# Patient Record
Sex: MALE | Race: WHITE | NOT HISPANIC OR LATINO | Employment: OTHER | URBAN - METROPOLITAN AREA
[De-identification: names, ages, dates, MRNs, and addresses within clinical notes are randomized per-mention and may not be internally consistent; named-entity substitution may affect disease eponyms.]

---

## 2017-02-24 ENCOUNTER — OFFICE VISIT - HEALTHEAST (OUTPATIENT)
Dept: CARDIOLOGY | Facility: CLINIC | Age: 72
End: 2017-02-24

## 2017-02-24 ENCOUNTER — AMBULATORY - HEALTHEAST (OUTPATIENT)
Dept: CARDIOLOGY | Facility: CLINIC | Age: 72
End: 2017-02-24

## 2017-02-24 DIAGNOSIS — I48.20 CHRONIC ATRIAL FIBRILLATION (H): ICD-10-CM

## 2017-02-24 DIAGNOSIS — R07.9 CHEST PAIN, UNSPECIFIED: ICD-10-CM

## 2017-02-24 DIAGNOSIS — E78.00 PURE HYPERCHOLESTEROLEMIA: ICD-10-CM

## 2017-02-24 DIAGNOSIS — G47.33 OBSTRUCTIVE SLEEP APNEA SYNDROME: ICD-10-CM

## 2017-02-24 DIAGNOSIS — I48.91 ATRIAL FIBRILLATION (H): ICD-10-CM

## 2017-02-24 DIAGNOSIS — I10 ESSENTIAL HYPERTENSION: ICD-10-CM

## 2017-02-24 DIAGNOSIS — E11.9 TYPE 2 DIABETES MELLITUS WITHOUT COMPLICATION (H): ICD-10-CM

## 2017-02-24 ASSESSMENT — MIFFLIN-ST. JEOR: SCORE: 1937.82

## 2017-02-25 ENCOUNTER — COMMUNICATION - HEALTHEAST (OUTPATIENT)
Dept: CARDIOLOGY | Facility: CLINIC | Age: 72
End: 2017-02-25

## 2017-02-25 DIAGNOSIS — I48.20 CHRONIC ATRIAL FIBRILLATION (H): ICD-10-CM

## 2017-02-27 ENCOUNTER — HOSPITAL ENCOUNTER (OUTPATIENT)
Dept: CARDIOLOGY | Facility: HOSPITAL | Age: 72
Discharge: HOME | End: 2017-02-27
Attending: INTERNAL MEDICINE

## 2017-02-27 ENCOUNTER — HOSPITAL ENCOUNTER (OUTPATIENT)
Dept: NUCLEAR MEDICINE | Facility: HOSPITAL | Age: 72
Discharge: HOME | End: 2017-02-27
Attending: INTERNAL MEDICINE

## 2017-02-27 DIAGNOSIS — E11.9 TYPE 2 DIABETES MELLITUS WITHOUT COMPLICATION (H): ICD-10-CM

## 2017-02-27 DIAGNOSIS — R07.9 CHEST PAIN, UNSPECIFIED: ICD-10-CM

## 2017-02-27 LAB
CV STRESS MAX HR HE: 83
NUC STRESS EJECTION FRACTION: 68 %
STRESS ECHO BASELINE BP: NORMAL M/S
STRESS ECHO BASELINE HR: 62 BPM
STRESS ECHO CALCULATED PERCENT HR: 56 %
STRESS ECHO LAST STRESS BP: NORMAL M/S

## 2017-03-22 ENCOUNTER — AMBULATORY - HEALTHEAST (OUTPATIENT)
Dept: FAMILY MEDICINE | Facility: CLINIC | Age: 72
End: 2017-03-22

## 2017-03-22 ENCOUNTER — OFFICE VISIT - HEALTHEAST (OUTPATIENT)
Dept: FAMILY MEDICINE | Facility: CLINIC | Age: 72
End: 2017-03-22

## 2017-03-22 DIAGNOSIS — I48.91 ATRIAL FIBRILLATION, UNSPECIFIED TYPE (H): ICD-10-CM

## 2017-03-22 DIAGNOSIS — M75.52 SHOULDER BURSITIS, LEFT: ICD-10-CM

## 2017-03-22 ASSESSMENT — MIFFLIN-ST. JEOR: SCORE: 1931.01

## 2017-03-23 ENCOUNTER — AMBULATORY - HEALTHEAST (OUTPATIENT)
Dept: CARDIOLOGY | Facility: CLINIC | Age: 72
End: 2017-03-23

## 2017-03-23 DIAGNOSIS — I48.91 ATRIAL FIBRILLATION (H): ICD-10-CM

## 2017-03-30 ENCOUNTER — AMBULATORY - HEALTHEAST (OUTPATIENT)
Dept: CARDIOLOGY | Facility: CLINIC | Age: 72
End: 2017-03-30

## 2017-03-30 DIAGNOSIS — I48.91 ATRIAL FIBRILLATION (H): ICD-10-CM

## 2017-04-20 ENCOUNTER — RECORDS - HEALTHEAST (OUTPATIENT)
Dept: ADMINISTRATIVE | Facility: OTHER | Age: 72
End: 2017-04-20

## 2017-05-11 ENCOUNTER — AMBULATORY - HEALTHEAST (OUTPATIENT)
Dept: CARDIOLOGY | Facility: CLINIC | Age: 72
End: 2017-05-11

## 2017-05-11 DIAGNOSIS — I48.91 ATRIAL FIBRILLATION (H): ICD-10-CM

## 2017-05-16 ENCOUNTER — COMMUNICATION - HEALTHEAST (OUTPATIENT)
Dept: CARDIOLOGY | Facility: CLINIC | Age: 72
End: 2017-05-16

## 2017-05-16 DIAGNOSIS — I48.20 CHRONIC ATRIAL FIBRILLATION (H): ICD-10-CM

## 2017-06-13 ENCOUNTER — COMMUNICATION - HEALTHEAST (OUTPATIENT)
Dept: FAMILY MEDICINE | Facility: CLINIC | Age: 72
End: 2017-06-13

## 2017-06-13 DIAGNOSIS — K21.00 GASTROESOPHAGEAL REFLUX DISEASE WITH ESOPHAGITIS: ICD-10-CM

## 2017-06-16 ENCOUNTER — AMBULATORY - HEALTHEAST (OUTPATIENT)
Dept: CARDIOLOGY | Facility: CLINIC | Age: 72
End: 2017-06-16

## 2017-06-16 ENCOUNTER — OFFICE VISIT - HEALTHEAST (OUTPATIENT)
Dept: FAMILY MEDICINE | Facility: CLINIC | Age: 72
End: 2017-06-16

## 2017-06-16 DIAGNOSIS — I48.91 ATRIAL FIBRILLATION (H): ICD-10-CM

## 2017-06-16 DIAGNOSIS — R30.0 DYSURIA: ICD-10-CM

## 2017-06-16 DIAGNOSIS — E11.9 TYPE 2 DIABETES MELLITUS WITHOUT COMPLICATION, WITHOUT LONG-TERM CURRENT USE OF INSULIN (H): ICD-10-CM

## 2017-06-16 DIAGNOSIS — I48.91 ATRIAL FIBRILLATION, UNSPECIFIED TYPE (H): ICD-10-CM

## 2017-06-16 LAB — HBA1C MFR BLD: 7.7 % (ref 3.5–6)

## 2017-06-16 ASSESSMENT — MIFFLIN-ST. JEOR: SCORE: 1911.74

## 2017-07-04 ENCOUNTER — COMMUNICATION - HEALTHEAST (OUTPATIENT)
Dept: FAMILY MEDICINE | Facility: CLINIC | Age: 72
End: 2017-07-04

## 2017-07-04 DIAGNOSIS — I48.91 ATRIAL FIBRILLATION (H): ICD-10-CM

## 2017-08-01 ENCOUNTER — COMMUNICATION - HEALTHEAST (OUTPATIENT)
Dept: SCHEDULING | Facility: CLINIC | Age: 72
End: 2017-08-01

## 2017-08-01 DIAGNOSIS — L50.9 URTICARIA, UNSPECIFIED: ICD-10-CM

## 2017-08-15 ENCOUNTER — COMMUNICATION - HEALTHEAST (OUTPATIENT)
Dept: FAMILY MEDICINE | Facility: CLINIC | Age: 72
End: 2017-08-15

## 2017-08-15 DIAGNOSIS — Z13.1 DM (DIABETES MELLITUS SCREEN): ICD-10-CM

## 2017-08-15 DIAGNOSIS — I48.91 ATRIAL FIBRILLATION (H): ICD-10-CM

## 2017-08-15 DIAGNOSIS — I10 UNSPECIFIED ESSENTIAL HYPERTENSION: ICD-10-CM

## 2017-08-28 ENCOUNTER — COMMUNICATION - HEALTHEAST (OUTPATIENT)
Dept: FAMILY MEDICINE | Facility: CLINIC | Age: 72
End: 2017-08-28

## 2017-08-28 ENCOUNTER — COMMUNICATION - HEALTHEAST (OUTPATIENT)
Dept: CARDIOLOGY | Facility: CLINIC | Age: 72
End: 2017-08-28

## 2017-08-28 DIAGNOSIS — I48.20 CHRONIC ATRIAL FIBRILLATION (H): ICD-10-CM

## 2017-09-05 ENCOUNTER — RECORDS - HEALTHEAST (OUTPATIENT)
Dept: ADMINISTRATIVE | Facility: OTHER | Age: 72
End: 2017-09-05

## 2017-09-05 ENCOUNTER — AMBULATORY - HEALTHEAST (OUTPATIENT)
Dept: CARDIOLOGY | Facility: CLINIC | Age: 72
End: 2017-09-05

## 2017-09-05 DIAGNOSIS — I48.91 ATRIAL FIBRILLATION (H): ICD-10-CM

## 2017-09-21 ENCOUNTER — COMMUNICATION - HEALTHEAST (OUTPATIENT)
Dept: CARDIOLOGY | Facility: CLINIC | Age: 72
End: 2017-09-21

## 2017-09-21 DIAGNOSIS — I48.20 CHRONIC ATRIAL FIBRILLATION (H): ICD-10-CM

## 2017-10-02 ENCOUNTER — OFFICE VISIT - HEALTHEAST (OUTPATIENT)
Dept: FAMILY MEDICINE | Facility: CLINIC | Age: 72
End: 2017-10-02

## 2017-10-02 DIAGNOSIS — Z01.818 PREOP EXAMINATION: ICD-10-CM

## 2017-10-02 DIAGNOSIS — Z23 NEED FOR VACCINATION: ICD-10-CM

## 2017-10-02 DIAGNOSIS — H26.9 CATARACT: ICD-10-CM

## 2017-10-02 ASSESSMENT — MIFFLIN-ST. JEOR: SCORE: 1942.35

## 2017-10-03 ENCOUNTER — AMBULATORY - HEALTHEAST (OUTPATIENT)
Dept: CARDIOLOGY | Facility: CLINIC | Age: 72
End: 2017-10-03

## 2017-10-03 DIAGNOSIS — I48.91 ATRIAL FIBRILLATION (H): ICD-10-CM

## 2017-10-24 ENCOUNTER — RECORDS - HEALTHEAST (OUTPATIENT)
Dept: ADMINISTRATIVE | Facility: OTHER | Age: 72
End: 2017-10-24

## 2017-10-31 ENCOUNTER — AMBULATORY - HEALTHEAST (OUTPATIENT)
Dept: CARDIOLOGY | Facility: CLINIC | Age: 72
End: 2017-10-31

## 2017-10-31 DIAGNOSIS — I48.91 ATRIAL FIBRILLATION (H): ICD-10-CM

## 2017-11-14 ENCOUNTER — COMMUNICATION - HEALTHEAST (OUTPATIENT)
Dept: FAMILY MEDICINE | Facility: CLINIC | Age: 72
End: 2017-11-14

## 2017-11-14 ENCOUNTER — RECORDS - HEALTHEAST (OUTPATIENT)
Dept: ADMINISTRATIVE | Facility: OTHER | Age: 72
End: 2017-11-14

## 2017-11-14 DIAGNOSIS — E11.9 TYPE 2 DIABETES MELLITUS WITHOUT COMPLICATION, WITHOUT LONG-TERM CURRENT USE OF INSULIN (H): ICD-10-CM

## 2017-11-25 ENCOUNTER — COMMUNICATION - HEALTHEAST (OUTPATIENT)
Dept: FAMILY MEDICINE | Facility: CLINIC | Age: 72
End: 2017-11-25

## 2017-11-25 DIAGNOSIS — G60.9 HEREDITARY AND IDIOPATHIC PERIPHERAL NEUROPATHY: ICD-10-CM

## 2017-12-02 ENCOUNTER — COMMUNICATION - HEALTHEAST (OUTPATIENT)
Dept: SCHEDULING | Facility: CLINIC | Age: 72
End: 2017-12-02

## 2017-12-03 ENCOUNTER — COMMUNICATION - HEALTHEAST (OUTPATIENT)
Dept: FAMILY MEDICINE | Facility: CLINIC | Age: 72
End: 2017-12-03

## 2017-12-03 DIAGNOSIS — E78.00 PURE HYPERCHOLESTEROLEMIA: ICD-10-CM

## 2017-12-06 ENCOUNTER — COMMUNICATION - HEALTHEAST (OUTPATIENT)
Dept: CARDIOLOGY | Facility: CLINIC | Age: 72
End: 2017-12-06

## 2017-12-06 DIAGNOSIS — I48.20 CHRONIC ATRIAL FIBRILLATION (H): ICD-10-CM

## 2017-12-14 ENCOUNTER — AMBULATORY - HEALTHEAST (OUTPATIENT)
Dept: CARDIOLOGY | Facility: CLINIC | Age: 72
End: 2017-12-14

## 2017-12-14 DIAGNOSIS — I48.91 ATRIAL FIBRILLATION (H): ICD-10-CM

## 2017-12-14 DIAGNOSIS — I48.91 ATRIAL FIBRILLATION, UNSPECIFIED TYPE (H): ICD-10-CM

## 2017-12-29 ENCOUNTER — COMMUNICATION - HEALTHEAST (OUTPATIENT)
Dept: CARDIOLOGY | Facility: CLINIC | Age: 72
End: 2017-12-29

## 2017-12-29 DIAGNOSIS — I48.20 CHRONIC ATRIAL FIBRILLATION (H): ICD-10-CM

## 2018-01-05 ENCOUNTER — COMMUNICATION - HEALTHEAST (OUTPATIENT)
Dept: SCHEDULING | Facility: CLINIC | Age: 73
End: 2018-01-05

## 2018-01-18 ENCOUNTER — AMBULATORY - HEALTHEAST (OUTPATIENT)
Dept: CARDIOLOGY | Facility: CLINIC | Age: 73
End: 2018-01-18

## 2018-01-18 DIAGNOSIS — I48.91 ATRIAL FIBRILLATION (H): ICD-10-CM

## 2018-01-18 LAB — INTERNATIONAL NORMALIZATION RATIO, POC - HISTORICAL: 1.7

## 2018-02-13 ENCOUNTER — COMMUNICATION - HEALTHEAST (OUTPATIENT)
Dept: CARDIOLOGY | Facility: CLINIC | Age: 73
End: 2018-02-13

## 2018-02-13 DIAGNOSIS — I48.20 CHRONIC ATRIAL FIBRILLATION (H): ICD-10-CM

## 2018-02-15 ENCOUNTER — COMMUNICATION - HEALTHEAST (OUTPATIENT)
Dept: CARDIOLOGY | Facility: CLINIC | Age: 73
End: 2018-02-15

## 2018-02-15 DIAGNOSIS — I48.20 CHRONIC ATRIAL FIBRILLATION (H): ICD-10-CM

## 2018-02-19 ENCOUNTER — AMBULATORY - HEALTHEAST (OUTPATIENT)
Dept: CARDIOLOGY | Facility: CLINIC | Age: 73
End: 2018-02-19

## 2018-02-19 DIAGNOSIS — I48.91 ATRIAL FIBRILLATION (H): ICD-10-CM

## 2018-02-19 LAB — INTERNATIONAL NORMALIZATION RATIO, POC - HISTORICAL: 2.1

## 2018-03-19 ENCOUNTER — AMBULATORY - HEALTHEAST (OUTPATIENT)
Dept: CARDIOLOGY | Facility: CLINIC | Age: 73
End: 2018-03-19

## 2018-03-19 DIAGNOSIS — Z79.01 LONG-TERM (CURRENT) USE OF ANTICOAGULANTS: ICD-10-CM

## 2018-03-19 DIAGNOSIS — I48.91 ATRIAL FIBRILLATION (H): ICD-10-CM

## 2018-03-19 LAB — POC INR - HE - HISTORICAL: 2.6 (ref 0.9–1.1)

## 2018-03-25 ENCOUNTER — COMMUNICATION - HEALTHEAST (OUTPATIENT)
Dept: CARDIOLOGY | Facility: CLINIC | Age: 73
End: 2018-03-25

## 2018-03-25 DIAGNOSIS — I48.20 CHRONIC ATRIAL FIBRILLATION (H): ICD-10-CM

## 2018-04-04 ENCOUNTER — AMBULATORY - HEALTHEAST (OUTPATIENT)
Dept: CARDIOLOGY | Facility: CLINIC | Age: 73
End: 2018-04-04

## 2018-04-04 ENCOUNTER — OFFICE VISIT - HEALTHEAST (OUTPATIENT)
Dept: CARDIOLOGY | Facility: CLINIC | Age: 73
End: 2018-04-04

## 2018-04-04 DIAGNOSIS — E78.00 PURE HYPERCHOLESTEROLEMIA: ICD-10-CM

## 2018-04-04 DIAGNOSIS — I10 ESSENTIAL HYPERTENSION: ICD-10-CM

## 2018-04-04 DIAGNOSIS — I48.91 ATRIAL FIBRILLATION (H): ICD-10-CM

## 2018-04-04 DIAGNOSIS — I48.20 CHRONIC ATRIAL FIBRILLATION (H): ICD-10-CM

## 2018-04-04 LAB — INTERNATIONAL NORMALIZATION RATIO, POC - HISTORICAL: 2.7

## 2018-04-04 ASSESSMENT — MIFFLIN-ST. JEOR: SCORE: 1910.6

## 2018-04-13 ENCOUNTER — COMMUNICATION - HEALTHEAST (OUTPATIENT)
Dept: FAMILY MEDICINE | Facility: CLINIC | Age: 73
End: 2018-04-13

## 2018-04-13 DIAGNOSIS — I48.91 ATRIAL FIBRILLATION (H): ICD-10-CM

## 2018-04-24 ENCOUNTER — RECORDS - HEALTHEAST (OUTPATIENT)
Dept: ADMINISTRATIVE | Facility: OTHER | Age: 73
End: 2018-04-24

## 2018-05-05 ENCOUNTER — COMMUNICATION - HEALTHEAST (OUTPATIENT)
Dept: FAMILY MEDICINE | Facility: CLINIC | Age: 73
End: 2018-05-05

## 2018-05-05 ENCOUNTER — COMMUNICATION - HEALTHEAST (OUTPATIENT)
Dept: CARDIOLOGY | Facility: CLINIC | Age: 73
End: 2018-05-05

## 2018-05-05 DIAGNOSIS — Z13.1 ENCOUNTER FOR SCREENING FOR DIABETES MELLITUS: ICD-10-CM

## 2018-05-05 DIAGNOSIS — I10 ESSENTIAL HYPERTENSION: ICD-10-CM

## 2018-05-05 DIAGNOSIS — I48.91 ATRIAL FIBRILLATION (H): ICD-10-CM

## 2018-05-05 DIAGNOSIS — I48.20 CHRONIC ATRIAL FIBRILLATION (H): ICD-10-CM

## 2018-05-15 ENCOUNTER — RECORDS - HEALTHEAST (OUTPATIENT)
Dept: ADMINISTRATIVE | Facility: OTHER | Age: 73
End: 2018-05-15

## 2018-05-23 ENCOUNTER — AMBULATORY - HEALTHEAST (OUTPATIENT)
Dept: CARDIOLOGY | Facility: CLINIC | Age: 73
End: 2018-05-23

## 2018-05-23 DIAGNOSIS — I48.91 ATRIAL FIBRILLATION (H): ICD-10-CM

## 2018-05-23 LAB — INTERNATIONAL NORMALIZATION RATIO, POC - HISTORICAL: 2.1

## 2018-05-25 ENCOUNTER — COMMUNICATION - HEALTHEAST (OUTPATIENT)
Dept: FAMILY MEDICINE | Facility: CLINIC | Age: 73
End: 2018-05-25

## 2018-05-25 DIAGNOSIS — G60.9 HEREDITARY AND IDIOPATHIC PERIPHERAL NEUROPATHY: ICD-10-CM

## 2018-06-14 ENCOUNTER — COMMUNICATION - HEALTHEAST (OUTPATIENT)
Dept: CARDIOLOGY | Facility: CLINIC | Age: 73
End: 2018-06-14

## 2018-06-14 DIAGNOSIS — I48.20 CHRONIC ATRIAL FIBRILLATION (H): ICD-10-CM

## 2018-07-25 ENCOUNTER — AMBULATORY - HEALTHEAST (OUTPATIENT)
Dept: CARDIOLOGY | Facility: CLINIC | Age: 73
End: 2018-07-25

## 2018-07-25 DIAGNOSIS — I48.91 ATRIAL FIBRILLATION (H): ICD-10-CM

## 2018-07-25 LAB — INTERNATIONAL NORMALIZATION RATIO, POC - HISTORICAL: 2

## 2018-09-16 ENCOUNTER — COMMUNICATION - HEALTHEAST (OUTPATIENT)
Dept: CARDIOLOGY | Facility: CLINIC | Age: 73
End: 2018-09-16

## 2018-09-16 DIAGNOSIS — I48.20 CHRONIC ATRIAL FIBRILLATION (H): ICD-10-CM

## 2018-09-19 ENCOUNTER — AMBULATORY - HEALTHEAST (OUTPATIENT)
Dept: CARDIOLOGY | Facility: CLINIC | Age: 73
End: 2018-09-19

## 2018-09-19 DIAGNOSIS — I48.91 ATRIAL FIBRILLATION (H): ICD-10-CM

## 2018-09-19 LAB — INTERNATIONAL NORMALIZATION RATIO, POC - HISTORICAL: 2.3

## 2018-09-20 ENCOUNTER — OFFICE VISIT - HEALTHEAST (OUTPATIENT)
Dept: FAMILY MEDICINE | Facility: CLINIC | Age: 73
End: 2018-09-20

## 2018-09-20 ENCOUNTER — COMMUNICATION - HEALTHEAST (OUTPATIENT)
Dept: SCHEDULING | Facility: CLINIC | Age: 73
End: 2018-09-20

## 2018-09-20 DIAGNOSIS — I48.91 ATRIAL FIBRILLATION (H): ICD-10-CM

## 2018-09-20 DIAGNOSIS — M79.89 CALF SWELLING: ICD-10-CM

## 2018-09-20 DIAGNOSIS — E11.9 TYPE 2 DIABETES MELLITUS WITHOUT COMPLICATION, WITHOUT LONG-TERM CURRENT USE OF INSULIN (H): ICD-10-CM

## 2018-09-20 DIAGNOSIS — I10 ESSENTIAL HYPERTENSION: ICD-10-CM

## 2018-09-20 LAB
ANION GAP SERPL CALCULATED.3IONS-SCNC: 12 MMOL/L (ref 5–18)
BUN SERPL-MCNC: 19 MG/DL (ref 8–28)
CALCIUM SERPL-MCNC: 10.4 MG/DL (ref 8.5–10.5)
CHLORIDE BLD-SCNC: 105 MMOL/L (ref 98–107)
CO2 SERPL-SCNC: 22 MMOL/L (ref 22–31)
CREAT SERPL-MCNC: 0.77 MG/DL (ref 0.7–1.3)
D DIMER PPP FEU-MCNC: 0.28 FEU UG/ML
GFR SERPL CREATININE-BSD FRML MDRD: >60 ML/MIN/1.73M2
GLUCOSE BLD-MCNC: 131 MG/DL (ref 70–125)
HBA1C MFR BLD: 7.7 % (ref 3.5–6)
POTASSIUM BLD-SCNC: 4.8 MMOL/L (ref 3.5–5)
SODIUM SERPL-SCNC: 139 MMOL/L (ref 136–145)

## 2018-09-20 ASSESSMENT — MIFFLIN-ST. JEOR: SCORE: 1925.57

## 2018-09-21 ENCOUNTER — RECORDS - HEALTHEAST (OUTPATIENT)
Dept: VASCULAR ULTRASOUND | Facility: CLINIC | Age: 73
End: 2018-09-21

## 2018-09-21 ENCOUNTER — COMMUNICATION - HEALTHEAST (OUTPATIENT)
Dept: FAMILY MEDICINE | Facility: CLINIC | Age: 73
End: 2018-09-21

## 2018-09-21 DIAGNOSIS — M79.89 OTHER SPECIFIED SOFT TISSUE DISORDERS: ICD-10-CM

## 2018-10-01 ENCOUNTER — OFFICE VISIT - HEALTHEAST (OUTPATIENT)
Dept: FAMILY MEDICINE | Facility: CLINIC | Age: 73
End: 2018-10-01

## 2018-10-01 DIAGNOSIS — M79.662 PAIN OF LEFT LOWER LEG: ICD-10-CM

## 2018-10-01 DIAGNOSIS — D12.6 ADENOMATOUS POLYP OF COLON, UNSPECIFIED PART OF COLON: ICD-10-CM

## 2018-10-01 ASSESSMENT — MIFFLIN-ST. JEOR: SCORE: 1918.54

## 2018-10-03 ENCOUNTER — COMMUNICATION - HEALTHEAST (OUTPATIENT)
Dept: CARDIOLOGY | Facility: CLINIC | Age: 73
End: 2018-10-03

## 2018-10-12 ENCOUNTER — COMMUNICATION - HEALTHEAST (OUTPATIENT)
Dept: FAMILY MEDICINE | Facility: CLINIC | Age: 73
End: 2018-10-12

## 2018-10-12 DIAGNOSIS — I10 ESSENTIAL HYPERTENSION: ICD-10-CM

## 2018-10-12 DIAGNOSIS — I48.91 ATRIAL FIBRILLATION (H): ICD-10-CM

## 2018-10-30 ENCOUNTER — COMMUNICATION - HEALTHEAST (OUTPATIENT)
Dept: CARDIOLOGY | Facility: CLINIC | Age: 73
End: 2018-10-30

## 2018-10-30 ENCOUNTER — AMBULATORY - HEALTHEAST (OUTPATIENT)
Dept: CARDIOLOGY | Facility: CLINIC | Age: 73
End: 2018-10-30

## 2018-10-30 DIAGNOSIS — I48.91 ATRIAL FIBRILLATION (H): ICD-10-CM

## 2018-10-30 LAB — INTERNATIONAL NORMALIZATION RATIO, POC - HISTORICAL: 1.8

## 2018-11-03 ENCOUNTER — COMMUNICATION - HEALTHEAST (OUTPATIENT)
Dept: FAMILY MEDICINE | Facility: CLINIC | Age: 73
End: 2018-11-03

## 2018-11-03 DIAGNOSIS — Z13.1 ENCOUNTER FOR SCREENING FOR DIABETES MELLITUS: ICD-10-CM

## 2018-11-08 ENCOUNTER — RECORDS - HEALTHEAST (OUTPATIENT)
Dept: ADMINISTRATIVE | Facility: OTHER | Age: 73
End: 2018-11-08

## 2018-11-16 ENCOUNTER — COMMUNICATION - HEALTHEAST (OUTPATIENT)
Dept: FAMILY MEDICINE | Facility: CLINIC | Age: 73
End: 2018-11-16

## 2018-11-16 DIAGNOSIS — G60.9 HEREDITARY AND IDIOPATHIC PERIPHERAL NEUROPATHY: ICD-10-CM

## 2018-11-20 ENCOUNTER — OFFICE VISIT - HEALTHEAST (OUTPATIENT)
Dept: FAMILY MEDICINE | Facility: CLINIC | Age: 73
End: 2018-11-20

## 2018-11-20 ENCOUNTER — AMBULATORY - HEALTHEAST (OUTPATIENT)
Dept: CARDIOLOGY | Facility: CLINIC | Age: 73
End: 2018-11-20

## 2018-11-20 DIAGNOSIS — I48.91 ATRIAL FIBRILLATION (H): ICD-10-CM

## 2018-11-20 DIAGNOSIS — M75.52 BURSITIS OF LEFT SHOULDER: ICD-10-CM

## 2018-11-20 LAB — INR PPP: 2.1 (ref 0.9–1.1)

## 2018-11-20 ASSESSMENT — MIFFLIN-ST. JEOR: SCORE: 1937.82

## 2018-11-24 ENCOUNTER — COMMUNICATION - HEALTHEAST (OUTPATIENT)
Dept: FAMILY MEDICINE | Facility: CLINIC | Age: 73
End: 2018-11-24

## 2018-11-24 DIAGNOSIS — E11.9 TYPE 2 DIABETES MELLITUS WITHOUT COMPLICATION, WITHOUT LONG-TERM CURRENT USE OF INSULIN (H): ICD-10-CM

## 2018-11-26 ENCOUNTER — OFFICE VISIT - HEALTHEAST (OUTPATIENT)
Dept: FAMILY MEDICINE | Facility: CLINIC | Age: 73
End: 2018-11-26

## 2018-11-26 DIAGNOSIS — M75.52 BURSITIS OF LEFT SHOULDER: ICD-10-CM

## 2018-11-26 DIAGNOSIS — I48.20 CHRONIC ATRIAL FIBRILLATION (H): ICD-10-CM

## 2018-11-26 DIAGNOSIS — E66.01 MORBID OBESITY (H): ICD-10-CM

## 2018-11-26 ASSESSMENT — MIFFLIN-ST. JEOR: SCORE: 1942.35

## 2018-11-30 ENCOUNTER — COMMUNICATION - HEALTHEAST (OUTPATIENT)
Dept: CARDIOLOGY | Facility: CLINIC | Age: 73
End: 2018-11-30

## 2018-12-07 ENCOUNTER — COMMUNICATION - HEALTHEAST (OUTPATIENT)
Dept: FAMILY MEDICINE | Facility: CLINIC | Age: 73
End: 2018-12-07

## 2018-12-07 DIAGNOSIS — E11.9 TYPE 2 DIABETES MELLITUS WITHOUT COMPLICATION, WITHOUT LONG-TERM CURRENT USE OF INSULIN (H): ICD-10-CM

## 2018-12-11 ENCOUNTER — AMBULATORY - HEALTHEAST (OUTPATIENT)
Dept: CARDIOLOGY | Facility: CLINIC | Age: 73
End: 2018-12-11

## 2018-12-11 ENCOUNTER — COMMUNICATION - HEALTHEAST (OUTPATIENT)
Dept: FAMILY MEDICINE | Facility: CLINIC | Age: 73
End: 2018-12-11

## 2018-12-11 DIAGNOSIS — I48.91 ATRIAL FIBRILLATION (H): ICD-10-CM

## 2018-12-11 DIAGNOSIS — E78.00 PURE HYPERCHOLESTEROLEMIA: ICD-10-CM

## 2018-12-11 LAB — INTERNATIONAL NORMALIZATION RATIO, POC - HISTORICAL: 2.1

## 2018-12-19 ENCOUNTER — COMMUNICATION - HEALTHEAST (OUTPATIENT)
Dept: FAMILY MEDICINE | Facility: CLINIC | Age: 73
End: 2018-12-19

## 2018-12-19 DIAGNOSIS — K21.00 GASTROESOPHAGEAL REFLUX DISEASE WITH ESOPHAGITIS: ICD-10-CM

## 2018-12-20 ENCOUNTER — OFFICE VISIT - HEALTHEAST (OUTPATIENT)
Dept: FAMILY MEDICINE | Facility: CLINIC | Age: 73
End: 2018-12-20

## 2018-12-20 DIAGNOSIS — K58.9 SPASM OF BOWEL: ICD-10-CM

## 2018-12-20 DIAGNOSIS — R13.10 DYSPHAGIA, UNSPECIFIED TYPE: ICD-10-CM

## 2018-12-20 DIAGNOSIS — I48.91 ATRIAL FIBRILLATION, UNSPECIFIED TYPE (H): ICD-10-CM

## 2018-12-20 DIAGNOSIS — E11.9 TYPE 2 DIABETES MELLITUS WITHOUT COMPLICATION, WITHOUT LONG-TERM CURRENT USE OF INSULIN (H): ICD-10-CM

## 2018-12-20 DIAGNOSIS — K21.00 GASTROESOPHAGEAL REFLUX DISEASE WITH ESOPHAGITIS: ICD-10-CM

## 2018-12-20 ASSESSMENT — MIFFLIN-ST. JEOR: SCORE: 1913.32

## 2018-12-26 ENCOUNTER — HOSPITAL ENCOUNTER (OUTPATIENT)
Dept: RADIOLOGY | Facility: HOSPITAL | Age: 73
Discharge: HOME | End: 2018-12-26
Attending: FAMILY MEDICINE

## 2018-12-26 DIAGNOSIS — R13.10 DYSPHAGIA, UNSPECIFIED TYPE: ICD-10-CM

## 2019-01-03 ENCOUNTER — COMMUNICATION - HEALTHEAST (OUTPATIENT)
Dept: FAMILY MEDICINE | Facility: CLINIC | Age: 74
End: 2019-01-03

## 2019-01-04 ENCOUNTER — AMBULATORY - HEALTHEAST (OUTPATIENT)
Dept: FAMILY MEDICINE | Facility: CLINIC | Age: 74
End: 2019-01-04

## 2019-01-09 ENCOUNTER — AMBULATORY - HEALTHEAST (OUTPATIENT)
Dept: FAMILY MEDICINE | Facility: CLINIC | Age: 74
End: 2019-01-09

## 2019-01-09 ENCOUNTER — COMMUNICATION - HEALTHEAST (OUTPATIENT)
Dept: FAMILY MEDICINE | Facility: CLINIC | Age: 74
End: 2019-01-09

## 2019-01-09 DIAGNOSIS — R13.10 DYSPHAGIA, UNSPECIFIED TYPE: ICD-10-CM

## 2019-01-10 ENCOUNTER — RECORDS - HEALTHEAST (OUTPATIENT)
Dept: ADMINISTRATIVE | Facility: OTHER | Age: 74
End: 2019-01-10

## 2019-01-11 ENCOUNTER — COMMUNICATION - HEALTHEAST (OUTPATIENT)
Dept: CARDIOLOGY | Facility: CLINIC | Age: 74
End: 2019-01-11

## 2019-01-11 DIAGNOSIS — I48.20 CHRONIC ATRIAL FIBRILLATION (H): ICD-10-CM

## 2019-01-30 ENCOUNTER — COMMUNICATION - HEALTHEAST (OUTPATIENT)
Dept: CARDIOLOGY | Facility: CLINIC | Age: 74
End: 2019-01-30

## 2019-01-30 ENCOUNTER — AMBULATORY - HEALTHEAST (OUTPATIENT)
Dept: CARDIOLOGY | Facility: CLINIC | Age: 74
End: 2019-01-30

## 2019-01-30 DIAGNOSIS — I48.91 ATRIAL FIBRILLATION (H): ICD-10-CM

## 2019-02-05 ENCOUNTER — OFFICE VISIT - HEALTHEAST (OUTPATIENT)
Dept: FAMILY MEDICINE | Facility: CLINIC | Age: 74
End: 2019-02-05

## 2019-02-05 DIAGNOSIS — I48.91 ATRIAL FIBRILLATION, UNSPECIFIED TYPE (H): ICD-10-CM

## 2019-02-05 DIAGNOSIS — L60.2 ONYCHOGRYPOSIS OF TOENAIL: ICD-10-CM

## 2019-02-05 DIAGNOSIS — E78.00 PURE HYPERCHOLESTEROLEMIA: ICD-10-CM

## 2019-02-05 DIAGNOSIS — E11.9 TYPE 2 DIABETES MELLITUS WITHOUT COMPLICATION, WITHOUT LONG-TERM CURRENT USE OF INSULIN (H): ICD-10-CM

## 2019-02-05 LAB
HBA1C MFR BLD: 8.2 % (ref 3.5–6)
INR PPP: 3.2 (ref 0.9–1.1)
LDLC SERPL CALC-MCNC: 93 MG/DL

## 2019-02-05 ASSESSMENT — MIFFLIN-ST. JEOR: SCORE: 1920.81

## 2019-02-11 ENCOUNTER — COMMUNICATION - HEALTHEAST (OUTPATIENT)
Dept: ANTICOAGULATION | Facility: CLINIC | Age: 74
End: 2019-02-11

## 2019-02-11 DIAGNOSIS — I48.91 ATRIAL FIBRILLATION, UNSPECIFIED TYPE (H): ICD-10-CM

## 2019-02-14 ENCOUNTER — AMBULATORY - HEALTHEAST (OUTPATIENT)
Dept: PODIATRY | Facility: CLINIC | Age: 74
End: 2019-02-14

## 2019-02-18 ENCOUNTER — AMBULATORY - HEALTHEAST (OUTPATIENT)
Dept: LAB | Facility: CLINIC | Age: 74
End: 2019-02-18

## 2019-02-18 ENCOUNTER — COMMUNICATION - HEALTHEAST (OUTPATIENT)
Dept: ANTICOAGULATION | Facility: CLINIC | Age: 74
End: 2019-02-18

## 2019-02-18 ENCOUNTER — OFFICE VISIT - HEALTHEAST (OUTPATIENT)
Dept: PODIATRY | Facility: CLINIC | Age: 74
End: 2019-02-18

## 2019-02-18 DIAGNOSIS — B35.1 NAIL FUNGUS: ICD-10-CM

## 2019-02-18 DIAGNOSIS — L03.031 CELLULITIS OF TOE OF RIGHT FOOT: ICD-10-CM

## 2019-02-18 DIAGNOSIS — G58.9 MONONEURITIS: ICD-10-CM

## 2019-02-18 DIAGNOSIS — E11.9 TYPE 2 DIABETES MELLITUS WITHOUT COMPLICATION, WITHOUT LONG-TERM CURRENT USE OF INSULIN (H): ICD-10-CM

## 2019-02-18 DIAGNOSIS — I48.91 ATRIAL FIBRILLATION, UNSPECIFIED TYPE (H): ICD-10-CM

## 2019-02-18 DIAGNOSIS — L60.2 ONYCHAUXIS: ICD-10-CM

## 2019-02-18 DIAGNOSIS — G58.9 COMPRESSION NEUROPATHY: ICD-10-CM

## 2019-02-18 DIAGNOSIS — G57.50 TARSAL TUNNEL SYNDROME, UNSPECIFIED LATERALITY: ICD-10-CM

## 2019-02-18 LAB — INR PPP: 2.7 (ref 0.9–1.1)

## 2019-02-18 ASSESSMENT — MIFFLIN-ST. JEOR: SCORE: 1919.67

## 2019-02-21 ENCOUNTER — COMMUNICATION - HEALTHEAST (OUTPATIENT)
Dept: FAMILY MEDICINE | Facility: CLINIC | Age: 74
End: 2019-02-21

## 2019-02-25 ENCOUNTER — COMMUNICATION - HEALTHEAST (OUTPATIENT)
Dept: ANTICOAGULATION | Facility: CLINIC | Age: 74
End: 2019-02-25

## 2019-03-04 ENCOUNTER — COMMUNICATION - HEALTHEAST (OUTPATIENT)
Dept: ANTICOAGULATION | Facility: CLINIC | Age: 74
End: 2019-03-04

## 2019-03-04 ENCOUNTER — OFFICE VISIT - HEALTHEAST (OUTPATIENT)
Dept: PODIATRY | Facility: CLINIC | Age: 74
End: 2019-03-04

## 2019-03-04 ENCOUNTER — AMBULATORY - HEALTHEAST (OUTPATIENT)
Dept: LAB | Facility: CLINIC | Age: 74
End: 2019-03-04

## 2019-03-04 DIAGNOSIS — L97.511: ICD-10-CM

## 2019-03-04 DIAGNOSIS — I48.91 ATRIAL FIBRILLATION, UNSPECIFIED TYPE (H): ICD-10-CM

## 2019-03-04 DIAGNOSIS — E13.621: ICD-10-CM

## 2019-03-04 DIAGNOSIS — L03.031 CELLULITIS OF TOE OF RIGHT FOOT: ICD-10-CM

## 2019-03-04 LAB — INR PPP: 2.3 (ref 0.9–1.1)

## 2019-03-04 ASSESSMENT — MIFFLIN-ST. JEOR: SCORE: 1919.67

## 2019-03-18 ENCOUNTER — OFFICE VISIT - HEALTHEAST (OUTPATIENT)
Dept: PODIATRY | Facility: CLINIC | Age: 74
End: 2019-03-18

## 2019-03-18 DIAGNOSIS — E13.621: ICD-10-CM

## 2019-03-18 DIAGNOSIS — L97.511: ICD-10-CM

## 2019-03-20 ENCOUNTER — COMMUNICATION - HEALTHEAST (OUTPATIENT)
Dept: FAMILY MEDICINE | Facility: CLINIC | Age: 74
End: 2019-03-20

## 2019-03-20 ENCOUNTER — RECORDS - HEALTHEAST (OUTPATIENT)
Dept: ADMINISTRATIVE | Facility: OTHER | Age: 74
End: 2019-03-20

## 2019-03-20 DIAGNOSIS — E11.9 TYPE 2 DIABETES MELLITUS WITHOUT COMPLICATION, WITHOUT LONG-TERM CURRENT USE OF INSULIN (H): ICD-10-CM

## 2019-03-28 ENCOUNTER — COMMUNICATION - HEALTHEAST (OUTPATIENT)
Dept: CARDIOLOGY | Facility: CLINIC | Age: 74
End: 2019-03-28

## 2019-03-29 ENCOUNTER — COMMUNICATION - HEALTHEAST (OUTPATIENT)
Dept: FAMILY MEDICINE | Facility: CLINIC | Age: 74
End: 2019-03-29

## 2019-03-29 ENCOUNTER — COMMUNICATION - HEALTHEAST (OUTPATIENT)
Dept: CARDIOLOGY | Facility: CLINIC | Age: 74
End: 2019-03-29

## 2019-04-03 ENCOUNTER — COMMUNICATION - HEALTHEAST (OUTPATIENT)
Dept: CARDIOLOGY | Facility: CLINIC | Age: 74
End: 2019-04-03

## 2019-04-03 DIAGNOSIS — I48.20 CHRONIC ATRIAL FIBRILLATION (H): ICD-10-CM

## 2019-04-08 ENCOUNTER — COMMUNICATION - HEALTHEAST (OUTPATIENT)
Dept: ANTICOAGULATION | Facility: CLINIC | Age: 74
End: 2019-04-08

## 2019-04-08 DIAGNOSIS — I48.91 ATRIAL FIBRILLATION, UNSPECIFIED TYPE (H): ICD-10-CM

## 2019-04-19 ENCOUNTER — COMMUNICATION - HEALTHEAST (OUTPATIENT)
Dept: FAMILY MEDICINE | Facility: CLINIC | Age: 74
End: 2019-04-19

## 2019-04-23 ENCOUNTER — COMMUNICATION - HEALTHEAST (OUTPATIENT)
Dept: FAMILY MEDICINE | Facility: CLINIC | Age: 74
End: 2019-04-23

## 2019-04-23 ENCOUNTER — AMBULATORY - HEALTHEAST (OUTPATIENT)
Dept: LAB | Facility: CLINIC | Age: 74
End: 2019-04-23

## 2019-04-23 DIAGNOSIS — I48.91 ATRIAL FIBRILLATION, UNSPECIFIED TYPE (H): ICD-10-CM

## 2019-04-23 DIAGNOSIS — I48.20 CHRONIC ATRIAL FIBRILLATION (H): ICD-10-CM

## 2019-04-23 LAB — INR PPP: 2.2 (ref 0.9–1.1)

## 2019-05-01 ENCOUNTER — COMMUNICATION - HEALTHEAST (OUTPATIENT)
Dept: FAMILY MEDICINE | Facility: CLINIC | Age: 74
End: 2019-05-01

## 2019-05-01 DIAGNOSIS — I10 ESSENTIAL HYPERTENSION: ICD-10-CM

## 2019-05-04 ENCOUNTER — COMMUNICATION - HEALTHEAST (OUTPATIENT)
Dept: SCHEDULING | Facility: CLINIC | Age: 74
End: 2019-05-04

## 2019-05-05 ENCOUNTER — SURGERY - HEALTHEAST (OUTPATIENT)
Dept: PODIATRY | Facility: CLINIC | Age: 74
End: 2019-05-05

## 2019-05-05 ENCOUNTER — OFFICE VISIT - HEALTHEAST (OUTPATIENT)
Dept: FAMILY MEDICINE | Facility: CLINIC | Age: 74
End: 2019-05-05

## 2019-05-05 DIAGNOSIS — L02.611 CELLULITIS AND ABSCESS OF TOE OF RIGHT FOOT: ICD-10-CM

## 2019-05-05 DIAGNOSIS — L03.031 CELLULITIS AND ABSCESS OF TOE OF RIGHT FOOT: ICD-10-CM

## 2019-05-05 ASSESSMENT — MIFFLIN-ST. JEOR: SCORE: 1937.82

## 2019-05-06 ENCOUNTER — ANESTHESIA - HEALTHEAST (OUTPATIENT)
Dept: SURGERY | Facility: HOSPITAL | Age: 74
End: 2019-05-06

## 2019-05-06 ENCOUNTER — SURGERY - HEALTHEAST (OUTPATIENT)
Dept: SURGERY | Facility: HOSPITAL | Age: 74
End: 2019-05-06

## 2019-05-06 ASSESSMENT — MIFFLIN-ST. JEOR: SCORE: 1937.82

## 2019-05-08 ENCOUNTER — HOME CARE/HOSPICE - HEALTHEAST (OUTPATIENT)
Dept: HOME HEALTH SERVICES | Facility: HOME HEALTH | Age: 74
End: 2019-05-08

## 2019-05-08 LAB
BACTERIA SPEC CULT: ABNORMAL

## 2019-05-09 ENCOUNTER — COMMUNICATION - HEALTHEAST (OUTPATIENT)
Dept: ANTICOAGULATION | Facility: CLINIC | Age: 74
End: 2019-05-09

## 2019-05-09 DIAGNOSIS — I48.91 ATRIAL FIBRILLATION, UNSPECIFIED TYPE (H): ICD-10-CM

## 2019-05-10 ENCOUNTER — COMMUNICATION - HEALTHEAST (OUTPATIENT)
Dept: SCHEDULING | Facility: CLINIC | Age: 74
End: 2019-05-10

## 2019-05-10 ENCOUNTER — COMMUNICATION - HEALTHEAST (OUTPATIENT)
Dept: CARE COORDINATION | Facility: CLINIC | Age: 74
End: 2019-05-10

## 2019-05-13 ENCOUNTER — AMBULATORY - HEALTHEAST (OUTPATIENT)
Dept: PODIATRY | Facility: CLINIC | Age: 74
End: 2019-05-13

## 2019-05-13 DIAGNOSIS — M86.9 OSTEOMYELITIS OF TOE OF RIGHT FOOT (H): ICD-10-CM

## 2019-05-13 DIAGNOSIS — M20.41 HAMMER TOE OF RIGHT FOOT: ICD-10-CM

## 2019-05-13 DIAGNOSIS — E11.9 TYPE 2 DIABETES MELLITUS WITHOUT COMPLICATION, WITHOUT LONG-TERM CURRENT USE OF INSULIN (H): ICD-10-CM

## 2019-05-13 ASSESSMENT — MIFFLIN-ST. JEOR: SCORE: 1937.82

## 2019-05-14 ENCOUNTER — OFFICE VISIT - HEALTHEAST (OUTPATIENT)
Dept: CARDIOLOGY | Facility: CLINIC | Age: 74
End: 2019-05-14

## 2019-05-14 DIAGNOSIS — I48.20 CHRONIC ATRIAL FIBRILLATION (H): ICD-10-CM

## 2019-05-14 ASSESSMENT — MIFFLIN-ST. JEOR: SCORE: 1881.8

## 2019-05-15 ENCOUNTER — OFFICE VISIT - HEALTHEAST (OUTPATIENT)
Dept: FAMILY MEDICINE | Facility: CLINIC | Age: 74
End: 2019-05-15

## 2019-05-15 DIAGNOSIS — L03.119 CELLULITIS OF FOOT: ICD-10-CM

## 2019-05-15 DIAGNOSIS — S98.131A AMPUTATED TOE OF RIGHT FOOT (H): ICD-10-CM

## 2019-05-15 ASSESSMENT — MIFFLIN-ST. JEOR: SCORE: 1895.78

## 2019-05-16 ENCOUNTER — COMMUNICATION - HEALTHEAST (OUTPATIENT)
Dept: FAMILY MEDICINE | Facility: CLINIC | Age: 74
End: 2019-05-16

## 2019-05-16 DIAGNOSIS — I48.91 ATRIAL FIBRILLATION (H): ICD-10-CM

## 2019-05-18 ENCOUNTER — COMMUNICATION - HEALTHEAST (OUTPATIENT)
Dept: FAMILY MEDICINE | Facility: CLINIC | Age: 74
End: 2019-05-18

## 2019-05-18 DIAGNOSIS — G60.9 HEREDITARY AND IDIOPATHIC PERIPHERAL NEUROPATHY: ICD-10-CM

## 2019-05-20 ENCOUNTER — OFFICE VISIT - HEALTHEAST (OUTPATIENT)
Dept: PODIATRY | Facility: CLINIC | Age: 74
End: 2019-05-20

## 2019-05-20 DIAGNOSIS — M86.9 OSTEOMYELITIS OF TOE OF RIGHT FOOT (H): ICD-10-CM

## 2019-05-20 ASSESSMENT — MIFFLIN-ST. JEOR: SCORE: 1894.66

## 2019-05-22 ENCOUNTER — COMMUNICATION - HEALTHEAST (OUTPATIENT)
Dept: ANTICOAGULATION | Facility: CLINIC | Age: 74
End: 2019-05-22

## 2019-05-23 ENCOUNTER — COMMUNICATION - HEALTHEAST (OUTPATIENT)
Dept: FAMILY MEDICINE | Facility: CLINIC | Age: 74
End: 2019-05-23

## 2019-05-28 ENCOUNTER — OFFICE VISIT - HEALTHEAST (OUTPATIENT)
Dept: PODIATRY | Facility: CLINIC | Age: 74
End: 2019-05-28

## 2019-05-28 DIAGNOSIS — M86.9 OSTEOMYELITIS OF TOE OF RIGHT FOOT (H): ICD-10-CM

## 2019-05-28 ASSESSMENT — MIFFLIN-ST. JEOR: SCORE: 1894.5

## 2019-05-29 ENCOUNTER — AMBULATORY - HEALTHEAST (OUTPATIENT)
Dept: OTHER | Facility: CLINIC | Age: 74
End: 2019-05-29

## 2019-05-29 ENCOUNTER — RECORDS - HEALTHEAST (OUTPATIENT)
Dept: ADMINISTRATIVE | Facility: OTHER | Age: 74
End: 2019-05-29

## 2019-05-29 DIAGNOSIS — E11.9 TYPE 2 DIABETES MELLITUS WITHOUT COMPLICATION, WITHOUT LONG-TERM CURRENT USE OF INSULIN (H): ICD-10-CM

## 2019-05-29 DIAGNOSIS — M20.41 HAMMER TOE OF RIGHT FOOT: ICD-10-CM

## 2019-05-31 ENCOUNTER — COMMUNICATION - HEALTHEAST (OUTPATIENT)
Dept: FAMILY MEDICINE | Facility: CLINIC | Age: 74
End: 2019-05-31

## 2019-05-31 ENCOUNTER — RECORDS - HEALTHEAST (OUTPATIENT)
Dept: ADMINISTRATIVE | Facility: OTHER | Age: 74
End: 2019-05-31

## 2019-06-01 ENCOUNTER — OFFICE VISIT - HEALTHEAST (OUTPATIENT)
Dept: FAMILY MEDICINE | Facility: CLINIC | Age: 74
End: 2019-06-01

## 2019-06-01 DIAGNOSIS — Z51.89 VISIT FOR WOUND CHECK: ICD-10-CM

## 2019-06-01 DIAGNOSIS — S98.131A AMPUTATED TOE OF RIGHT FOOT (H): ICD-10-CM

## 2019-06-03 ENCOUNTER — COMMUNICATION - HEALTHEAST (OUTPATIENT)
Dept: CARDIOLOGY | Facility: CLINIC | Age: 74
End: 2019-06-03

## 2019-06-12 ENCOUNTER — COMMUNICATION - HEALTHEAST (OUTPATIENT)
Dept: CARDIOLOGY | Facility: CLINIC | Age: 74
End: 2019-06-12

## 2019-06-18 ENCOUNTER — COMMUNICATION - HEALTHEAST (OUTPATIENT)
Dept: FAMILY MEDICINE | Facility: CLINIC | Age: 74
End: 2019-06-18

## 2019-06-19 ENCOUNTER — AMBULATORY - HEALTHEAST (OUTPATIENT)
Dept: FAMILY MEDICINE | Facility: CLINIC | Age: 74
End: 2019-06-19

## 2019-06-19 DIAGNOSIS — K21.9 GASTROESOPHAGEAL REFLUX DISEASE WITHOUT ESOPHAGITIS: ICD-10-CM

## 2019-06-22 ENCOUNTER — COMMUNICATION - HEALTHEAST (OUTPATIENT)
Dept: CARDIOLOGY | Facility: CLINIC | Age: 74
End: 2019-06-22

## 2019-06-22 DIAGNOSIS — I48.20 CHRONIC ATRIAL FIBRILLATION (H): ICD-10-CM

## 2019-06-24 ENCOUNTER — AMBULATORY - HEALTHEAST (OUTPATIENT)
Dept: FAMILY MEDICINE | Facility: CLINIC | Age: 74
End: 2019-06-24

## 2019-06-24 DIAGNOSIS — I48.20 CHRONIC ATRIAL FIBRILLATION (H): ICD-10-CM

## 2019-07-15 ENCOUNTER — COMMUNICATION - HEALTHEAST (OUTPATIENT)
Dept: ANTICOAGULATION | Facility: CLINIC | Age: 74
End: 2019-07-15

## 2019-07-15 ENCOUNTER — AMBULATORY - HEALTHEAST (OUTPATIENT)
Dept: FAMILY MEDICINE | Facility: CLINIC | Age: 74
End: 2019-07-15

## 2019-07-15 ENCOUNTER — AMBULATORY - HEALTHEAST (OUTPATIENT)
Dept: LAB | Facility: CLINIC | Age: 74
End: 2019-07-15

## 2019-07-15 DIAGNOSIS — E11.9 TYPE 2 DIABETES MELLITUS WITHOUT COMPLICATION, WITHOUT LONG-TERM CURRENT USE OF INSULIN (H): ICD-10-CM

## 2019-07-15 DIAGNOSIS — I48.91 ATRIAL FIBRILLATION, UNSPECIFIED TYPE (H): ICD-10-CM

## 2019-07-15 LAB — INR PPP: 3.2 (ref 0.9–1.1)

## 2019-07-23 ENCOUNTER — COMMUNICATION - HEALTHEAST (OUTPATIENT)
Dept: FAMILY MEDICINE | Facility: CLINIC | Age: 74
End: 2019-07-23

## 2019-07-30 ENCOUNTER — RECORDS - HEALTHEAST (OUTPATIENT)
Dept: ADMINISTRATIVE | Facility: OTHER | Age: 74
End: 2019-07-30

## 2019-08-01 ENCOUNTER — COMMUNICATION - HEALTHEAST (OUTPATIENT)
Dept: FAMILY MEDICINE | Facility: CLINIC | Age: 74
End: 2019-08-01

## 2019-08-01 DIAGNOSIS — E11.9 TYPE 2 DIABETES MELLITUS WITHOUT COMPLICATION, WITHOUT LONG-TERM CURRENT USE OF INSULIN (H): ICD-10-CM

## 2019-08-01 DIAGNOSIS — I10 ESSENTIAL HYPERTENSION: ICD-10-CM

## 2019-08-05 ENCOUNTER — COMMUNICATION - HEALTHEAST (OUTPATIENT)
Dept: ANTICOAGULATION | Facility: CLINIC | Age: 74
End: 2019-08-05

## 2019-08-15 ENCOUNTER — OFFICE VISIT - HEALTHEAST (OUTPATIENT)
Dept: FAMILY MEDICINE | Facility: CLINIC | Age: 74
End: 2019-08-15

## 2019-08-15 DIAGNOSIS — R60.0 EDEMA OF LEFT LOWER EXTREMITY: ICD-10-CM

## 2019-08-15 DIAGNOSIS — M25.562 ACUTE PAIN OF LEFT KNEE: ICD-10-CM

## 2019-08-19 ENCOUNTER — OFFICE VISIT - HEALTHEAST (OUTPATIENT)
Dept: FAMILY MEDICINE | Facility: CLINIC | Age: 74
End: 2019-08-19

## 2019-08-19 ENCOUNTER — COMMUNICATION - HEALTHEAST (OUTPATIENT)
Dept: ANTICOAGULATION | Facility: CLINIC | Age: 74
End: 2019-08-19

## 2019-08-19 DIAGNOSIS — I48.91 ATRIAL FIBRILLATION, UNSPECIFIED TYPE (H): ICD-10-CM

## 2019-08-19 DIAGNOSIS — E11.9 TYPE 2 DIABETES MELLITUS WITHOUT COMPLICATION, WITHOUT LONG-TERM CURRENT USE OF INSULIN (H): ICD-10-CM

## 2019-08-19 DIAGNOSIS — L03.116 CELLULITIS OF LEFT LOWER EXTREMITY: ICD-10-CM

## 2019-08-19 LAB
HBA1C MFR BLD: 7.6 % (ref 3.5–6)
INR PPP: 2.7 (ref 0.9–1.1)

## 2019-08-19 ASSESSMENT — MIFFLIN-ST. JEOR: SCORE: 1904.94

## 2019-08-23 ENCOUNTER — COMMUNICATION - HEALTHEAST (OUTPATIENT)
Dept: ANTICOAGULATION | Facility: CLINIC | Age: 74
End: 2019-08-23

## 2019-08-23 DIAGNOSIS — I48.91 ATRIAL FIBRILLATION, UNSPECIFIED TYPE (H): ICD-10-CM

## 2019-09-06 ENCOUNTER — COMMUNICATION - HEALTHEAST (OUTPATIENT)
Dept: ANTICOAGULATION | Facility: CLINIC | Age: 74
End: 2019-09-06

## 2019-09-06 ENCOUNTER — OFFICE VISIT - HEALTHEAST (OUTPATIENT)
Dept: FAMILY MEDICINE | Facility: CLINIC | Age: 74
End: 2019-09-06

## 2019-09-06 DIAGNOSIS — I48.91 ATRIAL FIBRILLATION, UNSPECIFIED TYPE (H): ICD-10-CM

## 2019-09-06 DIAGNOSIS — Z23 NEED FOR VACCINATION: ICD-10-CM

## 2019-09-06 DIAGNOSIS — Z00.00 ROUTINE GENERAL MEDICAL EXAMINATION AT A HEALTH CARE FACILITY: ICD-10-CM

## 2019-09-06 LAB — INR PPP: 2.7 (ref 0.9–1.1)

## 2019-09-06 ASSESSMENT — MIFFLIN-ST. JEOR: SCORE: 1906.07

## 2019-09-10 ENCOUNTER — COMMUNICATION - HEALTHEAST (OUTPATIENT)
Dept: FAMILY MEDICINE | Facility: CLINIC | Age: 74
End: 2019-09-10

## 2019-09-10 DIAGNOSIS — E78.00 PURE HYPERCHOLESTEROLEMIA: ICD-10-CM

## 2019-09-19 ENCOUNTER — COMMUNICATION - HEALTHEAST (OUTPATIENT)
Dept: CARDIOLOGY | Facility: CLINIC | Age: 74
End: 2019-09-19

## 2019-09-19 DIAGNOSIS — I48.20 CHRONIC ATRIAL FIBRILLATION (H): ICD-10-CM

## 2019-10-10 ENCOUNTER — COMMUNICATION - HEALTHEAST (OUTPATIENT)
Dept: FAMILY MEDICINE | Facility: CLINIC | Age: 74
End: 2019-10-10

## 2019-10-10 DIAGNOSIS — I48.91 ATRIAL FIBRILLATION (H): ICD-10-CM

## 2019-10-11 ENCOUNTER — COMMUNICATION - HEALTHEAST (OUTPATIENT)
Dept: ANTICOAGULATION | Facility: CLINIC | Age: 74
End: 2019-10-11

## 2019-10-16 ENCOUNTER — AMBULATORY - HEALTHEAST (OUTPATIENT)
Dept: LAB | Facility: CLINIC | Age: 74
End: 2019-10-16

## 2019-10-16 ENCOUNTER — COMMUNICATION - HEALTHEAST (OUTPATIENT)
Dept: ANTICOAGULATION | Facility: CLINIC | Age: 74
End: 2019-10-16

## 2019-10-16 DIAGNOSIS — I48.91 ATRIAL FIBRILLATION, UNSPECIFIED TYPE (H): ICD-10-CM

## 2019-10-16 LAB — INR PPP: 2.4 (ref 0.9–1.1)

## 2019-10-22 ENCOUNTER — COMMUNICATION - HEALTHEAST (OUTPATIENT)
Dept: SCHEDULING | Facility: CLINIC | Age: 74
End: 2019-10-22

## 2019-10-23 ENCOUNTER — OFFICE VISIT - HEALTHEAST (OUTPATIENT)
Dept: FAMILY MEDICINE | Facility: CLINIC | Age: 74
End: 2019-10-23

## 2019-10-23 ENCOUNTER — COMMUNICATION - HEALTHEAST (OUTPATIENT)
Dept: ANTICOAGULATION | Facility: CLINIC | Age: 74
End: 2019-10-23

## 2019-10-23 DIAGNOSIS — L03.116 CELLULITIS OF LEFT LOWER EXTREMITY: ICD-10-CM

## 2019-10-23 DIAGNOSIS — I48.91 ATRIAL FIBRILLATION, UNSPECIFIED TYPE (H): ICD-10-CM

## 2019-10-23 DIAGNOSIS — G60.9 HEREDITARY AND IDIOPATHIC PERIPHERAL NEUROPATHY: ICD-10-CM

## 2019-10-23 ASSESSMENT — MIFFLIN-ST. JEOR: SCORE: 1909.47

## 2019-10-29 ENCOUNTER — COMMUNICATION - HEALTHEAST (OUTPATIENT)
Dept: ANTICOAGULATION | Facility: CLINIC | Age: 74
End: 2019-10-29

## 2019-11-01 ENCOUNTER — AMBULATORY - HEALTHEAST (OUTPATIENT)
Dept: LAB | Facility: CLINIC | Age: 74
End: 2019-11-01

## 2019-11-01 ENCOUNTER — COMMUNICATION - HEALTHEAST (OUTPATIENT)
Dept: ANTICOAGULATION | Facility: CLINIC | Age: 74
End: 2019-11-01

## 2019-11-01 DIAGNOSIS — I48.91 ATRIAL FIBRILLATION, UNSPECIFIED TYPE (H): ICD-10-CM

## 2019-11-01 LAB — INR PPP: 3 (ref 0.9–1.1)

## 2019-11-05 ENCOUNTER — OFFICE VISIT - HEALTHEAST (OUTPATIENT)
Dept: PODIATRY | Facility: CLINIC | Age: 74
End: 2019-11-05

## 2019-11-05 DIAGNOSIS — M67.472 GANGLION CYST OF LEFT FOOT: ICD-10-CM

## 2019-11-05 ASSESSMENT — MIFFLIN-ST. JEOR: SCORE: 1908.34

## 2019-12-02 ENCOUNTER — COMMUNICATION - HEALTHEAST (OUTPATIENT)
Dept: ANTICOAGULATION | Facility: CLINIC | Age: 74
End: 2019-12-02

## 2019-12-09 ENCOUNTER — AMBULATORY - HEALTHEAST (OUTPATIENT)
Dept: LAB | Facility: CLINIC | Age: 74
End: 2019-12-09

## 2019-12-09 ENCOUNTER — COMMUNICATION - HEALTHEAST (OUTPATIENT)
Dept: FAMILY MEDICINE | Facility: CLINIC | Age: 74
End: 2019-12-09

## 2019-12-09 ENCOUNTER — COMMUNICATION - HEALTHEAST (OUTPATIENT)
Dept: ANTICOAGULATION | Facility: CLINIC | Age: 74
End: 2019-12-09

## 2019-12-09 DIAGNOSIS — K21.00 GASTROESOPHAGEAL REFLUX DISEASE WITH ESOPHAGITIS: ICD-10-CM

## 2019-12-09 DIAGNOSIS — I48.91 ATRIAL FIBRILLATION, UNSPECIFIED TYPE (H): ICD-10-CM

## 2019-12-09 LAB — INR PPP: 2 (ref 0.9–1.1)

## 2020-01-13 ENCOUNTER — COMMUNICATION - HEALTHEAST (OUTPATIENT)
Dept: ANTICOAGULATION | Facility: CLINIC | Age: 75
End: 2020-01-13

## 2020-01-14 ENCOUNTER — COMMUNICATION - HEALTHEAST (OUTPATIENT)
Dept: ANTICOAGULATION | Facility: CLINIC | Age: 75
End: 2020-01-14

## 2020-01-14 ENCOUNTER — AMBULATORY - HEALTHEAST (OUTPATIENT)
Dept: LAB | Facility: CLINIC | Age: 75
End: 2020-01-14

## 2020-01-14 DIAGNOSIS — I48.91 ATRIAL FIBRILLATION, UNSPECIFIED TYPE (H): ICD-10-CM

## 2020-01-14 LAB — INR PPP: 2.5 (ref 0.9–1.1)

## 2020-01-21 ENCOUNTER — RECORDS - HEALTHEAST (OUTPATIENT)
Dept: ADMINISTRATIVE | Facility: OTHER | Age: 75
End: 2020-01-21

## 2020-01-21 LAB — RETINOPATHY: NEGATIVE

## 2020-01-24 ENCOUNTER — RECORDS - HEALTHEAST (OUTPATIENT)
Dept: HEALTH INFORMATION MANAGEMENT | Facility: CLINIC | Age: 75
End: 2020-01-24

## 2020-01-31 ENCOUNTER — RECORDS - HEALTHEAST (OUTPATIENT)
Dept: ADMINISTRATIVE | Facility: OTHER | Age: 75
End: 2020-01-31

## 2020-02-01 ENCOUNTER — OFFICE VISIT - HEALTHEAST (OUTPATIENT)
Dept: FAMILY MEDICINE | Facility: CLINIC | Age: 75
End: 2020-02-01

## 2020-02-01 DIAGNOSIS — L03.116 CELLULITIS OF LEFT FOOT: ICD-10-CM

## 2020-02-01 DIAGNOSIS — S91.109A OPEN WOUND OF TOE, INITIAL ENCOUNTER: ICD-10-CM

## 2020-02-01 ASSESSMENT — MIFFLIN-ST. JEOR: SCORE: 1938.73

## 2020-02-05 ENCOUNTER — COMMUNICATION - HEALTHEAST (OUTPATIENT)
Dept: FAMILY MEDICINE | Facility: CLINIC | Age: 75
End: 2020-02-05

## 2020-02-05 ENCOUNTER — RECORDS - HEALTHEAST (OUTPATIENT)
Dept: ADMINISTRATIVE | Facility: OTHER | Age: 75
End: 2020-02-05

## 2020-02-06 ENCOUNTER — COMMUNICATION - HEALTHEAST (OUTPATIENT)
Dept: FAMILY MEDICINE | Facility: CLINIC | Age: 75
End: 2020-02-06

## 2020-02-06 DIAGNOSIS — E78.2 MIXED HYPERLIPIDEMIA: ICD-10-CM

## 2020-02-08 ENCOUNTER — COMMUNICATION - HEALTHEAST (OUTPATIENT)
Dept: FAMILY MEDICINE | Facility: CLINIC | Age: 75
End: 2020-02-08

## 2020-02-08 DIAGNOSIS — E11.9 TYPE 2 DIABETES MELLITUS WITHOUT COMPLICATION, WITHOUT LONG-TERM CURRENT USE OF INSULIN (H): ICD-10-CM

## 2020-02-11 ENCOUNTER — OFFICE VISIT - HEALTHEAST (OUTPATIENT)
Dept: PODIATRY | Facility: CLINIC | Age: 75
End: 2020-02-11

## 2020-02-11 DIAGNOSIS — E11.621: ICD-10-CM

## 2020-02-11 DIAGNOSIS — L89.891: ICD-10-CM

## 2020-02-11 ASSESSMENT — MIFFLIN-ST. JEOR: SCORE: 1935.55

## 2020-02-18 ENCOUNTER — OFFICE VISIT - HEALTHEAST (OUTPATIENT)
Dept: PODIATRY | Facility: CLINIC | Age: 75
End: 2020-02-18

## 2020-02-18 DIAGNOSIS — L89.891: ICD-10-CM

## 2020-02-18 DIAGNOSIS — E11.621: ICD-10-CM

## 2020-02-18 ASSESSMENT — MIFFLIN-ST. JEOR: SCORE: 1935.55

## 2020-03-02 ENCOUNTER — AMBULATORY - HEALTHEAST (OUTPATIENT)
Dept: CARDIOLOGY | Facility: CLINIC | Age: 75
End: 2020-03-02

## 2020-03-02 ENCOUNTER — OFFICE VISIT - HEALTHEAST (OUTPATIENT)
Dept: CARDIOLOGY | Facility: CLINIC | Age: 75
End: 2020-03-02

## 2020-03-02 DIAGNOSIS — I48.20 CHRONIC ATRIAL FIBRILLATION (H): ICD-10-CM

## 2020-03-02 DIAGNOSIS — R07.9 CHEST PAIN, UNSPECIFIED: ICD-10-CM

## 2020-03-02 DIAGNOSIS — R06.02 SHORTNESS OF BREATH: ICD-10-CM

## 2020-03-02 DIAGNOSIS — I10 ESSENTIAL HYPERTENSION: ICD-10-CM

## 2020-03-02 DIAGNOSIS — E78.00 PURE HYPERCHOLESTEROLEMIA: ICD-10-CM

## 2020-03-02 ASSESSMENT — MIFFLIN-ST. JEOR: SCORE: 1935.55

## 2020-03-03 ENCOUNTER — COMMUNICATION - HEALTHEAST (OUTPATIENT)
Dept: ANTICOAGULATION | Facility: CLINIC | Age: 75
End: 2020-03-03

## 2020-03-10 ENCOUNTER — HOSPITAL ENCOUNTER (OUTPATIENT)
Dept: NUCLEAR MEDICINE | Facility: HOSPITAL | Age: 75
Discharge: HOME | End: 2020-03-10
Attending: INTERNAL MEDICINE

## 2020-03-10 ENCOUNTER — HOSPITAL ENCOUNTER (OUTPATIENT)
Dept: CARDIOLOGY | Facility: HOSPITAL | Age: 75
Discharge: HOME | End: 2020-03-10
Attending: INTERNAL MEDICINE

## 2020-03-10 DIAGNOSIS — R07.9 CHEST PAIN, UNSPECIFIED: ICD-10-CM

## 2020-03-10 DIAGNOSIS — R06.02 SHORTNESS OF BREATH: ICD-10-CM

## 2020-03-10 DIAGNOSIS — I10 ESSENTIAL HYPERTENSION: ICD-10-CM

## 2020-03-10 DIAGNOSIS — E78.00 PURE HYPERCHOLESTEROLEMIA: ICD-10-CM

## 2020-03-10 LAB
CV STRESS CURRENT BP HE: NORMAL
CV STRESS CURRENT HR HE: 62
CV STRESS CURRENT HR HE: 71
CV STRESS CURRENT HR HE: 72
CV STRESS CURRENT HR HE: 72
CV STRESS CURRENT HR HE: 73
CV STRESS CURRENT HR HE: 74
CV STRESS CURRENT HR HE: 74
CV STRESS CURRENT HR HE: 75
CV STRESS CURRENT HR HE: 76
CV STRESS DEVIATION TIME HE: NORMAL
CV STRESS ECHO PERCENT HR HE: NORMAL
CV STRESS EXERCISE STAGE HE: NORMAL
CV STRESS FINAL RESTING BP HE: NORMAL
CV STRESS FINAL RESTING HR HE: 75
CV STRESS MAX HR HE: 78
CV STRESS MAX TREADMILL GRADE HE: 0
CV STRESS MAX TREADMILL SPEED HE: 0
CV STRESS PEAK DIA BP HE: NORMAL
CV STRESS PEAK SYS BP HE: NORMAL
CV STRESS PHASE HE: NORMAL
CV STRESS PROTOCOL HE: NORMAL
CV STRESS RESTING PT POSITION HE: NORMAL
CV STRESS ST DEVIATION AMOUNT HE: NORMAL
CV STRESS ST DEVIATION ELEVATION HE: NORMAL
CV STRESS ST EVELATION AMOUNT HE: NORMAL
CV STRESS TEST TYPE HE: NORMAL
CV STRESS TOTAL STAGE TIME MIN 1 HE: NORMAL
NUC STRESS EJECTION FRACTION: 66 %
RATE PRESSURE PRODUCT: NORMAL
STRESS ECHO BASELINE DIASTOLIC HE: 86
STRESS ECHO BASELINE HR: 61
STRESS ECHO BASELINE SYSTOLIC BP: 149
STRESS ECHO CALCULATED PERCENT HR: 54 %
STRESS ECHO LAST STRESS DIASTOLIC BP: 79
STRESS ECHO LAST STRESS HR: 75
STRESS ECHO LAST STRESS SYSTOLIC BP: 148
STRESS ECHO TARGET HR: 144

## 2020-03-14 ENCOUNTER — COMMUNICATION - HEALTHEAST (OUTPATIENT)
Dept: FAMILY MEDICINE | Facility: CLINIC | Age: 75
End: 2020-03-14

## 2020-03-14 DIAGNOSIS — I10 ESSENTIAL HYPERTENSION: ICD-10-CM

## 2020-03-17 ENCOUNTER — AMBULATORY - HEALTHEAST (OUTPATIENT)
Dept: FAMILY MEDICINE | Facility: CLINIC | Age: 75
End: 2020-03-17

## 2020-03-17 DIAGNOSIS — I48.91 ATRIAL FIBRILLATION, UNSPECIFIED TYPE (H): ICD-10-CM

## 2020-03-18 ENCOUNTER — AMBULATORY - HEALTHEAST (OUTPATIENT)
Dept: LAB | Facility: CLINIC | Age: 75
End: 2020-03-18

## 2020-03-18 ENCOUNTER — COMMUNICATION - HEALTHEAST (OUTPATIENT)
Dept: ANTICOAGULATION | Facility: CLINIC | Age: 75
End: 2020-03-18

## 2020-03-18 DIAGNOSIS — I48.91 ATRIAL FIBRILLATION, UNSPECIFIED TYPE (H): ICD-10-CM

## 2020-03-18 LAB — INR PPP: 2.4 (ref 0.9–1.1)

## 2020-03-22 ENCOUNTER — COMMUNICATION - HEALTHEAST (OUTPATIENT)
Dept: FAMILY MEDICINE | Facility: CLINIC | Age: 75
End: 2020-03-22

## 2020-03-22 DIAGNOSIS — I10 ESSENTIAL HYPERTENSION: ICD-10-CM

## 2020-04-07 ENCOUNTER — COMMUNICATION - HEALTHEAST (OUTPATIENT)
Dept: FAMILY MEDICINE | Facility: CLINIC | Age: 75
End: 2020-04-07

## 2020-04-07 DIAGNOSIS — I48.20 CHRONIC ATRIAL FIBRILLATION (H): ICD-10-CM

## 2020-04-25 ENCOUNTER — COMMUNICATION - HEALTHEAST (OUTPATIENT)
Dept: FAMILY MEDICINE | Facility: CLINIC | Age: 75
End: 2020-04-25

## 2020-04-25 DIAGNOSIS — I48.91 ATRIAL FIBRILLATION (H): ICD-10-CM

## 2020-04-28 ENCOUNTER — COMMUNICATION - HEALTHEAST (OUTPATIENT)
Dept: FAMILY MEDICINE | Facility: CLINIC | Age: 75
End: 2020-04-28

## 2020-04-28 DIAGNOSIS — E11.9 TYPE 2 DIABETES MELLITUS WITHOUT COMPLICATION, WITHOUT LONG-TERM CURRENT USE OF INSULIN (H): ICD-10-CM

## 2020-05-01 ENCOUNTER — COMMUNICATION - HEALTHEAST (OUTPATIENT)
Dept: ANTICOAGULATION | Facility: CLINIC | Age: 75
End: 2020-05-01

## 2020-05-01 DIAGNOSIS — I48.20 CHRONIC ATRIAL FIBRILLATION (H): ICD-10-CM

## 2020-05-11 ENCOUNTER — OFFICE VISIT - HEALTHEAST (OUTPATIENT)
Dept: FAMILY MEDICINE | Facility: CLINIC | Age: 75
End: 2020-05-11

## 2020-05-11 ENCOUNTER — AMBULATORY - HEALTHEAST (OUTPATIENT)
Dept: LAB | Facility: CLINIC | Age: 75
End: 2020-05-11

## 2020-05-11 ENCOUNTER — COMMUNICATION - HEALTHEAST (OUTPATIENT)
Dept: ANTICOAGULATION | Facility: CLINIC | Age: 75
End: 2020-05-11

## 2020-05-11 DIAGNOSIS — I48.91 ATRIAL FIBRILLATION, UNSPECIFIED TYPE (H): ICD-10-CM

## 2020-05-11 DIAGNOSIS — I48.20 CHRONIC ATRIAL FIBRILLATION (H): ICD-10-CM

## 2020-05-11 DIAGNOSIS — M79.662 PAIN OF LEFT LOWER LEG: ICD-10-CM

## 2020-05-11 LAB — INR PPP: 2.6 (ref 0.9–1.1)

## 2020-05-18 ENCOUNTER — COMMUNICATION - HEALTHEAST (OUTPATIENT)
Dept: ANTICOAGULATION | Facility: CLINIC | Age: 75
End: 2020-05-18

## 2020-06-08 ENCOUNTER — COMMUNICATION - HEALTHEAST (OUTPATIENT)
Dept: ANTICOAGULATION | Facility: CLINIC | Age: 75
End: 2020-06-08

## 2020-06-08 ENCOUNTER — AMBULATORY - HEALTHEAST (OUTPATIENT)
Dept: LAB | Facility: CLINIC | Age: 75
End: 2020-06-08

## 2020-06-08 DIAGNOSIS — I48.20 CHRONIC ATRIAL FIBRILLATION (H): ICD-10-CM

## 2020-06-08 DIAGNOSIS — I48.91 ATRIAL FIBRILLATION, UNSPECIFIED TYPE (H): ICD-10-CM

## 2020-06-08 LAB — INR PPP: 2.8 (ref 0.9–1.1)

## 2020-07-28 ENCOUNTER — COMMUNICATION - HEALTHEAST (OUTPATIENT)
Dept: FAMILY MEDICINE | Facility: CLINIC | Age: 75
End: 2020-07-28

## 2020-07-28 DIAGNOSIS — E11.9 TYPE 2 DIABETES MELLITUS WITHOUT COMPLICATION, WITHOUT LONG-TERM CURRENT USE OF INSULIN (H): ICD-10-CM

## 2020-08-04 ENCOUNTER — OFFICE VISIT - HEALTHEAST (OUTPATIENT)
Dept: FAMILY MEDICINE | Facility: CLINIC | Age: 75
End: 2020-08-04

## 2020-08-04 ENCOUNTER — COMMUNICATION - HEALTHEAST (OUTPATIENT)
Dept: SCHEDULING | Facility: CLINIC | Age: 75
End: 2020-08-04

## 2020-08-04 ENCOUNTER — COMMUNICATION - HEALTHEAST (OUTPATIENT)
Dept: ANTICOAGULATION | Facility: CLINIC | Age: 75
End: 2020-08-04

## 2020-08-04 DIAGNOSIS — I48.20 CHRONIC ATRIAL FIBRILLATION (H): ICD-10-CM

## 2020-08-04 DIAGNOSIS — I48.91 ATRIAL FIBRILLATION, UNSPECIFIED TYPE (H): ICD-10-CM

## 2020-08-04 DIAGNOSIS — L03.115 CELLULITIS OF LEG, RIGHT: ICD-10-CM

## 2020-08-04 LAB
BASOPHILS # BLD AUTO: 0.1 THOU/UL (ref 0–0.2)
BASOPHILS NFR BLD AUTO: 0 % (ref 0–2)
C REACTIVE PROTEIN LHE: 12.3 MG/DL (ref 0–0.8)
EOSINOPHIL # BLD AUTO: 0.3 THOU/UL (ref 0–0.4)
EOSINOPHIL NFR BLD AUTO: 2 % (ref 0–6)
ERYTHROCYTE [DISTWIDTH] IN BLOOD BY AUTOMATED COUNT: 13.4 % (ref 11–14.5)
HCT VFR BLD AUTO: 41.8 % (ref 40–54)
HGB BLD-MCNC: 14.1 G/DL (ref 14–18)
INR PPP: 2.32 (ref 0.9–1.1)
LYMPHOCYTES # BLD AUTO: 2 THOU/UL (ref 0.8–4.4)
LYMPHOCYTES NFR BLD AUTO: 16 % (ref 20–40)
MCH RBC QN AUTO: 30.9 PG (ref 27–34)
MCHC RBC AUTO-ENTMCNC: 33.7 G/DL (ref 32–36)
MCV RBC AUTO: 92 FL (ref 80–100)
MONOCYTES # BLD AUTO: 1.3 THOU/UL (ref 0–0.9)
MONOCYTES NFR BLD AUTO: 10 % (ref 2–10)
NEUTROPHILS # BLD AUTO: 8.9 THOU/UL (ref 2–7.7)
NEUTROPHILS NFR BLD AUTO: 71 % (ref 50–70)
PLATELET # BLD AUTO: 240 THOU/UL (ref 140–440)
PMV BLD AUTO: 11.2 FL (ref 8.5–12.5)
RBC # BLD AUTO: 4.57 MILL/UL (ref 4.4–6.2)
WBC: 12.6 THOU/UL (ref 4–11)

## 2020-08-10 ENCOUNTER — COMMUNICATION - HEALTHEAST (OUTPATIENT)
Dept: ANTICOAGULATION | Facility: CLINIC | Age: 75
End: 2020-08-10

## 2020-08-10 DIAGNOSIS — I48.91 ATRIAL FIBRILLATION, UNSPECIFIED TYPE (H): ICD-10-CM

## 2020-08-12 ENCOUNTER — COMMUNICATION - HEALTHEAST (OUTPATIENT)
Dept: SCHEDULING | Facility: CLINIC | Age: 75
End: 2020-08-12

## 2020-08-15 ASSESSMENT — MIFFLIN-ST. JEOR: SCORE: 1925.12

## 2020-08-18 ENCOUNTER — SURGERY - HEALTHEAST (OUTPATIENT)
Dept: SURGERY | Facility: HOSPITAL | Age: 75
End: 2020-08-18

## 2020-08-18 ENCOUNTER — ANESTHESIA - HEALTHEAST (OUTPATIENT)
Dept: SURGERY | Facility: HOSPITAL | Age: 75
End: 2020-08-18

## 2020-08-20 ENCOUNTER — HOME CARE/HOSPICE - HEALTHEAST (OUTPATIENT)
Dept: HOME HEALTH SERVICES | Facility: HOME HEALTH | Age: 75
End: 2020-08-20

## 2020-08-21 ENCOUNTER — COMMUNICATION - HEALTHEAST (OUTPATIENT)
Dept: ANTICOAGULATION | Facility: CLINIC | Age: 75
End: 2020-08-21

## 2020-08-21 DIAGNOSIS — I48.91 ATRIAL FIBRILLATION, UNSPECIFIED TYPE (H): ICD-10-CM

## 2020-08-24 ENCOUNTER — COMMUNICATION - HEALTHEAST (OUTPATIENT)
Dept: FAMILY MEDICINE | Facility: CLINIC | Age: 75
End: 2020-08-24

## 2020-08-24 ENCOUNTER — RECORDS - HEALTHEAST (OUTPATIENT)
Dept: ADMINISTRATIVE | Facility: OTHER | Age: 75
End: 2020-08-24

## 2020-08-24 DIAGNOSIS — I48.91 ATRIAL FIBRILLATION, UNSPECIFIED TYPE (H): ICD-10-CM

## 2020-08-25 ENCOUNTER — COMMUNICATION - HEALTHEAST (OUTPATIENT)
Dept: LAB | Facility: CLINIC | Age: 75
End: 2020-08-25

## 2020-08-25 ENCOUNTER — AMBULATORY - HEALTHEAST (OUTPATIENT)
Dept: LAB | Facility: CLINIC | Age: 75
End: 2020-08-25

## 2020-08-25 ENCOUNTER — AMBULATORY - HEALTHEAST (OUTPATIENT)
Dept: PHARMACY | Facility: CLINIC | Age: 75
End: 2020-08-25

## 2020-08-25 DIAGNOSIS — I10 ESSENTIAL HYPERTENSION, BENIGN: ICD-10-CM

## 2020-08-25 DIAGNOSIS — I48.20 CHRONIC ATRIAL FIBRILLATION (H): ICD-10-CM

## 2020-08-25 DIAGNOSIS — I48.91 ATRIAL FIBRILLATION, UNSPECIFIED TYPE (H): ICD-10-CM

## 2020-08-25 DIAGNOSIS — L03.115 CELLULITIS OF RIGHT LOWER EXTREMITY: ICD-10-CM

## 2020-08-25 DIAGNOSIS — R05.9 COUGH: ICD-10-CM

## 2020-08-25 LAB — INR PPP: 3.92 (ref 0.9–1.1)

## 2020-08-25 PROCEDURE — 99607 MTMS BY PHARM ADDL 15 MIN: CPT | Performed by: PHARMACIST

## 2020-08-25 PROCEDURE — 99605 MTMS BY PHARM NP 15 MIN: CPT | Performed by: PHARMACIST

## 2020-08-27 ENCOUNTER — COMMUNICATION - HEALTHEAST (OUTPATIENT)
Dept: FAMILY MEDICINE | Facility: CLINIC | Age: 75
End: 2020-08-27

## 2020-08-27 DIAGNOSIS — K21.00 GASTROESOPHAGEAL REFLUX DISEASE WITH ESOPHAGITIS: ICD-10-CM

## 2020-08-31 ENCOUNTER — COMMUNICATION - HEALTHEAST (OUTPATIENT)
Dept: FAMILY MEDICINE | Facility: CLINIC | Age: 75
End: 2020-08-31

## 2020-08-31 DIAGNOSIS — E78.00 PURE HYPERCHOLESTEROLEMIA: ICD-10-CM

## 2020-09-01 ENCOUNTER — AMBULATORY - HEALTHEAST (OUTPATIENT)
Dept: LAB | Facility: CLINIC | Age: 75
End: 2020-09-01

## 2020-09-01 ENCOUNTER — COMMUNICATION - HEALTHEAST (OUTPATIENT)
Dept: ANTICOAGULATION | Facility: CLINIC | Age: 75
End: 2020-09-01

## 2020-09-01 ENCOUNTER — RECORDS - HEALTHEAST (OUTPATIENT)
Dept: ADMINISTRATIVE | Facility: OTHER | Age: 75
End: 2020-09-01

## 2020-09-01 DIAGNOSIS — I48.91 ATRIAL FIBRILLATION, UNSPECIFIED TYPE (H): ICD-10-CM

## 2020-09-01 DIAGNOSIS — I48.20 CHRONIC ATRIAL FIBRILLATION (H): ICD-10-CM

## 2020-09-01 LAB — INR PPP: 1.4 (ref 0.9–1.1)

## 2020-09-03 ENCOUNTER — OFFICE VISIT - HEALTHEAST (OUTPATIENT)
Dept: FAMILY MEDICINE | Facility: CLINIC | Age: 75
End: 2020-09-03

## 2020-09-03 DIAGNOSIS — I10 ESSENTIAL HYPERTENSION, BENIGN: ICD-10-CM

## 2020-09-03 DIAGNOSIS — R05.9 COUGH: ICD-10-CM

## 2020-09-03 DIAGNOSIS — I48.91 ATRIAL FIBRILLATION, UNSPECIFIED TYPE (H): ICD-10-CM

## 2020-09-03 DIAGNOSIS — L02.419 CELLULITIS AND ABSCESS OF LEG: ICD-10-CM

## 2020-09-03 DIAGNOSIS — L50.9 URTICARIA, UNSPECIFIED: ICD-10-CM

## 2020-09-03 DIAGNOSIS — E11.51 TYPE II DIABETES MELLITUS WITH PERIPHERAL CIRCULATORY DISORDER (H): ICD-10-CM

## 2020-09-03 DIAGNOSIS — L03.119 CELLULITIS AND ABSCESS OF LEG: ICD-10-CM

## 2020-09-03 DIAGNOSIS — R09.82 POSTNASAL DRIP: ICD-10-CM

## 2020-09-03 ASSESSMENT — MIFFLIN-ST. JEOR: SCORE: 1900.12

## 2020-09-10 ENCOUNTER — COMMUNICATION - HEALTHEAST (OUTPATIENT)
Dept: ANTICOAGULATION | Facility: CLINIC | Age: 75
End: 2020-09-10

## 2020-09-10 ENCOUNTER — COMMUNICATION - HEALTHEAST (OUTPATIENT)
Dept: FAMILY MEDICINE | Facility: CLINIC | Age: 75
End: 2020-09-10

## 2020-09-10 DIAGNOSIS — R05.9 COUGH: ICD-10-CM

## 2020-09-15 ENCOUNTER — RECORDS - HEALTHEAST (OUTPATIENT)
Dept: ADMINISTRATIVE | Facility: OTHER | Age: 75
End: 2020-09-15

## 2020-09-15 DIAGNOSIS — Z11.59 ENCOUNTER FOR SCREENING FOR OTHER VIRAL DISEASES: Primary | ICD-10-CM

## 2020-09-17 ENCOUNTER — OFFICE VISIT - HEALTHEAST (OUTPATIENT)
Dept: FAMILY MEDICINE | Facility: CLINIC | Age: 75
End: 2020-09-17

## 2020-09-17 ENCOUNTER — COMMUNICATION - HEALTHEAST (OUTPATIENT)
Dept: ANTICOAGULATION | Facility: CLINIC | Age: 75
End: 2020-09-17

## 2020-09-17 DIAGNOSIS — R05.9 COUGH: ICD-10-CM

## 2020-09-17 DIAGNOSIS — R09.82 POSTNASAL DRIP: ICD-10-CM

## 2020-09-17 DIAGNOSIS — R63.4 WEIGHT LOSS: ICD-10-CM

## 2020-09-17 DIAGNOSIS — I48.20 CHRONIC ATRIAL FIBRILLATION (H): ICD-10-CM

## 2020-09-17 DIAGNOSIS — I48.91 ATRIAL FIBRILLATION, UNSPECIFIED TYPE (H): ICD-10-CM

## 2020-09-17 LAB
BASOPHILS # BLD AUTO: 0.1 THOU/UL (ref 0–0.2)
BASOPHILS NFR BLD AUTO: 1 % (ref 0–2)
EOSINOPHIL # BLD AUTO: 0.3 THOU/UL (ref 0–0.4)
EOSINOPHIL NFR BLD AUTO: 2 % (ref 0–6)
ERYTHROCYTE [DISTWIDTH] IN BLOOD BY AUTOMATED COUNT: 12.9 % (ref 11–14.5)
HCT VFR BLD AUTO: 33.2 % (ref 40–54)
HGB BLD-MCNC: 11.1 G/DL (ref 14–18)
INR PPP: 4.5 (ref 0.9–1.1)
LYMPHOCYTES # BLD AUTO: 3 THOU/UL (ref 0.8–4.4)
LYMPHOCYTES NFR BLD AUTO: 26 % (ref 20–40)
MCH RBC QN AUTO: 29.4 PG (ref 27–34)
MCHC RBC AUTO-ENTMCNC: 33.6 G/DL (ref 32–36)
MCV RBC AUTO: 88 FL (ref 80–100)
MONOCYTES # BLD AUTO: 1 THOU/UL (ref 0–0.9)
MONOCYTES NFR BLD AUTO: 9 % (ref 2–10)
NEUTROPHILS # BLD AUTO: 7.1 THOU/UL (ref 2–7.7)
NEUTROPHILS NFR BLD AUTO: 62 % (ref 50–70)
PLATELET # BLD AUTO: 451 THOU/UL (ref 140–440)
PMV BLD AUTO: 7.3 FL (ref 7–10)
RBC # BLD AUTO: 3.79 MILL/UL (ref 4.4–6.2)
WBC: 11.4 THOU/UL (ref 4–11)

## 2020-09-17 ASSESSMENT — MIFFLIN-ST. JEOR: SCORE: 1897.28

## 2020-09-23 ENCOUNTER — COMMUNICATION - HEALTHEAST (OUTPATIENT)
Dept: FAMILY MEDICINE | Facility: CLINIC | Age: 75
End: 2020-09-23

## 2020-09-23 DIAGNOSIS — I48.20 CHRONIC ATRIAL FIBRILLATION, UNSPECIFIED (H): ICD-10-CM

## 2020-09-25 ENCOUNTER — AMBULATORY - HEALTHEAST (OUTPATIENT)
Dept: LAB | Facility: CLINIC | Age: 75
End: 2020-09-25

## 2020-09-25 ENCOUNTER — COMMUNICATION - HEALTHEAST (OUTPATIENT)
Dept: ANTICOAGULATION | Facility: CLINIC | Age: 75
End: 2020-09-25

## 2020-09-25 ENCOUNTER — COMMUNICATION - HEALTHEAST (OUTPATIENT)
Dept: FAMILY MEDICINE | Facility: CLINIC | Age: 75
End: 2020-09-25

## 2020-09-25 DIAGNOSIS — I48.91 ATRIAL FIBRILLATION, UNSPECIFIED TYPE (H): ICD-10-CM

## 2020-09-25 DIAGNOSIS — I48.20 CHRONIC ATRIAL FIBRILLATION (H): ICD-10-CM

## 2020-09-25 DIAGNOSIS — R09.82 POSTNASAL DRIP: ICD-10-CM

## 2020-09-25 DIAGNOSIS — R05.9 COUGH: ICD-10-CM

## 2020-09-25 LAB — INR PPP: 2.5 (ref 0.9–1.1)

## 2020-10-07 ENCOUNTER — COMMUNICATION - HEALTHEAST (OUTPATIENT)
Dept: ANTICOAGULATION | Facility: CLINIC | Age: 75
End: 2020-10-07

## 2020-10-07 ENCOUNTER — OFFICE VISIT - HEALTHEAST (OUTPATIENT)
Dept: FAMILY MEDICINE | Facility: CLINIC | Age: 75
End: 2020-10-07

## 2020-10-07 DIAGNOSIS — I48.20 CHRONIC ATRIAL FIBRILLATION (H): ICD-10-CM

## 2020-10-07 DIAGNOSIS — E11.51 TYPE II DIABETES MELLITUS WITH PERIPHERAL CIRCULATORY DISORDER (H): ICD-10-CM

## 2020-10-07 DIAGNOSIS — Z23 NEED FOR VACCINATION: ICD-10-CM

## 2020-10-07 DIAGNOSIS — I48.91 ATRIAL FIBRILLATION, UNSPECIFIED TYPE (H): ICD-10-CM

## 2020-10-07 DIAGNOSIS — M51.369 DDD (DEGENERATIVE DISC DISEASE), LUMBAR: ICD-10-CM

## 2020-10-07 DIAGNOSIS — Z01.818 PREOPERATIVE EXAMINATION: ICD-10-CM

## 2020-10-07 DIAGNOSIS — M79.605 PAIN IN LEFT LEG: ICD-10-CM

## 2020-10-07 DIAGNOSIS — M41.9 SCOLIOSIS OF LUMBOSACRAL SPINE, UNSPECIFIED SCOLIOSIS TYPE: ICD-10-CM

## 2020-10-07 DIAGNOSIS — M43.16 SPONDYLOLISTHESIS OF LUMBAR REGION: ICD-10-CM

## 2020-10-07 LAB
ANION GAP SERPL CALCULATED.3IONS-SCNC: 13 MMOL/L (ref 5–18)
APTT PPP: 47 SECONDS (ref 24–37)
ATRIAL RATE - MUSE: 83 BPM
BUN SERPL-MCNC: 21 MG/DL (ref 8–28)
CALCIUM SERPL-MCNC: 9.6 MG/DL (ref 8.5–10.5)
CHLORIDE BLD-SCNC: 106 MMOL/L (ref 98–107)
CO2 SERPL-SCNC: 23 MMOL/L (ref 22–31)
CREAT SERPL-MCNC: 0.87 MG/DL (ref 0.7–1.3)
DIASTOLIC BLOOD PRESSURE - MUSE: NORMAL
ERYTHROCYTE [DISTWIDTH] IN BLOOD BY AUTOMATED COUNT: 14.1 % (ref 11–14.5)
GFR SERPL CREATININE-BSD FRML MDRD: >60 ML/MIN/1.73M2
GLUCOSE BLD-MCNC: 135 MG/DL (ref 70–125)
HCT VFR BLD AUTO: 37.1 % (ref 40–54)
HGB BLD-MCNC: 12.6 G/DL (ref 14–18)
INR PPP: 2.7 (ref 0.9–1.1)
INTERPRETATION ECG - MUSE: NORMAL
MCH RBC QN AUTO: 30.7 PG (ref 27–34)
MCHC RBC AUTO-ENTMCNC: 33.9 G/DL (ref 32–36)
MCV RBC AUTO: 90 FL (ref 80–100)
P AXIS - MUSE: NORMAL
PLATELET # BLD AUTO: 263 THOU/UL (ref 140–440)
PMV BLD AUTO: 8.2 FL (ref 7–10)
POTASSIUM BLD-SCNC: 4.9 MMOL/L (ref 3.5–5)
PR INTERVAL - MUSE: NORMAL
QRS DURATION - MUSE: 162 MS
QT - MUSE: 406 MS
QTC - MUSE: 468 MS
R AXIS - MUSE: 44 DEGREES
RBC # BLD AUTO: 4.1 MILL/UL (ref 4.4–6.2)
SODIUM SERPL-SCNC: 142 MMOL/L (ref 136–145)
SYSTOLIC BLOOD PRESSURE - MUSE: NORMAL
T AXIS - MUSE: 2 DEGREES
VENTRICULAR RATE- MUSE: 80 BPM
WBC: 8.7 THOU/UL (ref 4–11)

## 2020-10-07 ASSESSMENT — MIFFLIN-ST. JEOR: SCORE: 1890.19

## 2020-10-08 LAB
ABO/RH(D): NORMAL
ABORH REPEAT: NORMAL
ANTIBODY SCREEN: NEGATIVE
BACTERIA SPEC CULT: NORMAL

## 2020-10-09 ENCOUNTER — COMMUNICATION - HEALTHEAST (OUTPATIENT)
Dept: FAMILY MEDICINE | Facility: CLINIC | Age: 75
End: 2020-10-09

## 2020-10-09 DIAGNOSIS — R05.9 COUGH: ICD-10-CM

## 2020-10-14 ENCOUNTER — AMBULATORY - HEALTHEAST (OUTPATIENT)
Dept: FAMILY MEDICINE | Facility: CLINIC | Age: 75
End: 2020-10-14

## 2020-10-14 DIAGNOSIS — R79.1 ELEVATED PARTIAL THROMBOPLASTIN TIME (PTT): ICD-10-CM

## 2020-10-14 RX ORDER — GEMFIBROZIL 600 MG/1
600 TABLET, FILM COATED ORAL
COMMUNITY
End: 2021-11-03

## 2020-10-14 RX ORDER — WARFARIN SODIUM 5 MG/1
5 TABLET ORAL DAILY
COMMUNITY
End: 2020-10-27

## 2020-10-14 RX ORDER — DILTIAZEM HYDROCHLORIDE 120 MG/1
120 CAPSULE, COATED, EXTENDED RELEASE ORAL DAILY
COMMUNITY
End: 2021-11-01

## 2020-10-14 RX ORDER — GABAPENTIN 300 MG/1
600 CAPSULE ORAL AT BEDTIME
COMMUNITY
End: 2021-11-17

## 2020-10-14 RX ORDER — EPINEPHRINE 0.3 MG/.3ML
0.3 INJECTION SUBCUTANEOUS PRN
Status: ON HOLD | COMMUNITY
End: 2023-01-01

## 2020-10-14 RX ORDER — DILTIAZEM HYDROCHLORIDE 120 MG/1
120 TABLET, FILM COATED ORAL DAILY
COMMUNITY
End: 2020-10-14

## 2020-10-14 RX ORDER — LORATADINE 10 MG/1
10 TABLET ORAL DAILY
COMMUNITY
End: 2023-01-01

## 2020-10-14 RX ORDER — MONTELUKAST SODIUM 10 MG/1
10 TABLET ORAL DAILY
COMMUNITY
End: 2023-01-01

## 2020-10-14 RX ORDER — FLUTICASONE PROPIONATE 50 MCG
2 SPRAY, SUSPENSION (ML) NASAL DAILY PRN
Status: ON HOLD | COMMUNITY
End: 2023-01-01

## 2020-10-15 ENCOUNTER — AMBULATORY - HEALTHEAST (OUTPATIENT)
Dept: LAB | Facility: CLINIC | Age: 75
End: 2020-10-15

## 2020-10-15 DIAGNOSIS — R79.1 ELEVATED PARTIAL THROMBOPLASTIN TIME (PTT): ICD-10-CM

## 2020-10-15 LAB — APTT PPP: 43 SECONDS (ref 24–37)

## 2020-10-18 ENCOUNTER — COMMUNICATION - HEALTHEAST (OUTPATIENT)
Dept: FAMILY MEDICINE | Facility: CLINIC | Age: 75
End: 2020-10-18

## 2020-10-18 DIAGNOSIS — I48.91 ATRIAL FIBRILLATION (H): ICD-10-CM

## 2020-10-18 LAB — VWF:RCO ACT/NOR PPP PL AGG: 160 %

## 2020-10-19 LAB
FACT VIII ACT/NOR PPP: 190 % (ref 55–200)
VWF AG ACT/NOR PPP IA: 197 % (ref 50–200)
VWF:AC ACT/NOR PPP IA: 203 % (ref 50–180)

## 2020-10-21 LAB — VON WILLEBRAND EVAL PPP-IMP: NORMAL

## 2020-10-26 ENCOUNTER — COMMUNICATION - HEALTHEAST (OUTPATIENT)
Dept: FAMILY MEDICINE | Facility: CLINIC | Age: 75
End: 2020-10-26

## 2020-10-26 ASSESSMENT — MIFFLIN-ST. JEOR: SCORE: 1890.19

## 2020-10-27 DIAGNOSIS — Z11.59 ENCOUNTER FOR SCREENING FOR OTHER VIRAL DISEASES: ICD-10-CM

## 2020-10-27 LAB
SARS-COV-2 RNA SPEC QL NAA+PROBE: NORMAL
SPECIMEN SOURCE: NORMAL

## 2020-10-27 PROCEDURE — U0003 INFECTIOUS AGENT DETECTION BY NUCLEIC ACID (DNA OR RNA); SEVERE ACUTE RESPIRATORY SYNDROME CORONAVIRUS 2 (SARS-COV-2) (CORONAVIRUS DISEASE [COVID-19]), AMPLIFIED PROBE TECHNIQUE, MAKING USE OF HIGH THROUGHPUT TECHNOLOGIES AS DESCRIBED BY CMS-2020-01-R: HCPCS | Performed by: NEUROLOGICAL SURGERY

## 2020-10-27 RX ORDER — WARFARIN SODIUM 5 MG/1
5 TABLET ORAL
COMMUNITY
End: 2022-05-13

## 2020-10-27 RX ORDER — MULTIVIT-MIN/IRON/FOLIC ACID/K 18-600-40
1 CAPSULE ORAL DAILY
Status: ON HOLD | COMMUNITY
End: 2023-01-01

## 2020-10-27 RX ORDER — WARFARIN SODIUM 7.5 MG/1
7.5 TABLET ORAL WEEKLY
COMMUNITY
End: 2023-01-01

## 2020-10-27 RX ORDER — ATORVASTATIN CALCIUM 20 MG/1
20 TABLET, FILM COATED ORAL DAILY
COMMUNITY
End: 2022-01-03

## 2020-10-27 RX ORDER — ATENOLOL 50 MG/1
25 TABLET ORAL DAILY
COMMUNITY
End: 2021-11-17

## 2020-10-27 RX ORDER — VITS A,C,E/LUTEIN/MINERALS 300MCG-200
1 TABLET ORAL 2 TIMES DAILY
Status: ON HOLD | COMMUNITY
End: 2023-01-01

## 2020-10-27 NOTE — PHARMACY-ADMISSION MEDICATION HISTORY
Admission medication history interview status for this patient is complete. See [facility] admission navigator for allergy information, prior to admission medications and immunization status.     PTA meds completed by pre-admitting nurse, Brenda Meneses, RN, and reviewed by pharmacy.    Prior to Admission medications    Medication Sig Last Dose Taking? Auth Provider   atenolol (TENORMIN) 50 MG tablet Take 25 mg by mouth daily  Yes Unknown, Entered By History   atorvastatin (LIPITOR) 20 MG tablet Take 20 mg by mouth daily  Yes Unknown, Entered By History   diltiazem ER COATED BEADS (CARDIZEM CD/CARTIA XT) 120 MG 24 hr capsule Take 120 mg by mouth daily  Yes Reported, Patient   EPINEPHrine (ANY BX GENERIC EQUIV) 0.3 MG/0.3ML injection 2-pack Inject 0.3 mg into the muscle as needed for anaphylaxis  Yes Reported, Patient   fluticasone (FLONASE) 50 MCG/ACT nasal spray Spray 2 sprays into both nostrils daily  Yes Reported, Patient   gabapentin (NEURONTIN) 300 MG capsule Take 600 mg by mouth At Bedtime   Yes Reported, Patient   gemfibrozil (LOPID) 600 MG tablet Take 600 mg by mouth 2 times daily (before meals)  Yes Reported, Patient   loratadine (CLARITIN) 10 MG tablet Take 10 mg by mouth daily  Yes Reported, Patient   metFORMIN (GLUCOPHAGE) 500 MG tablet Take 1,000 mg by mouth daily (with dinner)  Yes Unknown, Entered By History   metFORMIN (GLUCOPHAGE) 500 MG tablet Take 500 mg by mouth daily (with breakfast)   Yes Reported, Patient   montelukast (SINGULAIR) 10 MG tablet Take 10 mg by mouth daily  Yes Reported, Patient   multivitamin (OCUVITE) TABS tablet Take 1 tablet by mouth 2 times daily  Yes Unknown, Entered By History   omeprazole (PRILOSEC) 20 MG DR capsule Take 20 mg by mouth daily  Yes Reported, Patient   Vitamin D, Cholecalciferol, 25 MCG (1000 UT) TABS Take 1 tablet by mouth daily  Yes Unknown, Entered By History   warfarin ANTICOAGULANT (COUMADIN) 5 MG tablet Take 5 mg by mouth six times a week On Mon, Tues, Wed,  Thurs, Fri and Sat  Yes Unknown, Entered By History   warfarin ANTICOAGULANT (COUMADIN) 7.5 MG tablet Take 7.5 mg by mouth once a week On Sundays  Yes Unknown, Entered By History

## 2020-10-28 ENCOUNTER — COMMUNICATION - HEALTHEAST (OUTPATIENT)
Dept: FAMILY MEDICINE | Facility: CLINIC | Age: 75
End: 2020-10-28

## 2020-10-28 ENCOUNTER — COMMUNICATION - HEALTHEAST (OUTPATIENT)
Dept: ANTICOAGULATION | Facility: CLINIC | Age: 75
End: 2020-10-28

## 2020-10-28 ENCOUNTER — AMBULATORY - HEALTHEAST (OUTPATIENT)
Dept: LAB | Facility: CLINIC | Age: 75
End: 2020-10-28

## 2020-10-28 DIAGNOSIS — I48.20 CHRONIC ATRIAL FIBRILLATION (H): ICD-10-CM

## 2020-10-28 DIAGNOSIS — I48.91 ATRIAL FIBRILLATION (H): ICD-10-CM

## 2020-10-28 DIAGNOSIS — I48.91 ATRIAL FIBRILLATION, UNSPECIFIED TYPE (H): ICD-10-CM

## 2020-10-28 LAB
INR PPP: 1.1 (ref 0.9–1.1)
LABORATORY COMMENT REPORT: NORMAL
SARS-COV-2 RNA SPEC QL NAA+PROBE: NEGATIVE
SPECIMEN SOURCE: NORMAL

## 2020-10-29 ENCOUNTER — ANESTHESIA (OUTPATIENT)
Dept: SURGERY | Facility: CLINIC | Age: 75
DRG: 454 | End: 2020-10-29
Payer: COMMERCIAL

## 2020-10-29 ENCOUNTER — ANESTHESIA EVENT (OUTPATIENT)
Dept: SURGERY | Facility: CLINIC | Age: 75
DRG: 454 | End: 2020-10-29
Payer: COMMERCIAL

## 2020-10-29 ENCOUNTER — APPOINTMENT (OUTPATIENT)
Dept: PHYSICAL THERAPY | Facility: CLINIC | Age: 75
DRG: 454 | End: 2020-10-29
Attending: NEUROLOGICAL SURGERY
Payer: COMMERCIAL

## 2020-10-29 ENCOUNTER — APPOINTMENT (OUTPATIENT)
Dept: GENERAL RADIOLOGY | Facility: CLINIC | Age: 75
DRG: 454 | End: 2020-10-29
Attending: NEUROLOGICAL SURGERY
Payer: COMMERCIAL

## 2020-10-29 ENCOUNTER — HOSPITAL ENCOUNTER (INPATIENT)
Facility: CLINIC | Age: 75
LOS: 1 days | Discharge: HOME | DRG: 454 | End: 2020-10-30
Attending: NEUROLOGICAL SURGERY | Admitting: NEUROLOGICAL SURGERY
Payer: COMMERCIAL

## 2020-10-29 DIAGNOSIS — M43.27 FUSION OF SPINE, LUMBOSACRAL REGION: Primary | ICD-10-CM

## 2020-10-29 LAB
ABO + RH BLD: NORMAL
ABO + RH BLD: NORMAL
ANION GAP SERPL CALCULATED.3IONS-SCNC: 8 MMOL/L (ref 3–14)
BLD GP AB SCN SERPL QL: NORMAL
BLOOD BANK CMNT PATIENT-IMP: NORMAL
BUN SERPL-MCNC: 28 MG/DL (ref 7–30)
CALCIUM SERPL-MCNC: 9.3 MG/DL (ref 8.5–10.1)
CHLORIDE SERPL-SCNC: 108 MMOL/L (ref 94–109)
CO2 SERPL-SCNC: 23 MMOL/L (ref 20–32)
CREAT SERPL-MCNC: 0.94 MG/DL (ref 0.66–1.25)
ERYTHROCYTE [DISTWIDTH] IN BLOOD BY AUTOMATED COUNT: 15.6 % (ref 10–15)
GFR SERPL CREATININE-BSD FRML MDRD: 78 ML/MIN/{1.73_M2}
GLUCOSE BLDC GLUCOMTR-MCNC: 165 MG/DL (ref 70–99)
GLUCOSE BLDC GLUCOMTR-MCNC: 173 MG/DL (ref 70–99)
GLUCOSE BLDC GLUCOMTR-MCNC: 173 MG/DL (ref 70–99)
GLUCOSE BLDC GLUCOMTR-MCNC: 207 MG/DL (ref 70–99)
GLUCOSE SERPL-MCNC: 170 MG/DL (ref 70–99)
HCT VFR BLD AUTO: 41.7 % (ref 40–53)
HGB BLD-MCNC: 13.2 G/DL (ref 13.3–17.7)
INR PPP: 1.05 (ref 0.86–1.14)
MCH RBC QN AUTO: 29.6 PG (ref 26.5–33)
MCHC RBC AUTO-ENTMCNC: 31.7 G/DL (ref 31.5–36.5)
MCV RBC AUTO: 94 FL (ref 78–100)
PLATELET # BLD AUTO: 290 10E9/L (ref 150–450)
POTASSIUM SERPL-SCNC: 4 MMOL/L (ref 3.4–5.3)
RBC # BLD AUTO: 4.46 10E12/L (ref 4.4–5.9)
SODIUM SERPL-SCNC: 139 MMOL/L (ref 133–144)
SPECIMEN EXP DATE BLD: NORMAL
WBC # BLD AUTO: 10.9 10E9/L (ref 4–11)

## 2020-10-29 PROCEDURE — 999N000136 HC STATISTIC PRE PROC ASSESS II: Performed by: NEUROLOGICAL SURGERY

## 2020-10-29 PROCEDURE — 85027 COMPLETE CBC AUTOMATED: CPT | Performed by: NEUROLOGICAL SURGERY

## 2020-10-29 PROCEDURE — 4A11X4G MONITORING OF PERIPHERAL NERVOUS ELECTRICAL ACTIVITY, INTRAOPERATIVE, EXTERNAL APPROACH: ICD-10-PCS | Performed by: NEUROLOGICAL SURGERY

## 2020-10-29 PROCEDURE — 360N000033 HC SURGERY LEVEL 5 EA 15 ADDTL MIN: Performed by: NEUROLOGICAL SURGERY

## 2020-10-29 PROCEDURE — 360N000036 HC SURGERY LEVEL 5 W FLUORO 1ST 30 MIN: Performed by: NEUROLOGICAL SURGERY

## 2020-10-29 PROCEDURE — 258N000003 HC RX IP 258 OP 636: Performed by: ANESTHESIOLOGY

## 2020-10-29 PROCEDURE — 250N000009 HC RX 250: Performed by: NURSE ANESTHETIST, CERTIFIED REGISTERED

## 2020-10-29 PROCEDURE — 999N001017 HC STATISTIC GLUCOSE BY METER IP

## 2020-10-29 PROCEDURE — C1713 ANCHOR/SCREW BN/BN,TIS/BN: HCPCS | Performed by: NEUROLOGICAL SURGERY

## 2020-10-29 PROCEDURE — 85610 PROTHROMBIN TIME: CPT | Performed by: NEUROLOGICAL SURGERY

## 2020-10-29 PROCEDURE — 86900 BLOOD TYPING SEROLOGIC ABO: CPT | Performed by: ANESTHESIOLOGY

## 2020-10-29 PROCEDURE — 250N000011 HC RX IP 250 OP 636: Performed by: NEUROLOGICAL SURGERY

## 2020-10-29 PROCEDURE — 3E0R3NZ INTRODUCTION OF ANALGESICS, HYPNOTICS, SEDATIVES INTO SPINAL CANAL, PERCUTANEOUS APPROACH: ICD-10-PCS | Performed by: NEUROLOGICAL SURGERY

## 2020-10-29 PROCEDURE — 86901 BLOOD TYPING SEROLOGIC RH(D): CPT | Performed by: ANESTHESIOLOGY

## 2020-10-29 PROCEDURE — 0SG1071 FUSION OF 2 OR MORE LUMBAR VERTEBRAL JOINTS WITH AUTOLOGOUS TISSUE SUBSTITUTE, POSTERIOR APPROACH, POSTERIOR COLUMN, OPEN APPROACH: ICD-10-PCS | Performed by: NEUROLOGICAL SURGERY

## 2020-10-29 PROCEDURE — 250N000013 HC RX MED GY IP 250 OP 250 PS 637: Performed by: PHYSICIAN ASSISTANT

## 2020-10-29 PROCEDURE — 07DS3ZZ EXTRACTION OF VERTEBRAL BONE MARROW, PERCUTANEOUS APPROACH: ICD-10-PCS | Performed by: NEUROLOGICAL SURGERY

## 2020-10-29 PROCEDURE — 120N000001 HC R&B MED SURG/OB

## 2020-10-29 PROCEDURE — 278N000051 HC OR IMPLANT GENERAL: Performed by: NEUROLOGICAL SURGERY

## 2020-10-29 PROCEDURE — 250N000009 HC RX 250: Performed by: NEUROLOGICAL SURGERY

## 2020-10-29 PROCEDURE — 761N000002 HC RECOVERY PHASE 1 LEVEL 1 EA ADDTL HR: Performed by: NEUROLOGICAL SURGERY

## 2020-10-29 PROCEDURE — 250N000013 HC RX MED GY IP 250 OP 250 PS 637: Performed by: CLINICAL NURSE SPECIALIST

## 2020-10-29 PROCEDURE — 999N000179 XR SURGERY CARM FLUORO LESS THAN 5 MIN W STILLS: Mod: TC

## 2020-10-29 PROCEDURE — 761N000001 HC RECOVERY PHASE 1 LEVEL 1 FIRST HR: Performed by: NEUROLOGICAL SURGERY

## 2020-10-29 PROCEDURE — 0SG30A0 FUSION OF LUMBOSACRAL JOINT WITH INTERBODY FUSION DEVICE, ANTERIOR APPROACH, ANTERIOR COLUMN, OPEN APPROACH: ICD-10-PCS | Performed by: NEUROLOGICAL SURGERY

## 2020-10-29 PROCEDURE — 36415 COLL VENOUS BLD VENIPUNCTURE: CPT | Performed by: NEUROLOGICAL SURGERY

## 2020-10-29 PROCEDURE — 258N000003 HC RX IP 258 OP 636: Performed by: NURSE ANESTHETIST, CERTIFIED REGISTERED

## 2020-10-29 PROCEDURE — 8E0WXBF COMPUTER ASSISTED PROCEDURE OF TRUNK REGION, WITH FLUOROSCOPY: ICD-10-PCS | Performed by: NEUROLOGICAL SURGERY

## 2020-10-29 PROCEDURE — 99222 1ST HOSP IP/OBS MODERATE 55: CPT | Performed by: CLINICAL NURSE SPECIALIST

## 2020-10-29 PROCEDURE — 95940 IONM IN OPERATNG ROOM 15 MIN: CPT | Performed by: NEUROLOGICAL SURGERY

## 2020-10-29 PROCEDURE — 3E0R33Z INTRODUCTION OF ANTI-INFLAMMATORY INTO SPINAL CANAL, PERCUTANEOUS APPROACH: ICD-10-PCS | Performed by: NEUROLOGICAL SURGERY

## 2020-10-29 PROCEDURE — 80048 BASIC METABOLIC PNL TOTAL CA: CPT | Performed by: NEUROLOGICAL SURGERY

## 2020-10-29 PROCEDURE — 0SG10A0 FUSION OF 2 OR MORE LUMBAR VERTEBRAL JOINTS WITH INTERBODY FUSION DEVICE, ANTERIOR APPROACH, ANTERIOR COLUMN, OPEN APPROACH: ICD-10-PCS | Performed by: NEUROLOGICAL SURGERY

## 2020-10-29 PROCEDURE — 86850 RBC ANTIBODY SCREEN: CPT | Performed by: ANESTHESIOLOGY

## 2020-10-29 PROCEDURE — 97530 THERAPEUTIC ACTIVITIES: CPT | Mod: GP | Performed by: PHYSICAL THERAPIST

## 2020-10-29 PROCEDURE — 250N000011 HC RX IP 250 OP 636: Performed by: PHYSICIAN ASSISTANT

## 2020-10-29 PROCEDURE — 370N000001 HC ANESTHESIA TECHNICAL FEE, 1ST 30 MIN: Performed by: NEUROLOGICAL SURGERY

## 2020-10-29 PROCEDURE — 370N000002 HC ANESTHESIA TECHNICAL FEE, EACH ADDTL 15 MIN: Performed by: NEUROLOGICAL SURGERY

## 2020-10-29 PROCEDURE — 272N000001 HC OR GENERAL SUPPLY STERILE: Performed by: NEUROLOGICAL SURGERY

## 2020-10-29 PROCEDURE — 97161 PT EVAL LOW COMPLEX 20 MIN: CPT | Mod: GP | Performed by: PHYSICAL THERAPIST

## 2020-10-29 PROCEDURE — 250N000011 HC RX IP 250 OP 636: Performed by: ANESTHESIOLOGY

## 2020-10-29 PROCEDURE — 0SG3071 FUSION OF LUMBOSACRAL JOINT WITH AUTOLOGOUS TISSUE SUBSTITUTE, POSTERIOR APPROACH, POSTERIOR COLUMN, OPEN APPROACH: ICD-10-PCS | Performed by: NEUROLOGICAL SURGERY

## 2020-10-29 PROCEDURE — 250N000011 HC RX IP 250 OP 636: Performed by: NURSE ANESTHETIST, CERTIFIED REGISTERED

## 2020-10-29 RX ORDER — HYDROMORPHONE HYDROCHLORIDE 1 MG/ML
.3-.5 INJECTION, SOLUTION INTRAMUSCULAR; INTRAVENOUS; SUBCUTANEOUS
Status: DISCONTINUED | OUTPATIENT
Start: 2020-10-29 | End: 2020-10-29

## 2020-10-29 RX ORDER — POLYETHYLENE GLYCOL 3350 17 G/17G
17 POWDER, FOR SOLUTION ORAL DAILY PRN
Status: DISCONTINUED | OUTPATIENT
Start: 2020-10-29 | End: 2020-10-30 | Stop reason: HOSPADM

## 2020-10-29 RX ORDER — DEXAMETHASONE SODIUM PHOSPHATE 4 MG/ML
INJECTION, SOLUTION INTRA-ARTICULAR; INTRALESIONAL; INTRAMUSCULAR; INTRAVENOUS; SOFT TISSUE PRN
Status: DISCONTINUED | OUTPATIENT
Start: 2020-10-29 | End: 2020-10-29

## 2020-10-29 RX ORDER — GEMFIBROZIL 600 MG/1
600 TABLET, FILM COATED ORAL
Status: DISCONTINUED | OUTPATIENT
Start: 2020-10-29 | End: 2020-10-30 | Stop reason: HOSPADM

## 2020-10-29 RX ORDER — CEFAZOLIN SODIUM 1 G/3ML
1 INJECTION, POWDER, FOR SOLUTION INTRAMUSCULAR; INTRAVENOUS SEE ADMIN INSTRUCTIONS
Status: DISCONTINUED | OUTPATIENT
Start: 2020-10-29 | End: 2020-10-29 | Stop reason: HOSPADM

## 2020-10-29 RX ORDER — AMOXICILLIN 250 MG
1-2 CAPSULE ORAL 2 TIMES DAILY
Status: DISCONTINUED | OUTPATIENT
Start: 2020-10-29 | End: 2020-10-30 | Stop reason: HOSPADM

## 2020-10-29 RX ORDER — PROPOFOL 10 MG/ML
INJECTION, EMULSION INTRAVENOUS PRN
Status: DISCONTINUED | OUTPATIENT
Start: 2020-10-29 | End: 2020-10-29

## 2020-10-29 RX ORDER — LIDOCAINE 40 MG/G
CREAM TOPICAL
Status: DISCONTINUED | OUTPATIENT
Start: 2020-10-29 | End: 2020-10-29 | Stop reason: HOSPADM

## 2020-10-29 RX ORDER — HYDRALAZINE HYDROCHLORIDE 20 MG/ML
2.5-5 INJECTION INTRAMUSCULAR; INTRAVENOUS EVERY 10 MIN PRN
Status: DISCONTINUED | OUTPATIENT
Start: 2020-10-29 | End: 2020-10-29 | Stop reason: HOSPADM

## 2020-10-29 RX ORDER — GLYCOPYRROLATE 0.2 MG/ML
INJECTION, SOLUTION INTRAMUSCULAR; INTRAVENOUS PRN
Status: DISCONTINUED | OUTPATIENT
Start: 2020-10-29 | End: 2020-10-29

## 2020-10-29 RX ORDER — WARFARIN SODIUM 5 MG/1
5 TABLET ORAL
Status: COMPLETED | OUTPATIENT
Start: 2020-10-29 | End: 2020-10-29

## 2020-10-29 RX ORDER — LIDOCAINE 40 MG/G
CREAM TOPICAL
Status: DISCONTINUED | OUTPATIENT
Start: 2020-10-29 | End: 2020-10-29

## 2020-10-29 RX ORDER — HYDROMORPHONE HYDROCHLORIDE 1 MG/ML
0.2 INJECTION, SOLUTION INTRAMUSCULAR; INTRAVENOUS; SUBCUTANEOUS
Status: DISCONTINUED | OUTPATIENT
Start: 2020-10-29 | End: 2020-10-30 | Stop reason: HOSPADM

## 2020-10-29 RX ORDER — CEFAZOLIN SODIUM 1 G/3ML
1 INJECTION, POWDER, FOR SOLUTION INTRAMUSCULAR; INTRAVENOUS EVERY 8 HOURS
Status: COMPLETED | OUTPATIENT
Start: 2020-10-29 | End: 2020-10-30

## 2020-10-29 RX ORDER — FENTANYL CITRATE 50 UG/ML
25-50 INJECTION, SOLUTION INTRAMUSCULAR; INTRAVENOUS
Status: DISCONTINUED | OUTPATIENT
Start: 2020-10-29 | End: 2020-10-29 | Stop reason: HOSPADM

## 2020-10-29 RX ORDER — HYDROXYZINE HYDROCHLORIDE 10 MG/1
10 TABLET, FILM COATED ORAL EVERY 6 HOURS PRN
Status: DISCONTINUED | OUTPATIENT
Start: 2020-10-29 | End: 2020-10-30 | Stop reason: HOSPADM

## 2020-10-29 RX ORDER — ACETAMINOPHEN 325 MG/1
975 TABLET ORAL EVERY 8 HOURS
Status: DISCONTINUED | OUTPATIENT
Start: 2020-10-29 | End: 2020-10-30 | Stop reason: HOSPADM

## 2020-10-29 RX ORDER — NALOXONE HYDROCHLORIDE 0.4 MG/ML
.1-.4 INJECTION, SOLUTION INTRAMUSCULAR; INTRAVENOUS; SUBCUTANEOUS
Status: DISCONTINUED | OUTPATIENT
Start: 2020-10-29 | End: 2020-10-29

## 2020-10-29 RX ORDER — WARFARIN SODIUM 7.5 MG/1
7.5 TABLET ORAL WEEKLY
Status: DISCONTINUED | OUTPATIENT
Start: 2020-10-29 | End: 2020-10-29

## 2020-10-29 RX ORDER — METHOCARBAMOL 500 MG/1
500 TABLET, FILM COATED ORAL 4 TIMES DAILY PRN
Status: DISCONTINUED | OUTPATIENT
Start: 2020-10-29 | End: 2020-10-30 | Stop reason: HOSPADM

## 2020-10-29 RX ORDER — MONTELUKAST SODIUM 10 MG/1
10 TABLET ORAL DAILY
Status: DISCONTINUED | OUTPATIENT
Start: 2020-10-30 | End: 2020-10-30 | Stop reason: HOSPADM

## 2020-10-29 RX ORDER — SODIUM CHLORIDE AND POTASSIUM CHLORIDE 150; 900 MG/100ML; MG/100ML
INJECTION, SOLUTION INTRAVENOUS CONTINUOUS
Status: DISCONTINUED | OUTPATIENT
Start: 2020-10-29 | End: 2020-10-29

## 2020-10-29 RX ORDER — GABAPENTIN 300 MG/1
300 CAPSULE ORAL 3 TIMES DAILY
Status: DISCONTINUED | OUTPATIENT
Start: 2020-10-29 | End: 2020-10-30 | Stop reason: HOSPADM

## 2020-10-29 RX ORDER — ONDANSETRON 2 MG/ML
4 INJECTION INTRAMUSCULAR; INTRAVENOUS EVERY 6 HOURS PRN
Status: DISCONTINUED | OUTPATIENT
Start: 2020-10-29 | End: 2020-10-30 | Stop reason: HOSPADM

## 2020-10-29 RX ORDER — DILTIAZEM HYDROCHLORIDE 120 MG/1
120 CAPSULE, COATED, EXTENDED RELEASE ORAL DAILY
Status: DISCONTINUED | OUTPATIENT
Start: 2020-10-30 | End: 2020-10-30 | Stop reason: HOSPADM

## 2020-10-29 RX ORDER — ONDANSETRON 4 MG/1
4 TABLET, ORALLY DISINTEGRATING ORAL EVERY 6 HOURS PRN
Status: DISCONTINUED | OUTPATIENT
Start: 2020-10-29 | End: 2020-10-30 | Stop reason: HOSPADM

## 2020-10-29 RX ORDER — ACETAMINOPHEN 325 MG/1
650 TABLET ORAL EVERY 4 HOURS PRN
Status: DISCONTINUED | OUTPATIENT
Start: 2020-11-01 | End: 2020-10-30 | Stop reason: HOSPADM

## 2020-10-29 RX ORDER — LORATADINE 10 MG/1
10 TABLET ORAL DAILY
Status: DISCONTINUED | OUTPATIENT
Start: 2020-10-30 | End: 2020-10-30 | Stop reason: HOSPADM

## 2020-10-29 RX ORDER — PROPOFOL 10 MG/ML
INJECTION, EMULSION INTRAVENOUS CONTINUOUS PRN
Status: DISCONTINUED | OUTPATIENT
Start: 2020-10-29 | End: 2020-10-29

## 2020-10-29 RX ORDER — HYDROMORPHONE HYDROCHLORIDE 1 MG/ML
.3-.5 INJECTION, SOLUTION INTRAMUSCULAR; INTRAVENOUS; SUBCUTANEOUS EVERY 5 MIN PRN
Status: DISCONTINUED | OUTPATIENT
Start: 2020-10-29 | End: 2020-10-29 | Stop reason: HOSPADM

## 2020-10-29 RX ORDER — ATORVASTATIN CALCIUM 20 MG/1
20 TABLET, FILM COATED ORAL DAILY
Status: DISCONTINUED | OUTPATIENT
Start: 2020-10-30 | End: 2020-10-30 | Stop reason: HOSPADM

## 2020-10-29 RX ORDER — SODIUM CHLORIDE AND POTASSIUM CHLORIDE 150; 900 MG/100ML; MG/100ML
INJECTION, SOLUTION INTRAVENOUS CONTINUOUS
Status: DISCONTINUED | OUTPATIENT
Start: 2020-10-29 | End: 2020-10-30 | Stop reason: HOSPADM

## 2020-10-29 RX ORDER — ONDANSETRON 2 MG/ML
INJECTION INTRAMUSCULAR; INTRAVENOUS PRN
Status: DISCONTINUED | OUTPATIENT
Start: 2020-10-29 | End: 2020-10-29

## 2020-10-29 RX ORDER — BUPIVACAINE HYDROCHLORIDE 7.5 MG/ML
INJECTION, SOLUTION EPIDURAL; RETROBULBAR PRN
Status: DISCONTINUED | OUTPATIENT
Start: 2020-10-29 | End: 2020-10-29 | Stop reason: HOSPADM

## 2020-10-29 RX ORDER — EPHEDRINE SULFATE 50 MG/ML
INJECTION, SOLUTION INTRAMUSCULAR; INTRAVENOUS; SUBCUTANEOUS PRN
Status: DISCONTINUED | OUTPATIENT
Start: 2020-10-29 | End: 2020-10-29

## 2020-10-29 RX ORDER — ONDANSETRON 2 MG/ML
4 INJECTION INTRAMUSCULAR; INTRAVENOUS EVERY 30 MIN PRN
Status: DISCONTINUED | OUTPATIENT
Start: 2020-10-29 | End: 2020-10-29 | Stop reason: HOSPADM

## 2020-10-29 RX ORDER — LIDOCAINE HYDROCHLORIDE 10 MG/ML
INJECTION, SOLUTION INFILTRATION; PERINEURAL PRN
Status: DISCONTINUED | OUTPATIENT
Start: 2020-10-29 | End: 2020-10-29

## 2020-10-29 RX ORDER — LIDOCAINE 40 MG/G
CREAM TOPICAL
Status: DISCONTINUED | OUTPATIENT
Start: 2020-10-29 | End: 2020-10-30 | Stop reason: HOSPADM

## 2020-10-29 RX ORDER — ATENOLOL 25 MG/1
25 TABLET ORAL DAILY
Status: DISCONTINUED | OUTPATIENT
Start: 2020-10-30 | End: 2020-10-30 | Stop reason: HOSPADM

## 2020-10-29 RX ORDER — SODIUM CHLORIDE, SODIUM LACTATE, POTASSIUM CHLORIDE, CALCIUM CHLORIDE 600; 310; 30; 20 MG/100ML; MG/100ML; MG/100ML; MG/100ML
INJECTION, SOLUTION INTRAVENOUS CONTINUOUS
Status: DISCONTINUED | OUTPATIENT
Start: 2020-10-29 | End: 2020-10-29 | Stop reason: HOSPADM

## 2020-10-29 RX ORDER — NALOXONE HYDROCHLORIDE 0.4 MG/ML
.1-.4 INJECTION, SOLUTION INTRAMUSCULAR; INTRAVENOUS; SUBCUTANEOUS
Status: DISCONTINUED | OUTPATIENT
Start: 2020-10-29 | End: 2020-10-30 | Stop reason: HOSPADM

## 2020-10-29 RX ORDER — FLUTICASONE PROPIONATE 50 MCG
2 SPRAY, SUSPENSION (ML) NASAL DAILY
Status: DISCONTINUED | OUTPATIENT
Start: 2020-10-30 | End: 2020-10-30 | Stop reason: HOSPADM

## 2020-10-29 RX ORDER — LABETALOL 20 MG/4 ML (5 MG/ML) INTRAVENOUS SYRINGE
10
Status: DISCONTINUED | OUTPATIENT
Start: 2020-10-29 | End: 2020-10-29 | Stop reason: HOSPADM

## 2020-10-29 RX ORDER — OXYCODONE HYDROCHLORIDE 5 MG/1
5-10 TABLET ORAL EVERY 4 HOURS PRN
Status: DISCONTINUED | OUTPATIENT
Start: 2020-10-29 | End: 2020-10-29

## 2020-10-29 RX ORDER — CEFAZOLIN SODIUM 2 G/100ML
2 INJECTION, SOLUTION INTRAVENOUS
Status: COMPLETED | OUTPATIENT
Start: 2020-10-29 | End: 2020-10-29

## 2020-10-29 RX ORDER — ONDANSETRON 4 MG/1
4 TABLET, ORALLY DISINTEGRATING ORAL EVERY 30 MIN PRN
Status: DISCONTINUED | OUTPATIENT
Start: 2020-10-29 | End: 2020-10-29 | Stop reason: HOSPADM

## 2020-10-29 RX ORDER — FENTANYL CITRATE 50 UG/ML
INJECTION, SOLUTION INTRAMUSCULAR; INTRAVENOUS PRN
Status: DISCONTINUED | OUTPATIENT
Start: 2020-10-29 | End: 2020-10-29

## 2020-10-29 RX ORDER — KETOROLAC TROMETHAMINE 15 MG/ML
15 INJECTION, SOLUTION INTRAMUSCULAR; INTRAVENOUS EVERY 6 HOURS
Status: DISCONTINUED | OUTPATIENT
Start: 2020-10-29 | End: 2020-10-30 | Stop reason: HOSPADM

## 2020-10-29 RX ADMIN — FENTANYL CITRATE 50 MCG: 50 INJECTION, SOLUTION INTRAMUSCULAR; INTRAVENOUS at 11:36

## 2020-10-29 RX ADMIN — PHENYLEPHRINE HYDROCHLORIDE 200 MCG: 10 INJECTION INTRAVENOUS at 09:48

## 2020-10-29 RX ADMIN — FENTANYL CITRATE 50 MCG: 50 INJECTION, SOLUTION INTRAMUSCULAR; INTRAVENOUS at 10:07

## 2020-10-29 RX ADMIN — PHENYLEPHRINE HYDROCHLORIDE 100 MCG: 10 INJECTION INTRAVENOUS at 10:34

## 2020-10-29 RX ADMIN — ACETAMINOPHEN 975 MG: 325 TABLET, FILM COATED ORAL at 22:44

## 2020-10-29 RX ADMIN — SODIUM CHLORIDE, POTASSIUM CHLORIDE, SODIUM LACTATE AND CALCIUM CHLORIDE: 600; 310; 30; 20 INJECTION, SOLUTION INTRAVENOUS at 08:12

## 2020-10-29 RX ADMIN — CEFAZOLIN 1 G: 1 INJECTION, POWDER, FOR SOLUTION INTRAMUSCULAR; INTRAVENOUS at 17:38

## 2020-10-29 RX ADMIN — PROPOFOL 50 MG: 10 INJECTION, EMULSION INTRAVENOUS at 10:06

## 2020-10-29 RX ADMIN — SODIUM CHLORIDE, POTASSIUM CHLORIDE, SODIUM LACTATE AND CALCIUM CHLORIDE: 600; 310; 30; 20 INJECTION, SOLUTION INTRAVENOUS at 10:36

## 2020-10-29 RX ADMIN — PROPOFOL 125 MCG/KG/MIN: 10 INJECTION, EMULSION INTRAVENOUS at 09:19

## 2020-10-29 RX ADMIN — ONDANSETRON HYDROCHLORIDE 4 MG: 2 INJECTION, SOLUTION INTRAVENOUS at 10:30

## 2020-10-29 RX ADMIN — METFORMIN HYDROCHLORIDE 1000 MG: 500 TABLET, FILM COATED ORAL at 17:40

## 2020-10-29 RX ADMIN — GABAPENTIN 300 MG: 300 CAPSULE ORAL at 14:40

## 2020-10-29 RX ADMIN — LIDOCAINE HYDROCHLORIDE 35 MG: 10 INJECTION, SOLUTION INFILTRATION; PERINEURAL at 09:09

## 2020-10-29 RX ADMIN — SODIUM CHLORIDE, POTASSIUM CHLORIDE, SODIUM LACTATE AND CALCIUM CHLORIDE: 600; 310; 30; 20 INJECTION, SOLUTION INTRAVENOUS at 09:27

## 2020-10-29 RX ADMIN — DEXAMETHASONE SODIUM PHOSPHATE 4 MG: 4 INJECTION, SOLUTION INTRA-ARTICULAR; INTRALESIONAL; INTRAMUSCULAR; INTRAVENOUS; SOFT TISSUE at 09:09

## 2020-10-29 RX ADMIN — KETOROLAC TROMETHAMINE 15 MG: 15 INJECTION, SOLUTION INTRAMUSCULAR; INTRAVENOUS at 12:44

## 2020-10-29 RX ADMIN — FENTANYL CITRATE 100 MCG: 50 INJECTION, SOLUTION INTRAMUSCULAR; INTRAVENOUS at 09:09

## 2020-10-29 RX ADMIN — GLYCOPYRROLATE 0.2 MG: 0.2 INJECTION, SOLUTION INTRAMUSCULAR; INTRAVENOUS at 09:09

## 2020-10-29 RX ADMIN — PROPOFOL 140 MG: 10 INJECTION, EMULSION INTRAVENOUS at 09:09

## 2020-10-29 RX ADMIN — POTASSIUM CHLORIDE AND SODIUM CHLORIDE: 900; 150 INJECTION, SOLUTION INTRAVENOUS at 13:49

## 2020-10-29 RX ADMIN — DOCUSATE SODIUM 50 MG AND SENNOSIDES 8.6 MG 1 TABLET: 8.6; 5 TABLET, FILM COATED ORAL at 19:38

## 2020-10-29 RX ADMIN — Medication 2.5 MG: at 22:44

## 2020-10-29 RX ADMIN — FENTANYL CITRATE 50 MCG: 50 INJECTION, SOLUTION INTRAMUSCULAR; INTRAVENOUS at 11:46

## 2020-10-29 RX ADMIN — PHENYLEPHRINE HYDROCHLORIDE 200 MCG: 10 INJECTION INTRAVENOUS at 09:27

## 2020-10-29 RX ADMIN — Medication 10 MG: at 10:44

## 2020-10-29 RX ADMIN — PHENYLEPHRINE HYDROCHLORIDE 200 MCG: 10 INJECTION INTRAVENOUS at 10:09

## 2020-10-29 RX ADMIN — CEFAZOLIN SODIUM 2 G: 2 INJECTION, SOLUTION INTRAVENOUS at 09:06

## 2020-10-29 RX ADMIN — GEMFIBROZIL 600 MG: 600 TABLET ORAL at 17:33

## 2020-10-29 RX ADMIN — HYDROMORPHONE HYDROCHLORIDE 0.5 MG: 1 INJECTION, SOLUTION INTRAMUSCULAR; INTRAVENOUS; SUBCUTANEOUS at 11:38

## 2020-10-29 RX ADMIN — GABAPENTIN 300 MG: 300 CAPSULE ORAL at 19:38

## 2020-10-29 RX ADMIN — WARFARIN SODIUM 5 MG: 5 TABLET ORAL at 17:40

## 2020-10-29 RX ADMIN — PHENYLEPHRINE HYDROCHLORIDE 200 MCG: 10 INJECTION INTRAVENOUS at 09:37

## 2020-10-29 RX ADMIN — PHENYLEPHRINE HYDROCHLORIDE 100 MCG: 10 INJECTION INTRAVENOUS at 10:27

## 2020-10-29 RX ADMIN — ACETAMINOPHEN 975 MG: 325 TABLET, FILM COATED ORAL at 14:40

## 2020-10-29 RX ADMIN — HYDROMORPHONE HYDROCHLORIDE 0.3 MG: 1 INJECTION, SOLUTION INTRAMUSCULAR; INTRAVENOUS; SUBCUTANEOUS at 14:40

## 2020-10-29 RX ADMIN — KETOROLAC TROMETHAMINE 15 MG: 15 INJECTION, SOLUTION INTRAMUSCULAR; INTRAVENOUS at 19:31

## 2020-10-29 RX ADMIN — ROCURONIUM BROMIDE 30 MG: 10 INJECTION INTRAVENOUS at 09:09

## 2020-10-29 RX ADMIN — FENTANYL CITRATE 50 MCG: 50 INJECTION, SOLUTION INTRAMUSCULAR; INTRAVENOUS at 12:23

## 2020-10-29 ASSESSMENT — ACTIVITIES OF DAILY LIVING (ADL)
ADLS_ACUITY_SCORE: 15
ADLS_ACUITY_SCORE: 15

## 2020-10-29 ASSESSMENT — LIFESTYLE VARIABLES: TOBACCO_USE: 1

## 2020-10-29 ASSESSMENT — MIFFLIN-ST. JEOR: SCORE: 1894.73

## 2020-10-29 ASSESSMENT — ENCOUNTER SYMPTOMS: DYSRHYTHMIAS: 1

## 2020-10-29 NOTE — OP NOTE
REPORT OF OPERATION  Stefan Diallo is a 75 year old old male admitted on 10/29/2020  7:08 AM.    Operative Date:  10/29/2020  PRE-PROCEDURE DIAGNOSIS:  1) L3/4/5S1 degenerative disc disease.  2)Body mass index is 32.46 kg/m .  Obesety  POST-PROCEDURE DIAGNOSIS:  1) Same as above  PROCEDURE PERFORMED:  1) L3/4/5S1 oblique lateral lumbar interbody fusion with discectomy, preparation of the endplate and placement of a bullet cage packed with calcium triphosphate soaked in bone marrow anterior to the transverse process in modified prone position, with intraoperative biplanar fluoroscopic imaging and electrophysiological monitoring.  2) L3/4/5S1 Posterior minimally invasive pedicle screw placement and posterolateral instrumentation and fusion with LNK  system with intraoperative biplanar fluoroscopic imaging and electrophysiological monitoring.  3) Epidural steroid injection.  4) Transpedicular Bone marrow aspiration  5) Injection of 0.75 marcain in paracvertebral tissue for post op pain management  Surgeon: Vernon Bynum MD  ASSISTANT: Kenya QUIROZ    This is Complex surgery ad an assistant is needed for safety and excecution of the surgery, assistant helps with instrumentation setup and retraction, as well as for positioning and closure of the incision.    HISTORY: Please refer to my clinic note for full details, but in short, patient is a 75 -year-old male with severe back pain and radiculopathy not responding to usual conservative therapy. Patient was set up for the surgery as mentioned above and was taken to surgery as mentioned above after all risks and benefits were explained.  PROCEDURE:  The patient was taken to surgery. After general anesthesia was applied, SCDs and Scruggs placed and preoperative antibiotic given, then patient has been positioned on the Domingo table and Art frame in a modified prone position for ease of access from the left side.  AP and lateral fluoroscopic images are positioned.  Patient has been prepped and draped in sterile fashion. The landmarks, including Spinal process, transverse process, disk space, endplates and pedicels are identified and marked.    A Jamshidi needle is placed in upper right pedicle inside of the vertebral body and bone marrow has been aspirated to be mixed with biologics to introduce Stem cells to the biologics.  Following steps are then taken for levels:    L3/4  Cage size 12 mm high and 33 mm long Titanium  L4/5  Cage size 13 mm high and 27 mm long Titanium  L5/S1   Cage size 10 mm high and 27 mm long Titanium    The patient was turned using the rotation of the surgical table so that a near direct anterior-lateral approach to the lumbar spine could be achieved. A smal  incision was then made superior to the mid iliac crest and then using biplanar fluoroscopic visualization, under electrophysiological monitoring and stimulation, we introduced an electrophysiological probe through the retro peritoneal space into the desired discs anterior to the transverse processes and then passed it into the disc space after finding a silent window. The sleeve is retained and the probe is removed, then a K wire was passed sequentially into the disk space. A dilating tube was then passed along this same route. Following this, a working channel was then passed sequentially into the disc spaces. The working channel was manually held in position while a series of disc cleaning tools was passed through the channel to remove the affected discs under clear and direct biplanar fluoroscopic visualization, decompress the nerve roots, and decorticate the vertebral endplates at those segments.    Arthrodesis of the intervertebral spaces via an anterior retroperitoneal exposure and application of an intervertebral biomechanical device was then accomplished by using the working channel that had been placed in the retroperitoneal space anterior to the transverse processes. After adequate  decompression and preparation of the endplates, we then put calcium triphosphate soaked in bone marrow anterior into disc space and then a PEEK interbody was packed tightly with allograft bone for stabilization and arthrodesis of the intervertebral spaces and inserted into the mid portion of the intervertebral discs. This was done under biplanar fluoroscopic visualization. All bone was confined to the borders of the disc space. The working channel was then removed.    Following steps are then taken for levels:  L3 7.5mm Screw size Right 60 Left 60   L4 7.5mm Screw size Right 55 Left 55  L5 7.5mm Screw size Right 55 Left 55   S1 7.5mm Screw size Right 50 Left 50     Then patient is rotated for a true prone position. Then entry point for the pedicles is identified in the AP and lateral view, and then skin incision has been injected with local anesthetic. Then we entered the pedicle with a Jamshidi needle. Over the Jamshidi needle, we introduced the K wire in Vertebral body. Additionally we use a small periostal elevator along the screws to refresh the surface of the bone and facet and put minimal amount of Calcium-triphoisphate soaked in bone marrow for additional posterolateral fusion. Over the K wire then , we dilate the muscle with the dilator and then put a pedicle screws bilaterally. Screws are all silent up to  25 MA of stimulation. After screws are all placed, we put tatiana in place and under fluoroscopic imaging, we locked the tatiana in place and removed the screw tops and then each incision has been closed with 2-0 Vicryl suture and then Steri-Strips applied.  Before the end of the surgery, we injected 40mg Kenalog and 1 cc 0.25% Marcaine for epidural steroid injection in epidural space under fluoroscopic imaging after we introduced the spinal needle and confirmed with injecting 2cc air and aspiration which confirms we are in the epidural space and no CSF is returned.  20 cc of marcaine 0.75 was injected into deep  tissue at the end of surgery for post operative pain management.  Before closing skin we inject 15 cc 0.75% marcaine in paravertebral tissue for post op pain management.    Additional finding: Body mass index is 32.46 kg/m .    Obesity/Scoliosis made the surgery technically more challenging and so  extended the surgery time  Estimated blood: 73cc.    DISPOSITION: To PACU with postoperative antibiotic. All counts are correct at the end of the surgery.  Vernon Bynum MD        CC Dr Bynum/[employer]

## 2020-10-29 NOTE — ANESTHESIA CARE TRANSFER NOTE
Patient: Stefan Diallo    Procedure(s):  LUMBAR three to sacral one interbody and posterior lateral fusion minimally invasive versus open.    Diagnosis: Idiopathic scoliosis of lumbar spine [M41.26]  Spondylolisthesis of lumbar region [M43.16]  Degeneration of lumbar intervertebral disc [M51.36]  Spinal stenosis, lumbar region, without neurogenic claudication [M48.061]  Diagnosis Additional Information: No value filed.    Anesthesia Type:   General     Note:  Airway :Face Mask  Patient transferred to:PACU  Handoff Report: Identifed the Patient, Identified the Reponsible Provider, Reviewed the pertinent medical history, Discussed the surgical course, Reviewed Intra-OP anesthesia mangement and issues during anesthesia, Set expectations for post-procedure period and Allowed opportunity for questions and acknowledgement of understanding      Vitals: (Last set prior to Anesthesia Care Transfer)    CRNA VITALS  10/29/2020 1034 - 10/29/2020 1110      10/29/2020             Pulse:  75    SpO2:  98 %    Resp Rate (observed):  11                Electronically Signed By: LEO Boogie CRNA  October 29, 2020  11:10 AM

## 2020-10-29 NOTE — PHARMACY-ANTICOAGULATION SERVICE
Clinical Pharmacy - Warfarin Dosing Consult     Pharmacy has been consulted to manage this patient s warfarin therapy.  Indication: Atrial Fibrillation  Therapy Goal: INR 2-3  Warfarin Prior to Admission: Yes  Warfarin PTA Regimen: : Warfarin PTA (Per 10/7 Anticoag Note): 7.5mg on Sun, 5mg ROW    INR   Date Value Ref Range Status   10/29/2020 1.05 0.86 - 1.14 Final       Recommend warfarin 5 mg today.  Pharmacy will monitor Stefan Diallo daily and order warfarin doses to achieve specified goal.      Please contact pharmacy as soon as possible if the warfarin needs to be held for a procedure or if the warfarin goals change.

## 2020-10-29 NOTE — PLAN OF CARE
From PACU at 1320, little drowsy but wakes easily.  Pain managed with minimal PRN IV pain meds + cold.  No nausea, tolerating clears.  LS clear bilaterally, O2, encouraged CDB hourly.  BS faint, gas-, hewitt.  CMS intact except baseline neuropathy BLEs.  Drsg CDI.  Oriented to room and call system, denies questions.  Educated on IS use hourly, denies question.

## 2020-10-29 NOTE — ANESTHESIA POSTPROCEDURE EVALUATION
Patient: Stefan Diallo    Procedure(s):  LUMBAR three to sacral one interbody and posterior lateral fusion minimally invasive versus open.    Diagnosis:Idiopathic scoliosis of lumbar spine [M41.26]  Spondylolisthesis of lumbar region [M43.16]  Degeneration of lumbar intervertebral disc [M51.36]  Spinal stenosis, lumbar region, without neurogenic claudication [M48.061]  Diagnosis Additional Information: No value filed.    Anesthesia Type:  General    Note:  Anesthesia Post Evaluation    Patient location during evaluation: PACU  Patient participation: Able to fully participate in evaluation  Level of consciousness: awake  Pain management: adequate  Airway patency: patent  Cardiovascular status: acceptable  Respiratory status: acceptable  Hydration status: acceptable  PONV: none             Last vitals:  Vitals:    10/29/20 1200 10/29/20 1205 10/29/20 1220   BP:  121/77    Pulse: 73 70    Resp: (!) 7 16    Temp:  97.3  F (36.3  C)    SpO2: 96% 96% 92%         Electronically Signed By: Sushil Tesfaye MD  October 29, 2020  12:28 PM

## 2020-10-29 NOTE — PROGRESS NOTES
10/29/20 1610   Quick Adds   Type of Visit Initial PT Evaluation   Living Environment   People in home alone   Current Living Arrangements house   Home Accessibility stairs to enter home   Number of Stairs, Main Entrance   (13)   Stair Railings, Main Entrance railing on right side (ascending)   Transportation Anticipated family or friend will provide   Living Environment Comments Pt lives alone in a home with a tuck-under garage-13 stairs to enter, once in the home all needs are met on same level. Fiance will be staying with pt.    Self-Care   Usual Activity Tolerance good   Current Activity Tolerance fair   Regular Exercise No   Equipment Currently Used at Home none   Activity/Exercise/Self-Care Comment Owns a walker from a recent R TKA   Disability/Function   Fall history within last six months no   Change in Functional Status Since Onset of Current Illness/Injury yes   General Information   Onset of Illness/Injury or Date of Surgery 10/29/20   Referring Physician Kenya Damon PA-C   Patient/Family Therapy Goals Statement (PT) Discharge to home   Pertinent History of Current Problem (include personal factors and/or comorbidities that impact the POC) L3/4/5S1 oblique lateral lumbar interbody fusion with discectomy   Existing Precautions/Restrictions fall;spinal;brace worn when out of bed   Weight-Bearing Status - LLE full weight-bearing   Weight-Bearing Status - RLE full weight-bearing   Cognition   Orientation Status (Cognition) oriented x 4   Affect/Mental Status (Cognition) WNL   Follows Commands (Cognition) WNL   Pain Assessment   Patient Currently in Pain Yes, see Vital Sign flowsheet  (reports 8/10, however appears calm)   Integumentary/Edema   Integumentary/Edema Comments Dressing intact to L spine   Posture    Posture Forward head position;Protracted shoulders   Range of Motion (ROM)   ROM Comment LE ROM WFL, spinal mobility limited by precautions   Strength   Strength Comments Able to SLR B LE   Bed  Mobility   Comment (Bed Mobility) sit<>supine with Linda   Transfers   Transfer Safety Comments sit<>stand with Linda   Gait/Stairs (Locomotion)   Comment (Gait/Stairs) Steps at EOB without walker with min-modA   Balance   Balance Comments Pt would benefit from B UE support for safe dynamic support.    Sensory Examination   Sensory Perception patient reports no sensory changes   Coordination   Coordination no deficits were identified   Muscle Tone   Muscle Tone no deficits were identified   Clinical Impression   Criteria for Skilled Therapeutic Intervention yes, treatment indicated   PT Diagnosis (PT) Impaired functional mobility   Influenced by the following impairments Pain, spinal precautions, LE weakness   Functional limitations due to impairments Difficulty with bed mobility, transfers, ambulation, stairs   Clinical Presentation Stable/Uncomplicated   Clinical Presentation Rationale medically stable, clear POC, good support at home   Clinical Decision Making (Complexity) low complexity   Therapy Frequency (PT) 2x/day   Predicted Duration of Therapy Intervention (days/wks) 3 days   Planned Therapy Interventions (PT) balance training;bed mobility training;gait training;patient/family education;ROM (range of motion);stair training;strengthening;stretching;transfer training   Risk & Benefits of therapy have been explained evaluation/treatment results reviewed;care plan/treatment goals reviewed;risks/benefits reviewed;current/potential barriers reviewed;participants voiced agreement with care plan;participants included;patient;spouse/significant other   PT Discharge Planning    PT Discharge Recommendation (DC Rec) home with assist   PT Rationale for DC Rec Anticipate that with continued IP PT the pt will be able to complete all basic mobility skills required for safe discharge home. Pt may require assist for stair management.    Total Evaluation Time   Total Evaluation Time (Minutes) 5

## 2020-10-29 NOTE — CONSULTS
"    [facility]  Pain Service Consultation   Text Page    Date of Admission:  10/29/2020    Assessment & Plan   Stefan Diallo is a 75 year old male who was admitted on 10/29/2020. I was asked by Kenya Damon PA-C to see the patient for pain management.    Met with Stefan as he was resting in bed. He explained he is having low back pain, but that he's a little sleepy. Stefan acknowledged that when he was taking 5 mg Oxycodone following his surgery earlier this year at [facility] \"They would just knock me out and I'd sleep\". Reasonable to start with lower does Oxycodone of 2.5 mg and if needed use 5 mg. Stefan does not have his home BiPAP machine, but gave permission to call his girlfriend, Kayce to see if she could bring it. I phoned Kayce Elena at [phone] and she is willing to drop off Stefan's \"sleeping machine\".       1)  Acute low back pain s/p L3-S1 fusion per Vernon Bynum MD earlier today     2)  Patient with chronic back pain with radiculopathy. He is not on chronic opioid therapy   Baseline 0 mg Daily Morphine Equivalent as dispensed as reported daily use.  Patient has no expected opioid tolerance.     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    PLAN:   1)  Oxycodone 2.5-5 mg every 3 hours prn    2)  Non-opioid multimodal medication therapy  -Topical:  Not indicated   -N-SAIDS:  Ketorolac 15 mg every 6 hour x 2 days per surgery  -Muscle " Relaxants:Methocarbamol 500 mg QID PRN  -Adjuvants:Acetaminophen 975 mg every 8 hours, Gabapentin 300 mg TID  -Antidepressants/anxiolytics: Not indicated    3)  Non-medication interventions  Positioning, ICE, Relaxation, Distraction, Physical therapy     4)  Opioids: Oxycodone 2.5 mg to 5 mg every 3 hours prn  Opioids Treatment Goal: -Improvement in function  -Participate in PT  -Post-operative management of pain, expected 3-7 days needed    5)  Constipation Prophylaxis  Senna-S 1-2 tablets BID and Autumn lax daily prn     6)  Pain Education  -Opioid safe use, storage and disposal information included in DC AVS    7)  DC Planning   Discussed goal of opioid therapy as above with patient and bedside nurse Leslie Tilley RN  Length of therapy is less than 10 days, opioids may be stopped without taper.  Continued outpatient management of pain per PCP  Disposition: ? Home  Support systems: Significant other  Outpatient Referrals: None needed from pain perspective  The following risk factors have been identified for unintentional overdose: patient is not expected to have previous tolerance given gap in opioid use, patient is > 65 years old, patient is overweight and patient has sleep disordered breathing. Discharge with intra-nasal naloxone if discharged to home with opioids  >40 mg MME/day.  Plan for education prior to discharge.    Time Spent on this Encounter   Total unit/floor time 2:10 PM until 2:55 PM, time consisted of the following, examination of the patient, reviewing the record and completing documentation. >50% of time spent in counseling and coordination of care.  Time spend counseling with patient consisted of the following topics, symptom management.  Time spent in coordination of care with Bedside Nurse Leslie Tilley RN.     Brenda Marie MS, RN, CNS, APRN, ACHPN, FAACVPR  Pain and Palliative Care  Pager [phone]  Office [phone]       Primary Care Physician   Primary Care Physician:Collin Singh,  "MD  Pain Specialist:  None    Chief Complaint   No chief complaint on file.    History is obtained from the patient, electronic health record and patient's significant other    History of Present Illness   Stefan Diallo is a 75 year old male who was admitted 10/29/2020 for planned L3/4/5S1 fusion per Vernon yBnum MD. Patient is a obese gentleman with diabetes mellitus with peripheral neuropathy, GERD      CURRENT PAIN:  His pain is located in the low back  It is described as Throbbing  He rates it as ranging between 2/10 and 9/10  The average is 7/10 on a scale of 0-10  Currently it is rated as 7/10  It improves by \"moving\"  It worsens by \"standing too long\"  He has been compliant with the recommendations while in the hospital.    PAST PAIN TREATMENT:   Medications: Gabapentin for peripheral neuropathy and Oxycodone with recent surgery   Non-pharmacologic modalities: Physical Therapy  Previous interventions/surgeries:Toe amputation    [place]  REVIEW [email or web address]  One prescription for Oxycodone 5 mg (tablets obtained 8/20/2020 following I&D of pre patella bursa for septic bursitis on 8/18/2020 at [place], [place]) previous use of Gabapentin 300 mg (180 tablets obtained 10/23/2019)    Past Medical History   I have reviewed this patient's medical history and updated it with pertinent information if needed.   Past Medical History:   Diagnosis Date     Arthritis      Bell's palsy 2015     BPH (benign prostatic hyperplasia)      Diabetes (H)      Gastroesophageal reflux disease      Hyperlipemia      Hypertension      Idiopathic peripheral neuropathy      Irregular heart beat     chronic at fib     Renal artery aneurysm (H)      Sleep apnea     Bipap       Past Surgical History   I have reviewed this patient's surgical history and updated it with pertinent information if needed.  Past Surgical History:   Procedure Laterality Date     ORTHOPEDIC SURGERY Right     knee " surgery     ORTHOPEDIC SURGERY Right     amputation 3rd toe on right foot         Prior to Admission Medications   Prior to Admission Medications   Prescriptions Last Dose Informant Patient Reported? Taking?   EPINEPHrine (ANY BX GENERIC EQUIV) 0.3 MG/0.3ML injection 2-pack Unknown at Unknown time  Yes No   Sig: Inject 0.3 mg into the muscle as needed for anaphylaxis   Vitamin D, Cholecalciferol, 25 MCG (1000 UT) TABS 10/28/2020 at Unknown time  Yes Yes   Sig: Take 1 tablet by mouth daily   atenolol (TENORMIN) 50 MG tablet 10/29/2020 at Unknown time  Yes Yes   Sig: Take 25 mg by mouth daily   atorvastatin (LIPITOR) 20 MG tablet 10/29/2020 at Unknown time  Yes Yes   Sig: Take 20 mg by mouth daily   diltiazem ER COATED BEADS (CARDIZEM CD/CARTIA XT) 120 MG 24 hr capsule 10/29/2020 at Unknown time  Yes Yes   Sig: Take 120 mg by mouth daily   fluticasone (FLONASE) 50 MCG/ACT nasal spray 10/28/2020 at Unknown time  Yes Yes   Sig: Spray 2 sprays into both nostrils daily   gabapentin (NEURONTIN) 300 MG capsule 10/29/2020 at Unknown time  Yes Yes   Sig: Take 600 mg by mouth At Bedtime    gemfibrozil (LOPID) 600 MG tablet 10/29/2020 at Unknown time  Yes Yes   Sig: Take 600 mg by mouth 2 times daily (before meals)   loratadine (CLARITIN) 10 MG tablet 10/29/2020 at Unknown time  Yes Yes   Sig: Take 10 mg by mouth daily   metFORMIN (GLUCOPHAGE) 500 MG tablet 10/28/2020 at Unknown time  Yes Yes   Sig: Take 500 mg by mouth daily (with breakfast)    metFORMIN (GLUCOPHAGE) 500 MG tablet 10/28/2020 at Unknown time  Yes Yes   Sig: Take 1,000 mg by mouth daily (with dinner)   montelukast (SINGULAIR) 10 MG tablet Unknown at Unknown time  Yes No   Sig: Take 10 mg by mouth daily   multivitamin (OCUVITE) TABS tablet 10/28/2020 at Unknown time  Yes Yes   Sig: Take 1 tablet by mouth 2 times daily   omeprazole (PRILOSEC) 20 MG DR capsule 10/29/2020 at Unknown time  Yes Yes   Sig: Take 20 mg by mouth daily   warfarin ANTICOAGULANT (COUMADIN) 5  "MG tablet 10/21/2020  Yes Yes   Sig: Take 5 mg by mouth six times a week On Mon, Tues, Wed, Thurs, Fri and Sat   warfarin ANTICOAGULANT (COUMADIN) 7.5 MG tablet 10/18/2020  Yes Yes   Sig: Take 7.5 mg by mouth once a week On Sundays      Facility-Administered Medications: None     Allergies   Allergies   Allergen Reactions     Bees        Social History   I have reviewed this patient's social history and updated it with pertinent information if needed. Stefan Diallo  reports that he quit smoking about 30 years ago. He has never used smokeless tobacco. He reports previous alcohol use. He reports that he does not use drugs.    Family History   I have reviewed this patient's family history and updated it with pertinent information if needed.   History reviewed. No pertinent family history.  Family history of addiction - \"Not that I know\"    Review of Systems   The 10 point Review of Systems is negative other than noted in the HPI or here.    Denies Bowel or bladder dysfunction (currently has hewitt catheter)    Physical Exam   Temp:  [97.1  F (36.2  C)-98.1  F (36.7  C)] 98.1  F (36.7  C)  Pulse:  [67-77] 77  Resp:  [7-21] 12  BP: ()/() 129/78  SpO2:  [92 %-99 %] 98 %  246 lbs 0 oz  GEN:  Elderly obese  male. Slightly groggy, but oriented x 3, appears comfortable, no apparent distress.  HEENT:  Normocephalic/atraumatic, no scleral icterus, no nasal discharge, mouth moist.  CV:  RRR, S1, S2; no murmurs or other irregularities noted.  +3 DP/PT pulses bilaterally; no edema bilateral lower extremities.  RESP:  Clear to auscultation bilaterally without rales/rhonchi/wheezing/retractions.  Symmetric chest rise on inhalation noted.  Normal respiratory effort.  ABD:  Rounded, soft, non-tender/non-distended.  +BS  EXT:  Edema & pulses as noted above.  Color, moisture and sensation intact x 4.     M/S:   Low back is tender to palpation.    SKIN:  Warm and dry to touch, no exanthems noted in the visualized " areas.    NEURO: Symmetric strength +5/5.  Sensation to touch intact all extremities.   There is no area of allodynia or hyperesthesia.  PAIN BEHAVIOR: Cooperative  Psych:  Normal affect.  Calm, cooperative, conversant appropriately.       Data   Results for orders placed or performed during the hospital encounter of 10/29/20   XR Surgery GRAEME L/T 5 Min Fluoro w Stills     Status: None    Narrative    This exam was marked as non-reportable because it will not be read by a   radiologist or a [facility] non-radiologist provider.         CBC with platelets     Status: Abnormal   Result Value Ref Range    WBC 10.9 4.0 - 11.0 10e9/L    RBC Count 4.46 4.4 - 5.9 10e12/L    Hemoglobin 13.2 (L) 13.3 - 17.7 g/dL    Hematocrit 41.7 40.0 - 53.0 %    MCV 94 78 - 100 fl    MCH 29.6 26.5 - 33.0 pg    MCHC 31.7 31.5 - 36.5 g/dL    RDW 15.6 (H) 10.0 - 15.0 %    Platelet Count 290 150 - 450 10e9/L   Basic metabolic panel     Status: Abnormal   Result Value Ref Range    Sodium 139 133 - 144 mmol/L    Potassium 4.0 3.4 - 5.3 mmol/L    Chloride 108 94 - 109 mmol/L    Carbon Dioxide 23 20 - 32 mmol/L    Anion Gap 8 3 - 14 mmol/L    Glucose 170 (H) 70 - 99 mg/dL    Urea Nitrogen 28 7 - 30 mg/dL    Creatinine 0.94 0.66 - 1.25 mg/dL    GFR Estimate 78 >60 mL/min/[1.73_m2]    GFR Estimate If Black >90 >60 mL/min/[1.73_m2]    Calcium 9.3 8.5 - 10.1 mg/dL   INR     Status: None   Result Value Ref Range    INR 1.05 0.86 - 1.14   Glucose by meter     Status: Abnormal   Result Value Ref Range    Glucose 165 (H) 70 - 99 mg/dL   Glucose by meter     Status: Abnormal   Result Value Ref Range    Glucose 173 (H) 70 - 99 mg/dL   ABO/Rh type and screen     Status: None   Result Value Ref Range    ABO O     RH(D) Pos     Antibody Screen Neg     Test Valid Only At [facility]        Specimen Expires 11/01/2020

## 2020-10-29 NOTE — ANESTHESIA PREPROCEDURE EVALUATION
Anesthesia Pre-Procedure Evaluation    Patient: Stefan Diallo   MRN: [id number] : 1945          Preoperative Diagnosis: Idiopathic scoliosis of lumbar spine [M41.26]  Spondylolisthesis of lumbar region [M43.16]  Degeneration of lumbar intervertebral disc [M51.36]  Spinal stenosis, lumbar region, without neurogenic claudication [M48.061]    Procedure(s):  LUMBAR three to sacral one, with possible extension to lumbar two to lumbar three interbody and posterior lateral fusion minimally invasive versus open.    Past Medical History:   Diagnosis Date     Arthritis      Bell's palsy      BPH (benign prostatic hyperplasia)      Diabetes (H)      Gastroesophageal reflux disease      Hyperlipemia      Hypertension      Idiopathic peripheral neuropathy      Irregular heart beat     chronic at fib     Renal artery aneurysm (H)      Sleep apnea     Bipap     Past Surgical History:   Procedure Laterality Date     ORTHOPEDIC SURGERY Right     knee surgery     ORTHOPEDIC SURGERY Right     amputation 3rd toe on right foot     Anesthesia Evaluation     . Pt has had prior anesthetic.            ROS/MED HX    ENT/Pulmonary:     (+)sleep apnea, tobacco use, Past use uses CPAP , . Other pulmonary disease SOB.    Neurologic:     (+)neuropathy     Cardiovascular:     (+) Dyslipidemia, hypertension-Peripheral Vascular Disease---. : . . . :. dysrhythmias a-fib, .       METS/Exercise Tolerance:     Hematologic:         Musculoskeletal:   (+) arthritis,  other musculoskeletal- LBP      GI/Hepatic:     (+) GERD Other GI/Hepatic h/o GIB      Renal/Genitourinary:     (+) Other Renal/ Genitourinary, renal art aneurysm      Endo:     (+) type II DM .      Psychiatric:         Infectious Disease:  - neg infectious disease ROS (+) Other Infectious Disease LE, UTI's      Malignancy:         Other:                          Physical Exam      Airway   Mallampati: II  TM distance: >3 FB  Neck ROM: full    Dental   (+) upper dentures and  "lower dentures    Cardiovascular       Pulmonary             No results found for: WBC, HGB, HCT, PLT, CRP, SED, NA, POTASSIUM, CHLORIDE, CO2, BUN, CR, GLC, LUTHER, PHOS, MAG, ALBUMIN, PROTTOTAL, ALT, AST, GGT, ALKPHOS, BILITOTAL, BILIDIRECT, LIPASE, AMYLASE, [place], PTT, INR, FIBR, TSH, T4, T3, HCG, HCGS, CKTOTAL, CKMB, TROPN    Preop Vitals  BP Readings from Last 3 Encounters:   No data found for BP    Pulse Readings from Last 3 Encounters:   No data found for Pulse      Resp Readings from Last 3 Encounters:   No data found for Resp    SpO2 Readings from Last 3 Encounters:   No data found for SpO2      Temp Readings from Last 1 Encounters:   No data found for Temp    Ht Readings from Last 1 Encounters:   10/26/20 1.854 m (6' 1\")      Wt Readings from Last 1 Encounters:   10/26/20 111.1 kg (245 lb)    Estimated body mass index is 32.32 kg/m  as calculated from the following:    Height as of this encounter: 1.854 m (6' 1\").    Weight as of this encounter: 111.1 kg (245 lb).       Anesthesia Plan      History & Physical Review  History and physical reviewed and following examination; no interval change.    ASA Status:  3 .    NPO Status:  > 8 hours    Plan for General with Intravenous and Propofol induction. Maintenance will be Balanced.    PONV prophylaxis:  Ondansetron (or other 5HT-3) and Dexamethasone or Solumedrol  Fully discussed plan and risks including cardiac, pulm issues and visual loss, all questions answered and desires to proceed.        Postoperative Care  Postoperative pain management:  IV analgesics.      Consents  Anesthetic plan, risks, benefits and alternatives discussed with:  Patient.  Use of blood products discussed: Yes.   .                 Sushil Tesfaye MD                    .  "

## 2020-10-30 ENCOUNTER — APPOINTMENT (OUTPATIENT)
Dept: OCCUPATIONAL THERAPY | Facility: CLINIC | Age: 75
DRG: 454 | End: 2020-10-30
Attending: PHYSICIAN ASSISTANT
Payer: COMMERCIAL

## 2020-10-30 ENCOUNTER — APPOINTMENT (OUTPATIENT)
Dept: PHYSICAL THERAPY | Facility: CLINIC | Age: 75
DRG: 454 | End: 2020-10-30
Attending: NEUROLOGICAL SURGERY
Payer: COMMERCIAL

## 2020-10-30 ENCOUNTER — RECORDS - HEALTHEAST (OUTPATIENT)
Dept: ADMINISTRATIVE | Facility: OTHER | Age: 75
End: 2020-10-30

## 2020-10-30 VITALS
TEMPERATURE: 98.1 F | RESPIRATION RATE: 16 BRPM | OXYGEN SATURATION: 93 % | HEIGHT: 73 IN | DIASTOLIC BLOOD PRESSURE: 71 MMHG | HEART RATE: 82 BPM | WEIGHT: 246 LBS | BODY MASS INDEX: 32.6 KG/M2 | SYSTOLIC BLOOD PRESSURE: 121 MMHG

## 2020-10-30 LAB
ANION GAP SERPL CALCULATED.3IONS-SCNC: 5 MMOL/L (ref 3–14)
BUN SERPL-MCNC: 24 MG/DL (ref 7–30)
CALCIUM SERPL-MCNC: 9.1 MG/DL (ref 8.5–10.1)
CHLORIDE SERPL-SCNC: 106 MMOL/L (ref 94–109)
CO2 SERPL-SCNC: 26 MMOL/L (ref 20–32)
CREAT SERPL-MCNC: 0.87 MG/DL (ref 0.66–1.25)
GFR SERPL CREATININE-BSD FRML MDRD: 83 ML/MIN/{1.73_M2}
GLUCOSE BLDC GLUCOMTR-MCNC: 164 MG/DL (ref 70–99)
GLUCOSE BLDC GLUCOMTR-MCNC: 181 MG/DL (ref 70–99)
GLUCOSE BLDC GLUCOMTR-MCNC: 207 MG/DL (ref 70–99)
GLUCOSE SERPL-MCNC: 167 MG/DL (ref 70–99)
HGB BLD-MCNC: 11.1 G/DL (ref 13.3–17.7)
INR PPP: 1.2 (ref 0.86–1.14)
POTASSIUM SERPL-SCNC: 4.5 MMOL/L (ref 3.4–5.3)
SODIUM SERPL-SCNC: 137 MMOL/L (ref 133–144)

## 2020-10-30 PROCEDURE — 85018 HEMOGLOBIN: CPT | Performed by: PHYSICIAN ASSISTANT

## 2020-10-30 PROCEDURE — 80048 BASIC METABOLIC PNL TOTAL CA: CPT | Performed by: PHYSICIAN ASSISTANT

## 2020-10-30 PROCEDURE — 85610 PROTHROMBIN TIME: CPT | Performed by: PHYSICIAN ASSISTANT

## 2020-10-30 PROCEDURE — 97116 GAIT TRAINING THERAPY: CPT | Mod: GP | Performed by: PHYSICAL THERAPIST

## 2020-10-30 PROCEDURE — 250N000013 HC RX MED GY IP 250 OP 250 PS 637: Performed by: PHYSICIAN ASSISTANT

## 2020-10-30 PROCEDURE — 97165 OT EVAL LOW COMPLEX 30 MIN: CPT | Mod: GO

## 2020-10-30 PROCEDURE — 97535 SELF CARE MNGMENT TRAINING: CPT | Mod: GO

## 2020-10-30 PROCEDURE — 999N001017 HC STATISTIC GLUCOSE BY METER IP

## 2020-10-30 PROCEDURE — 36415 COLL VENOUS BLD VENIPUNCTURE: CPT | Performed by: PHYSICIAN ASSISTANT

## 2020-10-30 PROCEDURE — 97530 THERAPEUTIC ACTIVITIES: CPT | Mod: GP | Performed by: PHYSICAL THERAPIST

## 2020-10-30 PROCEDURE — 99231 SBSQ HOSP IP/OBS SF/LOW 25: CPT | Performed by: CLINICAL NURSE SPECIALIST

## 2020-10-30 PROCEDURE — 250N000011 HC RX IP 250 OP 636: Performed by: PHYSICIAN ASSISTANT

## 2020-10-30 PROCEDURE — 250N000013 HC RX MED GY IP 250 OP 250 PS 637: Performed by: CLINICAL NURSE SPECIALIST

## 2020-10-30 RX ORDER — METHOCARBAMOL 500 MG/1
500 TABLET, FILM COATED ORAL 4 TIMES DAILY PRN
Qty: 60 TABLET | Refills: 3 | Status: SHIPPED | OUTPATIENT
Start: 2020-10-30 | End: 2023-01-01

## 2020-10-30 RX ORDER — HYDROXYZINE HYDROCHLORIDE 10 MG/1
10 TABLET, FILM COATED ORAL EVERY 6 HOURS PRN
Qty: 60 TABLET | Refills: 3 | Status: SHIPPED | OUTPATIENT
Start: 2020-10-30 | End: 2023-01-01

## 2020-10-30 RX ORDER — OXYCODONE HYDROCHLORIDE 5 MG/1
2.5 TABLET ORAL
Qty: 60 TABLET | Refills: 0 | Status: SHIPPED | OUTPATIENT
Start: 2020-10-30 | End: 2022-01-01

## 2020-10-30 RX ORDER — WARFARIN SODIUM 7.5 MG/1
7.5 TABLET ORAL
Status: DISCONTINUED | OUTPATIENT
Start: 2020-10-30 | End: 2020-10-30 | Stop reason: HOSPADM

## 2020-10-30 RX ORDER — OXYCODONE HYDROCHLORIDE 5 MG/1
5 TABLET ORAL
Qty: 60 TABLET | Refills: 0 | Status: SHIPPED | OUTPATIENT
Start: 2020-10-30 | End: 2020-10-30

## 2020-10-30 RX ADMIN — ATORVASTATIN CALCIUM 20 MG: 20 TABLET, FILM COATED ORAL at 08:23

## 2020-10-30 RX ADMIN — GEMFIBROZIL 600 MG: 600 TABLET ORAL at 06:40

## 2020-10-30 RX ADMIN — CEFAZOLIN 1 G: 1 INJECTION, POWDER, FOR SOLUTION INTRAMUSCULAR; INTRAVENOUS at 01:38

## 2020-10-30 RX ADMIN — DILTIAZEM HYDROCHLORIDE 120 MG: 120 CAPSULE, COATED, EXTENDED RELEASE ORAL at 08:24

## 2020-10-30 RX ADMIN — OMEPRAZOLE 20 MG: 20 CAPSULE, DELAYED RELEASE ORAL at 08:23

## 2020-10-30 RX ADMIN — ACETAMINOPHEN 975 MG: 325 TABLET, FILM COATED ORAL at 06:19

## 2020-10-30 RX ADMIN — DOCUSATE SODIUM 50 MG AND SENNOSIDES 8.6 MG 2 TABLET: 8.6; 5 TABLET, FILM COATED ORAL at 08:23

## 2020-10-30 RX ADMIN — LORATADINE 10 MG: 10 TABLET ORAL at 08:23

## 2020-10-30 RX ADMIN — GABAPENTIN 300 MG: 300 CAPSULE ORAL at 08:23

## 2020-10-30 RX ADMIN — Medication 2.5 MG: at 13:42

## 2020-10-30 RX ADMIN — KETOROLAC TROMETHAMINE 15 MG: 15 INJECTION, SOLUTION INTRAMUSCULAR; INTRAVENOUS at 06:19

## 2020-10-30 RX ADMIN — METFORMIN HYDROCHLORIDE 500 MG: 500 TABLET, FILM COATED ORAL at 08:24

## 2020-10-30 RX ADMIN — KETOROLAC TROMETHAMINE 15 MG: 15 INJECTION, SOLUTION INTRAMUSCULAR; INTRAVENOUS at 12:20

## 2020-10-30 RX ADMIN — KETOROLAC TROMETHAMINE 15 MG: 15 INJECTION, SOLUTION INTRAMUSCULAR; INTRAVENOUS at 01:37

## 2020-10-30 RX ADMIN — MONTELUKAST 10 MG: 10 TABLET, FILM COATED ORAL at 08:23

## 2020-10-30 RX ADMIN — ATENOLOL 25 MG: 25 TABLET ORAL at 08:23

## 2020-10-30 RX ADMIN — ACETAMINOPHEN 975 MG: 325 TABLET, FILM COATED ORAL at 13:42

## 2020-10-30 RX ADMIN — Medication 2.5 MG: at 08:28

## 2020-10-30 RX ADMIN — GABAPENTIN 300 MG: 300 CAPSULE ORAL at 13:42

## 2020-10-30 ASSESSMENT — ACTIVITIES OF DAILY LIVING (ADL)
ADLS_ACUITY_SCORE: 17

## 2020-10-30 NOTE — DISCHARGE SUMMARY
Discharge Summary    Attending Physician:  Vernon Bynum MD  Admit Date: 10/29/2020    Discharge Date: 10/30/20  Primary Care Physician: Collin Singh    Discharge Diagnoses  [unfilled]    Discharge Exam    AAOx3 HONG f/c all for , no weakness, No new sensory deficit, incision C/D/I        Preliminary Discharge Medications    This list of medications is preliminary and tentative.  Please see the After Visit Summary for the final and accurate medication list.    [unfilled]    Procedures Performed and Findings  Procedure(s):  LUMBAR three to sacral one interbody and posterior lateral fusion minimally invasive versus open.       Consultations Obtained  HOSPITALIST IP CONSULT  OCCUPATIONAL THERAPY ADULT IP CONSULT  PHYSICAL THERAPY ADULT IP CONSULT  PAIN MANAGEMENT ADULT IP CONSULT  CARE MANAGEMENT / SOCIAL WORK IP CONSULT  PHARMACY TO DOSE WARFARIN    Code Status   Full Code    Discharge Disposition        Diet on Discharge   Regular    Activity on Discharge   Your activity upon discharge: Ad hesham within following limitations:  No excessive activities   No Bending, Twisting, climbing, Crawling,   No lifting more than 8 lb for 2 weeks, or 15 lb for 2 months or 25 lb for 4 months or 35 lb for 6 months  Brace for riding cars for 4-6 months      Discharge Instructions  Per [facility] instruction : [email or web address]        Follow-Up Scheduled    Follow up in 2 weeks with me or PCP for wound check ( patient's choice) if patients want to go to PCP for wound check, then f/u in my office  In 3 months      Hospital Course   Hospital Course unremarkable , adequate ambulation in due time, pain controlled , cleared by PT/OT, no events         CC Dr Bynum/[employer]

## 2020-10-30 NOTE — PLAN OF CARE
Pain managed with PRN oxycodone 2.5mg + cold.  No nausea.  LS clear bilaterally, weaned off O2, sats low-mid 90s RA, encouraged CDB/IS hourly.  CMS intact except baseline neuropathy BLEs.  Incisions approximated, steri-stripes intact, small dried drainage, drsgs changed sterilely.  Up with SBA belt walker, brace OOB, ambulating.  Voiding.  Reviewed discharge instructions and medications with patient and fiance, questions answered.  Patient discharged to home with discharge instructions, medications (oxycodone, atarax, robaxin), pill cutter, and belongings.  Left floor via w/c, transported to home by [facility].

## 2020-10-30 NOTE — PHARMACY-ANTICOAGULATION SERVICE
Clinical Pharmacy- Warfarin Discharge Note  This patient is currently on warfarin for the treatment of Atrial fibrillation.  INR Goal= 2-3  Expected length of therapy undetermined.    Warfarin PTA Regimen: : Warfarin PTA (Per 10/7 Anticoag Note): 7.5mg on Sun, 5mg ROW      Anticoagulation Dose History     Recent Dosing and Labs Latest Ref Rng & Units 10/29/2020 10/30/2020    Warfarin 5 mg - 5 mg -    INR 0.86 - 1.14 1.05 1.20(H)          Vitamin K doses administered during the last 7 days:   FFP administered during the last 7 days:     Patient is discharged on same PTA warfarin dose of 7.5 mg Sundays and 5 mg ROW.  To have INR ordered for Monday or Tuesday of next week.

## 2020-10-30 NOTE — CONSULTS
Care Transitions Team: Following for CC, discharge planning, and disposition.        Per chart review MD has consulted [facility] for potential for disposition concerns.     Per PT  Assessment:   Distance in Feet (Required for LE Total Joints)    10/30 0929  180   PT Discharge Recommendation (DC Rec) (Last 1 values)    10/30 0929  home with assist   PT Rationale for DC Rec (Last 1 values)    10/30 0929  Patient is SBA with all bed mobility, transfers, gait, and stairs.            Please re- consult CM if there are changes/concern with above plan.    Angeles Zhong RN, BSN CTS  Care Coordinator  [facility]   [phone]

## 2020-10-30 NOTE — PLAN OF CARE
Physical Therapy Discharge Summary    Reason for therapy discharge:    All goals and outcomes met, no further needs identified.    Progress towards therapy goal(s). See goals on Care Plan in [facility] electronic health record for goal details.  Goals met    Therapy recommendation(s):    No further therapy is recommended.

## 2020-10-30 NOTE — PROGRESS NOTES
10/30/20 1235   Quick Adds   Type of Visit Initial Occupational Therapy Evaluation   Living Environment   People in home alone   Current Living Arrangements house  (multi level home )   Home Accessibility stairs to enter home   Transportation Anticipated family or friend will provide  (pt typically drives )   Living Environment Comments all needs met on the main level. Fiance will be staying with pt.  Has a Tub/shower.    Self-Care   Usual Activity Tolerance good   Current Activity Tolerance moderate   Equipment Currently Used at Home raised toilet seat;shower chair;grab bar, toilet;grab bar, tub/shower  (has long shoe horn. )   Disability/Function   Walking or Climbing Stairs Difficulty no   Dressing/Bathing Difficulty no   Toileting no   Fall history within last six months no   General Information   Onset of Illness/Injury or Date of Surgery 10/29/20   Referring Physician Kenya Damon PA-C   Patient/Family Therapy Goal Statement (OT) Home today    Additional Occupational Profile Info/Pertinent History of Current Problem Acute low back pain s/p L3-S1 fusion per Vernon Bynum MD on 10/29/2020. See chart for details.    Existing Precautions/Restrictions spinal   Cognitive Status Examination   Orientation Status orientation to person, place and time   Transfers   Transfers sit-stand transfer;toilet transfer   Sit-Stand Transfer   Sit-Stand [place] (Transfers) set up;verbal cues  (SBA)   Toilet Transfer   [place] Level (Toilet Transfer) set up;verbal cues  (SBA)   Activities of Daily Living   BADL Assessment/Intervention lower body dressing   Lower Body Dressing Assessment/Training   [place] Level (Lower Body Dressing) set up;verbal cues;minimum assist (75% patient effort)   Assistive Devices (Lower Body Dressing) reacher;long-handled shoe horn   Instrumental Activities of Daily Living (IADL)   Previous Responsibilities housekeeping;laundry;shopping;medication management;finances;driving;yardwork  "  Clinical Impression   Criteria for Skilled Therapeutic Interventions Met (OT) yes   OT Diagnosis Decreased independence in I/ADLs    OT Problem List-Impairments impacting ADL activity tolerance impaired;post-surgical precautions   ADL comments/analysis Decreased independence in I/ADLs    Assessment of Occupational Performance 1-3 Performance Deficits   Identified Performance Deficits Decreased independence in I/ADLs  (dressing, bathing, toileting)   Planned Therapy Interventions (OT) ADL retraining;IADL retraining;transfer training   Clinical Decision Making Complexity (OT) low complexity   Therapy Frequency (OT) Daily   Predicted Duration of Therapy 3 days   Anticipated Equipment Needs Upon Discharge (OT) reacher;shower chair;raised toilet seat   Risks and Benefits of Treatment have been explained. Yes   Patient, Family & other staff in agreement with plan of care Yes   OT Discharge Planning    OT Discharge Recommendation (DC Rec) Home with assist   OT Rationale for DC Rec Assist for I/ADLs (including transportation, dressing, shower transfers, home management tasks).    [facility] AM-PAC TM \"6 Clicks\"   2016, Trustees of [facility], under license to [employer].  All rights reserved.   6 Clicks Short Forms Daily Activity Inpatient Short Form   Total Evaluation Time (Minutes)   Total Evaluation Time (Minutes) 7     "

## 2020-10-30 NOTE — PLAN OF CARE
Pt ambulating in the room with A1, walker GB, hewitt patent, dressing reinforced, pain controlled with oxycodone 2.5mg, ice and scheduled medications. Will keep monitoring.

## 2020-10-30 NOTE — PROGRESS NOTES
[facility]  Pain Management Progress Note  Text Page     Assessment & Plan   Stefan Diallo is a 75 year old male who was admitted on 10/29/2020.     Met with Stefan as he was resting in bed watching television. He offered no complaint.    1)  Acute low back pain s/p L3-S1 fusion per Vernon Bynum MD on 10/29/2020.  Pain well managed over night with use of 2.5 mg Oxycodone prn     2)  Patient with chronic back pain with radiculopathy. He is not on chronic opioid therapy   Baseline 0 mg Daily Morphine Equivalent as dispensed as reported daily use.  Patient has no expected opioid tolerance.      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     PLAN:   1)  Oxycodone 2.5-5 mg every 3 hours prn     2)  Non-opioid multimodal medication therapy  -Topical:  Not indicated   -N-SAIDS:  Ketorolac 15 mg every 6 hour x 2 days per surgery  -Muscle Relaxants:  Methocarbamol 500 mg QID PRN  -Adjuvants:  Acetaminophen 975 mg every 8 hours, Gabapentin 300 mg TID  -Antidepressants/anxiolytics: Not indicated     3)  Non-medication interventions  Positioning, ICE, Relaxation, Distraction  Appreciate input of Physical Therapist Cathleen Ochoa, PT     4)  Opioids: Oxycodone 2.5 mg to 5 mg every 3 hours prn  Opioids Treatment Goal: -Improvement in function  -Participate in PT  -Post-operative management of pain, expected 3-7 days needed     5)  Constipation Prophylaxis  Senna-S 1-2 tablets  BID and Autumn lax daily prn      6)  Pain Education  -Opioid safe use, storage and disposal information included in DC AVS     7)  DC Planning   Discussed goal of opioid therapy as above with patient, Physical Therapist Cathleen Ocoha, ALINE and bedside nurse Carmen Crespo, RN  Length of therapy is less than 10 days, opioids may be stopped without taper.  Continued outpatient management of pain per PCP  Disposition: Home  Support systems: Significant other  Outpatient Referrals: None needed from pain perspective  The following risk factors have been identified for unintentional overdose: patient is not expected to have previous tolerance given gap in opioid use, patient is > 65 years old, patient is overweight and patient has sleep disordered breathing. Discharge with intra-nasal naloxone if discharged to home with opioids  >40 mg MME/day.  Plan for education prior to discharge.    Brenda Marie MS, RN, CNS, APRN, ACHPN, FAACVPR  Pain and Palliative Care  Pager [phone]  Office [phone]       Time Spent on this Encounter   Total unit/floor time 8:45 AM until 9:05 AM minutes, time consisted of the following, examination of the patient, reviewing the record and completing documentation. >50% of time spent in counseling and coordination of care.  Time spend counseling with patient consisted of the following topics, symptom management.  Time spent in coordination of care with Physical Therapist Cathleen Ochoa, ALINE and bedside nurse Carmen Crespo, RN.    Interval History   Chart reviewed - progressing well after surgery    Review of Systems    CONSTITUTIONAL: NEGATIVE for fever, chills, change in weight  ENT/MOUTH: NEGATIVE for ear, mouth and throat problems  RESP: NEGATIVE for significant cough or SOB  CV: NEGATIVE for chest pain, palpitations or peripheral edema    Physical Exam   Temp:  [97.1  F (36.2  C)-98.1  F (36.7  C)] 98.1  F (36.7  C)  Pulse:  [67-84] 82  Resp:  [7-21] 16  BP: ()/(60-84)  121/71  SpO2:  [91 %-99 %] 92 %  246 lbs 0 oz  GEN:  Obese  male. Alert, oriented x 3, appears comfortable, NAD.  HEENT:  Normocephalic/atraumatic, no scleral icterus, no nasal discharge, mouth moist.  CV:  RRR, S1, S2; no murmurs or other irregularities noted.  +3 DP/PT pulses bilaterally; no edema BLE.  RESP:  Clear to auscultation bilaterally without rales/rhonchi/wheezing/retractions.  Symmetric chest rise on inhalation noted.  Normal respiratory effort.  ABD:  Rounded, soft, non-tender/non-distended.  +BS  EXT:  CMS intact x 4.     M/S:   Low back is tender to palpation.    SKIN:  Warm and dry to touch, no exanthems noted in the visualized areas.    NEURO: Symmetric strength +5/5.  Sensation to touch intact all extremities.   There is no area of allodynia or hyperesthesia.  PAIN BEHAVIOR: Cooperative  Psych:  Normal affect.  Calm, cooperative, conversant appropriately.    Medications     0.9% sodium chloride + KCl 20 mEq/L Stopped (10/30/20 0140)     Warfarin Therapy Reminder         acetaminophen  975 mg Oral Q8H     atenolol  25 mg Oral Daily     atorvastatin  20 mg Oral Daily     diltiazem ER COATED BEADS  120 mg Oral Daily     fluticasone  2 spray Both Nostrils Daily     gabapentin  300 mg Oral TID     gemfibrozil  600 mg Oral BID AC     ketorolac  15 mg Intravenous Q6H     loratadine  10 mg Oral Daily     metFORMIN  1,000 mg Oral Daily with supper     metFORMIN  500 mg Oral Daily with breakfast     montelukast  10 mg Oral Daily     omeprazole  20 mg Oral Daily     senna-docusate  1-2 tablet Oral BID     sodium chloride (PF)  3 mL Intracatheter Q8H       Data   Results for orders placed or performed during the hospital encounter of 10/29/20 (from the past 24 hour(s))   XR Surgery GRAEME L/T 5 Min Fluoro w Stills    Narrative    This exam was marked as non-reportable because it will not be read by a   radiologist or a [facility] non-radiologist provider.         Glucose by meter   Result Value Ref Range     Glucose 173 (H) 70 - 99 mg/dL   Glucose by meter   Result Value Ref Range    Glucose 173 (H) 70 - 99 mg/dL   Glucose by meter   Result Value Ref Range    Glucose 207 (H) 70 - 99 mg/dL   Glucose by meter   Result Value Ref Range    Glucose 207 (H) 70 - 99 mg/dL   Basic metabolic panel   Result Value Ref Range    Sodium 137 133 - 144 mmol/L    Potassium 4.5 3.4 - 5.3 mmol/L    Chloride 106 94 - 109 mmol/L    Carbon Dioxide 26 20 - 32 mmol/L    Anion Gap 5 3 - 14 mmol/L    Glucose 167 (H) 70 - 99 mg/dL    Urea Nitrogen 24 7 - 30 mg/dL    Creatinine 0.87 0.66 - 1.25 mg/dL    GFR Estimate 83 >60 mL/min/[1.73_m2]    GFR Estimate If Black >90 >60 mL/min/[1.73_m2]    Calcium 9.1 8.5 - 10.1 mg/dL   Hemoglobin   Result Value Ref Range    Hemoglobin 11.1 (L) 13.3 - 17.7 g/dL   INR   Result Value Ref Range    INR 1.20 (H) 0.86 - 1.14   Glucose by meter   Result Value Ref Range    Glucose 164 (H) 70 - 99 mg/dL

## 2020-10-30 NOTE — PROGRESS NOTES
No charge note:    Reviewed chart.  discharge orders/ instructions are in place  Medicine reconciliation done.  Spoke with RN in charge of patient.  No current issues, stable hemodynamics  Warfarin resumed upon discharge, placed repeat INR this coming Monday  Seen by pain service. Comfortable this morning    Greg

## 2020-10-30 NOTE — PROGRESS NOTES
"Blood pressure 118/70, pulse 80, temperature 97.4  F (36.3  C), temperature source Temporal, resp. rate 16, height 1.854 m (6' 1\"), weight 111.6 kg (246 lb), SpO2 95 %.    Pt is alert and oriented x3. Pt verbalized pain of 2/10 and denied pain medications. Pt reported no nausea. Pt wanted to sleep after his night medications and refused to go on his side. Pt had hewitt removed in the morning and administered medications. Patient turned to his side and had clean, dry, and intact dressing on his back incision site. Patient went back to sleep after.  "

## 2020-10-30 NOTE — DISCHARGE INSTRUCTIONS
Wear your back brace when out of bed at all times!    While on narcotic pain medication, to prevent constipation:  1. Drink plenty of water to keep well hydrated   2. May take over the counter Colace or Senna (follow instructions on label)    Call MD if:  1) Increased redness, inflammation, localized warmth, swelling, or tenderness at incision site.  2) Opening or pulling apart of the incision.  3) Increased pain not controlled with oral pain medications, cold pack, and rest.  4) Fever greater than 101 degrees, body chills or excessive sweating.  5) Generalized feeling of illness.  6) Any questions/concerns.      Thank your for allowing [facility] to participate in your cares!!!

## 2020-10-31 ENCOUNTER — COMMUNICATION - HEALTHEAST (OUTPATIENT)
Dept: SCHEDULING | Facility: CLINIC | Age: 75
End: 2020-10-31

## 2020-10-31 NOTE — PLAN OF CARE
Occupational Therapy Discharge Summary    Reason for therapy discharge:    Discharged to home.    Progress towards therapy goal(s). See goals on Care Plan in [facility] electronic health record for goal details.  Goals not met.  Barriers to achieving goals:   discharge on same date as initial evaluation.    Therapy recommendation(s):    No further therapy is recommended.

## 2020-11-04 ENCOUNTER — COMMUNICATION - HEALTHEAST (OUTPATIENT)
Dept: FAMILY MEDICINE | Facility: CLINIC | Age: 75
End: 2020-11-04

## 2020-11-04 DIAGNOSIS — G60.9 HEREDITARY AND IDIOPATHIC PERIPHERAL NEUROPATHY: ICD-10-CM

## 2020-11-06 ENCOUNTER — OFFICE VISIT - HEALTHEAST (OUTPATIENT)
Dept: FAMILY MEDICINE | Facility: CLINIC | Age: 75
End: 2020-11-06

## 2020-11-06 ENCOUNTER — COMMUNICATION - HEALTHEAST (OUTPATIENT)
Dept: ANTICOAGULATION | Facility: CLINIC | Age: 75
End: 2020-11-06

## 2020-11-06 DIAGNOSIS — R79.89 ELEVATED FERRITIN: ICD-10-CM

## 2020-11-06 DIAGNOSIS — I48.91 ATRIAL FIBRILLATION, UNSPECIFIED TYPE (H): ICD-10-CM

## 2020-11-06 DIAGNOSIS — Z98.890 STATUS POST LUMBAR SURGERY: ICD-10-CM

## 2020-11-06 DIAGNOSIS — E11.51 TYPE II DIABETES MELLITUS WITH PERIPHERAL CIRCULATORY DISORDER (H): ICD-10-CM

## 2020-11-06 DIAGNOSIS — E83.10 DISORDER OF IRON METABOLISM, UNSPECIFIED: ICD-10-CM

## 2020-11-06 DIAGNOSIS — Z89.421 ACQUIRED ABSENCE OF OTHER RIGHT TOE(S) (H): ICD-10-CM

## 2020-11-06 DIAGNOSIS — D64.9 ANEMIA, UNSPECIFIED TYPE: ICD-10-CM

## 2020-11-06 DIAGNOSIS — E66.01 MORBID OBESITY (H): ICD-10-CM

## 2020-11-06 LAB
BASOPHILS # BLD AUTO: 0.1 THOU/UL (ref 0–0.2)
BASOPHILS NFR BLD AUTO: 1 % (ref 0–2)
CHOLEST SERPL-MCNC: 151 MG/DL
EOSINOPHIL # BLD AUTO: 0.6 THOU/UL (ref 0–0.4)
EOSINOPHIL NFR BLD AUTO: 5 % (ref 0–6)
ERYTHROCYTE [DISTWIDTH] IN BLOOD BY AUTOMATED COUNT: 14.4 % (ref 11–14.5)
FASTING STATUS PATIENT QL REPORTED: NORMAL
FERRITIN SERPL-MCNC: 1201 NG/ML (ref 27–300)
HCT VFR BLD AUTO: 37.8 % (ref 40–54)
HDLC SERPL-MCNC: 40 MG/DL
HGB BLD-MCNC: 12.9 G/DL (ref 14–18)
INR PPP: 2.4 (ref 0.9–1.1)
IRON SATN MFR SERPL: 20 % (ref 20–50)
IRON SERPL-MCNC: 56 UG/DL (ref 42–175)
LDLC SERPL CALC-MCNC: 83 MG/DL
LYMPHOCYTES # BLD AUTO: 2.2 THOU/UL (ref 0.8–4.4)
LYMPHOCYTES NFR BLD AUTO: 19 % (ref 20–40)
MCH RBC QN AUTO: 29.9 PG (ref 27–34)
MCHC RBC AUTO-ENTMCNC: 34.1 G/DL (ref 32–36)
MCV RBC AUTO: 88 FL (ref 80–100)
MONOCYTES # BLD AUTO: 1 THOU/UL (ref 0–0.9)
MONOCYTES NFR BLD AUTO: 9 % (ref 2–10)
NEUTROPHILS # BLD AUTO: 7.7 THOU/UL (ref 2–7.7)
NEUTROPHILS NFR BLD AUTO: 66 % (ref 50–70)
PLATELET # BLD AUTO: 460 THOU/UL (ref 140–440)
PMV BLD AUTO: 7.2 FL (ref 7–10)
RBC # BLD AUTO: 4.3 MILL/UL (ref 4.4–6.2)
TIBC SERPL-MCNC: 280 UG/DL (ref 313–563)
TRANSFERRIN SERPL-MCNC: 224 MG/DL (ref 212–360)
TRIGL SERPL-MCNC: 138 MG/DL
VIT B12 SERPL-MCNC: 352 PG/ML (ref 213–816)
WBC: 11.6 THOU/UL (ref 4–11)

## 2020-11-06 ASSESSMENT — MIFFLIN-ST. JEOR: SCORE: 1853.9

## 2020-11-16 ENCOUNTER — COMMUNICATION - HEALTHEAST (OUTPATIENT)
Dept: FAMILY MEDICINE | Facility: CLINIC | Age: 75
End: 2020-11-16

## 2020-11-16 ENCOUNTER — AMBULATORY - HEALTHEAST (OUTPATIENT)
Dept: FAMILY MEDICINE | Facility: CLINIC | Age: 75
End: 2020-11-16

## 2020-11-16 DIAGNOSIS — R79.89 ELEVATED FERRITIN: ICD-10-CM

## 2020-11-16 DIAGNOSIS — E83.10 DISORDER OF IRON METABOLISM, UNSPECIFIED: ICD-10-CM

## 2020-11-16 DIAGNOSIS — D64.9 ANEMIA, UNSPECIFIED TYPE: ICD-10-CM

## 2020-11-23 ENCOUNTER — COMMUNICATION - HEALTHEAST (OUTPATIENT)
Dept: ANTICOAGULATION | Facility: CLINIC | Age: 75
End: 2020-11-23

## 2020-12-07 ENCOUNTER — AMBULATORY - HEALTHEAST (OUTPATIENT)
Dept: LAB | Facility: CLINIC | Age: 75
End: 2020-12-07

## 2020-12-07 ENCOUNTER — COMMUNICATION - HEALTHEAST (OUTPATIENT)
Dept: ANTICOAGULATION | Facility: CLINIC | Age: 75
End: 2020-12-07

## 2020-12-07 DIAGNOSIS — I48.20 CHRONIC ATRIAL FIBRILLATION (H): ICD-10-CM

## 2020-12-07 DIAGNOSIS — I48.91 ATRIAL FIBRILLATION, UNSPECIFIED TYPE (H): ICD-10-CM

## 2020-12-07 LAB — INR PPP: 2.9 (ref 0.9–1.1)

## 2020-12-17 ENCOUNTER — COMMUNICATION - HEALTHEAST (OUTPATIENT)
Dept: FAMILY MEDICINE | Facility: CLINIC | Age: 75
End: 2020-12-17

## 2020-12-17 DIAGNOSIS — I10 ESSENTIAL HYPERTENSION: ICD-10-CM

## 2020-12-18 ENCOUNTER — OFFICE VISIT - HEALTHEAST (OUTPATIENT)
Dept: FAMILY MEDICINE | Facility: CLINIC | Age: 75
End: 2020-12-18

## 2020-12-18 DIAGNOSIS — Z79.01 LONG TERM CURRENT USE OF ANTICOAGULANT THERAPY: ICD-10-CM

## 2020-12-18 DIAGNOSIS — R04.0 EPISTAXIS: ICD-10-CM

## 2020-12-22 ENCOUNTER — COMMUNICATION - HEALTHEAST (OUTPATIENT)
Dept: FAMILY MEDICINE | Facility: CLINIC | Age: 75
End: 2020-12-22

## 2020-12-22 DIAGNOSIS — I10 ESSENTIAL HYPERTENSION: ICD-10-CM

## 2020-12-23 ENCOUNTER — COMMUNICATION - HEALTHEAST (OUTPATIENT)
Dept: ANTICOAGULATION | Facility: CLINIC | Age: 75
End: 2020-12-23

## 2020-12-23 DIAGNOSIS — I48.91 ATRIAL FIBRILLATION, UNSPECIFIED TYPE (H): ICD-10-CM

## 2020-12-28 ENCOUNTER — AMBULATORY - HEALTHEAST (OUTPATIENT)
Dept: FAMILY MEDICINE | Facility: CLINIC | Age: 75
End: 2020-12-28

## 2020-12-28 DIAGNOSIS — I10 ESSENTIAL HYPERTENSION: ICD-10-CM

## 2020-12-29 ENCOUNTER — OFFICE VISIT - HEALTHEAST (OUTPATIENT)
Dept: OTOLARYNGOLOGY | Facility: CLINIC | Age: 75
End: 2020-12-29

## 2020-12-29 DIAGNOSIS — R04.0 EPISTAXIS: ICD-10-CM

## 2020-12-29 DIAGNOSIS — J34.89 NASAL CRUSTING: ICD-10-CM

## 2021-01-07 ENCOUNTER — AMBULATORY - HEALTHEAST (OUTPATIENT)
Dept: LAB | Facility: CLINIC | Age: 76
End: 2021-01-07

## 2021-01-07 ENCOUNTER — COMMUNICATION - HEALTHEAST (OUTPATIENT)
Dept: ANTICOAGULATION | Facility: CLINIC | Age: 76
End: 2021-01-07

## 2021-01-07 DIAGNOSIS — I48.20 CHRONIC ATRIAL FIBRILLATION (H): ICD-10-CM

## 2021-01-07 DIAGNOSIS — I48.91 ATRIAL FIBRILLATION, UNSPECIFIED TYPE (H): ICD-10-CM

## 2021-01-07 LAB — INR PPP: 2.6 (ref 0.9–1.1)

## 2021-01-26 ENCOUNTER — COMMUNICATION - HEALTHEAST (OUTPATIENT)
Dept: FAMILY MEDICINE | Facility: CLINIC | Age: 76
End: 2021-01-26

## 2021-01-26 DIAGNOSIS — E11.9 TYPE 2 DIABETES MELLITUS WITHOUT COMPLICATION, WITHOUT LONG-TERM CURRENT USE OF INSULIN (H): ICD-10-CM

## 2021-02-04 ENCOUNTER — COMMUNICATION - HEALTHEAST (OUTPATIENT)
Dept: FAMILY MEDICINE | Facility: CLINIC | Age: 76
End: 2021-02-04

## 2021-02-07 ENCOUNTER — COMMUNICATION - HEALTHEAST (OUTPATIENT)
Dept: FAMILY MEDICINE | Facility: CLINIC | Age: 76
End: 2021-02-07

## 2021-02-07 DIAGNOSIS — E78.2 MIXED HYPERLIPIDEMIA: ICD-10-CM

## 2021-02-11 ENCOUNTER — COMMUNICATION - HEALTHEAST (OUTPATIENT)
Dept: ANTICOAGULATION | Facility: CLINIC | Age: 76
End: 2021-02-11

## 2021-02-15 ENCOUNTER — COMMUNICATION - HEALTHEAST (OUTPATIENT)
Dept: FAMILY MEDICINE | Facility: CLINIC | Age: 76
End: 2021-02-15

## 2021-02-16 ENCOUNTER — COMMUNICATION - HEALTHEAST (OUTPATIENT)
Dept: ANTICOAGULATION | Facility: CLINIC | Age: 76
End: 2021-02-16

## 2021-02-16 ENCOUNTER — OFFICE VISIT - HEALTHEAST (OUTPATIENT)
Dept: FAMILY MEDICINE | Facility: CLINIC | Age: 76
End: 2021-02-16

## 2021-02-16 DIAGNOSIS — I48.91 ATRIAL FIBRILLATION, UNSPECIFIED TYPE (H): ICD-10-CM

## 2021-02-16 DIAGNOSIS — E11.51 TYPE II DIABETES MELLITUS WITH PERIPHERAL CIRCULATORY DISORDER (H): ICD-10-CM

## 2021-02-16 DIAGNOSIS — I48.20 CHRONIC ATRIAL FIBRILLATION (H): ICD-10-CM

## 2021-02-16 DIAGNOSIS — R79.89 ELEVATED FERRITIN: ICD-10-CM

## 2021-02-16 DIAGNOSIS — D64.9 ANEMIA, UNSPECIFIED TYPE: ICD-10-CM

## 2021-02-16 DIAGNOSIS — E83.10 DISORDER OF IRON METABOLISM, UNSPECIFIED: ICD-10-CM

## 2021-02-16 LAB
CREAT UR-MCNC: 133.3 MG/DL
HBA1C MFR BLD: 6.9 %
INR PPP: 2.7 (ref 0.9–1.1)
MICROALBUMIN UR-MCNC: 8.95 MG/DL (ref 0–1.99)
MICROALBUMIN/CREAT UR: 67.1 MG/G

## 2021-02-16 ASSESSMENT — MIFFLIN-ST. JEOR: SCORE: 1803.54

## 2021-02-19 ENCOUNTER — COMMUNICATION - HEALTHEAST (OUTPATIENT)
Dept: FAMILY MEDICINE | Facility: CLINIC | Age: 76
End: 2021-02-19

## 2021-02-19 DIAGNOSIS — E78.00 PURE HYPERCHOLESTEROLEMIA: ICD-10-CM

## 2021-02-19 LAB — MOLECULAR DX INHERIT DISORDER - HISTORICAL: NORMAL

## 2021-02-22 ENCOUNTER — OFFICE VISIT - HEALTHEAST (OUTPATIENT)
Dept: FAMILY MEDICINE | Facility: CLINIC | Age: 76
End: 2021-02-22

## 2021-02-22 DIAGNOSIS — E11.51 TYPE II DIABETES MELLITUS WITH PERIPHERAL CIRCULATORY DISORDER (H): ICD-10-CM

## 2021-02-22 DIAGNOSIS — S98.131A AMPUTATED TOE OF RIGHT FOOT (H): ICD-10-CM

## 2021-02-22 DIAGNOSIS — S90.424A BLISTER OF TOE OF RIGHT FOOT WITH INFECTION, INITIAL ENCOUNTER: ICD-10-CM

## 2021-02-22 DIAGNOSIS — I75.022: ICD-10-CM

## 2021-02-22 DIAGNOSIS — B35.1 ONYCHOMYCOSIS: ICD-10-CM

## 2021-02-22 DIAGNOSIS — G60.9 HEREDITARY AND IDIOPATHIC PERIPHERAL NEUROPATHY: ICD-10-CM

## 2021-02-22 DIAGNOSIS — L08.9 BLISTER OF TOE OF RIGHT FOOT WITH INFECTION, INITIAL ENCOUNTER: ICD-10-CM

## 2021-02-23 ENCOUNTER — COMMUNICATION - HEALTHEAST (OUTPATIENT)
Dept: ANTICOAGULATION | Facility: CLINIC | Age: 76
End: 2021-02-23

## 2021-02-25 ENCOUNTER — COMMUNICATION - HEALTHEAST (OUTPATIENT)
Dept: FAMILY MEDICINE | Facility: CLINIC | Age: 76
End: 2021-02-25

## 2021-02-26 ENCOUNTER — AMBULATORY - HEALTHEAST (OUTPATIENT)
Dept: NURSING | Facility: CLINIC | Age: 76
End: 2021-02-26

## 2021-03-04 ENCOUNTER — COMMUNICATION - HEALTHEAST (OUTPATIENT)
Dept: FAMILY MEDICINE | Facility: CLINIC | Age: 76
End: 2021-03-04

## 2021-03-05 ENCOUNTER — RECORDS - HEALTHEAST (OUTPATIENT)
Dept: VASCULAR ULTRASOUND | Facility: CLINIC | Age: 76
End: 2021-03-05

## 2021-03-05 ENCOUNTER — OFFICE VISIT - HEALTHEAST (OUTPATIENT)
Dept: PODIATRY | Facility: CLINIC | Age: 76
End: 2021-03-05

## 2021-03-05 DIAGNOSIS — B35.1 ONYCHOMYCOSIS: ICD-10-CM

## 2021-03-05 DIAGNOSIS — L97.511 DIABETIC ULCER OF TOE OF RIGHT FOOT ASSOCIATED WITH TYPE 2 DIABETES MELLITUS, LIMITED TO BREAKDOWN OF SKIN (H): ICD-10-CM

## 2021-03-05 DIAGNOSIS — I73.9 PAD (PERIPHERAL ARTERY DISEASE) (H): ICD-10-CM

## 2021-03-05 DIAGNOSIS — E11.621 DIABETIC ULCER OF TOE OF RIGHT FOOT ASSOCIATED WITH TYPE 2 DIABETES MELLITUS, LIMITED TO BREAKDOWN OF SKIN (H): ICD-10-CM

## 2021-03-05 DIAGNOSIS — I73.9 PERIPHERAL VASCULAR DISEASE, UNSPECIFIED (H): ICD-10-CM

## 2021-03-08 ENCOUNTER — AMBULATORY - HEALTHEAST (OUTPATIENT)
Dept: VASCULAR SURGERY | Facility: CLINIC | Age: 76
End: 2021-03-08

## 2021-03-08 DIAGNOSIS — R09.89 OTHER SPECIFIED SYMPTOMS AND SIGNS INVOLVING THE CIRCULATORY AND RESPIRATORY SYSTEMS: ICD-10-CM

## 2021-03-08 DIAGNOSIS — S98.131A AMPUTATED TOE OF RIGHT FOOT (H): ICD-10-CM

## 2021-03-10 ENCOUNTER — COMMUNICATION - HEALTHEAST (OUTPATIENT)
Dept: VASCULAR SURGERY | Facility: CLINIC | Age: 76
End: 2021-03-10

## 2021-03-11 ENCOUNTER — RECORDS - HEALTHEAST (OUTPATIENT)
Dept: VASCULAR ULTRASOUND | Facility: CLINIC | Age: 76
End: 2021-03-11

## 2021-03-11 DIAGNOSIS — S98.131A: ICD-10-CM

## 2021-03-11 DIAGNOSIS — R09.89 OTHER SPECIFIED SYMPTOMS AND SIGNS INVOLVING THE CIRCULATORY AND RESPIRATORY SYSTEMS: ICD-10-CM

## 2021-03-15 ENCOUNTER — COMMUNICATION - HEALTHEAST (OUTPATIENT)
Dept: FAMILY MEDICINE | Facility: CLINIC | Age: 76
End: 2021-03-15

## 2021-03-15 DIAGNOSIS — I48.20 CHRONIC ATRIAL FIBRILLATION, UNSPECIFIED (H): ICD-10-CM

## 2021-03-16 ENCOUNTER — COMMUNICATION - HEALTHEAST (OUTPATIENT)
Dept: VASCULAR SURGERY | Facility: CLINIC | Age: 76
End: 2021-03-16

## 2021-03-16 DIAGNOSIS — R09.89 OTHER SPECIFIED SYMPTOMS AND SIGNS INVOLVING THE CIRCULATORY AND RESPIRATORY SYSTEMS: ICD-10-CM

## 2021-03-19 ENCOUNTER — AMBULATORY - HEALTHEAST (OUTPATIENT)
Dept: NURSING | Facility: CLINIC | Age: 76
End: 2021-03-19

## 2021-03-25 ENCOUNTER — AMBULATORY - HEALTHEAST (OUTPATIENT)
Dept: ANTICOAGULATION | Facility: CLINIC | Age: 76
End: 2021-03-25

## 2021-03-25 DIAGNOSIS — I48.20 CHRONIC ATRIAL FIBRILLATION (H): ICD-10-CM

## 2021-03-31 ENCOUNTER — OFFICE VISIT - HEALTHEAST (OUTPATIENT)
Dept: VASCULAR SURGERY | Facility: CLINIC | Age: 76
End: 2021-03-31

## 2021-03-31 DIAGNOSIS — L97.511 DIABETIC ULCER OF TOE OF RIGHT FOOT ASSOCIATED WITH TYPE 2 DIABETES MELLITUS, LIMITED TO BREAKDOWN OF SKIN (H): ICD-10-CM

## 2021-03-31 DIAGNOSIS — E11.621 DIABETIC ULCER OF TOE OF RIGHT FOOT ASSOCIATED WITH TYPE 2 DIABETES MELLITUS, LIMITED TO BREAKDOWN OF SKIN (H): ICD-10-CM

## 2021-04-07 ENCOUNTER — COMMUNICATION - HEALTHEAST (OUTPATIENT)
Dept: ANTICOAGULATION | Facility: CLINIC | Age: 76
End: 2021-04-07

## 2021-04-13 ENCOUNTER — COMMUNICATION - HEALTHEAST (OUTPATIENT)
Dept: ANTICOAGULATION | Facility: CLINIC | Age: 76
End: 2021-04-13

## 2021-04-13 ENCOUNTER — AMBULATORY - HEALTHEAST (OUTPATIENT)
Dept: LAB | Facility: CLINIC | Age: 76
End: 2021-04-13

## 2021-04-13 DIAGNOSIS — I48.91 ATRIAL FIBRILLATION, UNSPECIFIED TYPE (H): ICD-10-CM

## 2021-04-13 DIAGNOSIS — I48.20 CHRONIC ATRIAL FIBRILLATION (H): ICD-10-CM

## 2021-04-13 LAB — INR PPP: 2.7 (ref 0.9–1.1)

## 2021-05-06 ENCOUNTER — RECORDS - HEALTHEAST (OUTPATIENT)
Dept: ADMINISTRATIVE | Facility: OTHER | Age: 76
End: 2021-05-06

## 2021-05-26 ENCOUNTER — COMMUNICATION - HEALTHEAST (OUTPATIENT)
Dept: FAMILY MEDICINE | Facility: CLINIC | Age: 76
End: 2021-05-26

## 2021-05-26 ENCOUNTER — RECORDS - HEALTHEAST (OUTPATIENT)
Dept: ADMINISTRATIVE | Facility: CLINIC | Age: 76
End: 2021-05-26

## 2021-05-26 DIAGNOSIS — K21.00 GASTROESOPHAGEAL REFLUX DISEASE WITH ESOPHAGITIS: ICD-10-CM

## 2021-05-27 VITALS
OXYGEN SATURATION: 97 % | TEMPERATURE: 98.1 F | DIASTOLIC BLOOD PRESSURE: 76 MMHG | SYSTOLIC BLOOD PRESSURE: 128 MMHG | HEART RATE: 90 BPM | RESPIRATION RATE: 16 BRPM

## 2021-05-27 VITALS
TEMPERATURE: 97.6 F | SYSTOLIC BLOOD PRESSURE: 140 MMHG | HEART RATE: 80 BPM | DIASTOLIC BLOOD PRESSURE: 82 MMHG | RESPIRATION RATE: 18 BRPM

## 2021-05-27 VITALS
DIASTOLIC BLOOD PRESSURE: 79 MMHG | HEART RATE: 79 BPM | RESPIRATION RATE: 16 BRPM | SYSTOLIC BLOOD PRESSURE: 131 MMHG | TEMPERATURE: 98 F | OXYGEN SATURATION: 96 %

## 2021-05-27 VITALS
HEART RATE: 80 BPM | DIASTOLIC BLOOD PRESSURE: 72 MMHG | TEMPERATURE: 97.2 F | SYSTOLIC BLOOD PRESSURE: 120 MMHG | RESPIRATION RATE: 18 BRPM

## 2021-05-27 VITALS
SYSTOLIC BLOOD PRESSURE: 130 MMHG | TEMPERATURE: 98.2 F | OXYGEN SATURATION: 97 % | DIASTOLIC BLOOD PRESSURE: 67 MMHG | HEART RATE: 85 BPM | RESPIRATION RATE: 12 BRPM

## 2021-05-27 NOTE — TELEPHONE ENCOUNTER
----- Message from Vishnu Garland MD sent at 3/28/2019  5:47 PM CDT -----  Regarding: RE: hold Warfarin for injection  To whom it may concern  Stefan Diallo has been under my care for chronic atrial fibrillation.  His left ventricular function is normal.  There is minimal increase in risk for stroke by holding his warfarin for 5 days, and given the severity of his chronic back pain, this risk appears quite reasonable, to allow effective treatment of his condition.  Vishnu Garland MD [facility]  ----- Message -----  From: Lula Perez, RN  Sent: 3/28/2019   3:17 PM  To: Vishnu Garland MD  Subject: hold Warfarin for injection                      Pt needs spinal injection and Dr. Kumar [facility] office needs an order/statment that it is ok to hold Warfarin 5 days before injection and then restart. Pt is afib. Concerns?

## 2021-05-27 NOTE — TELEPHONE ENCOUNTER
Pt called due to need for steroid injection from back pain. [phone]. Fax [phone].

## 2021-05-27 NOTE — TELEPHONE ENCOUNTER
----- Message from Estela Roth sent at 3/27/2019  3:08 PM CDT -----  Contact: CANDY  General phone call:    Caller: candy  Primary cardiologist: dr baxter  Detailed reason for call: pt wants to get a cortozone shot in his back. [facility] needs permission for him to stop his warfarin for 5 days. Needs us to call kai at [facility]. Her number is [phone]  New or active symptoms? n/a  Best phone number: [phone] (patient  Best time to contact: any  Ok to leave a detailed message? yes  Device? no    Additional Info:

## 2021-05-27 NOTE — TELEPHONE ENCOUNTER
Dr. Garland,  Patient is planning a spinal injection from [facility].   The request is for patient to hold warfarin for 5 days prior to procedure.  May patient hold warfarin for 5 days prior to spinal injection?  Message sent to  to call patient for yearly f/u.  Thank you,  Magalie

## 2021-05-27 NOTE — TELEPHONE ENCOUNTER
Per chart review, pt is holding warfarin (without bridge) for injection on 4/10. Rechecking INR 1 week after resuming warfarin.

## 2021-05-27 NOTE — TELEPHONE ENCOUNTER
Who is calling:  Patient   Reason for Call:  Notification -- See below  Date of last appointment with primary care:   Okay to leave a detailed message: Yes    Patient wants to notify that he will be stopping Warfarin starting on 4/5 for 5 days due to getting a Cortisone Injection on 4/10.

## 2021-05-27 NOTE — TELEPHONE ENCOUNTER
Spoke with pt that hold order was faxed to [facility]. Will hold for 5 days and restart as soon as directed by ortho. Retest INR 1 week after restart. Pt agrees with plan.

## 2021-05-28 NOTE — TELEPHONE ENCOUNTER
CMT left message x 2. Please review message thread below and advise the patient as indicated. Please schedule if necessary or indicated in message thread. Per clinic outreach policy, writer will call three times before postponing 3 months. CMT will then repeat call cycle (3 calls) and send letter if unable to reach the patient.

## 2021-05-28 NOTE — TELEPHONE ENCOUNTER
"Triage Nurse Advisor- [facility]    RN Phone Assessment  Pt calling requesting care advice for toes that are swelling  Pt recently was seen by podiatrist for problems with right foot, 2nd toe, treated with antibiotics,   Now 2nd and 3rd toe are affected.  Today, toes are swollen and bleeding is worse  Swelling now extends to foot and calf  Toes are red, foot is swelled up to \"where it is shiny\", skin looks white.  Gets worse when he is up walking around but improves when he elevates foot or after laying in bed.   Foot is sore, is able to walk on it but is painful   Does not think he has a fever, no chills  Right foot is warmer than then left foot.   No red streaks or redness going up his legs.   Painful to touch.   Patient covers each affected toe with a Band-Aid with neosporin as he states he was instructed by Dr. Abbasi.   He would like to know if he should be seen tonight or if he can wait to be seen until tomorrow.     Reviewed Care Advice per Protocol  Pt will go to [facility] in the morning. Tonight he will keep his foot elevated to reduce swelling. Pt agrees with  the recommended plan of care. Has no further questions at this time. Encouraged to call back as needed for worsening or development of additional symptoms.     Lani Angel RN, [facility] Nurse Triage    Reason for Disposition    Looks like a boil, infected sore, deep ulcer or other infected rash (spreading redness, pus)    Protocols used: LEG SWELLING AND EDEMA-A-AH      "

## 2021-05-28 NOTE — TELEPHONE ENCOUNTER
Refill Request  Did you contact pharmacy: Yes  Medication name: warfarin (COUMADIN/JANTOVEN) 5 MG tablet  Who prescribed the medication: Dr. Singh  Pharmacy Name and Location: [facility]  Is patient out of medication: No.  2-3 days left  Patient notified refills processed in 72 hours:  No. Patient scheduled and INR for today  Okay to leave a detailed message: yes

## 2021-05-28 NOTE — TELEPHONE ENCOUNTER
Pt calling with questions post podiatry surgery about his wound and bandage. Bandage has fallen off twice.     Writer spoke with Dr. Abbasi who advised pt should re wrap foot and try to stay off of it. Writer advised pt of Dr. Abbasi recommendation to re wrap foot and advised pt to follow up with Dr. Abbasi on Monday.     Pt hung up on Writer before Writer could advise pt to stay off his foot however pt agrees to follow up with Dr. Abbasi on Monday and verbalized understanding to wrap wound again. Pt expressed dissatisfaction that Dr. Abbasi was not able to call him despite Writer explaining that triage is contact for pt.     Reason for Disposition    [1] Caller demands to speak with the PCP AND [2] about sick adult (or sick caller)    Protocols used: DIFFICULT CALLER-A-AH

## 2021-05-28 NOTE — TELEPHONE ENCOUNTER
Refill Approved    Rx renewed per Medication Renewal Policy. Medication was last renewed on 10/12/18.    Lula Karimi, [facility] Triage/Med Refill 5/2/2019     Requested Prescriptions   Pending Prescriptions Disp Refills     lisinopril (PRINIVIL,ZESTRIL) 5 MG tablet [Pharmacy Med Name: LISINOPRIL 5 MG TABLET] 90 tablet 1     Sig: TAKE 1 TABLET BY MOUTH EVERY DAY       [employer] Inhibitors Refill Protocol Passed - 5/1/2019 11:40 AM        Passed - PCP or prescribing provider visit in past 12 months       Last office visit with prescriber/PCP: 2/5/2019 Collin Singh MD OR same dept: 2/5/2019 Collin Singh MD OR same specialty: 2/5/2019 Collin Singh MD  Last physical: Visit date not found Last MTM visit: Visit date not found   Next visit within 3 mo: Visit date not found  Next physical within 3 mo: Visit date not found  Prescriber OR PCP: Collin Singh MD  Last diagnosis associated with med order: 1. Essential hypertension  - lisinopril (PRINIVIL,ZESTRIL) 5 MG tablet [Pharmacy Med Name: LISINOPRIL 5 MG TABLET]; TAKE 1 TABLET BY MOUTH EVERY DAY  Dispense: 90 tablet; Refill: 1    If protocol passes may refill for 12 months if within 3 months of last provider visit (or a total of 15 months).             Passed - Serum Potassium in past 12 months     Lab Results   Component Value Date    Potassium 4.8 09/20/2018             Passed - Blood pressure filed in past 12 months     BP Readings from Last 1 Encounters:   03/18/19 (!) 148/96             Passed - Serum Creatinine in past 12 months     Creatinine   Date Value Ref Range Status   09/20/2018 0.77 0.70 - 1.30 mg/dL Final

## 2021-05-28 NOTE — PROGRESS NOTES
"TCM DISCHARGE FOLLOW UP CALL    Discharge Date:  5/9/2019  Reason for hospital stay (discharge diagnosis)::  Cellulitis of foot, Osteomyelitis of toe, Closed nondisplaced fracture of phalanx of toe of right foot, unspecified toe  Are you feeling better, the same or worse since your discharge?:  Patient is feeling the same (Some pain in the foot, but mostly from the sciatic nerve. He having BM's. States bandage on his foot fell off today, he put a new piece of guaze over the incision and he rewrapped the foot/)  Do you feel like you have a plan in the event of a health emergency?: Yes ([facility] ER)    As part of your discharge plan, were  home care services ordered for you?: No    Did you receive any new medications, or was there a change to your medications?: Yes    Are you taking those medications, or do you have any established regiment?:  RN reviewed discharge medications w/pt.  Pt states he took one Vicodin last night, and 2 Aleve earlier today.  He is taking Keflex four times a day as prescribed.  RN advised pt not to take anymore Aleve or any other NSAIDs, as he is on warfarin.  Pt states \"I've been taking Aleve for years w/the warfarin,\" but does agree not to take anymore going forward.  Do you have any follow up visits scheduled with your PCP or Specialist?:  Yes, with PCP and Yes, with Specialist (Dr. Singh 5/15/19)  (RN) Is PCP appt scheduled soon enough (within 14 days of discharge date)?: Yes    Who are you seeing and when is it scheduled?:  Dr. Abbasi (Podiatry) 5/13/19  RN NOTES::  Provided pt w/podiatry phone number, and advised he call over the weekend if any questions/concerns, or dressing comes off again.      Heather Lewis RN Care Manager, Population Health      "

## 2021-05-28 NOTE — ANESTHESIA PREPROCEDURE EVALUATION
Anesthesia Evaluation      Patient summary reviewed     Airway   Mallampati: II  Neck ROM: full   Pulmonary - normal exam    breath sounds clear to auscultation                         Cardiovascular - normal exam  Rhythm: regular  Rate: normal,         Neuro/Psych    Anxiety/panic attacks:       Endo/Other       GI/Hepatic/Renal    (+) GERD well controlled,        Other findings:       NMST 2/27/17: When compared to the images of 10/4/2012, there is now evidence of transient ischemic dilatation.  The left ventricular ejection fraction is 68%.  The pharmacologic nuclear stress test is abnormal. Nuclear stress test was negative for any focal perfusion defects.  Increased stress to rest cavity ratio of 1.5 is consistent with diffuse subendocardial ischemia.  The patient is at an intermediate risk of future cardiac ischemic events.      Dental - normal exam                        Anesthesia Plan  Planned anesthetic: MAC    ASA 3   Induction: intravenous   Anesthetic plan and risks discussed with: patient    Post-op plan: routine recovery

## 2021-05-28 NOTE — PROGRESS NOTES
Subjective findings: The patient return to clinic today for postop visit #2, 2 weeks status post amputation third toe right foot.  The patient is in good spirits.  Had no complaints.  The patient also complained of a painful second toe right foot.     Objective findings: The dressings were removed and wound margins well healed. There is no edema, erythema, cellulitis, drainage or bleeding noted.  Neurovascular status is intact.  Vital signs are stable.  There is a small necrotic lesion at the distal aspect second toe right foot.  No drainage or bleeding noted.  There is a flexion deformity at the proximal interphalangeal joint second toe right foot.     Assessment: Osteomyelitis  Hammertoe second toe right foot     Plan: Moved all sutures.  A Band-Aid was placed across the incision site.  I have asked the patient to keep the wound clean and dry for 1 more week.  He is to return to the clinic for one final postop visit in 1 week.

## 2021-05-28 NOTE — PATIENT INSTRUCTIONS - HE
Please call one of the [facility] locations below to schedule an appointment. If you received a prescription please bring it with you to your appointment. Some locations are limited to what they carry.    Office Locations    [facility]  [address] [place] [address]  Home Medical Equipment, [address]   Phone: [phone]   Orthotics and Prosthetics, [address]   Phone: [phone]    [facility]  Home Medical Equipment  [address], [address], [place] [address]   Phone: [phone]    Orthotics and Prosthetics ([facility])    [address], [address], [place] [address]  Phone: [phone]    [facility]  [address]  [address]   [place] [address]   Phone: [phone]    [facility]  [address]  [place] [address]  Phone: [phone]    [facility] Bld[facility]   [address]  [place] [address]  Phone: [phone]    [facility]  [address]  [place] [address]  Phone: [phone]    [facility]  [address]  [address]  [place] [address]  Phone: [phone]    [place]   [address] [place] [address]   Phone: [phone]

## 2021-05-28 NOTE — PROGRESS NOTES
"[facility] Office Note    Assessment / Plan:    1.  Dyspnea on exertion due to physical deconditioning and morbid obesity.  Congratulated on weight loss and encouraged to continue his efforts  2.  Atrial fibrillation.  Continue warfarin.  We will continue atenolol 25 mg daily.  We discussed that a steady intake of leafy green vegetables will help to stabilize his INR readings, and that whenever he is placed on antibiotics more frequent INR checks are required because of alterations in gut patti.  3.  Hypertension.  Adequate control on his present regimen    Plan follow-up in 1 year .    ______________________________________________________________________    Subjective:    I had the opportunity to see Stefan Diallo at the [facility]. Stefan Diallo is a 73 y.o. male with a history of diabetes mellitus, obstructive sleep apnea, hypertension, and chronic atrial fibrillation who returns for routine follow-up.      He reports recent toe amputation due to a penetrating ulcer.  His blood sugars have not been well controlled though his weight is down 8 pounds from when I saw him last year.  He is not aware of any palpitations and uses his BiPAP regularly.  He does have a number of misperceptions about anticoagulation with warfarin that we discussed at some length.  He reports eating leafy green vegetables \"like candy\"    His walking is limited by toe pains as well as sciatica.  He does occasionally uses stationary bicycle or walk.  He has had no chest pain or palpitations.  He has had no syncope or near syncopal episodes.      ______________________________________________________________________    Problem List:  Patient Active Problem List   Diagnosis     Benign Prostatic Hypertrophy     Renal Artery Aneurysm     Tinea Corporis     Shortness Of Breath     Chest Pain     Lower Back Pain     Vertigo     Idiopathic Peripheral Neuropathy     Myalgia And Myositis     Urinary Tract Infection     " Pain During Urination (Dysuria)     Cough     Eye Pain     Eczematoid Dermatitis     Urticaria     Obesity     Essential Hypertension     Atrial Fibrillation     Adult Sleep Apnea     Pure hypercholesterolemia     Muscle Weakness     Dysphagia     Bell's palsy     Gastroesophageal reflux disease with esophagitis     Type 2 diabetes mellitus without complication, without long-term current use of insulin (H)     Obesity (BMI 35.0-39.9) with comorbidity (H)     Atrial fibrillation, unspecified type (H)     Cellulitis of foot     Osteomyelitis of toe (H)     Closed nondisplaced fracture of phalanx of toe of right foot, unspecified toe, initial encounter     Medical History:  Past Medical History:   Diagnosis Date     Atrial fibrillation (H)      Bacteremia      GERD (gastroesophageal reflux disease)      GI hemorrhage      High cholesterol      Hyperlipidemia      Hypertension      Renal artery aneurysm (H)      Sleep apnea, obstructive     USES BIPAP     Type 2 diabetes mellitus (H)      UTI (lower urinary tract infection)      Surgical History:  Past Surgical History:   Procedure Laterality Date     FOOT AMPUTATION Right 5/6/2019    Procedure: AMPUTATION, 3rd TOE RIGHT FOOT;  Surgeon: Ravi Abbasi DPM;  Location: [facility] [place];  Service: Podiatry     MD REPAIR COMPL ROTATOR CUFF AVULSN,CHR      Description: Chronic Rotator Cuff Avulsion;  Recorded: 04/11/2011;     TENOTOMY ACHILLES TENDON       TRANSURETHRAL RESECTION OF PROSTATE       Social History:  Social History     Socioeconomic History     Marital status:      Spouse name: Not on file     Number of children: 3     Years of education: Not on file     Highest education level: Not on file   Occupational History     Occupation:      Employer: [employer]   Social Needs     Financial resource strain: Not on file     Food insecurity:     Worry: Not on file     Inability: Not on file     Transportation needs:     Medical: Not on file      Non-medical: Not on file   Tobacco Use     Smoking status: Former Smoker     Packs/day: 2.00     Years: 27.00     Pack years: 54.00     Types: Cigarettes     Last attempt to quit: 1990     Years since quittin.3     Smokeless tobacco: Never Used   Substance and Sexual Activity     Alcohol use: No     Frequency: Never     Drug use: No     Sexual activity: Never   Lifestyle     Physical activity:     Days per week: Not on file     Minutes per session: Not on file     Stress: Not on file   Relationships     Social connections:     Talks on phone: Not on file     Gets together: Not on file     Attends Anabaptism service: Not on file     Active member of club or organization: Not on file     Attends meetings of clubs or organizations: Not on file     Relationship status: Not on file     Intimate partner violence:     Fear of current or ex partner: Not on file     Emotionally abused: Not on file     Physically abused: Not on file     Forced sexual activity: Not on file   Other Topics Concern     Not on file   Social History Narrative     Not on file     Sleep History:  Uses BIPAP nightly.  Restorative  Exercise History:  Limited by foot pain, as well as hip pain    Review of Systems:   General: WNL  Eyes: WNL  Ears/Nose/Throat: Nosebleeds  Lungs: WNL  Heart: Irregular Heartbeat, Shortness of Breath with activity  Stomach: WNL  Bladder: WNL  Muscle/Joints: WNL  Skin: WNL  Nervous System: WNL  Mental Health: WNL     Blood: Easy Bleeding, Easy Bruising          Family History:  Family History   Problem Relation Age of Onset     Coronary artery disease Mother      Coronary artery disease Father      Atrial fibrillation Father          Allergies:  No Known Allergies  Medications:  Current Outpatient Medications   Medication Sig Dispense Refill     atenolol (TENORMIN) 50 MG tablet Take 0.5 tablets (25 mg total) by mouth daily.       atorvastatin (LIPITOR) 20 MG tablet TAKE 1 TABLET BY MOUTH ONCE DAILY 90 tablet 2      blood glucose test strips Use 1 each As Directed daily before breakfast. 100 strip 3     blood-glucose meter (CONTOUR NEXT METER) Misc Use once daily. 1 each 0     blood-glucose meter Misc Use 1 Device As Directed daily before breakfast. 1 each 0     cholecalciferol, vitamin D3, 1,000 unit tablet Take 1,000 Units by mouth daily.              CONTOUR NEXT TEST STRIPS strips USE TO TEST ONCE DAILY 100 strip 5     diltiazem (CARDIZEM CD) 120 MG 24 hr capsule TAKE 1 CAPSULE BY MOUTH EVERY DAY 90 capsule 1     EPINEPHrine (EPIPEN 2-BRI) 0.3 mg/0.3 mL atIn Inject 0.3 mL (0.3 mg total) into the shoulder, thigh, or buttocks once as needed (allergic reaction). 2 Pre-filled Pen Syringe 1     FLUZONE HIGH-DOSE 2018-19, PF, 180 mcg/0.5 mL Syrg injection TO BE ADMINISTERED BY PHARMACIST FOR IMMUNIZATION  0     gabapentin (NEURONTIN) 300 MG capsule TAKE ONE CAPSULE BY MOUTH TWICE A  capsule 1     gemfibrozil (LOPID) 600 MG tablet Take 1 tablet (600 mg total) by mouth 2 (two) times a day. 180 tablet 3     generic lancets (ACCU-CHEK SOFTCLIX LANCETS) Use 1 application As Directed daily before breakfast. 100 each 3     HYDROcodone-acetaminophen (NORCO )  mg per tablet Take 1 tablet by mouth every 6 (six) hours as needed. 10 tablet 0     lisinopril (PRINIVIL,ZESTRIL) 5 MG tablet TAKE 1 TABLET BY MOUTH EVERY DAY 90 tablet 0     metFORMIN (GLUCOPHAGE) 500 MG tablet Take 1 tablet each morning and 2 tablets each evening 270 tablet 1     miscellaneous medical supply Misc Bipap w heated humidifier, tubing & chamber all x1, FFM w cushion x 1, headgear x 1, filers: disposable and resuable x 1pk both       vitamin A-vitamin C-vit E-min (OCUVITE) Tab tablet Take 1 tablet by mouth 2 (two) times a day.              warfarin (COUMADIN/JANTOVEN) 5 MG tablet Take 1 to 1.5 tablets (5 to 7.5 mg) by mouth daily. Adjust dose based on INR results as directed. (Patient taking differently: Take 5-7.5 mg by mouth See Admin Instructions.  "Take 5mg on Monday, Tuesday, Wednesday, Thursday, Friday, and Saturday.  Take 7.5mg on Sunday.      ) 90 tablet 1     cephalexin (KEFLEX) 500 MG capsule Take 1 capsule (500 mg total) by mouth 4 (four) times a day. 112 capsule 0     omeprazole (PRILOSEC) 20 MG capsule TAKE 1 CAPSULE (20 MG TOTAL) BY MOUTH DAILY. 90 capsule 3     polyethylene glycol (MIRALAX) 17 gram packet Take 1 packet (17 g total) by mouth daily as needed.  0     REDNESS RELIEVER EYE DROPS 0.05 % ophthalmic solution        REDNESS RELIEVER EYE DROPS 0.05 % ophthalmic solution        No current facility-administered medications for this visit.        Objective:   Wt Readings from Last 3 Encounters:   05/14/19 (!) 252 lb (114.3 kg)   05/13/19 (!) 259 lb (117.5 kg)   05/06/19 (!) 259 lb (117.5 kg)     Vital signs:  /78 (Patient Site: Right Arm, Patient Position: Sitting, Cuff Size: Adult Large)   Pulse 76   Resp 16   Ht 5' 10.47\" (1.79 m)   Wt (!) 252 lb (114.3 kg)   BMI 35.68 kg/m        Physical Exam: Alert, appropriate, joking  Eyes    Conjunctiva Findings: bilateral hyperemia.   Sclerae: Clear, nonicteric.   ENT   Mouth: Oral mucuous membranes are pink and moist   Neck   Carotid pulses: Carotid pulses palpaple with normal contours and symmetric, no bruits.   Jugular Veins: Normal jugular venous pressure.   Thyroid: No visible thyromegaly.   Pulmonary   Examination of lungs: Clear to auscultation, no crackles, or wheezing.   Cardiovascular    The heart rate was normal. The rhythm was irregularly irregular.  No murmur audible  Pulses: Normal   Extremities: No edema    Gastrointestinal    The abdomen was obese. Bowel sounds were normal.   Musculoskeletal   Spine: spine straight upon visual inspection.   Skin   Skin and subcutaneous tissue: No xanthelasma.   Neurologic   Orientation to person, place and time: Normal.   Psychiatric   Mood and affect: Normal.       Lab Results:  LIPIDS:  Lab Results   Component Value Date    CHOL 183 " 12/14/2012    CHOL 176 11/29/2011    CHOL 167 03/28/2011     Lab Results   Component Value Date    HDL 40 12/14/2012    HDL 39 (L) 11/29/2011    HDL 47 03/28/2011     Lab Results   Component Value Date    LDLCALC 103 12/14/2012    LDLCALC 98 11/29/2011    LDLCALC 88 03/28/2011     Lab Results   Component Value Date    TRIG 200 (H) 12/14/2012    TRIG 193 (H) 11/29/2011    TRIG 161 (H) 03/28/2011       Pharmacologic nuclear stress test 2/2017:  Conclusion     When compared to the images of 10/4/2012, there is now evidence of transient ischemic dilatation.    The left ventricular ejection fraction is 68%.    The pharmacologic nuclear stress test is abnormal. Nuclear stress test was negative for any focal perfusion defects.    Increased stress to rest cavity ratio of 1.5 is consistent with diffuse subendocardial ischemia.    The patient is at an intermediate risk of future cardiac ischemic events.                 MARGIE KEANE MD [facility]  [phone]    This note created using Dragon voice recognition software.  Sound alike errors may have escaped editing.

## 2021-05-28 NOTE — ANESTHESIA CARE TRANSFER NOTE
Last vitals:   Vitals:    05/06/19 1030   BP: 96/64   Pulse: 89   Resp: 12   Temp: 37.2  C (98.9  F)   SpO2: 96%     Patient's level of consciousness is drowsy  Spontaneous respirations: yes  Maintains airway independently: yes  Dentition unchanged: yes  Oropharynx: oropharynx clear of all foreign objects    QCDR Measures:  ASA# 20 - Surgical Safety Checklist: WHO surgical safety checklist completed prior to induction    PQRS# 430 - Adult PONV Prevention: 4558F - Pt received => 2 anti-emetic agents (different classes) preop & intraop  ASA# 8 - Peds PONV Prevention: NA - Not pediatric patient, not GA or 2 or more risk factors NOT present  PQRS# 424 - Leydi-op Temp Management: 4559F - At least one body temp DOCUMENTED => 35.5C or 95.9F within required timeframe  PQRS# 426 - PACU Transfer Protocol: - Transfer of care checklist used  ASA# 14 - Acute Post-op Pain: ASA14B - Patient did NOT experience pain >= 7 out of 10

## 2021-05-28 NOTE — TELEPHONE ENCOUNTER
reports indicate that the patient is due for diabetic follow and/or diabetic quality measures are delinquent. Chart review indicates that the patient needs a diabetic follow up exam, microalbumin urine test, Zoster and Pneumo vaccines. Due for AD discussion. Writer intends to contact the patient to assist in scheduling and appointing for this purpose. Per clinic policy, writer will call three times prior to closing encounter.

## 2021-05-28 NOTE — TELEPHONE ENCOUNTER
Refill Approved    Rx renewed per Medication Renewal Policy. Medication was last renewed on 11/19/18.    Lula Karimi, [facility] Triage/Med Refill 5/20/2019     Requested Prescriptions   Pending Prescriptions Disp Refills     gabapentin (NEURONTIN) 300 MG capsule [Pharmacy Med Name: GABAPENTIN 300 MG CAPSULE] 180 capsule 1     Sig: TAKE 1 CAPSULE BY MOUTH TWICE A DAY       Gabapentin/Levetiracetam/Tiagabine Refill Protocol  Passed - 5/18/2019 11:27 AM        Passed - PCP or prescribing provider visit in past 12 months or next 3 months     Last office visit with prescriber/PCP: 5/15/2019 Collin Singh MD OR same dept: 5/15/2019 Collin Singh MD OR same specialty: 5/15/2019 Collin Singh MD  Last physical: Visit date not found Last MTM visit: Visit date not found   Next visit within 3 mo: Visit date not found  Next physical within 3 mo: Visit date not found  Prescriber OR PCP: Collin Singh MD  Last diagnosis associated with med order: 1. Hereditary and idiopathic peripheral neuropathy  - gabapentin (NEURONTIN) 300 MG capsule [Pharmacy Med Name: GABAPENTIN 300 MG CAPSULE]; TAKE 1 CAPSULE BY MOUTH TWICE A DAY  Dispense: 180 capsule; Refill: 1    If protocol passes may refill for 12 months if within 3 months of last provider visit (or a total of 15 months).

## 2021-05-28 NOTE — TELEPHONE ENCOUNTER
Refill Approved    Rx renewed per Medication Renewal Policy. Medication was last renewed on 10/12/18.    Lula Karimi, [facility] Triage/Med Refill 5/16/2019     Requested Prescriptions   Pending Prescriptions Disp Refills     diltiazem (CARDIZEM CD) 120 MG 24 hr capsule [Pharmacy Med Name: DILTIAZEM 24HR  MG CAP] 90 capsule 1     Sig: TAKE 1 CAPSULE BY MOUTH EVERY DAY       Calcium-Channel Blockers Protocol Passed - 5/16/2019 10:33 AM        Passed - PCP or prescribing provider visit in past 12 months or next 3 months     Last office visit with prescriber/PCP: 5/15/2019 Collin Singh MD OR same dept: 5/15/2019 Collin Singh MD OR same specialty: 5/15/2019 Collin Singh MD  Last physical: Visit date not found Last MTM visit: Visit date not found   Next visit within 3 mo: Visit date not found  Next physical within 3 mo: Visit date not found  Prescriber OR PCP: Collin Singh MD  Last diagnosis associated with med order: 1. Atrial fibrillation (H)  - diltiazem (CARDIZEM CD) 120 MG 24 hr capsule [Pharmacy Med Name: DILTIAZEM 24HR  MG CAP]; TAKE 1 CAPSULE BY MOUTH EVERY DAY  Dispense: 90 capsule; Refill: 1    If protocol passes may refill for 12 months if within 3 months of last provider visit (or a total of 15 months).             Passed - Blood pressure filed in past 12 months     BP Readings from Last 1 Encounters:   05/15/19 134/80

## 2021-05-28 NOTE — TELEPHONE ENCOUNTER
ANTICOAGULATION  MANAGEMENT    Assessment     Today's INR result of 2.2 is Therapeutic (goal INR of 2.0-3.0)        Warfarin taken as previously instructed    No new diet changes affecting INR    No new medication/supplements affecting INR    Continues to tolerate warfarin with no reported s/s of bleeding or thromboembolism     Previous INR was Therapeutic    Plan:     Spoke with Stefan regarding INR result and instructed:     Warfarin Dosing Instructions:  Continue current warfarin dose    7.5 mg every Sun; 5 mg all other days         Instructed patient to follow up no later than: 4 weeks.     Education provided: importance of taking warfarin as instructed    Stefan verbalizes understanding and agrees to warfarin dosing plan.    Instructed to call the [facility] for any changes, questions or concerns. (#[phone])   ?   Torin Tobar RN    Subjective/Objective:      Stefan Diallo, a 73 y.o. male is on warfarin.     Stefan reports:     Home warfarin dose: template incorrect; verbally confirmed home dose with pt and updated on anticoagulation calendar     Missed doses: No     Medication changes:  No     S/S of bleeding or thromboembolism:  No     New Injury or illness:  No     Changes in diet or alcohol consumption:  No     Upcoming surgery, procedure or cardioversion:  No    Anticoagulation Episode Summary     Current INR goal:   2.0-3.0   TTR:   100.0 % (2.1 mo)   Next INR check:   5/21/2019   INR from last check:   2.20 (4/23/2019)   Weekly max warfarin dose:      Target end date:      INR check location:      Preferred lab:      Send INR reminders to:   ANTICOAGULATION POOL B (MPW,HUG,STW,RVL,OAK,RLN)    Indications    Atrial fibrillation  unspecified type (H) [I48.91]           Comments:            Anticoagulation Care Providers     Provider Role Specialty Phone number    Collin Singh MD Referring Family Medicine [phone]

## 2021-05-28 NOTE — TELEPHONE ENCOUNTER
ANTICOAGULATION  MANAGEMENT: Discharge Continuity of Care Review    Hospital admission on  5/5-5/9 for osteomyelitis; had amputation of 3rd toe right foot.    Discharge disposition: Home    INR Results:       Recent labs: (last 7 days)     05/05/19  1010 05/06/19  0646 05/07/19  0745 05/08/19  0740 05/09/19  0654   INR 2.11* 2.03* 2.42* 2.57* 2.25*       Warfarin inpatient management: Home regimen continued    Warfarin discharge instructions: home regimen continued     Medication Changes Affecting Anticoagulation: Yes: Keflex x4 weeks.     Additional Factors Affecting Anticoagulation: Yes: osteomyelitis    Plan     Agree with discharge plan for follow up on 5/15 at     Recommended follow up is scheduled    Anticoagulation calendar updated    Torin Tobar RN

## 2021-05-28 NOTE — TELEPHONE ENCOUNTER
CMT left message x 1. Please review message thread below and advise the patient as indicated. Please schedule if necessary or indicated in message thread. Per clinic outreach policy, writer will call three times before postponing 3 months. CMT will then repeat call cycle (3 calls) and send letter if unable to reach the patient.

## 2021-05-28 NOTE — ANESTHESIA POSTPROCEDURE EVALUATION
Patient: Stefan Diallo  AMPUTATION, 3rd TOE RIGHT FOOT  Anesthesia type: MAC    Patient location: PACU  Last vitals:   Vitals Value Taken Time   /78 5/6/2019 11:30 AM   Temp 36.5  C (97.7  F) 5/6/2019 11:30 AM   Pulse 83 5/6/2019 11:30 AM   Resp 17 5/6/2019 11:30 AM   SpO2 95 % 5/6/2019 11:30 AM   Note is a late entry, pt seen in PACU at time indicated.  Post vital signs: stable  Level of consciousness: alert and conversant  Post-anesthesia pain: pain controlled  Post-anesthesia nausea and vomiting: no  Pulmonary: supplemental oxygen  Cardiovascular: stable and blood pressure at baseline  Hydration: adequate  Anesthetic events: no    QCDR Measures:  ASA# 11 - Leydi-op Cardiac Arrest: ASA11B - Patient did NOT experience unanticipated cardiac arrest  ASA# 12 - Leydi-op Mortality Rate: ASA12B - Patient did NOT die  ASA# 13 - PACU Re-Intubation Rate: ASA13B - Patient did NOT require a new airway mgmt  ASA# 10 - Composite Anes Safety: ASA10A - No serious adverse event    Additional Notes:

## 2021-05-28 NOTE — TELEPHONE ENCOUNTER
Pt had surgery yesterday, and was discharged. Nurse indicated he should call back this weekend if he has issues with his bandage again. Pt is calling about having his bandage re-wrapped. On call Podiatrist was called to call pt at home about his bandage.    Roman Thrasher RN [facility] Triage/Medication Refill

## 2021-05-28 NOTE — PATIENT INSTRUCTIONS - HE
1. Try to be physically active for 20-30 minutes most days of the week  2. Maintain a steady intake of leafy green vegetables to help stabilize your INR  3. Whenever you are placed on antibiotics alert your provider that you take warfarin

## 2021-05-28 NOTE — PROGRESS NOTES
Assessment/Plan:   Cellulitis and abscess of toe of right foot  Second and third toes with callous and dystrophic nails at the tips with swelling, oozing, purulent drainage, redness and warmth extending to dorsum of foot, swelling to knee.  Worse the last couple days but the toe tips have had infection for awhile. Missed last podiatry recheck appointment. No oral antibiotics for a month or so . Thick callous pulled away from toe tip and purulent drainage. Sterile swab to obtain wound culture entered into the third toe tip all the way to end of the cotton tip. Diabetic. Recent cortisone injection for flare of sciatica. Afib. On coumadin, levels sub therapeutic as it was held for cortisone injection.    - Culture, Wound    To ER for further management, possible imaging of bones of toes and/or venous ultrasound, IV antibiotics +/- admission  Report called to ER MD    Subjective:      Stefan Diallo is a 73 y.o. male with diabetes, peripheral neuropathy, afib, and low back pain/sciatica who presents for increased redness, swelling, warmth of right foot, lower leg, and the second and third right toes. He has had chronic nail issues and some cellulitis in the second toe for which he he had been seeing podiatry since Feb/March. No apparent trauma. He was treated with cephalexin for about 3 weeks total in March. He was also told to cover the 2nd and 3rd toe tips with antibiotic ointment and bandaids which he does every night. He missed his follow up appointment due to a flare of sciatica and eventually had a cortisone injection to settle that down. He has had some swelling in the right leg off and on, but more so this week, worse when he is up and better over night. Yesterday he walked a lot in his normal shoes so assumes there was pinching on the toes but he doesn't feel it due to numbness and constant neuropathy pain. The third toe now seems swollen and red and oozing as well as the second toe. He wonders if he needs  another antibiotic. No fever. Has been suggested previously by podiatry that he get an MRI of the toes to rule out osteomyelitis before which he has declined. He has recently been off coumadin due to the cortisone injection, but has resumed it. No history of DVT. Is on coumadin for afib. No CP or increased shortness of breath, no fever or chills. Former smoker.  NKDA    No current facility-administered medications on file prior to visit.      Current Outpatient Medications on File Prior to Visit   Medication Sig Dispense Refill     atenolol (TENORMIN) 50 MG tablet Take 1 tablet (50 mg total) by mouth daily. (Patient taking differently: Take 25 mg by mouth daily.       ) 90 tablet 1     atorvastatin (LIPITOR) 20 MG tablet TAKE 1 TABLET BY MOUTH ONCE DAILY 90 tablet 2     blood glucose test strips Use 1 each As Directed daily before breakfast. 100 strip 3     blood-glucose meter (CONTOUR NEXT METER) Misc Use once daily. 1 each 0     blood-glucose meter Misc Use 1 Device As Directed daily before breakfast. 1 each 0     cholecalciferol, vitamin D3, 1,000 unit tablet Take 1,000 Units by mouth daily.              CONTOUR NEXT TEST STRIPS strips USE TO TEST ONCE DAILY 100 strip 5     diltiazem (CARDIZEM CD) 120 MG 24 hr capsule TAKE 1 CAPSULE BY MOUTH EVERY DAY 90 capsule 1     EPINEPHrine (EPIPEN 2-BRI) 0.3 mg/0.3 mL atIn Inject 0.3 mL (0.3 mg total) into the shoulder, thigh, or buttocks once as needed (allergic reaction). 2 Pre-filled Pen Syringe 1     FLUZONE HIGH-DOSE 2018-19, PF, 180 mcg/0.5 mL Syrg injection TO BE ADMINISTERED BY PHARMACIST FOR IMMUNIZATION  0     gabapentin (NEURONTIN) 300 MG capsule TAKE ONE CAPSULE BY MOUTH TWICE A  capsule 1     gemfibrozil (LOPID) 600 MG tablet Take 1 tablet (600 mg total) by mouth 2 (two) times a day. 180 tablet 3     generic lancets (ACCU-CHEK SOFTCLIX LANCETS) Use 1 application As Directed daily before breakfast. 100 each 3     lisinopril (PRINIVIL,ZESTRIL) 5 MG tablet  TAKE 1 TABLET BY MOUTH EVERY DAY 90 tablet 0     metFORMIN (GLUCOPHAGE) 500 MG tablet Take 1 tablet each morning and 2 tablets each evening 270 tablet 1     miscellaneous medical supply Misc Bipap w heated humidifier, tubing & chamber all x1, FFM w cushion x 1, headgear x 1, filers: disposable and resuable x 1pk both       omeprazole (PRILOSEC) 20 MG capsule TAKE 1 CAPSULE (20 MG TOTAL) BY MOUTH DAILY. 90 capsule 3     vitamin A-vitamin C-vit E-min (OCUVITE) Tab tablet Take 1 tablet by mouth 2 (two) times a day.              warfarin (COUMADIN/JANTOVEN) 5 MG tablet Take 1 to 1.5 tablets (5 to 7.5 mg) by mouth daily. Adjust dose based on INR results as directed. (Patient taking differently: Take 5-7.5 mg by mouth See Admin Instructions. Take 5mg on Monday, Tuesday, Wednesday, Thursday, Friday, and Saturday.  Take 7.5mg on Sunday.      ) 90 tablet 1     [DISCONTINUED] CYANOCOBALAMIN, VITAMIN B-12, (VITAMIN B12 ORAL) Take by mouth.       [DISCONTINUED] neomycin-polymyxin-dexamethasone (MAXITROL) 3.5mg/mL-10,000 unit/mL-0.1 % ophthalmic suspension Apply 1 drop to eye.       [DISCONTINUED] neomycin-polymyxin-dexamethasone (MAXITROL) 3.5mg/mL-10,000 unit/mL-0.1 % ophthalmic suspension INSTILL 1 DROP INTO RIGHT EYE 4 TIMES A DAY FOR 7 DAYS  1     [DISCONTINUED] ranitidine (ZANTAC) 150 MG capsule Take 150 mg by mouth 2 (two) times a day.       [DISCONTINUED] simvastatin (ZOCOR) 40 MG tablet Take 40 mg by mouth.       Past Medical History:   Diagnosis Date     Atrial fibrillation (H)      Bacteremia      GERD (gastroesophageal reflux disease)      GI hemorrhage      High cholesterol      Hyperlipidemia      Hypertension      Renal artery aneurysm (H)      Sleep apnea, obstructive     USES BIPAP     Type 2 diabetes mellitus (H)      UTI (lower urinary tract infection)        Objective:     /81   Pulse 98   Temp 97.5  F (36.4  C) (Oral)   Resp 12   Wt (!) 259 lb 12.8 oz (117.8 kg)   SpO2 96%   BMI 37.28 kg/m       Physical  General Appearance: Alert, cooperative, no distress, AVSS  Head: Normocephalic, without obvious abnormality, atraumatic  Eyes: Conjunctivae are normal.   Nose: No significant congestion.  Extremities: there is swelling of the mid and distal foot with warmth and redness over the dorsum of the foot as well as extending to the toes. There are thickened nails. The second and third toenails have thick callous at the tips and these came off partly with removal of the bandaid and then more fully by me to view the toes. There is a shallow ulcer at the second toe tip which is not fluctuant. There is a larger open ulcer at the tip of the third toe with draining purulent fluid. A culture was obtained of this and the swab entered the ulcer up to the end of the cotton tip. The right great toe is bluish - he states this is longstanding.   Psychiatric: Patient has a normal mood and affect.

## 2021-05-29 NOTE — TELEPHONE ENCOUNTER
Spoke with pt and let him know to hold Warfarin for spinal injection. Restart back as soon as possible after inject. He will call spine office to set up inject.

## 2021-05-29 NOTE — TELEPHONE ENCOUNTER
ANTICOAGULATION MANAGEMENT PROGRAM--Non-Compliance Chart Review    Stefan Diallo is overdue for INR follow up.      INR Results:   Lab Results   Component Value Date    INR 2.25 (H) 05/09/2019    INR 2.57 (H) 05/08/2019    INR 2.42 (H) 05/07/2019         Chart reviewed, continue non-compliance protocol       Rossi Spence RN

## 2021-05-29 NOTE — PROGRESS NOTES
"Subjective:      Patient ID: Stefan Diallo is a 73 y.o. male.    Chief Complaint:    HPI  Stefan Diallo is a 73 y.o. male who presents today complaining of opening of his surgical incision site.  Patient is just recently transition from postop shoe to a \"extrawide\" tennis shoe to accommodate his foot.  He noticed that the wound may have been open for the last day or 2 and is concerned and is coming in for evaluation and treatment.  He has been placing a Band-Aid on the wound.  No noted fever chills night sweats fatigue or discharge from the area.  He also states there is no pain warmth or edema.    He has been taking his antibiotic as prescribed.    He was just recently seen by Dr. Abbasi for postop check #3 on 5/29/2019.  It was documented that the wound was not open at that time there is no reactivity.  Please see the note in epic for specifics.    Past Medical History:   Diagnosis Date     Atrial fibrillation (H)      Bacteremia      GERD (gastroesophageal reflux disease)      GI hemorrhage      High cholesterol      Hyperlipidemia      Hypertension      Renal artery aneurysm (H)      Sleep apnea, obstructive     USES BIPAP     Type 2 diabetes mellitus (H)      UTI (lower urinary tract infection)        Past Surgical History:   Procedure Laterality Date     FOOT AMPUTATION Right 5/6/2019    Procedure: AMPUTATION, 3rd TOE RIGHT FOOT;  Surgeon: Ravi Abbasi DPM;  [place] [facility] [place]iat[place]     AL REPAIR COMPL ROTATOR CUFF AVULSN,CHR      Description: Chronic Rotator Cuff Avulsion;  Recorded: 04/11/2011;     TENOTOMY ACHILLES TENDON       TRANSURETHRAL RESECTION OF PROSTATE         Family History   Problem Relation Age of Onset     Coronary artery disease Mother      Coronary artery disease Father      Atrial fibrillation Father        Social History     Tobacco Use     Smoking status: Former Smoker     Packs/day: 2.00     Years: 27.00     Pack years: 54.00     Types: Cigarettes     " Last attempt to quit: 1990     Years since quittin.3     Smokeless tobacco: Never Used   Substance Use Topics     Alcohol use: No     Frequency: Never     Drug use: No       Review of Systems  As above in HPI, otherwise balance of Review of Systems are negative.    Objective:     /74   Pulse 74   Temp 98  F (36.7  C) (Oral)   Resp 18   Wt (!) 255 lb (115.7 kg)   SpO2 96%   BMI 35.87 kg/m      Physical Exam  General: Patient is resting comfortably no acute distress is afebrile  Skin: Without rash non-diaphoretic  Musculoskeletal: dorsum of right foot: The surgical incision site with the proximal portion of the surgical incision on the dorsum of the foot has a 5 mm area of opening.  There is some fibrin formation already on the edges of the wound and on the bottom of the wound itself.  This has been open for a little bit.  Gentle traction on the wound does not show that is opening and gentle pressure pushing it together does not come together easily.  There is no current discharge or malodorous area the rest of the test.  He has no pain to palpation.    Dry dressing consisting of Adaptic, 2 x 2 roll gauze and Coban to hold the dressing in place was applied.  Care was taken not to wrap too tightly.  He was comfortable in the dressing before discharge.          Assessment:     Procedures    The primary encounter diagnosis was Visit for wound check. A diagnosis of Amputated toe of right foot (H) was also pertinent to this visit.    Plan:     1. Visit for wound check  Ambulatory referral to Podiatry   2. Amputated toe of right foot (H)         Had a conversation with the patient stating that I do think that the shear force on the dorsum of the foot has opened up his surgical wound.  However the wound margins are not dynamic and moving and there is no bleeding.  This will heal by secondary intent and it is already been open for a period of time before he presented to clinic.  I do not think that the  wound is able to be closed in the office because of the amount of time that is open as well as the margins are adhered to the underlying tissue.  Patient is currently on an antibiotic and will allow him to continue with the course of antibiotic as written.  He will keep the area clean dry and protected.  I want him to transition back to his postop surgical shoes that there is no shear force either on the dorsum of the foot or from the tibial and fibular side pushing on the skin margins.  Patient acknowledged that he still has the postop shoe at home.  Lastly, referral back to Dr. Abbasi to review the wound next week.  He can return to walk-in care in the interim if any new symptoms or concerns arise.  He should also keep the dressings on clean dry and protected.  He does not need to change the dressing between now and his follow-up with Dr. Abbasi if it is clean and dry.  Questions were answered patient satisfaction before discharge.    Patient Instructions   Keep the area clean dry and covered.  Do not soak the foot or immerse in water.  Do not apply topical antibiotic.  Keep the dressings on for at least 2 to 3 days until you are seen by podiatry.  If the dressings get wet dirty or bleed through you may change them.  You may also transition back from normal shoewear to the postop shoes there is no pressure on the top of the wound.  The shear force from walking and on the shoe wear may be opening the wound.  Return to the walk-in care if you having any complications or new symptoms arise.  In the interim, continue taking the oral antibiotic as previously prescribed.

## 2021-05-29 NOTE — TELEPHONE ENCOUNTER
Please see pt request below and respond.  Pt Zantac was dc'd and now is on Omeprazole but pt wants both.  Thanks.

## 2021-05-29 NOTE — TELEPHONE ENCOUNTER
----- Message from Vishnu Garland MD sent at 6/11/2019  4:51 PM CDT -----  Regarding: RE: spinal inject    No changes. [facility]      ----- Message -----  From: Lula Perez RN  Sent: 6/3/2019   7:55 AM  To: Vishnu Garland MD  Subject: spinal inject                                    Pt is having a spinal inject and needs to hold Warfarin for 5 days before injection.    Has anything changed since 3/28/19 hold note?   You saw him in June. Concerns?

## 2021-05-29 NOTE — TELEPHONE ENCOUNTER
----- Message from Vishnu Garland MD sent at 6/3/2019 12:57 PM CDT -----  Regarding: RE: spinal inject  Low risk for holding anticoagulants for 5 days.  [facility]  ----- Message -----  From: Lula Perez RN  Sent: 6/3/2019   7:55 AM  To: Vishnu Garland MD  Subject: spinal inject                                    Pt is having a spinal inject and needs to hold Warfarin for 5 days before injection.    Has anything changed since 3/28/19 hold note?   You saw him in June. Concerns?

## 2021-05-29 NOTE — TELEPHONE ENCOUNTER
Patient Returning Call  Reason for call:  Patient   Information relayed to patient:  Calling ACN back  Patient has additional questions:  Yes  If YES, what are your questions/concerns:  Stefan stated that he Is not coming in. He is not due and  Going to be coming off off his medications for 5 days anyway's for a cortisone injection so he will not be coming in.  Okay to leave a detailed message?: No call back needed

## 2021-05-29 NOTE — TELEPHONE ENCOUNTER
ANTICOAGULATION  MANAGEMENT PROGRAM    Stefan Diallo is overdue for INR check.  Reminder call made.    Left message for Stefan. If returning call, please schedule INR check as soon as possible.    Torin Tobar RN

## 2021-05-29 NOTE — PATIENT INSTRUCTIONS - HE
Keep the area clean dry and covered.  Do not soak the foot or immerse in water.  Do not apply topical antibiotic.  Keep the dressings on for at least 2 to 3 days until you are seen by podiatry.  If the dressings get wet dirty or bleed through you may change them.  You may also transition back from normal shoewear to the postop shoes there is no pressure on the top of the wound.  The shear force from walking and on the shoe wear may be opening the wound.  Return to the walk-in care if you having any complications or new symptoms arise.  In the interim, continue taking the oral antibiotic as previously prescribed.

## 2021-05-29 NOTE — TELEPHONE ENCOUNTER
Medication Request  Medication name: ranitidine (ZANTAC) 150 MG tablet (Discontinued)  Pharmacy Name and Location: [facility] [place]  Reason for request: Patient states he would like to have this medication on hand for when he has symptoms. Patient states he does currently take Omeprazole, but would like to have Zantac on hand as well.  When did you use medication last?:  Unknown  Patient offered appointment:  No  Okay to leave a detailed message: yes

## 2021-05-29 NOTE — TELEPHONE ENCOUNTER
Orders being requested: hold and bridge   Reason service is needed/diagnosis: not able to schedule his ortho appointment until he gets the okay to hold for 5 days and if he needs to bridge in that time.  When are orders needed by: asap, patient called in very upset he wasn't able to talk with INR nurse right away and states he waited over 3 days for a call back before.   Where to send Orders: patient didnt state.  Okay to leave detailed message?  Yes

## 2021-05-29 NOTE — TELEPHONE ENCOUNTER
OK to hold warfarin for 5 days prior to spine injection.  No need for bridging; he has A Fib.  Thanks - dlh

## 2021-05-29 NOTE — TELEPHONE ENCOUNTER
ACN called and informed pt that he can hold warfarin 5 days prior his spine injection; no need for bridge (per PCP)    ACN also called [facility] [phone] (as requested by patient) and left a message for Dr. Tristin Mckenzie's coordinator, that it is ok for patient to hold warfarin 5 days prior without bridge.

## 2021-05-29 NOTE — PROGRESS NOTES
Assessment: /    Plan:    1. Cellulitis of foot     2. Amputated toe of right foot (H)         Recheck diabetes in 2 months.    I reconciled all of his medications.      Subjective:    HPI:  Stefan Diallo is a 73-year-old male presents for follow-up of hospitalization.  He was at [facility] May 5 to May 9.  He had cellulitis of the right foot.  He had osteomyelitis of the third toe due to fracture, and had amputation of the toe.  He also had cellulitis of the second toe.  He is taking cephalexin.  No drainage from the wound.      Review of Systems: No fever or cough.      Current Outpatient Medications   Medication Sig Dispense Refill     atenolol (TENORMIN) 50 MG tablet Take 0.5 tablets (25 mg total) by mouth daily.       atorvastatin (LIPITOR) 20 MG tablet TAKE 1 TABLET BY MOUTH ONCE DAILY 90 tablet 2     blood glucose test strips Use 1 each As Directed daily before breakfast. 100 strip 3     blood-glucose meter (CONTOUR NEXT METER) Misc Use once daily. 1 each 0     blood-glucose meter Misc Use 1 Device As Directed daily before breakfast. 1 each 0     cephalexin (KEFLEX) 500 MG capsule Take 1 capsule (500 mg total) by mouth 4 (four) times a day. 112 capsule 0     cholecalciferol, vitamin D3, 1,000 unit tablet Take 1,000 Units by mouth daily.              CONTOUR NEXT TEST STRIPS strips USE TO TEST ONCE DAILY 100 strip 5     EPINEPHrine (EPIPEN 2-BRI) 0.3 mg/0.3 mL atIn Inject 0.3 mL (0.3 mg total) into the shoulder, thigh, or buttocks once as needed (allergic reaction). 2 Pre-filled Pen Syringe 1     FLUZONE HIGH-DOSE 2018-19, PF, 180 mcg/0.5 mL Syrg injection TO BE ADMINISTERED BY PHARMACIST FOR IMMUNIZATION  0     gemfibrozil (LOPID) 600 MG tablet Take 1 tablet (600 mg total) by mouth 2 (two) times a day. 180 tablet 3     generic lancets (ACCU-CHEK SOFTCLIX LANCETS) Use 1 application As Directed daily before breakfast. 100 each 3     HYDROcodone-acetaminophen (NORCO )  mg per tablet Take 1 tablet by  mouth every 6 (six) hours as needed. 10 tablet 0     lisinopril (PRINIVIL,ZESTRIL) 5 MG tablet TAKE 1 TABLET BY MOUTH EVERY DAY 90 tablet 0     metFORMIN (GLUCOPHAGE) 500 MG tablet Take 1 tablet each morning and 2 tablets each evening 270 tablet 1     miscellaneous medical supply Misc Bipap w heated humidifier, tubing & chamber all x1, FFM w cushion x 1, headgear x 1, filers: disposable and resuable x 1pk both       omeprazole (PRILOSEC) 20 MG capsule TAKE 1 CAPSULE (20 MG TOTAL) BY MOUTH DAILY. 90 capsule 3     polyethylene glycol (MIRALAX) 17 gram packet Take 1 packet (17 g total) by mouth daily as needed.  0     REDNESS RELIEVER EYE DROPS 0.05 % ophthalmic solution        REDNESS RELIEVER EYE DROPS 0.05 % ophthalmic solution        vitamin A-vitamin C-vit E-min (OCUVITE) Tab tablet Take 1 tablet by mouth 2 (two) times a day.              warfarin (COUMADIN/JANTOVEN) 5 MG tablet Take 1 to 1.5 tablets (5 to 7.5 mg) by mouth daily. Adjust dose based on INR results as directed. (Patient taking differently: Take 5-7.5 mg by mouth See Admin Instructions. Take 5mg on Monday, Tuesday, Wednesday, Thursday, Friday, and Saturday.  Take 7.5mg on Sunday.      ) 90 tablet 1     diltiazem (CARDIZEM CD) 120 MG 24 hr capsule TAKE 1 CAPSULE BY MOUTH EVERY DAY 90 capsule 3     gabapentin (NEURONTIN) 300 MG capsule TAKE 1 CAPSULE BY MOUTH TWICE A  capsule 3     No current facility-administered medications for this visit.          Objective:    Vitals:    05/15/19 1112   BP: 134/80   Pulse: 75   Resp: 20   Temp: 98  F (36.7  C)   SpO2: 96%       Gen:  NAD, VSS  Lungs:  normal  Heart: Atrial fibrillation          ADDITIONAL HISTORY SUMMARIZED (2): Reviewed discharge summary and podiatry notes.  DECISION TO OBTAIN EXTRA INFORMATION (1): None.   RADIOLOGY TESTS (1):  Reviewed foot MRI.  LABS (1): Reviewed BMP.  MEDICINE TESTS (1): Reviewed EKG.  INDEPENDENT REVIEW (2 each): None.     Total Data Points: 5

## 2021-05-29 NOTE — TELEPHONE ENCOUNTER
Spoke to the pt, thought he was due for DM f/u according to the note made in 2/2019 office visit with PCP. Pt states that his 5/2019 office he already spoke to PCP about his DM when he came in to check his foot.

## 2021-05-29 NOTE — TELEPHONE ENCOUNTER
Pt is noncompliant with INR checks. Per protocol ACN is unable to refill warfarin. PCP please address refill.

## 2021-05-29 NOTE — TELEPHONE ENCOUNTER
Who is calling:  Patient   Reason for Call:  Follow up on refill request. Patient states he is out of medication. Please process as soon as possible.  Date of last appointment with primary care: 5//28/19  Okay to leave a detailed message: No

## 2021-05-30 VITALS — BODY MASS INDEX: 38.08 KG/M2 | HEIGHT: 70 IN | WEIGHT: 266 LBS

## 2021-05-30 VITALS — WEIGHT: 264.5 LBS | HEIGHT: 70 IN | BODY MASS INDEX: 37.87 KG/M2

## 2021-05-30 NOTE — TELEPHONE ENCOUNTER
ANTICOAGULATION MANAGEMENT PROGRAM--FINAL REMINDER NOTIFICATION     Dr. Singh,    Your patient, Stefan Diallo, has been under the care of the [facility] for warfarin management. Our records indicate that Stefan is either past due for an INR check or has not followed clinic staff recommendations. The patient s last INR was 2.25 on 5/9/2019.      Stefan received two phone calls and one letter over the last several weeks in attempt to arrange a follow up appointment.  Stefan is being sent a final reminder letter today to attempt to arrange a follow up appointment.      The [facility] is unable to manage anticoagulation therapy for non-compliant patients. If Stefan does not schedule follow up within 3 weeks from the date of his letter it may result in transition of Stefan from our service back to you for warfarin management.    Please don t hesitate to contact the Anticoagulation Management Program at [phone] if you have any questions or concerns.     Sincerely,     [facility]

## 2021-05-30 NOTE — TELEPHONE ENCOUNTER
reports indicate that the patient needs AWV, TD/PNEUMO, AD.  Writer intends to contact the patient to assist in scheduling and appointing for this purpose. Per clinic policy, writer will three times prior to closing encounter.

## 2021-05-30 NOTE — TELEPHONE ENCOUNTER
ANTICOAGULATION  MANAGEMENT    Assessment     Today's INR result of 3.2 is Supratherapeutic (goal INR of 2.0-3.0)        Warfarin taken as previously instructed    No new diet changes affecting INR    No new medication/supplements affecting INR    Continues to tolerate warfarin with no reported s/s of bleeding or thromboembolism     Previous INR was Therapeutic    Plan:     Spoke with Stefan regarding INR result and instructed:     Warfarin Dosing Instructions:  Continue current warfarin dose    7.5 mg every Sun; 5 mg all other days     Pt agreed to slightly increase greens.     Instructed patient to follow up no later than: 2 weeks.    Education provided: importance of consistent vitamin K intake, importance of following up for INR monitoring at instructed interval and importance of taking warfarin as instructed    Stefan verbalizes understanding and agrees to warfarin dosing plan.    Instructed to call the [facility] for any changes, questions or concerns. (#[phone])   ?   Torin Tobar RN    Subjective/Objective:      Stefan Diallo, a 74 y.o. male is on warfarin.     Stefan reports:     Home warfarin dose: template incorrect; verbally confirmed home dose with pt and updated on anticoagulation calendar     Missed doses: No     Medication changes:  No     S/S of bleeding or thromboembolism:  No     New Injury or illness:  No     Changes in diet or alcohol consumption:  No     Upcoming surgery, procedure or cardioversion:  No    Anticoagulation Episode Summary     Current INR goal:   2.0-3.0   TTR:   100.0 % (2.5 mo)   Next INR check:   7/29/2019   INR from last check:   3.20! (7/15/2019)   Weekly max warfarin dose:      Target end date:      INR check location:      Preferred lab:      Send INR reminders to:   HILARIO HECTOR    Indications    Atrial fibrillation  unspecified type (H) [I48.91]           Comments:            Anticoagulation Care Providers     Provider Role Specialty Phone number    Francisco  Collin Fontanez MD [facility] [phone]

## 2021-05-30 NOTE — TELEPHONE ENCOUNTER
"Writer spoke with patient today. Patient states he is awaiting results from 07/15/19 A1c. Patient was scheduled 07/15/19 for INR and A1c. INR collected and run.     EDTA tube drawn while lab staff awaited A1c order, PCP out on 07/15/19 (Mark covered). Covering provider entered A1c as \"future\" order. A1c not run 07/15/19.     Writer will call patient back with update.   "

## 2021-05-30 NOTE — TELEPHONE ENCOUNTER
Staff checked with coding. Patient charged appropriately for INR but was never charged for A1c.     [facility] offered to schedule lab-only visit for patient to have EDTA tube collected for A1c testing.     Patient states that he will call clinic to schedule appointment to meet with PCP at his convenience.

## 2021-05-31 VITALS — WEIGHT: 260.25 LBS | BODY MASS INDEX: 37.26 KG/M2 | HEIGHT: 70 IN

## 2021-05-31 VITALS — BODY MASS INDEX: 38.22 KG/M2 | HEIGHT: 70 IN | WEIGHT: 267 LBS

## 2021-05-31 NOTE — PROGRESS NOTES
Assessment: /    Plan:    1. Cellulitis of left lower extremity  cephalexin (KEFLEX) 500 MG capsule   2. Type 2 diabetes mellitus without complication, without long-term current use of insulin (H)  Glycosylated Hemoglobin A1c   3. Atrial fibrillation, unspecified type (H)  INR       Cellulitis, acute.  Cephalexin is not likely to affect INR significantly.    Diabetes, stable.  Continue metformin.    He has a follow-up appointment September 6.  Recheck sooner if any problem.      Subjective:    HPI:  Stefan Diallo is a 74-year-old male presenting with swelling of the left leg.  1.5 weeks ago, he was working on a deck, replacing a board.  He stepped on the end of the board, and his foot went through the deck and the other end of the board came up and hit his leg.  He was seen at walk-in clinic on August 15, and had negative x-ray and ultrasound.    He has diabetes, and is due for A1c.  He takes metformin.      Social Hx: He quit smoking in 1990.    Review of Systems: No fever, sweats, dyspnea, chest pain.      Current Outpatient Medications   Medication Sig Dispense Refill     atenolol (TENORMIN) 50 MG tablet Take 0.5 tablets (25 mg total) by mouth daily.       atorvastatin (LIPITOR) 20 MG tablet TAKE 1 TABLET BY MOUTH ONCE DAILY 90 tablet 2     blood glucose test strips Use 1 each As Directed daily before breakfast. 100 strip 3     blood-glucose meter (CONTOUR NEXT METER) Misc Use once daily. 1 each 0     blood-glucose meter Misc Use 1 Device As Directed daily before breakfast. 1 each 0     cephalexin (KEFLEX) 500 MG capsule Take 1 capsule (500 mg total) by mouth 3 (three) times a day for 7 days. 21 capsule 0     cholecalciferol, vitamin D3, 1,000 unit tablet Take 1,000 Units by mouth daily.              CONTOUR NEXT TEST STRIPS strips USE TO TEST ONCE DAILY 100 strip 5     diltiazem (CARDIZEM CD) 120 MG 24 hr capsule TAKE 1 CAPSULE BY MOUTH EVERY DAY 90 capsule 3     EPINEPHrine (EPIPEN 2-BRI) 0.3 mg/0.3 mL atIn  Inject 0.3 mL (0.3 mg total) into the shoulder, thigh, or buttocks once as needed (allergic reaction). 2 Pre-filled Pen Syringe 1     FLUZONE HIGH-DOSE 2018-19, PF, 180 mcg/0.5 mL Syrg injection TO BE ADMINISTERED BY PHARMACIST FOR IMMUNIZATION  0     gabapentin (NEURONTIN) 300 MG capsule TAKE 1 CAPSULE BY MOUTH TWICE A  capsule 3     gemfibrozil (LOPID) 600 MG tablet Take 1 tablet (600 mg total) by mouth 2 (two) times a day. 180 tablet 3     generic lancets (ACCU-CHEK SOFTCLIX LANCETS) Use 1 application As Directed daily before breakfast. 100 each 3     HYDROcodone-acetaminophen (NORCO )  mg per tablet Take 1 tablet by mouth every 6 (six) hours as needed. 10 tablet 0     lisinopril (PRINIVIL,ZESTRIL) 5 MG tablet TAKE 1 TABLET BY MOUTH EVERY DAY 90 tablet 2     metFORMIN (GLUCOPHAGE) 500 MG tablet TAKE 1 TABLET BY MOUTH EACH MORNING AND 2 TABLETS EACH EVENING 270 tablet 1     miscellaneous medical supply Misc Bipap w heated humidifier, tubing & chamber all x1, FFM w cushion x 1, headgear x 1, filers: disposable and resuable x 1pk both       omeprazole (PRILOSEC) 20 MG capsule TAKE 1 CAPSULE (20 MG TOTAL) BY MOUTH DAILY. 90 capsule 3     polyethylene glycol (MIRALAX) 17 gram packet Take 1 packet (17 g total) by mouth daily as needed.  0     ranitidine (ZANTAC) 150 MG tablet Take 1 tablet (150 mg total) by mouth 2 (two) times a day. 60 tablet 5     REDNESS RELIEVER EYE DROPS 0.05 % ophthalmic solution               vitamin A-vitamin C-vit E-min (OCUVITE) Tab tablet Take 1 tablet by mouth 2 (two) times a day.              warfarin (COUMADIN/JANTOVEN) 5 MG tablet Take 1 to 1.5 tablets (5 to 7.5 mg) by mouth daily. Adjust dose based on INR results as directed. 90 tablet 1     No current facility-administered medications for this visit.          Objective:    Vitals:    08/19/19 1645   BP: 136/86   Pulse: 84   Resp: 12   Temp: 98.2  F (36.8  C)       Gen:  NAD, VSS  Lungs:  normal  Heart: A. fib  Left  lower leg is bright pink on the anterior aspect.  Homans sign negative.  1+ edema.  Mild to moderate tenderness.    A1c is 7.6, improved from 8.2    ADDITIONAL HISTORY SUMMARIZED (2): Reviewed August 15 walk-in clinic note.  DECISION TO OBTAIN EXTRA INFORMATION (1): None.   RADIOLOGY TESTS (1): Reviewed leg x-ray and ultrasound reports.  LABS (1): A1c.  MEDICINE TESTS (1): None.  INDEPENDENT REVIEW (2 each): None.     Total Data Points: 4

## 2021-05-31 NOTE — TELEPHONE ENCOUNTER
ANTICOAGULATION  MANAGEMENT PROGRAM    Stefan Diallo is overdue for INR check.  Reminder call made.    Spoke with pt who declined to schedule INR at this time.  If calling back, please schedule INR check as soon as possible.    Torin Tobar RN

## 2021-05-31 NOTE — TELEPHONE ENCOUNTER
ANTICOAGULATION  MANAGEMENT    Assessment     Today's INR result of 2.70 is Therapeutic (goal INR of 2.0-3.0)        Warfarin taken as previously instructed    No new diet changes affecting INR    Potential interaction between Keflex (8/19/19 - 8/25/19) and warfarin which may affect subsequent INRs. Per [employer] Concurrent use of CEPHALEXIN and WARFARIN may result in an increased risk of bleeding.  Patient stated that the doctor told him specifically that the antibiotic would NOT interact with the Warfarin medication. Patient not willing to re-check INR in 3-5 days. Patient does not want to re-check the INR in 2 weeks either per protocol (his last INR was out-of-range).    Continues to tolerate warfarin with no reported s/s of bleeding or thromboembolism     Previous INR was Supratherapeutic    Plan:     Spoke with Stefan regarding INR result and instructed:     Warfarin Dosing Instructions:  Continue current warfarin dose 7.5 mg daily on SUN; and 5 mg daily rest of week  (0 % change)    Instructed patient to follow up no later than: 2 weeks (patient did not agree)    Education provided: potential interaction between warfarin and Keflex    Stefan verbalizes understanding and disagrees to warfarin dosing plan (as far as when to re-check the INR).    Instructed to call the [facility] for any changes, questions or concerns. (#[phone])   ?   Анна Joshua RN    Subjective/Objective:      Stefan MIRANDA Yoel, a 74 y.o. male is on warfarin.     Stefan reports:     Home warfarin dose: verbally confirmed home dose with Stefan and updated on anticoagulation calendar     Missed doses: No     Medication changes:  Yes: Keflex 8/19/19 - 8/25/19     S/S of bleeding or thromboembolism:  No     New Injury or illness:  Yes: Cellulitis in toe     Changes in diet or alcohol consumption:  No     Upcoming surgery, procedure or cardioversion:  No    Anticoagulation Episode Summary     Current INR goal:   2.0-3.0   TTR:   87.2 % (3.6 mo)    Next INR check:   9/3/2019   INR from last check:   2.70 (8/19/2019)   Weekly max warfarin dose:      Target end date:      INR check location:      Preferred lab:      Send INR reminders to:   HILARIO HECTOR    Indications    Atrial fibrillation  unspecified type (H) [I48.91]           Comments:            Anticoagulation Care Providers     Provider Role Specialty Phone number    Collin Singh MD Referring Family Medicine [phone]

## 2021-05-31 NOTE — TELEPHONE ENCOUNTER
Refill Request  Did you contact pharmacy: Yes.  Date: 8/1/19  Medication name:   Requested Prescriptions     Pending Prescriptions Disp Refills     lisinopril (PRINIVIL,ZESTRIL) 5 MG tablet [Pharmacy Med Name: LISINOPRIL 5 MG TABLET] 90 tablet 0     Sig: TAKE 1 TABLET BY MOUTH EVERY DAY     metFORMIN (GLUCOPHAGE) 500 MG tablet [Pharmacy Med Name: METFORMIN  MG TABLET] 270 tablet 1     Sig: TAKE 1 TABLET BY MOUTH EACH MORNING AND 2 TABLETS EACH EVENING     Who prescribed the medication: Collin Singh MD    Pharmacy Name and Location: [facility] #[id number]  Is patient out of medication: No.  2 days left  Patient notified refills processed in 72 hours:  yes  Okay to leave a detailed message: yes    Patient has 2 days remaining.  Requesting a priority on refill.

## 2021-05-31 NOTE — TELEPHONE ENCOUNTER
AWV scheduled 09/06/19. CMT left message to call and schedule as [facility] was not aware that this had been scheduled. No further action needed.

## 2021-05-31 NOTE — TELEPHONE ENCOUNTER
Provider Refill Request  Medication:  metformin  RN cannot refill this medication per RN refill protocol because Protocol failed and NO refill given      Refill Approved    Rx renewed per Medication Renewal Policy. Medication was last renewed on 5/2/19.    Ov: 5/15/19    Анна Yoo, [facility] Triage/Med Refill 8/1/2019     Requested Prescriptions   Pending Prescriptions Disp Refills     lisinopril (PRINIVIL,ZESTRIL) 5 MG tablet [Pharmacy Med Name: LISINOPRIL 5 MG TABLET] 90 tablet 0     Sig: TAKE 1 TABLET BY MOUTH EVERY DAY       Ace Inhibitors Refill Protocol Passed - 8/1/2019 10:33 AM        Passed - PCP or prescribing provider visit in past 12 months       Last office visit with prescriber/PCP: 5/15/2019 Collin Singh MD OR same dept: 5/15/2019 Collin Singh MD OR same specialty: 5/15/2019 Collin Singh MD  Last physical: Visit date not found Last MTM visit: Visit date not found   Next visit within 3 mo: Visit date not found  Next physical within 3 mo: Visit date not found  Prescriber OR PCP: Collin Singh MD  Last diagnosis associated with med order: 1. Essential hypertension  - lisinopril (PRINIVIL,ZESTRIL) 5 MG tablet [Pharmacy Med Name: LISINOPRIL 5 MG TABLET]; TAKE 1 TABLET BY MOUTH EVERY DAY  Dispense: 90 tablet; Refill: 0    2. Type 2 diabetes mellitus without complication, without long-term current use of insulin (H)  - metFORMIN (GLUCOPHAGE) 500 MG tablet [Pharmacy Med Name: METFORMIN  MG TABLET]; TAKE 1 TABLET BY MOUTH EACH MORNING AND 2 TABLETS EACH EVENING  Dispense: 270 tablet; Refill: 1    If protocol passes may refill for 12 months if within 3 months of last provider visit (or a total of 15 months).             Passed - Serum Potassium in past 12 months     Lab Results   Component Value Date    Potassium 4.0 05/05/2019             Passed - Blood pressure filed in past 12 months     BP Readings from Last 1 Encounters:   06/01/19 116/74              Passed - Serum Creatinine in past 12 months     Creatinine   Date Value Ref Range Status   05/08/2019 0.97 0.70 - 1.30 mg/dL Final             metFORMIN (GLUCOPHAGE) 500 MG tablet [Pharmacy Med Name: METFORMIN  MG TABLET] 270 tablet 1     Sig: TAKE 1 TABLET BY MOUTH EACH MORNING AND 2 TABLETS EACH EVENING       Metformin Refill Protocol Failed - 8/1/2019 10:33 AM        Failed - LFT or AST or ALT in last 12 months     Albumin   Date Value Ref Range Status   06/16/2017 3.7 3.5 - 5.0 g/dL Final     Bilirubin, Total   Date Value Ref Range Status   06/16/2017 0.3 0.0 - 1.0 mg/dL Final     Bilirubin, Direct   Date Value Ref Range Status   03/28/2011 0.2 <0.6 mg/dL Final     Alkaline Phosphatase   Date Value Ref Range Status   06/16/2017 77 45 - 120 U/L Final     AST   Date Value Ref Range Status   06/16/2017 30 0 - 40 U/L Final     ALT   Date Value Ref Range Status   06/16/2017 21 0 - 45 U/L Final     Protein, Total   Date Value Ref Range Status   06/16/2017 7.1 6.0 - 8.0 g/dL Final                Failed - Microalbumin in last year      Microalbumin, Random Urine   Date Value Ref Range Status   06/16/2017 2.36 (H) 0.00 - 1.99 mg/dL Final                  Passed - Blood pressure in last 12 months     BP Readings from Last 1 Encounters:   06/01/19 116/74             Passed - GFR or Serum Creatinine in last 6 months     GFR MDRD Non Af Amer   Date Value Ref Range Status   05/08/2019 >60 >60 mL/min/1.73m2 Final     GFR MDRD Af Amer   Date Value Ref Range Status   05/08/2019 >60 >60 mL/min/1.73m2 Final             Passed - Visit with PCP or prescribing provider visit in last 6 months or next 3 months     Last office visit with prescriber/PCP: 5/15/2019 OR same dept: 5/15/2019 Collin Singh MD OR same specialty: 5/15/2019 Collin Singh MD Last physical: Visit date not found Last MTM visit: Visit date not found         Next appt within 3 mo: Visit date not found  Next physical within 3 mo: Visit date not  found  Prescriber OR PCP: Collin Singh MD  Last diagnosis associated with med order: 1. Essential hypertension  - lisinopril (PRINIVIL,ZESTRIL) 5 MG tablet [Pharmacy Med Name: LISINOPRIL 5 MG TABLET]; TAKE 1 TABLET BY MOUTH EVERY DAY  Dispense: 90 tablet; Refill: 0    2. Type 2 diabetes mellitus without complication, without long-term current use of insulin (H)  - metFORMIN (GLUCOPHAGE) 500 MG tablet [Pharmacy Med Name: METFORMIN  MG TABLET]; TAKE 1 TABLET BY MOUTH EACH MORNING AND 2 TABLETS EACH EVENING  Dispense: 270 tablet; Refill: 1     If protocol passes may refill for 12 months if within 3 months of last provider visit (or a total of 15 months).           Passed - A1C in last 6 months     Hemoglobin A1c   Date Value Ref Range Status   02/05/2019 8.2 (H) 3.5 - 6.0 % Final

## 2021-05-31 NOTE — TELEPHONE ENCOUNTER
ANTICOAGULATION  MANAGEMENT: Discharge Continuity of Care Review    Emergency room visit on  8/22 for cellulitis.    Discharge disposition: Home    INR Results:       Recent labs: (last 7 days)     08/19/19  1656   INR 2.70*       Warfarin inpatient management: not applicable    Warfarin discharge instructions: home regimen continued     Medication Changes Affecting Anticoagulation: Yes: Bactrim for 7 days    Additional Factors Affecting Anticoagulation: No    Plan     Recommend INR check 8/26 or 8/27 due to antibiotic start    Spoke with Stefan    Anticoagulation calendar updated    Angela Montes, MARILU

## 2021-06-01 ENCOUNTER — RECORDS - HEALTHEAST (OUTPATIENT)
Dept: ADMINISTRATIVE | Facility: CLINIC | Age: 76
End: 2021-06-01

## 2021-06-01 VITALS — HEIGHT: 70 IN | WEIGHT: 260 LBS | BODY MASS INDEX: 37.22 KG/M2

## 2021-06-01 NOTE — PROGRESS NOTES
Assessment: /    Plan:    1. Routine general medical examination at a health care facility     2. Need for vaccination  Influenza High Dose, Seasonal 65+ yrs    Td, Preservative Free (green label)   3. Atrial fibrillation, unspecified type (H)  INR       Continue current medications.    Recheck diabetes in 3 months.      Subjective:    HPI:  Stefan Diallo is a 74-year-old male presenting for a physical.    He has diabetes, taking metformin.  A1c was 7.6 in August.    He has atrial fibrillation, taking warfarin.    I reviewed his health risk assessment.      Review of Systems: No chest pain, cough, reflux, melena, hematochezia, dysuria.      Current Outpatient Medications   Medication Sig Dispense Refill     atenolol (TENORMIN) 50 MG tablet Take 0.5 tablets (25 mg total) by mouth daily.       blood glucose test strips Use 1 each As Directed daily before breakfast. 100 strip 3     blood-glucose meter (CONTOUR NEXT METER) Misc Use once daily. 1 each 0     blood-glucose meter Misc Use 1 Device As Directed daily before breakfast. 1 each 0     cholecalciferol, vitamin D3, 1,000 unit tablet Take 1,000 Units by mouth daily.              CONTOUR NEXT TEST STRIPS strips USE TO TEST ONCE DAILY 100 strip 5     diltiazem (CARDIZEM CD) 120 MG 24 hr capsule TAKE 1 CAPSULE BY MOUTH EVERY DAY 90 capsule 3     EPINEPHrine (EPIPEN 2-BRI) 0.3 mg/0.3 mL atIn Inject 0.3 mL (0.3 mg total) into the shoulder, thigh, or buttocks once as needed (allergic reaction). 2 Pre-filled Pen Syringe 1     FLUZONE HIGH-DOSE 2018-19, PF, 180 mcg/0.5 mL Syrg injection TO BE ADMINISTERED BY PHARMACIST FOR IMMUNIZATION  0     gabapentin (NEURONTIN) 300 MG capsule TAKE 1 CAPSULE BY MOUTH TWICE A  capsule 3     gemfibrozil (LOPID) 600 MG tablet Take 1 tablet (600 mg total) by mouth 2 (two) times a day. 180 tablet 3     generic lancets (ACCU-CHEK SOFTCLIX LANCETS) Use 1 application As Directed daily before breakfast. 100 each 3      HYDROcodone-acetaminophen (NORCO )  mg per tablet Take 1 tablet by mouth every 6 (six) hours as needed. 10 tablet 0     lisinopril (PRINIVIL,ZESTRIL) 5 MG tablet TAKE 1 TABLET BY MOUTH EVERY DAY 90 tablet 2     metFORMIN (GLUCOPHAGE) 500 MG tablet TAKE 1 TABLET BY MOUTH EACH MORNING AND 2 TABLETS EACH EVENING 270 tablet 1     miscellaneous medical supply Misc Bipap w heated humidifier, tubing & chamber all x1, FFM w cushion x 1, headgear x 1, filers: disposable and resuable x 1pk both       omeprazole (PRILOSEC) 20 MG capsule TAKE 1 CAPSULE (20 MG TOTAL) BY MOUTH DAILY. 90 capsule 3     polyethylene glycol (MIRALAX) 17 gram packet Take 1 packet (17 g total) by mouth daily as needed.  0     ranitidine (ZANTAC) 150 MG tablet Take 1 tablet (150 mg total) by mouth 2 (two) times a day. 60 tablet 5     REDNESS RELIEVER EYE DROPS 0.05 % ophthalmic solution        vitamin A-vitamin C-vit E-min (OCUVITE) Tab tablet Take 1 tablet by mouth 2 (two) times a day.              warfarin (COUMADIN/JANTOVEN) 5 MG tablet Take 1 to 1.5 tablets (5 to 7.5 mg) by mouth daily. Adjust dose based on INR results as directed. 90 tablet 1     atorvastatin (LIPITOR) 20 MG tablet Take 1 tablet (20 mg total) by mouth daily. 90 tablet 3     No current facility-administered medications for this visit.          Objective:    Vitals:    09/06/19 1506   BP: 122/76   Pulse: 79   Resp: 20   Temp: 98.4  F (36.9  C)   SpO2: 95%       Gen:  NAD, VSS  Eyes: EOM full, pupils normal, conjunctivae normal  Ears: TM's and canals normal  Oropharynx: Dentures  Neck: supple without adenopathy or thyromegaly  Lungs: normal  Heart: regular rhythm, normal rate, no murmur  Abdomen: no HSM, mass or tenderness  Extremities: FROM, normal strength and sensation          ADDITIONAL HISTORY SUMMARIZED (2): None.  DECISION TO OBTAIN EXTRA INFORMATION (1): None.   RADIOLOGY TESTS (1): None.  LABS (1): None.  MEDICINE TESTS (1): None.  INDEPENDENT REVIEW (2 each):  None.     Total Data Points: 0

## 2021-06-01 NOTE — TELEPHONE ENCOUNTER
ANTICOAGULATION  MANAGEMENT    Assessment     Today's INR result of 2.7 is Therapeutic (goal INR of 2.0-3.0)        Warfarin taken as previously instructed    No new diet changes affecting INR    No new medication/supplements affecting INR    Continues to tolerate warfarin with no reported s/s of bleeding or thromboembolism     Previous INR was Therapeutic    Plan:     Spoke with Stefan regarding INR result and instructed:     Warfarin Dosing Instructions:  Continue current warfarin dose 7.5 mg daily on Sun; and 5 mg daily rest of week  (0 % change)    Instructed patient to follow up no later than: 4 weeks    Education provided: target INR goal and significance of current INR result, importance of notifying clinic for changes in medications and importance of notifying clinic for diarrhea, nausea/vomiting, reduced intake and/or illness    Stefan verbalizes understanding and agrees to warfarin dosing plan.    Instructed to call the [facility] for any changes, questions or concerns. (#[phone])   ?   Angela Montes RN    Subjective/Objective:      Stefan Diallo, a 74 y.o. male is on warfarin.     Stefan reports:     Home warfarin dose: verbally confirmed home dose with Stefan and updated on anticoagulation calendar     Missed doses: No     Medication changes:  No     S/S of bleeding or thromboembolism:  No     New Injury or illness:  No     Changes in diet or alcohol consumption:  No     Upcoming surgery, procedure or cardioversion:  No    Anticoagulation Episode Summary     Current INR goal:   2.0-3.0   TTR:   88.6 %   Next INR check:   10/4/2019   INR from last check:   2.70 (9/6/2019)   Weekly max warfarin dose:      Target end date:      INR check location:      Preferred lab:      Send INR reminders to:   HILARIO HECTOR    Indications    Atrial fibrillation  unspecified type (H) [I48.91]           Comments:            Anticoagulation Care Providers     Provider Role Specialty Phone number    Collin Singh,  MD [facility] [phone]

## 2021-06-01 NOTE — TELEPHONE ENCOUNTER
Refill Approved    Rx renewed per Medication Renewal Policy. Medication was last renewed on 12/12/2018.    Roman Thrasher, [facility] Triage/Med Refill 9/10/2019     Requested Prescriptions   Pending Prescriptions Disp Refills     atorvastatin (LIPITOR) 20 MG tablet [Pharmacy Med Name: ATORVASTATIN 20 MG TABLET] 90 tablet 2     Sig: TAKE 1 TABLET BY MOUTH EVERY DAY       Statins Refill Protocol (Hmg CoA Reductase Inhibitors) Passed - 9/10/2019  1:47 AM        Passed - PCP or prescribing provider visit in past 12 months      Last office visit with prescriber/PCP: 8/19/2019 Collin Singh MD OR same dept: 8/19/2019 Collin Singh MD OR same specialty: 8/19/2019 Collin Singh MD  Last physical: 9/6/2019 Last MTM visit: Visit date not found   Next visit within 3 mo: Visit date not found  Next physical within 3 mo: Visit date not found  Prescriber OR PCP: Collin Singh MD  Last diagnosis associated with med order: 1. Pure hypercholesterolemia  - atorvastatin (LIPITOR) 20 MG tablet [Pharmacy Med Name: ATORVASTATIN 20 MG TABLET]; TAKE 1 TABLET BY MOUTH EVERY DAY  Dispense: 90 tablet; Refill: 2    If protocol passes may refill for 12 months if within 3 months of last provider visit (or a total of 15 months).

## 2021-06-02 ENCOUNTER — RECORDS - HEALTHEAST (OUTPATIENT)
Dept: ADMINISTRATIVE | Facility: CLINIC | Age: 76
End: 2021-06-02

## 2021-06-02 VITALS — HEIGHT: 70 IN | BODY MASS INDEX: 38.22 KG/M2 | WEIGHT: 267 LBS

## 2021-06-02 VITALS — BODY MASS INDEX: 38.08 KG/M2 | HEIGHT: 70 IN | WEIGHT: 266 LBS

## 2021-06-02 VITALS — HEIGHT: 70 IN | BODY MASS INDEX: 37.31 KG/M2 | WEIGHT: 260.6 LBS

## 2021-06-02 VITALS — HEIGHT: 70 IN | BODY MASS INDEX: 37.47 KG/M2 | WEIGHT: 261.75 LBS

## 2021-06-02 VITALS — BODY MASS INDEX: 37.51 KG/M2 | HEIGHT: 70 IN | WEIGHT: 262 LBS

## 2021-06-02 VITALS — HEIGHT: 70 IN | BODY MASS INDEX: 37.69 KG/M2 | WEIGHT: 263.3 LBS

## 2021-06-02 VITALS — WEIGHT: 262 LBS | HEIGHT: 70 IN | BODY MASS INDEX: 37.51 KG/M2

## 2021-06-02 VITALS — WEIGHT: 262.25 LBS | HEIGHT: 70 IN | BODY MASS INDEX: 37.54 KG/M2

## 2021-06-02 NOTE — TELEPHONE ENCOUNTER
ANTICOAGULATION  MANAGEMENT    Assessment     Today's INR result of 3.0 is high Therapeutic (goal INR of 2.0-3.0)        Warfarin taken as previously instructed    No new diet changes affecting INR    Interaction between recent bactrim and warfarin may be affecting INR; this finished 2 days ago (7 day course)    Cellulitis has improved following abx    Continues to tolerate warfarin with no reported s/s of bleeding or thromboembolism     Previous INR was Therapeutic    Plan:     Spoke with Ascencion regarding INR result and instructed:     Warfarin Dosing Instructions:  Continue current warfarin dose 7.5 mg daily on Sun; and 5 mg daily rest of week      Instructed patient to follow up no later than: 2-3 weeks    Education provided: importance of therapeutic range, target INR goal and significance of current INR result, importance of following up for INR monitoring at instructed interval, potential interaction between warfarin and bactrim, monitoring for bleeding signs and symptoms, monitoring for clotting signs and symptoms and when to seek medical attention/emergency care    Ascencion verbalizes understanding and agrees to warfarin dosing plan.    Instructed to call the [facility] for any changes, questions or concerns. (#[phone])   ?   Rossi Spence RN    Subjective/Objective:      Ascencion Diallo, a 74 y.o. male is on warfarin.     Ascencion reports:     Home warfarin dose: template incorrect; verbally confirmed home dose with Ascencion and updated on anticoagulation calendar     Missed doses: No     Medication changes:  Yes: just completed one week of bactrim for cellulitis which has improved     S/S of bleeding or thromboembolism:  No     New Injury or illness:  No     Changes in diet or alcohol consumption:  No     Upcoming surgery, procedure or cardioversion:  No    Anticoagulation Episode Summary     Current INR goal:   2.0-3.0   TTR:   92.2 %   Next INR check:   11/22/2019   INR from last check:   3.00 (11/1/2019)    Weekly max warfarin dose:      Target end date:      INR check location:      Preferred lab:      Send INR reminders to:   HILARIO HECTOR    Indications    Atrial fibrillation  unspecified type (H) [I48.91]           Comments:            Anticoagulation Care Providers     Provider Role Specialty Phone number    Collin Singh MD Referring Family Medicine [phone]

## 2021-06-02 NOTE — TELEPHONE ENCOUNTER
"Pt reports history of cellulitis of L lower leg.  Had OV for this issue 8/19/19 as well as f/u ED eval including additional antibiotics.    Now reports L lower leg redness, swelling and pain \"again\".  Does not know time of onset.  Pt reports chronic neuropathy, therefore pt states \"I don't always feel the infection coming on right away, until it gets bad.\"  Affected area includes \"all around the ankle, extending long term up to the knee.\"  No drainage at this time.  No fevers.  Pt feels well otherwise.    Pt has clinic appt tomorrow with PCP.  Advised pt that more immediate eval would be preferable, however pt refuses.  Wishes to see only Dr Singh.  Appt therefore is set; nevertheless instructed pt that if any new onset of fever or leg drainage overnight, to seek ED eval.  Pt verbalizes understanding.  Agrees to plan.    June Patel RN BSBA  [facility] RN Triage     Reason for Disposition    Thigh, calf, or ankle swelling in only one leg    Protocols used: LEG SWELLING AND EDEMA-A-OH      "

## 2021-06-02 NOTE — TELEPHONE ENCOUNTER
ANTICOAGULATION  MANAGEMENT PROGRAM    Stefan Diallo is overdue for INR check.  Reminder call made.    Spoke with Stefan and scheduled INR appointment on 11/1 .    Chelo Spaulding RN

## 2021-06-02 NOTE — TELEPHONE ENCOUNTER
ANTICOAGULATION  MANAGEMENT    Assessment     Today's INR result of 2.4 is Therapeutic (goal INR of 2.0-3.0)        Warfarin taken as previously instructed    No new diet changes affecting INR    No new medication/supplements affecting INR    Continues to tolerate warfarin with no reported s/s of bleeding or thromboembolism     Previous INR was Therapeutic    Plan:     Left a detailed message for Stefan regarding INR result and instructed:     Warfarin Dosing Instructions:  Continue current warfarin dose 7.5 mg daily on Sun; and 5 mg daily rest of week  (0 % change)    Instructed patient to follow up no later than: 4 weeks.    Education provided: importance of taking warfarin as instructed      Instructed to call the [facility] for any changes, questions or concerns. (#[phone])   ?   Torin Tobar RN    Subjective/Objective:      Stefan Diallo, a 74 y.o. male is on warfarin.     Stefan reports:     Home warfarin dose: as updated on anticoagulation calendar per template     Missed doses: No     Medication changes:  No     S/S of bleeding or thromboembolism:  No     New Injury or illness:  No     Changes in diet or alcohol consumption:  No     Upcoming surgery, procedure or cardioversion:  No    Anticoagulation Episode Summary     Current INR goal:   2.0-3.0   TTR:   91.4 %   Next INR check:   11/13/2019   INR from last check:   2.40 (10/16/2019)   Weekly max warfarin dose:      Target end date:      INR check location:      Preferred lab:      Send INR reminders to:   HILARIO HECTOR    Indications    Atrial fibrillation  unspecified type (H) [I48.91]           Comments:            Anticoagulation Care Providers     Provider Role Specialty Phone number    Collin Singh MD Referring Family Medicine [phone]

## 2021-06-02 NOTE — PROGRESS NOTES
Assessment: /    Plan:    1. Cellulitis of left lower extremity  sulfamethoxazole-trimethoprim (SEPTRA DS) 800-160 mg per tablet   2. Hereditary and idiopathic peripheral neuropathy  gabapentin (NEURONTIN) 300 MG capsule       He will apply bacitracin to the wound daily.  Septra.  Recheck if any problem.  He will consider podiatry appointment regarding the foot cyst after cellulitis has cleared up.      Subjective:    HPI:  Stefan Diallo is a 74-year-old male presenting with infection on the left lower leg.  This began 10 days ago.  He has been keeping it covered by a Band-Aid.  He previously had cellulitis which did not respond to cephalexin, but then was cleared with Septra.    He is considering lumbar fusion.    Social Hx: His close friend of 50 years passed away, and patient has been checking on the friend's , who is 90 years old and has severe macular degeneration.  Stefan makes sure that her sidewalk is cleared in the winter.  He will check with one of the 's neighbors, to see if they could take care of snow removal.  If so he might be able to do lumbar surgery in the late fall or early winter.  Otherwise he would wait until spring.    Review of Systems: No fever or cough.  He has a small cyst on the left foot.      Current Outpatient Medications   Medication Sig Dispense Refill     atenolol (TENORMIN) 50 MG tablet Take 0.5 tablets (25 mg total) by mouth daily.       atenolol (TENORMIN) 50 MG tablet Take 0.5 tablets (25 mg total) by mouth daily. 45 tablet 3     atorvastatin (LIPITOR) 20 MG tablet Take 1 tablet (20 mg total) by mouth daily. 90 tablet 3     blood glucose test strips Use 1 each As Directed daily before breakfast. 100 strip 3     blood-glucose meter (CONTOUR NEXT METER) Misc Use once daily. 1 each 0     blood-glucose meter Misc Use 1 Device As Directed daily before breakfast. 1 each 0     cholecalciferol, vitamin D3, 1,000 unit tablet Take 1,000 Units by mouth daily.              CONTOUR  NEXT TEST STRIPS strips USE TO TEST ONCE DAILY 100 strip 5     diltiazem (CARDIZEM CD) 120 MG 24 hr capsule TAKE 1 CAPSULE BY MOUTH EVERY DAY 90 capsule 3     EPINEPHrine (EPIPEN 2-BRI) 0.3 mg/0.3 mL atIn Inject 0.3 mL (0.3 mg total) into the shoulder, thigh, or buttocks once as needed (allergic reaction). 2 Pre-filled Pen Syringe 1     FLUZONE HIGH-DOSE 2018-19, PF, 180 mcg/0.5 mL Syrg injection TO BE ADMINISTERED BY PHARMACIST FOR IMMUNIZATION  0     gabapentin (NEURONTIN) 300 MG capsule Take 2 capsules (600 mg total) by mouth at bedtime. 180 capsule 3     gemfibrozil (LOPID) 600 MG tablet Take 1 tablet (600 mg total) by mouth 2 (two) times a day. 180 tablet 3     generic lancets (ACCU-CHEK SOFTCLIX LANCETS) Use 1 application As Directed daily before breakfast. 100 each 3     HYDROcodone-acetaminophen (NORCO )  mg per tablet Take 1 tablet by mouth every 6 (six) hours as needed. 10 tablet 0     lisinopril (PRINIVIL,ZESTRIL) 5 MG tablet TAKE 1 TABLET BY MOUTH EVERY DAY 90 tablet 2     metFORMIN (GLUCOPHAGE) 500 MG tablet TAKE 1 TABLET BY MOUTH EACH MORNING AND 2 TABLETS EACH EVENING 270 tablet 1     miscellaneous medical supply Misc Bipap w heated humidifier, tubing & chamber all x1, FFM w cushion x 1, headgear x 1, filers: disposable and resuable x 1pk both       omeprazole (PRILOSEC) 20 MG capsule TAKE 1 CAPSULE (20 MG TOTAL) BY MOUTH DAILY. 90 capsule 3     polyethylene glycol (MIRALAX) 17 gram packet Take 1 packet (17 g total) by mouth daily as needed.  0     ranitidine (ZANTAC) 150 MG tablet Take 1 tablet (150 mg total) by mouth 2 (two) times a day. 60 tablet 5     REDNESS RELIEVER EYE DROPS 0.05 % ophthalmic solution        vitamin A-vitamin C-vit E-min (OCUVITE) Tab tablet Take 1 tablet by mouth 2 (two) times a day.              warfarin (COUMADIN/JANTOVEN) 5 MG tablet Take 1 to 1.5 tablets (5 to 7.5 mg) by mouth daily. Adjust dose based on INR results as directed. 90 tablet 1     warfarin  (COUMADIN/JANTOVEN) 5 MG tablet Take 1 to 1.5 tablets (5 to 7.5 mg) by mouth daily. Adjust dose per INR results as instructed. 90 tablet 1     sulfamethoxazole-trimethoprim (SEPTRA DS) 800-160 mg per tablet Take 1 tablet by mouth 2 (two) times a day for 7 days. 14 tablet 0     No current facility-administered medications for this visit.          Objective:    Vitals:    10/23/19 1055   BP: 128/84   Pulse: 80   Resp: 20   Temp: 98.1  F (36.7  C)   SpO2: 96%       Gen:  NAD, VSS  Lungs:  normal  Heart:  normal  Left lower leg with 1.5 cm open area on the anterior tibia.  Slight surrounding erythema.  Left foot with a 5 mm soft cyst on the dorsum of the foot.      ADDITIONAL HISTORY SUMMARIZED (2): None.  DECISION TO OBTAIN EXTRA INFORMATION (1): None.   RADIOLOGY TESTS (1): None.  LABS (1): None.  MEDICINE TESTS (1): None.  INDEPENDENT REVIEW (2 each): None.     Total Data Points: 0

## 2021-06-02 NOTE — TELEPHONE ENCOUNTER
RN cannot approve Refill Request    RN can NOT refill this medication refill rx sig does not match med list.       Lula Karimi, [facility] Triage/Med Refill 10/11/2019    Requested Prescriptions   Pending Prescriptions Disp Refills     atenolol (TENORMIN) 50 MG tablet [Pharmacy Med Name: ATENOLOL 50 MG TABLET] 90 tablet 1     Sig: TAKE 1 TABLET BY MOUTH EVERY DAY       Beta-Blockers Refill Protocol Passed - 10/10/2019 10:00 AM        Passed - PCP or prescribing provider visit in past 12 months or next 3 months     Last office visit with prescriber/PCP: 8/19/2019 Collin Singh MD OR same dept: 8/19/2019 Collin Singh MD OR same specialty: 8/19/2019 Collin Singh MD  Last physical: 9/6/2019 Last MTM visit: Visit date not found   Next visit within 3 mo: Visit date not found  Next physical within 3 mo: Visit date not found  Prescriber OR PCP: Collin Singh MD  Last diagnosis associated with med order: 1. Atrial fibrillation (H)  - atenolol (TENORMIN) 50 MG tablet [Pharmacy Med Name: ATENOLOL 50 MG TABLET]; TAKE 1 TABLET BY MOUTH EVERY DAY  Dispense: 90 tablet; Refill: 1    If protocol passes may refill for 12 months if within 3 months of last provider visit (or a total of 15 months).             Passed - Blood pressure filed in past 12 months     BP Readings from Last 1 Encounters:   09/06/19 122/76

## 2021-06-03 ENCOUNTER — COMMUNICATION - HEALTHEAST (OUTPATIENT)
Dept: ANTICOAGULATION | Facility: CLINIC | Age: 76
End: 2021-06-03

## 2021-06-03 ENCOUNTER — AMBULATORY - HEALTHEAST (OUTPATIENT)
Dept: LAB | Facility: CLINIC | Age: 76
End: 2021-06-03

## 2021-06-03 VITALS
WEIGHT: 255.5 LBS | DIASTOLIC BLOOD PRESSURE: 76 MMHG | HEIGHT: 71 IN | SYSTOLIC BLOOD PRESSURE: 122 MMHG | TEMPERATURE: 98.4 F | HEART RATE: 79 BPM | OXYGEN SATURATION: 95 % | BODY MASS INDEX: 35.77 KG/M2 | RESPIRATION RATE: 20 BRPM

## 2021-06-03 VITALS — WEIGHT: 255 LBS | BODY MASS INDEX: 35.87 KG/M2

## 2021-06-03 VITALS — BODY MASS INDEX: 35.6 KG/M2 | WEIGHT: 254.25 LBS | HEIGHT: 71 IN

## 2021-06-03 VITALS — BODY MASS INDEX: 35.36 KG/M2 | WEIGHT: 251.38 LBS

## 2021-06-03 VITALS — HEIGHT: 70 IN | WEIGHT: 252 LBS | BODY MASS INDEX: 36.08 KG/M2

## 2021-06-03 VITALS — WEIGHT: 259.8 LBS | BODY MASS INDEX: 37.28 KG/M2

## 2021-06-03 VITALS — BODY MASS INDEX: 35.56 KG/M2 | WEIGHT: 254 LBS | HEIGHT: 71 IN

## 2021-06-03 VITALS — WEIGHT: 259 LBS | HEIGHT: 72 IN | BODY MASS INDEX: 35.08 KG/M2

## 2021-06-03 VITALS — HEIGHT: 71 IN | WEIGHT: 254 LBS | BODY MASS INDEX: 35.56 KG/M2

## 2021-06-03 VITALS
DIASTOLIC BLOOD PRESSURE: 70 MMHG | HEIGHT: 71 IN | RESPIRATION RATE: 20 BRPM | WEIGHT: 256 LBS | HEART RATE: 80 BPM | TEMPERATURE: 97.8 F | SYSTOLIC BLOOD PRESSURE: 130 MMHG | BODY MASS INDEX: 35.84 KG/M2

## 2021-06-03 VITALS
HEART RATE: 80 BPM | TEMPERATURE: 98.1 F | OXYGEN SATURATION: 96 % | WEIGHT: 256.25 LBS | SYSTOLIC BLOOD PRESSURE: 128 MMHG | BODY MASS INDEX: 35.87 KG/M2 | HEIGHT: 71 IN | RESPIRATION RATE: 20 BRPM | DIASTOLIC BLOOD PRESSURE: 84 MMHG

## 2021-06-03 VITALS — BODY MASS INDEX: 35.08 KG/M2 | HEIGHT: 72 IN | WEIGHT: 259 LBS

## 2021-06-03 VITALS — BODY MASS INDEX: 35.73 KG/M2 | HEIGHT: 71 IN | WEIGHT: 255.25 LBS

## 2021-06-03 DIAGNOSIS — I48.20 CHRONIC ATRIAL FIBRILLATION (H): ICD-10-CM

## 2021-06-03 DIAGNOSIS — I48.91 ATRIAL FIBRILLATION, UNSPECIFIED TYPE (H): ICD-10-CM

## 2021-06-03 LAB — INR PPP: 2.5 (ref 0.9–1.1)

## 2021-06-03 NOTE — TELEPHONE ENCOUNTER
ANTICOAGULATION  MANAGEMENT PROGRAM    Stefan Diallo is overdue for INR check.     Spoke with Stefan who declined to schedule INR at this time. If calling back please schedule as soon as possible.      Torin Tobar RN

## 2021-06-03 NOTE — PROGRESS NOTES
FOOT AND ANKLE SURGERY/PODIATRY Progress Note        ASSESSMENT:   Ganglion cyst left foot    HPI: Stefan Diallo was seen again today complaining of a small lump on the top of his left foot.  The patient stated that he has noticed this for the past 4 months.  It is nontender.  He does not know if it is tender because he does have some neuropathy of his left lower extremity.  He has not noticed any rapid change in size or color.  At this point he is able to wear shoes without discomfort.  He does not recall trauma to the left foot.    Past Medical History:   Diagnosis Date     Atrial fibrillation (H)      Bacteremia      GERD (gastroesophageal reflux disease)      GI hemorrhage      High cholesterol      Hyperlipidemia      Hypertension      Renal artery aneurysm (H)      Sleep apnea, obstructive     USES BIPAP     Type 2 diabetes mellitus (H)      UTI (lower urinary tract infection)        Past Surgical History:   Procedure Laterality Date     FOOT AMPUTATION Right 5/6/2019    Procedure: AMPUTATION, 3rd TOE RIGHT FOOT;  Surgeon: Ravi Abbasi DPM;  Location: [facility];  Service: Podiatry     [place] REPAIR COMPL ROTATOR CUFF AVULSN,CHR      Description: Chronic Rotator Cuff Avulsion;  Recorded: 04/11/2011;     TENOTOMY ACHILLES TENDON       TRANSURETHRAL RESECTION OF PROSTATE         No Known Allergies      Current Outpatient Medications:      atenolol (TENORMIN) 50 MG tablet, Take 0.5 tablets (25 mg total) by mouth daily., Disp: , Rfl:      atorvastatin (LIPITOR) 20 MG tablet, Take 1 tablet (20 mg total) by mouth daily., Disp: 90 tablet, Rfl: 3     blood glucose test strips, Use 1 each As Directed daily before breakfast., Disp: 100 strip, Rfl: 3     blood-glucose meter (CONTOUR NEXT METER) Misc, Use once daily., Disp: 1 each, Rfl: 0     blood-glucose meter Misc, Use 1 Device As Directed daily before breakfast., Disp: 1 each, Rfl: 0     calcium citrate (CALCITRATE) 200 mg (950 mg) tablet, Take 1 tablet by  oral route 2 times every day., Disp: , Rfl:      cholecalciferol, vitamin D3, 1,000 unit tablet, Take 1,000 Units by mouth daily.    , Disp: , Rfl:      CONTOUR NEXT TEST STRIPS strips, USE TO TEST ONCE DAILY, Disp: 100 strip, Rfl: 5     diltiazem (CARDIZEM CD) 120 MG 24 hr capsule, TAKE 1 CAPSULE BY MOUTH EVERY DAY, Disp: 90 capsule, Rfl: 3     docusate sodium (COLACE) 100 MG capsule, Take 1 by oral route 2 times every day as needed., Disp: , Rfl:      dutasteride (AVODART) 0.5 mg capsule, Take by mouth., Disp: , Rfl:      EPINEPHrine (EPIPEN 2-BRI) 0.3 mg/0.3 mL atIn, Inject 0.3 mL (0.3 mg total) into the shoulder, thigh, or buttocks once as needed (allergic reaction)., Disp: 2 Pre-filled Pen Syringe, Rfl: 1     ergocalciferol (ERGOCALCIFEROL) 50,000 unit capsule, Take 1 capsule by oral route 1 time every week., Disp: , Rfl:      FLUZONE HIGH-DOSE 2018-19, PF, 180 mcg/0.5 mL Syrg injection, TO BE ADMINISTERED BY PHARMACIST FOR IMMUNIZATION, Disp: , Rfl: 0     gabapentin (NEURONTIN) 300 MG capsule, Take 2 capsules (600 mg total) by mouth at bedtime., Disp: 180 capsule, Rfl: 3     gemfibrozil (LOPID) 600 MG tablet, Take 1 tablet (600 mg total) by mouth 2 (two) times a day., Disp: 180 tablet, Rfl: 3     generic lancets (ACCU-CHEK SOFTCLIX LANCETS), Use 1 application As Directed daily before breakfast., Disp: 100 each, Rfl: 3     HYDROcodone-acetaminophen (NORCO )  mg per tablet, Take 1 tablet by mouth every 6 (six) hours as needed., Disp: 10 tablet, Rfl: 0     lisinopril (PRINIVIL,ZESTRIL) 5 MG tablet, TAKE 1 TABLET BY MOUTH EVERY DAY, Disp: 90 tablet, Rfl: 2     metFORMIN (GLUCOPHAGE) 500 MG tablet, TAKE 1 TABLET BY MOUTH EACH MORNING AND 2 TABLETS EACH EVENING, Disp: 270 tablet, Rfl: 1     miscellaneous medical supply Misc, Bipap w heated humidifier, tubing & chamber all x1, FFM w cushion x 1, headgear x 1, filers: disposable and resuable x 1pk both, Disp: , Rfl:      omeprazole (PRILOSEC) 20 MG  capsule, TAKE 1 CAPSULE (20 MG TOTAL) BY MOUTH DAILY., Disp: 90 capsule, Rfl: 3     polyethylene glycol (MIRALAX) 17 gram packet, Take 1 packet (17 g total) by mouth daily as needed., Disp: , Rfl: 0     ranitidine (ZANTAC) 150 MG tablet, Take 1 tablet (150 mg total) by mouth 2 (two) times a day., Disp: 60 tablet, Rfl: 5     REDNESS RELIEVER EYE DROPS 0.05 % ophthalmic solution, , Disp: , Rfl:      vitamin A-vitamin C-vit E-min (OCUVITE) Tab tablet, Take 1 tablet by mouth 2 (two) times a day.    , Disp: , Rfl:      warfarin (COUMADIN/JANTOVEN) 5 MG tablet, Take 1 to 1.5 tablets (5 to 7.5 mg) by mouth daily. Adjust dose based on INR results as directed., Disp: 90 tablet, Rfl: 1     warfarin (COUMADIN/JANTOVEN) 5 MG tablet, Take 1 to 1.5 tablets (5 to 7.5 mg) by mouth daily. Adjust dose per INR results as instructed., Disp: 90 tablet, Rfl: 1    Family History   Problem Relation Age of Onset     Coronary artery disease Mother      Coronary artery disease Father      Atrial fibrillation Father        Social History     Socioeconomic History     Marital status:      Spouse name: Not on file     Number of children: 3     Years of education: Not on file     Highest education level: Not on file   Occupational History     Occupation:      Employer: [employer]   Social Needs     Financial resource strain: Not on file     Food insecurity:     Worry: Not on file     Inability: Not on file     Transportation needs:     Medical: Not on file     Non-medical: Not on file   Tobacco Use     Smoking status: Former Smoker     Packs/day: 2.00     Years: 27.00     Pack years: 54.00     Types: Cigarettes     Last attempt to quit: 1990     Years since quittin.8     Smokeless tobacco: Never Used   Substance and Sexual Activity     Alcohol use: No     Frequency: Never     Drug use: No     Sexual activity: Not Currently   Lifestyle     Physical activity:     Days per week: Not on file     Minutes per session: Not on file      Stress: Not on file   Relationships     Social connections:     Talks on phone: Not on file     Gets together: Not on file     Attends [employer] service: Not on file     Active member of club or organization: Not on file     Attends meetings of clubs or organizations: Not on file     Relationship status: Not on file     Intimate partner violence:     Fear of current or ex partner: Not on file     Emotionally abused: Not on file     Physically abused: Not on file     Forced sexual activity: Not on file   Other Topics Concern     Not on file   Social History Narrative     Not on file       10 point Review of Systems is negative       Vitals:    11/05/19 1404   BP: 130/70   Pulse: 80   Resp: 20   Temp: 97.8  F (36.6  C)       BMI= Body mass index is 35.7 kg/m .    OBJECTIVE:  General appearance: Patient is alert and fully cooperative with history & exam.  No sign of distress is noted during the visit.  Vascular: Dorsalis pedis and posterior tibial pulses are palpable. There is no pedal hair growth bilaterally.  CFT < 3 sec from anterior tibial surface to distal digits bilaterally. There is no appreciable edema noted.  Dermatologic: There is a small raised firm palpable freely movable mass on the dorsal aspect of the left foot near the shaft of the first and second metatarsal.  Turgor and texture are within normal limits. No coloration or temperature changes. No primary or secondary lesions noted.  Neurologic: All epicritic and proprioceptive sensations are grossly intact right lower extremity but diminished left lower extremity.  Musculoskeletal: All active and passive ankle, subtalar, midtarsal, and 1st MPJ range of motion are grossly intact without pain or crepitus, with the exception of none. Manual muscle strength is within normal limits bilaterally. All dorsiflexors, plantarflexors, invertors, evertors are intact bilaterally.  No tenderness present to small mass dorsal aspect left foot on palpation.  No  tenderness to left foot or ankle with range of motion. Calf is soft/non-tender without warmth/induration    Imaging:     No results found.         TREATMENT:  I informed the patient that it appears to be a ganglion cyst which is fairly benign.  I told the patient he could opt to monitor this mass.  I recommended that if he notices any change in size, color or tenderness he should return to the clinic at which time I will recommend surgical excision of the cyst.        Ravi Abbasi; LIMA  [facility] Foot & Ankle Surgery/Podiatry

## 2021-06-04 VITALS
SYSTOLIC BLOOD PRESSURE: 164 MMHG | HEIGHT: 71 IN | WEIGHT: 262.7 LBS | HEART RATE: 78 BPM | OXYGEN SATURATION: 98 % | DIASTOLIC BLOOD PRESSURE: 88 MMHG | TEMPERATURE: 97.6 F | BODY MASS INDEX: 36.78 KG/M2

## 2021-06-04 VITALS — HEART RATE: 91 BPM | HEIGHT: 71 IN | OXYGEN SATURATION: 97 % | WEIGHT: 262 LBS | BODY MASS INDEX: 36.68 KG/M2

## 2021-06-04 VITALS
RESPIRATION RATE: 14 BRPM | BODY MASS INDEX: 36.68 KG/M2 | WEIGHT: 262 LBS | HEIGHT: 71 IN | HEART RATE: 81 BPM | SYSTOLIC BLOOD PRESSURE: 136 MMHG | DIASTOLIC BLOOD PRESSURE: 90 MMHG

## 2021-06-04 VITALS — WEIGHT: 252.7 LBS | HEIGHT: 73 IN | BODY MASS INDEX: 33.49 KG/M2

## 2021-06-04 VITALS
RESPIRATION RATE: 24 BRPM | TEMPERATURE: 98.4 F | HEART RATE: 88 BPM | BODY MASS INDEX: 32.76 KG/M2 | WEIGHT: 247.19 LBS | HEIGHT: 73 IN | SYSTOLIC BLOOD PRESSURE: 136 MMHG | DIASTOLIC BLOOD PRESSURE: 84 MMHG

## 2021-06-04 VITALS
HEART RATE: 80 BPM | BODY MASS INDEX: 36.68 KG/M2 | DIASTOLIC BLOOD PRESSURE: 82 MMHG | WEIGHT: 262 LBS | TEMPERATURE: 98.1 F | SYSTOLIC BLOOD PRESSURE: 124 MMHG | HEIGHT: 71 IN | OXYGEN SATURATION: 97 %

## 2021-06-04 NOTE — TELEPHONE ENCOUNTER
ANTICOAGULATION  MANAGEMENT    Assessment     Today's INR result of 2.0 is Therapeutic (goal INR of 2.0-3.0)        Warfarin taken as previously instructed    No new diet changes affecting INR    No new medication/supplements affecting INR    Continues to tolerate warfarin with no reported s/s of bleeding or thromboembolism     Previous INR was Therapeutic    Plan:     Spoke with Stefan regarding INR result and instructed:     Warfarin Dosing Instructions:  Continue current warfarin dose 7.5 mg daily on Sun; and 5 mg daily rest of week  (0 % change)    Instructed patient to follow up no later than: 4 weeks.    Education provided: importance of taking warfarin as instructed    Stefan verbalizes understanding and agrees to warfarin dosing plan.    Instructed to call the [facility] for any changes, questions or concerns. (#[phone])   ?   Torin Tobar RN    Subjective/Objective:      Stefan Diallo, a 74 y.o. male is on warfarin.     Stefan reports:     Home warfarin dose: template incorrect; verbally confirmed home dose with pt and updated on anticoagulation calendar     Missed doses: No     Medication changes:  No     S/S of bleeding or thromboembolism:  No     New Injury or illness:  No     Changes in diet or alcohol consumption:  No     Upcoming surgery, procedure or cardioversion:  No    Anticoagulation Episode Summary     Current INR goal:   2.0-3.0   TTR:   93.7 % (7.4 mo)   Next INR check:   1/6/2020   INR from last check:   2.00 (12/9/2019)   Weekly max warfarin dose:      Target end date:      INR check location:      Preferred lab:      Send INR reminders to:   HILARIO HECTOR    Indications    Atrial fibrillation  unspecified type (H) [I48.91]           Comments:            Anticoagulation Care Providers     Provider Role Specialty Phone number    Collin Singh MD Referring Family Medicine [phone]

## 2021-06-04 NOTE — TELEPHONE ENCOUNTER
Refill Approved    Rx renewed per Medication Renewal Policy. Medication was last renewed on 10.23.19.    Kimberly Henderson, [facility] Triage/Med Refill 12/9/2019     Requested Prescriptions   Pending Prescriptions Disp Refills     omeprazole (PRILOSEC) 20 MG capsule [Pharmacy Med Name: OMEPRAZOLE DR 20 MG CAPSULE] 90 capsule 3     Sig: TAKE 1 CAPSULE BY MOUTH EVERY DAY       GI Medications Refill Protocol Passed - 12/9/2019  2:56 AM        Passed - PCP or prescribing provider visit in last 12 or next 3 months.     Last office visit with prescriber/PCP: 10/23/2019 Collin Singh MD OR same dept: 10/23/2019 Collin Singh MD OR same specialty: 10/23/2019 Collin Singh MD  Last physical: 9/6/2019 Last MTM visit: Visit date not found   Next visit within 3 mo: Visit date not found  Next physical within 3 mo: Visit date not found  Prescriber OR PCP: Collin Singh MD  Last diagnosis associated with med order: 1. Gastroesophageal reflux disease with esophagitis  - omeprazole (PRILOSEC) 20 MG capsule [Pharmacy Med Name: OMEPRAZOLE DR 20 MG CAPSULE]; TAKE 1 CAPSULE BY MOUTH EVERY DAY  Dispense: 90 capsule; Refill: 3    If protocol passes may refill for 12 months if within 3 months of last provider visit (or a total of 15 months).

## 2021-06-05 VITALS
TEMPERATURE: 97.6 F | BODY MASS INDEX: 31.41 KG/M2 | SYSTOLIC BLOOD PRESSURE: 132 MMHG | RESPIRATION RATE: 16 BRPM | WEIGHT: 237 LBS | HEART RATE: 110 BPM | HEIGHT: 73 IN | OXYGEN SATURATION: 99 % | DIASTOLIC BLOOD PRESSURE: 80 MMHG

## 2021-06-05 VITALS
TEMPERATURE: 97.5 F | HEIGHT: 73 IN | HEART RATE: 80 BPM | DIASTOLIC BLOOD PRESSURE: 78 MMHG | BODY MASS INDEX: 32.68 KG/M2 | SYSTOLIC BLOOD PRESSURE: 124 MMHG | WEIGHT: 246.56 LBS | RESPIRATION RATE: 18 BRPM

## 2021-06-05 VITALS
DIASTOLIC BLOOD PRESSURE: 66 MMHG | OXYGEN SATURATION: 97 % | SYSTOLIC BLOOD PRESSURE: 116 MMHG | WEIGHT: 237.5 LBS | HEART RATE: 64 BPM | HEIGHT: 70 IN | BODY MASS INDEX: 34 KG/M2 | TEMPERATURE: 98.1 F

## 2021-06-05 VITALS
HEIGHT: 73 IN | RESPIRATION RATE: 20 BRPM | SYSTOLIC BLOOD PRESSURE: 128 MMHG | TEMPERATURE: 98.1 F | DIASTOLIC BLOOD PRESSURE: 70 MMHG | BODY MASS INDEX: 32.47 KG/M2 | WEIGHT: 245 LBS | HEART RATE: 77 BPM | OXYGEN SATURATION: 92 %

## 2021-06-05 NOTE — TELEPHONE ENCOUNTER
I received a form via fax, from [place].  I completed it, indicating OK to hold warfarin x 5 days.  No bridge needed.  It was faxed to [place] this afternoon.  andre

## 2021-06-05 NOTE — TELEPHONE ENCOUNTER
Is PCP ok with patient holding warfarin 5 days prior without bridge for upcoming spinal steroid injection (not yet scheduled)?

## 2021-06-05 NOTE — TELEPHONE ENCOUNTER
ANTICOAGULATION  MANAGEMENT    Assessment     Today's INR result of 2.5 is Therapeutic (goal INR of 2.0-3.0)        Warfarin taken as previously instructed    No new diet changes affecting INR    No new medication/supplements affecting INR    Continues to tolerate warfarin with no reported s/s of bleeding or thromboembolism     Previous INR was Therapeutic    Plan:     Spoke with Stefan regarding INR result and instructed:     Warfarin Dosing Instructions:  Continue current warfarin dose 7.5 mg daily on Sun; and 5 mg daily rest of week  (0 % change)    Instructed patient to follow up no later than: 4-6 weeks.    Education provided: importance of taking warfarin as instructed    Stefan verbalizes understanding and agrees to warfarin dosing plan.    Instructed to call the [facility] for any changes, questions or concerns. (#[phone])   ?   Torin Tobar RN    Subjective/Objective:      Stefan Diallo, a 74 y.o. male is on warfarin.     Stefan reports:     Home warfarin dose: template incorrect; verbally confirmed home dose with Stefan and updated on anticoagulation calendar     Missed doses: No     Medication changes:  No     S/S of bleeding or thromboembolism:  No     New Injury or illness:  No     Changes in diet or alcohol consumption:  No     Upcoming surgery, procedure or cardioversion:  No    Anticoagulation Episode Summary     Current INR goal:   2.0-3.0   TTR:   94.5 % (8.6 mo)   Next INR check:   2/25/2020   INR from last check:   2.50 (1/14/2020)   Weekly max warfarin dose:      Target end date:      INR check location:      Preferred lab:      Send INR reminders to:   HILARIO HECTOR    Indications    Atrial fibrillation  unspecified type (H) [I48.91]           Comments:            Anticoagulation Care Providers     Provider Role Specialty Phone number    Collin Singh MD Referring Family Medicine [phone]

## 2021-06-05 NOTE — TELEPHONE ENCOUNTER
ANTICOAGULATION  MANAGEMENT PROGRAM    Stefan Diallo is overdue for INR check.     Spoke with pt and scheduled INR appointment on 1/14.      Torin Tobar RN

## 2021-06-05 NOTE — TELEPHONE ENCOUNTER
RN cannot approve Refill Request    RN can NOT refill this medication Protocol failed and NO refill given.      uLla Karimi, Care Connection Triage/Med Refill 2/6/2020    Requested Prescriptions   Pending Prescriptions Disp Refills     gemfibrozil (LOPID) 600 MG tablet [Pharmacy Med Name: GEMFIBROZIL 600 MG TABLET] 180 tablet 3     Sig: TAKE 1 TABLET BY MOUTH TWICE A DAY       Gemfibrozil Refill Protocol Failed - 2/6/2020  5:01 AM        Failed - Fasting lipid cascade in last 12 months     Cholesterol   Date Value Ref Range Status   12/14/2012 183 <200 mg/dL Final     Triglycerides   Date Value Ref Range Status   12/14/2012 200 (H) <150 mg/dL Final     HDL Cholesterol   Date Value Ref Range Status   12/14/2012 40 >39 mg/dL Final     LDL Calculated   Date Value Ref Range Status   12/14/2012 103 <130 mg/dL Final             Failed - AST or ALT in last 12 months     AST   Date Value Ref Range Status   06/16/2017 30 0 - 40 U/L Final     ALT   Date Value Ref Range Status   06/16/2017 21 0 - 45 U/L Final               Passed - PCP or prescribing provider visit in last 12 months       Last office visit with prescriber/PCP: 10/23/2019 Collin Singh MD OR same dept: 10/23/2019 Collin Singh MD OR same specialty: 10/23/2019 Collin Singh MD  Last physical: 9/6/2019 Last MTM visit: Visit date not found   Next visit within 3 mo: Visit date not found  Next physical within 3 mo: Visit date not found  Prescriber OR PCP: Collin Singh MD  Last diagnosis associated with med order: There are no diagnoses linked to this encounter.  If protocol passes may refill for 12 months if within 3 months of last provider visit (or a total of 15 months).

## 2021-06-05 NOTE — TELEPHONE ENCOUNTER
ACN called and informed pt that PCP has completed and faxed the form for [facility] stating it's ok for patient to hold warfarin 5 days prior procedure without bridge. Pt verbalized understanding.

## 2021-06-05 NOTE — TELEPHONE ENCOUNTER
RN cannot approve Refill Request    RN can NOT refill this medication Protocol failed and NO refill given. Last office visit: 12/20/2018 Pratik Parham MD Last Physical: Visit date not found Last MTM visit: Visit date not found Last visit same specialty: 10/23/2019 Collin Singh MD.  Next visit within 3 mo: Visit date not found  Next physical within 3 mo: Visit date not found      Rhonda Ortiz, [facility] Connection Triage/Med Refill 2/8/2020    Requested Prescriptions   Pending Prescriptions Disp Refills     metFORMIN (GLUCOPHAGE) 500 MG tablet [Pharmacy Med Name: METFORMIN  MG TABLET] 270 tablet 1     Sig: TAKE 1 TABLET BY MOUTH EACH MORNING AND 2 TABLETS EACH EVENING       Metformin Refill Protocol Failed - 2/8/2020  9:42 AM        Failed - LFT or AST or ALT in last 12 months     Albumin   Date Value Ref Range Status   06/16/2017 3.7 3.5 - 5.0 g/dL Final     Bilirubin, Total   Date Value Ref Range Status   06/16/2017 0.3 0.0 - 1.0 mg/dL Final     Bilirubin, Direct   Date Value Ref Range Status   03/28/2011 0.2 <0.6 mg/dL Final     Alkaline Phosphatase   Date Value Ref Range Status   06/16/2017 77 45 - 120 U/L Final     AST   Date Value Ref Range Status   06/16/2017 30 0 - 40 U/L Final     ALT   Date Value Ref Range Status   06/16/2017 21 0 - 45 U/L Final     Protein, Total   Date Value Ref Range Status   06/16/2017 7.1 6.0 - 8.0 g/dL Final                Failed - Microalbumin in last year      Microalbumin, Random Urine   Date Value Ref Range Status   06/16/2017 2.36 (H) 0.00 - 1.99 mg/dL Final                  Passed - Blood pressure in last 12 months     BP Readings from Last 1 Encounters:   02/01/20 164/88             Passed - GFR or Serum Creatinine in last 6 months     GFR MDRD Non Af Amer   Date Value Ref Range Status   08/22/2019 >60 >60 mL/min/1.73m2 Final     GFR MDRD Af Amer   Date Value Ref Range Status   08/22/2019 >60 >60 mL/min/1.73m2 Final             Passed - Visit with PCP or prescribing  provider visit in last 6 months or next 3 months     Last office visit with prescriber/PCP: Visit date not found OR same dept: 10/23/2019 Collin Singh MD OR same specialty: 10/23/2019 Collin Singh MD Last physical: Visit date not found Last MTM visit: Visit date not found         Next appt within 3 mo: Visit date not found  Next physical within 3 mo: Visit date not found  Prescriber OR PCP: Pratik Parham MD  Last diagnosis associated with med order: 1. Type 2 diabetes mellitus without complication, without long-term current use of insulin (H)  - metFORMIN (GLUCOPHAGE) 500 MG tablet [Pharmacy Med Name: METFORMIN  MG TABLET]; TAKE 1 TABLET BY MOUTH EACH MORNING AND 2 TABLETS EACH EVENING  Dispense: 270 tablet; Refill: 1     If protocol passes may refill for 12 months if within 3 months of last provider visit (or a total of 15 months).           Passed - A1C in last 6 months     Hemoglobin A1c   Date Value Ref Range Status   08/19/2019 7.6 (H) 3.5 - 6.0 % Final

## 2021-06-05 NOTE — PROGRESS NOTES
Chief Complaint   Patient presents with     Nail Problem     pain 2nd toe both feet         HPI    Patient is here for a week of swelling and redness of left toes, and a few days of an open wound on right second toe. He has diabetes with neuropathy so he has no pain. No fever, chills.     ROS: Pertinent ROS noted in HPI.     No Known Allergies    Patient Active Problem List   Diagnosis     Benign Prostatic Hypertrophy     Renal Artery Aneurysm     Tinea Corporis     Shortness Of Breath     Chest Pain     Lower Back Pain     Vertigo     Idiopathic Peripheral Neuropathy     Myalgia And Myositis     Urinary Tract Infection     Pain During Urination (Dysuria)     Cough     Eye Pain     Eczematoid Dermatitis     Urticaria     Obesity     Essential Hypertension     Chronic atrial fibrillation     Adult Sleep Apnea     Pure hypercholesterolemia     Muscle Weakness     Dysphagia     Bell's palsy     Gastroesophageal reflux disease with esophagitis     Type 2 diabetes mellitus without complication, without long-term current use of insulin (H)     Obesity (BMI 35.0-39.9) with comorbidity (H)     Atrial fibrillation, unspecified type (H)     Cellulitis of foot     Osteomyelitis of toe (H)     Closed nondisplaced fracture of phalanx of toe of right foot, unspecified toe, initial encounter     Amputated toe of right foot (H)       Family History   Problem Relation Age of Onset     Coronary artery disease Mother      Coronary artery disease Father      Atrial fibrillation Father        Social History     Socioeconomic History     Marital status:      Spouse name: Not on file     Number of children: 3     Years of education: Not on file     Highest education level: Not on file   Occupational History     Occupation:      Employer: [employer]   Social Needs     Financial resource strain: Not on file     Food insecurity:     Worry: Not on file     Inability: Not on file     Transportation needs:     Medical: Not on file      Non-medical: Not on file   Tobacco Use     Smoking status: Former Smoker     Packs/day: 2.00     Years: 27.00     Pack years: 54.00     Types: Cigarettes     Last attempt to quit: 1990     Years since quittin.0     Smokeless tobacco: Never Used   Substance and Sexual Activity     Alcohol use: No     Frequency: Never     Drug use: No     Sexual activity: Not Currently   Lifestyle     Physical activity:     Days per week: Not on file     Minutes per session: Not on file     Stress: Not on file   Relationships     Social connections:     Talks on phone: Not on file     Gets together: Not on file     Attends Rastafarian service: Not on file     Active member of club or organization: Not on file     Attends meetings of clubs or organizations: Not on file     Relationship status: Not on file     Intimate partner violence:     Fear of current or ex partner: Not on file     Emotionally abused: Not on file     Physically abused: Not on file     Forced sexual activity: Not on file   Other Topics Concern     Not on file   Social History Narrative     Not on file         Objective:    Vitals:    20 0930   BP: 164/88   Pulse: 78   Temp: 97.6  F (36.4  C)   SpO2: 98%       Gen:NAD  CV: no M, R, G  Pulm: CTAB  MSK: moderate erythema of left distal fore foot and the 2nd to 5 toes with mild induration and warmth. Here is an open wound without erythema nor edema of right second toe. No purulence nor fluctuance. Patient had no sensation of his toes.         Cellulitis of left foot  -     cephalexin (KEFLEX) 500 MG capsule; Take 1 capsule (500 mg total) by mouth 3 (three) times a day for 10 days.  -     Ambulatory referral to Podiatry    Open wound of toe, initial encounter  - wound care and dressing done in clinic today.   -     Ambulatory referral to Podiatry

## 2021-06-05 NOTE — TELEPHONE ENCOUNTER
Who is calling: Patient  Reason for Call:  Patient states [facility] should have called for hold and bridge orders for Cortisone shot ( yet to be scheduled) Writer did not see any open encounters  In chart.patient states that Dr Whitman at [facility] recommends a 5 day hold, patient provided phone number for clinic in case ACN needs to reach out to them- phone number is [phone], patient will also call  [facility] to follow up. Please advise patient.  Date of last appointment with primary care: na  Okay to leave a detailed message: Yes

## 2021-06-06 NOTE — TELEPHONE ENCOUNTER
ANTICOAGULATION  MANAGEMENT PROGRAM    Stefan Diallo is overdue for INR check.     Spoke with Stefan who declined to schedule INR at this time. If calling back please schedule as soon as possible.      Chelo Spaulding RN

## 2021-06-06 NOTE — PROGRESS NOTES
FOOT AND ANKLE SURGERY/PODIATRY Progress Note        ASSESSMENT:   Superficial diabetic ulcer second toe both feet     HPI: Stefan Diallo was seen again today continued evaluation and treatment of small wounds on the tip of the second toe both feet.   The patient stated that he has noticed some improvement.  He has almost completed his full course of antibiotics.  He has been wearing his postop shoe as recommended.  Past Medical History:   Diagnosis Date     Atrial fibrillation (H)      Bacteremia      GERD (gastroesophageal reflux disease)      GI hemorrhage      High cholesterol      Hyperlipidemia      Hypertension      Renal artery aneurysm (H)      Sleep apnea, obstructive     USES BIPAP     Type 2 diabetes mellitus (H)      UTI (lower urinary tract infection)        Past Surgical History:   Procedure Laterality Date     FOOT AMPUTATION Right 5/6/2019    Procedure: AMPUTATION, 3rd TOE RIGHT FOOT;  Surgeon: Ravi Abbasi DPM;  Location: [facility];  Service: Podiatry     MS REPAIR COMPL ROTATOR CUFF AVULSN,CHR      Description: Chronic Rotator Cuff Avulsion;  Recorded: 04/11/2011;     TENOTOMY ACHILLES TENDON       TRANSURETHRAL RESECTION OF PROSTATE         No Known Allergies      Current Outpatient Medications:      atenolol (TENORMIN) 50 MG tablet, Take 0.5 tablets (25 mg total) by mouth daily., Disp: , Rfl:      atorvastatin (LIPITOR) 20 MG tablet, Take 1 tablet (20 mg total) by mouth daily., Disp: 90 tablet, Rfl: 3     blood glucose test strips, Use 1 each As Directed daily before breakfast., Disp: 100 strip, Rfl: 3     blood-glucose meter (CONTOUR NEXT METER) Misc, Use once daily., Disp: 1 each, Rfl: 0     blood-glucose meter Misc, Use 1 Device As Directed daily before breakfast., Disp: 1 each, Rfl: 0     calcium citrate (CALCITRATE) 200 mg (950 mg) tablet, Take 1 tablet by oral route 2 times every day., Disp: , Rfl:      cholecalciferol, vitamin D3, 1,000 unit tablet, Take 1,000 Units by  mouth daily.    , Disp: , Rfl:      clindamycin (CLEOCIN) 300 MG capsule, Take 1 capsule (300 mg total) by mouth 3 (three) times a day for 10 days., Disp: 30 capsule, Rfl: 0     CONTOUR NEXT TEST STRIPS strips, USE TO TEST ONCE DAILY, Disp: 100 strip, Rfl: 5     diltiazem (CARDIZEM CD) 120 MG 24 hr capsule, TAKE 1 CAPSULE BY MOUTH EVERY DAY, Disp: 90 capsule, Rfl: 3     docusate sodium (COLACE) 100 MG capsule, Take 1 by oral route 2 times every day as needed., Disp: , Rfl:      dutasteride (AVODART) 0.5 mg capsule, Take by mouth., Disp: , Rfl:      EPINEPHrine (EPIPEN 2-BRI) 0.3 mg/0.3 mL atIn, Inject 0.3 mL (0.3 mg total) into the shoulder, thigh, or buttocks once as needed (allergic reaction)., Disp: 2 Pre-filled Pen Syringe, Rfl: 1     ergocalciferol (ERGOCALCIFEROL) 50,000 unit capsule, Take 1 capsule by oral route 1 time every week., Disp: , Rfl:      FLUZONE HIGH-DOSE 2018-19, PF, 180 mcg/0.5 mL Syrg injection, TO BE ADMINISTERED BY PHARMACIST FOR IMMUNIZATION, Disp: , Rfl: 0     gabapentin (NEURONTIN) 300 MG capsule, Take 2 capsules (600 mg total) by mouth at bedtime., Disp: 180 capsule, Rfl: 3     gemfibrozil (LOPID) 600 MG tablet, TAKE 1 TABLET BY MOUTH TWICE A DAY, Disp: 180 tablet, Rfl: 3     generic lancets (ACCU-CHEK SOFTCLIX LANCETS), Use 1 application As Directed daily before breakfast., Disp: 100 each, Rfl: 3     HYDROcodone-acetaminophen (NORCO )  mg per tablet, Take 1 tablet by mouth every 6 (six) hours as needed., Disp: 10 tablet, Rfl: 0     lisinopril (PRINIVIL,ZESTRIL) 5 MG tablet, TAKE 1 TABLET BY MOUTH EVERY DAY, Disp: 90 tablet, Rfl: 2     metFORMIN (GLUCOPHAGE) 500 MG tablet, TAKE 1 TABLET BY MOUTH EACH MORNING AND 2 TABLETS EACH EVENING, Disp: 270 tablet, Rfl: 1     miscellaneous medical supply Misc, Bipap w heated humidifier, tubing & chamber all x1, FFM w cushion x 1, headgear x 1, filers: disposable and resuable x 1pk both, Disp: , Rfl:      omeprazole (PRILOSEC) 20 MG  capsule, TAKE 1 CAPSULE BY MOUTH EVERY DAY, Disp: 90 capsule, Rfl: 2     polyethylene glycol (MIRALAX) 17 gram packet, Take 1 packet (17 g total) by mouth daily as needed., Disp: , Rfl: 0     ranitidine (ZANTAC) 150 MG tablet, Take 1 tablet (150 mg total) by mouth 2 (two) times a day., Disp: 60 tablet, Rfl: 5     REDNESS RELIEVER EYE DROPS 0.05 % ophthalmic solution, , Disp: , Rfl:      vitamin A-vitamin C-vit E-min (OCUVITE) Tab tablet, Take 1 tablet by mouth 2 (two) times a day.    , Disp: , Rfl:      warfarin (COUMADIN/JANTOVEN) 5 MG tablet, Take 1 to 1.5 tablets (5 to 7.5 mg) by mouth daily. Adjust dose based on INR results as directed., Disp: 90 tablet, Rfl: 1     warfarin (COUMADIN/JANTOVEN) 5 MG tablet, Take 1 to 1.5 tablets (5 to 7.5 mg) by mouth daily. Adjust dose per INR results as instructed., Disp: 90 tablet, Rfl: 1    Family History   Problem Relation Age of Onset     Coronary artery disease Mother      Coronary artery disease Father      Atrial fibrillation Father        Social History     Socioeconomic History     Marital status:      Spouse name: Not on file     Number of children: 3     Years of education: Not on file     Highest education level: Not on file   Occupational History     Occupation:      Employer: [employer]   Social Needs     Financial resource strain: Not on file     Food insecurity:     Worry: Not on file     Inability: Not on file     Transportation needs:     Medical: Not on file     Non-medical: Not on file   Tobacco Use     Smoking status: Former Smoker     Packs/day: 2.00     Years: 27.00     Pack years: 54.00     Types: Cigarettes     Last attempt to quit: 1990     Years since quittin.0     Smokeless tobacco: Never Used   Substance and Sexual Activity     Alcohol use: No     Frequency: Never     Drug use: No     Sexual activity: Not Currently   Lifestyle     Physical activity:     Days per week: Not on file     Minutes per session: Not on file     Stress:  Not on file   Relationships     Social connections:     Talks on phone: Not on file     Gets together: Not on file     Attends Shinto service: Not on file     Active member of club or organization: Not on file     Attends meetings of clubs or organizations: Not on file     Relationship status: Not on file     Intimate partner violence:     Fear of current or ex partner: Not on file     Emotionally abused: Not on file     Physically abused: Not on file     Forced sexual activity: Not on file   Other Topics Concern     Not on file   Social History Narrative     Not on file       10 point Review of Systems is negative except as mentioned above       Vitals:    02/18/20 1304   Pulse: 91   SpO2: 97%       BMI= Body mass index is 36.54 kg/m .    OBJECTIVE:  General appearance: Patient is alert and fully cooperative with history & exam.  No sign of distress is noted during the visit.  Vascular: Dorsalis pedis and posterior tibial pulses are palpable. There is no pedal hair growth bilaterally.  CFT < 3 sec from anterior tibial surface to distal digits bilaterally. There is no appreciable edema noted.  Dermatologic: Superficial necrotic wound on the distal aspect second toe both feet.  This is limited to the skin.  No fluctuance noted.  The area does not probe to deeper tissues.  No cellulitis noted.  Turgor and texture are within normal limits. No coloration or temperature changes. No other primary or secondary lesions noted.  Neurologic: All epicritic and proprioceptive sensations are grossly diminished bilaterally.    Musculoskeletal: All active and passive ankle, subtalar, midtarsal, and 1st MPJ range of motion are grossly intact without pain or crepitus, with the exception of none. Manual muscle strength is within normal limits bilaterally. All dorsiflexors, plantarflexors, invertors, evertors are intact bilaterally.  No tenderness present to second toe both feet on palpation.  No tenderness to second toe both feet  with range of motion. Calf is soft/non-tender without warmth/induration    Imaging:         No results found.            TREATMENT:  No debridement was needed at this time.  I informed the patient that he is showing signs of good wound healing.  I recommend he continue to wear the postop shoe for the next 3 weeks.  He is to return to clinic in 3 weeks for follow-up visit.         Ravi Abbasi; [place]  [facility] [place] Foot & Ankle Surgery/Podiatry

## 2021-06-06 NOTE — PATIENT INSTRUCTIONS - HE
"1. Try to get to the [facility] for 150-minute walking in the pool each week for exercise.  2. Work on dietary restrictions to help with weight loss.  3. We will schedule a \"chemical stress test\" to look for heart artery blockages that may increase the risk of your planned back surgery.  "

## 2021-06-06 NOTE — PROGRESS NOTES
FOOT AND ANKLE SURGERY/PODIATRY Progress Note        ASSESSMENT:   Superficial diabetic ulcer second toe both feet    HPI: Stefan Diallo was seen again today complaining of small wounds on the tip of the second toe both feet.  He has had this condition for several weeks.  The patient has had some drainage.  He notices there was a lot of dead skin on the tip of the second toe both feet.  He has no pain.  He has had an amputation of the third toe of the right foot secondary to osteomyelitis.  The patient became a bit concerned because of the drainage.  He was started on an antibiotic by his primary care physician.  He completed his recent course of oral antibiotics.      Past Medical History:   Diagnosis Date     Atrial fibrillation (H)      Bacteremia      GERD (gastroesophageal reflux disease)      GI hemorrhage      High cholesterol      Hyperlipidemia      Hypertension      Renal artery aneurysm (H)      Sleep apnea, obstructive     USES BIPAP     Type 2 diabetes mellitus (H)      UTI (lower urinary tract infection)        Past Surgical History:   Procedure Laterality Date     FOOT AMPUTATION Right 5/6/2019    Procedure: AMPUTATION, 3rd TOE RIGHT FOOT;  Surgeon: Ravi Abbasi DPM;  Location: [facility];  Service: Podiatry     AK REPAIR COMPL ROTATOR CUFF AVULSN,CHR      Description: Chronic Rotator Cuff Avulsion;  Recorded: 04/11/2011;     TENOTOMY ACHILLES TENDON       TRANSURETHRAL RESECTION OF PROSTATE         No Known Allergies      Current Outpatient Medications:      atenolol (TENORMIN) 50 MG tablet, Take 0.5 tablets (25 mg total) by mouth daily., Disp: , Rfl:      atorvastatin (LIPITOR) 20 MG tablet, Take 1 tablet (20 mg total) by mouth daily., Disp: 90 tablet, Rfl: 3     blood glucose test strips, Use 1 each As Directed daily before breakfast., Disp: 100 strip, Rfl: 3     blood-glucose meter (CONTOUR NEXT METER) Misc, Use once daily., Disp: 1 each, Rfl: 0     blood-glucose meter Misc, Use 1  Device As Directed daily before breakfast., Disp: 1 each, Rfl: 0     calcium citrate (CALCITRATE) 200 mg (950 mg) tablet, Take 1 tablet by oral route 2 times every day., Disp: , Rfl:      cephalexin (KEFLEX) 500 MG capsule, Take 1 capsule (500 mg total) by mouth 3 (three) times a day for 10 days., Disp: 30 capsule, Rfl: 0     cholecalciferol, vitamin D3, 1,000 unit tablet, Take 1,000 Units by mouth daily.    , Disp: , Rfl:      CONTOUR NEXT TEST STRIPS strips, USE TO TEST ONCE DAILY, Disp: 100 strip, Rfl: 5     diltiazem (CARDIZEM CD) 120 MG 24 hr capsule, TAKE 1 CAPSULE BY MOUTH EVERY DAY, Disp: 90 capsule, Rfl: 3     docusate sodium (COLACE) 100 MG capsule, Take 1 by oral route 2 times every day as needed., Disp: , Rfl:      dutasteride (AVODART) 0.5 mg capsule, Take by mouth., Disp: , Rfl:      EPINEPHrine (EPIPEN 2-BRI) 0.3 mg/0.3 mL atIn, Inject 0.3 mL (0.3 mg total) into the shoulder, thigh, or buttocks once as needed (allergic reaction)., Disp: 2 Pre-filled Pen Syringe, Rfl: 1     ergocalciferol (ERGOCALCIFEROL) 50,000 unit capsule, Take 1 capsule by oral route 1 time every week., Disp: , Rfl:      FLUZONE HIGH-DOSE 2018-19, PF, 180 mcg/0.5 mL Syrg injection, TO BE ADMINISTERED BY PHARMACIST FOR IMMUNIZATION, Disp: , Rfl: 0     gabapentin (NEURONTIN) 300 MG capsule, Take 2 capsules (600 mg total) by mouth at bedtime., Disp: 180 capsule, Rfl: 3     gemfibrozil (LOPID) 600 MG tablet, TAKE 1 TABLET BY MOUTH TWICE A DAY, Disp: 180 tablet, Rfl: 3     generic lancets (ACCU-CHEK SOFTCLIX LANCETS), Use 1 application As Directed daily before breakfast., Disp: 100 each, Rfl: 3     HYDROcodone-acetaminophen (NORCO )  mg per tablet, Take 1 tablet by mouth every 6 (six) hours as needed., Disp: 10 tablet, Rfl: 0     lisinopril (PRINIVIL,ZESTRIL) 5 MG tablet, TAKE 1 TABLET BY MOUTH EVERY DAY, Disp: 90 tablet, Rfl: 2     metFORMIN (GLUCOPHAGE) 500 MG tablet, TAKE 1 TABLET BY MOUTH EACH MORNING AND 2 TABLETS EACH  EVENING, Disp: 270 tablet, Rfl: 1     miscellaneous medical supply Misc, Bipap w heated humidifier, tubing & chamber all x1, FFM w cushion x 1, headgear x 1, filers: disposable and resuable x 1pk both, Disp: , Rfl:      omeprazole (PRILOSEC) 20 MG capsule, TAKE 1 CAPSULE BY MOUTH EVERY DAY, Disp: 90 capsule, Rfl: 2     polyethylene glycol (MIRALAX) 17 gram packet, Take 1 packet (17 g total) by mouth daily as needed., Disp: , Rfl: 0     ranitidine (ZANTAC) 150 MG tablet, Take 1 tablet (150 mg total) by mouth 2 (two) times a day., Disp: 60 tablet, Rfl: 5     REDNESS RELIEVER EYE DROPS 0.05 % ophthalmic solution, , Disp: , Rfl:      vitamin A-vitamin C-vit E-min (OCUVITE) Tab tablet, Take 1 tablet by mouth 2 (two) times a day.    , Disp: , Rfl:      warfarin (COUMADIN/JANTOVEN) 5 MG tablet, Take 1 to 1.5 tablets (5 to 7.5 mg) by mouth daily. Adjust dose based on INR results as directed., Disp: 90 tablet, Rfl: 1     warfarin (COUMADIN/JANTOVEN) 5 MG tablet, Take 1 to 1.5 tablets (5 to 7.5 mg) by mouth daily. Adjust dose per INR results as instructed., Disp: 90 tablet, Rfl: 1     clindamycin (CLEOCIN) 300 MG capsule, Take 1 capsule (300 mg total) by mouth 3 (three) times a day for 10 days., Disp: 30 capsule, Rfl: 0    Family History   Problem Relation Age of Onset     Coronary artery disease Mother      Coronary artery disease Father      Atrial fibrillation Father        Social History     Socioeconomic History     Marital status:      Spouse name: Not on file     Number of children: 3     Years of education: Not on file     Highest education level: Not on file   Occupational History     Occupation:      Employer: [employer]   Social Needs     Financial resource strain: Not on file     Food insecurity:     Worry: Not on file     Inability: Not on file     Transportation needs:     Medical: Not on file     Non-medical: Not on file   Tobacco Use     Smoking status: Former Smoker     Packs/day: 2.00     Years:  27.00     Pack years: 54.00     Types: Cigarettes     Last attempt to quit: 1990     Years since quittin.0     Smokeless tobacco: Never Used   Substance and Sexual Activity     Alcohol use: No     Frequency: Never     Drug use: No     Sexual activity: Not Currently   Lifestyle     Physical activity:     Days per week: Not on file     Minutes per session: Not on file     Stress: Not on file   Relationships     Social connections:     Talks on phone: Not on file     Gets together: Not on file     Attends Shinto service: Not on file     Active member of club or organization: Not on file     Attends meetings of clubs or organizations: Not on file     Relationship status: Not on file     Intimate partner violence:     Fear of current or ex partner: Not on file     Emotionally abused: Not on file     Physically abused: Not on file     Forced sexual activity: Not on file   Other Topics Concern     Not on file   Social History Narrative     Not on file       10 point Review of Systems is negative except as mentioned above.         Vitals:    20 1431   BP: 124/82   Pulse: 80   Temp: 98.1  F (36.7  C)   SpO2: 97%       BMI= Body mass index is 36.54 kg/m .    OBJECTIVE:  General appearance: Patient is alert and fully cooperative with history & exam.  No sign of distress is noted during the visit.  Vascular: Dorsalis pedis and posterior tibial pulses are palpable. There is no pedal hair growth bilaterally.  CFT < 3 sec from anterior tibial surface to distal digits bilaterally. There is no appreciable edema noted.  Dermatologic: Superficial necrotic wound on the distal aspect second toe both feet.  This is limited to the skin.  No fluctuance noted.  The area does not probe to deeper tissues.  No cellulitis noted.  Turgor and texture are within normal limits. No coloration or temperature changes. No other primary or secondary lesions noted.  Neurologic: All epicritic and proprioceptive sensations are grossly  diminished bilaterally.    Musculoskeletal: All active and passive ankle, subtalar, midtarsal, and 1st MPJ range of motion are grossly intact without pain or crepitus, with the exception of none. Manual muscle strength is within normal limits bilaterally. All dorsiflexors, plantarflexors, invertors, evertors are intact bilaterally.  No tenderness present to second toe both feet on palpation.  No tenderness to second toe both feet with range of motion. Calf is soft/non-tender without warmth/induration    Imaging:     No results found.         TREATMENT:  Debrided all necrotic tissue utilizing a tissue nipper.  A sterile dressing was applied.  No bleeding was noted.  I recommended the patient ambulate in a postop shoe until the wounds are completely healed.  He was placed on clindamycin 3 mg 1 tab 3 times daily.  The patient is to return to the clinic in 1 week for follow-up visit.        Ravi Abbasi; [place]  [facility] Foot & Ankle Surgery/Podiatry

## 2021-06-06 NOTE — PATIENT INSTRUCTIONS - HE
Clean both second toes with water and Betadine solution.  You may purchase the Betadine solution at the drugstore.

## 2021-06-06 NOTE — TELEPHONE ENCOUNTER
ANTICOAGULATION  MANAGEMENT PROGRAM    Stefan Diallo is overdue for INR check.     Spoke with pt who declined to schedule INR at this time. If calling back please schedule as soon as possible.      Torin Tobar RN

## 2021-06-07 NOTE — TELEPHONE ENCOUNTER
Refill Approved    Rx renewed per Medication Renewal Policy. Medication was last renewed on 5/16/19.    Lula Karimi, [facility] Triage/Med Refill 4/27/2020     Requested Prescriptions   Pending Prescriptions Disp Refills     diltiazem (CARDIZEM CD) 120 MG 24 hr capsule [Pharmacy Med Name: DILTIAZEM 24H ER(CD) 120 MG CP] 90 capsule 3     Sig: TAKE 1 CAPSULE BY MOUTH EVERY DAY       Calcium-Channel Blockers Protocol Passed - 4/25/2020  9:30 AM        Passed - PCP or prescribing provider visit in past 12 months or next 3 months     Last office visit with prescriber/PCP: 10/23/2019 Collin Singh MD OR same dept: 10/23/2019 Collin Singh MD OR same specialty: 10/23/2019 Collin Singh MD  Last physical: 9/6/2019 Last MTM visit: Visit date not found   Next visit within 3 mo: Visit date not found  Next physical within 3 mo: Visit date not found  Prescriber OR PCP: Collin Singh MD  Last diagnosis associated with med order: 1. Atrial fibrillation (H)  - diltiazem (CARDIZEM CD) 120 MG 24 hr capsule [Pharmacy Med Name: DILTIAZEM 24H ER(CD) 120 MG CP]; TAKE 1 CAPSULE BY MOUTH EVERY DAY  Dispense: 90 capsule; Refill: 3    If protocol passes may refill for 12 months if within 3 months of last provider visit (or a total of 15 months).             Passed - Blood pressure filed in past 12 months     BP Readings from Last 1 Encounters:   03/02/20 136/90

## 2021-06-07 NOTE — TELEPHONE ENCOUNTER
RN cannot approve Refill Request    RN can NOT refill this medication Protocol failed and NO refill given.      Lula Karimi, [facility] Triage/Med Refill 4/7/2020    Requested Prescriptions   Pending Prescriptions Disp Refills     warfarin ANTICOAGULANT (COUMADIN/JANTOVEN) 5 MG tablet [Pharmacy Med Name: WARFARIN SODIUM 5 MG TABLET] 90 tablet 1     Sig: TAKE 1 TO 1.5 TABLETS (5 TO 7.5 MG) BY MOUTH DAILY. ADJUST DOSE BASED ON INR RESULTS AS DIRECTED.       Warfarin Refill Protocol  Failed - 4/7/2020 12:32 AM        Failed -  Route to appropriate pool/provider     Last Anticoagulation Summary:   Anticoagulation Episode Summary     Current INR goal:   2.0-3.0   TTR:   94.9 % (9.1 mo)   Next INR check:   5/13/2020   INR from last check:   2.40 (3/18/2020)   Weekly max warfarin dose:      Target end date:      INR check location:      Preferred lab:      Send INR reminders to:   HILARIO HECTOR    Indications    Atrial fibrillation  unspecified type (H) [I48.91]           Comments:            Anticoagulation Care Providers     Provider Role Specialty Phone number    Collin Singh MD Referring Family Medicine [phone]                Passed - Provider visit in last year     Last office visit with prescriber/PCP: 10/23/2019 Collin Singh MD OR same dept: 10/23/2019 Collin Singh MD OR same specialty: 10/23/2019 Collin Singh MD  Last physical: 9/6/2019 Last MTM visit: Visit date not found    Next appt within 3 mo: Visit date not found Next physical within 3 mo: Visit date not found  Prescriber OR PCP: Collin Singh MD  Last diagnosis associated with med order: 1. Chronic atrial fibrillation  - warfarin ANTICOAGULANT (COUMADIN/JANTOVEN) 5 MG tablet [Pharmacy Med Name: WARFARIN SODIUM 5 MG TABLET]; Take 1 to 1.5 tablets (5 to 7.5 mg) by mouth daily. Adjust dose based on INR results as directed.  Dispense: 90 tablet; Refill: 1    If protocol passes may refill for 6 months if  within 3 months of last provider visit (or a total of 9 months).

## 2021-06-07 NOTE — TELEPHONE ENCOUNTER
Anticoagulation Annual Referral Renewal Review    Stefan MIRANDA Yoel's chart reviewed for annual renewal of referral to anticoagulation monitoring.        Criteria for anticoagulation nurse and/or pharmacist renewal met   Warfarin indication: Atrial Fibrillation Yes, per indication   Current with INR monitoring/compliant Yes Yes   Date of last office visit 10/23/19 Yes, had office visit within last year   Time in Therapeutic Range (TTR) 95 % Yes, TTR > 60%       Stefan RUTH Yoel met all criteria for anticoagulation management program initiated renewal.  New INR standing orders and anticoagulation referral renewal placed.      Torin Tobar RN  3:16 PM

## 2021-06-07 NOTE — TELEPHONE ENCOUNTER
RN cannot approve Refill Request    RN can NOT refill this medication PCP messaged that patient is overdue for Labs and Office Visit. Last office visit: 10/23/2019 Collin Singh MD Last Physical: 9/6/2019 Last MTM visit: Visit date not found Last visit same specialty: 10/23/2019 Collin Singh MD.  Next visit within 3 mo: Visit date not found  Next physical within 3 mo: Visit date not found      Kimberly Henderson, [facility] Triage/Med Refill 4/29/2020    Requested Prescriptions   Pending Prescriptions Disp Refills     ACCU-CHEK GUIDE strips [Pharmacy Med Name: ACCU-CHEK GUIDE TEST STRIP] 100 strip 3     Sig: TEST ONCE DAILY BEFORE BREAKFAST       Diabetic Supplies Refill Protocol Failed - 4/28/2020 12:26 AM        Failed - Visit with PCP or prescribing provider visit in last 6 months     Last office visit with prescriber/PCP: 10/23/2019 Collin Singh MD OR same dept: 10/23/2019 Collin Singh MD OR same specialty: 10/23/2019 Collin Singh MD  Last physical: 9/6/2019 Last MTM visit: Visit date not found   Next visit within 3 mo: Visit date not found  Next physical within 3 mo: Visit date not found  Prescriber OR PCP: Collin Singh MD  Last diagnosis associated with med order: 1. Type 2 diabetes mellitus without complication, without long-term current use of insulin (H)  - ACCU-CHEK GUIDE strips [Pharmacy Med Name: ACCU-CHEK GUIDE TEST STRIP]; TEST ONCE DAILY BEFORE BREAKFAST  Dispense: 100 strip; Refill: 3    If protocol passes may refill for 12 months if within 3 months of last provider visit (or a total of 15 months).             Failed - A1C in last 6 months     Hemoglobin A1c   Date Value Ref Range Status   08/19/2019 7.6 (H) 3.5 - 6.0 % Final

## 2021-06-08 NOTE — PROGRESS NOTES
"Stefan Diallo is a 74 y.o. male who is being evaluated via a billable telephone visit.      The patient has been notified of following:     \"This telephone visit will be conducted via a call between you and your physician/provider. We have found that certain health care needs can be provided without the need for a physical exam.  This service lets us provide the care you need with a short phone conversation.  If a prescription is necessary we can send it directly to your pharmacy.  If lab work is needed we can place an order for that and you can then stop by our lab to have the test done at a later time.    Telephone visits are billed at different rates depending on your insurance coverage. During this emergency period, for some insurers they may be billed the same as an in-person visit.  Please reach out to your insurance provider with any questions.    If during the course of the call the physician/provider feels a telephone visit is not appropriate, you will not be charged for this service.\"    Patient has given verbal consent to a Telephone visit? Yes    What phone number would you like to be contacted at? [phone]    Patient would like to receive their AVS by AVS Preference: Mail a copy.    Additional provider notes:     Swelling of left lower leg, mid-shin, same as last August when he had cellulitis.  No recent injury, or break in the skin.  No fever or chills.  No orthopnea.  He would like Septra again.  He has had more salt intake recently.  He will have back surgery for left sciatica.    Assessment/Plan:  1. Pain of left lower leg    - sulfamethoxazole-trimethoprim (SEPTRA DS) 800-160 mg per tablet; Take 1 tablet by mouth 2 (two) times a day for 7 days.  Dispense: 14 tablet; Refill: 0    He will use an ace bandage during the day.  Elevate the leg whenever possible.  Decrease salt intake.    Call if any problem.  Might need an in-person visit if not improving.    Phone call duration:  14 " minutes    Collin Singh MD

## 2021-06-08 NOTE — TELEPHONE ENCOUNTER
ANTICOAGULATION  MANAGEMENT    Assessment     Today's INR result of 2.6 is Therapeutic (goal INR of 2.0-3.0)        Warfarin taken as previously instructed    No new diet changes affecting INR    Potential interaction between Septra and warfarin which may affect subsequent INRs - starting today for 7 days    Continues to tolerate warfarin with no reported s/s of bleeding or thromboembolism     Previous INR was Therapeutic    Plan:     Spoke with Stefan regarding INR result and instructed:     Warfarin Dosing Instructions:  Continue current warfarin dose 7.5 mg daily on Sun; and 5 mg daily rest of week  (0 % change)    Instructed patient to follow up no later than: Thu 5/14    Education provided: target INR goal and significance of current INR result, importance of following up for INR monitoring at instructed interval, importance of taking warfarin as instructed, importance of notifying clinic for changes in medications and importance of notifying clinic for diarrhea, nausea/vomiting, reduced intake and/or illness    Stefan verbalizes understanding and agrees to warfarin dosing plan.    Instructed to call the [facility] for any changes, questions or concerns. (#[phone])   ?   Angela Montes RN    Subjective/Objective:      Stefan Diallo, a 74 y.o. male is on warfarin.     Stefan reports:     Home warfarin dose: verbally confirmed home dose with Stefan and updated on anticoagulation calendar     Missed doses: No     Medication changes:  Yes: septra for 7 days     S/S of bleeding or thromboembolism:  No     New Injury or illness:  Yes: leg swelling/infection     Changes in diet or alcohol consumption:  No     Upcoming surgery, procedure or cardioversion:  No    Anticoagulation Episode Summary     Current INR goal:   2.0-3.0   TTR:   95.3 % (10 mo)   Next INR check:   5/14/2020   INR from last check:   2.60 (5/11/2020)   Weekly max warfarin dose:      Target end date:      INR check location:      Preferred lab:       Send INR reminders to:   HILARIO HECTOR    Indications    Atrial fibrillation  unspecified type (H) [I48.91]           Comments:            Anticoagulation Care Providers     Provider Role Specialty Phone number    Collin Singh MD Referring Family Medicine [phone]

## 2021-06-09 NOTE — PROGRESS NOTES
[facility] Office Note    Assessment / Plan:    1.  Dyspnea on exertion, fatigue.  I suspect that this is due to physical deconditioning and morbid obesity.  We will check a pharmacologic nuclear stress test to exclude coronary disease as a cause.  Increased exertion and diet for weight loss were advocated  2.  Atrial fibrillation.  Continue warfarin.  We will decrease atenolol for 1 week to see if this improves his activity tolerance  3.  Hypertension.  Now relatively low.  May benefit from a decrease in the dose of atenolol    Plan follow-up in 1 year if stress test reveals no significant ischemia    ______________________________________________________________________    Subjective:    I had the opportunity to see Stefan Diallo at the [facility]. Stefan Diallo is a 71 y.o. male with a history of diabetes mellitus, obstructive sleep apnea, hypertension, and atrial fibrillation who returns for routine follow-up.  He has no history of coronary artery disease.  He has been using his BiPAP regularly.  Unfortunately does not feel that his stamina significantly improved.  He feels sleepy in the afternoons.   He also notes that his activity tolerance is worsening.  He is less active than what he was a year ago.  He occasionally has some vague heaviness but denies that it is pain.  He seldom notes that his heart is racing, but has had no syncope or near syncopal spells.     His weight has been stable.  He occasionally notes some transient lightheadedness when he awakens and pulls his BiPAP mask off.        ______________________________________________________________________    Problem List:  Patient Active Problem List   Diagnosis     Benign Prostatic Hypertrophy     Renal Artery Aneurysm     Tinea Corporis     Shortness Of Breath     Chest Pain     Lower Back Pain     Vertigo     Idiopathic Peripheral Neuropathy     Myalgia And Myositis     Urinary Tract Infection     Pain During  Urination (Dysuria)     Cough     Eye Pain     Eczematoid Dermatitis     Urticaria     Obesity     Essential Hypertension     Atrial Fibrillation     Adult Sleep Apnea     Type 2 diabetes mellitus without complication     Hyperlipidemia     Muscle Weakness     Dysphagia     Bell's palsy     Gastroesophageal reflux disease with esophagitis     Medical History:  Past Medical History:   Diagnosis Date     Atrial fibrillation      Bacteremia      GERD (gastroesophageal reflux disease)      GI hemorrhage      Hyperlipidemia      Hypertension      Renal artery aneurysm      Type 2 diabetes mellitus      UTI (lower urinary tract infection)      Surgical History:  Past Surgical History:   Procedure Laterality Date     CT REPAIR COMPL ROTATOR CUFF AVULSN,CHR      Description: Chronic Rotator Cuff Avulsion;  Recorded: 04/11/2011;     TRANSURETHRAL RESECTION OF PROSTATE       Social History:  Social History     Social History     Marital status:      Spouse name: N/A     Number of children: 3     Years of education: N/A     Occupational History      [employer]     Social History Main Topics     Smoking status: Former Smoker     Packs/day: 2.00     Years: 27.00     Types: Cigarettes     Quit date: 1/24/1990     Smokeless tobacco: Not on file     Alcohol use 2.4 oz/week     4 Cans of beer per week      Comment: Ocasional     Drug use: No     Sexual activity: No     Other Topics Concern     Not on file     Social History Narrative     Sleep History:  Uses CPAP nightly, but not during naps  Exercise History:  Limited by foot pain.  Minimal activities    Review of Systems:   General: WNL  Eyes: Visual Distubance  Ears/Nose/Throat: WNL  Lungs: Shortness of Breath  Heart: Arm Pain, Shortness of Breath with activity, Irregular Heartbeat  Stomach: WNL  Bladder: Frequent Urination at Night  Muscle/Joints: Muscle Pain  Skin: WNL  Nervous System: Daytime Sleepiness, Loss of Balance  Mental Health: WNL     Blood:  WNL          Family History:  Family History   Problem Relation Age of Onset     Coronary artery disease Mother      Coronary artery disease Father      Atrial fibrillation Father          Allergies:  No Known Allergies  Medications:  Current Outpatient Prescriptions   Medication Sig Dispense Refill     atenolol (TENORMIN) 50 MG tablet TAKE ONE TABLET BY MOUTH ONE TIME DAILY 90 tablet 3     atorvastatin (LIPITOR) 20 MG tablet Take 1 tablet (20 mg total) by mouth daily. 90 tablet 3     blood sugar diagnostic (GLUCOSE BLOOD) Strp Use As Directed 2 (two) times a day. Bindu Contour Next Test In Vitro Strip,       cholecalciferol, vitamin D3, 1,000 unit tablet Take 2,000 Units by mouth daily.        CYANOCOBALAMIN, VITAMIN B-12, (VITAMIN B12 ORAL) Take by mouth.       diltiazem (CARTIA XT) 120 MG 24 hr capsule TAKE ONE CAPSULE BY MOUTH ONE TIME DAILY 90 capsule 1     gabapentin (NEURONTIN) 300 MG capsule TAKE ONE CAPSULE BY MOUTH TWICE DAILY 180 capsule 3     gemfibrozil (LOPID) 600 MG tablet Take 1 tablet (600 mg total) by mouth 2 (two) times a day. 180 tablet 3     lisinopril (PRINIVIL,ZESTRIL) 5 MG tablet TAKE ONE TABLET BY MOUTH ONE TIME DAILY 90 tablet 3     metFORMIN (GLUCOPHAGE) 500 MG tablet TAKE ONE TABLET BY MOUTH TWICE DAILY 180 tablet 1     ranitidine (ZANTAC) 150 MG capsule Take 150 mg by mouth 2 (two) times a day.       vitamin A-vitamin C-vit E-min (OCUVITE) Tab tablet Take by mouth daily. Focus Eye Select       warfarin (COUMADIN) 5 MG tablet Take 1 tablet (5 mg total) by mouth daily. 7.5 on Wed 10 tablet 0     warfarin (COUMADIN) 5 MG tablet Take 7.5 mg Wed and Sat and 5 mg all other days of the week. 45 tablet 2     EPINEPHrine (EPIPEN 2-BRI) 0.3 mg/0.3 mL (1:1,000) atIn Inject 0.3 mL (0.3 mg total) into the shoulder, thigh, or buttocks once as needed (allergic reaction). 2 Device 2     omeprazole (PRILOSEC) 20 MG capsule Take 1 capsule (20 mg total) by mouth daily. 90 capsule 3     No current  "facility-administered medications for this visit.        Objective:   Wt Readings from Last 3 Encounters:   02/24/17 (!) 266 lb (120.7 kg)   10/10/16 (!) 257 lb 1.9 oz (116.6 kg)   06/13/16 (!) 262 lb (118.8 kg)     Vital signs:  Visit Vitals     /74 (Patient Site: Right Arm, Patient Position: Sitting, Cuff Size: Adult Large)     Pulse 66     Resp 20     Ht 5' 10\" (1.778 m)     Wt (!) 266 lb (120.7 kg)     SpO2 95%     BMI 38.17 kg/m2         Physical Exam: Alert, appropriate, joking    Eyes    Conjunctiva Findings: bilateral hyperemia.   Sclerae: Clear, nonicteric.   ENT   Mouth: Oral mucuous membranes are pink and moist   Neck   Carotid pulses: Carotid pulses palpaple with normal contours and symmetric, no bruits.   Jugular Veins: Normal jugular venous pressure.   Thyroid: No visible thyromegaly.   Pulmonary   Examination of lungs: Clear to auscultation, no crackles, or wheezing.   Cardiovascular    The heart rate was normal. The rhythm was irregularly irregular.   Resting heart rate of 72 increases to 84 after climbing 10 steps.  He is mildly short of breath at that time  Pulses: Normal   Extremities: No edema or clubbing.   Gastrointestinal    The abdomen was obese. Bowel sounds were normal.   Musculoskeletal   Spine: spine straight upon visual inspection.   Skin   Skin and subcutaneous tissue: No xanthelasma.   Neurologic   Orientation to person, place and time: Normal.   Psychiatric   Mood and affect: Normal.       Lab Results:  LIPIDS:  Lab Results   Component Value Date    CHOL 183 12/14/2012    CHOL 176 11/29/2011    CHOL 167 03/28/2011     Lab Results   Component Value Date    HDL 40 12/14/2012    HDL 39 (L) 11/29/2011    HDL 47 03/28/2011     Lab Results   Component Value Date    LDLCALC 103 12/14/2012    LDLCALC 98 11/29/2011    LDLCALC 88 03/28/2011     Lab Results   Component Value Date    TRIG 200 (H) 12/14/2012    TRIG 193 (H) 11/29/2011    TRIG 161 (H) 03/28/2011                   MARGIE LOERA" MD [facility]  [phone]    This note created using Dragon voice recognition software.  Sound alike errors may have escaped editing.

## 2021-06-09 NOTE — PROGRESS NOTES
INR 2.0 pt in to see Dr. Garland. Has not check INR since Nov 2016. Pt has been taking 5 mg daily. Will increase dose to 7.5 mg sat and 5 mg 6/7 days. Instructed to have INR checked monthly.

## 2021-06-09 NOTE — PROGRESS NOTES
INR 1.3 at MD office for steroid shot. Spoke to pt today and will increase dose of Warfarin ot 7.5 mg M,W,F and 5 mg all other days. Retest in one week. After talking with pt and discussing history of greens/salads and medication change. Pt will  continue  with current diet and dosing of Warfarin.  Continue with moderation of Vit K and green leafy vegetables. Cautioned to call with increase bruising or bleeding. Reminded to call with medication change especially antibiotic. Call with any questions or concerns or any up coming procedures. Cautioned about using Herbal medication.

## 2021-06-09 NOTE — PROGRESS NOTES
ASSESMENT AND PLAN:  Diagnoses and all orders for this visit:    Atrial fibrillation, unspecified type  Rate controlled.  -     INR done today comes back subtherapeutic.  I sent a message to his cardiac clinic nurse asking that they call the patient tomorrow to tell him how he should adjust his warfarin.    Shoulder bursitis, left  Prior to procedure, his INR was done and came back at 1.3.  We reviewed the risks and benefits of left shoulder bursa injection including bleeding and infection.  Patient wishes to proceed.  Shoulder was prepped with iodine and using sterile technique and sterile equipment, 20 mg of Kenalog and 2.5 mL of lidocaine without epinephrine were injected into the left shoulder subacromial bursa without complication.  Patient tolerated the procedure well.  medications at the time of procedure.  Aftercare instructions discussed with the patient and we reviewed indications for routine and urgent follow-up.  -     triamcinolone acetonide 40 mg/mL injection 20 mg (KENALOG-40); Inject 0.5 mL (20 mg total) into the joint once.          SUBJECTIVE: 71-year-old male who has a history of atrial fibrillation.  He had seen the cardiologist back in February and his dose of atenolol was reduced.  Since then, he has not been having any problems with palpitations or chest pain.  He takes warfarin, and is due for his INR today.  Patient has a history of chronic bilateral shoulder pain.  In the past, he got an excellent improvement in his pain from injection of his subacromial bursa.  His last injection was several years ago.  Over the last few months, his left shoulder pain has been worsening.  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  Sometimes the movements for turning the steering wheel will worsen his shoulder pain.  Overall, shoulder pain is gone to the point where he would like to do another injection.    Past Medical History:   Diagnosis Date     Atrial fibrillation       Bacteremia      GERD (gastroesophageal reflux disease)      GI hemorrhage      High cholesterol      Hyperlipidemia      Hypertension      Renal artery aneurysm      Sleep apnea, obstructive     USES BIPAP     Type 2 diabetes mellitus      UTI (lower urinary tract infection)      Patient Active Problem List   Diagnosis     Benign Prostatic Hypertrophy     Renal Artery Aneurysm     Tinea Corporis     Shortness Of Breath     Chest Pain     Lower Back Pain     Vertigo     Idiopathic Peripheral Neuropathy     Myalgia And Myositis     Urinary Tract Infection     Pain During Urination (Dysuria)     Cough     Eye Pain     Eczematoid Dermatitis     Urticaria     Obesity     Essential Hypertension     Atrial Fibrillation     Adult Sleep Apnea     Type 2 diabetes mellitus without complication     Pure hypercholesterolemia     Muscle Weakness     Dysphagia     Bell's palsy     Gastroesophageal reflux disease with esophagitis     Current Outpatient Prescriptions   Medication Sig Dispense Refill     atenolol (TENORMIN) 50 MG tablet TAKE ONE TABLET BY MOUTH ONE TIME DAILY (Patient taking differently: 50 mg. TAKE HALF TABLET BY MOUTH ONE TIME DAILY) 90 tablet 3     atorvastatin (LIPITOR) 20 MG tablet Take 1 tablet (20 mg total) by mouth daily. 90 tablet 3     blood sugar diagnostic (GLUCOSE BLOOD) Strp Use As Directed 2 (two) times a day. Bindu Contour Next Test In Vitro Strip,       cholecalciferol, vitamin D3, 1,000 unit tablet Take 2,000 Units by mouth daily.        CYANOCOBALAMIN, VITAMIN B-12, (VITAMIN B12 ORAL) Take by mouth.       diltiazem (CARTIA XT) 120 MG 24 hr capsule TAKE ONE CAPSULE BY MOUTH ONE TIME DAILY 90 capsule 1     EPINEPHrine (EPIPEN 2-BRI) 0.3 mg/0.3 mL (1:1,000) atIn Inject 0.3 mL (0.3 mg total) into the shoulder, thigh, or buttocks once as needed (allergic reaction). 2 Device 2     gabapentin (NEURONTIN) 300 MG capsule TAKE ONE CAPSULE BY MOUTH TWICE DAILY 180 capsule 3     gemfibrozil (LOPID) 600 MG  "tablet Take 1 tablet (600 mg total) by mouth 2 (two) times a day. 180 tablet 3     lisinopril (PRINIVIL,ZESTRIL) 5 MG tablet TAKE ONE TABLET BY MOUTH ONE TIME DAILY 90 tablet 3     metFORMIN (GLUCOPHAGE) 500 MG tablet TAKE ONE TABLET BY MOUTH TWICE DAILY 180 tablet 1     omeprazole (PRILOSEC) 20 MG capsule Take 1 capsule (20 mg total) by mouth daily. 90 capsule 3     ranitidine (ZANTAC) 150 MG capsule Take 150 mg by mouth 2 (two) times a day.       vitamin A-vitamin C-vit E-min (OCUVITE) Tab tablet Take by mouth daily. Focus Eye Select       warfarin (COUMADIN) 5 MG tablet Take 1 tablet (5 mg total) by mouth daily. 7.5 on Wed 10 tablet 0     warfarin (COUMADIN) 5 MG tablet TAKE 1.5 TABLET BY MOUTH ONCE DAILY ON WED AND SAT AND 1 TAB DAILY ON ALL OTHER DAYS OF THE WEEK. 90 tablet 0     No current facility-administered medications for this visit.      History   Smoking Status     Former Smoker     Packs/day: 2.00     Years: 27.00     Types: Cigarettes     Quit date: 1/24/1990   Smokeless Tobacco     Not on file       OBJECTICE:   Visit Vitals     /84 (Patient Site: Right Arm, Patient Position: Sitting, Cuff Size: Adult Large)     Pulse 79     Temp 97.7  F (36.5  C) (Oral)     Resp 19     Ht 5' 10\" (1.778 m)     Wt (!) 264 lb 8 oz (120 kg)     SpO2 96%     BMI 37.95 kg/m2        Recent Results (from the past 24 hour(s))   INR    Collection Time: 03/22/17  5:25 PM   Result Value Ref Range    INR 1.30 (H) 0.90 - 1.10        GEN-alert, appropriate, in no apparent distress   CV-on auscultation today he has regular rate and rhythm   SKIN-skin overlying his injection site is clear   Musculoskeletal-he has some tenderness and repetition of his pain with palpation of his left shoulder subacromial bursa.      Donovan Ponce          "

## 2021-06-09 NOTE — PROGRESS NOTES
INR 3.3 spoke with pt and he wants to increase his greens so will continue at current dose of Warfarin. After talking with pt and discussing history of greens/salads and medication change. Pt will  continue  with current diet and dosing of Warfarin.  Continue with moderation of Vit K and green leafy vegetables. Cautioned to call with increase bruising or bleeding. Reminded to call with medication change especially antibiotic. Call with any questions or concerns or any up coming procedures. Cautioned about using Herbal medication.

## 2021-06-10 ENCOUNTER — RECORDS - HEALTHEAST (OUTPATIENT)
Dept: FAMILY MEDICINE | Facility: CLINIC | Age: 76
End: 2021-06-10

## 2021-06-10 NOTE — TELEPHONE ENCOUNTER
ANTICOAGULATION  MANAGEMENT: Discharge Review    Stefan MIRANDA Yoel chart reviewed for anticoagulation continuity of care    Hospital admission on  8/14 to 8/20 for cellulitis.    Discharge disposition: Home    INR Results:       Recent labs: (last 7 days)     08/14/20  2202 08/15/20  0554 08/16/20  0631 08/17/20  0552 08/18/20  0615 08/19/20  0623 08/20/20  0630   INR 3.15* 2.64* 1.86* 2.08* 1.52* 1.43* 1.42*       Warfarin inpatient management: less warfarin administered than maintenance regimen    Warfarin discharge instructions: home regimen continued     Medication Changes Affecting Anticoagulation: Yes: Keflex x14 days    Additional Factors Affecting Anticoagulation: Yes: cellulitis    Plan     Agree with dosing adjustment on discharge    Spoke with Stefan. Recommended to check INR by 8/24. Patient verbalized understanding but declined to schedule.      Anticoagulation calendar updated    Torin Tobar RN

## 2021-06-10 NOTE — TELEPHONE ENCOUNTER
Who is calling:  Patient  Reason for Call: The patient is scheduled for an INR lab tomorrow 8/25 after being in the hospital. He expressed he does have a cough and it was requested he reschedule his lab until his cough has cleared. He is declining. Please advise at the number provided.     Okay to leave a detailed message: Yes

## 2021-06-10 NOTE — TELEPHONE ENCOUNTER
Medication Question or Clarification  Who is calling: Patient  What medication are you calling about (include dose and sig)?:   sulfamethoxazole-trimethoprim (BACTRIM DS) 800-160 mg per tablet  14 tablet  0  8/4/2020 8/11/2020     Sig - Route: Take 1 tablet by mouth 2 (two) times a day for 7 days. - Oral         Who prescribed the medication?: Collin Singh MD    What is your question/concern?: Patient was informed by the Pharmacist that this medication would mess with the patient's Warfarin.   Patient wants to make sure that the Dr is aware of this before he even takes the first pill, please advise the patient if okay to start.  Requested Pharmacy: [facility]  # [id number] to leave a detailed message?: No

## 2021-06-10 NOTE — TELEPHONE ENCOUNTER
Tickle cough. He is feeling well and has occasional tickle in sinus area that he needs to clear his throat.  He has no fever, sneezing, or fatigue.    He is scheduled to come in clinic today and is instructed to keep his mask on for the entire visit.    If he would have a change or worsening in symptoms please call back to triage before coming to clinic.    Kimberly Henderson RN  Triage Nurse Advisor

## 2021-06-10 NOTE — TELEPHONE ENCOUNTER
ANTICOAGULATION  MANAGEMENT    Assessment     8/9's INR result of 2.92 is Therapeutic (goal INR of 2.0-3.0)        Warfarin taken as previously instructed    No new diet changes affecting INR    Interaction between Bactrim and warfarin may be affecting INR- has another two days left.     Started on Ceftin yesterday x10 days for leg cellulitis (ED visit).     Continues to tolerate warfarin with no reported s/s of bleeding or thromboembolism     Previous INR was Therapeutic    Plan:     Spoke on phone with Stefan regarding INR result and instructed:     Warfarin Dosing Instructions:  Continue current warfarin dose 5 mg daily (while on antibiotics).     Instructed patient to follow up no later than: 1 week.     Education provided: importance of following up for INR monitoring at instructed interval and importance of taking warfarin as instructed    Stefan verbalizes understanding and agrees to warfarin dosing plan.    Instructed to call the [facility] for any changes, questions or concerns. (#[phone])   ?   Torin Tobar RN    Subjective/Objective:      Stefan MIRANDA Yoel, a 75 y.o. male is on warfarin.     Stefan reports:     Home warfarin dose: verbally confirmed home dose with pt and updated on anticoagulation calendar     Missed doses: No     Medication changes:  Yes: started on Ceftin x10 days.      S/S of bleeding or thromboembolism:  No     New Injury or illness:  Yes: leg cellulitis     Changes in diet or alcohol consumption:  No     Upcoming surgery, procedure or cardioversion:  No    Anticoagulation Episode Summary     Current INR goal:   2.0-3.0   TTR:   100.0 % (1 y)   Next INR check:   8/17/2020   INR from last check:   2.92 (8/9/2020)   Weekly max warfarin dose:      Target end date:      INR check location:      Preferred lab:      Send INR reminders to:   HILARIO HECTOR    Indications    Atrial fibrillation  unspecified type (H) [I48.91]           Comments:            Anticoagulation Care Providers      Provider Role Specialty Phone number    Collin Singh MD Referring Family Medicine [phone]

## 2021-06-10 NOTE — TELEPHONE ENCOUNTER
INITIAL INR > 5.5 NOTIFICATION- Anticoagulation Management Program    Stefan Diallo had an initial point of care INR of 6.2.     Venous confirmation of INR required per protocol. STAT venous sample drawn and being sent out for confirmation.    Anticoagulation template scanned into EHR. Patient phone call with Anticoagulation Management Program (ACM) being arranged for patient triage prior to discharge.     Teto Gray

## 2021-06-10 NOTE — ANESTHESIA POSTPROCEDURE EVALUATION
Patient: Stefan Diallo  Procedure(s):  IRRIGATION AND DEBRIDEMENT, PREPATELLA BURSA (Right)  Anesthesia type: general    Patient location: PACU  Last vitals:   Vitals Value Taken Time   /88 8/18/2020  7:10 PM   Temp 36.6  C (97.9  F) 8/18/2020  6:22 PM   Pulse 94 8/18/2020  7:11 PM   Resp 15 8/18/2020  7:11 PM   SpO2 95 % 8/18/2020  7:11 PM   Vitals shown include unvalidated device data.  Post vital signs: stable  Level of consciousness: awake and responds to simple questions  Post-anesthesia pain: pain controlled  Post-anesthesia nausea and vomiting: no  Pulmonary: unassisted, return to baseline  Cardiovascular: stable and blood pressure at baseline  Hydration: adequate  Anesthetic events: no    QCDR Measures:  ASA# 11 - Leydi-op Cardiac Arrest: ASA11B - Patient did NOT experience unanticipated cardiac arrest  ASA# 12 - Leydi-op Mortality Rate: ASA12B - Patient did NOT die  ASA# 13 - PACU Re-Intubation Rate: ASA13B - Patient did NOT require a new airway mgmt  ASA# 10 - Composite Anes Safety: ASA10A - No serious adverse event    Additional Notes:

## 2021-06-10 NOTE — PROGRESS NOTES
[facility]     SUBJECTIVE: Stefan Diallo is a 75 y.o. male who presents today with concerns for right knee redness anterior    Reports similar episode in left calf region about a few months ago took bactrim and this resolved within a few days.  He denies trauma to this area. No recent open cuts/wounds. Denies fever  No hx of MRSA skin infection. No hx of surgery to this right knee.   Able to bend and extend the right knee.      PMH:   Past Medical History:   Diagnosis Date     Atrial fibrillation (H)      Bacteremia      GERD (gastroesophageal reflux disease)      GI hemorrhage      High cholesterol      Hyperlipidemia      Hypertension      Renal artery aneurysm (H)      Sleep apnea, obstructive     USES BIPAP     Type 2 diabetes mellitus (H)      UTI (lower urinary tract infection)            OBJECTIVE:    Vitals: /67   Pulse 85   Temp 98.2  F (36.8  C) (Oral)   Resp 12   SpO2 97%    BMI= There is no height or weight on file to calculate BMI.    Gen:  NAD, well-nourished, sitting in chair comfortably  HEENT: EOMI, sclera anicteric, Head normocephalic, ; nares patent; moit mucous membranes  Neck: trachea midline, no thyromegaly  CV:  Hemodynamically stable  Pulm:  no increased work of breathing   R knee: anterior redness with mild swelling without fluctuance, able to bend flex to 70 degrees and can extend to 180 degrees. No open wounds or ulcers.   Extrem: no cyanosis, Right leg 1+ Edema of lower extremity below the knee (no edema  Left side)   Skin: no obvious rashes or abnormalities  Psych: Euthymic, linear thoughts, normal rate of speech    No results found for this or any previous visit (from the past 24 hour(s)).    ASSESSMENT AND PLAN:     Stefan Diallo is a 75 y.o. male     1. Cellulitis of leg, right  Patient is considered to have fair control of his diabetes (although last A1c check ~ 1 year ago).   CBC 12.8 at this present time. No fever experienced.  This appears to be a developing  cellulitis thus we will start him on antibiotics he has tolerated Bactrim in the past so we will start with this is a twice daily medication for the next week.  Discussed that if symptoms worsen which include fever, increasing pain, inability to move the joint, lightheadedness then he is to come in right away to be evaluated.  My concern for septic joint at this time is low given he has no history of a leg procedure and no recent penetrating wounds. CRP is pending, was drawn as a baseline marker if needed for future reference.   - HM1(CBC and Differential)  - C-Reactive Protein(CRP)  - HM1 (CBC with Diff)  - [facility] ZACK Shaver MD  [facility] Unscheduled Care    This note was created with help of Dragon dictation system. Grammatical /typing errors are not intentional.    Options for treatment and/or follow-up care were reviewed with the patient and/or guardian. Stefan Diallo and/or guardian was engaged and actively involved in the decision making process. He and/or guardian verbalized understanding of the options discussed and was satisfied with the final plan.

## 2021-06-10 NOTE — TELEPHONE ENCOUNTER
Who is calling:  Patient   Reason for Call:  Patient is calling in for follow up on below medication question Patient is requesting for providers response whether he should take antibiotic or not . Please advise .  Date of last appointment with primary care: 05/11/20  Okay to leave a detailed message: No

## 2021-06-10 NOTE — TELEPHONE ENCOUNTER
Patient is having a cough, but he really needs to come in for INR check tmr 8/25 because he is on antibiotic. Can we make some accommodations to make sure patient can check INR in the clinic tomorrow?     Thank you.

## 2021-06-10 NOTE — PROGRESS NOTES
INR 1.9pt was taking 7.5 mg on Wed and 5 mg all other days. Will increase to 7.5 mg Sun and Wed and 5 all other days. Retest in 4 weeks. INR stable. Discussed continuing management of dose of Warfarin and returning in one month . No changes to diet needed at this time. Continue moderate intake of Vitamin K and call if increase bruising or unexplained bleeding. Call with medication changes or upcoming procedures.

## 2021-06-10 NOTE — PROGRESS NOTES
MTM Transition of Care Encounter  Assessment & Plan                                                     Post Discharge Medication Reconciliation Status: discharge medications reconciled and changed, per note/orders    1. Cellulitis of right lower extremity- symptoms improving.  Patient states that the swelling has gone down and he does have a follow-up scheduled September 1 for suture removal and other monitoring.  Reiterated the importance of completing cephalexin course of antibiotics and contacting clinic with any medication concerns or questions.  Patient states that he is really having any pain, but also endorsing using naproxen for any pain as needed, recommended against using NSAID therapy for pain and recommended use of Tylenol and the oxycodone that was prescribed instead.  Plan  1.  Patient to complete full course of antibiotics even if infection seems healed  2.  MTM recommended against using NSAIDs such as Aleve for pain  3.  Recommended patient only use acetaminophen or prescribed pain medications (dispensed oxycodone at discharge)    2. Atrial fibrillation, unspecified type (H) patient appropriately taking beta-blocker daily and anticoagulation as prescribed.  Recommend discussion with anticoagulation nurse and care team for follow-up INR as directed.    3. Essential hypertension, benign-Needs further assessment.  Patient's lisinopril was discontinued in the hospital due to possible concern of ACE inhibitor induced cough, previously taking lisinopril 5 mg daily.  Patient's cough has not improved, though recommended continuing to hold lisinopril until follow-up discussion with PCP, follow-up BP and labs.    4. Cough- Needs improvement.  Discussed that his current cough could be related to his previous ACE and inhibitor use or other causes.  For now, recommended continuing to hold lisinopril until follow-up with PCP.  Additionally, recommended switching from his current cough syrup which includes  "guaifenesin to only a cough suppressant, dextromethorphan.  Plan  1.  Continue to hold lisinopril until follow-up with PCP, could consider switching to ARB such as losartan or valsartan if additional BP medication is needed  2.  Patient to stop using cough syrup with guaifenesin (an expectorant)  3.  Recommended patient  cough syrup only containing dextromethorphan such as Delsym    Follow Up  Return in about 4 weeks (around 9/22/2020) for Next scheduled follow up, Primary Care.     Subjective & Objective                                                       Stefan Diallo is a 75 y.o. male called for a transitions of care visit. he was discharged from [facility] on 8/20/20 for cellulitis.    Patient consented to a telehealth visit: Yes  Chief Complaint: Hospital follow up  Medication Adherence/Access: Self mangement.  Patient endorsing that he does not miss any doses of his medications.    Cellulitis: Discharged on cephalexin 1,000 mg three times a day for 14 days.  Patient states that he has had minimal pain, endorsing that his swelling has decreased significantly in his knee.  Patient admits that he has had some bouts of diarrhea, but this is not consistent, it is variable.  Patient tolerating medications well and aware to complete full course of antibiotics.  Per discharge notes, \"weightbearing as tolerated with crutches or walker.  Knee immobilizer on at all times when out of bed, okay to remove when in bed.  Follow-up in 10 to 14 days with orthopedics for wound check, suture removal\".    Chronic A. Fib.: warfarin 1 tablet every day and 1.5 tablets on Sunday. INR elevated on 8/14 and given vitamiin K, warfarin resumed 8/19.  Patient endorsing that he took Aleve this morning for pain, he has been taking this on an as-needed basis for quite some time, never instructed that he should not use Aleve or other NSAID products.  Patient has been concerned because he was told that he cannot be seen in clinic because " of his cough, not sure when he will get his INR rechecked.   Lab Results   Component Value Date    INR 1.42 (H) 2020    INR 1.43 (H) 2020    INR 1.52 (H) 2020     HTN: Patient states that he is taking the atenolol and diltiazem as prescribed, according to med list this is atenolol 25 mg daily  and diltiazem 120 mg daily.  In the hospital his lisinopril was held due to borderline blood pressure and persistent cough.  Patient states that since hospitalization his cough does not seem to be improving.  Patient does not check his BP at home, not sure what his BP has been.  BP Readings from Last 3 Encounters:   20 117/72   20 130/72   20 130/67     Lab Results   Component Value Date    CREATININE 0.81 2020     Cough: Patient states that his cough continues, despite discontinuation of lisinopril at least 5+ days ago.  Patient states that his cough is dry with no productive mucus.  States that the cough initiated about a few days prior to being in the hospital.  Patient states that he does not have allergies or other symptoms associated with his cough at this time.  Patient states that he picked up from the drugstore and over-the-counter cough syrup that contains both dextromethorphan and guaifenesin.      PMH: reviewed in EPIC   Allergies/ADRs: reviewed in EPIC   Alcohol: Prrertty much zero  Tobacco:   Social History     Tobacco Use   Smoking Status Former Smoker     Packs/day: 2.00     Years: 27.00     Pack years: 54.00     Types: Cigarettes     Last attempt to quit: 1990     Years since quittin.6   Smokeless Tobacco Never Used     Recent Vitals:   BP Readings from Last 3 Encounters:   20 117/72   20 130/72   20 130/67      Wt Readings from Last 3 Encounters:   08/15/20 (!) 252 lb 11.2 oz (114.6 kg)   20 (!) 262 lb (118.8 kg)   20 (!) 262 lb (118.8 kg)     ----------------    The patient declined an after visit summary    I spent 30 minutes  with this patient today;  . All changes were made via collaborative practice agreement with Collin Singh MD. A copy of the visit note was provided to the patient's provider.     eKrline Luu ([place]), PharmD  Medication Therapy Management Pharmacist  [facility]    Telemedicine Visit Details    Type of service:  Telephone     Start Time: 9:00 AM  End Time (time video/phone call stopped): 9:30 AM    Originating Location (pt. Location): Home    Distant Location (provider location):  [facility] MEDICATION THERAPY MANAGEMENT [facility]    Mode of Communication:   Telephone     Current Outpatient Medications   Medication Sig Dispense Refill     ACCU-CHEK GUIDE strips TEST ONCE DAILY BEFORE BREAKFAST 100 strip 3     acetaminophen (TYLENOL) 500 MG tablet Take 2 tablets (1,000 mg total) by mouth every 6 (six) hours as needed for pain.  0     atenolol (TENORMIN) 50 MG tablet Take 0.5 tablets (25 mg total) by mouth daily.       atorvastatin (LIPITOR) 20 MG tablet Take 1 tablet (20 mg total) by mouth daily. 90 tablet 3     blood-glucose meter (CONTOUR NEXT METER) Misc Use once daily. 1 each 0     blood-glucose meter Misc Use 1 Device As Directed daily before breakfast. 1 each 0     cephalexin (KEFLEX) 500 MG capsule Take 2 capsules (1,000 mg total) by mouth 3 (three) times a day for 14 days. 84 capsule 0     cholecalciferol, vitamin D3, 1,000 unit tablet Take 1,000 Units by mouth daily.              compression socks, x-large Misc Apply compression socks to the r lower leg daily, remove at bedtime. 1 each 0     CONTOUR NEXT TEST STRIPS strips USE TO TEST ONCE DAILY 100 strip 5     diltiazem (CARDIZEM CD) 120 MG 24 hr capsule TAKE 1 CAPSULE BY MOUTH EVERY DAY 90 capsule 1     EPINEPHrine (EPIPEN 2-BRI) 0.3 mg/0.3 mL atIn Inject 0.3 mL (0.3 mg total) into the shoulder, thigh, or buttocks once as needed (allergic reaction). 2 Pre-filled Pen Syringe 1     gabapentin (NEURONTIN) 300 MG  capsule Take 2 capsules (600 mg total) by mouth at bedtime. 180 capsule 3     gemfibrozil (LOPID) 600 MG tablet TAKE 1 TABLET BY MOUTH TWICE A  tablet 3     generic lancets (ACCU-CHEK SOFTCLIX LANCETS) Use 1 application As Directed daily before breakfast. 100 each 3     metFORMIN (GLUCOPHAGE) 500 MG tablet TAKE 1 TABLET BY MOUTH EACH MORNING AND 2 TABLETS EACH EVENING 270 tablet 1     miscellaneous medical supply Misc Bipap w heated humidifier, tubing & chamber all x1, FFM w cushion x 1, headgear x 1, filers: disposable and resuable x 1pk both       omeprazole (PRILOSEC) 20 MG capsule TAKE 1 CAPSULE BY MOUTH EVERY DAY 90 capsule 2     ranitidine (ZANTAC) 150 MG tablet Take 150 mg by mouth 2 (two) times a day as needed for heartburn.       senna-docusate (PERICOLACE) 8.6-50 mg tablet Take 1 tablet by mouth 2 (two) times a day as needed for constipation.  0     vitamin A-vitamin C-vit E-min (OCUVITE) Tab tablet Take 1 tablet by mouth 2 (two) times a day.              warfarin ANTICOAGULANT (COUMADIN/JANTOVEN) 5 MG tablet Take 5-7.5 mg by mouth See Admin Instructions. 5mg daily except 7.5mg on Sundays       No current facility-administered medications for this visit.

## 2021-06-10 NOTE — TELEPHONE ENCOUNTER
Reason for Disposition    Cough with cold symptoms (e.g., runny nose, postnasal drip, throat clearing)    Additional Information    Negative: Bluish (or gray) lips or face    Negative: SEVERE difficulty breathing (e.g., struggling for each breath, speaks in single words)    Negative: Rapid onset of cough and has hives    Negative: Coughing started suddenly after medicine, an allergic food or bee sting    Negative: Difficulty breathing after exposure to flames, smoke, or fumes    Negative: Sounds like a life-threatening emergency to the triager    Negative: Previous asthma attacks and this feels like asthma attack    Negative: Chest pain present when not coughing    Negative: Difficulty breathing    Negative: Passed out (i.e., fainted, collapsed and was not responding)    Negative: Patient sounds very sick or weak to the triager    Negative: Coughed up > 1 tablespoon (15 ml) blood (Exception: blood-tinged sputum)    Negative: Fever > 103 F (39.4 C)    Negative: Fever > 101 F (38.3 C) and over 60 years of age    Negative: Fever > 100.0 F (37.8 C) and has diabetes mellitus or a weak immune system (e.g., HIV positive, cancer chemotherapy, organ transplant, splenectomy, chronic steroids)    Negative: Fever > 100.0 F (37.8 C) and bedridden (e.g., nursing home patient, stroke, chronic illness, recovering from surgery)    Negative: Increasing ankle swelling    Negative: Wheezing is present    Negative: SEVERE coughing spells (e.g., whooping sound after coughing, vomiting after coughing)    Negative: Coughing up madai-colored (reddish-brown) or blood-tinged sputum    Negative: Fever present > 3 days (72 hours)    Negative: Fever returns after gone for over 24 hours and symptoms worse or not improved    Negative: Using nasal washes and pain medicine > 24 hours and sinus pain persists    Negative: Known COPD or other severe lung disease (i.e., bronchiectasis, cystic fibrosis, lung surgery) and worsening symptoms (i.e.,  increased sputum purulence or amount, increased breathing difficulty)    Negative: Continuous (nonstop) coughing interferes with work or school and no improvement using cough treatment per Care Advice    Negative: Patient wants to be seen    Negative: Cough has been present for > 3 weeks    Negative: Allergy symptoms are also present (e.g., itchy eyes, clear nasal discharge, postnasal drip)    Negative: Nasal discharge present > 10 days    Negative: Exposure to TB (Tuberculosis)    Negative: Taking an ACE Inhibitor medication (e.g., benazepril/LOTENSIN, captopril/CAPOTEN, enalapril/VASOTEC, lisinopril/ZESTRIL)    Negative: Cough with no complications    Protocols used: COUGH-A-OH

## 2021-06-10 NOTE — ANESTHESIA CARE TRANSFER NOTE
Last vitals:   Vitals:    08/18/20 1822   BP: 112/68   Pulse: 93   Resp: (!) 35   Temp: 36.6  C (97.9  F)   SpO2: 93%     Patient's level of consciousness is awake  Spontaneous respirations: yes  Maintains airway independently: yes  Dentition unchanged: yes  Oropharynx: oropharynx clear of all foreign objects    QCDR Measures:  ASA# 20 - Surgical Safety Checklist: WHO surgical safety checklist completed prior to induction    PQRS# 430 - Adult PONV Prevention: 4558F - Pt received => 2 anti-emetic agents (different classes) preop & intraop  ASA# 8 - Peds PONV Prevention: NA - Not pediatric patient, not GA or 2 or more risk factors NOT present  PQRS# 424 - Leydi-op Temp Management: 4559F - At least one body temp DOCUMENTED => 35.5C or 95.9F within required timeframe  PQRS# 426 - PACU Transfer Protocol: - Transfer of care checklist used  ASA# 14 - Acute Post-op Pain: ASA14B - Patient did NOT experience pain >= 7 out of 10

## 2021-06-10 NOTE — ANESTHESIA PREPROCEDURE EVALUATION
Anesthesia Evaluation      Patient summary reviewed     Airway   Mallampati: III  Neck ROM: full   Pulmonary     breath sounds clear to auscultation  (+) sleep apnea,                          Cardiovascular   (+) hypertension, dysrhythmias (afib - warfarin), , hypercholesterolemia,     Rhythm: irregular        Neuro/Psych    (+) neuromuscular disease (neuropathy),      Endo/Other    (+) diabetes mellitus type 2, arthritis, obesity,      GI/Hepatic/Renal    (+) GERD,        Other findings:       COVID neg 8/14/2020      Results for LEI SIDDIQUI (MRN [id number]) as of 8/18/2020 8/18/2020 06:15  INR: 1.52 (H)      Results for LEI SIDDIQUI (MRN [id number]) as of 8/18/2020 8/17/2020 05:52  WBC: 13.0 (H)  RBC: 3.63 (L)  Hemoglobin: 11.0 (L)  Hematocrit: 33.7 (L)  MCV: 93  MCH: 30.3  MCHC: 32.6  RDW: 13.4  Platelets: 425  MPV: 9.2      ECHO 3/10/2020:    Indications     Essential hypertension   Pure hypercholesterolemia   Chest pain, unspecified   Shortness of breath   Conclusion           The nuclear stress test is negative for inducible myocardial ischemia or infarction.     The left ventricular ejection fraction at stress is 66%.     A prior study was conducted on 2/27/2017.  No interval change.          Dental    (+) lower dentures and upper dentures                       Anesthesia Plan  Planned anesthetic: general LMA      Ketamine    ASA 3 - emergent   Induction: intravenous   Anesthetic plan and risks discussed with: patient  Anesthesia plan special considerations: antiemetics,   Post-op plan: routine recovery

## 2021-06-10 NOTE — TELEPHONE ENCOUNTER
I called the pharmacist.  OK to dispense Bactrim.  I instructed pt to take 1 warfarin per day, not the 1.5 on one day, during the antibiotic.  INR was 2.32 yesterday.  (He is in the clinic lobby now.)  Be seen if worsening.  andre

## 2021-06-10 NOTE — TELEPHONE ENCOUNTER
ANTICOAGULATION  MANAGEMENT    Assessment     Today's INR result of 2.32 is Therapeutic (goal INR of 2.0-3.0)        Warfarin taken as previously instructed    No new diet changes affecting INR    Potential interaction between Bactrim and warfarin which may affect subsequent INRs    Continues to tolerate warfarin with no reported s/s of bleeding or thromboembolism     Previous INR was Therapeutic    Plan:     Spoke with Stefan regarding INR result and instructed:     Warfarin Dosing Instructions:  Continue current warfarin dose    7.5 mg every Sun; 5 mg all other days         Instructed patient to follow up no later than: 8/7 (or 4 days after starting Bactrim).     Education provided: importance of following up for INR monitoring at instructed interval, importance of taking warfarin as instructed and potential interaction between warfarin and Bactrim    Stefan verbalizes understanding and agrees to warfarin dosing plan.    Instructed to call the [facility] for any changes, questions or concerns. (#[phone])   ?   Torin Tobar RN    Subjective/Objective:      Stefan Diallo, a 75 y.o. male is on warfarin.     Stefan reports:     Home warfarin dose: template incorrect; verbally confirmed home dose with pt and updated on anticoagulation calendar     Missed doses: No     Medication changes:  Yes: possibly starting on Bactrim     S/S of bleeding or thromboembolism:  No     New Injury or illness:  Yes: cellulitis     Changes in diet or alcohol consumption:  No     Upcoming surgery, procedure or cardioversion:  No    Anticoagulation Episode Summary     Current INR goal:   2.0-3.0   TTR:   100.0 % (1 y)   Next INR check:   8/7/2020   INR from last check:   2.32 (8/4/2020)   Weekly max warfarin dose:      Target end date:      INR check location:      Preferred lab:      Send INR reminders to:   HILARIO HECTOR    Indications    Atrial fibrillation  unspecified type (H) [I48.91]           Comments:             Anticoagulation Care Providers     Provider Role Specialty Phone number    Collin Singh MD Referring Family Medicine [phone]

## 2021-06-10 NOTE — TELEPHONE ENCOUNTER
"ANTICOAGULATION  MANAGEMENT    Stefan Diallo is on warfarin and had a point of care INR result > 5.5 or an \"error message\" on point of care meter today.    Afternoon INR; STAT venous INR processing and STAT  requested from lab    Spoke with Stefan via phone while at the lab and reviewed triage questions for potential signs and symptoms of bleeding.      Patient Response     Have you had any bleeding in the last week?   No   Have you passed any red, black, or tarry stools in last week?   No   Have you vomited/spit up any red or coffee ground material in last week?   No   Have you had any new, severe abdominal pain or bloating develop in last week?   No   Have you fallen or had any injuries in last week?   No   Do you currently have a severe, sudden onset headache?   No   Do you currently have any severe sudden changes in your vision?   No   Do you currently have any new onset numbness, weakness/paralysis?   No     Assessment/Plan:     Stefan's responses were negative for signs and symptoms of bleeding; may discharge from clinic    Stefan instructed to:       Hold warfarin today until venous INR result returns and receives follow up call from [facility]    Seek medical attention for new signs and symptoms of bleeding or a fall with injury.       "

## 2021-06-10 NOTE — TELEPHONE ENCOUNTER
Dr. Singh,     INR is 3.92 today.     Patient is having spine procedure next Mon 8/31 and the spine clinic would like him to hold warfarin 5-7 days prior. So he will start holding warfarin today.     Please advise if patient should be bridging while holding? Thank you.

## 2021-06-10 NOTE — PATIENT INSTRUCTIONS - HE
Bactrim antibiotic take twice a day for 7 days      If you are seeing no improvement within next few days please return for evaluation      IF you develop nausea/fever/lightheadedness/ difficulty with moving your knee joint please return right away

## 2021-06-10 NOTE — TELEPHONE ENCOUNTER
RN cannot approve Refill Request    RN can NOT refill this medication Protocol failed and NO refill given. Last office visit: 10/23/2019 Collin Singh MD Last Physical: 9/6/2019 Last MTM visit: Visit date not found Last visit same specialty: 10/23/2019 Collin Singh MD.  Next visit within 3 mo: Visit date not found  Next physical within 3 mo: Visit date not found      Lula Karimi, [facility] Connection Triage/Med Refill 7/30/2020    Requested Prescriptions   Pending Prescriptions Disp Refills     metFORMIN (GLUCOPHAGE) 500 MG tablet [Pharmacy Med Name: METFORMIN  MG TABLET] 270 tablet 1     Sig: TAKE 1 TABLET BY MOUTH EACH MORNING AND 2 TABLETS EACH EVENING       Metformin Refill Protocol Failed - 7/28/2020  7:27 AM        Failed - LFT or AST or ALT in last 12 months     Albumin   Date Value Ref Range Status   06/16/2017 3.7 3.5 - 5.0 g/dL Final     Bilirubin, Total   Date Value Ref Range Status   06/16/2017 0.3 0.0 - 1.0 mg/dL Final     Bilirubin, Direct   Date Value Ref Range Status   03/28/2011 0.2 <0.6 mg/dL Final     Alkaline Phosphatase   Date Value Ref Range Status   06/16/2017 77 45 - 120 U/L Final     AST   Date Value Ref Range Status   06/16/2017 30 0 - 40 U/L Final     ALT   Date Value Ref Range Status   06/16/2017 21 0 - 45 U/L Final     Protein, Total   Date Value Ref Range Status   06/16/2017 7.1 6.0 - 8.0 g/dL Final                Failed - Visit with PCP or prescribing provider visit in last 6 months or next 3 months     Last office visit with prescriber/PCP: Visit date not found OR same dept: 10/23/2019 Collin Singh MD OR same specialty: 10/23/2019 Collin Singh MD Last physical: Visit date not found Last MTM visit: Visit date not found         Next appt within 3 mo: Visit date not found  Next physical within 3 mo: Visit date not found  Prescriber OR PCP: Collin Singh MD  Last diagnosis associated with med order: 1. Type 2 diabetes mellitus without complication,  without long-term current use of insulin (H)  - metFORMIN (GLUCOPHAGE) 500 MG tablet [Pharmacy Med Name: METFORMIN  MG TABLET]; TAKE 1 TABLET BY MOUTH EACH MORNING AND 2 TABLETS EACH EVENING  Dispense: 270 tablet; Refill: 1     If protocol passes may refill for 12 months if within 3 months of last provider visit (or a total of 15 months).           Failed - A1C in last 6 months     Hemoglobin A1c   Date Value Ref Range Status   08/19/2019 7.6 (H) 3.5 - 6.0 % Final               Failed - Microalbumin in last year      Microalbumin, Random Urine   Date Value Ref Range Status   06/16/2017 2.36 (H) 0.00 - 1.99 mg/dL Final                  Passed - Blood pressure in last 12 months     BP Readings from Last 1 Encounters:   03/02/20 136/90             Passed - GFR or Serum Creatinine in last 6 months     GFR MDRD Non Af Amer   Date Value Ref Range Status   08/22/2019 >60 >60 mL/min/1.73m2 Final     GFR MDRD Af Amer   Date Value Ref Range Status   08/22/2019 >60 >60 mL/min/1.73m2 Final

## 2021-06-10 NOTE — TELEPHONE ENCOUNTER
ANTICOAGULATION  MANAGEMENT    Assessment     Today's INR result of 3.92 is Supratherapeutic (goal INR of 2.0-3.0)        Warfarin taken as previously instructed    No new diet changes affecting INR    Interaction between Keflex and warfarin may be affecting INR    Continues to tolerate warfarin with no reported s/s of bleeding or thromboembolism     Previous INR was Subtherapeutic     Hospitalized 8/14-8/20 for cellulitis.    Having spine procedure on Monday 8/31- [facility] said pt needs to hold warfarin 5-7 days prior procedure.     Plan:     Spoke on phone with Stefan regarding INR result and instructed:     Warfarin Dosing Instructions:  Start holding today. Resume warfarin the evening of 8/31 after procedure if ok with attending doctor.    Instructed patient to follow up no later than: 9/8. Appt made.    Education provided: importance of taking warfarin as instructed    Stefan verbalizes understanding and agrees to warfarin dosing plan.    Instructed to call the [facility] for any changes, questions or concerns. (#[phone])   ?   Torin Tobar RN    Subjective/Objective:      Stefan Diallo, a 75 y.o. male is on warfarin.     Stefan reports:     Home warfarin dose: verbally confirmed home dose with pt and updated on anticoagulation calendar     Missed doses: No     Medication changes:  Yes: Keflex     S/S of bleeding or thromboembolism:  No     New Injury or illness:  Yes: cellulitis     Changes in diet or alcohol consumption:  No     Upcoming surgery, procedure or cardioversion:  Yes: spine procedure 8/31/2020.    Anticoagulation Episode Summary     Current INR goal:   2.0-3.0   TTR:   100.0 % (11.6 mo)   Next INR check:   9/8/2020   INR from last check:   3.92! (8/25/2020)   Weekly max warfarin dose:      Target end date:      INR check location:      Preferred lab:      Send INR reminders to:   HILARIO HECTOR    Indications    Atrial fibrillation  unspecified type  (H) [I48.91]           Comments:            Anticoagulation Care Providers     Provider Role Specialty Phone number    Collin Singh MD Referring Family Medicine [phone]

## 2021-06-11 NOTE — PROGRESS NOTES
ASSESSMENT and plan   1. Postnasal drip  Patient has postural cough.  He has significant postnasal drip noted on exam.  Starting him on Flonase.  - fluticasone propionate (FLONASE) 50 mcg/actuation nasal spray; 2 sprays into each nostril daily.  Dispense: 10 g; Refill: 11    2. Cough    No response to cetirizine and loratadine.  Adding Singulair to his regimen.  Patient has had the CTA and the chest x-ray was in the last 1 months which have not been read as being abnormal.  I have asked him to stop using cough syrups at night  - montelukast (SINGULAIR) 10 mg tablet; Take 1 tablet (10 mg total) by mouth daily.  Dispense: 30 tablet; Refill: 2    3. Weight loss    Weight loss noted by the patient he says his appetite is normal.  Checking a hemogram today he denies having any fever or chills.  Energy level is normal.  - HM1(CBC and Differential)  - HM1 (CBC with Diff)    4. Chronic atrial fibrillation (H)    - INR      There are no Patient Instructions on file for this visit.    Orders Placed This Encounter   Procedures     HM1 (CBC with Diff)     There are no discontinued medications.    No follow-ups on file.    CHIEF COMPLAINT:  Chief Complaint   Patient presents with     Cough     ongoing       HISTORY OF PRESENT ILLNESS:  Stefan is a 75 y.o. male who is following up for cough he saw me approximately 2 weeks ago.  At that time I started her on cetirizine he has restarted his lisinopril he is taking cough syrup at night to help him sleep he says when he tries to lie down his cough gets worse he feels short of breath.  He denies any chest pain.  He states that he has been taking all his medications faithfully no fever no chills he has lost weight without trying he says his appetite is normal.    REVIEW OF SYSTEMS:     Pulmonary positive for cough  General positive for weight loss  10 point review of  All other systems are negative.    PFSH:    Social history reviewed    TOBACCO USE:  Social History     Tobacco Use  "  Smoking Status Former Smoker     Packs/day: 2.00     Years: 27.00     Pack years: 54.00     Types: Cigarettes     Last attempt to quit: 1990     Years since quittin.6   Smokeless Tobacco Never Used       VITALS:  Vitals:    20 1416   BP: 124/78   Pulse: 80   Resp: 18   Temp: 97.5  F (36.4  C)   TempSrc: Oral   Weight: (!) 246 lb 9 oz (111.8 kg)   Height: 6' 1\" (1.854 m)     Wt Readings from Last 3 Encounters:   20 (!) 246 lb 9 oz (111.8 kg)   20 (!) 247 lb 3 oz (112.1 kg)   08/15/20 (!) 252 lb 11.2 oz (114.6 kg)       PHYSICAL EXAM:  Interactive elderly male sitting comfortably exam no acute distress  HEENT neck supple mucous membranes moist oral cavity shows no exudate no erythema nasal passages are patent posterior pharyngeal wall shows copious clear discharge present.  No sinus tenderness present  Respiratory system clear to auscultation equal breath sounds no wheezes no crackles  CVS regular rate and rhythm no murmurs rubs gallops appreciated  CNS cranials 2-12 intact gait is normal reflexes are brisk    DATA REVIEWED:  Additional History from Old Records Summarized (2): 0  Decision to Obtain Records (1): 0  Radiology Tests Summarized or Ordered (1): 0  Labs Reviewed or Ordered (1): 0  Medicine Test Summarized or Ordered (1): 0  Independent Review of EKG or X-RAY(2 each): 0    The visit lasted a total of 25 minutes face to face with the patient. Over 50% of the time was spent counseling and educating the patient about   Cough allergies and postnasal drip.    MEDICATIONS:  Current Outpatient Medications   Medication Sig Dispense Refill     ACCU-CHEK GUIDE strips TEST ONCE DAILY BEFORE BREAKFAST 100 strip 3     acetaminophen (TYLENOL) 500 MG tablet Take 2 tablets (1,000 mg total) by mouth every 6 (six) hours as needed for pain.  0     atenolol (TENORMIN) 50 MG tablet Take 0.5 tablets (25 mg total) by mouth daily.       atorvastatin (LIPITOR) 20 MG tablet Take 1 tablet (20 mg total) by " mouth daily. 90 tablet 0     blood-glucose meter (CONTOUR NEXT METER) Misc Use once daily. 1 each 0     blood-glucose meter Misc Use 1 Device As Directed daily before breakfast. 1 each 0     cetirizine (ZYRTEC) 10 MG tablet Take 1 tablet (10 mg total) by mouth daily. 30 tablet 2     cholecalciferol, vitamin D3, 1,000 unit tablet Take 1,000 Units by mouth daily.              compression socks, x-large Misc Apply compression socks to the r lower leg daily, remove at bedtime. 1 each 0     CONTOUR NEXT TEST STRIPS strips USE TO TEST ONCE DAILY 100 strip 5     diltiazem (CARDIZEM CD) 120 MG 24 hr capsule TAKE 1 CAPSULE BY MOUTH EVERY DAY 90 capsule 1     EPINEPHrine (EPIPEN 2-BRI) 0.3 mg/0.3 mL injection Inject 0.3 mL (0.3 mg total) into the shoulder, thigh, or buttocks once as needed (allergic reaction). 2 Pre-filled Pen Syringe 1     fluticasone propionate (FLONASE) 50 mcg/actuation nasal spray 2 sprays into each nostril daily. 10 g 11     gabapentin (NEURONTIN) 300 MG capsule Take 2 capsules (600 mg total) by mouth at bedtime. 180 capsule 3     gemfibrozil (LOPID) 600 MG tablet TAKE 1 TABLET BY MOUTH TWICE A  tablet 3     generic lancets (ACCU-CHEK SOFTCLIX LANCETS) Use 1 application As Directed daily before breakfast. 100 each 3     loratadine (CLARITIN) 10 mg tablet Take 1 tablet (10 mg total) by mouth daily. 90 tablet 3     metFORMIN (GLUCOPHAGE) 500 MG tablet TAKE 1 TABLET BY MOUTH EACH MORNING AND 2 TABLETS EACH EVENING 270 tablet 1     miscellaneous medical supply Misc Bipap w heated humidifier, tubing & chamber all x1, FFM w cushion x 1, headgear x 1, filers: disposable and resuable x 1pk both       montelukast (SINGULAIR) 10 mg tablet Take 1 tablet (10 mg total) by mouth daily. 30 tablet 2     omeprazole (PRILOSEC) 20 MG capsule TAKE 1 CAPSULE BY MOUTH EVERY DAY 90 capsule 2     ranitidine (ZANTAC) 150 MG tablet Take 150 mg by mouth 2 (two) times a day as needed for heartburn.       senna-docusate  (PERICOLACE) 8.6-50 mg tablet Take 1 tablet by mouth 2 (two) times a day as needed for constipation.  0     vitamin A-vitamin C-vit E-min (OCUVITE) Tab tablet Take 1 tablet by mouth 2 (two) times a day.              warfarin ANTICOAGULANT (COUMADIN/JANTOVEN) 5 MG tablet Take 5-7.5 mg by mouth See Admin Instructions. 5mg daily except 7.5mg on Sundays       No current facility-administered medications for this visit.      Chad VALENTINO

## 2021-06-11 NOTE — TELEPHONE ENCOUNTER
Chart reviewed. Please review findings below.     Assessment and plan  1. Cellulitis and abscess of leg no tenderness noted over the leg.  No itching or swelling or redness noted.     2. Urticaria medication refilled in case symptoms recur EPINEPHrine (EPIPEN 2-BRI) 0.3 mg/0.3 mL injection   3. Atrial fibrillation, unspecified type (H) no chest pain taking medications faithfully back on warfarin     4. Type II diabetes mellitus with peripheral circulatory disorder (H) back in metformin did not bring his glucometer in today     5. Cough cough is precipitated by postural changes he stopped his lisinopril and the cough continued its mainly present at night due to postnasal drip cetirizine prescribed cetirizine (ZYRTEC) 10 MG tablet   6. Postnasal drip  cetirizine (ZYRTEC) 10 MG tablet   7. Essential hypertension, benign he can continue lisinopril

## 2021-06-11 NOTE — TELEPHONE ENCOUNTER
ANTICOAGULATION  MANAGEMENT    Assessment     Today's INR result of 4.5 is Supratherapeutic (goal INR of 2.0-3.0)        Warfarin taken as previously instructed    No new diet changes affecting INR    No interaction expected between new singulair and warfarin per utd    Continues to tolerate warfarin with no reported s/s of bleeding or thromboembolism     Previous INR was Subtherapeutic    Plan:     Spoke on phone with Stefan regarding INR result and instructed:     Warfarin Dosing Instructions: Confirmed he did hold warfarin last night.  Change warfarin dose to 2.5 mg daily on fri; and 5 mg daily rest of week  (13.3 % change)    Instructed patient to follow up no later than: one week, scheduled 9/25    Stefan verbalizes understanding and agrees to warfarin dosing plan.    Instructed to call the [facility] for any changes, questions or concerns. (#[phone])   ?   Matty Amin RN    Subjective/Objective:      Stefan Diallo, a 75 y.o. male is on warfarin.     Stefan reports:     Home warfarin dose: verbally confirmed home dose with Stefan and updated on anticoagulation calendar     Missed doses: No     Medication changes:  Finished keflex, new singulair     S/S of bleeding or thromboembolism:  No     New Injury or illness:  Cough ov 9/17     Changes in diet or alcohol consumption:  No     Upcoming surgery, procedure or cardioversion:  No    Anticoagulation Episode Summary     Current INR goal:   2.0-3.0   TTR:   95.7 % (11.6 mo)   Next INR check:   9/25/2020   INR from last check:   4.50! (9/17/2020)   Weekly max warfarin dose:      Target end date:      INR check location:      Preferred lab:      Send INR reminders to:   HILARIO HECTOR    Indications    Atrial fibrillation  unspecified type (H) [I48.91]           Comments:            Anticoagulation Care Providers     Provider Role Specialty Phone number    Collin Singh MD Referring Family Medicine [phone]

## 2021-06-11 NOTE — TELEPHONE ENCOUNTER
Refill Given    Refill given per Policy, patient informed they are overdue for Physical     Kimberly Henderson, [facility] Connection Triage/Med Refill 9/2/2020    Requested Prescriptions   Pending Prescriptions Disp Refills     atorvastatin (LIPITOR) 20 MG tablet [Pharmacy Med Name: ATORVASTATIN 20 MG TABLET] 90 tablet 3     Sig: TAKE 1 TABLET BY MOUTH EVERY DAY       Statins Refill Protocol (Hmg CoA Reductase Inhibitors) Passed - 8/31/2020 11:05 AM        Passed - PCP or prescribing provider visit in past 12 months      Last office visit with prescriber/PCP: 10/23/2019 Collin Singh MD OR same dept: 10/23/2019 Collin Singh MD OR same specialty: 10/23/2019 Collin Singh MD  Last physical: 9/6/2019 Last MTM visit: Visit date not found   Next visit within 3 mo: Visit date not found  Next physical within 3 mo: Visit date not found  Prescriber OR PCP: Collin Singh MD  Last diagnosis associated with med order: 1. Pure hypercholesterolemia  - atorvastatin (LIPITOR) 20 MG tablet [Pharmacy Med Name: ATORVASTATIN 20 MG TABLET]; TAKE 1 TABLET BY MOUTH EVERY DAY  Dispense: 90 tablet; Refill: 3    If protocol passes may refill for 12 months if within 3 months of last provider visit (or a total of 15 months).                Kimberly Henderson RN [facility] Triage Nurse Advisor

## 2021-06-11 NOTE — PROGRESS NOTES
INR 2.6. Left VM message with return phone number for questions and concerns. [phone]  Continue current management dosing of Warfarin. Continue  diet of moderate Vitamin K intake. Discussed with pt the need to call with questions or concerns or any change in medication especially herbal medication or OTC. Call with increased bleeding or bruising or any upcoming procedures.

## 2021-06-11 NOTE — TELEPHONE ENCOUNTER
ANTICOAGULATION  MANAGEMENT    Assessment     Today's INR result of 1.4 is Subtherapeutic (goal INR of 2.0-3.0)        Warfarin recently held as instructed which may be affecting INR- resumed last night.    No new diet changes affecting INR    Interaction between Keflex and warfarin may be affecting INR- will complete in a couple days.     Continues to tolerate warfarin with no reported s/s of bleeding or thromboembolism     Previous INR was Supratherapeutic    Plan:     Spoke on phone with Stefan regarding INR result and instructed:     Warfarin Dosing Instructions:  Continue current warfarin dose 7.5 mg daily on Sun; and 5 mg daily rest of week  (0 % change)    Instructed patient to follow up no later than: 1 week.    Education provided: importance of following up for INR monitoring at instructed interval and importance of taking warfarin as instructed    Stefan verbalizes understanding and agrees to warfarin dosing plan.    Instructed to call the [facility] for any changes, questions or concerns. (#[phone])   ?   Torin Tobar RN    Subjective/Objective:      Stefan MIRANDA Yoel, a 75 y.o. male is on warfarin.     Stefan reports:     Home warfarin dose: template incorrect; verbally confirmed home dose with pt and updated on anticoagulation calendar     Missed doses: No     Medication changes:  No     S/S of bleeding or thromboembolism:  No     New Injury or illness:  No     Changes in diet or alcohol consumption:  No     Upcoming surgery, procedure or cardioversion:  No    Anticoagulation Episode Summary     Current INR goal:   2.0-3.0   TTR:   98.8 % (11.6 mo)   Next INR check:   9/8/2020   INR from last check:   1.40! (9/1/2020)   Weekly max warfarin dose:      Target end date:      INR check location:      Preferred lab:      Send INR reminders to:   HILARIO HECTOR    Indications    Atrial fibrillation  unspecified type (H) [I48.91]           Comments:            Anticoagulation Care Providers     Provider  Role Specialty Phone number    Collin Singh MD Referring Family Medicine [phone]

## 2021-06-11 NOTE — PROGRESS NOTES
Assessment: /    Plan:    1. Type 2 diabetes mellitus without complication, without long-term current use of insulin  Comprehensive Metabolic Panel    Glycosylated Hemoglobin A1c    Microalbumin, Random Urine   2. Atrial fibrillation, unspecified type  INR   3. Dysuria  Urinalysis-UC if Indicated    Culture, Urine       Continue current medications.      Subjective:    HPI:  Stefan Diallo is a 72-year-old male presenting for follow-up on diabetes.  He continues to take metformin.  He also notes burning with urination.      Review of Systems: No fever, cough, chest pain, nausea, vomiting.      Current Outpatient Prescriptions   Medication Sig Dispense Refill     atenolol (TENORMIN) 50 MG tablet TAKE ONE TABLET BY MOUTH ONE TIME DAILY (Patient taking differently: 50 mg. TAKE HALF TABLET BY MOUTH ONE TIME DAILY) 90 tablet 3     atorvastatin (LIPITOR) 20 MG tablet Take 1 tablet (20 mg total) by mouth daily. 90 tablet 3     blood sugar diagnostic (GLUCOSE BLOOD) Strp Use As Directed 2 (two) times a day. Bindu Contour Next Test In Vitro Strip,       cholecalciferol, vitamin D3, 1,000 unit tablet Take 2,000 Units by mouth daily.        CYANOCOBALAMIN, VITAMIN B-12, (VITAMIN B12 ORAL) Take by mouth.       diltiazem (CARTIA XT) 120 MG 24 hr capsule TAKE ONE CAPSULE BY MOUTH ONE TIME DAILY 90 capsule 1     EPINEPHrine (EPIPEN 2-BRI) 0.3 mg/0.3 mL (1:1,000) atIn Inject 0.3 mL (0.3 mg total) into the shoulder, thigh, or buttocks once as needed (allergic reaction). 2 Device 2     gabapentin (NEURONTIN) 300 MG capsule TAKE ONE CAPSULE BY MOUTH TWICE DAILY 180 capsule 3     gemfibrozil (LOPID) 600 MG tablet Take 1 tablet (600 mg total) by mouth 2 (two) times a day. 180 tablet 3     lisinopril (PRINIVIL,ZESTRIL) 5 MG tablet TAKE ONE TABLET BY MOUTH ONE TIME DAILY 90 tablet 3     metFORMIN (GLUCOPHAGE) 500 MG tablet TAKE ONE TABLET BY MOUTH TWICE DAILY 180 tablet 1     ranitidine (ZANTAC) 150 MG capsule Take 150 mg by mouth 2 (two)  times a day.       vitamin A-vitamin C-vit E-min (OCUVITE) Tab tablet Take by mouth daily. Focus Eye Select       warfarin (COUMADIN) 5 MG tablet Take 1 tablet (5 mg total) by mouth daily. 7.5 on Wed 10 tablet 0     warfarin (COUMADIN) 5 MG tablet TAKE 1.5 TABLET BY MOUTH ONCE DAILY ON WED AND SAT AND 1 TAB DAILY ON ALL OTHER DAYS OF THE WEEK. 90 tablet 0     omeprazole (PRILOSEC) 20 MG capsule TAKE 1 CAPSULE (20 MG TOTAL) BY MOUTH DAILY. 90 capsule 3     No current facility-administered medications for this visit.          Objective:    Vitals:    06/16/17 1437   BP: 106/70   Pulse: 74   Resp: 19   Temp: 97.9  F (36.6  C)   SpO2: 95%       Gen:  NAD, VSS  Lungs:  normal  Heart:  normal  Abdomen:  No HSM, mass or tenderness  Back without CVA tenderness    UA with minimal findings    ADDITIONAL HISTORY SUMMARIZED (2): None.  DECISION TO OBTAIN EXTRA INFORMATION (1): None.   RADIOLOGY TESTS (1): None.  LABS (1): Ordered.  MEDICINE TESTS (1): None.  INDEPENDENT REVIEW (2 each): None.     Total Data Points: 1

## 2021-06-11 NOTE — TELEPHONE ENCOUNTER
Refill Approved    Rx renewed per Medication Renewal Policy. Medication was last renewed on 12/9/19.    Lula Karimi, [facility] Triage/Med Refill 8/31/2020     Requested Prescriptions   Pending Prescriptions Disp Refills     omeprazole (PRILOSEC) 20 MG capsule [Pharmacy Med Name: OMEPRAZOLE DR 20 MG CAPSULE] 90 capsule 2     Sig: TAKE 1 CAPSULE BY MOUTH EVERY DAY       GI Medications Refill Protocol Passed - 8/27/2020 12:39 AM        Passed - PCP or prescribing provider visit in last 12 or next 3 months.     Last office visit with prescriber/PCP: 10/23/2019 Collin Singh MD OR same dept: 10/23/2019 Collin Singh MD OR same specialty: 10/23/2019 Collin Singh MD  Last physical: 9/6/2019 Last MTM visit: Visit date not found   Next visit within 3 mo: Visit date not found  Next physical within 3 mo: Visit date not found  Prescriber OR PCP: Collin Singh MD  Last diagnosis associated with med order: 1. Gastroesophageal reflux disease with esophagitis  - omeprazole (PRILOSEC) 20 MG capsule [Pharmacy Med Name: OMEPRAZOLE DR 20 MG CAPSULE]; TAKE 1 CAPSULE BY MOUTH EVERY DAY  Dispense: 90 capsule; Refill: 2    If protocol passes may refill for 12 months if within 3 months of last provider visit (or a total of 15 months).

## 2021-06-11 NOTE — TELEPHONE ENCOUNTER
Anticoagulation Management    Unable to reach Stefan today.    Today's INR result of 4.5 is Supratherapeutic (goal INR of 2.0-3.0).     Follow up required to discuss dosing instructions and confirm understanding of instructions.    Left message to hold warfarin tonight.       ACN to follow up    Torin Tobar RN

## 2021-06-11 NOTE — TELEPHONE ENCOUNTER
RN cannot approve Refill Request    RN can NOT refill this medication med is not covered by policy/route to provider. Last office visit: 10/23/2019 Collin Singh MD Last Physical: 9/6/2019 Last MTM visit: Visit date not found Last visit same specialty: 9/17/2020 Pratik Parham MD.  Next visit within 3 mo: Visit date not found  Next physical within 3 mo: Visit date not found      Mirna Will, [facility] Triage/Med Refill 9/23/2020    Requested Prescriptions   Pending Prescriptions Disp Refills     warfarin ANTICOAGULANT (COUMADIN/JANTOVEN) 5 MG tablet [Pharmacy Med Name: WARFARIN SODIUM 5 MG TABLET] 135 tablet 1     Sig: TAKE 1 TO 1.5 TABLETS (5 TO 7.5 MG) BY MOUTH DAILY. ADJUST DOSE BASED ON INR RESULTS AS DIRECTED.       Warfarin Refill Protocol  Failed - 9/23/2020 12:37 AM        Failed -  Route to appropriate pool/provider     Last Anticoagulation Summary:   Anticoagulation Episode Summary     Current INR goal:   2.0-3.0   TTR:   95.6 % (11.4 mo)   Next INR check:   9/25/2020   INR from last check:   4.50! (9/17/2020)   Weekly max warfarin dose:      Target end date:      INR check location:      Preferred lab:      Send INR reminders to:   HILARIO HECTOR    Indications    Atrial fibrillation  unspecified type (H) [I48.91]           Comments:            Anticoagulation Care Providers     Provider Role Specialty Phone number    Collin Singh MD Referring Family Medicine [phone]                Passed - Provider visit in last year     Last office visit with prescriber/PCP: 10/23/2019 Collin Singh MD OR same dept: 9/17/2020 Pratik Parham MD OR same specialty: 9/17/2020 Pratik Parham MD  Last physical: 9/6/2019 Last MTM visit: Visit date not found    Next appt within 3 mo: Visit date not found Next physical within 3 mo: Visit date not found  Prescriber OR PCP: Collin Singh MD  Last diagnosis associated with med order: 1. Chronic atrial fibrillation, unspecified (H)  -  warfarin ANTICOAGULANT (COUMADIN/JANTOVEN) 5 MG tablet [Pharmacy Med Name: WARFARIN SODIUM 5 MG TABLET]; TAKE 1 TO 1.5 TABLETS (5 TO 7.5 MG) BY MOUTH DAILY. ADJUST DOSE BASED ON INR RESULTS AS DIRECTED.  Dispense: 135 tablet; Refill: 1    If protocol passes may refill for 6 months if within 3 months of last provider visit (or a total of 9 months).

## 2021-06-11 NOTE — TELEPHONE ENCOUNTER
Medication Request  Medication name:    Disp  Refills  Start  End  LOLIS    cetirizine (ZYRTEC) 10 MG tablet  30 tablet  2  9/3/2020   No    Sig - Route: Take 1 tablet (10 mg total) by mouth daily. - Oral    Sent to pharmacy as: cetirizine 10 mg tablet (ZyrTEC)    E-Prescribing Status: Receipt confirmed by pharmacy (9/3/2020  4:17 PM CDT)        Requested Pharmacy: [facility]  Reason for request: Caller stated that this medication is not helping him at all and that he would like to try something else. Caller also wants a call back once new one is sent.   When did you use medication last?:    Patient offered appointment:  patient declined  Okay to leave a detailed message: yes

## 2021-06-11 NOTE — PROGRESS NOTES
Assessment and plan  1. Cellulitis and abscess of leg no tenderness noted over the leg.  No itching or swelling or redness noted.    2. Urticaria medication refilled in case symptoms recur EPINEPHrine (EPIPEN 2-BRI) 0.3 mg/0.3 mL injection   3. Atrial fibrillation, unspecified type (H) no chest pain taking medications faithfully back on warfarin    4. Type II diabetes mellitus with peripheral circulatory disorder (H) back in metformin did not bring his glucometer in today    5. Cough cough is precipitated by postural changes he stopped his lisinopril and the cough continued its mainly present at night due to postnasal drip cetirizine prescribed cetirizine (ZYRTEC) 10 MG tablet   6. Postnasal drip  cetirizine (ZYRTEC) 10 MG tablet   7. Essential hypertension, benign he can continue lisinopril          Hospital Follow-up Visit:    Hospital/Nursing Home/IP Rehab Facility: [facility]  Date of Admission: 8/202020  Date of Discharge: 8/25/20  Reason(s) for Admission:   Cellulitis and abscess of right patella with staph infection    Was your hospitalization related to COVID-19? No  Problems taking medications regularly:  None  Medication changes since discharge: None  Problems adhering to non-medication therapy:  None    Summary of hospitalization:  [facility] hospital discharge summary reviewed  Diagnostic Tests/Treatments reviewed.  Follow up needed: none  Other Healthcare Providers Involved in Patient s Care:         None  Update since discharge: improved.      Post Discharge Medication Reconciliation: discharge medications reconciled, continue medications without change.  Plan of care communicated with patient     There are no Patient Instructions on file for this visit.    No orders of the defined types were placed in this encounter.    Medications Discontinued During This Encounter   Medication Reason     EPINEPHrine (EPIPEN 2-BRI) 0.3 mg/0.3 mL atIn Reorder       No follow-ups on  "file.    CHIEF COMPLAINT:  Chief Complaint   Patient presents with     Shortness of Breath     Fatigue     Hospital Visit Follow Up     REVIEW OF SYSTEMS:   Pulmonary positive for cough  12 point review of  All other systems are negative.    PFSH:  Social history reviewed    TOBACCO USE:  Social History     Tobacco Use   Smoking Status Former Smoker     Packs/day: 2.00     Years: 27.00     Pack years: 54.00     Types: Cigarettes     Last attempt to quit: 1990     Years since quittin.6   Smokeless Tobacco Never Used       VITALS:  Vitals:    20 1601   BP: 136/84   Pulse: 88   Resp: 24   Temp: 98.4  F (36.9  C)   TempSrc: Oral   Weight: (!) 247 lb 3 oz (112.1 kg)   Height: 6' 1\" (1.854 m)     Wt Readings from Last 3 Encounters:   20 (!) 247 lb 3 oz (112.1 kg)   08/15/20 (!) 252 lb 11.2 oz (114.6 kg)   20 (!) 262 lb (118.8 kg)       PHYSICAL EXAM:  Interactive elderly male seen in the exam room no acute distress  HEENT neck supple mucous membranes moist, oral cavity shows no exudate no erythema posterior pharyngeal wall has copious clear discharge.  Nasal passages show boggy inferior turbinate in the right nostril with minor bleeding  Respiratory system clear to auscultation good breath sounds no wheezes no crackles  CVS irregularly irregular no murmurs rubs gallops appreciated no pedal edema  Abdomen soft there is no focal tenderness bowel sounds are present  Skin incision site over the right patella appears to be healing well there is no evidence of tenderness on palpation  CNS cranial nerves II to XII intact gait is normal reflexes are brisk  Psych oriented x3 not agitated seems comfortable      MEDICATIONS:  Current Outpatient Medications   Medication Sig Dispense Refill     EPINEPHrine (EPIPEN 2-BRI) 0.3 mg/0.3 mL injection Inject 0.3 mL (0.3 mg total) into the shoulder, thigh, or buttocks once as needed (allergic reaction). 2 Pre-filled Pen Syringe 1     ACCU-CHEK GUIDE strips TEST " ONCE DAILY BEFORE BREAKFAST 100 strip 3     acetaminophen (TYLENOL) 500 MG tablet Take 2 tablets (1,000 mg total) by mouth every 6 (six) hours as needed for pain.  0     atenolol (TENORMIN) 50 MG tablet Take 0.5 tablets (25 mg total) by mouth daily.       atorvastatin (LIPITOR) 20 MG tablet Take 1 tablet (20 mg total) by mouth daily. 90 tablet 0     blood-glucose meter (CONTOUR NEXT METER) Misc Use once daily. 1 each 0     blood-glucose meter Misc Use 1 Device As Directed daily before breakfast. 1 each 0     cephalexin (KEFLEX) 500 MG capsule Take 2 capsules (1,000 mg total) by mouth 3 (three) times a day for 14 days. 84 capsule 0     cetirizine (ZYRTEC) 10 MG tablet Take 1 tablet (10 mg total) by mouth daily. 30 tablet 2     cholecalciferol, vitamin D3, 1,000 unit tablet Take 1,000 Units by mouth daily.              compression socks, x-large Misc Apply compression socks to the r lower leg daily, remove at bedtime. 1 each 0     CONTOUR NEXT TEST STRIPS strips USE TO TEST ONCE DAILY 100 strip 5     diltiazem (CARDIZEM CD) 120 MG 24 hr capsule TAKE 1 CAPSULE BY MOUTH EVERY DAY 90 capsule 1     gabapentin (NEURONTIN) 300 MG capsule Take 2 capsules (600 mg total) by mouth at bedtime. 180 capsule 3     gemfibrozil (LOPID) 600 MG tablet TAKE 1 TABLET BY MOUTH TWICE A  tablet 3     generic lancets (ACCU-CHEK SOFTCLIX LANCETS) Use 1 application As Directed daily before breakfast. 100 each 3     metFORMIN (GLUCOPHAGE) 500 MG tablet TAKE 1 TABLET BY MOUTH EACH MORNING AND 2 TABLETS EACH EVENING 270 tablet 1     miscellaneous medical supply Misc Bipap w heated humidifier, tubing & chamber all x1, FFM w cushion x 1, headgear x 1, filers: disposable and resuable x 1pk both       omeprazole (PRILOSEC) 20 MG capsule TAKE 1 CAPSULE BY MOUTH EVERY DAY 90 capsule 2     ranitidine (ZANTAC) 150 MG tablet Take 150 mg by mouth 2 (two) times a day as needed for heartburn.       senna-docusate (PERICOLACE) 8.6-50 mg tablet Take 1  tablet by mouth 2 (two) times a day as needed for constipation.  0     vitamin A-vitamin C-vit E-min (OCUVITE) Tab tablet Take 1 tablet by mouth 2 (two) times a day.              warfarin ANTICOAGULANT (COUMADIN/JANTOVEN) 5 MG tablet Take 5-7.5 mg by mouth See Admin Instructions. 5mg daily except 7.5mg on Sundays       No current facility-administered medications for this visit.           Pratik Parham MD

## 2021-06-11 NOTE — TELEPHONE ENCOUNTER
ANTICOAGULATION  MANAGEMENT    Assessment     Today's INR result of 2.5 is Therapeutic (goal INR of 2.0-3.0)        More warfarin taken than instructed which may be affecting INR    No new diet changes affecting INR    No new medication/supplements affecting INR    Continues to tolerate warfarin with no reported s/s of bleeding or thromboembolism     Previous INR was Supratherapeutic     Back surgery 10/29- pre-op 10/7.    Plan:     Spoke on phone with Stefan regarding INR result and instructed:     Warfarin Dosing Instructions:  Continue current warfarin dose 5 mg daily.    Instructed patient to follow up no later than: Pre-op visit 10/7.     Education provided: importance of following up for INR monitoring at instructed interval and importance of taking warfarin as instructed    Stefan verbalizes understanding and agrees to warfarin dosing plan.    Instructed to call the [facility] for any changes, questions or concerns. (#[phone])   ?   Torin Tobar RN    Subjective/Objective:      Stefan Diallo, a 75 y.o. male is on warfarin.     Stefan reports:     Home warfarin dose: template incorrect; verbally confirmed home dose with pt and updated on anticoagulation calendar     Missed doses: No     Medication changes:  No     S/S of bleeding or thromboembolism:  No     New Injury or illness:  No     Changes in diet or alcohol consumption:  No     Upcoming surgery, procedure or cardioversion:  Yes: back surgery 10/29.     Anticoagulation Episode Summary     Current INR goal:   2.0-3.0   TTR:   94.0 % (11.6 mo)   Next INR check:   10/7/2020   INR from last check:   2.50 (9/25/2020)   Weekly max warfarin dose:      Target end date:      INR check location:      Preferred lab:      Send INR reminders to:   HILARIO HECTOR    Indications    Atrial fibrillation  unspecified type (H) [I48.91]           Comments:            Anticoagulation Care Providers     Provider Role Specialty Phone number    Collin Singh,  MD [facility] [phone]

## 2021-06-12 NOTE — PROGRESS NOTES
INR 1.9 will increase dose to 7.5 mg on Sun, Wed and Fri and 5 mg all other days. Retest in 4 week. After talking with pt and discussing history of greens/salads and medication change. Pt will  continue  with current diet and dosing of Warfarin.  Continue with moderation of Vit K and green leafy vegetables. Cautioned to call with increase bruising or bleeding. Reminded to call with medication change especially antibiotic. Call with any questions or concerns or any up coming procedures. Cautioned about using Herbal medication.

## 2021-06-12 NOTE — TELEPHONE ENCOUNTER
ANTICOAGULATION  MANAGEMENT    Assessment     Today's INR result of 2.4 is Therapeutic (goal INR of 2.0-3.0)        Warfarin taken as previously instructed    No new diet changes affecting INR    No interaction expected between Oxycodone and warfarin- taking for pain post back surgery.    Continues to tolerate warfarin with no reported s/s of bleeding or thromboembolism     Previous INR was Subtherapeutic    Plan:     Spoke on phone with Stefan regarding INR result and instructed:     Warfarin Dosing Instructions:  Continue current warfarin dose 7.5 mg daily on Sun; and 5 mg daily rest of week  (0 % change)    Instructed patient to follow up no later than: 1-2 weeks.    Education provided: importance of following up for INR monitoring at instructed interval and importance of taking warfarin as instructed    Stefan verbalizes understanding and agrees to warfarin dosing plan.    Instructed to call the [facility] for any changes, questions or concerns. (#[phone])   ?   Torin Tobar RN    Subjective/Objective:      Stefan Diallo, a 75 y.o. male is on warfarin.     Stefan reports:     Home warfarin dose: template incorrect; verbally confirmed home dose with pt and updated on anticoagulation calendar     Missed doses: No     Medication changes:  Yes: Oxycodone     S/S of bleeding or thromboembolism:  No     New Injury or illness: Yes, recently had back surgery.     Changes in diet or alcohol consumption:  No     Upcoming surgery, procedure or cardioversion:  No    Anticoagulation Episode Summary     Current INR goal:  2.0-3.0   TTR:  88.8 % (11.6 mo)   Next INR check:  11/20/2020   INR from last check:  2.40 (11/6/2020)   Weekly max warfarin dose:     Target end date:     INR check location:     Preferred lab:     Send INR reminders to:  HILARIO HECTOR    Indications    Atrial fibrillation  unspecified type (H) [I48.91]           Comments:           Anticoagulation Care Providers     Provider Role Specialty  Phone number    Collin Singh MD Referring Family Medicine [phone]

## 2021-06-12 NOTE — PROGRESS NOTES
[address]  [facility] [address]  Dept: [phone]  Dept Fax: [phone]  Primary Provider: Collin Singh MD  Pre-op Performing Provider: COLLIN SINGH    PREOPERATIVE EVALUATION:  Today's date: 10/7/2020    Stefan Diallo is a 75 y.o. male who presents for a preoperative evaluation.    Surgical Information:  Surgery Details 10/7/2020   Surgery/Procedure: L3 - S1 OLLIF   Surgery Location: [facility]   Surgeon: Dr. Vernon Bynum   Surgery Date: 10/29/2020   Where patient plans to recover: at home with family     Fax number for surgical facility:   [facility] [phone]  [facility] [phone]    Type of Anesthesia Anticipated: General    Subjective     HPI related to upcoming procedure:  Low back pain with left leg radiculopathy.  Has DDD, HNP, spondylolisthesis, scoliosis with Lewis angle 18 degrees.  A fib.  Diabetes, A1c 7.6 on 8/15/20.    Preop Questions 10/7/2020   Have you ever had a heart attack or stroke? No   Have you ever had surgery on your heart or blood vessels, such as a stent placement, a coronary artery bypass, or surgery on an artery in your head, neck, heart, or legs? No   Do you have chest pain with activity? No   Do you have a history of  heart failure? No   Do you currently have a cold, bronchitis or symptoms of other infection? No   Do you have a cough, shortness of breath, or wheezing? No   Do you or anyone in your family have previous history of blood clots? No   Do you or does anyone in your family have a serious bleeding problem such as prolonged bleeding following surgeries or cuts? No   Have you ever had problems with anemia or been told to take iron pills? No   Have you had any abnormal blood loss such as black, tarry or bloody stools? No   Have you ever had a blood transfusion? No   Are you willing to have a blood transfusion if it is medically needed before, during, or after your surgery? Yes   Have you or any of  your relatives ever had problems with anesthesia? No   Do you have sleep apnea, excessive snoring or daytime drowsiness? YES - using CPAP   Do you have a CPAP machine? Yes   Do you have any artifical heart valves or other implanted medical devices like a pacemaker, defibrillator, or continuous glucose monitor? No   Do you have artificial joints? No   Are you allergic to latex? No           RX monitoring program ([place]) reviewed:  reviewed - no concerns      Review of Systems  Constitutional, neuro, ENT, endocrine, pulmonary, cardiac, gastrointestinal, genitourinary, musculoskeletal, integument and psychiatric systems are negative, except as otherwise noted.      Patient Active Problem List    Diagnosis Date Noted     Cellulitis and abscess of leg      Staph aureus infection      Cellulitis 08/14/2020     Amputated toe of right foot (H) 05/23/2019     Cellulitis of foot 05/05/2019     Osteomyelitis of toe (H)      Closed nondisplaced fracture of phalanx of toe of right foot, unspecified toe, initial encounter      Atrial fibrillation, unspecified type (H) 02/10/2019     Obesity (BMI 35.0-39.9) with comorbidity (H) 11/26/2018     Type II diabetes mellitus with peripheral circulatory disorder (H) 06/16/2017     Gastroesophageal reflux disease with esophagitis 06/21/2016     Bell's palsy 09/14/2015     Dysphagia 04/13/2015     Benign Prostatic Hypertrophy      Renal Artery Aneurysm      Tinea Corporis      Shortness Of Breath      Chest Pain      Lower Back Pain      Vertigo      Idiopathic Peripheral Neuropathy      Myalgia And Myositis      Urinary Tract Infection      Pain During Urination (Dysuria)      Cough      Eye Pain      Eczematoid Dermatitis      Urticaria      Obesity      Essential hypertension, benign      Chronic atrial fibrillation (H)      Adult Sleep Apnea      Pure hypercholesterolemia      Muscle Weakness      Past Medical History:   Diagnosis Date     Atrial fibrillation (H)      Bacteremia       GERD (gastroesophageal reflux disease)      GI hemorrhage      High cholesterol      Hyperlipidemia      Hypertension      Renal artery aneurysm (H)      Sleep apnea, obstructive     USES BIPAP     Type 2 diabetes mellitus (H)      UTI (lower urinary tract infection)      Past Surgical History:   Procedure Laterality Date     FOOT AMPUTATION Right 5/6/2019    Procedure: AMPUTATION, 3rd TOE RIGHT FOOT;  Surgeon: Ravi Abbasi DPM;  [place] [facility] [place]  Service: Podiatry     CO REPAIR COMPL ROTATOR CUFF AVULSN,CHR      Description: Chronic Rotator Cuff Avulsion;  Recorded: 04/11/2011;     TENOTOMY ACHILLES TENDON       TRANSURETHRAL RESECTION OF PROSTATE       Current Outpatient Medications   Medication Sig Dispense Refill     ACCU-CHEK GUIDE strips TEST ONCE DAILY BEFORE BREAKFAST 100 strip 3     acetaminophen (TYLENOL) 500 MG tablet Take 2 tablets (1,000 mg total) by mouth every 6 (six) hours as needed for pain.  0     atenolol (TENORMIN) 50 MG tablet Take 0.5 tablets (25 mg total) by mouth daily.       atorvastatin (LIPITOR) 20 MG tablet Take 1 tablet (20 mg total) by mouth daily. 90 tablet 0     blood-glucose meter (CONTOUR NEXT METER) Misc Use once daily. 1 each 0     blood-glucose meter Misc Use 1 Device As Directed daily before breakfast. 1 each 0     cholecalciferol, vitamin D3, 1,000 unit tablet Take 1,000 Units by mouth daily.              compression socks, x-large Misc Apply compression socks to the r lower leg daily, remove at bedtime. 1 each 0     CONTOUR NEXT TEST STRIPS strips USE TO TEST ONCE DAILY 100 strip 5     diltiazem (CARDIZEM CD) 120 MG 24 hr capsule TAKE 1 CAPSULE BY MOUTH EVERY DAY 90 capsule 1     EPINEPHrine (EPIPEN 2-BRI) 0.3 mg/0.3 mL injection Inject 0.3 mL (0.3 mg total) into the shoulder, thigh, or buttocks once as needed (allergic reaction). 2 Pre-filled Pen Syringe 1     fluticasone propionate (FLONASE) 50 mcg/actuation nasal spray 2 sprays into each nostril daily.  "10 g 11     gabapentin (NEURONTIN) 300 MG capsule Take 2 capsules (600 mg total) by mouth at bedtime. 180 capsule 3     gemfibrozil (LOPID) 600 MG tablet TAKE 1 TABLET BY MOUTH TWICE A  tablet 3     generic lancets (ACCU-CHEK SOFTCLIX LANCETS) Use 1 application As Directed daily before breakfast. 100 each 3     metFORMIN (GLUCOPHAGE) 500 MG tablet TAKE 1 TABLET BY MOUTH EACH MORNING AND 2 TABLETS EACH EVENING 270 tablet 1     miscellaneous medical supply Misc Bipap w heated humidifier, tubing & chamber all x1, FFM w cushion x 1, headgear x 1, filers: disposable and resuable x 1pk both       montelukast (SINGULAIR) 10 mg tablet Take 1 tablet (10 mg total) by mouth daily. 30 tablet 2     omeprazole (PRILOSEC) 20 MG capsule TAKE 1 CAPSULE BY MOUTH EVERY DAY 90 capsule 2     ranitidine (ZANTAC) 150 MG tablet Take 150 mg by mouth 2 (two) times a day as needed for heartburn.       senna-docusate (PERICOLACE) 8.6-50 mg tablet Take 1 tablet by mouth 2 (two) times a day as needed for constipation.  0     vitamin A-vitamin C-vit E-min (OCUVITE) Tab tablet Take 1 tablet by mouth 2 (two) times a day.              warfarin ANTICOAGULANT (COUMADIN/JANTOVEN) 5 MG tablet Take 1/2-1 tablet (2.5-5 mg) daily as directed. Adjust dose per INR results. 90 tablet 1     No current facility-administered medications for this visit.        Allergies   Allergen Reactions     Bee Venom Protein (Honey Bee)        Social History     Tobacco Use     Smoking status: Former Smoker     Packs/day: 2.00     Years: 27.00     Pack years: 54.00     Types: Cigarettes     Quit date: 1990     Years since quittin.7     Smokeless tobacco: Never Used   Substance Use Topics     Alcohol use: No     Frequency: Never        Social History     Substance and Sexual Activity   Drug Use No           Objective   /70   Pulse 77   Temp 98.1  F (36.7  C) (Oral)   Resp 20   Ht 6' 1\" (1.854 m)   Wt (!) 245 lb (111.1 kg)   SpO2 92%   BMI 32.32 " kg/m    Physical Exam  Eyes: EOM full, pupils normal, conjunctivae normal  Ears: TM's and canals normal  Oropharynx: normal  Neck: supple without adenopathy or thyromegaly  Lungs: normal  Heart: regular rhythm, normal rate, no murmur  Abdomen: no HSM, mass or tenderness  Extremities: FROM, normal strength and sensation      Recent Labs   Lab Test 10/07/20  1302 10/07/20  1251 09/25/20  1400 09/17/20  1449 09/17/20  1449 08/16/20  0631 08/16/20  0631   HGB  --  12.6*  --   --  11.1*   < > 11.3*   PLT  --  263  --   --  451*   < > 409   INR 2.70*  --  2.50*   < >  --    < > 1.86*   NA  --  142  --   --   --   --  139   K  --  4.9  --   --   --   --  4.0   CREATININE  --  0.87  --   --   --   --  0.81    < > = values in this interval not displayed.        PRE-OP Diagnostics:   Recent Results (from the past 240 hour(s))   Culture, MRSA (ID Only)    Collection Time: 10/07/20 12:40 PM    Specimen: Nares, Right; Swab   Result Value Ref Range    Culture No MRSA isolated    HM2(CBC w/o Differential)    Collection Time: 10/07/20 12:51 PM   Result Value Ref Range    WBC 8.7 4.0 - 11.0 thou/uL    RBC 4.10 (L) 4.40 - 6.20 mill/uL    Hemoglobin 12.6 (L) 14.0 - 18.0 g/dL    Hematocrit 37.1 (L) 40.0 - 54.0 %    MCV 90 80 - 100 fL    MCH 30.7 27.0 - 34.0 pg    MCHC 33.9 32.0 - 36.0 g/dL    RDW 14.1 11.0 - 14.5 %    Platelets 263 140 - 440 thou/uL    MPV 8.2 7.0 - 10.0 fL   Basic Metabolic Panel    Collection Time: 10/07/20 12:51 PM   Result Value Ref Range    Sodium 142 136 - 145 mmol/L    Potassium 4.9 3.5 - 5.0 mmol/L    Chloride 106 98 - 107 mmol/L    CO2 23 22 - 31 mmol/L    Anion Gap, Calculation 13 5 - 18 mmol/L    Glucose 135 (H) 70 - 125 mg/dL    Calcium 9.6 8.5 - 10.5 mg/dL    BUN 21 8 - 28 mg/dL    Creatinine 0.87 0.70 - 1.30 mg/dL    GFR MDRD Af Amer >60 >60 mL/min/1.73m2    GFR MDRD Non Af Amer >60 >60 mL/min/1.73m2   Antibody Screen - Outpatient    Collection Time: 10/07/20 12:51 PM   Result Value Ref Range    HML  Antibody Screen Negative    Blood Type - Outpatient    Collection Time: 10/07/20 12:51 PM   Result Value Ref Range    HML ABO/Rh Typing O POS     HML ABO/Rh Repeat Typing O POS    APTT(PTT)    Collection Time: 10/07/20 12:51 PM   Result Value Ref Range    PTT 47 (H) 24 - 37 seconds   INR    Collection Time: 10/07/20  1:02 PM   Result Value Ref Range    INR 2.70 (H) 0.90 - 1.10   Electrocardiogram Perform and Read    Collection Time: 10/07/20  1:05 PM   Result Value Ref Range    SYSTOLIC BLOOD PRESSURE      DIASTOLIC BLOOD PRESSURE      VENTRICULAR RATE 80 BPM    ATRIAL RATE 83 BPM    P-R INTERVAL      QRS DURATION 162 ms    Q-T INTERVAL 406 ms    QTC CALCULATION (BEZET) 468 ms    P Axis      R AXIS 44 degrees    T AXIS 2 degrees    MUSE DIAGNOSIS       Atrial fibrillation  Right bundle branch block  Abnormal ECG  When compared with ECG of 09-AUG-2020 13:39,  Nonspecific T wave abnormality now evident in Inferior leads  Confirmed by JAGRUTI VALENTINO, JORGE LOC:JN ([id number]) on 10/7/2020 3:14:53 PM       EKG: atrial fibrillation, rate 80, Right Bundle Branch Block, unchanged from previous tracings    Recent Results (from the past 240 hour(s))   APTT(PTT)   Result Value Ref Range    PTT 43 (H) 24 - 37 seconds   Von Willebrand Factor Activity (VWF:Act)   Result Value Ref Range    VW Factor Activity 203 (H) 50 - 180 %   Von Willebrand Antigen   Result Value Ref Range    Von Willebrand Antigen 197 50 - 200 %   Factor 8 Assay   Result Value Ref Range    Factor 8 Assay 190 55 - 200 %   Ristocetin Cofactor Activity   Result Value Ref Range    Ristocetin Cofactor 160    von Willebrand Interpretation   Result Value Ref Range    von Willebrand Interp (Note)           Assessment & Plan       The proposed surgical procedure is considered INTERMEDIATE risk.    REVISED CARDIAC RISK INDEX   The patient has the following serious cardiovascular risks for perioperative complications:  No serious cardiac risks = 0 points    INTERPRETATION: 0  points: Class I (very low risk - 0.4% complication rate)    Stefan was seen today for pre-op exam.    Diagnoses and all orders for this visit:    Preoperative examination    DDD (degenerative disc disease), lumbar  -     Culture, MRSA (ID Only)  -     HM2(CBC w/o Differential)  -     Basic Metabolic Panel  -     Antibody Screen - Outpatient  -     Blood Type - Outpatient    Spondylolisthesis of lumbar region    Scoliosis of lumbosacral spine, unspecified scoliosis type    Type II diabetes mellitus with peripheral circulatory disorder (H)    Chronic atrial fibrillation (H)  -     Electrocardiogram Perform and Read  -     INR    Need for vaccination  -     Influenza,Quad,High Dose,PF 65 YR+    Pain in left leg   -     APTT(PTT)        The patient has the following additional risks and recommendations for perioperative complications:      DIABETES:  - Patient is not on insulin therapy: regular NPO guidelines can be followed.     SLEEP APNEA: monitor for hypoxia     MEDICATION INSTRUCTIONS:  Patient is to take all scheduled medications on the day of surgery EXCEPT for modifications listed below:    Anticoagulant or Antiplatelet Medication Use   - WARFARIN: Thromboembolic risk is low (<4%/year). HOLD warfarin for 7 days without bridging before procedure.     DIABETIC Medications:   - METFORMIN: HOLD day of surgery.    RECOMMENDATION:  APPROVAL GIVEN to proceed with proposed procedure, without further diagnostic evaluation.    PTT was 69 and 64 in August, now 43.  Warfarin can mildly increase PTT.  He has no history of bleeding problems, and family history is negative for bleeding disorders.  I checked for von Willebrand disease, and tests are negative for that.        This was a 45 minute visit with >50% of the time spent in face-to-face counseling and coordination of care regarding: Back pain, A fib, DM      No follow-ups on file.    Signed Electronically by: Collin Singh MD    Copy of this evaluation report is  provided to requesting physician.    Preop [facility] Preop Guidelines    Revised Cardiac Risk Index

## 2021-06-12 NOTE — TELEPHONE ENCOUNTER
Refill Approved    Rx renewed per Medication Renewal Policy. Medication was last renewed on 04/27/2020.  Last office visit was 10/07/2020 with PCP.    Nida Robert, [facility] Triage/Med Refill 10/19/2020     Requested Prescriptions   Pending Prescriptions Disp Refills     diltiazem (CARDIZEM CD) 120 MG 24 hr capsule [Pharmacy Med Name: DILTIAZEM 24H ER(CD) 120 MG CP] 90 capsule 1     Sig: TAKE 1 CAPSULE BY MOUTH EVERY DAY       Calcium-Channel Blockers Protocol Passed - 10/18/2020  9:47 AM        Passed - PCP or prescribing provider visit in past 12 months or next 3 months     Last office visit with prescriber/PCP: 10/23/2019 Collin Singh MD OR same dept: 9/17/2020 Pratik Parham MD OR same specialty: 9/17/2020 Pratik Parham MD  Last physical: 10/7/2020 Last MTM visit: Visit date not found   Next visit within 3 mo: Visit date not found  Next physical within 3 mo: Visit date not found  Prescriber OR PCP: Collin Singh MD  Last diagnosis associated with med order: 1. Atrial fibrillation (H)  - diltiazem (CARDIZEM CD) 120 MG 24 hr capsule [Pharmacy Med Name: DILTIAZEM 24H ER(CD) 120 MG CP]; TAKE 1 CAPSULE BY MOUTH EVERY DAY  Dispense: 90 capsule; Refill: 1    If protocol passes may refill for 12 months if within 3 months of last provider visit (or a total of 15 months).             Passed - Blood pressure filed in past 12 months     BP Readings from Last 1 Encounters:   10/07/20 128/70

## 2021-06-12 NOTE — TELEPHONE ENCOUNTER
RN cannot approve Refill Request    RN can NOT refill this medication med is not covered by policy/route to provider. Last office visit: 10/23/2019 Collin Singh MD Last Physical: 10/7/2020 Last MTM visit: Visit date not found Last visit same specialty: 9/17/2020 Pratik Parham MD.  Next visit within 3 mo: Visit date not found  Next physical within 3 mo: Visit date not found      Janett Mora, [facility] Triage/Med Refill 11/6/2020    Requested Prescriptions   Pending Prescriptions Disp Refills     gabapentin (NEURONTIN) 300 MG capsule [Pharmacy Med Name: GABAPENTIN 300 MG CAPSULE] 180 capsule 3     Sig: TAKE 2 CAPSULES (600 MG TOTAL) BY MOUTH AT BEDTIME.       Gabapentin/Levetiracetam/Tiagabine Refill Protocol  Passed - 11/4/2020  3:02 PM        Passed - PCP or prescribing provider visit in past 12 months or next 3 months     Last office visit with prescriber/PCP: 10/23/2019 Collin Singh MD OR same dept: 9/17/2020 Pratik Parham MD OR same specialty: 9/17/2020 Pratik Parham MD  Last physical: 10/7/2020 Last MTM visit: Visit date not found   Next visit within 3 mo: Visit date not found  Next physical within 3 mo: Visit date not found  Prescriber OR PCP: Collin Singh MD  Last diagnosis associated with med order: 1. Hereditary and idiopathic peripheral neuropathy  - gabapentin (NEURONTIN) 300 MG capsule [Pharmacy Med Name: GABAPENTIN 300 MG CAPSULE]; Take 2 capsules (600 mg total) by mouth at bedtime.  Dispense: 180 capsule; Refill: 3    If protocol passes may refill for 12 months if within 3 months of last provider visit (or a total of 15 months).

## 2021-06-12 NOTE — TELEPHONE ENCOUNTER
ANTICOAGULATION  MANAGEMENT    Assessment     Today's INR result of 2.7 is Therapeutic (goal INR of 2.0-3.0)        More warfarin taken than instructed which may be affecting INR    No new diet changes affecting INR    No new medication/supplements affecting INR    Continues to tolerate warfarin with no reported s/s of bleeding or thromboembolism     Previous INR was Therapeutic     Back surgery 10/29- to hold warfarin 5 days prior without bridge (see pre-op notes today)    Plan:     Spoke on phone with Stefan regarding INR result and instructed:     Warfarin Dosing Instructions:  Continue current warfarin dose 7.5 mg daily on Sun; and 5 mg daily rest of week  (0 % change)  Start holding warfarin 10/24-10/28.    (pt said he'll be admitted for at least a couple days after surgery)    Instructed patient to follow up no later than: 4-5 days after hospital discharge.    Education provided: importance of following up for INR monitoring at instructed interval and importance of taking warfarin as instructed    Stefan verbalizes understanding and agrees to warfarin dosing plan.    Instructed to call the [facility] for any changes, questions or concerns. (#[phone])   ?   Torin Tobar RN    Subjective/Objective:      Stefan MIRANDA Yoel, a 75 y.o. male is on warfarin.     Stefan reports:     Home warfarin dose: template incorrect; verbally confirmed home dose with pt and updated on anticoagulation calendar     Missed doses: No     Medication changes:  No     S/S of bleeding or thromboembolism:  No     New Injury or illness:  No     Changes in diet or alcohol consumption:  No     Upcoming surgery, procedure or cardioversion:  Yes: back surgery 10/29.     Anticoagulation Episode Summary     Current INR goal:  2.0-3.0   TTR:  94.0 % (11.6 mo)   Next INR check:  11/5/2020   INR from last check:  2.70 (10/7/2020)   Weekly max warfarin dose:     Target end date:     INR check location:     Preferred lab:     Send INR reminders  to:  HILARIO HECTOR    Indications    Atrial fibrillation  unspecified type (H) [I48.91]           Comments:           Anticoagulation Care Providers     Provider Role Specialty Phone number    Collin Singh MD Referring Family Medicine [phone]

## 2021-06-12 NOTE — TELEPHONE ENCOUNTER
I called surgeon, Dr. Bynum, and discussed PTT results.  He does not expect that there would be bleeding problems.  He requested that we check INR on Wed 10/28 just to be on the safe side.  If > 1.5, prescribe vitamin K 2.5 mg.    I called pt - he will come for lab-only on 10/28.  [facility]

## 2021-06-12 NOTE — PROGRESS NOTES
"Hospital Follow-up Visit:    Assessment/Plan:     Problem List Items Addressed This Visit     Type II diabetes mellitus with peripheral circulatory disorder (H) - Primary    Relevant Orders    Microalbumin, Random Urine    Lipid Cascade               Subjective:     Stefan Diallo is a 75 y.o. male who presents for a hospital discharge follow up.      Hospital/Nursing Home/IP Rehab Facility: [facility]   Date of Admission: 10/29   Date of Discharg 10/30   Reason(s) for Admission: Back pain worsening  Pain down both legs   left greater than right  Middle of the back as well  Slowly gotten worse over the last year  No injury no work comp injury  Numbness in the top of the thighs  Difficulty with ambulation  Sees Dr. Bynum    No neuropathy  Diabetes  Last A1c 7.6% 8 of 2020          Do you have any problems taking your medication regularly?  None       Have you had any changes in your medication since discharge? None       Have you had any difficulty following your discharge or treatment plan?  No    Summary of hospitalization:  Hospital discharge summary reviewed  Diagnostic Tests/Treatments reviewed.  Follow up needed: inr    Other Healthcare Providers Involved in Patient's Care: Patient Care Team:  Collin Singh MD as PCP - General  Kerline Galo, PharmD as Pharmacist (Pharmacist)  Collin Singh MD as Assigned PCP  Ravi Abbasi DPM as Assigned Musculoskeletal Provider  Vishnu Garland MD as Assigned Heart and Vascular Provider      Update since discharge: {no change   Information from family, SNF, care coordination: patient       Post Discharge Medication Reconciliation: discharge medications reconciled and changed, per note/orders  Plan of care communicated with: patient    Objective:     Vitals:    11/06/20 0906   BP: 132/80   Pulse: (!) 110   Resp: 16   Temp: 97.6  F (36.4  C)   TempSrc: Oral   SpO2: 99%   Weight: (!) 237 lb (107.5 kg)   Height: 6' 1\" (1.854 m)         Physical " Exam:  Swelling across the lumbar spine specifically on the left upper area  Steri-Strips in place  3 x 3 cm hematoma  Not fluctuant  No spreading erythema  Old bruising resolving    Strength testing  Flexion at the knee on the left 4+ out of 5  Dorsiflexion of the left ankle 4+ out of 5  Other strength testing flexion-extension the ankle flexion-extension the knee on the right 5/5  Hip flexion 4-5 bilaterally  Walking with a walker  Difficulty getting up in the chair  Excellent distal pulses  Purplish toes but excellent capillary refill  Less than 2 seconds  No visualized ulcerations  He is missing the third toe on the right foot well-healed scar      Coding guidelines for this visit:  Type of Medical   Decision Making Face-to-Face Visit       within 7 Days of discharge Face-to-Face Visit        within 14 days of discharge   Moderate Complexity [id number]   High Complexity [id number]       Electronically signed by Henrry Hilton MD 11/06/20 10:59 AM

## 2021-06-12 NOTE — PATIENT INSTRUCTIONS - HE
It was great to see you today and meet you Stefan      Next steps for you    Follow through with Dr. Bynum's plan    Monitor the area in your back to ensure that there is no increased swelling or redness    Monitor for fevers chills or sweats    We are checking your INR today  Unless you hear from us the goal INR 2-3  Continue taking your warfarin 5 mg 6 days a week  7.5 mg 1 day a week on Sunday    Continue to pursue physical therapy    If you have worsening pain or diminished strength in your lower extremities that is unusual or new please contact Dr. Bynum immediately    Please schedule follow-up with Dr. Singh 3 months to check your A1c    You were a little anemic  I will check your iron levels as well as your B12 level    Again it was nice to meet you!    Dawn

## 2021-06-12 NOTE — TELEPHONE ENCOUNTER
RN cannot approve Refill Request    RN can NOT refill this medication Needs new script from mery VALENTINO. Last office visit: 10/23/2019 Collin Singh MD Last Physical: 10/7/2020 Last MTM visit: Visit date not found Last visit same specialty: 9/17/2020 Pratik Parham MD.  Next visit within 3 mo: Visit date not found  Next physical within 3 mo: Visit date not found      Amino DIANE Dixon, [facility] Triage/Med Refill 10/29/2020    Requested Prescriptions   Pending Prescriptions Disp Refills     atenoloL (TENORMIN) 50 MG tablet [Pharmacy Med Name: ATENOLOL 50 MG TABLET] 45 tablet 3     Sig: TAKE 1/2 TABLET BY MOUTH DAILY       Beta-Blockers Refill Protocol Passed - 10/28/2020 12:37 AM        Passed - PCP or prescribing provider visit in past 12 months or next 3 months     Last office visit with prescriber/PCP: 10/23/2019 Collin Singh MD OR same dept: 9/17/2020 Pratik Parham MD OR same specialty: 9/17/2020 Pratik Parham MD  Last physical: 10/7/2020 Last MTM visit: Visit date not found   Next visit within 3 mo: Visit date not found  Next physical within 3 mo: Visit date not found  Prescriber OR PCP: Collin Singh MD  Last diagnosis associated with med order: 1. Atrial fibrillation (H)  - atenoloL (TENORMIN) 50 MG tablet [Pharmacy Med Name: ATENOLOL 50 MG TABLET]; TAKE 1/2 TABLET BY MOUTH DAILY  Dispense: 45 tablet; Refill: 3    If protocol passes may refill for 12 months if within 3 months of last provider visit (or a total of 15 months).             Passed - Blood pressure filed in past 12 months     BP Readings from Last 1 Encounters:   10/07/20 128/70

## 2021-06-13 NOTE — PROGRESS NOTES
Assessment & Plan:       1. Epistaxis     2. Long term current use of anticoagulant therapy        Medical Decision Making  Patient with current use of anticoagulants presents with a nosebleed.  Patient has had on and off nosebleeds over the last month with significant worsening since this morning.  Had patient pinch his nose and tip of the head back for 15 minutes with no improvement of symptoms.  Even after attempting to apply compression for 15 minutes, patient was able to remove large blood clots after blowing his nose.  Given the size of the blood clots, I feel it is unlikely that the patient only has a simple 3 mm abrasion on his medial septum, but instead has a more posterior bleed that is difficult to visualize on my exam.  Feel the patient may need further evaluation in the emergency room.  Will send patient by private vehicle.  Understood and agreed with plan.  Attempted to call and provide report to [facility] ED, however providers in emergency room were busy seeing other patients.        Subjective:       Stefan Diallo is a 75 y.o. male here for evaluation of a nosebleed.  Patient is on warfarin.  Last INR check was at 2.9.  Patient symptoms began this morning shortly after he woke up.  He has been bleeding on and off since then.  Patient notes that the bleeding will start again after the patient blows his nose.  He will get a large blood clot every time he blows.  He otherwise does not feel lightheaded.  He denies any known trauma or irritation to the nose that caused the bleeding.  He denies significant drainage down the back of the throat.  He does note some blood going down the throat when he sniffs hard.    The following portions of the patient's history were reviewed and updated as appropriate: allergies, current medications and problem list.    Review of Systems  Pertinent items are noted in HPI.     Allergies  Allergies   Allergen Reactions     Bee Venom Protein (Honey Bee)        Family  History   Problem Relation Age of Onset     Coronary artery disease Mother      Coronary artery disease Father      Atrial fibrillation Father        Social History     Socioeconomic History     Marital status:      Spouse name: None     Number of children: 3     Years of education: None     Highest education level: None   Occupational History     Occupation:      Employer: [employer]   Social Needs     Financial resource strain: None     Food insecurity     Worry: None     Inability: None     Transportation needs     Medical: None     Non-medical: None   Tobacco Use     Smoking status: Former Smoker     Packs/day: 2.00     Years: 27.00     Pack years: 54.00     Types: Cigarettes     Quit date: 1990     Years since quittin.9     Smokeless tobacco: Never Used   Substance and Sexual Activity     Alcohol use: No     Frequency: Never     Drug use: No     Sexual activity: Not Currently   Lifestyle     Physical activity     Days per week: None     Minutes per session: None     Stress: None   Relationships     Social connections     Talks on phone: None     Gets together: None     Attends Scientologist service: None     Active member of club or organization: None     Attends meetings of clubs or organizations: None     Relationship status: None     Intimate partner violence     Fear of current or ex partner: None     Emotionally abused: None     Physically abused: None     Forced sexual activity: None   Other Topics Concern     None   Social History Narrative     None         Objective:       /76 (Patient Site: Right Arm, Patient Position: Sitting, Cuff Size: Adult Large)   Pulse 90   Temp 98.1  F (36.7  C) (Oral)   Resp 16   SpO2 97%   General appearance: alert, appears stated age, cooperative, no distress and non-toxic  Nose: Active bleeding seen along the anterior septum of the right nostril about 3 mm in diameter

## 2021-06-13 NOTE — TELEPHONE ENCOUNTER
ANTICOAGULATION  MANAGEMENT    Assessment     Today's INR result of 2.9 is Therapeutic (goal INR of 2.0-3.0)        Warfarin taken as previously instructed    No new diet changes affecting INR    No new medication/supplements affecting INR    Continues to tolerate warfarin with no reported s/s of bleeding or thromboembolism     Previous INR was Therapeutic    Plan:     Spoke on phone with Stefan regarding INR result and instructed:     Warfarin Dosing Instructions:  Continue current warfarin dose 7.5 mg daily on Sun; and 5 mg daily rest of week      Instructed patient to follow up no later than: 1 month    Education provided: target INR goal and significance of current INR result    Stefan verbalizes understanding and agrees to warfarin dosing plan.    Instructed to call the [facility] for any changes, questions or concerns. (#[phone])   ?   Rossi Spence RN    Subjective/Objective:      Stefan MIRANDA Yoel, a 75 y.o. male is on warfarin.     Stefan reports:     Home warfarin dose: template incorrect; verbally confirmed home dose with Stefan and updated on anticoagulation calendar     Missed doses: No     Medication changes:  No     S/S of bleeding or thromboembolism:  No     New Injury or illness:  No     Changes in diet or alcohol consumption:  No     Upcoming surgery, procedure or cardioversion:  No    Anticoagulation Episode Summary     Current INR goal:  2.0-3.0   TTR:  88.8 % (11.6 mo)   Next INR check:  1/4/2021   INR from last check:  2.90 (12/7/2020)   Weekly max warfarin dose:     Target end date:     INR check location:     Preferred lab:     Send INR reminders to:  HILARIO [facility]ECTOR    Indications    Atrial fibrillation  unspecified type (H) [I48.91]           Comments:           Anticoagulation Care Providers     Provider Role Specialty Phone number    Collin Singh MD Referring Family Medicine [phone]

## 2021-06-13 NOTE — TELEPHONE ENCOUNTER
lisinopriL (PRINIVIL,ZESTRIL) 5 MG tablet [[id number]]    Electronically signed by: Kerline Leavitt PharmD on 03/24/20 0737 Status: Discontinued   Ordering user: Kerline Leavitt PharmD 03/24/20 0737 Ordering provider: Collin Singh MD   Authorized by: Collin Singh MD   Frequency:  03/24/20 - 08/20/20  Released by: Kerline Leavitt PharmD 03/24/20 0737   Discontinued by: Collin Gan MD 08/20/20 1133 [Stop Taking at Discharge]   Diagnoses

## 2021-06-13 NOTE — TELEPHONE ENCOUNTER
ANTICOAGULATION  MANAGEMENT PROGRAM    Stefan Diallo is overdue for INR check.     Spoke with Stefan who declined to schedule INR at this time. If calling back please schedule as soon as possible.      Angela Montes, RN

## 2021-06-13 NOTE — PROGRESS NOTES
Assessment/Plan:      Visit for Preoperative Exam.      1. Preop examination     2. Cataract     3. Need for vaccination  Influenza High Dose, Seasonal 65+ yrs         Patient approved for surgery with general or local anesthesia. Copy of the pre-op was given to the patient to bring along on the day of surgery. Proceed with proposed surgery without additional clinical clarifications.     Subjective:     Scheduled Procedure: Phaco W/IOL - Left eye  Surgery Date:  10/19/17  Surgery Location:  [facility]   Surgeon:  Dr. Bowers.    Left cataract.  Previous retinal detachment on other side.  Diabetes, A1c 7.7 in June.  Hypertension, controlled on meds.  Atrial fibrillation, on warfarin.  INR 1.9 in Sept.    Current Outpatient Prescriptions   Medication Sig Dispense Refill     atenolol (TENORMIN) 50 MG tablet TAKE ONE TABLET BY MOUTH ONE TIME DAILY 90 tablet 2     atorvastatin (LIPITOR) 20 MG tablet Take 1 tablet (20 mg total) by mouth daily. 90 tablet 3     blood sugar diagnostic (GLUCOSE BLOOD) Strp Use As Directed 2 (two) times a day. Bindu Contour Next Test In Vitro Strip,       cholecalciferol, vitamin D3, 1,000 unit tablet Take 2,000 Units by mouth daily.        CYANOCOBALAMIN, VITAMIN B-12, (VITAMIN B12 ORAL) Take by mouth.       diltiazem (CARDIZEM CD) 120 MG 24 hr capsule TAKE ONE CAPSULE BY MOUTH ONE TIME DAILY 90 capsule 2     EPINEPHrine (EPIPEN 2-BRI) 0.3 mg/0.3 mL atIn Inject 0.3 mL (0.3 mg total) into the shoulder, thigh, or buttocks once as needed (allergic reaction). 2 Pre-filled Pen Syringe 1     gabapentin (NEURONTIN) 300 MG capsule TAKE ONE CAPSULE BY MOUTH TWICE DAILY 180 capsule 3     gemfibrozil (LOPID) 600 MG tablet TAKE 1 TABLET (600 MG TOTAL) BY MOUTH 2 (TWO) TIMES A DAY. 180 tablet 2     lisinopril (PRINIVIL,ZESTRIL) 5 MG tablet TAKE ONE TABLET BY MOUTH ONE TIME DAILY 90 tablet 2     metFORMIN (GLUCOPHAGE) 500 MG tablet TAKE ONE TABLET BY MOUTH TWICE DAILY 180 tablet 2      omeprazole (PRILOSEC) 20 MG capsule TAKE 1 CAPSULE (20 MG TOTAL) BY MOUTH DAILY. 90 capsule 3     ranitidine (ZANTAC) 150 MG capsule Take 150 mg by mouth 2 (two) times a day.       vitamin A-vitamin C-vit E-min (OCUVITE) Tab tablet Take by mouth daily. Focus Eye Select       warfarin (COUMADIN) 5 MG tablet TAKE 1 AND 1/2 TAB BY MOUTH ONCE DAILY ON WED AND SAT AND 1 TAB DAILY ON ALL OTHER DAYS OF THE WEEK. 32 tablet 2     No current facility-administered medications for this visit.        No Known Allergies    Immunization History   Administered Date(s) Administered     DT (pediatric) 12/01/2002     Influenza [id number], 01/15/2010     Influenza Whole 11/28/2008     Influenza high dose, seasonal 11/27/2015, 10/10/2016, 10/02/2017     Influenza, inj, historic 12/21/2007, 11/02/2009, 10/15/2010, 10/20/2011     Influenza, seasonal,quad inj 6-35 mos 09/07/2012     Pneumo Polysac 23-V 11/28/2008       Patient Active Problem List   Diagnosis     Benign Prostatic Hypertrophy     Renal Artery Aneurysm     Tinea Corporis     Shortness Of Breath     Chest Pain     Lower Back Pain     Vertigo     Idiopathic Peripheral Neuropathy     Myalgia And Myositis     Urinary Tract Infection     Pain During Urination (Dysuria)     Cough     Eye Pain     Eczematoid Dermatitis     Urticaria     Obesity     Essential Hypertension     Atrial Fibrillation     Adult Sleep Apnea     Pure hypercholesterolemia     Muscle Weakness     Dysphagia     Bell's palsy     Gastroesophageal reflux disease with esophagitis     Type 2 diabetes mellitus without complication, without long-term current use of insulin       Past Medical History:   Diagnosis Date     Atrial fibrillation      Bacteremia      GERD (gastroesophageal reflux disease)      GI hemorrhage      High cholesterol      Hyperlipidemia      Hypertension      Renal artery aneurysm      Sleep apnea, obstructive     USES BIPAP     Type 2 diabetes mellitus      UTI (lower urinary tract infection)         Social History     Social History     Marital status:      Spouse name: N/A     Number of children: 3     Years of education: N/A     Occupational History      [employer]     Social History Main Topics     Smoking status: Former Smoker     Packs/day: 2.00     Years: 27.00     Types: Cigarettes     Quit date: 1/24/1990     Smokeless tobacco: Not on file     Alcohol use 2.4 oz/week     4 Cans of beer per week      Comment: Ocasional     Drug use: No     Sexual activity: No     Other Topics Concern     Not on file     Social History Narrative       Past Surgical History:   Procedure Laterality Date     IA REPAIR COMPL ROTATOR CUFF AVULSN,CHR      Description: Chronic Rotator Cuff Avulsion;  Recorded: 04/11/2011;     TRANSURETHRAL RESECTION OF PROSTATE         History of Present Illness  Recent Health  Fever: no  Chills: no  Fatigue: no  Chest Pain: no  Cough: no  Dyspnea: no  Urinary Frequency: yes  Nausea: no  Vomiting: no  Diarrhea: no  Abdominal Pain: no  Easy Bruising: yes  Lower Extremity Swelling: no  Poor Exercise Tolerance: no    Most recent Health Maintenance Visit:  1 year(s) ago    Pertinent History  Prior Anesthesia: yes  Previous Anesthesia Reaction:  no  Diabetes: yes  Cardiovascular Disease: yes  Pulmonary Disease: no  Renal Disease: no  GI Disease: no  Sleep Apnea: yes  Thromboembolic Problems: no  Clotting Disorder: no  Bleeding Disorder: no  Transfusion Reaction: no  Impaired Immunity: no  Steroid use in the last 6 months: no  Frequent Aspirin use: no    Family history of None    Social history of patient wears dentures or partial plates and there are no concerns regarding care after surgery    After surgery, the patient plans to recover at home alone.    Review of Systems  Constitutional: negative  Ears, nose, mouth, throat, and face: negative  Respiratory: negative  Cardiovascular: negative  Gastrointestinal: negative          Objective:         Vitals:    10/02/17 1657 10/02/17  "1701   BP: 132/80 136/82   Pulse: 68    Resp: 19    Temp: 97.8  F (36.6  C)    TempSrc: Oral    SpO2: 96%    Weight: (!) 267 lb (121.1 kg)    Height: 5' 10\" (1.778 m)        Physical Exam:  Eyes: EOM full, pupils normal, conjunctivae normal.  Left cataract.  Ears: TM's and canals normal  Oropharynx: dentures  Neck: supple without adenopathy or thyromegaly  Lungs: normal  Heart: regular rhythm, normal rate, no murmur  Abdomen: no HSM, mass or tenderness  Extremities: FROM, normal strength and sensation       K 4.7 in June  Creat 1.05 in June  "

## 2021-06-13 NOTE — TELEPHONE ENCOUNTER
Left voice message that [facility] clinic ([phone]) is calling to start the notes for today's appointment.  Was able to reach patient by cell phone to confirm appt time and do travel screen.

## 2021-06-14 NOTE — TELEPHONE ENCOUNTER
lisinopriL (PRINIVIL,ZESTRIL) 5 MG tablet [[id number]]    Electronically signed by: Kerline Leavitt PharmD on 03/24/20 0737 Status: Discontinued   Ordering user: Kerline Leavitt PharmD 03/24/20 0737 Ordering provider: Collin Singh MD   Authorized by: Collin Singh MD   Frequency:  03/24/20 - 08/20/20  Released by: Kerline Leavitt PharmD 03/24/20 0737   Discontinued by: Collin Gan MD 08/20/20 1133 [Stop Taking at Discharge]   Diagnoses

## 2021-06-14 NOTE — PROGRESS NOTES
CHIEF COMPLAINT:   Chief Complaint   Patient presents with     Epistaxis     right side nose bleeds, recent visit to ED         HISTORY OF PRESENT ILLNESS    Stefan was seen at the behest of Collin [facility] for recurrent epistaxis.  He was recently seen in the ED and had his right nasal cavity packed.  He has had not had any bleeding since that time but is concerned about the severity of his nosebleeds since he is on chronic warfarin.  No other ear nose or throat related complaints today.  Chief Complaint   Patient presents with     Epistaxis     right side nose bleeds, recent visit to ED            REVIEW OF SYSTEMS    Review of Systems: a 10-system review is reviewed at this encounter.  See scanned document.     Bee venom protein (honey bee)       There were no vitals filed for this visit.        PHYSICAL EXAM:        HEAD: Normal appearance and symmetry:  No cutaneous lesions.      EARS:    Normal TM's bilaterally. Normal auditory canals and external ears. Non-tender.         NOSE:    Dorsum:   straight  Septum: normal with scabbing right anterior septum  Mucosa:  moist  Inferior turbinates:  normal       ORAL CAVITY/OROPHARYNX:    Lips:  Normal.  Tongue: normal, midline  Mucosa:   no lesions  Tonsils:  1+      NECK:  Trachea:  midline.   Thyroid:  normal   Adenopathy:  none       NEURO:   Alert and Oriented    GAIT AND STATION:  normal     RESPIRATORY:   Symmetry and Respiratory effort    PSYCH:   normal mood and affect    SKIN:  warm and dry         IMPRESSION:    Encounter Diagnoses   Name Primary?     Epistaxis Yes     Nasal crusting           RECOMMENDATIONS:      No orders of the defined types were placed in this encounter.     Medications Ordered   Medications     mupirocin (BACTROBAN) 2 % ointment     Sig: Apply pea sized amount to inside of each nostril 3x daily for 14 days     Dispense:  15 g     Refill:  1   Examination today showed no evidence of active bleeding or prominent vessels.  There is  crusting on the right anterior septum and patient is advised to start the mupirocin ointment as directed.  He in fact can use this on both sides as there is some minor crusting in the left nasal cavity as well.  He wishes to return in an as-needed basis.  After the mupirocin ointment is done I do recommend using a wire saline gel to keep the new his nose moist especially during the winter months.  If he does experience an acute bleed he can take a half a cotton ball soaked in Afrin placed in the affected nostril with some gentle pressure to help stop the bleeding.  All questions were answered he is agreeable with plan of care     Return as needed

## 2021-06-14 NOTE — TELEPHONE ENCOUNTER
RN cannot approve Refill Request    RN can NOT refill this medication PCP messaged that patient is overdue for Labs. Last office visit: 10/23/2019 Collin Singh MD Last Physical: 10/7/2020 Last MTM visit: Visit date not found Last visit same specialty: 11/6/2020 Henrry Hilton MD.  Next visit within 3 mo: Visit date not found  Next physical within 3 mo: Visit date not found      Nava Dietrich, [facility] Triage/Med Refill 1/27/2021    Requested Prescriptions   Pending Prescriptions Disp Refills     metFORMIN (GLUCOPHAGE) 500 MG tablet [Pharmacy Med Name: METFORMIN  MG TABLET] 270 tablet 1     Sig: TAKE 1 TABLET BY MOUTH EACH MORNING AND 2 TABLETS EACH EVENING       Metformin Refill Protocol Failed - 1/26/2021 12:31 AM        Failed - Microalbumin in last year      Microalbumin, Random Urine   Date Value Ref Range Status   06/16/2017 2.36 (H) 0.00 - 1.99 mg/dL Final                  Passed - Blood pressure in last 12 months     BP Readings from Last 1 Encounters:   12/18/20 157/85             Passed - LFT or AST or ALT in last 12 months     Albumin   Date Value Ref Range Status   08/14/2020 2.8 (L) 3.5 - 5.0 g/dL Final     Bilirubin, Total   Date Value Ref Range Status   08/14/2020 0.3 0.0 - 1.0 mg/dL Final     Bilirubin, Direct   Date Value Ref Range Status   03/28/2011 0.2 <0.6 mg/dL Final     Alkaline Phosphatase   Date Value Ref Range Status   08/14/2020 99 45 - 120 U/L Final     AST   Date Value Ref Range Status   08/14/2020 20 0 - 40 U/L Final     ALT   Date Value Ref Range Status   08/14/2020 25 0 - 45 U/L Final     Protein, Total   Date Value Ref Range Status   08/14/2020 8.2 (H) 6.0 - 8.0 g/dL Final                Passed - GFR or Serum Creatinine in last 6 months     GFR MDRD Non Af Amer   Date Value Ref Range Status   10/07/2020 >60 >60 mL/min/1.73m2 Final     GFR MDRD Af Amer   Date Value Ref Range Status   10/07/2020 >60 >60 mL/min/1.73m2 Final             Passed - Visit with  PCP or prescribing provider visit in last 6 months or next 3 months     Last office visit with prescriber/PCP: Visit date not found OR same dept: 11/6/2020 Henrry Hilton MD OR same specialty: 11/6/2020 Henrry Hilton MD Last physical: 10/7/2020 Last MTM visit: Visit date not found         Next appt within 3 mo: Visit date not found  Next physical within 3 mo: Visit date not found  Prescriber OR PCP: Collin Singh MD  Last diagnosis associated with med order: 1. Type 2 diabetes mellitus without complication, without long-term current use of insulin (H)  - metFORMIN (GLUCOPHAGE) 500 MG tablet [Pharmacy Med Name: METFORMIN  MG TABLET]; TAKE 1 TABLET BY MOUTH EACH MORNING AND 2 TABLETS EACH EVENING  Dispense: 270 tablet; Refill: 1     If protocol passes may refill for 12 months if within 3 months of last provider visit (or a total of 15 months).           Passed - A1C in last 6 months     Hemoglobin A1c   Date Value Ref Range Status   08/15/2020 7.6 (H) <=5.6 % Final     Comment:     Prediabetes:   HBA1c       5.7 to 6.4%        Diabetes:        HBA1c        >=6.5%   Patients with Hgb F >5%, total bilirubin >10.0 mg/dL, abnormal red cell turnover, severe renal or hepatic disease or malignancy should not have this A1C method used to diagnose or monitor diabetes.

## 2021-06-14 NOTE — PATIENT INSTRUCTIONS - HE
Mupirocin ointment for 14 days  Add distilled water to CPAP  AYR brand saline gel to inside of each nose daily during   Return as needed

## 2021-06-14 NOTE — PROGRESS NOTES
Assessment:     No diagnosis found.  Stefan is here Pre-Cardioversion? No  Past Medical History:   Diagnosis Date     Atrial fibrillation      Bacteremia      GERD (gastroesophageal reflux disease)      GI hemorrhage      High cholesterol      Hyperlipidemia      Hypertension      Renal artery aneurysm      Sleep apnea, obstructive     USES BIPAP     Type 2 diabetes mellitus      UTI (lower urinary tract infection)        Plan:     Written dosage and follow-up instructions were provided in the After Visit Summary  Anticoagulation Episode Summary     Current INR goal 2.0-3.0   Next INR check 1/11/2018   INR from last check 2.10 (12/14/2017)   Weekly max dose    Target end date    INR check location    Preferred lab    Send INR reminders to  [facility] SUPPORT POOL    Indications   Atrial Fibrillation [I48.91]           Comments          Anticoagulation Care Providers     Provider Role Specialty Phone number    Vishnu Garland MD Referring Cardiology [phone]          Discussed the following:  Written instructions provided   Stefan displays a  good warfarin knowledge base. There were   no barriers to education  Zeina Chne        Subjective/Objective:      ANTICOAGULATION MANAGEMENT    Stefan Diallo is  is established on warfarin  INR Therapeutic Range: 2-3    Collin Singh MD

## 2021-06-14 NOTE — TELEPHONE ENCOUNTER
Reason for Call:  Medication or medication refill:    Do you use a [facility]?  Name of the pharmacy and phone number for the current request: [id number] - [place], [place] - [address]  Telephone Fax   [phone]     Name of the medication requested: lisinopriL (PRINIVIL,ZESTRIL) 5 MG tablet   Sig: TAKE 1 TABLET BY MOUTH EVERY DAY    Other request:     Refill Request  Did you contact pharmacy: No  Medication name:   Requested Prescriptions     Refused Prescriptions Disp Refills     lisinopriL (PRINIVIL,ZESTRIL) 5 MG tablet [Pharmacy Med Name: LISINOPRIL 5 MG TABLET] 90 tablet 2     Sig: TAKE 1 TABLET BY MOUTH EVERY DAY     Refused By: MILA ARCOS     Reason for Refusal: Medication has been discontinued     Who prescribed the medication: Dr. Collin Singh MD  Requested Pharmacy: see above  Is patient out of medication: Unknown  Patient notified refills processed in 3 business days:  no  Okay to leave a detailed message: yes          Can we leave a detailed message on this number? Yes    Phone number patient can be reached at: Cell number on file:    Telephone Information:   Mobile [phone]       Best Time: ASAP     Call taken on 12/28/2020 at 9:17 AM by Tab Gee

## 2021-06-14 NOTE — TELEPHONE ENCOUNTER
ANTICOAGULATION  MANAGEMENT: Discharge Review    Stefan RUTH Weston chart reviewed for anticoagulation continuity of care    Emergency room visit on  12/18 for nose bleed.    Discharge disposition: Home    INR Results:       Recent labs: (last 7 days)     12/18/20  1640   INR 2.28*       Warfarin inpatient management: home regimen continued    Warfarin discharge instructions: home regimen continued     Medication Changes Affecting Anticoagulation: No    Additional Factors Affecting Anticoagulation: No    Plan     No adjustment to anticoagulation plan needed      Spoke with Stefan      Anticoagulation calendar updated    Matty Amin RN

## 2021-06-14 NOTE — TELEPHONE ENCOUNTER
ANTICOAGULATION  MANAGEMENT    Assessment     Today's INR result of 2.6 is Therapeutic (goal INR of 2.0-3.0)        Warfarin taken as previously instructed    No new diet changes affecting INR    No new medication/supplements affecting INR    Continues to tolerate warfarin with no reported s/s of bleeding or thromboembolism     Previous INR was Therapeutic    Plan:     Left detailed message for Stefan regarding INR result and instructed:     Warfarin Dosing Instructions:  Continue current warfarin dose 7.5 mg daily on sun; and 50 mg daily rest of week      Instructed patient to follow up no later than: one month      Instructed to call the [facility] for any changes, questions or concerns. (#[phone])   ?   Matty Amin RN    Subjective/Objective:      Stefan Diallo, a 75 y.o. male is on warfarin.     Stefan reports:     Home warfarin dose: as updated on anticoagulation calendar per template     Missed doses: No     Medication changes:  No     S/S of bleeding or thromboembolism:  No     New Injury or illness:  No     Changes in diet or alcohol consumption:  No     Upcoming surgery, procedure or cardioversion:  No    Anticoagulation Episode Summary     Current INR goal:  2.0-3.0   TTR:  88.8 % (11.6 mo)   Next INR check:  2/4/2021   INR from last check:  2.60 (1/7/2021)   Weekly max warfarin dose:     Target end date:     INR check location:     Preferred lab:     Send INR reminders to:  HILARIO HECTOR    Indications    Atrial fibrillation  unspecified type (H) [I48.91]           Comments:           Anticoagulation Care Providers     Provider Role Specialty Phone number    Collin Singh MD Referring Family Medicine [phone]

## 2021-06-14 NOTE — TELEPHONE ENCOUNTER
Last office visit: 11/04/2020 Dr. Hilton  Last ordered: Unknown  Last lab check: CBC 11/2020, BMP 10/2020  Next appointment: None with PCP     Chart reviewed. Please review findings below.     Lisinopril was discontinued and taken off of medication list Aug 2020 by ED/hospitalist for reason of chronic cough. MD recommended patient trial off of Lisinopril.     Patient saw Dr. Singh in September and it was noted that the patient was still taking the Lisinopril and using cough syrup at night.     The patient's cough has since resolved and patient has never discontinued the Lisinopril as planned in ED in Aug 2020.     Need refill.

## 2021-06-15 NOTE — TELEPHONE ENCOUNTER
"ANTICOAGULATION  MANAGEMENT: Discharge Review    Stefan Diallo chart reviewed for anticoagulation continuity of care    Emergency room visit/Urgent Care on  2/22 for toe pain, blisters with infection.    Discharge disposition: sent to ED from  for evaluation of \"purple toes\". discharged home from ED.    INR Results:     Recent labs: (last 7 days)     02/16/21  1318   INR 2.70*       Warfarin inpatient management: not applicable    Warfarin discharge instructions: home regimen continued     Medication Changes Affecting Anticoagulation: Yes       Interacting medication(s): Keflex with warfarin.       Duration: 10 days  (2/22 to 3/4)       Indication: infection of blisters/wound on toes    Additional Factors Affecting Anticoagulation: Yes: infection    Plan     Continue current warfarin dosing. Recommend to check INR within 1 week due to infection and keflex.    Spoke with Stefan-- states he was not started on anything. States the  provider sent him to ED and did not give him any medication to start.  No change to AC calendar made.      Jeanne Harris RN                "

## 2021-06-15 NOTE — TELEPHONE ENCOUNTER
Received message from Magalie Lin CNP who saw Dante in walk-in care on Monday regarding contacting patient for INR check since she started him on Keflex.   Writer had reached out to patient Monday regarding this and he stated he was not started on anything.     Reached out to patient to confirm he had not started the Keflex-- states that he picked up meds at the pharmacy yesterday, but did not  the keflex.     He will see podiatry next week regarding his foot-- states it is doing fine right now.       Jeanne Harris, RN  Anticoagulation Clinic

## 2021-06-15 NOTE — PROGRESS NOTES
"    Assessment & Plan     Stefan was seen today for follow-up and equipment request.    Diagnoses and all orders for this visit:    Type II diabetes mellitus with peripheral circulatory disorder (H)  -     Glycosylated Hemoglobin A1c  -     C RED  -     blood-glucose meter (CONTOUR NEXT METER) Misc; Use once daily  -     blood glucose test (CONTOUR NEXT TEST STRIPS) strips; Use 1 each As Directed daily.  -     generic lancets (FINGERSTIX LANCETS); Use 1 each As Directed daily. Dispense brand per patient's insurance at pharmacy discretion.    Chronic atrial fibrillation (H)  -     INR    Elevated ferritin  -     Hemochromatosis Mutation Analysis (S65C, C282Y and H63D) (HHMUT)    Anemia, unspecified type  -     Hemochromatosis Mutation Analysis (S65C, C282Y and H63D) (HHMUT)    Disorder of iron metabolism, unspecified   -     Hemochromatosis Mutation Analysis (S65C, C282Y and H63D) (HHMUT)                 BMI:   Estimated body mass index is 34.39 kg/m  as calculated from the following:    Height as of this encounter: 5' 9.69\" (1.77 m).    Weight as of this encounter: 237 lb 8 oz (107.7 kg).       Return in about 4 months (around 6/16/2021) for DM.    Collin Singh MD  [facility]   Stefan Diallo is 75 y.o. and presents today for the following health issues   HPI           Current Outpatient Medications   Medication Sig Dispense Refill     blood-glucose meter (CONTOUR NEXT METER) Misc Use once daily 1 each 0     cholecalciferol, vitamin D3, 1,000 unit tablet Take 1,000 Units by mouth daily.              oxyCODONE (ROXICODONE) 5 MG immediate release tablet oxycodone 5 mg tablet   TAKE 1/2 1 TABLET BY MOUTH EVERY 4 HOURS AS NEEDED FOR 7 DAYS.       acetaminophen (TYLENOL) 500 MG tablet Take 2 tablets (1,000 mg total) by mouth every 6 (six) hours as needed for pain.  0     atenoloL (TENORMIN) 50 MG tablet TAKE 1/2 TABLET BY MOUTH DAILY 45 tablet 3     atorvastatin (LIPITOR) 20 MG " tablet TAKE 1 TABLET BY MOUTH EVERY DAY 90 tablet 3     blood glucose test (CONTOUR NEXT TEST STRIPS) strips Use 1 each As Directed daily. 100 strip 5     cephalexin (KEFLEX) 500 MG capsule Take 1 capsule (500 mg total) by mouth 4 (four) times a day for 10 days. 40 capsule 0     compression socks, x-large Misc Apply compression socks to the r lower leg daily, remove at bedtime. 1 each 0     diltiazem (CARDIZEM CD) 120 MG 24 hr capsule TAKE 1 CAPSULE BY MOUTH EVERY DAY 90 capsule 3     EPINEPHrine (EPIPEN 2-BRI) 0.3 mg/0.3 mL injection Inject 0.3 mL (0.3 mg total) into the shoulder, thigh, or buttocks once as needed (allergic reaction). 2 Pre-filled Pen Syringe 1     gabapentin (NEURONTIN) 300 MG capsule TAKE 2 CAPSULES (600 MG TOTAL) BY MOUTH AT BEDTIME. 180 capsule 3     gemfibroziL (LOPID) 600 MG tablet Take 1 tablet (600 mg total) by mouth 2 (two) times a day. 180 tablet 2     generic lancets (FINGERSTIX LANCETS) Use 1 each As Directed daily. Dispense brand per patient's insurance at pharmacy discretion. 100 each 5     lisinopriL (PRINIVIL,ZESTRIL) 5 MG tablet Take 1 tablet (5 mg total) by mouth daily. 90 tablet 3     metFORMIN (GLUCOPHAGE) 500 MG tablet TAKE 1 TABLET BY MOUTH EACH MORNING AND 2 TABLETS EACH EVENING 270 tablet 1     methocarbamoL (ROBAXIN) 500 MG tablet methocarbamol 500 mg tablet   TAKE 1 TABLET BY MOUTH 4 TIMES A DAY AS NEEDED FOR 15 DAYS.       miscellaneous medical supply Misc Bipap w heated humidifier, tubing & chamber all x1, FFM w cushion x 1, headgear x 1, filers: disposable and resuable x 1pk both       montelukast (SINGULAIR) 10 mg tablet Take 1 tablet (10 mg total) by mouth daily. 30 tablet 2     omeprazole (PRILOSEC) 20 MG capsule TAKE 1 CAPSULE BY MOUTH EVERY DAY 90 capsule 2     ranitidine (ZANTAC) 150 MG tablet Take 150 mg by mouth 2 (two) times a day as needed for heartburn.       senna-docusate (PERICOLACE) 8.6-50 mg tablet Take 1 tablet by mouth 2 (two) times a day as needed for  "constipation.  0     vitamin A-vitamin C-vit E-min (OCUVITE) Tab tablet Take 1 tablet by mouth 2 (two) times a day.              warfarin ANTICOAGULANT (COUMADIN/JANTOVEN) 5 MG tablet Take 1/2-1 tablet (2.5-5 mg) daily as directed. Adjust dose per INR results. 90 tablet 1     No current facility-administered medications for this visit.          Review of Systems  No fever or cough.      Objective    /66 (Patient Site: Right Arm, Patient Position: Sitting, Cuff Size: Adult Regular)   Pulse 64   Temp 98.1  F (36.7  C) (Oral)   Ht 5' 9.69\" (1.77 m)   Wt (!) 237 lb 8 oz (107.7 kg)   SpO2 97%   BMI 34.39 kg/m    Body mass index is 34.39 kg/m .  Physical Exam                "

## 2021-06-15 NOTE — PATIENT INSTRUCTIONS - HE
Limited walking on right foot with surgical shoe on.    Start medihoney on right great toe ulcer:    1. Remove old Medihoney packing.  2. Cleanse with normal saline, pat dry.  3. Apply Medihoney alginate into the wound. Try to avoid medihoney on the intact skin.   4. Cover with a gauze dressing and secure with Roll Gauze and paper tape.  5. Change every other day.      It is not ok to get your wound wet     Call the clinic for any questions regarding your wound or cares and if you experience signs or symptoms of infection including: fevers, chills, increased redness, increased drainage, foul odor, increased pain at [phone].                Podiatry Clinics that offer foot and toenail care  [facility]  [facility]  [phone]    [facility] Clin[facility], [facility]  [phone]    [facility]  [place] - [address] on Tuesdays  [phone]    [facility]  [place]  [phone]    [facility]  [facility] [place]  [phone]    [facility]  [phone]    [facility]  [phone]    [facility]  They have several locations and have a team of registered nurses that offer diabetic foot care.  They do not bill to insurance and the average cost per visit is $37.  [facility], [facility]  [phone]    [facility]  *Nurse comes to your home for nail care.  Skyla Almeida RN Foot Specialist  [phone]

## 2021-06-15 NOTE — TELEPHONE ENCOUNTER
Refill Approved    Rx renewed per Medication Renewal Policy. Medication was last renewed on 9/3/2020.    Nava Dietrich, [facility] Connection Triage/Med Refill 2/19/2021     Requested Prescriptions   Pending Prescriptions Disp Refills     atorvastatin (LIPITOR) 20 MG tablet [Pharmacy Med Name: ATORVASTATIN 20 MG TABLET] 90 tablet 0     Sig: TAKE 1 TABLET BY MOUTH EVERY DAY       Statins Refill Protocol (Hmg CoA Reductase Inhibitors) Passed - 2/19/2021 12:28 AM        Passed - PCP or prescribing provider visit in past 12 months      Last office visit with prescriber/PCP: 9/17/2020 Pratik Parham MD OR same dept: 2/16/2021 Collin Singh MD OR same specialty: 2/16/2021 Collin Singh MD  Last physical: Visit date not found Last MTM visit: Visit date not found   Next visit within 3 mo: Visit date not found  Next physical within 3 mo: Visit date not found  Prescriber OR PCP: Pratik Parham MD  Last diagnosis associated with med order: 1. Pure hypercholesterolemia  - atorvastatin (LIPITOR) 20 MG tablet [Pharmacy Med Name: ATORVASTATIN 20 MG TABLET]; TAKE 1 TABLET BY MOUTH EVERY DAY  Dispense: 90 tablet; Refill: 0    If protocol passes may refill for 12 months if within 3 months of last provider visit (or a total of 15 months).

## 2021-06-15 NOTE — TELEPHONE ENCOUNTER
Per Provider note 2/16, return in 4 months (approx 6/16/21) for DM.  Per Health Maintenance, patient is overdue for AWV.    Left voice message requesting return call to [facility] at [phone] to schedule appointment.    Please assist in scheduling if call is returned.   Thank you.

## 2021-06-15 NOTE — TELEPHONE ENCOUNTER
Patient requesting orders for diabetic shoes as pt states this was discussed with Dr. Miller at last appt. He would like a call once orders have been placed so he can call [facility] to set up an appt.

## 2021-06-15 NOTE — PATIENT INSTRUCTIONS - HE
I recommend going to the emergency room due to your purple toes and foot infection.    You have not had any work-up related to these problems recently, especially the purple toes.  Purple toes can be a sign of blocked arteries going to your feet and can be corrected with surgery if discovered on time.    If you do not go to the hospital, the risk includes the need to amputate additional toes including your first and second toe on both feet.     I tried getting an urgent outpatient appointment for you and there are none available in our vascular/blood vessel clinic.    The ultrasound you need to evaluate the blood flow to your feet is not available at our facility.

## 2021-06-15 NOTE — TELEPHONE ENCOUNTER
Pt returning call. Relayed msg below. Pt said he will call back to [facility] at a later time.

## 2021-06-15 NOTE — PROGRESS NOTES
Chief Complaint   Patient presents with     Toe Pain     x3-4d, R toes       ASSESSMENT & PLAN:   Diagnoses and all orders for this visit:    Blister of toe of right foot with infection, initial encounter  -     cephalexin (KEFLEX) 500 MG capsule; Take 1 capsule (500 mg total) by mouth 4 (four) times a day for 10 days.  Dispense: 40 capsule; Refill: 0    Purple toe syndrome, left (H)    Onychomycosis    Type II diabetes mellitus with peripheral circulatory disorder (H)    Amputated toe of right foot (H)    Idiopathic Peripheral Neuropathy    Patient presented for concerns related to R 1st toe and 2nd toe with ulcerations with secondary infection.  No systemic signs of infection present.  Wounds cleaned and dressed in clinic.      Left 1st and 2nd toes, which are dark purple in color and cool.  Given age and underlying conditions, concern for vascular blockage.  Unable to obtain consult in vascular surgery clinic this week so consulted ED at [facility] who recommended he go there today for further testing. Discussed the importance of following up immediately.  He was originally refusing to go to the ED but agreed to go there after learning that postponing treatment for this could lead to further damage to his toes and possibly amputation.  Unclear timeline on the onset of the purple toes.     On 2/25 - Reached out to INR nurse to call patient to schedule INR check.      At the end of the encounter, I discussed results, diagnosis, medications with the patient and/or caregivers. Discussed red flags for immediate return to clinic/ER, as well as indications for follow up if no improvement. Patient and/or caregiver understood and agreed to plan. Patient was stable for discharge.    60 minutes spent on the date of the encounter doing chart review, review of outside records, review of test results, patient visit, documentation, discussion with other provider(s) and extensive discussion concerning wanting to go home AMA.    "      SUBJECTIVE    HPI:  HPI  Stefan Diallo presents to the walk-in clinic with   Chief Complaint   Patient presents with     Toe Pain     x3-4d, R toes     Type II diabetic with hx digit amputation on rt with redness, swelling, wounds to 1st and 2nd toes.  Has noticed for the last 3-4 days.  +Tender rt great toe tenderness, baseline foot numbness.  States wound to 1st toe \"was a blister but it popped.\"    3rd toe on R foot absent.  States it was amputated.        1st and 2nd toe left foot purple, intermittent.  Baseline foot numbness.  Says that \"sometimes these are as cold as ice.\" States he has had a workup for this with podiatry in our building, but none evident based on chart review.  Also states he sees \"the heart doctor\" for this but no notes evident on chart review.    Requested that we clip his 1st toenail on the R.  Explained that this needs to be done by podiatry due to history of diabetes.      Associated with: DM2, history of diabetic ulcers including amputation.      Denies: History of Raynaud's, previous discoloration of extremities.      Symptoms started: on R toes, 3-4 days ago.  On L toes, \"they've been like this for a long time.\"        See ROS for additional symptoms and/or pertinent negatives.       History obtained from the patient.      Review of Systems   Constitutional: Negative for chills and fever.   HENT: Negative for congestion.    Respiratory: Negative for cough and shortness of breath.    Skin: Positive for color change (1st and 2nd toes on left foot) and wound (On 1st and 2nd toes of R foot).       OBJECTIVE    Vitals:    02/22/21 1338   BP: 131/79   Patient Site: Right Arm   Patient Position: Sitting   Cuff Size: Adult Regular   Pulse: 79   Resp: 16   Temp: 98  F (36.7  C)   TempSrc: Oral   SpO2: 96%       Physical Exam  Vitals signs and nursing note reviewed.   HENT:      Head: Normocephalic.   Eyes:      Conjunctiva/sclera: Conjunctivae normal.   Cardiovascular:      Rate and " Rhythm: Normal rate.   Pulmonary:      Effort: Pulmonary effort is normal.   Skin:     General: Skin is warm and dry.      Findings: Wound present.      Comments: 1st toe right foot with shallow ulcerated area approximately 1.5 cm x 1.5 cm.  Wound is moist with erythematous base, sanguinous drainage present.  Mild tenderness to palpation along lateral edge.        2nd toe right foot with open wound with erythematous base on bottom surface of toe approximately 1 cm x 1 cm.  Entire toe edematous.      Rt 1st and 2nd toes erythematous and swollen.     All nails of right foot thick, white, and flaking.    1st and 2nd toes of L foot dark purple in color, blanchable with slow return to purple.  Toenail of second toe black in color.  Other L toes dusky.  All toes cool, non-tender to palpation.      Left foot dusky in color up to level of ankle.      Unable to palpate PT pulses in either foot.  Nml PP both feet.      Neurological:      Mental Status: He is alert and oriented to person, place, and time.   Psychiatric:         Mood and Affect: Mood normal.         Labs/EKG:    Radiology:      PATIENT INSTRUCTIONS:   Patient Instructions   I recommend going to the emergency room due to your purple toes and foot infection.    You have not had any work-up related to these problems recently, especially the purple toes.  Purple toes can be a sign of blocked arteries going to your feet and can be corrected with surgery if discovered on time.    If you do not go to the hospital, the risk includes the need to amputate additional toes including your first and second toe on both feet.     I tried getting an urgent outpatient appointment for you and there are none available in our vascular/blood vessel clinic.    The ultrasound you need to evaluate the blood flow to your feet is not available at our facility.

## 2021-06-15 NOTE — TELEPHONE ENCOUNTER
Warfarin refilled per [facility] protocol.   Last OV on 2/16/21. Referral UTD, last renewed on 5/1/20. INR checked within past 90 days, most recently on 2/16/21.    Current sig checked and adjusted to match dosing from last INR check note.      Jeanne Harris, RN  Anticoagulation Clinic

## 2021-06-15 NOTE — TELEPHONE ENCOUNTER
Pt has been scheduled for an US arterial right leg 3/11/21. Pt is wondering why this is only one leg when the one recently done was both legs. Please call pt to discuss [phone]

## 2021-06-15 NOTE — TELEPHONE ENCOUNTER
I called pt about hemochromatosis mutation results.  He is heterozygous for 2 mutations.  There is variable penetrance.  He had normal CT of the abdomen, so no evidence of liver problems.  Ferritin is high, but could be due to a variety of reasons.  It does not appear that there is clinically significant iron storage disorder, and no need for phlebotomy.  He does not have children.  andre

## 2021-06-15 NOTE — TELEPHONE ENCOUNTER
RN cannot approve Refill Request    RN can NOT refill this medication Protocol failed and NO refill given. Last office visit: 2/16/2021 Collin Singh MD Last Physical: 10/7/2020 Last MTM visit: Visit date not found Last visit same specialty: 2/16/2021 Collin Singh MD.  Next visit within 3 mo: Visit date not found  Next physical within 3 mo: Visit date not found      Tha Grant, [facility] Connection Triage/Med Refill 3/15/2021    Requested Prescriptions   Pending Prescriptions Disp Refills     warfarin ANTICOAGULANT (COUMADIN/JANTOVEN) 5 MG tablet [Pharmacy Med Name: WARFARIN SODIUM 5 MG TABLET] 90 tablet 1     Sig: TAKE 1/2-1 TABLET (2.5-5 MG) DAILY AS DIRECTED. ADJUST DOSE PER INR RESULTS.       Warfarin Refill Protocol  Failed - 3/15/2021 12:38 AM        Failed -  Route to appropriate pool/provider     Last Anticoagulation Summary:   Anticoagulation Episode Summary     Current INR goal:  2.0-3.0   TTR:  87.9 % (10.7 mo)   Next INR check:  3/30/2021   INR from last check:  2.70 (2/16/2021)   Weekly max warfarin dose:     Target end date:     INR check location:     Preferred lab:     Send INR reminders to:  HILARIO HECTOR    Indications    Atrial fibrillation  unspecified type (H) [I48.91]           Comments:           Anticoagulation Care Providers     Provider Role Specialty Phone number    Collin Singh MD Referring Family Medicine [phone]                Passed - Provider visit in last year     Last office visit with prescriber/PCP: 2/16/2021 Collin Singh MD OR same dept: 2/16/2021 Collin Singh MD OR same specialty: 2/16/2021 Collin Singh MD  Last physical: 10/7/2020 Last MTM visit: Visit date not found    Next appt within 3 mo: Visit date not found Next physical within 3 mo: Visit date not found  Prescriber OR PCP: Collin Singh MD  Last diagnosis associated with med order: 1. Chronic atrial fibrillation, unspecified (H)  - warfarin ANTICOAGULANT  (COUMADIN/JANTOVEN) 5 MG tablet [Pharmacy Med Name: WARFARIN SODIUM 5 MG TABLET]; Take 1/2-1 tablet (2.5-5 mg) daily as directed. Adjust dose per INR results.  Dispense: 90 tablet; Refill: 1    If protocol passes may refill for 6 months if within 3 months of last provider visit (or a total of 9 months).

## 2021-06-15 NOTE — TELEPHONE ENCOUNTER
RN cannot approve Refill Request    RN can NOT refill this medication Protocol failed and NO refill given and See medication warning re: Gemfibrozil and Atorvastatin both prescribed. . Last office visit: 10/23/2019 Collin Singh MD Last Physical: 10/7/2020 Last MTM visit: Visit date not found Last visit same specialty: 11/6/2020 Henrry Hilton MD.  Next visit within 3 mo: Visit date not found  Next physical within 3 mo: Visit date not found      Juliann Rose, [facility] Triage/Med Refill 2/7/2021    Requested Prescriptions   Pending Prescriptions Disp Refills     gemfibroziL (LOPID) 600 MG tablet [Pharmacy Med Name: GEMFIBROZIL 600 MG TABLET] 180 tablet 2     Sig: Take 1 tablet (600 mg total) by mouth 2 (two) times a day.       Gemfibrozil Refill Protocol Passed - 2/7/2021  1:27 PM        Passed - Fasting lipid cascade in last 12 months     Cholesterol   Date Value Ref Range Status   11/06/2020 151 <=199 mg/dL Final     Triglycerides   Date Value Ref Range Status   11/06/2020 138 <=149 mg/dL Final     HDL Cholesterol   Date Value Ref Range Status   11/06/2020 40 >=40 mg/dL Final     LDL Calculated   Date Value Ref Range Status   11/06/2020 83 <=129 mg/dL Final     Patient Fasting > 8hrs?   Date Value Ref Range Status   11/06/2020 Unknown  Final             Passed - AST or ALT in last 12 months     AST   Date Value Ref Range Status   08/14/2020 20 0 - 40 U/L Final     ALT   Date Value Ref Range Status   08/14/2020 25 0 - 45 U/L Final               Passed - PCP or prescribing provider visit in last 12 months       Last office visit with prescriber/PCP: 10/23/2019 Collin Singh MD OR same dept: 11/6/2020 Henrry Hilton MD OR same specialty: 11/6/2020 Henrry Hilton MD  Last physical: 10/7/2020 Last MTM visit: Visit date not found   Next visit within 3 mo: Visit date not found  Next physical within 3 mo: Visit date not found  Prescriber OR PCP: Collin Singh MD  Last diagnosis  associated with med order: 1. Mixed hyperlipidemia  - gemfibroziL (LOPID) 600 MG tablet [Pharmacy Med Name: GEMFIBROZIL 600 MG TABLET]; TAKE 1 TABLET BY MOUTH TWICE A DAY  Dispense: 180 tablet; Refill: 3    If protocol passes may refill for 12 months if within 3 months of last provider visit (or a total of 15 months).

## 2021-06-15 NOTE — TELEPHONE ENCOUNTER
Called to suggest patient check with insurance re: coverage of Shingrix vaccine. First dose could be administered at tomorrow's office visit.  Called to inquire if patient is interested in receiving COVID 19 vaccine. He is and was informed we will try to schedule and notify if/when successful.

## 2021-06-15 NOTE — TELEPHONE ENCOUNTER
ANTICOAGULATION  MANAGEMENT PROGRAM    Stefan Diallo is overdue for INR check.     Spoke with Stefan who declined to schedule INR at this time. If calling back please schedule as soon as possible.      Анна Smart RN

## 2021-06-15 NOTE — TELEPHONE ENCOUNTER
ANTICOAGULATION  MANAGEMENT    Assessment     Today's INR result of 2.6 is Therapeutic (goal INR of 2.0-3.0)        Warfarin taken as previously instructed    No new diet changes affecting INR    No new medication/supplements affecting INR    Continues to tolerate warfarin with no reported s/s of bleeding or thromboembolism     Previous INR was Therapeutic    Plan:     Spoke on phone with Stefan regarding INR result and instructed:      Warfarin Dosing Instructions:  Continue current warfarin dose 7.5 mg daily on Sun; and 5 mg daily rest of week  (0 % change)    Instructed patient to follow up no later than: 6 weeks    Education provided: target INR goal and significance of current INR result, importance of notifying clinic for changes in medications and importance of notifying clinic of upcoming surgeries and procedures 2 weeks in advance    Stefan verbalizes understanding and agrees to warfarin dosing plan.    Instructed to call the [facility] for any changes, questions or concerns. (#[phone])   ?   Jeanne Harris RN    Subjective/Objective:      Stefan Diallo, a 75 y.o. male is on warfarin. Stefan Aviles reports:     Home warfarin dose: verbally confirmed home dose with Stefan and updated on anticoagulation calendar     Missed doses: No     Medication changes:  No     S/S of bleeding or thromboembolism:  No     New Injury or illness:  No     Changes in diet or alcohol consumption:  No     Upcoming surgery, procedure or cardioversion:  No    Anticoagulation Episode Summary     Current INR goal:  2.0-3.0   TTR:  88.8 % (11.6 mo)   Next INR check:  2/4/2021   INR from last check:  2.70 (2/16/2021)   Weekly max warfarin dose:     Target end date:     INR check location:     Preferred lab:     Send INR reminders to:  HILARIO HECTOR    Indications    Atrial fibrillation  unspecified type (H) [I48.91]           Comments:           Anticoagulation Care Providers     Provider Role Specialty Phone number     Collin Singh MD [facility] Medicine [phone]

## 2021-06-15 NOTE — TELEPHONE ENCOUNTER
Pt informed that for DEEJAY we always do both legs. For the US tomorrow that is scheduled it is just his right, informed that Dr. Miller must be looking for an issue specifically for his right leg.     Pt states understanding and no further questions

## 2021-06-16 NOTE — TELEPHONE ENCOUNTER
ANTICOAGULATION  MANAGEMENT PROGRAM    Stefan Diallo is overdue for INR check.     Left message to call and schedule INR appointment as soon as possible.      Jeanne Harris RN

## 2021-06-16 NOTE — TELEPHONE ENCOUNTER
ANTICOAGULATION  MANAGEMENT    Assessment     Today's INR result of 2.7 is Therapeutic (goal INR of 2.0-3.0)        Warfarin taken as previously instructed    No new diet changes affecting INR    No new medication/supplements affecting INR    Continues to tolerate warfarin with no reported s/s of bleeding or thromboembolism     Previous INR was Therapeutic    Plan:     Spoke on phone with Stefan regarding INR result and instructed:      Warfarin Dosing Instructions:  Continue current warfarin dose 7.5 mg daily on Bauer; and 5 mg daily rest of week  (0 % change)    Instructed patient to follow up no later than: 8 weeks    Education provided: target INR goal and significance of current INR result, importance of notifying clinic for changes in medications, when to seek medical attention/emergency care, importance of notifying clinic for diarrhea, nausea/vomiting, reduced intake and/or illness and importance of notifying clinic of upcoming surgeries and procedures 2 weeks in advance    Stefan verbalizes understanding and agrees to warfarin dosing plan.    Instructed to call the [facility] for any changes, questions or concerns. (#[phone])   ?   Jeanne Harris RN    Subjective/Objective:      Stefan MIRANDA Yoel, a 75 y.o. male is on warfarin.       Stefan reports:     Home warfarin dose: as updated on anticoagulation calendar per template     Missed doses: No     Medication changes:  No     S/S of bleeding or thromboembolism:  No     New Injury or illness:  No     Changes in diet or alcohol consumption:  No     Upcoming surgery, procedure or cardioversion:  No    Anticoagulation Episode Summary     Current INR goal:  2.0-3.0   TTR:  88.8 % (11.6 mo)   Next INR check:  6/8/2021   INR from last check:  2.70 (4/13/2021)   Weekly max warfarin dose:     Target end date:     INR check location:     Preferred lab:     Send INR reminders to:  HILARIO HECTOR    Indications    Atrial fibrillation  unspecified type (H)  [I48.91]           Comments:           Anticoagulation Care Providers     Provider Role Specialty Phone number    Collin Singh MD Referring Family Medicine [phone]

## 2021-06-16 NOTE — TELEPHONE ENCOUNTER
Telephone Encounter by Magalie Dillon RN at 3/29/2019  1:26 PM     Author: Magalie Dillon RN Service: -- Author Type: Registered Nurse    Filed: 3/29/2019  1:30 PM Encounter Date: 3/28/2019 Status: Signed    : Magalie Dillon RN (Registered Nurse)       Note already faxed to [facility].    ===================  Vishnu Garland MD   You 19 hours ago (5:35 PM)      Minimal risk to hold warfarin for 5 days. cmc

## 2021-06-16 NOTE — TELEPHONE ENCOUNTER
Telephone Encounter by Torin Tobar RN at 3/18/2020  2:33 PM     Author: Torin Tobar RN Service: -- Author Type: RN, Care Manager    Filed: 3/18/2020  2:36 PM Encounter Date: 3/18/2020 Status: Attested    : Torin Tobar RN (RN, Care Manager) Cosigner: Collin Singh MD at 3/24/2020 10:43 AM    Attestation signed by Collin Singh MD at 3/24/2020 10:43 AM                     ANTICOAGULATION  MANAGEMENT    Assessment     Today's INR result of 2.4 is Therapeutic (goal INR of 2.0-3.0)        Warfarin taken as previously instructed    No new diet changes affecting INR    No new medication/supplements affecting INR    Continues to tolerate warfarin with no reported s/s of bleeding or thromboembolism     Previous INR was Therapeutic    Plan:     Spoke with Stefan regarding INR result and instructed:     Warfarin Dosing Instructions:  Continue current warfarin dose    7.5 mg every Sun; 5 mg all other days         Instructed patient to follow up no later than: 6-8 weeks.    Education provided: importance of taking warfarin as instructed    Stefan verbalizes understanding and agrees to warfarin dosing plan.    Instructed to call the [facility] for any changes, questions or concerns. (#[phone])   ?   Torin Tobar RN    Subjective/Objective:      Stefan MIRANDA Yoel, a 74 y.o. male is on warfarin.     Stefan reports:     Home warfarin dose: template incorrect; verbally confirmed home dose with pt and updated on anticoagulation calendar     Missed doses: No     Medication changes:  No     S/S of bleeding or thromboembolism:  No     New Injury or illness:  No     Changes in diet or alcohol consumption:  No     Upcoming surgery, procedure or cardioversion:  No    Anticoagulation Episode Summary     Current INR goal:   2.0-3.0   TTR:   95.2 % (9.8 mo)   Next INR check:   5/13/2020   INR from last check:   2.40 (3/18/2020)   Weekly max warfarin dose:      Target end date:      INR  check location:      Preferred lab:      Send INR reminders to:   HILARIO HECTOR    Indications    Atrial fibrillation  unspecified type (H) [I48.91]           Comments:            Anticoagulation Care Providers     Provider Role Specialty Phone number    Collin Singh MD Referring Family Medicine [phone]

## 2021-06-16 NOTE — PROGRESS NOTES
FOOT AND ANKLE SURGERY/PODIATRY Progress Note      ASSESSMENT:   Diabetic Ulceration right hallux      TREATMENT:  -The right hallux ulcer has resolved. I am pleased with his progress. Reviewed DEEJAY's with the patient which are adequate for wound healing.     -I recommend he use the surgical shoe until the diabetic shoes and inserts are available. Discussed risks for re-ulceration if he continues to walk in gym shoes.     -He is discharged from my care at this time and will follow-up with me as concerns develop.    Quinn Miller DPM  [facility]      HPI: Stefan Diallo was seen again today for a right hallux ulceration. He has refused to wear the surgical shoe and has not applied a gauze dressing in recent days.      Past Medical History:   Diagnosis Date     Atrial fibrillation (H)      Bacteremia      GERD (gastroesophageal reflux disease)      GI hemorrhage      High cholesterol      Hyperlipidemia      Hypertension      Renal artery aneurysm (H)      Sleep apnea, obstructive     USES BIPAP     Type 2 diabetes mellitus (H)      UTI (lower urinary tract infection)        Past Surgical History:   Procedure Laterality Date     FOOT AMPUTATION Right 5/6/2019    Procedure: AMPUTATION, 3rd TOE RIGHT FOOT;  Surgeon: Ravi Abbasi DPM;  Location: [facility];  Service: Podiatry     AR REPAIR COMPL ROTATOR CUFF AVULSN,CHR      Description: Chronic Rotator Cuff Avulsion;  Recorded: 04/11/2011;     TENOTOMY ACHILLES TENDON       TRANSURETHRAL RESECTION OF PROSTATE         Allergies   Allergen Reactions     Bee Venom Protein (Honey Bee)          Current Outpatient Medications:      acetaminophen (TYLENOL) 500 MG tablet, Take 2 tablets (1,000 mg total) by mouth every 6 (six) hours as needed for pain., Disp: , Rfl: 0     atenoloL (TENORMIN) 50 MG tablet, TAKE 1/2 TABLET BY MOUTH DAILY, Disp: 45 tablet, Rfl: 3     atorvastatin (LIPITOR) 20 MG tablet, TAKE 1 TABLET BY MOUTH EVERY DAY, Disp:  90 tablet, Rfl: 3     blood glucose test (CONTOUR NEXT TEST STRIPS) strips, Use 1 each As Directed daily., Disp: 100 strip, Rfl: 5     blood-glucose meter (CONTOUR NEXT METER) Misc, Use once daily, Disp: 1 each, Rfl: 0     cholecalciferol, vitamin D3, 1,000 unit tablet, Take 1,000 Units by mouth daily.    , Disp: , Rfl:      compression socks, x-large Misc, Apply compression socks to the r lower leg daily, remove at bedtime., Disp: 1 each, Rfl: 0     diltiazem (CARDIZEM CD) 120 MG 24 hr capsule, TAKE 1 CAPSULE BY MOUTH EVERY DAY, Disp: 90 capsule, Rfl: 3     EPINEPHrine (EPIPEN 2-BRI) 0.3 mg/0.3 mL injection, Inject 0.3 mL (0.3 mg total) into the shoulder, thigh, or buttocks once as needed (allergic reaction)., Disp: 2 Pre-filled Pen Syringe, Rfl: 1     gabapentin (NEURONTIN) 300 MG capsule, TAKE 2 CAPSULES (600 MG TOTAL) BY MOUTH AT BEDTIME., Disp: 180 capsule, Rfl: 3     gemfibroziL (LOPID) 600 MG tablet, Take 1 tablet (600 mg total) by mouth 2 (two) times a day., Disp: 180 tablet, Rfl: 2     generic lancets (FINGERSTIX LANCETS), Use 1 each As Directed daily. Dispense brand per patient's insurance at pharmacy discretion., Disp: 100 each, Rfl: 5     lisinopriL (PRINIVIL,ZESTRIL) 5 MG tablet, Take 1 tablet (5 mg total) by mouth daily., Disp: 90 tablet, Rfl: 3     metFORMIN (GLUCOPHAGE) 500 MG tablet, TAKE 1 TABLET BY MOUTH EACH MORNING AND 2 TABLETS EACH EVENING, Disp: 270 tablet, Rfl: 1     methocarbamoL (ROBAXIN) 500 MG tablet, methocarbamol 500 mg tablet  TAKE 1 TABLET BY MOUTH 4 TIMES A DAY AS NEEDED FOR 15 DAYS., Disp: , Rfl:      miscellaneous medical supply Misc, Bipap w heated humidifier, tubing & chamber all x1, FFM w cushion x 1, headgear x 1, filers: disposable and resuable x 1pk both, Disp: , Rfl:      montelukast (SINGULAIR) 10 mg tablet, Take 1 tablet (10 mg total) by mouth daily., Disp: 30 tablet, Rfl: 2     omeprazole (PRILOSEC) 20 MG capsule, TAKE 1 CAPSULE BY MOUTH EVERY DAY, Disp: 90 capsule, Rfl:  2     senna-docusate (PERICOLACE) 8.6-50 mg tablet, Take 1 tablet by mouth 2 (two) times a day as needed for constipation., Disp: , Rfl: 0     vitamin A-vitamin C-vit E-min (OCUVITE) Tab tablet, Take 1 tablet by mouth 2 (two) times a day.    , Disp: , Rfl:      warfarin ANTICOAGULANT (COUMADIN/JANTOVEN) 5 MG tablet, Take 1-1.5 tablet (5-7.5 mg) daily as directed. Adjust dose per INR results., Disp: 90 tablet, Rfl: 1    Review of Systems - 10 point Review of Systems is negative except for right hallux ulcer which is noted in HPI.      OBJECTIVE:  Vitals:    03/31/21 1408   BP: 120/72   Pulse: 80   Resp: 18   Temp: 97.2  F (36.2  C)     General appearance: Patient is alert and fully cooperative with history & exam.  No sign of distress is noted during the visit.   Vascular: Dorsalis pedis non-palpableRight.  Dermatologic:    VASC Wound 03/05/21 right hallux  (Active)       VASC Wound 03/31/21 Right hallux (Active)   Post Size Length 0 03/31/21 1400   Post Size Width 0 03/31/21 1400   Post Size Depth 0 03/31/21 1400   Post Total Sq cm 0 03/31/21 1400       Wound 08/15/20 Other wound type (comment) Knee Anterior (front) (Active)       Incision 05/06/19 Surgical Foot Right (Active)       Incision 08/18/20 Surgical Leg Right (Active)   Thin top cover plantar right hallux. No erythema right foot.   Neurologic: Diminished to light touch Right.  Musculoskeletal: Contracted digits noted Right.    Imaging:     Us Deejay Doppler No Exercise, 1-2 Levels, Bilateral    Result Date: 3/8/2021  BILATERAL RESTING ANKLE-BRACHIAL INDICES (DEEJAY'S) (03/05/21) Indication: Surveillance of bilateral ulcers Previous: none History: Previous Smoker, Hypertension, Diabetic, Hyperlipidemia, PAD, Skin Color Change and Vascular Ulcers  Resting DEEJAY's          Right: mmHg Index     Brachial: 160  Ankle-(PT): 175 1.09 Ankle-(DP): 198 1.24           Digit: >254 NC               Left: mmHg Index     Brachial: 147  Ankle-(PT): 252 1.58 Ankle-(DP): 180 1.13            Digit: 92 0.58 Resting ankle-brachial index of 1.24 on the right. Toe Pressures of >254 mmHg and TBI of NC  Resting ankle-brachial index of 1.13 on the left. Toe Pressures of 92 mmHg and TBI of 0.58  WAVEFORMS: The right dorsalis pedis and posterior tibial arteries show triphasic waveforms. The left dorsalis pedis and posterior tibial arteries show triphasic waveforms. Comments: did pressure on 1st & 2nd digit of right toe both were non compressible  Impression:  1. RIGHT LOWER EXTREMITY: DEEJAY is Normal with an DEEJAY of 1.24 but it could be falsely elevated. and Un-interpretable. 2. LEFT LOWER EXTREMITY: DEEJAY is 1.58, falsely elevated and Normal toe pressures of 92 mmHg. Reference: Wound classification Grade DEEJAY Ankle Systolic Pressure Toe Pressures 0 > 0.80 > 100 mmHg > 60 mmHg 1 0.6 - 0.79 70 - 100 mmHg 40 - 59 mmHg 2 0.4 - 0.59 50-70 mmHg 30 - 39 mmHg 3 < 0.39 < 50 mmHg < 30 mmHg Digit Pressures DBI Disease Category > 0.70 Normal < 0.70 Abnormal > 30 mmHg Potential wound healing < 30 mmHg Impaired wound healing Ankle Brachial Pressures DEEJAY Disease Category > 1.3  Likely vessel calcification with monophasic waveforms, non-diagnostic 0.95-1.30 Normal with multiphasic waveforms 0.50-0.95 Single level disease 0.30-0.50 Multilevel disease < 0.30 Critical limb ischema Volume Plethysmography Recording (VPR) at all levels Normal Sharp systolic peak, fast upstroke, prominent dicrotic notch in wave Mild Sharp systolic peak, fast upstroke, absent dicrotic notch in wave Moderate Flattened systolic peak, slowed upstroke, absent dicrotic notch in wave Severe Low-amplitude wave with = upslope and down slope Occluded Flat Line Multiple Level Segmental Pressures  Normal Abnormal Upper Thigh > 1.2 pressure indices, <30 mmHg between lateral and horizontal cuffs < 0.8 pressure indices suggest proximal occlusion, 0.8-1.2 pressure indices suggest inflow disease, > 30 mmHg between lateral and horizontal cuffs  Lower Thigh < 30 mmHg  between lateral and horizontal cuffs > 30 mmHg between lateral and horizontal cuffs Upper Calf < 30 mmHg between lateral and horizontal cuffs > 30 mmHg between lateral and horizontal cuffs Note: As limb diameter increases, pressure measurements also increase.    Us Arterial Leg Right    Result Date: 3/11/2021  Arterial Duplex Ultrasound (03/11/21) Lower Extremity Artery Evaluation Indication: SURVEILLANCE RIGHT LOWER EXTREMITY ARTERIAL. HX: RIGHT 3RD TOE AMPUTATION ( 5/6/2019), RIGHT HALLUX ULCER. NON-COMPRESSIBLE RIGHT BIG TOE PRESSURE.  Previous: 3/5/2021 DEEJAY'S ONLY. History: Previous Smoker, Hypertension, Diabetic, Hyperlipidemia, PAD, Skin Color Change and Vascular Ulcers Technique: Duplex imaging is performed utilizing gray-scale, two-dimensional images, and color-flow imaging. Doppler waveform analysis and spectral Doppler imaging is also performed. LOWER EXTREMITY ARTERIAL DUPLEX EXAM WITH WAVEFORMS Right Leg:(cm/s) Location: Velocities Waveforms EIA:   128  T CFA:   103  T PFA:   140  T SFA Proximal:   137  T SFA Mid:   114  T SFA Distal:   74  T Popliteal Artery:   58  T PTA:   97   T NORMA:   71 T DPA:  93 T 1ST RAY (Between 1st /2nd toe)  100 T 2ND RAY (Between 2nd/3rd toe)  60 T 3rd RAY ( Between 3rd (amp) /4th toe)  61 T 4th RAY (Between 4th/5th toe)  24 M Waveforms: T=Triphasic, M=Monophasic, B=Biphasic Left Leg:(cm/s) Location: Velocities Waveforms EIA:   107  T PTA:   75   T NORMA:   53  T DPA:   109  T Waveforms: T=Triphasic, M=Monophasic, B=Biphasic Comments:  Impression: Right Lower Extremity: Patent right lower extremity arterial system, no hemodynamically significant stenosis, triphasic waveform pattern all through the right lower extremity, monophasic waveform pattern between the fourth and fifth toe with dampened velocity indicating possible stenosis Left Lower Extremity: Triphasic waveform pattern with normal flow velocity in the left leg Reference: Category Normal 1-19% 20-49% 50-99% Occluded  PSV <160 cm/sec without spectral broadening <160 cm/sec with spectral broadening Increased Increased Absent Flow Ratio N/A N/A < 2.0 >2.0 N/A Post-Stenotic Turbulence No No No Yes N/A          Picture: None

## 2021-06-16 NOTE — PROGRESS NOTES
INR 2.6 INR stable. Discussed continuing management of dose of Warfarin and returning in one month . No changes to diet needed at this time. Continue moderate intake of Vitamin K and call if increase bruising or unexplained bleeding. Call with medication changes or upcoming procedures.  Spoke with pt.

## 2021-06-16 NOTE — PATIENT INSTRUCTIONS - HE
Ok to shower.    No dressing needed.    Limited walking in surgical shoe on right foot until you receive your diabetic shoes and inserts.      Podiatry Clinics that offer foot and toenail care  [facility]  [facility]  [phone]    [facility] Ank[facility] Clinics, [facility]  [phone]    [facility]  [place] - [address] on Tuesdays  [phone]    [facility]  [place]  [phone]    [facility]  [facility] [place]  [phone]    [facility]  [phone]    [facility]  [phone]    [facility]  They have several locations and have a team of registered nurses that offer diabetic foot care.  They do not bill to insurance and the average cost per visit is $37.  [facility], [facility]  [phone]    [facility]  *Nurse comes to your home for nail care.  Skyla Almeida RN Foot Specialist  [phone]

## 2021-06-17 NOTE — TELEPHONE ENCOUNTER
Telephone Encounter by Cathy Perez LPN at 11/16/2020 12:22 PM     Author: Cathy Perez LPN Service: -- Author Type: Licensed Nurse    Filed: 11/16/2020 12:35 PM Encounter Date: 11/16/2020 Status: Attested    : Cathy Perez LPN (Licensed Nurse) Cosigner: Henrry Hilton MD at 11/17/2020  5:01 PM    Attestation signed by Henrry Hilton MD at 11/17/2020  5:01 PM    Phone call   Lab Only Ordered      DanL            Summary: Tele or office visit needed?      Patient was informed he needs a f/u appt with Provider due to blood work done post-op.    Surgery done 10/29/20.  '  Per anticoagulation mgmt note 11/6/20, Instructed patient to follow up no later than: 1-2 weeks.    Today's office visit was cancelled and no telephone visit was scheduled.    Will Provider please advise as to whether patient needs an office visit or a telephone visit?  Thank you.

## 2021-06-17 NOTE — PROGRESS NOTES
[facility] Office Note    Assessment / Plan:    1.  Dyspnea on exertion, fatigue.  I suspect that this is due to physical deconditioning and morbid obesity.  Stable.  Again advocated a regular moderate exercise program.  He will plan to try to use his stationary bicycle 20-30 minutes daily   2.  Atrial fibrillation.  Continue warfarin.  We will continue atenolol 25 mg daily  3.  Hypertension.  Now relatively low.  No further decrease in his medication regimen given his stable status at this point    Plan follow-up in 1 year .    ______________________________________________________________________    Subjective:    I had the opportunity to see Stefan Diallo at the [facility]. Stefan Diallo is a 72 y.o. male with a history of diabetes mellitus, obstructive sleep apnea, hypertension, and atrial fibrillation who returns for routine follow-up.      Since I saw him last year, he is felt well.  He is still not exercising much because of foot pain.  He denies any dyspnea with daily activities.  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  He has had no falls.  He has no history of coronary artery disease.  He has been using his BiPAP regularly.  He does not feel that his stamina significantly improved.      He has had no chest pain or palpitations.  He has had no syncope or near syncopal episodes.    ______________________________________________________________________    Problem List:  Patient Active Problem List   Diagnosis     Benign Prostatic Hypertrophy     Renal Artery Aneurysm     Tinea Corporis     Shortness Of Breath     Chest Pain     Lower Back Pain     Vertigo     Idiopathic Peripheral Neuropathy     Myalgia And Myositis     Urinary Tract Infection     Pain During Urination (Dysuria)     Cough     Eye Pain     Eczematoid Dermatitis     Urticaria     Obesity     Essential Hypertension     Atrial Fibrillation     Adult Sleep Apnea      Pure hypercholesterolemia     Muscle Weakness     Dysphagia     Bell's palsy     Gastroesophageal reflux disease with esophagitis     Type 2 diabetes mellitus without complication, without long-term current use of insulin     Medical History:  Past Medical History:   Diagnosis Date     Atrial fibrillation      Bacteremia      GERD (gastroesophageal reflux disease)      GI hemorrhage      High cholesterol      Hyperlipidemia      Hypertension      Renal artery aneurysm      Sleep apnea, obstructive     USES BIPAP     Type 2 diabetes mellitus      UTI (lower urinary tract infection)      Surgical History:  Past Surgical History:   Procedure Laterality Date     OH REPAIR COMPL ROTATOR CUFF AVULSN,CHR      Description: Chronic Rotator Cuff Avulsion;  Recorded: 04/11/2011;     TRANSURETHRAL RESECTION OF PROSTATE       Social History:  Social History     Social History     Marital status:      Spouse name: N/A     Number of children: 3     Years of education: N/A     Occupational History      [employer]     Social History Main Topics     Smoking status: Former Smoker     Packs/day: 2.00     Years: 27.00     Types: Cigarettes     Quit date: 1/24/1990     Smokeless tobacco: Not on file     Alcohol use 2.4 oz/week     4 Cans of beer per week      Comment: Ocasional     Drug use: No     Sexual activity: No     Other Topics Concern     Not on file     Social History Narrative     Sleep History:  Uses BIPAP nightly.  Restorative  Exercise History:  Limited by foot pain.  Denies shortness of breath or chest pain with walking.  Has stationary bicycle at home but does not use it.    Review of Systems:   General: WNL  Eyes: WNL  Ears/Nose/Throat: WNL  Lungs: WNL  Heart: WNL  Stomach: WNL  Bladder: WNL  Muscle/Joints: WNL  Skin: WNL  Nervous System: WNL  Mental Health: WNL     Blood: WNL          Family History:  Family History   Problem Relation Age of Onset     Coronary artery disease Mother      Coronary artery disease  Father      Atrial fibrillation Father          Allergies:  No Known Allergies  Medications:  Current Outpatient Prescriptions   Medication Sig Dispense Refill     atenolol (TENORMIN) 50 MG tablet TAKE ONE TABLET BY MOUTH ONE TIME DAILY 90 tablet 2     atorvastatin (LIPITOR) 20 MG tablet Take 1 tablet (20 mg total) by mouth daily. 90 tablet 3     blood glucose test (GLUCOSE BLOOD) strips Use 1 each As Directed daily. Contour next strips 50 strip 11     cholecalciferol, vitamin D3, 1,000 unit tablet Take 2,000 Units by mouth daily.        CYANOCOBALAMIN, VITAMIN B-12, (VITAMIN B12 ORAL) Take by mouth.       diltiazem (CARDIZEM CD) 120 MG 24 hr capsule TAKE ONE CAPSULE BY MOUTH ONE TIME DAILY 90 capsule 2     EPINEPHrine (EPIPEN 2-BRI) 0.3 mg/0.3 mL atIn Inject 0.3 mL (0.3 mg total) into the shoulder, thigh, or buttocks once as needed (allergic reaction). 2 Pre-filled Pen Syringe 1     gabapentin (NEURONTIN) 300 MG capsule TAKE ONE CAPSULE BY MOUTH TWICE DAILY 180 capsule 1     gemfibrozil (LOPID) 600 MG tablet TAKE 1 TABLET (600 MG TOTAL) BY MOUTH 2 (TWO) TIMES A DAY. 180 tablet 2     lisinopril (PRINIVIL,ZESTRIL) 5 MG tablet TAKE ONE TABLET BY MOUTH ONE TIME DAILY 90 tablet 2     metFORMIN (GLUCOPHAGE) 500 MG tablet TAKE ONE TABLET BY MOUTH TWICE DAILY 180 tablet 2     omeprazole (PRILOSEC) 20 MG capsule TAKE 1 CAPSULE (20 MG TOTAL) BY MOUTH DAILY. 90 capsule 3     ranitidine (ZANTAC) 150 MG capsule Take 150 mg by mouth 2 (two) times a day.       vitamin A-vitamin C-vit E-min (OCUVITE) Tab tablet Take by mouth daily. Focus Eye Select       warfarin (COUMADIN) 5 MG tablet Take 7.5 mg Sun, Wed and Friday 5 mg all other day. 50 tablet 1     warfarin (COUMADIN) 5 MG tablet TAKE 1 & 1/2 TABLETS BY MOUTH ON SUN, WED AND FRIDAY AND 1 TABLET ON ALL OTHER DAYS. 50 tablet 0     No current facility-administered medications for this visit.        Objective:   Wt Readings from Last 3 Encounters:   04/04/18 (!) 260 lb (117.9 kg)  "  10/02/17 (!) 267 lb (121.1 kg)   06/16/17 (!) 260 lb 4 oz (118 kg)     Vital signs:  /60 (Patient Site: Left Arm, Patient Position: Sitting, Cuff Size: Adult Large)  Pulse 65  Resp 12  Ht 5' 10\" (1.778 m)  Wt (!) 260 lb (117.9 kg)  BMI 37.31 kg/m2      Physical Exam: Alert, appropriate, joking    Eyes    Conjunctiva Findings: bilateral hyperemia.   Sclerae: Clear, nonicteric.   ENT   Mouth: Oral mucuous membranes are pink and moist   Neck   Carotid pulses: Carotid pulses palpaple with normal contours and symmetric, no bruits.   Jugular Veins: Normal jugular venous pressure.   Thyroid: No visible thyromegaly.   Pulmonary   Examination of lungs: Clear to auscultation, no crackles, or wheezing.   Cardiovascular    The heart rate was normal. The rhythm was irregularly irregular.     Pulses: Normal   Extremities: No edema or clubbing.   Gastrointestinal    The abdomen was obese. Bowel sounds were normal.   Musculoskeletal   Spine: spine straight upon visual inspection.   Skin   Skin and subcutaneous tissue: No xanthelasma.   Neurologic   Orientation to person, place and time: Normal.   Psychiatric   Mood and affect: Normal.       Lab Results:  LIPIDS:  Lab Results   Component Value Date    CHOL 183 12/14/2012    CHOL 176 11/29/2011    CHOL 167 03/28/2011     Lab Results   Component Value Date    HDL 40 12/14/2012    HDL 39 (L) 11/29/2011    HDL 47 03/28/2011     Lab Results   Component Value Date    LDLCALC 103 12/14/2012    LDLCALC 98 11/29/2011    LDLCALC 88 03/28/2011     Lab Results   Component Value Date    TRIG 200 (H) 12/14/2012    TRIG 193 (H) 11/29/2011    TRIG 161 (H) 03/28/2011       Pharmacologic nuclear stress test 2/2017:  Conclusion     When compared to the images of 10/4/2012, there is now evidence of transient ischemic dilatation.    The left ventricular ejection fraction is 68%.    The pharmacologic nuclear stress test is abnormal. Nuclear stress test was negative for any focal perfusion " defects.    Increased stress to rest cavity ratio of 1.5 is consistent with diffuse subendocardial ischemia.    The patient is at an intermediate risk of future cardiac ischemic events.                 MARGIE KEANE MD [facility]  [phone]    This note created using Dragon voice recognition software.  Sound alike errors may have escaped editing.

## 2021-06-17 NOTE — TELEPHONE ENCOUNTER
Telephone Encounter by Torin Tobar RN at 10/28/2020  3:23 PM     Author: Torin Tobar RN Service: -- Author Type: RN, Care Manager    Filed: 10/28/2020  3:28 PM Encounter Date: 10/28/2020 Status: Signed    : Torin Tobar RN (RN, Care Manager)       ANTICOAGULATION  MANAGEMENT    Assessment     Today's INR result of 1.1 is Subtherapeutic (goal INR of 2.0-3.0)        Warfarin recently held as instructed which may be affecting INR- has been holding since 10/24 for back surgery tmr.     No new diet changes affecting INR    No new medication/supplements affecting INR    Continues to tolerate warfarin with no reported s/s of bleeding or thromboembolism     Previous INR was Therapeutic    Plan:     Spoke on phone with Stefan regarding INR result and instructed:     Warfarin Dosing Instructions:  Continue holding.     Surgeon will resume warfarin after surgery.     Instructed patient to follow up no later than: 4-6 days after discharge home from hospital.    Education provided: importance of taking warfarin as instructed    Stefan verbalizes understanding and agrees to warfarin dosing plan.    Instructed to call the [facility] for any changes, questions or concerns. (#[phone])   ?   Torin Tobar RN    Subjective/Objective:      Stefan Diallo, a 75 y.o. male is on warfarin.     Stefan reports:     Home warfarin dose: template incorrect; verbally confirmed home dose with Stefan and updated on anticoagulation calendar     Missed doses: No     Medication changes:  No     S/S of bleeding or thromboembolism:  No     New Injury or illness:  No     Changes in diet or alcohol consumption:  No     Upcoming surgery, procedure or cardioversion:  Yes, back surgery tmr 10/29.     Anticoagulation Episode Summary     Current INR goal:  2.0-3.0   TTR:  90.6 % (11.6 mo)   Next INR check:  11/4/2020   INR from last check:  1.10 (10/28/2020)   Weekly max warfarin dose:     Target end date:     INR check  location:     Preferred lab:     Send INR reminders to:  HILARIO HECTOR    Indications    Atrial fibrillation  unspecified type (H) [I48.91]           Comments:           Anticoagulation Care Providers     Provider Role Specialty Phone number    Collin Singh MD Referring Family Medicine [phone]

## 2021-06-17 NOTE — PATIENT INSTRUCTIONS - HE
"Patient Instructions by Stefan Watson PA-C at 8/15/2019 11:10 AM     Author: Stefan Watson PA-C Service: -- Author Type: Physician Assistant    Filed: 8/15/2019  1:19 PM Encounter Date: 8/15/2019 Status: Addendum    : Stefan Watson PA-C (Physician Assistant)    Related Notes: Original Note by Stefan Watson PA-C (Physician Assistant) filed at 8/15/2019  1:15 PM         Elevate the left leg continuously above the level of the heart.  I suggest \"toes above the nose\".  Ice topically to the area of the knee that hurts and is swollen the most 20 minutes on each hour while awake.  Take precautions to avoid damage to the skin.  Use of Tylenol for pain relief as needed.  Follow packaging directions.  If having any problematic swelling or getting numbness and tingling in the lower extremities or difficulty with shortness of breath, funny beatings of your heart, dizziness or sharp stabbing pain when he breathes in or out follow-up immediately with your primary care provider or if it is after hours with the emergency room.  Otherwise recheck with your primary care provider on Monday for reevaluation and treatment        Patient Education     Treatment for Bone Bruise (Bone Contusion)  A bone bruise is an injury to a bone that is less severe than a bone fracture. Bone bruises are fairly common. They can happen to people of all ages. Any type of bone in your body can get a bone bruise. Other injuries often happen along with a bone bruise, such as damage to nearby ligaments.  Types of treatment  Treatment for a bone bruise may include:    Resting the bone or joint    Putting an ice pack on the area several times a day    Raising the injury above the level of your heart to reduce swelling    Taking medicine to reduce pain and swelling    Wearing a brace or other device to limit movement, if needed  Your doctor may give you advice about your diet. This is because eating a diet that is rich in calcium, vitamin D, and protein " can help you heal. Your doctor may ask you to not use certain over-the-counter medicines for pain. Some of these may delay normal bone healing. If you smoke, your doctor will advise you to stop smoking. Smoking can also delay bone healing.  Your health care provider will tell you how long you should avoid putting weight on your bone. Most bone bruises slowly heal over 2 to 4 months. A larger bone bruise may take longer to heal. You may not be able to return to sports activities for weeks or months. If your symptoms dont go away, your health care provider may give you an MRI.  Possible complications of a bone bruise  Most bone bruises heal without any problems. If your bone bruise is very large, your body may have trouble getting blood flow back to the area. This can cause avascular necrosis of the bone. This leads to death of that part of the bone.     When to call the health care provider  Call your health care provider if your symptoms dont start to get better in a few days. Call him or her right away if you have any severe symptoms, such as a high fever.      Date Last Reviewed: 7/21/2015 2000-2017 [employer]. [address] [place], [place] [id number]. All rights reserved. This information is not intended as a substitute for professional medical care. Always follow your healthcare professional's instructions.           Patient Education     Understanding Bone Bruise (Bone Contusion)  A bone bruise is an injury to a bone that is less severe than a bone fracture. Bone bruises are fairly common. They can happen to people of all ages. Any type of bone in your body can be bruised. Other injuries often happen along with a bone bruise, such as damage to nearby ligaments.  What happens when a bone is bruised?  Bone is made of different kinds of tissue. The periosteum is a thin layer of tissue that covers most of a bone. Where bones come together, there is usually a layer of cartilage at the edges. The  bone here is called subchondral bone. Deep inside the bone is an area called the medulla. It contains the bone marrow and fibrous tissue called trabeculae.  With a bone fracture, all of the trabeculae in a region of bone have broken. But with a bone bruise, an injury only damages some of these trabeculae. An injury might cause blood to build up in the area beneath the periosteum. This causes a subperiosteal hematoma, a type of bone bruise. An injury might also cause bleeding and swelling in the area between your cartilage and the bone beneath it. This causes a subchondral bone bruise. Or bleeding and swelling can occur in the medulla of your bone. This is called an intraosseous bone bruise.  What causes a bone bruise?  Injury of any kind can cause a bone bruise. Sports injuries, motor vehicle accidents, or falls from a height can cause them. Twisting injuries that cause joint sprains can also cause a bone bruise. Health conditions like arthritis may also lead to a bone bruise. This is because arthritis causes bone surfaces to grind against each other. Child abuse is another cause of bone bruises.  Symptoms of a bone bruise  Symptoms of a bone bruise can include:    Pain and soreness in the injured area    Swelling in the area and soft tissues around it    Change in color of the injured area    Swelling or stiffness of an injured joint  This pain is often more severe and lasts longer than a soft tissue injury. How severe your symptoms are and how long they last depends on how severe the bone bruise is.  Diagnosing a bone bruise  Your healthcare provider will ask you about your medical history and symptoms. He or she will ask how you got your injury. Your provider will examine the injured area to check for pain, bruising, and swelling. After the exam, your health care provider may be able to tell if you have a bone bruise.  A bone bruise doesnt show up on an X-ray. But you may be given an X-ray to rule out a bone  fracture. A fracture may need a different kind of treatment. An MRI can confirm a bone bruise. But your healthcare provider will likely only give you an MRI if your symptoms dont get better.  Date Last Reviewed: 4/1/2017 2000-2017 [employer]. [address] [place] [address], [place] [address]. All rights reserved. This information is not intended as a substitute for professional medical care. Always follow your healthcare professional's instructions.

## 2021-06-19 NOTE — LETTER
Letter by Torin Tobar RN at      Author: Torin Tobar RN Service: -- Author Type: --    Filed:  Encounter Date: 5/22/2019 Status: (Other)         Stefan Diallo  [address] [facility] [address]      July 1, 2019      Dear Mr. Diallo,    You are currently under the care of [facility] for your warfarin therapy.  We have attempted to contact you several times regarding your warfarin therapy but have not received a return call from you.  Our records show that your last INR was on 5/9/2019 and you were due to come back on: 5/15/2019.     The correct dose of warfarin for you may change from time to time based on your INR result. We would like to ensure that your warfarin dose is correct and that you are not having any side effects. It is not safe for you to be on Warfarin if your INR is not checked regularly. If your INR is too high, serious bleeding can occur. If your INR is too low, blood clots can form and lead to heart attack, stroke or a blood clot in the lung.  Getting your INR checked as instructed is required in order for the [facility] to continue managing and refilling your warfarin.    If you have been told to stop taking warfarin or your warfarin is being managed by another healthcare provider, please call and inform our staff so we can update your records.  If this is not the case, please contact us at [phone] to schedule an INR appointment as soon as possible. Our clinic staff is available Monday through Friday 8:00 am to 5:00 pm.     If we do not hear from you, it may lead to being discharged from the Anticoagulation Management Program.  If this occurs, your [facility] primary care provider would then take over managing your warfarin and you would need to have regular office visits with your provider for warfarin management.    Sincerely,       [facility]

## 2021-06-19 NOTE — LETTER
Letter by Torin Tobar RN at      Author: Torin Tobar RN Service: -- Author Type: --    Filed:  Encounter Date: 5/22/2019 Status: (Other)         Stefan Diallo  [address] [facility] [address]      June 13, 2019      Dear Mr. Diallo,    You are currently under the care of [facility] for your warfarin (Coumadin ) therapy. Our records show that your last INR was on 5/9/2019 and you were due to come back on 5/15/2019. We have attempted to reach you by phone to schedule an INR appointment, but have not heard back from you.    The correct dose of warfarin for you may change from time to time based on your INR result. We would like to ensure that your warfarin dose is correct and that you are not having any side effects. It is not safe for you to be on Warfarin if your INR is not checked regularly. If your INR is too high, serious bleeding can occur. If your INR is too low, blood clots can form and lead to heart attack, stroke or a blood clot in the lung.      Getting your INR checked as instructed is required in order for the [facility] to continue managing and refilling your warfarin. We realize that it might be difficult for you to have frequent blood testing, but it is for your own safety.    Please contact us at [phone] as soon as possible to schedule an INR appointment. Our clinic staff is available Monday through Friday 8:00 am to 5:00 pm.  If you have been told to stop taking warfarin or your warfarin is being managed by another healthcare provider, please call and inform our staff.       Sincerely,     [facility]

## 2021-06-19 NOTE — LETTER
Letter by Torin Tobar RN at      Author: Torin Tobar RN Service: -- Author Type: --    Filed:  Encounter Date: 8/5/2019 Status: (Other)         Stefan Diallo  [address] [facility] [address]      August 13, 2019      Dear Mr. Diallo,    You are currently under the care of [facility] for your warfarin (Coumadin ) therapy. Our records show that your last INR was on 7/15/2019 and you were due to come back on 7/29/2019. We have attempted to reach you by phone to schedule an INR appointment, but have not heard back from you.    The correct dose of warfarin for you may change from time to time based on your INR result. We would like to ensure that your warfarin dose is correct and that you are not having any side effects. It is not safe for you to be on Warfarin if your INR is not checked regularly. If your INR is too high, serious bleeding can occur. If your INR is too low, blood clots can form and lead to heart attack, stroke or a blood clot in the lung.      Getting your INR checked as instructed is required in order for the [facility] to continue managing and refilling your warfarin. We realize that it might be difficult for you to have frequent blood testing, but it is for your own safety.    Please contact us at [phone] as soon as possible to schedule an INR appointment. Our clinic staff is available Monday through Friday 8:00 am to 5:00 pm.  If you have been told to stop taking warfarin or your warfarin is being managed by another healthcare provider, please call and inform our staff.       Sincerely,     [facility]

## 2021-06-20 NOTE — PROGRESS NOTES
Assessment: /    Plan:    1. Pain of left lower leg     2. Adenomatous polyp of colon, unspecified part of colon  Ambulatory referral for Colonoscopy       Recheck if any problem.      Subjective:    HPI:  Stefan Diallo is a 73-year-old male presenting with left lower leg pain.  He was on a paddle pontoon about 1 week ago.  He paddled for a long time, and then had a sore left lower leg.  He has been taking ibuprofen which has helped.  He has noted small amount of swelling lateral to the lower shin, which resolved.  He did not have trauma to the area.    He had colonoscopy in June 2010, with finding of a tubular polyp, recommendation for repeat in 5 years.       Review of Systems: No fever or cough.      Current Outpatient Prescriptions   Medication Sig Dispense Refill     atenolol (TENORMIN) 50 MG tablet Take 1 tablet (50 mg total) by mouth daily. 90 tablet 1     atorvastatin (LIPITOR) 20 MG tablet Take 1 tablet (20 mg total) by mouth daily. 90 tablet 3     blood glucose test (GLUCOSE BLOOD) strips Use 1 each As Directed daily. Contour next strips 50 strip 11     cholecalciferol, vitamin D3, 1,000 unit tablet Take 2,000 Units by mouth daily.        CYANOCOBALAMIN, VITAMIN B-12, (VITAMIN B12 ORAL) Take by mouth.       diltiazem (CARDIZEM CD) 120 MG 24 hr capsule Take 1 capsule (120 mg total) by mouth daily. 90 capsule 1     EPINEPHrine (EPIPEN 2-BRI) 0.3 mg/0.3 mL atIn Inject 0.3 mL (0.3 mg total) into the shoulder, thigh, or buttocks once as needed (allergic reaction). 2 Pre-filled Pen Syringe 1     gabapentin (NEURONTIN) 300 MG capsule Take 1 capsule (300 mg total) by mouth 2 (two) times a day. 180 capsule 1     gemfibrozil (LOPID) 600 MG tablet TAKE 1 TABLET BY MOUTH 2 TIMES DAILY. 180 tablet 2     lisinopril (PRINIVIL,ZESTRIL) 5 MG tablet Take 1 tablet (5 mg total) by mouth daily. 90 tablet 1     metFORMIN (GLUCOPHAGE) 500 MG tablet Take 1 tablet (500 mg total) by mouth 2 (two) times a day. 180 tablet 1      omeprazole (PRILOSEC) 20 MG capsule TAKE 1 CAPSULE (20 MG TOTAL) BY MOUTH DAILY. 90 capsule 3     ranitidine (ZANTAC) 150 MG capsule Take 150 mg by mouth 2 (two) times a day.       vitamin A-vitamin C-vit E-min (OCUVITE) Tab tablet Take by mouth daily. Focus Eye Select       warfarin (COUMADIN) 5 MG tablet Take 7.5 mg Sun, Wed and Friday 5 mg all other day. 50 tablet 1     warfarin (COUMADIN) 5 MG tablet TAKE 1 & 1/2 TABLETS BY MOUTH ON SUN, WED AND FRIDAY AND 1 TABLET ON ALL OTHER DAYS. 50 tablet 1     No current facility-administered medications for this visit.          Objective:    Vitals:    10/01/18 1600   BP: 116/64   Pulse: 70   Resp: 19   Temp: 97.9  F (36.6  C)   SpO2: 96%       Gen:  NAD, VSS  Lungs:  normal  Heart:  normal  Left lower leg with slight tenderness of the muscle lateral to the lower tibia.  No calf tenderness.  Homans sign negative.  No erythema or increased warmth.        ADDITIONAL HISTORY SUMMARIZED (2): None.  DECISION TO OBTAIN EXTRA INFORMATION (1): None.   RADIOLOGY TESTS (1): None.  LABS (1): None.  MEDICINE TESTS (1): None.  INDEPENDENT REVIEW (2 each): None.     Total Data Points: 0

## 2021-06-20 NOTE — PROGRESS NOTES
ASSESSMENT and plan   1. Calf swelling there is a  Large swelling present on the left calf encompassing the popliteal muscle it is tender.  The entire muscle appears taut no evidence of bruising.  Will obtain an ultrasound to rule out DVT or any clotting.  His Coumadin has been therapeutic checking a d-dimer and checking BMP today Vicodin given for pain side effects discussed.  Develops acute shortness of breath today he should go to the emergency room  - US Venous Leg Left; Future  - Basic Metabolic Panel  - D-dimer, Quantitative    2. Type 2 diabetes mellitus without complication, without long-term current use of insulin (H)  He has been feeling well however I will check an A1c today  - Basic Metabolic Panel  - Glycosylated Hemoglobin A1c    3. Essential hypertension    Blood pressure well controlled    4. Atrial fibrillation (H)    He has been taking his Coumadin his last INR was 2.3 he was taking ibuprofen and Aleve for the discomfort in his left calf of asked him to stop that        There are no Patient Instructions on file for this visit.    Orders Placed This Encounter   Procedures     US Venous Leg Left     Standing Status:   Future     Standing Expiration Date:   9/20/2019     Order Specific Question:   Can the procedure be changed per Radiologist protocol?     Answer:   Yes     Basic Metabolic Panel     Glycosylated Hemoglobin A1c     D-dimer, Quantitative     There are no discontinued medications.    No Follow-up on file.    CHIEF COMPLAINT:  Chief Complaint   Patient presents with     Leg Pain     left leg pain, pt rates pain 8/10       HISTORY OF PRESENT ILLNESS:  Stefan is a 73 y.o. male   Coming here for evaluation of the left calf swelling with tenderness.  The swelling started last week in his left knee and when he was using a paddle boat it became more pronounced and now is present in his left calf his left knee is not painful.  He denies any shortness of breath, fever, chills or chest pain.  He says  "difficulty walking is been using ibuprofen and Aleve to help with the pain control the pain does keep him up at night.  He denies having any swellings like this before.  He has been taking his Coumadin faithfully.    REVIEW OF SYSTEMS:     Musculoskeletal complains of pain and swelling in the left calf    All other 10 point review of systems are negative.  According to the patient    PFSH:    Now retired    TOBACCO USE:  History   Smoking Status     Former Smoker     Packs/day: 2.00     Years: 27.00     Types: Cigarettes     Quit date: 1/24/1990   Smokeless Tobacco     Never Used       VITALS:  Vitals:    09/20/18 1508   BP: 112/72   Pulse: 85   Resp: 16   Temp: 98  F (36.7  C)   TempSrc: Oral   SpO2: 96%   Weight: (!) 263 lb 4.8 oz (119.4 kg)   Height: 5' 10\" (1.778 m)     Wt Readings from Last 3 Encounters:   09/20/18 (!) 263 lb 4.8 oz (119.4 kg)   04/04/18 (!) 260 lb (117.9 kg)   10/02/17 (!) 267 lb (121.1 kg)       PHYSICAL EXAM:    Interactive elderly  male sitting in exam room no acute distress  HEENT mucous membranes are moist neck supple there is no lymph enlargement  Respiratory system clear to auscultation equal breath sounds no wheezes no crackles  CVS irregularly irregular no murmurs rubs or gallops appreciated no pedal edema noted  Musculoskeletal system is large swelling present in the left calf approximately measuring 5 x 3 cm in size it is located approximately 2 cm inferior to the lateral aspect of the left knee.  Anterior and posterior drawer tests are negative      DATA REVIEWED:  Additional History from Old Records Summarized (2): 0  Decision to Obtain Records (1): 0  Radiology Tests Summarized or Ordered (1): 1  Ultrasound of the left lower extremity ordered  Labs Reviewed or Ordered (1):   BMP d-dimer hemoglobin A1c ordered blood clots, embolism, diabetes.  Medicine Test Summarized or Ordered (1): 0  Independent Review of EKG or X-RAY(2 each): 0    The visit lasted a total of 25 " minutes face to face with the patient. Over 50% of the time was spent counseling and educating the patient about   .    MEDICATIONS:  Current Outpatient Prescriptions   Medication Sig Dispense Refill     atenolol (TENORMIN) 50 MG tablet Take 1 tablet (50 mg total) by mouth daily. 90 tablet 1     atorvastatin (LIPITOR) 20 MG tablet Take 1 tablet (20 mg total) by mouth daily. 90 tablet 3     blood glucose test (GLUCOSE BLOOD) strips Use 1 each As Directed daily. Contour next strips 50 strip 11     cholecalciferol, vitamin D3, 1,000 unit tablet Take 2,000 Units by mouth daily.        CYANOCOBALAMIN, VITAMIN B-12, (VITAMIN B12 ORAL) Take by mouth.       diltiazem (CARDIZEM CD) 120 MG 24 hr capsule Take 1 capsule (120 mg total) by mouth daily. 90 capsule 1     EPINEPHrine (EPIPEN 2-BRI) 0.3 mg/0.3 mL atIn Inject 0.3 mL (0.3 mg total) into the shoulder, thigh, or buttocks once as needed (allergic reaction). 2 Pre-filled Pen Syringe 1     gabapentin (NEURONTIN) 300 MG capsule Take 1 capsule (300 mg total) by mouth 2 (two) times a day. 180 capsule 1     gemfibrozil (LOPID) 600 MG tablet TAKE 1 TABLET BY MOUTH 2 TIMES DAILY. 180 tablet 2     HYDROcodone-acetaminophen 5-325 mg per tablet Take 1 tablet by mouth every 4 (four) hours as needed for pain. 20 tablet 0     lisinopril (PRINIVIL,ZESTRIL) 5 MG tablet Take 1 tablet (5 mg total) by mouth daily. 90 tablet 1     metFORMIN (GLUCOPHAGE) 500 MG tablet Take 1 tablet (500 mg total) by mouth 2 (two) times a day. 180 tablet 1     omeprazole (PRILOSEC) 20 MG capsule TAKE 1 CAPSULE (20 MG TOTAL) BY MOUTH DAILY. 90 capsule 3     ranitidine (ZANTAC) 150 MG capsule Take 150 mg by mouth 2 (two) times a day.       vitamin A-vitamin C-vit E-min (OCUVITE) Tab tablet Take by mouth daily. Focus Eye Select       warfarin (COUMADIN) 5 MG tablet Take 7.5 mg Sun, Wed and Friday 5 mg all other day. 50 tablet 1     warfarin (COUMADIN) 5 MG tablet TAKE 1 & 1/2 TABLETS BY MOUTH ON SUN, WED AND  FRIDAY AND 1 TABLET ON ALL OTHER DAYS. 50 tablet 1     No current facility-administered medications for this visit.      Please note that this clinical encounter uses voice recognition software, there may be typographical errors present

## 2021-06-21 NOTE — PROGRESS NOTES
ASSESMENT AND PLAN:  Diagnoses and all orders for this visit:    Bursitis of left shoulder  Reviewed options with the patient.  Reviewed the risks and benefits of proceeding with an injection.  He decides to proceed and the skin was prepped with Betadine followed by injection of 20 mg of Kenalog along with 2 mL of lidocaine into the subacromial bursa of the left side.  This was done using sterile technique and sterile equipment.  Aftercare instructions and indications for routine and emergent follow-up discussed with the patient.  No blood loss or complications at the time of procedure.    Chronic atrial fibrillation (H)  Rate controlled, asymptomatic.  He held his warfarin for 2 days before the above procedure, he will restart it tonight and then follow-up as directed by his anticoagulation RN.    Morbid obesity (H)  The following high BMI interventions were performed this visit: encouragement to exercise and dietary management education, guidance, and counseling        SUBJECTIVE: 73-year-old male comes in today for a left shoulder injection.  He has been getting slowly worsening left shoulder pain, no recent injury.  In the past, he had this bursa injected with very good results, almost 1 year of pain improvement.  He would like to have the injection done again.  In preparation for the injection, he has been off of his warfarin for the last 2 days.  He skipped warfarin on Saturday and Sunday and today is Monday and he will be taking a dose tonight as detailed above after the injection.  He takes this for atrial fibrillation.  He has not been having any bleeding from any site.  No palpitations or chest pain or shortness of breath.    Past Medical History:   Diagnosis Date     Atrial fibrillation (H)      Bacteremia      GERD (gastroesophageal reflux disease)      GI hemorrhage      High cholesterol      Hyperlipidemia      Hypertension      Renal artery aneurysm (H)      Sleep apnea, obstructive     USES BIPAP      Type 2 diabetes mellitus (H)      UTI (lower urinary tract infection)      Patient Active Problem List   Diagnosis     Benign Prostatic Hypertrophy     Renal Artery Aneurysm     Tinea Corporis     Shortness Of Breath     Chest Pain     Lower Back Pain     Vertigo     Idiopathic Peripheral Neuropathy     Myalgia And Myositis     Urinary Tract Infection     Pain During Urination (Dysuria)     Cough     Eye Pain     Eczematoid Dermatitis     Urticaria     Obesity     Essential Hypertension     Atrial Fibrillation     Adult Sleep Apnea     Pure hypercholesterolemia     Muscle Weakness     Dysphagia     Bell's palsy     Gastroesophageal reflux disease with esophagitis     Type 2 diabetes mellitus without complication, without long-term current use of insulin (H)     Obesity (BMI 35.0-39.9) with comorbidity (H)     Current Outpatient Medications   Medication Sig Dispense Refill     atenolol (TENORMIN) 50 MG tablet Take 1 tablet (50 mg total) by mouth daily. 90 tablet 1     atorvastatin (LIPITOR) 20 MG tablet Take 1 tablet (20 mg total) by mouth daily. 90 tablet 3     blood glucose test (GLUCOSE BLOOD) strips Use 1 each As Directed daily. Contour next strips 50 strip 11     cholecalciferol, vitamin D3, 1,000 unit tablet Take 2,000 Units by mouth daily.        CYANOCOBALAMIN, VITAMIN B-12, (VITAMIN B12 ORAL) Take by mouth.       diltiazem (CARDIZEM CD) 120 MG 24 hr capsule TAKE 1 CAPSULE BY MOUTH EVERY DAY 90 capsule 1     EPINEPHrine (EPIPEN 2-BRI) 0.3 mg/0.3 mL atIn Inject 0.3 mL (0.3 mg total) into the shoulder, thigh, or buttocks once as needed (allergic reaction). 2 Pre-filled Pen Syringe 1     gabapentin (NEURONTIN) 300 MG capsule TAKE ONE CAPSULE BY MOUTH TWICE A  capsule 1     gemfibrozil (LOPID) 600 MG tablet TAKE 1 TABLET BY MOUTH 2 TIMES DAILY. 180 tablet 2     lisinopril (PRINIVIL,ZESTRIL) 5 MG tablet Take 1 tablet (5 mg total) by mouth daily. 90 tablet 1     metFORMIN (GLUCOPHAGE) 500 MG tablet TAKE 1  "TABLET BY MOUTH TWICE A  tablet 1     omeprazole (PRILOSEC) 20 MG capsule TAKE 1 CAPSULE (20 MG TOTAL) BY MOUTH DAILY. 90 capsule 3     ranitidine (ZANTAC) 150 MG capsule Take 150 mg by mouth 2 (two) times a day.       vitamin A-vitamin C-vit E-min (OCUVITE) Tab tablet Take by mouth daily. Focus Eye Select       warfarin (COUMADIN) 5 MG tablet Take 7.5 mg Sun, Wed and Friday 5 mg all other day. 50 tablet 1     warfarin (COUMADIN) 5 MG tablet TAKE 1 & 1/2 TABLETS BY MOUTH ON SUN, WED AND FRIDAY AND 1 TABLET ON ALL OTHER DAYS. 50 tablet 1     No current facility-administered medications for this visit.      Social History     Tobacco Use   Smoking Status Former Smoker     Packs/day: 2.00     Years: 27.00     Pack years: 54.00     Types: Cigarettes     Last attempt to quit: 1990     Years since quittin.8   Smokeless Tobacco Never Used       OBJECTICE: /64 (Patient Site: Left Arm, Patient Position: Sitting, Cuff Size: Adult Large)   Pulse 66   Temp 97.8  F (36.6  C)   Resp 16   Ht 5' 10\" (1.778 m)   Wt (!) 267 lb (121.1 kg)   SpO2 97%   BMI 38.31 kg/m       No results found for this or any previous visit (from the past 24 hour(s)).     GEN-alert, appropriate, in no apparent distress   CV-irregularly irregular rhythm, normal rate   RESP-clear to auscultation   Musculoskeletal-some pain with palpation over the left shoulder subacromial bursa.   SKIN-overlying the injection site is normal without any ulcers or vesicles or rash      Donovan Ponce          "

## 2021-06-21 NOTE — PROGRESS NOTES
ASSESMENT AND PLAN:  Diagnoses and all orders for this visit:    Atrial fibrillation (H)  -     INR done today comes back at 2.10.  He is rate controlled.  We will continue current medication with the exception of the 2-day break in his warfarin as detailed below.    Bursitis of left shoulder  Discussed risks and benefits of injection with the patient.  Given that he is anticoagulated fully on warfarin we decided not to proceed with the bursa injection today.  Instead, he is going to skip his warfarin on Saturday and Sunday and return to clinic on Monday for the shoulder injection at that time.        SUBJECTIVE: 73-year-old male who was last had a shoulder bursa about 1 year ago.  Patient reports he had an excellent response with almost 1 year of significant reduction in pain and improved range of motion of the left shoulder.  However, over the last month, has had worsening pain on the lateral aspect of the left shoulder and reduced range of motion with elevation of the arm.  Pain has been getting slowly progressively worse.  He would like to do another injection.  He has a history of atrial fibrillation for which he is anticoagulated with warfarin.  On review of systems related to the A. fib, no chest pain, no shortness of breath, no palpitations, no bleeding issues.    Past Medical History:   Diagnosis Date     Atrial fibrillation (H)      Bacteremia      GERD (gastroesophageal reflux disease)      GI hemorrhage      High cholesterol      Hyperlipidemia      Hypertension      Renal artery aneurysm (H)      Sleep apnea, obstructive     USES BIPAP     Type 2 diabetes mellitus (H)      UTI (lower urinary tract infection)      Patient Active Problem List   Diagnosis     Benign Prostatic Hypertrophy     Renal Artery Aneurysm     Tinea Corporis     Shortness Of Breath     Chest Pain     Lower Back Pain     Vertigo     Idiopathic Peripheral Neuropathy     Myalgia And Myositis     Urinary Tract Infection     Pain During  Urination (Dysuria)     Cough     Eye Pain     Eczematoid Dermatitis     Urticaria     Obesity     Essential Hypertension     Atrial Fibrillation     Adult Sleep Apnea     Pure hypercholesterolemia     Muscle Weakness     Dysphagia     Bell's palsy     Gastroesophageal reflux disease with esophagitis     Type 2 diabetes mellitus without complication, without long-term current use of insulin (H)     Current Outpatient Medications   Medication Sig Dispense Refill     atenolol (TENORMIN) 50 MG tablet Take 1 tablet (50 mg total) by mouth daily. 90 tablet 1     atorvastatin (LIPITOR) 20 MG tablet Take 1 tablet (20 mg total) by mouth daily. 90 tablet 3     cholecalciferol, vitamin D3, 1,000 unit tablet Take 2,000 Units by mouth daily.        CYANOCOBALAMIN, VITAMIN B-12, (VITAMIN B12 ORAL) Take by mouth.       diltiazem (CARDIZEM CD) 120 MG 24 hr capsule TAKE 1 CAPSULE BY MOUTH EVERY DAY 90 capsule 1     EPINEPHrine (EPIPEN 2-BRI) 0.3 mg/0.3 mL atIn Inject 0.3 mL (0.3 mg total) into the shoulder, thigh, or buttocks once as needed (allergic reaction). 2 Pre-filled Pen Syringe 1     gabapentin (NEURONTIN) 300 MG capsule TAKE ONE CAPSULE BY MOUTH TWICE A  capsule 1     gemfibrozil (LOPID) 600 MG tablet TAKE 1 TABLET BY MOUTH 2 TIMES DAILY. 180 tablet 2     lisinopril (PRINIVIL,ZESTRIL) 5 MG tablet Take 1 tablet (5 mg total) by mouth daily. 90 tablet 1     metFORMIN (GLUCOPHAGE) 500 MG tablet TAKE 1 TABLET BY MOUTH TWICE A  tablet 1     omeprazole (PRILOSEC) 20 MG capsule TAKE 1 CAPSULE (20 MG TOTAL) BY MOUTH DAILY. 90 capsule 3     ranitidine (ZANTAC) 150 MG capsule Take 150 mg by mouth 2 (two) times a day.       vitamin A-vitamin C-vit E-min (OCUVITE) Tab tablet Take by mouth daily. Focus Eye Select       warfarin (COUMADIN) 5 MG tablet Take 7.5 mg Sun, Wed and Friday 5 mg all other day. 50 tablet 1     blood glucose test (GLUCOSE BLOOD) strips Use 1 each As Directed daily. Contour next strips 50 strip 11      "warfarin (COUMADIN) 5 MG tablet TAKE 1 & 1/2 TABLETS BY MOUTH ON SUN, WED AND FRIDAY AND 1 TABLET ON ALL OTHER DAYS. 50 tablet 1     No current facility-administered medications for this visit.      Social History     Tobacco Use   Smoking Status Former Smoker     Packs/day: 2.00     Years: 27.00     Pack years: 54.00     Types: Cigarettes     Last attempt to quit: 1990     Years since quittin.8   Smokeless Tobacco Never Used       OBJECTICE: /64 (Patient Site: Right Arm, Patient Position: Sitting, Cuff Size: Adult Large)   Pulse 69   Temp 98  F (36.7  C) (Oral)   Resp 20   Ht 5' 10\" (1.778 m)   Wt (!) 266 lb (120.7 kg)   SpO2 99%   BMI 38.17 kg/m       No results found for this or any previous visit (from the past 24 hour(s)).     GEN-alert, appropriate, in no apparent distress   Musculoskeletal-tender to palpation over the subacromial bursa of the left shoulder.   CV-irregularly irregular rhythm with normal rate   RESP-lungs clear to auscultation     Donovan Ponce          "

## 2021-06-21 NOTE — PROGRESS NOTES
INR 1.8 pt has been taking 7.5 mg Sun and 5 mg all other days. Increase dose to 7.5 mg Sun and Fri and 5 mg all other days. Pt going for colonoscopy and will notify Dr. Garland. Retest in 2 weeks.

## 2021-06-21 NOTE — LETTER
Letter by Quinn Miller DPM at      Author: Quinn Miller DPM Service: -- Author Type: --    Filed:  Encounter Date: 3/5/2021 Status: (Other)       3/5/2021    [facility]   Fax: [phone] Wound Dressing Rx and Order Form  Customer Service: [phone] Order Status: New Order   Verbal: Dr. Miller/Jacqueline Bear RN             Patient Info:  Name: Stefan Diallo  : 1945  Address:   [address] [place] [address]  Phone: [phone]      Insurance Info:  Primary: Payor: BLUE CROSS / Plan: [employer] [place] MED[place]ARE ADVANTAGE / Product Type: MEDICARE ADVANTAGE /    Secondary:    [id number] - ([facility])      Physician Info:   Name:  Quinn Miller JR., DPM   Dept Address/Phones:   [address] [place] [address], [facility] [address] [phone]  Fax: [phone]    Wound info:  Encounter Diagnoses   Name Primary?   ? Diabetic ulcer of toe of right foot associated with type 2 diabetes mellitus, limited to breakdown of skin (H) Yes   ? PAD (peripheral artery disease) (H)    ? Onychomycosis      VASC Wound 21 right hallux  (Active)   Pre Size Length 1 21 1500   Pre Size Width 0.6 21 1500   Pre Size Depth 0.2 21 1500   Pre Total Sq cm 0.6 21 1500   Post Size Length 1 21 1500   Post Size Width 0.6 21 1500   Post Size Depth 0.1 21 1500   Post Total Sq cm 0.6 21 1500       Wound 08/15/20 Other wound type (comment) Knee Anterior (front) (Active)       Incision 19 Surgical Foot Right (Active)       Incision 20 Surgical Leg Right (Active)   Limited walking on right foot with surgical shoe on.    Start medihoney on right great toe ulcer:    1. Remove old Medihoney packing.  2. Cleanse with normal saline, pat dry.  3. Apply Medihoney alginate into the wound. Try to avoid medihoney on the intact skin.   4. Cover with a gauze dressing and secure with Roll  Gauze and paper tape.  5. Change every other day.    Drainage: Moderate  Thickness:  Full  Duration of Need: 60  Days Supply: 30  Start Date: 3/5/21  Starter Kit: Ancillary Kit (saline, gloves, gauze)  Qualifying wound/Debridement Yes      Dressing Type Brand Size Number of pieces Frequency of change   Primary                                                Secondary Gauze  4x4 3 loafs Every other day    Soft Stretch Roll Gauze  4x75 15 rolls Every other day                           Tape Paper Tape  1 in 3 rolls Every other day                     Note: If total out of pocket is more than $50.00 please contact the patient before processing order.     OK to forward to covered supplier.     Electronically Signed Physician: Quinn Miller JR., DPLUZ MARIA Date: 3/5/2021

## 2021-06-21 NOTE — PROGRESS NOTES
INR 2.1 Continue current management dosing of Warfarin. Continue  diet of moderate Vitamin K intake. Discussed with pt the need to call with questions or concerns or any change in medication especially herbal medication or OTC. Call with increased bleeding or bruising or any upcoming procedures.  Left VM message with return phone number for questions and concerns. [phone]  Pt having steroid shot on 11/26. Will hold Warfarin 11-24 and 25 and restart after shot, retest in one week after per pt.

## 2021-06-22 NOTE — PROGRESS NOTES
Spoke to patient today regarding the lab test we will organize GI referral patient agrees with plan

## 2021-06-22 NOTE — TELEPHONE ENCOUNTER
Who is calling:  Patient   Reason for Call:  He is calling this morning very upset. He states Dr Parham was going to call him back in the afternoon on 1/04/2019, but he has not received a call. He is specifically asking for recommendations on next step. Writer note that Dr Parham documented on 1/04/2019 that the plan was for the patient to be seen by JOEL CHOW, but there was no referral placed. Please contact the patient as soon as possible.  Date of last appointment with primary care: 12/20/2019  Has the patient been recently seen:  Yes  Okay to leave a detailed message: Yes

## 2021-06-22 NOTE — PROGRESS NOTES
ASSESSMENT AND PLAN:  1. Atrial fibrillation, unspecified type (H)  No symptoms noted taking warfarin faithfully wants to have his INR checked here as co-pay will be waived.  Informed him that test [facility] labs like    2. Gastroesophageal reflux disease with esophagitis    Previous history of GERD with esophagitis diagnosed in 2015 been placed on omeprazole takes ranitidine occasionally has symptoms suggestive of worsening GERD at this time.  Continues both medications follow-up up with GI if symptoms progress or intolerance to certain foods including oranges noted.    3. Type 2 diabetes mellitus without complication, without long-term current use of insulin (H)    Watching his diet last A1c 7.7 we will follow-up with primary as needed    4. Spasm of bowel    Bilateral lower abdominal quadrant pain noted regular bowel movements noted symptoms are most suggestive of ileus and bowel spasm.  Trial of dicyclomine for 10 days.    5. Dysphagia, unspecified type    Coughing and choking sensation whichshe had before repeating barium enema amps is suspecting that he may have a hiatal hernia because of his weight.  He mentions that he has to will sometimes throw up to relieve himself.  - XR Esophagram; Future            Orders Placed This Encounter   Procedures     XR Esophagram     Standing Status:   Future     Standing Expiration Date:   12/20/2019     Order Specific Question:   Can the procedure be changed per Radiologist protocol?     Answer:   Yes     There are no discontinued medications.    No Follow-up on file.    CHIEF COMPLAINT:  Chief Complaint   Patient presents with     Cough     [id number]     Abdominal Pain     lower abdominal pain       HISTORY OF PRESENT ILLNESS:  Stefan is a 73 y.o. male who presents to the clinic today for cough and lower abdominal pain. He has had a dry cough for several months. This can be triggered with eating in which case it turns into choking type of cough.  "Certain foods can make the cough worse such as oranges or spicy food. Laughing can trigger his cough. The patient coughs more during the day. The cough does not change the way he talks. He denies any associated vomiting.    Stefan reports lower abdominal pain, typically noticeable when he wakes and then turns over at night. It does not wake him from sleep. He does struggle with some constipation, but otherwise no issues with passing gas or decreased appetite. His bladder habits have been normal. The patient is already on omeprazole for reflux which flares seldomly. He underwent upper GI endoscopy on 6/4/15 for dysphagia with impression of reflux esophagitis with erosions and suspected associated esophageal dysmotility/spasm.    He has been on CPAP for 3-4 years, and gets his mask updated regularly.    Stefan continues on metformin for management of diabetes. His most recent hemoglobin A1c was 7.7% on 2018.    REVIEW OF SYSTEMS:   Respiratory: Positive for cough. No wheezing or shortness of breath.  GI: Positive for lower abdominal pain and occasional reflux. Negative for vomiting, gas issues, or decreased appetite.  All other systems are negative.    PFSH:  Reviewed as below.     TOBACCO USE:  Social History     Tobacco Use   Smoking Status Former Smoker     Packs/day: 2.00     Years: 27.00     Pack years: 54.00     Types: Cigarettes     Last attempt to quit: 1990     Years since quittin.9   Smokeless Tobacco Never Used       VITALS:  Vitals:    18 1456   BP: 108/80   Pulse: 77   Resp: 12   Temp: 97.8  F (36.6  C)   TempSrc: Oral   SpO2: 95%   Weight: (!) 260 lb 9.6 oz (118.2 kg)   Height: 5' 10\" (1.778 m)     Wt Readings from Last 3 Encounters:   18 (!) 260 lb 9.6 oz (118.2 kg)   18 (!) 267 lb (121.1 kg)   18 (!) 266 lb (120.7 kg)     Body mass index is 37.39 kg/m .    PHYSICAL EXAM:  General: Alert, cooperative, no distress, appears stated age  Head: Normocephalic, without " obvious abnormality, atraumatic  Nose: Nares normal, septum midline, mucosa normal, no drainage or sinus tenderness  Throat: Lips, mucosa, and tongue normal; teeth and gums normal  Lungs: Clear to auscultation bilaterally, respirations unlabored  Heart: Regular rate and rhythm, S1 and S2 normal, no murmur, rub, or gallop  Abdomen: Soft, bowel sounds active all four quadrants, no masses, no organomegaly, tender to palpation over the right and left lower quadrants  Neurologic:  A & O x 3.  Normal gait.     DATA REVIEWED:  Additional History from Old Records Summarized (2): Reviewed EGD results from 6/4/15 with impression of reflux esophagitis with erosions and suspected associated esophageal dysmotility/spasm.  Decision to Obtain Records (1): none  Radiology Tests Summarized or Ordered (1): XR esophagram was ordered today.  Labs Reviewed or Ordered (1): Recent hemoglobin A1c was 7.7% on 09/20/2018.  Medicine Test Summarized or Ordered (1): none  Independent Review of EKG or X-RAY(2 each): none    ISkyla, am scribing for and in the presence of, Dr. Parham.    I, Dr. Parham, personally performed the services described in this documentation, as scribed by Skyla Venegas in my presence, and it is both accurate and complete.       MEDICATIONS:  Current Outpatient Medications   Medication Sig Dispense Refill     atenolol (TENORMIN) 50 MG tablet Take 1 tablet (50 mg total) by mouth daily. 90 tablet 1     atorvastatin (LIPITOR) 20 MG tablet TAKE 1 TABLET BY MOUTH ONCE DAILY 90 tablet 2     blood-glucose meter (CONTOUR NEXT METER) Misc Use once daily. 1 each 0     cholecalciferol, vitamin D3, 1,000 unit tablet Take 2,000 Units by mouth daily.        CONTOUR NEXT TEST STRIPS strips USE TO TEST ONCE DAILY 100 strip 5     CYANOCOBALAMIN, VITAMIN B-12, (VITAMIN B12 ORAL) Take by mouth.       dicyclomine (BENTYL) 10 MG capsule Take 1 capsule (10 mg total) by mouth 2 (two) times a day before meals for 20 days. 20 capsule 0      diltiazem (CARDIZEM CD) 120 MG 24 hr capsule TAKE 1 CAPSULE BY MOUTH EVERY DAY 90 capsule 1     EPINEPHrine (EPIPEN 2-BRI) 0.3 mg/0.3 mL atIn Inject 0.3 mL (0.3 mg total) into the shoulder, thigh, or buttocks once as needed (allergic reaction). 2 Pre-filled Pen Syringe 1     gabapentin (NEURONTIN) 300 MG capsule TAKE ONE CAPSULE BY MOUTH TWICE A  capsule 1     gemfibrozil (LOPID) 600 MG tablet TAKE 1 TABLET BY MOUTH 2 TIMES DAILY. 180 tablet 2     lisinopril (PRINIVIL,ZESTRIL) 5 MG tablet Take 1 tablet (5 mg total) by mouth daily. 90 tablet 1     metFORMIN (GLUCOPHAGE) 500 MG tablet TAKE 1 TABLET BY MOUTH TWICE A  tablet 1     omeprazole (PRILOSEC) 20 MG capsule TAKE 1 CAPSULE (20 MG TOTAL) BY MOUTH DAILY. 90 capsule 3     ranitidine (ZANTAC) 150 MG capsule Take 150 mg by mouth 2 (two) times a day.       vitamin A-vitamin C-vit E-min (OCUVITE) Tab tablet Take by mouth daily. Focus Eye Select       warfarin (COUMADIN) 5 MG tablet Take 7.5 mg Sun, Wed and Friday 5 mg all other day. 50 tablet 1     warfarin (COUMADIN) 5 MG tablet TAKE 1 & 1/2 TABLETS BY MOUTH ON SUN, WED AND FRIDAY AND 1 TABLET ON ALL OTHER DAYS. 50 tablet 1     No current facility-administered medications for this visit.      Please note that this clinical encounter uses voice recognition software, there may be typographical errors present     Total Data Points: 4

## 2021-06-22 NOTE — TELEPHONE ENCOUNTER
Appointment: 01/10/2019  Time: 02:00pm  Provider: Lisandro Granger MD     [address] [address]  Phone: [phone]

## 2021-06-22 NOTE — TELEPHONE ENCOUNTER
Test Results  Who is calling?:  Patient  Who ordered the test: Pratik Parham MD  Type of test: Imaging  Date of test:  12/26/2018  Where was the test performed:   [facility]  What are your questions/concerns?:  Patient is wanting to know the results of his XR.  Please advise.  Writer did not relay any information.      IMPRESSION:   CONCLUSION:  1.  Patulous esophagus with markedly abnormal motility. Prolonged retention of barium in the esophagus with to and fro motion of barium to the level of the thoracic inlet. Numerous tertiary contractions.  2.  No hiatal hernia or reflux.    Okay to leave a detailed message?:  Yes - [phone]

## 2021-06-22 NOTE — PROGRESS NOTES
Today I called the patient and confirmed that he does have an appointment scheduled with gastroenterology.  He was confused to why he needed to see them for a consult, I informed him that they would review all the information and then decide on the best course of action and treatment plan if needed.  He appeared to understand the      Chad VALENTINO

## 2021-06-22 NOTE — TELEPHONE ENCOUNTER
Orders being requested: Please sign referral for [facility] and re send to online portal again.  Reason service is needed/diagnosis: Dysphagia  When are orders needed by: asap  Where to send Orders: [facility] online portal as they stated they never got the order.  Okay to leave detailed message?  Yes

## 2021-06-23 NOTE — TELEPHONE ENCOUNTER
----- Message from Estela Roth sent at 1/30/2019 10:06 AM CST -----  Regarding: RE: INR  Spoke to Stefan, he is now going to his primary office due to his copay with his new insurance.   ----- Message -----  From: Lula Perez RN  Sent: 1/29/2019   3:49 PM  To: [facility] Scheduling Registration Pool  Subject: INR                                              Pt needs INR

## 2021-06-23 NOTE — TELEPHONE ENCOUNTER
ACN called and introduced pt to [facility] program.   Pt has been on warfarin for over 10 years. Switched [facility]in management from [facility] to PCP due to co-pays.     Last INR was 3.2 on 2/5/19 (goal range 2-3). Pt has been taking warfarin 7.5 mg on Fri; 5 mg all other days.     ACN instructed pt to recheck INR two weeks from last check. Pt verbalized understanding but will call back another time to schedule.     ACN sent out warfarin packet today.

## 2021-06-23 NOTE — PROGRESS NOTES
Assessment: /    Plan:    1. Type 2 diabetes mellitus without complication, without long-term current use of insulin (H)  Glycosylated Hemoglobin A1c    metFORMIN (GLUCOPHAGE) 500 MG tablet   2. Pure hypercholesterolemia  LDL Cholesterol, Direct   3. Atrial fibrillation, unspecified type (H)  INR    INR    Ambulatory referral to Anticoagulation Monitoring   4. Onychogryposis of toenail  Ambulatory referral to Podiatry       He states that when his INR is slightly above 3.0, he eats broccoli the next day, and his INR is usually back in range at the next check.    Increase metformin to 1 tablet in the morning and 2 tablets in the evening.    Recheck diabetes in 4 months.    This was a 25 minute visit with >50% of the time spent in face-to-face counseling and coordination of care regarding: Diabetes, atrial fibrillation, onychogryphosis of toenail.      Subjective:    HPI:  Stefan Diallo is a 73-year-old male presenting for follow-up on diabetes and INR.  Fingerstick glucose has been about 200 recently.  He has been taking metformin 500 mg twice daily.    He would like to have INR monitoring through our clinic.  He states that co-pays are expensive through the cardiology clinic.    He has toenail deformities, and would like to see the podiatrist.      Review of Systems: No fever, cough, chest pain.      Current Outpatient Medications   Medication Sig Dispense Refill     atenolol (TENORMIN) 50 MG tablet Take 1 tablet (50 mg total) by mouth daily. 90 tablet 1     atorvastatin (LIPITOR) 20 MG tablet TAKE 1 TABLET BY MOUTH ONCE DAILY 90 tablet 2     blood-glucose meter (CONTOUR NEXT METER) Misc Use once daily. 1 each 0     cholecalciferol, vitamin D3, 1,000 unit tablet Take 2,000 Units by mouth daily.        CONTOUR NEXT TEST STRIPS strips USE TO TEST ONCE DAILY 100 strip 5     CYANOCOBALAMIN, VITAMIN B-12, (VITAMIN B12 ORAL) Take by mouth.       diltiazem (CARDIZEM CD) 120 MG 24 hr capsule TAKE 1 CAPSULE BY MOUTH EVERY DAY  90 capsule 1     EPINEPHrine (EPIPEN 2-BRI) 0.3 mg/0.3 mL atIn Inject 0.3 mL (0.3 mg total) into the shoulder, thigh, or buttocks once as needed (allergic reaction). 2 Pre-filled Pen Syringe 1     gabapentin (NEURONTIN) 300 MG capsule TAKE ONE CAPSULE BY MOUTH TWICE A  capsule 1     gemfibrozil (LOPID) 600 MG tablet TAKE 1 TABLET BY MOUTH 2 TIMES DAILY. 180 tablet 2     lisinopril (PRINIVIL,ZESTRIL) 5 MG tablet Take 1 tablet (5 mg total) by mouth daily. 90 tablet 1     metFORMIN (GLUCOPHAGE) 500 MG tablet Take 1 tablet each morning and 2 tablets each evening 270 tablet 1     omeprazole (PRILOSEC) 20 MG capsule TAKE 1 CAPSULE (20 MG TOTAL) BY MOUTH DAILY. 90 capsule 3     ranitidine (ZANTAC) 150 MG capsule Take 150 mg by mouth 2 (two) times a day.       vitamin A-vitamin C-vit E-min (OCUVITE) Tab tablet Take by mouth daily. Focus Eye Select       warfarin (COUMADIN/JANTOVEN) 5 MG tablet TAKE 1 & 1/2 TABLETS BY MOUTH ON SUN, WED AND FRIDAY AND TAKE 1 TABLET ON ALL OTHER DAYS 50 tablet 1     No current facility-administered medications for this visit.          Objective:    Vitals:    02/05/19 1431   BP: 124/70   Pulse: 64   Resp: 19   Temp: 98.3  F (36.8  C)   SpO2: 96%       Gen:  NAD, VSS  Lungs:  normal  Heart: Atrial fibrillation  Feet with decreased sensation on monofilament testing.  Onychogryphosis of toenails.    A1c is 8.2  INR is 3.2    ADDITIONAL HISTORY SUMMARIZED (2): None.  DECISION TO OBTAIN EXTRA INFORMATION (1): None.   RADIOLOGY TESTS (1): None.  LABS (1): Ordered.  MEDICINE TESTS (1): None.  INDEPENDENT REVIEW (2 each): None.     Total Data Points: 1

## 2021-06-24 NOTE — TELEPHONE ENCOUNTER
ANTICOAGULATION  MANAGEMENT PROGRAM    Stefan MIRANDA Yoel is overdue for INR check.  Reminder call made.    Spoke with pt and scheduled INR appointment on 3/4 .    Torin Tobar RN

## 2021-06-24 NOTE — TELEPHONE ENCOUNTER
ANTICOAGULATION  MANAGEMENT    Assessment     Today's INR result of 2.3 is Therapeutic (goal INR of 2.0-3.0)        Warfarin taken as previously instructed    No new diet changes affecting INR    Finished 10 day course of Keflex last week for toe cellulitis. Today will start on Keflex again for 7 days. (Did not seem to have effect on warfarin the last time pt was on Keflex)    Continues to tolerate warfarin with no reported s/s of bleeding or thromboembolism     Previous INR was Therapeutic    Plan:     Spoke with Stefan regarding INR result and instructed:     Warfarin Dosing Instructions:  Continue current warfarin dose    7.5 mg every Sun; 5 mg all other days         Instructed patient to follow up no later than: 2-4 weeks.    Education provided: importance of taking warfarin as instructed    Stefan verbalizes understanding and agrees to warfarin dosing plan.    Instructed to call the [facility] for any changes, questions or concerns. (#[phone])   ?   Torin Tobar RN    Subjective/Objective:      Stefan Diallo, a 73 y.o. male is on warfarin.     Stefan reports:     Home warfarin dose: verbally confirmed home dose with pt and updated on anticoagulation calendar     Missed doses: No     Medication changes:  Yes: Keflex x7 days     S/S of bleeding or thromboembolism:  No     New Injury or illness:  No     Changes in diet or alcohol consumption:  No     Upcoming surgery, procedure or cardioversion:  No    Anticoagulation Episode Summary     Current INR goal:   2.0-3.0   TTR:   100.0 % (1.7 wk)   Next INR check:   4/1/2019   INR from last check:   2.30 (3/4/2019)   Weekly max warfarin dose:      Target end date:      INR check location:      Preferred lab:      Send INR reminders to:   ANTICOAGULATION POOL B (MPW,HUG,STW,RVL,OAK,RLN)    Indications    Atrial fibrillation  unspecified type (H) [I48.91]           Comments:            Anticoagulation Care Providers     Provider Role Specialty Phone number     Collin Singh MD [facility] [phone]

## 2021-06-24 NOTE — TELEPHONE ENCOUNTER
ANTICOAGULATION  MANAGEMENT    Assessment     Today's INR result of 2.7 is Therapeutic (goal INR of 2.0-3.0)        Warfarin taken differently than instructed, but no impact to total weekly dose    No new diet changes affecting INR    Potential interaction between Keflex and warfarin which may affect subsequent INRs- starting today x10 days for foot cellulitis.    Continues to tolerate warfarin with no reported s/s of bleeding or thromboembolism     Previous INR was Supratherapeutic    Plan:     Spoke with Stefan regarding INR result and instructed:     Warfarin Dosing Instructions:  Continue current warfarin dose 7.5 mg daily on Sun; and 5 mg daily rest of week  (0 % change)    Instructed patient to follow up no later than: Friday 2/22.     Education provided: importance of taking warfarin as instructed and potential interaction between warfarin and Keflex    Stefan verbalizes understanding and agrees to warfarin dosing plan.    Instructed to call the [facility] for any changes, questions or concerns. (#[phone])   ?   Torin Tobar RN    Subjective/Objective:      Stefan MIRANDA Yoel, a 73 y.o. male is on warfarin.     Stefan reports:     Home warfarin dose: verbally confirmed home dose with pt and updated on anticoagulation calendar     Missed doses: No     Medication changes:  Yes: today starting Keflex x10 days.     S/S of bleeding or thromboembolism:  No     New Injury or illness:  Yes: foot cellulitis     Changes in diet or alcohol consumption:  No     Upcoming surgery, procedure or cardioversion:  No    Anticoagulation Episode Summary     Current INR goal:   2.0-3.0   TTR:   --   Next INR check:   2/22/2019   INR from last check:   2.70 (2/18/2019)   Weekly max warfarin dose:      Target end date:      INR check location:      Preferred lab:      Send INR reminders to:   ANTICOAGULATION POOL B (MPW,ANDRESG,STW,RVL,OAK,RLN)    Indications    Atrial fibrillation  unspecified type (H) [I48.91]           Comments:             Anticoagulation Care Providers     Provider Role Specialty Phone number    Collin Singh MD Referring Family Medicine [phone]

## 2021-06-24 NOTE — PROGRESS NOTES
FOOT AND ANKLE SURGERY/PODIATRY Progress Note        ASSESSMENT:   Diabetic ulcer second toe right foot    HPI: Stefan Diallo was seen again today for follow-up evaluation of cellulitis and an ulcer on the tip of the second toe as well as her third toe right foot.  Patient stated he has seen very little improvement.  Some of the redness and swelling involving the toes has slightly improved.  The patient has no pain due to decreased sensation.  He has noticed some mild drainage from the second toe right foot.    Past Medical History:   Diagnosis Date     Atrial fibrillation (H)      Bacteremia      GERD (gastroesophageal reflux disease)      GI hemorrhage      High cholesterol      Hyperlipidemia      Hypertension      Renal artery aneurysm (H)      Sleep apnea, obstructive     USES BIPAP     Type 2 diabetes mellitus (H)      UTI (lower urinary tract infection)        Past Surgical History:   Procedure Laterality Date     MS REPAIR COMPL ROTATOR CUFF AVULSN,CHR      Description: Chronic Rotator Cuff Avulsion;  Recorded: 04/11/2011;     TRANSURETHRAL RESECTION OF PROSTATE         No Known Allergies      Current Outpatient Medications:      atenolol (TENORMIN) 50 MG tablet, Take 1 tablet (50 mg total) by mouth daily., Disp: 90 tablet, Rfl: 1     atorvastatin (LIPITOR) 20 MG tablet, TAKE 1 TABLET BY MOUTH ONCE DAILY, Disp: 90 tablet, Rfl: 2     blood-glucose meter (CONTOUR NEXT METER) Misc, Use once daily., Disp: 1 each, Rfl: 0     cholecalciferol, vitamin D3, 1,000 unit tablet, Take 2,000 Units by mouth daily. , Disp: , Rfl:      CONTOUR NEXT TEST STRIPS strips, USE TO TEST ONCE DAILY, Disp: 100 strip, Rfl: 5     CYANOCOBALAMIN, VITAMIN B-12, (VITAMIN B12 ORAL), Take by mouth., Disp: , Rfl:      diltiazem (CARDIZEM CD) 120 MG 24 hr capsule, TAKE 1 CAPSULE BY MOUTH EVERY DAY, Disp: 90 capsule, Rfl: 1     EPINEPHrine (EPIPEN 2-BRI) 0.3 mg/0.3 mL atIn, Inject 0.3 mL (0.3 mg total) into the shoulder, thigh, or buttocks  once as needed (allergic reaction)., Disp: 2 Pre-filled Pen Syringe, Rfl: 1     FLUZONE HIGH-DOSE 2018-19, PF, 180 mcg/0.5 mL Syrg injection, TO BE ADMINISTERED BY PHARMACIST FOR IMMUNIZATION, Disp: , Rfl: 0     gabapentin (NEURONTIN) 300 MG capsule, TAKE ONE CAPSULE BY MOUTH TWICE A DAY, Disp: 180 capsule, Rfl: 1     gemfibrozil (LOPID) 600 MG tablet, Take 1 tablet (600 mg total) by mouth 2 (two) times a day., Disp: 180 tablet, Rfl: 3     lisinopril (PRINIVIL,ZESTRIL) 5 MG tablet, Take 1 tablet (5 mg total) by mouth daily., Disp: 90 tablet, Rfl: 1     metFORMIN (GLUCOPHAGE) 500 MG tablet, Take 1 tablet each morning and 2 tablets each evening, Disp: 270 tablet, Rfl: 1     miscellaneous medical supply Misc, Bipap w heated humidifier, tubing & chamber all x1, FFM w cushion x 1, headgear x 1, filers: disposable and resuable x 1pk both, Disp: , Rfl:      neomycin-polymyxin-dexamethasone (MAXITROL) 3.5mg/mL-10,000 unit/mL-0.1 % ophthalmic suspension, Apply 1 drop to eye., Disp: , Rfl:      neomycin-polymyxin-dexamethasone (MAXITROL) 3.5mg/mL-10,000 unit/mL-0.1 % ophthalmic suspension, INSTILL 1 DROP INTO RIGHT EYE 4 TIMES A DAY FOR 7 DAYS, Disp: , Rfl: 1     omeprazole (PRILOSEC) 20 MG capsule, TAKE 1 CAPSULE (20 MG TOTAL) BY MOUTH DAILY., Disp: 90 capsule, Rfl: 3     ranitidine (ZANTAC) 150 MG capsule, Take 150 mg by mouth 2 (two) times a day., Disp: , Rfl:      simvastatin (ZOCOR) 40 MG tablet, Take 40 mg by mouth., Disp: , Rfl:      vitamin A-vitamin C-vit E-min (OCUVITE) Tab tablet, Take by mouth daily. Focus Eye Select, Disp: , Rfl:      warfarin (COUMADIN/JANTOVEN) 5 MG tablet, TAKE 1 & 1/2 TABLETS BY MOUTH ON SUN, WED AND FRIDAY AND TAKE 1 TABLET ON ALL OTHER DAYS, Disp: 50 tablet, Rfl: 1    Family History   Problem Relation Age of Onset     Coronary artery disease Mother      Coronary artery disease Father      Atrial fibrillation Father        Social History     Socioeconomic History     Marital status:       Spouse name: Not on file     Number of children: 3     Years of education: Not on file     Highest education level: Not on file   Occupational History     Occupation:      Employer: [employer]   Social Needs     Financial resource strain: Not on file     Food insecurity:     Worry: Not on file     Inability: Not on file     Transportation needs:     Medical: Not on file     Non-medical: Not on file   Tobacco Use     Smoking status: Former Smoker     Packs/day: 2.00     Years: 27.00     Pack years: 54.00     Types: Cigarettes     Last attempt to quit: 1990     Years since quittin.1     Smokeless tobacco: Never Used   Substance and Sexual Activity     Alcohol use: No     Frequency: Never     Drug use: No     Sexual activity: No   Lifestyle     Physical activity:     Days per week: Not on file     Minutes per session: Not on file     Stress: Not on file   Relationships     Social connections:     Talks on phone: Not on file     Gets together: Not on file     Attends Latter day service: Not on file     Active member of club or organization: Not on file     Attends meetings of clubs or organizations: Not on file     Relationship status: Not on file     Intimate partner violence:     Fear of current or ex partner: Not on file     Emotionally abused: Not on file     Physically abused: Not on file     Forced sexual activity: Not on file   Other Topics Concern     Not on file   Social History Narrative     Not on file       10 point Review of Systems is negative except as mentioned below.     There were no vitals filed for this visit.    BMI= There is no height or weight on file to calculate BMI.    OBJECTIVE:  General appearance: Patient is alert and fully cooperative with history & exam.  No sign of distress is noted during the visit.  Vascular: Dorsalis pedis and posterior tibial pulses are palpable.  Pedal hair is absent bilaterally.   CFT < 3 sec from anterior tibial surface to distal digits bilaterally.  There is no appreciable edema noted.  There is some mild erythema noted second third toe right foot.  Dermatologic: Turgor and texture are within normal limits. No coloration or temperature changes. No primary or secondary lesions noted.  Neurologic: All epicritic and proprioceptive sensations are grossly intact bilaterally.  Musculoskeletal: All active and passive ankle, subtalar, midtarsal, and 1st MPJ range of motion are grossly intact without pain or crepitus, with the exception of none. Manual muscle strength is +4/5 bilaterally in all compartments. All dorsiflexors, plantarflexors, invertors, evertors are intact bilaterally.  No tenderness present to second toe right foot on palpation.  No tenderness to foot or ankle with range of motion.     Imaging:     No results found.         TREATMENT:  A sterile dressing was applied to the second third toes right foot.  No debridement was needed today.  The patient was placed in a postop shoe and asked to wear only the postop shoe and return to the clinic in 2 weeks.  He was placed on Keflex 500 mg 1 tab twice daily.        Ravi Abbasi; LIM[place]  [facility] Foot & Ankle Surgery/Podiatry

## 2021-06-24 NOTE — TELEPHONE ENCOUNTER
RN cannot approve Refill Request    RN can NOT refill this medication PCP messaged that patient is overdue for Labs.       Lula Karimi, [facility] Triage/Med Refill 2/22/2019    Requested Prescriptions   Pending Prescriptions Disp Refills     gemfibrozil (LOPID) 600 MG tablet 180 tablet 2     Sig: Take 1 tablet (600 mg total) by mouth 2 (two) times a day.    Gemfibrozil Refill Protocol Failed - 2/21/2019  1:42 PM       Failed - Fasting lipid cascade in last 12 months    Cholesterol   Date Value Ref Range Status   12/14/2012 183 <200 mg/dL Final     Triglycerides   Date Value Ref Range Status   12/14/2012 200 (H) <150 mg/dL Final     HDL Cholesterol   Date Value Ref Range Status   12/14/2012 40 >39 mg/dL Final     LDL Calculated   Date Value Ref Range Status   12/14/2012 103 <130 mg/dL Final            Failed - AST or ALT in last 12 months    AST   Date Value Ref Range Status   06/16/2017 30 0 - 40 U/L Final     ALT   Date Value Ref Range Status   06/16/2017 21 0 - 45 U/L Final              Passed - PCP or prescribing provider visit in last 12 months      Last office visit with prescriber/PCP: 2/5/2019 Collin Singh MD OR same dept: 2/5/2019 Collin Singh MD OR same specialty: 2/5/2019 Collin Singh MD  Last physical: Visit date not found Last MTM visit: Visit date not found   Next visit within 3 mo: Visit date not found  Next physical within 3 mo: Visit date not found  Prescriber OR PCP: Collin Singh MD  Last diagnosis associated with med order: There are no diagnoses linked to this encounter.  If protocol passes may refill for 12 months if within 3 months of last provider visit (or a total of 15 months).

## 2021-06-24 NOTE — PROGRESS NOTES
FOOT AND ANKLE SURGERY/PODIATRY CONSULT NOTE         ASSESSMENT:   Onychomycosis, onychauxis, cellulitis second toe right foot, compression neuropathy, tarsal tunnel, mononeuritis both lower extremities, diabetes mellitus      TREATMENT:  Debrided nails 1 through 5 both feet today.  The patient was placed on Keflex 500 mg 1 tab twice daily.  The patient is to return to the clinic in 2 weeks        HPI: I was asked to see Stefan MIRANDA Yoel today for evaluation and treatment of long  thick nails both feet..  The patient was seen in consultation at the request of Collin Singh MD for foot pain.     Past Medical History:   Diagnosis Date     Atrial fibrillation (H)      Bacteremia      GERD (gastroesophageal reflux disease)      GI hemorrhage      High cholesterol      Hyperlipidemia      Hypertension      Renal artery aneurysm (H)      Sleep apnea, obstructive     USES BIPAP     Type 2 diabetes mellitus (H)      UTI (lower urinary tract infection)        Past Surgical History:   Procedure Laterality Date     NC REPAIR COMPL ROTATOR CUFF AVULSN,CHR      Description: Chronic Rotator Cuff Avulsion;  Recorded: 04/11/2011;     TRANSURETHRAL RESECTION OF PROSTATE         No Known Allergies      Current Outpatient Medications:      atenolol (TENORMIN) 50 MG tablet, Take 1 tablet (50 mg total) by mouth daily., Disp: 90 tablet, Rfl: 1     atorvastatin (LIPITOR) 20 MG tablet, TAKE 1 TABLET BY MOUTH ONCE DAILY, Disp: 90 tablet, Rfl: 2     blood-glucose meter (CONTOUR NEXT METER) Misc, Use once daily., Disp: 1 each, Rfl: 0     cholecalciferol, vitamin D3, 1,000 unit tablet, Take 2,000 Units by mouth daily. , Disp: , Rfl:      CONTOUR NEXT TEST STRIPS strips, USE TO TEST ONCE DAILY, Disp: 100 strip, Rfl: 5     CYANOCOBALAMIN, VITAMIN B-12, (VITAMIN B12 ORAL), Take by mouth., Disp: , Rfl:      diltiazem (CARDIZEM CD) 120 MG 24 hr capsule, TAKE 1 CAPSULE BY MOUTH EVERY DAY, Disp: 90 capsule, Rfl: 1     EPINEPHrine (EPIPEN 2-BRI) 0.3  mg/0.3 mL atIn, Inject 0.3 mL (0.3 mg total) into the shoulder, thigh, or buttocks once as needed (allergic reaction)., Disp: 2 Pre-filled Pen Syringe, Rfl: 1     FLUZONE HIGH-DOSE 2018-19, PF, 180 mcg/0.5 mL Syrg injection, TO BE ADMINISTERED BY PHARMACIST FOR IMMUNIZATION, Disp: , Rfl: 0     gabapentin (NEURONTIN) 300 MG capsule, TAKE ONE CAPSULE BY MOUTH TWICE A DAY, Disp: 180 capsule, Rfl: 1     gemfibrozil (LOPID) 600 MG tablet, TAKE 1 TABLET BY MOUTH 2 TIMES DAILY., Disp: 180 tablet, Rfl: 2     lisinopril (PRINIVIL,ZESTRIL) 5 MG tablet, Take 1 tablet (5 mg total) by mouth daily., Disp: 90 tablet, Rfl: 1     metFORMIN (GLUCOPHAGE) 500 MG tablet, Take 1 tablet each morning and 2 tablets each evening, Disp: 270 tablet, Rfl: 1     miscellaneous medical supply Misc, Bipap w heated humidifier, tubing & chamber all x1, FFM w cushion x 1, headgear x 1, filers: disposable and resuable x 1pk both, Disp: , Rfl:      neomycin-polymyxin-dexamethasone (MAXITROL) 3.5mg/mL-10,000 unit/mL-0.1 % ophthalmic suspension, Apply 1 drop to eye., Disp: , Rfl:      neomycin-polymyxin-dexamethasone (MAXITROL) 3.5mg/mL-10,000 unit/mL-0.1 % ophthalmic suspension, INSTILL 1 DROP INTO RIGHT EYE 4 TIMES A DAY FOR 7 DAYS, Disp: , Rfl: 1     omeprazole (PRILOSEC) 20 MG capsule, TAKE 1 CAPSULE (20 MG TOTAL) BY MOUTH DAILY., Disp: 90 capsule, Rfl: 3     ranitidine (ZANTAC) 150 MG capsule, Take 150 mg by mouth 2 (two) times a day., Disp: , Rfl:      simvastatin (ZOCOR) 40 MG tablet, Take 40 mg by mouth., Disp: , Rfl:      vitamin A-vitamin C-vit E-min (OCUVITE) Tab tablet, Take by mouth daily. Focus Eye Select, Disp: , Rfl:      warfarin (COUMADIN/JANTOVEN) 5 MG tablet, TAKE 1 & 1/2 TABLETS BY MOUTH ON SUN, WED AND FRIDAY AND TAKE 1 TABLET ON ALL OTHER DAYS, Disp: 50 tablet, Rfl: 1    Family History   Problem Relation Age of Onset     Coronary artery disease Mother      Coronary artery disease Father      Atrial fibrillation Father        Social  History     Socioeconomic History     Marital status:      Spouse name: Not on file     Number of children: 3     Years of education: Not on file     Highest education level: Not on file   Social Needs     Financial resource strain: Not on file     Food insecurity - worry: Not on file     Food insecurity - inability: Not on file     Transportation needs - medical: Not on file     Transportation needs - non-medical: Not on file   Occupational History     Occupation:      Employer: [employer]   Tobacco Use     Smoking status: Former Smoker     Packs/day: 2.00     Years: 27.00     Pack years: 54.00     Types: Cigarettes     Last attempt to quit: 1990     Years since quittin.0     Smokeless tobacco: Never Used   Substance and Sexual Activity     Alcohol use: Yes     Alcohol/week: 2.4 oz     Types: 4 Cans of beer per week     Comment: Ocasional     Drug use: No     Sexual activity: No   Other Topics Concern     Not on file   Social History Narrative     Not on file       Review of Systems - Patient denies fever, chills, rash, wound, stiffness, limping, numbness, weakness, heart burn, blood in stool, chest pain with activity, calf pain when walking, shortness of breath with activity, chronic cough, easy bleeding/bruising, swelling of ankles, excessive thirst, fatigue, depression, anxiety.  Patient admits to long thick painful nails both feet.  The patient also stated that he has some redness involving the second toe right foot.  The redness has been present for approximately 1 week.  The patient denies trauma to the second toe right foot.  He has not noticed any drainage of bleeding.  He has no pain.  The patient stated that he does have some neuropathy of both feet.  He has loss of sensation both feet.  His pain is mild to moderate.  It is aggravated by shoe gear and ambulation.  The patient also complained of numbness and tingling both feet and both legs.  He stated that the symptoms are worse at  night.  He does have difficulty sleeping because of his symptoms.  He has been taking gabapentin with very little relief.  He has had EMGs performed in the past and results are positive for polyneuropathy.      OBJECTIVE:  Appearance: alert, well appearing, and in no distress and oriented to person, place, and time.    There were no vitals filed for this visit.    BMI= There is no height or weight on file to calculate BMI.    General appearance: Patient is alert and fully cooperative with history & exam.  No sign of distress is noted during the visit.  Psychiatric: Affect is pleasant & appropriate.  Patient appears motivated to improve health.  Respiratory: Breathing is regular & unlabored while sitting.  HEENT: Hearing is intact to spoken word.  Speech is clear.  No gross evidence of visual impairment that would impact ambulation.    Vascular: Dorsalis pedis and posterior tibial pulses are palpable. There is pedal hair growth bilaterally.   CFT < 3 sec from anterior tibial surface to distal digits bilaterally. There is no appreciable edema noted.  Dermatologic: Turgor and texture are within normal limits. No coloration or temperature changes. No primary or secondary lesions noted.  Long thick discolored dystrophic nails 1 through 5 both feet.  All nails are 2-3 times normal thickness with subungual debris present.  The second toe right foot has some moderate edema and erythema noted.  No active drainage or bleeding noted second toe right foot.  Neurologic: All epicritic and proprioceptive sensations are severely diminished bilaterally.  The patient has a very pronounced positive Tinel sign when percussing the common, superficial, deep peroneal nerves as well as the tarsal tunnel bilaterally.  Musculoskeletal: All active and passive ankle, subtalar, midtarsal, and 1st MPJ range of motion are grossly intact without pain or crepitus, with the exception of none. Manual muscle strength is 4/5 bilaterally in all  compartments. All dorsiflexors, plantarflexors, invertors, evertors are intact bilaterally.  No tenderness present to second toe right foot on palpation.  No tenderness to right foot or ankle bilaterally with range of motion. Calf is soft and non-tender without warmth or induration    Imaging:     No results found.       TREATMENT:  Debrided nails 1 through 5 both feet today.  The patient was placed on Keflex 500 mg 1 tab twice daily.  The patient is to return to the clinic in 2 weeks to evaluate cellulitis second toe right foot.  I informed the patient he may be a candidate for nerve decompression with external neural lysis of multiple nerves of both lower extremities in an attempt to alleviate his symptoms of neuropathy.  The patient was told this procedure is performed on an outpatient basis under general anesthesia.  He was told the procedures take approximately 90 minutes to perform.  One leg would be done at a time.  He would be discharged following the procedure and able to ambulate with the aid of a walker.  The patient stated he would contemplate having these procedures performed.  I told the patient that I would like to ensure that the cellulitis of the second toe right foot has been completely resolved.    Ravi Abbasi; LIM[place]  [facility] Foot & Ankle Surgery/Podiatry

## 2021-06-25 NOTE — TELEPHONE ENCOUNTER
Called and updated patient on approval.   Stefan requests a call closer to 7/15 to set up appt.     Will set reminder and call patient the week prior.      Jeanne Harris RN  [facility] Anticoagulation

## 2021-06-25 NOTE — TELEPHONE ENCOUNTER
Warfarin refilled per ACM protocol..   INR checked within past 90 days, most recently on 6/3/21. Last OV on 2/22/21. Referral UTD, last renewed on 3/25/21.     Current sig checked and matches dosing from last INR check note.      Jeanne Harris, RN  Anticoagulation Clinic       DISPLAY PLAN FREE TEXT

## 2021-06-25 NOTE — TELEPHONE ENCOUNTER
Refill Approved    Rx renewed per Medication Renewal Policy. Medication was last renewed on 8/31/20.    Tha Grant, [facility] Triage/Med Refill 5/26/2021     Requested Prescriptions   Pending Prescriptions Disp Refills     omeprazole (PRILOSEC) 20 MG capsule [Pharmacy Med Name: OMEPRAZOLE DR 20 MG CAPSULE] 90 capsule 2     Sig: TAKE 1 CAPSULE BY MOUTH EVERY DAY       GI Medications Refill Protocol Passed - 5/26/2021 12:20 AM        Passed - PCP or prescribing provider visit in last 12 or next 3 months.     Last office visit with prescriber/PCP: 2/16/2021 Collin Singh MD OR same dept: 2/16/2021 Collin Singh MD OR same specialty: 2/16/2021 Collin Singh MD  Last physical: 10/7/2020 Last MTM visit: Visit date not found   Next visit within 3 mo: Visit date not found  Next physical within 3 mo: Visit date not found  Prescriber OR PCP: Collin Singh MD  Last diagnosis associated with med order: 1. Gastroesophageal reflux disease with esophagitis  - omeprazole (PRILOSEC) 20 MG capsule [Pharmacy Med Name: OMEPRAZOLE DR 20 MG CAPSULE]; TAKE 1 CAPSULE BY MOUTH EVERY DAY  Dispense: 90 capsule; Refill: 2    If protocol passes may refill for 12 months if within 3 months of last provider visit (or a total of 15 months).

## 2021-06-25 NOTE — TELEPHONE ENCOUNTER
Anticoagulation-Extended Recheck Request    Under 2021 [facility] Anticoagulation protocol effective 5/10/21, Anticoagulation team may extend INR recheck interval + 1 week for each stable in range INR to max of 6 weeks. Extended interval rechecks between 8-12 weeks for patients on stable maintenance therapy require an approval (one time) from referring provider.    Anticoagulation clinic suggests consideration of extended intervals in medically stable patients, with standard INR goals, and no change in warfarin dose for last 4-6 months    Stefan MIRANDA Yoel, 75 y.o., male has been on:    Current recheck interval: 8 week per prior [facility] protocol  Compliant: Yes  Stable warfarin dose since: 11/6/20  INR Goal: 2.0-3.0  Time in Therapeutic range: 88.8%    Lab Results   Component Value Date    INR 2.50 (H) 06/03/2021    INR 2.70 (H) 04/13/2021    INR 2.70 (H) 02/16/2021    INR 2.60 (H) 01/07/2021    INR 2.28 (H) 12/18/2020    INR 2.90 (H) 12/07/2020    INR 2.40 (H) 11/06/2020    INR 1.10 10/28/2020    INR 2.60 (!) 03/19/2018    INR 2.10 (!) 12/14/2017    INR 2.00 (!) 02/24/2017    INR 2.80 (!) 01/18/2016    INR 1.80 (H) 10/02/2014    INR 3.40 (H) 06/17/2014       Please advise if Stefan is approved to have extended interval rechecks every 8 weeks while on stable maintenance therapy    Thank you,    Jeanne Harris

## 2021-06-25 NOTE — PATIENT INSTRUCTIONS - HE
Podiatry Clinics that offer foot and toenail care  [facility]  [facility]  [phone]    Foot and Ankle Clinics, [facility]  [phone]    [facility]  [place] - [address] on Tuesdays  [phone]    [facility]  [place]  [phone]    [facility]  [facility] [place]  [phone]    [facility]  [phone]    [facility]  [phone]    [facility]  They have several locations and have a team of registered nurses that offer diabetic foot care.  They do not bill to insurance and the average cost per visit is $37.  [facility]  [phone]    [facility]  *Nurse comes to your home for nail care.  Sklya Almeida RN Foot Specialist  [phone]

## 2021-06-25 NOTE — PROGRESS NOTES
FOOT AND ANKLE SURGERY/PODIATRY Progress Note          ASSESSMENT:   Diabetic ulcer second toe right foot     HPI: Stefan Diallo was seen again today for follow-up evaluation of cellulitis and an ulcer on the tip of the second toe as well as his third toe right foot.  Patient stated he has seen very little improvement.  Some of the redness and swelling involving the toes has slightly improved.  The patient has no pain due to decreased sensation.  He has noticed some mild drainage from the second toe right foot.      Past Medical History:   Diagnosis Date     Atrial fibrillation (H)      Bacteremia      GERD (gastroesophageal reflux disease)      GI hemorrhage      High cholesterol      Hyperlipidemia      Hypertension      Renal artery aneurysm (H)      Sleep apnea, obstructive     USES BIPAP     Type 2 diabetes mellitus (H)      UTI (lower urinary tract infection)        Past Surgical History:   Procedure Laterality Date     [place] REPAIR COMPL ROTATOR CUFF AVULSN,CHR      Description: Chronic Rotator Cuff Avulsion;  Recorded: 04/11/2011;     TRANSURETHRAL RESECTION OF PROSTATE         No Known Allergies      Current Outpatient Medications:      atenolol (TENORMIN) 50 MG tablet, Take 1 tablet (50 mg total) by mouth daily., Disp: 90 tablet, Rfl: 1     atorvastatin (LIPITOR) 20 MG tablet, TAKE 1 TABLET BY MOUTH ONCE DAILY, Disp: 90 tablet, Rfl: 2     blood-glucose meter (CONTOUR NEXT METER) Misc, Use once daily., Disp: 1 each, Rfl: 0     cephalexin (KEFLEX) 500 MG capsule, Take 1 capsule (500 mg total) by mouth 2 (two) times a day for 14 days., Disp: 28 capsule, Rfl: 0     cholecalciferol, vitamin D3, 1,000 unit tablet, Take 2,000 Units by mouth daily. , Disp: , Rfl:      CONTOUR NEXT TEST STRIPS strips, USE TO TEST ONCE DAILY, Disp: 100 strip, Rfl: 5     CYANOCOBALAMIN, VITAMIN B-12, (VITAMIN B12 ORAL), Take by mouth., Disp: , Rfl:      diltiazem (CARDIZEM CD) 120 MG 24 hr capsule, TAKE 1 CAPSULE BY MOUTH EVERY DAY,  Disp: 90 capsule, Rfl: 1     EPINEPHrine (EPIPEN 2-BRI) 0.3 mg/0.3 mL atIn, Inject 0.3 mL (0.3 mg total) into the shoulder, thigh, or buttocks once as needed (allergic reaction)., Disp: 2 Pre-filled Pen Syringe, Rfl: 1     FLUZONE HIGH-DOSE 2018-19, PF, 180 mcg/0.5 mL Syrg injection, TO BE ADMINISTERED BY PHARMACIST FOR IMMUNIZATION, Disp: , Rfl: 0     gabapentin (NEURONTIN) 300 MG capsule, TAKE ONE CAPSULE BY MOUTH TWICE A DAY, Disp: 180 capsule, Rfl: 1     gemfibrozil (LOPID) 600 MG tablet, Take 1 tablet (600 mg total) by mouth 2 (two) times a day., Disp: 180 tablet, Rfl: 3     lisinopril (PRINIVIL,ZESTRIL) 5 MG tablet, Take 1 tablet (5 mg total) by mouth daily., Disp: 90 tablet, Rfl: 1     metFORMIN (GLUCOPHAGE) 500 MG tablet, Take 1 tablet each morning and 2 tablets each evening, Disp: 270 tablet, Rfl: 1     miscellaneous medical supply Misc, Bipap w heated humidifier, tubing & chamber all x1, FFM w cushion x 1, headgear x 1, filers: disposable and resuable x 1pk both, Disp: , Rfl:      neomycin-polymyxin-dexamethasone (MAXITROL) 3.5mg/mL-10,000 unit/mL-0.1 % ophthalmic suspension, Apply 1 drop to eye., Disp: , Rfl:      neomycin-polymyxin-dexamethasone (MAXITROL) 3.5mg/mL-10,000 unit/mL-0.1 % ophthalmic suspension, INSTILL 1 DROP INTO RIGHT EYE 4 TIMES A DAY FOR 7 DAYS, Disp: , Rfl: 1     omeprazole (PRILOSEC) 20 MG capsule, TAKE 1 CAPSULE (20 MG TOTAL) BY MOUTH DAILY., Disp: 90 capsule, Rfl: 3     ranitidine (ZANTAC) 150 MG capsule, Take 150 mg by mouth 2 (two) times a day., Disp: , Rfl:      simvastatin (ZOCOR) 40 MG tablet, Take 40 mg by mouth., Disp: , Rfl:      vitamin A-vitamin C-vit E-min (OCUVITE) Tab tablet, Take by mouth daily. [employer] Select, Disp: , Rfl:      warfarin (COUMADIN/JANTOVEN) 5 MG tablet, TAKE 1 & 1/2 TABLETS BY MOUTH ON SUN, WED AND FRIDAY AND TAKE 1 TABLET ON ALL OTHER DAYS, Disp: 50 tablet, Rfl: 1    Family History   Problem Relation Age of Onset     Coronary artery disease Mother       Coronary artery disease Father      Atrial fibrillation Father        Social History     Socioeconomic History     Marital status:      Spouse name: Not on file     Number of children: 3     Years of education: Not on file     Highest education level: Not on file   Occupational History     Occupation:      Employer: [employer]   Social Needs     Financial resource strain: Not on file     Food insecurity:     Worry: Not on file     Inability: Not on file     Transportation needs:     Medical: Not on file     Non-medical: Not on file   Tobacco Use     Smoking status: Former Smoker     Packs/day: 2.00     Years: 27.00     Pack years: 54.00     Types: Cigarettes     Last attempt to quit: 1990     Years since quittin.1     Smokeless tobacco: Never Used   Substance and Sexual Activity     Alcohol use: No     Frequency: Never     Drug use: No     Sexual activity: No   Lifestyle     Physical activity:     Days per week: Not on file     Minutes per session: Not on file     Stress: Not on file   Relationships     Social connections:     Talks on phone: Not on file     Gets together: Not on file     Attends [employer] service: Not on file     Active member of club or organization: Not on file     Attends meetings of clubs or organizations: Not on file     Relationship status: Not on file     Intimate partner violence:     Fear of current or ex partner: Not on file     Emotionally abused: Not on file     Physically abused: Not on file     Forced sexual activity: Not on file   Other Topics Concern     Not on file   Social History Narrative     Not on file       10 point Review of Systems is negative except as mentioned below.         Vitals:    19 1324   BP: (!) 148/96   Pulse: 88   Resp: 16   Temp: 97.3  F (36.3  C)       BMI= There is no height or weight on file to calculate BMI.    OBJECTIVE:  General appearance: Patient is alert and fully cooperative with history & exam.  No sign of distress is  noted during the visit.  Vascular: Dorsalis pedis and posterior tibial pulses are nonpalpable.  Pedal hair is absent bilaterally.   CFT < 3 sec from anterior tibial surface to distal digits bilaterally. There is no appreciable edema noted.  There is some mild erythema noted second third toe right foot.  Dermatologic: Turgor and texture are within normal limits. No coloration or temperature changes.  There is a chronic lesion on the distal aspect of the second and third toe right foot.  Hyperkeratotic no primary or secondary lesions noted.  Neurologic: All epicritic and proprioceptive sensations are grossly intact bilaterally.  Musculoskeletal: All active and passive ankle, subtalar, midtarsal, and 1st MPJ range of motion are grossly intact without pain or crepitus, with the exception of none. Manual muscle strength is +4/5 bilaterally in all compartments. All dorsiflexors, plantarflexors, invertors, evertors are intact bilaterally.  No tenderness present to second toe right foot on palpation.  No tenderness to foot or ankle with range of motion.      Imaging:     No results found.         TREATMENT:  I informed the patient that I would recommend x-rays and an MRI of the right foot to rule out osteomyelitis.  The patient showed significance concerns about the cost of MRI.  The patient stated he was going to refuse the MRI at this time.  He has elected to continue taking the oral antibiotic.  He stated he would remove the Band-Aid to allow the toe to heal faster.  I informed the patient that if he does have osteomyelitis covering it with a Band-Aid would not cause this wound to remain open.  I told the patient that if he does have osteomyelitis there is a possibility that he may have to have the toe amputated.  The patient was still reluctant to have the MRI.  He decided against having the MRI.  The patient was placed on Keflex 500 mg 1 tab twice daily for 2 weeks.  The patient is to return to the clinic in 2  analia.        Ravi Abbasi; LIMA  [facility] Foot & Ankle Surgery/Podiatry

## 2021-06-25 NOTE — TELEPHONE ENCOUNTER
Medication Question or Clarification  Who is calling: Pharmacy: [facility]  What medication are you calling about? (include dose and sig)   CONTOUR NEXT TEST STRIPS strips 100 strip 5 11/27/2018     Sig: USE TO TEST ONCE DAILY        Who prescribed the medication?: Collin Singh MD    What is your question/concern?: Fax received from pharmacy, the above brand of diabetic supplies are not covered by the insurance plan.    Please send new RX for Meter, Test Strips, and Lancets for one of the covered brands    Covered Brands: [employer]u-chek    Medicare guidelines have been updated as of 1/1/2019 and all new scripts must be submitted for diabetic supplies. Each script must list the ICD-10 code and specific directions for use. Also, it must state if the patient is insulin or non-insulin dependent.     *Please note that for non-insulin dependent patients, Medicare Part B will only cover for once daily testing*       Pharmacy: [facility]  Okay to leave a detailed message?: No-speak to pharmacy staff  Site CMT - Please call the pharmacy to obtain any additional needed information.

## 2021-06-25 NOTE — TELEPHONE ENCOUNTER
ANTICOAGULATION  MANAGEMENT    Assessment     Today's INR result of 2.5 is Therapeutic (goal INR of 2.0-3.0)        Warfarin taken as previously instructed    No new diet changes affecting INR    No new medication/supplements affecting INR    Continues to tolerate warfarin with no reported s/s of bleeding or thromboembolism     Previous INR was Therapeutic    Plan:     Spoke on phone with Stefan regarding INR result and instructed:      Warfarin Dosing Instructions:  Continue current warfarin dose 7.5 mg daily on [place]; and 5 mg daily rest of week  (0 % change)    Instructed patient to follow up no later than: will call back once response from  is received.     Education provided: target INR goal and significance of current INR result, importance of notifying clinic for changes in medications, importance of notifying clinic for diarrhea, nausea/vomiting, reduced intake and/or illness and importance of notifying clinic of upcoming surgeries and procedures 2 weeks in advance    Stefan verbalizes understanding and agrees to warfarin dosing plan.    Instructed to call the [facility] for any changes, questions or concerns. (#[phone])   ?   Jeanne Harris RN    Subjective/Objective:      Stefan MIRANDA Yoel, a 75 y.o. male is on warfarin.       Stefan reports:     Home warfarin dose: as updated on anticoagulation calendar per template     Missed doses: No     Medication changes:  No     S/S of bleeding or thromboembolism:  No     New Injury or illness:  No     Changes in diet or alcohol consumption:  No     Upcoming surgery, procedure or cardioversion:  No    Anticoagulation Episode Summary     Current INR goal:  2.0-3.0   TTR:  88.8 % (11.6 mo)   Next INR check:  6/8/2021   INR from last check:  2.50 (6/3/2021)   Weekly max warfarin dose:     Target end date:     INR check location:     Preferred lab:     Send INR reminders to:  HILARIO HECTOR    Indications    Atrial fibrillation  unspecified type (H)  [I48.91]           Comments:           Anticoagulation Care Providers     Provider Role Specialty Phone number    Collin Singh MD Referring Family Medicine [phone]

## 2021-06-27 NOTE — PROGRESS NOTES
Progress Notes by Stefan Watson PA-C at 8/15/2019 11:10 AM     Author: Stefan Watosn PA-C Service: -- Author Type: Physician Assistant    Filed: 8/21/2019 12:10 PM Encounter Date: 8/15/2019 Status: Signed    : Stefan Watson PA-C (Physician Assistant)       Subjective:      Patient ID: Stefan Diallo is a 74 y.o. male.    Chief Complaint:    HPI  Stefan Diallo is a 74 y.o. male with past medical history for atrial fibrillation, diabetes mellitus type 2 who presents today complaining of an injury to the left lower leg since Monday, 4 days ago.  Patient recounts past medical history for slipping and injuring his left lower extremity on the pretibial area.  He has had pain and swelling in the left lower extremity.  At this time he has had no difficulty with DVT symptoms to include shortness of breath dizziness heart palpitations.  He has had swelling after the trauma into the left lower extremity.  No numbness and tingling.  He has had no bleeding from the pretibial area.  He has had redness and swelling however.  He is able to bear full weight on the lower extremity to include the hip and he has no hip back or femur pain.  It radiates from the left knee down to the foreleg into the ankle.  He still has good sensory intact into the left foot.  He has no involvement of the right lower extremity at this time.    She denies fever chills night sweats fatigue or shortness of breath or dyspnea on exertion.      Past Medical History:   Diagnosis Date   ? Atrial fibrillation (H)    ? Bacteremia    ? GERD (gastroesophageal reflux disease)    ? GI hemorrhage    ? High cholesterol    ? Hyperlipidemia    ? Hypertension    ? Renal artery aneurysm (H)    ? Sleep apnea, obstructive     USES BIPAP   ? Type 2 diabetes mellitus (H)    ? UTI (lower urinary tract infection)        Past Surgical History:   Procedure Laterality Date   ? FOOT AMPUTATION Right 5/6/2019    Procedure: AMPUTATION, 3rd TOE RIGHT FOOT;  Surgeon: Elroy  Ravi FORD DPM;  Location: [facility] [place];  Service: Podiatry   ? [place] REPAIR COMPL ROTATOR CUFF AVULSN,CHR      Description: Chronic Rotator Cuff Avulsion;  Recorded: 2011;   ? TENOTOMY ACHILLES TENDON     ? TRANSURETHRAL RESECTION OF PROSTATE         Family History   Problem Relation Age of Onset   ? Coronary artery disease Mother    ? Coronary artery disease Father    ? Atrial fibrillation Father        Social History     Tobacco Use   ? Smoking status: Former Smoker     Packs/day: 2.00     Years: 27.00     Pack years: 54.00     Types: Cigarettes     Last attempt to quit: 1990     Years since quittin.5   ? Smokeless tobacco: Never Used   Substance Use Topics   ? Alcohol use: No     Frequency: Never   ? Drug use: No       Review of Systems  As above in HPI, otherwise balance of Review of Systems are negative.    Objective:     /65 (Patient Site: Right Arm, Patient Position: Sitting, Cuff Size: Adult Large)   Pulse 83   Temp 98  F (36.7  C) (Oral)   Resp 20   Wt (!) 251 lb 6 oz (114 kg)   SpO2 96%   BMI 35.36 kg/m      Physical Exam  General: Patient is resting comfortably no acute distress is afebrile  HEENT: Head is normocephalic atraumatic   eyes are PERRL EOMI sclera anicteric   TMs are clear bilaterally  Throat is clear   No cervical lymphadenopathy present  LUNGS: Clear to auscultation bilaterally patient speaks in full sentences.  HEART: Regular rate and rhythm  Skin: Without rash non-diaphoretic  Musculoskeletal: The thigh is supple compartments are soft no pain over the anterior medial aspect of the thigh along the course of the artery and vein.  He has some swelling of the left lower extremity to include to include the calf.  Calf is still supple nonindurated.  He has tenseness and firmness on the front part of the pretibial area on the front compartments and it is erythematous and slightly warm to touch.  He has good range of motion with dorsiflexion and plantarflexion of  the ankle.  He has no involvement of the dorsum of the foot.    Imaging    Xr Tibia And Fibula Left    Result Date: 8/15/2019  EXAM DATE:         08/15/2019 EXAM: X-RAY TIBIA FIBULA LEFT, AP AND LATERAL LOCATION: [facility] [place] DATE/TIME: 8/15/2019 12:15 PM INDICATION: Left medial tibial plateau pain. COMPARISON: None. FINDINGS: No acute left tibia or fibula fracture identified. Left leg soft tissue swelling. Arterial calcification. No focal periostitis.     Xr Knee Left Plus Sunrise Vw    Result Date: 8/15/2019  EXAM DATE:         08/15/2019 EXAM: X-RAY KNEE, LEFT, 3 VIEWS LOCATION: [facility] [place] DATE/TIME: 8/15/2019 12:00 PM INDICATION: Left medial knee pain. COMPARISON: None. FINDINGS: No acute knee fracture or dislocation. Mild tricompartmental left knee osteoarthritis. Knee soft tissue swelling. No sizable knee joint effusion.     Us Venous Leg Left    Result Date: 8/15/2019  EXAM DATE:         08/15/2019 EXAM: US LOWER EXTREMITY VEINS LTD LEFT LOCATION: [facility] [place] DATE/TIME: 8/15/2019 12:00 PM INDICATION: Left calf pain and swelling. COMPARISON: 9/21/2018 duplex TECHNIQUE: Routine exam without and with compression, augmentation, and duplex utilizing 2D gray-scale imaging, Doppler interrogation with color-flow and spectral waveform analysis. FINDINGS: The common femoral, femoral, popliteal, and segmentally visualized calf veins were evaluated. The opposite CFV was also included in the evaluation. Left leg veins are negative for deep venous thrombosis. Left calf subcutaneous edema. CONCLUSION: 1.  Left leg veins are negative for DVT.     I personally reviewed and discussed findings with the patient.  My own personal interpretation and radiologist will over read x-rays      Assessment:     Procedures    The primary encounter diagnosis was Edema of left lower extremity. A diagnosis of Acute pain of left knee was also pertinent to this visit.    Plan:     1. Edema of  "left lower extremity  XR Knee Left Plus Sunrise VW    XR Tibia and Fibula Left    US Venous Leg Left   2. Acute pain of left knee  XR Knee Left Plus Sunrise VW    XR Tibia and Fibula Left       I was concerned for potential DVT and a fracture of the knee or the tibial or fibula of the left lower extremity.  Ultrasound and x-rays were returned as negative.  The plan of conservative and symptomatic care until he is seen on Monday for reexamination by his primary care provider for recheck of his symptoms.  Indication for return to the walk-in care or ER was gone over patient voiced understanding.    Patient Instructions       Elevate the left leg continuously above the level of the heart.  I suggest \"toes above the nose\".  Ice topically to the area of the knee that hurts and is swollen the most 20 minutes on each hour while awake.  Take precautions to avoid damage to the skin.  Use of Tylenol for pain relief as needed.  Follow packaging directions.  If having any problematic swelling or getting numbness and tingling in the lower extremities or difficulty with shortness of breath, funny beatings of your heart, dizziness or sharp stabbing pain when he breathes in or out follow-up immediately with your primary care provider or if it is after hours with the emergency room.  Otherwise recheck with your primary care provider on Monday for reevaluation and treatment        Patient Education     Treatment for Bone Bruise (Bone Contusion)  A bone bruise is an injury to a bone that is less severe than a bone fracture. Bone bruises are fairly common. They can happen to people of all ages. Any type of bone in your body can get a bone bruise. Other injuries often happen along with a bone bruise, such as damage to nearby ligaments.  Types of treatment  Treatment for a bone bruise may include:    Resting the bone or joint    Putting an ice pack on the area several times a day    Raising the injury above the level of your heart to " reduce swelling    Taking medicine to reduce pain and swelling    Wearing a brace or other device to limit movement, if needed  Your doctor may give you advice about your diet. This is because eating a diet that is rich in calcium, vitamin D, and protein can help you heal. Your doctor may ask you to not use certain over-the-counter medicines for pain. Some of these may delay normal bone healing. If you smoke, your doctor will advise you to stop smoking. Smoking can also delay bone healing.  Your health care provider will tell you how long you should avoid putting weight on your bone. Most bone bruises slowly heal over 2 to 4 months. A larger bone bruise may take longer to heal. You may not be able to return to sports activities for weeks or months. If your symptoms dont go away, your health care provider may give you an MRI.  Possible complications of a bone bruise  Most bone bruises heal without any problems. If your bone bruise is very large, your body may have trouble getting blood flow back to the area. This can cause avascular necrosis of the bone. This leads to death of that part of the bone.     When to call the health care provider  Call your health care provider if your symptoms dont start to get better in a few days. Call him or her right away if you have any severe symptoms, such as a high fever.      Date Last Reviewed: 7/21/2015 2000-2017 [employer] [address] [place] [address]. All rights reserved. This information is not intended as a substitute for professional medical care. Always follow your healthcare professional's instructions.           Patient Education     Understanding Bone Bruise (Bone Contusion)  A bone bruise is an injury to a bone that is less severe than a bone fracture. Bone bruises are fairly common. They can happen to people of all ages. Any type of bone in your body can be bruised. Other injuries often happen along with a bone bruise, such as damage  to nearby ligaments.  What happens when a bone is bruised?  Bone is made of different kinds of tissue. The periosteum is a thin layer of tissue that covers most of a bone. Where bones come together, there is usually a layer of cartilage at the edges. The bone here is called subchondral bone. Deep inside the bone is an area called the medulla. It contains the bone marrow and fibrous tissue called trabeculae.  With a bone fracture, all of the trabeculae in a region of bone have broken. But with a bone bruise, an injury only damages some of these trabeculae. An injury might cause blood to build up in the area beneath the periosteum. This causes a subperiosteal hematoma, a type of bone bruise. An injury might also cause bleeding and swelling in the area between your cartilage and the bone beneath it. This causes a subchondral bone bruise. Or bleeding and swelling can occur in the medulla of your bone. This is called an intraosseous bone bruise.  What causes a bone bruise?  Injury of any kind can cause a bone bruise. Sports injuries, motor vehicle accidents, or falls from a height can cause them. Twisting injuries that cause joint sprains can also cause a bone bruise. Health conditions like arthritis may also lead to a bone bruise. This is because arthritis causes bone surfaces to grind against each other. Child abuse is another cause of bone bruises.  Symptoms of a bone bruise  Symptoms of a bone bruise can include:    Pain and soreness in the injured area    Swelling in the area and soft tissues around it    Change in color of the injured area    Swelling or stiffness of an injured joint  This pain is often more severe and lasts longer than a soft tissue injury. How severe your symptoms are and how long they last depends on how severe the bone bruise is.  Diagnosing a bone bruise  Your healthcare provider will ask you about your medical history and symptoms. He or she will ask how you got your injury. Your provider  will examine the injured area to check for pain, bruising, and swelling. After the exam, your health care provider may be able to tell if you have a bone bruise.  A bone bruise doesnt show up on an X-ray. But you may be given an X-ray to rule out a bone fracture. A fracture may need a different kind of treatment. An MRI can confirm a bone bruise. But your healthcare provider will likely only give you an MRI if your symptoms dont get better.  Date Last Reviewed: 4/1/2017 2000-2017 [employer]. [address] [place] [address]. All rights reserved. This information is not intended as a substitute for professional medical care. Always follow your healthcare professional's instructions.

## 2021-06-28 NOTE — PROGRESS NOTES
Progress Notes by Vishnu Garland MD at 3/2/2020  1:10 PM     Author: Vishnu Garland MD Service: -- Author Type: Physician    Filed: 3/3/2020  1:03 PM Encounter Date: 3/2/2020 Status: Signed    : Vishnu Garland MD (Physician)       Heart Care Office Note    Assessment / Plan:    1.  Dyspnea on exertion due to physical deconditioning and morbid obesity.  Need to exclude coronary artery disease prior to extensive spinal fusion surgery.  Plan for pharmacologic nuclear stress testing.  2.  Atrial fibrillation.  Adequate rate control on atenolol.  This could be contributing to his cold extremities, but he declines a switch from atenolol to carvedilol.  3.  Hypertension.  Adequate control on his present regimen    Plan follow-up in 1 year .    ______________________________________________________________________    Subjective:    I had the opportunity to see Stefan Diallo at the [facility]. Stefan Diallo is a 74 y.o. male with a history of diabetes mellitus, obstructive sleep apnea, hypertension, and chronic atrial fibrillation who returns for routine follow-up.  He has no history of obstructive coronary artery disease.  He is considering spinal fusion surgery.    Since I saw him last, he has gained 10 pounds.  He is no longer able to walk or do other exercise because of his sciatic pain.  He has had multiple evaluations and plans to undergo spinal fusion surgery.  His diabetes has been poorly controlled, neuropathy has progressed.  He is had multiple toes amputated.  He feels fatigued walking short distances, but is chiefly limited by pain radiating from his low back down his left leg.    Denies paroxysmal nocturnal dyspnea or orthopnea.  He is using his CPAP mask regularly.  He is no longer using a stationary bicycle.  He is a member the [employer] but seldom goes there for activities.      ______________________________________________________________________    Problem  List:  Patient Active Problem List   Diagnosis   ? Benign Prostatic Hypertrophy   ? Renal Artery Aneurysm   ? Tinea Corporis   ? Shortness Of Breath   ? Chest Pain   ? Lower Back Pain   ? Vertigo   ? Idiopathic Peripheral Neuropathy   ? Myalgia And Myositis   ? Urinary Tract Infection   ? Pain During Urination (Dysuria)   ? Cough   ? Eye Pain   ? Eczematoid Dermatitis   ? Urticaria   ? Obesity   ? Essential Hypertension   ? Chronic atrial fibrillation   ? Adult Sleep Apnea   ? Pure hypercholesterolemia   ? Muscle Weakness   ? Dysphagia   ? Bell's palsy   ? Gastroesophageal reflux disease with esophagitis   ? Type 2 diabetes mellitus without complication, without long-term current use of insulin (H)   ? Obesity (BMI 35.0-39.9) with comorbidity (H)   ? Atrial fibrillation, unspecified type (H)   ? Cellulitis of foot   ? Osteomyelitis of toe (H)   ? Closed nondisplaced fracture of phalanx of toe of right foot, unspecified toe, initial encounter   ? Amputated toe of right foot (H)     Medical History:  Past Medical History:   Diagnosis Date   ? Atrial fibrillation (H)    ? Bacteremia    ? GERD (gastroesophageal reflux disease)    ? GI hemorrhage    ? High cholesterol    ? Hyperlipidemia    ? Hypertension    ? Renal artery aneurysm (H)    ? Sleep apnea, obstructive     USES BIPAP   ? Type 2 diabetes mellitus (H)    ? UTI (lower urinary tract infection)      Surgical History:  Past Surgical History:   Procedure Laterality Date   ? FOOT AMPUTATION Right 5/6/2019    Procedure: AMPUTATION, 3rd TOE RIGHT FOOT;  Surgeon: Ravi Abbasi DPM;  [place] [facility] [place]  Service: Podiatry   ? ME REPAIR COMPL ROTATOR CUFF AVULSN,CHR      Description: Chronic Rotator Cuff Avulsion;  Recorded: 04/11/2011;   ? TENOTOMY ACHILLES TENDON     ? TRANSURETHRAL RESECTION OF PROSTATE       Social History:  Social History     Socioeconomic History   ? Marital status:      Spouse name: Not on file   ? Number of children: 3   ?  Years of education: Not on file   ? Highest education level: Not on file   Occupational History   ? Occupation:      Employer: [employer]   Social Needs   ? Financial resource strain: Not on file   ? Food insecurity:     Worry: Not on file     Inability: Not on file   ? Transportation needs:     Medical: Not on file     Non-medical: Not on file   Tobacco Use   ? Smoking status: Former Smoker     Packs/day: 2.00     Years: 27.00     Pack years: 54.00     Types: Cigarettes     Last attempt to quit: 1990     Years since quittin.1   ? Smokeless tobacco: Never Used   Substance and Sexual Activity   ? Alcohol use: No     Frequency: Never   ? Drug use: No   ? Sexual activity: Not Currently   Lifestyle   ? Physical activity:     Days per week: Not on file     Minutes per session: Not on file   ? Stress: Not on file   Relationships   ? Social connections:     Talks on phone: Not on file     Gets together: Not on file     Attends Muslim service: Not on file     Active member of club or organization: Not on file     Attends meetings of clubs or organizations: Not on file     Relationship status: Not on file   ? Intimate partner violence:     Fear of current or ex partner: Not on file     Emotionally abused: Not on file     Physically abused: Not on file     Forced sexual activity: Not on file   Other Topics Concern   ? Not on file   Social History Narrative   ? Not on file     Sleep History:  Uses BIPAP nightly.  Restorative  Exercise History:  Limited by foot pain, as well as hip pain .  Has a [employer] membership but does not use it.    Review of Systems:   General: WNL  Eyes: WNL  Ears/Nose/Throat: Nosebleeds  Lungs: Shortness of Breath  Heart: Shortness of Breath with activity, Irregular Heartbeat, Leg Swelling  Stomach: WNL  Bladder: Frequent Urination at Night  Muscle/Joints: Muscle Weakness, Muscle Pain  Skin: Poor Wound Healing  Nervous System: Daytime Sleepiness, Loss of Balance  Mental Health: WNL      Blood: WNL          Family History:  Family History   Problem Relation Age of Onset   ? Coronary artery disease Mother    ? Coronary artery disease Father    ? Atrial fibrillation Father          Allergies:  No Known Allergies  Medications:  Current Outpatient Medications   Medication Sig Dispense Refill   ? atenolol (TENORMIN) 50 MG tablet Take 0.5 tablets (25 mg total) by mouth daily.     ? atorvastatin (LIPITOR) 20 MG tablet Take 1 tablet (20 mg total) by mouth daily. 90 tablet 3   ? blood glucose test strips Use 1 each As Directed daily before breakfast. 100 strip 3   ? blood-glucose meter (CONTOUR NEXT METER) Misc Use once daily. 1 each 0   ? blood-glucose meter Misc Use 1 Device As Directed daily before breakfast. 1 each 0   ? cholecalciferol, vitamin D3, 1,000 unit tablet Take 1,000 Units by mouth daily.            ? CONTOUR NEXT TEST STRIPS strips USE TO TEST ONCE DAILY 100 strip 5   ? diltiazem (CARDIZEM CD) 120 MG 24 hr capsule TAKE 1 CAPSULE BY MOUTH EVERY DAY 90 capsule 3   ? dutasteride (AVODART) 0.5 mg capsule Take by mouth.     ? EPINEPHrine (EPIPEN 2-BRI) 0.3 mg/0.3 mL atIn Inject 0.3 mL (0.3 mg total) into the shoulder, thigh, or buttocks once as needed (allergic reaction). 2 Pre-filled Pen Syringe 1   ? gabapentin (NEURONTIN) 300 MG capsule Take 2 capsules (600 mg total) by mouth at bedtime. 180 capsule 3   ? gemfibrozil (LOPID) 600 MG tablet TAKE 1 TABLET BY MOUTH TWICE A  tablet 3   ? generic lancets (ACCU-CHEK SOFTCLIX LANCETS) Use 1 application As Directed daily before breakfast. 100 each 3   ? lisinopril (PRINIVIL,ZESTRIL) 5 MG tablet TAKE 1 TABLET BY MOUTH EVERY DAY 90 tablet 2   ? metFORMIN (GLUCOPHAGE) 500 MG tablet TAKE 1 TABLET BY MOUTH EACH MORNING AND 2 TABLETS EACH EVENING 270 tablet 1   ? miscellaneous medical supply Misc Bipap w heated humidifier, tubing & chamber all x1, FFM w cushion x 1, headgear x 1, filers: disposable and resuable x 1pk both     ? omeprazole (PRILOSEC)  20 MG capsule TAKE 1 CAPSULE BY MOUTH EVERY DAY 90 capsule 2   ? ranitidine (ZANTAC) 150 MG tablet Take 1 tablet (150 mg total) by mouth 2 (two) times a day. 60 tablet 5   ? REDNESS RELIEVER EYE DROPS 0.05 % ophthalmic solution      ? vitamin A-vitamin C-vit E-min (OCUVITE) Tab tablet Take 1 tablet by mouth 2 (two) times a day.            ? warfarin (COUMADIN/JANTOVEN) 5 MG tablet Take 1 to 1.5 tablets (5 to 7.5 mg) by mouth daily. Adjust dose based on INR results as directed. 90 tablet 1   ? calcium citrate (CALCITRATE) 200 mg (950 mg) tablet Take 1 tablet by oral route 2 times every day.     ? docusate sodium (COLACE) 100 MG capsule Take 1 by oral route 2 times every day as needed.     ? ergocalciferol (ERGOCALCIFEROL) 50,000 unit capsule Take 1 capsule by oral route 1 time every week.     ? FLUZONE HIGH-DOSE 2018-19, PF, 180 mcg/0.5 mL Syrg injection TO BE ADMINISTERED BY PHARMACIST FOR IMMUNIZATION  0   ? HYDROcodone-acetaminophen (NORCO )  mg per tablet Take 1 tablet by mouth every 6 (six) hours as needed. 10 tablet 0   ? polyethylene glycol (MIRALAX) 17 gram packet Take 1 packet (17 g total) by mouth daily as needed.  0     No current facility-administered medications for this visit.        Objective:   Wt Readings from Last 3 Encounters:   02/18/20 (!) 262 lb (118.8 kg)   02/11/20 (!) 262 lb (118.8 kg)   02/01/20 (!) 262 lb 11.2 oz (119.2 kg)     Vital signs:  There were no vitals taken for this visit.      Physical Exam: Alert, appropriate, joking  Eyes    Conjunctiva Findings: No hyperemia.   Sclerae: Clear, nonicteric.   ENT   Mouth: Oral mucuous membranes are pink and moist   Neck   Carotid pulses: Carotid pulses palpaple with normal contours and symmetric, no bruits.   Jugular Veins: Normal jugular venous pressure.   Thyroid: No visible thyromegaly.   Pulmonary   Examination of lungs: Clear to auscultation, no crackles, or wheezing.   Cardiovascular    The heart rate was normal. The rhythm  was irregularly irregular.  No murmur audible  Pulses: Normal   Extremities: No edema  .  Cyanotic with brisk capillary refill.  Multiple missing toes.  Gastrointestinal    The abdomen was obese. Bowel sounds were normal.   Musculoskeletal   Spine: spine straight upon visual inspection.   Skin   Skin and subcutaneous tissue: No xanthelasma.   Neurologic   Orientation to person, place and time: Normal.   Psychiatric   Mood and affect: Normal.       Lab Results:  LIPIDS:  Lab Results   Component Value Date    CHOL 183 12/14/2012    CHOL 176 11/29/2011    CHOL 167 03/28/2011     Lab Results   Component Value Date    HDL 40 12/14/2012    HDL 39 (L) 11/29/2011    HDL 47 03/28/2011     Lab Results   Component Value Date    LDLCALC 103 12/14/2012    LDLCALC 98 11/29/2011    LDLCALC 88 03/28/2011   direct LDL     93    2/2019  Lab Results   Component Value Date    TRIG 200 (H) 12/14/2012    TRIG 193 (H) 11/29/2011    TRIG 161 (H) 03/28/2011       Pharmacologic nuclear stress test 2/2017:  Conclusion     When compared to the images of 10/4/2012, there is now evidence of transient ischemic dilatation.    The left ventricular ejection fraction is 68%.    The pharmacologic nuclear stress test is abnormal. Nuclear stress test was negative for any focal perfusion defects.    Increased stress to rest cavity ratio of 1.5 is consistent with diffuse subendocardial ischemia.    The patient is at an intermediate risk of future cardiac ischemic events.                 MARGIE KEANE MD [facility]      [phone]    This note created using Dragon voice recognition software.  Sound alike errors may have escaped editing.

## 2021-06-30 NOTE — PROGRESS NOTES
Progress Notes by Jeanne Harris RN at 3/25/2021  2:12 PM     Author: Jeanne Harris RN Service: -- Author Type: Registered Nurse    Filed: 3/25/2021  2:14 PM Encounter Date: 3/25/2021 Status: Attested    : Jeanne Harris RN (Registered Nurse) Cosigner: Collin Singh MD at 4/7/2021  3:48 PM    Attestation signed by Collin Singh MD at 4/7/2021  3:48 PM                     Anticoagulation Annual Referral Renewal Review    Stefan Diallo's chart reviewed for annual renewal of referral to anticoagulation monitoring.        Criteria for anticoagulation nurse and/or pharmacist renewal met   Warfarin indication: Atrial Fibrillation   Goal 2-3 Yes, per indication   Current with INR monitoring/compliant Yes Yes   Date of last office visit 2/16/21 Yes, had office visit within last year   Time in Therapeutic Range (TTR) 88.8 % Yes, TTR > 60%       Stefan Diallo met all criteria for anticoagulation management program initiated renewal.  New INR standing orders and anticoagulation referral renewal placed.      Jeanne Harris RN  2:13 PM

## 2021-06-30 NOTE — PROGRESS NOTES
Progress Notes by Quinn Miller DPM at 3/5/2021  3:40 PM     Author: Quinn Miller DPM Service: -- Author Type: Physician    Filed: 3/6/2021  8:58 AM Encounter Date: 3/5/2021 Status: Signed    : Quinn Miller DPM (Physician)       FOOT AND ANKLE SURGERY/PODIATRY CONSULT NOTE        ASSESSMENT:   Diabetic Ulceration right hallux  Onychomycosis        TREATMENT:  -The ulceration on the right hallux is stable, granular base. No signs of infection. I discussed the principles of wound healing today including the importance of limited walking on the involved limb, good vascular perfusion, good glycemic control and the absence of infection.     -After discussion of risk factors and consent obtained 2% Lidocaine HCL jelly was applied, under clean conditions, the right and foot ulceration(s) were debrided using #15 blade scalpel.  Devitalized and nonviable tissue, along with any fibrin and slough, was removed to improve granulation tissue formation, stimulate wound healing, decrease overall bacteria load, disrupt biofilm formation and decrease edge senescence.  Total excisional debridement was 0.6 sq cm into the subcutaneous tissue with a depth of 0.2 cm.   Ulcers were improved afterwards and .  Measures were as noted on the flow sheet. Medi-honey with a gauze dressing was applied. He will continue to apply Medi-honey with a gauze dressing qoday.    -He does have palpable dorsalis pedis pulses on both feet but complains of cold feet. Referred for DEEJAY's.     -Debrided nails greater than 6 in length and thickness.     -He will follow-up with me in 3 weeks.    Quinn Miller DPM  [facility]      HPI: Stefan Diallo was seen today for a sore on his right hallux. He first noticed the sore about two weeks ago and believes he got the sore while working with his son. He also requests a nail trim and complains of cold feet. PMH significant for DM2. Previous 3rd digit right foot  amputation due to infection.    Past Medical History:   Diagnosis Date   ? Atrial fibrillation (H)    ? Bacteremia    ? GERD (gastroesophageal reflux disease)    ? GI hemorrhage    ? High cholesterol    ? Hyperlipidemia    ? Hypertension    ? Renal artery aneurysm (H)    ? Sleep apnea, obstructive     USES BIPAP   ? Type 2 diabetes mellitus (H)    ? UTI (lower urinary tract infection)        Past Surgical History:   Procedure Laterality Date   ? FOOT AMPUTATION Right 5/6/2019    Procedure: AMPUTATION, 3rd TOE RIGHT FOOT;  Surgeon: Ravi Abbasi DPM;  Location: [facility] [place];  Service: Podiatry   ? CT REPAIR COMPL ROTATOR CUFF AVULSN,[facility]      Description: Chronic Rotator Cuff Avulsion;  Recorded: 04/11/2011;   ? TENOTOMY ACHILLES TENDON     ? TRANSURETHRAL RESECTION OF PROSTATE         Allergies   Allergen Reactions   ? Bee Venom Protein (Honey Bee)          Current Outpatient Medications:   ?  acetaminophen (TYLENOL) 500 MG tablet, Take 2 tablets (1,000 mg total) by mouth every 6 (six) hours as needed for pain., Disp: , Rfl: 0  ?  atenoloL (TENORMIN) 50 MG tablet, TAKE 1/2 TABLET BY MOUTH DAILY, Disp: 45 tablet, Rfl: 3  ?  atorvastatin (LIPITOR) 20 MG tablet, TAKE 1 TABLET BY MOUTH EVERY DAY, Disp: 90 tablet, Rfl: 3  ?  blood glucose test (CONTOUR NEXT TEST STRIPS) strips, Use 1 each As Directed daily., Disp: 100 strip, Rfl: 5  ?  blood-glucose meter (CONTOUR NEXT METER) Misc, Use once daily, Disp: 1 each, Rfl: 0  ?  cholecalciferol, vitamin D3, 1,000 unit tablet, Take 1,000 Units by mouth daily.    , Disp: , Rfl:   ?  compression socks, x-large Misc, Apply compression socks to the r lower leg daily, remove at bedtime., Disp: 1 each, Rfl: 0  ?  diltiazem (CARDIZEM CD) 120 MG 24 hr capsule, TAKE 1 CAPSULE BY MOUTH EVERY DAY, Disp: 90 capsule, Rfl: 3  ?  EPINEPHrine (EPIPEN 2-BRI) 0.3 mg/0.3 mL injection, Inject 0.3 mL (0.3 mg total) into the shoulder, thigh, or buttocks once as needed (allergic reaction).,  Disp: 2 Pre-filled Pen Syringe, Rfl: 1  ?  gabapentin (NEURONTIN) 300 MG capsule, TAKE 2 CAPSULES (600 MG TOTAL) BY MOUTH AT BEDTIME., Disp: 180 capsule, Rfl: 3  ?  gemfibroziL (LOPID) 600 MG tablet, Take 1 tablet (600 mg total) by mouth 2 (two) times a day., Disp: 180 tablet, Rfl: 2  ?  generic lancets (FINGERSTIX LANCETS), Use 1 each As Directed daily. Dispense brand per patient's insurance at pharmacy discretion., Disp: 100 each, Rfl: 5  ?  lisinopriL (PRINIVIL,ZESTRIL) 5 MG tablet, Take 1 tablet (5 mg total) by mouth daily., Disp: 90 tablet, Rfl: 3  ?  metFORMIN (GLUCOPHAGE) 500 MG tablet, TAKE 1 TABLET BY MOUTH EACH MORNING AND 2 TABLETS EACH EVENING, Disp: 270 tablet, Rfl: 1  ?  methocarbamoL (ROBAXIN) 500 MG tablet, methocarbamol 500 mg tablet  TAKE 1 TABLET BY MOUTH 4 TIMES A DAY AS NEEDED FOR 15 DAYS., Disp: , Rfl:   ?  miscellaneous medical supply Misc, Bipap w heated humidifier, tubing & chamber all x1, FFM w cushion x 1, headgear x 1, filers: disposable and resuable x 1pk both, Disp: , Rfl:   ?  montelukast (SINGULAIR) 10 mg tablet, Take 1 tablet (10 mg total) by mouth daily., Disp: 30 tablet, Rfl: 2  ?  omeprazole (PRILOSEC) 20 MG capsule, TAKE 1 CAPSULE BY MOUTH EVERY DAY, Disp: 90 capsule, Rfl: 2  ?  oxyCODONE (ROXICODONE) 5 MG immediate release tablet, oxycodone 5 mg tablet  TAKE 1/2 1 TABLET BY MOUTH EVERY 4 HOURS AS NEEDED FOR 7 DAYS., Disp: , Rfl:   ?  ranitidine (ZANTAC) 150 MG tablet, Take 150 mg by mouth 2 (two) times a day as needed for heartburn., Disp: , Rfl:   ?  senna-docusate (PERICOLACE) 8.6-50 mg tablet, Take 1 tablet by mouth 2 (two) times a day as needed for constipation., Disp: , Rfl: 0  ?  vitamin A-vitamin C-vit E-min (OCUVITE) Tab tablet, Take 1 tablet by mouth 2 (two) times a day.    , Disp: , Rfl:   ?  warfarin ANTICOAGULANT (COUMADIN/JANTOVEN) 5 MG tablet, Take 1/2-1 tablet (2.5-5 mg) daily as directed. Adjust dose per INR results., Disp: 90 tablet, Rfl: 1    Past Surgical  History:   Procedure Laterality Date   ? FOOT AMPUTATION Right 5/6/2019    Procedure: AMPUTATION, 3rd TOE RIGHT FOOT;  Surgeon: Ravi Abbasi DPM;  Location: [facility];  Service: Podiatry   ? DE REPAIR COMPL ROTATOR CUFF AVULSN,[facility]      Description: Chronic Rotator Cuff Avulsion;  Recorded: 04/11/2011;   ? TENOTOMY ACHILLES TENDON     ? TRANSURETHRAL RESECTION OF PROSTATE         Social History     Social History Narrative   ? Not on file       Family History   Problem Relation Age of Onset   ? Coronary artery disease Mother    ? Coronary artery disease Father    ? Atrial fibrillation Father        Review of Systems - 10 point Review of Systems is negative except for right hallux ulcer which is noted in HPI.      OBJECTIVE:  Appearance: alert, well appearing, and in no distress.    Vitals:    03/05/21 1510   BP: 140/82   Pulse: 80   Resp: 18   Temp: 97.6  F (36.4  C)       BMI= There is no height or weight on file to calculate BMI.    General appearance: Patient is alert and fully cooperative with history & exam.  No sign of distress is noted during the visit.     Psychiatric: Affect is pleasant & appropriate.  Patient appears motivated to improve health.     Respiratory: Breathing is regular & unlabored while sitting.     HEENT: Hearing is intact to spoken word.  Speech is clear.  No gross evidence of visual impairment that would impact ambulation.        Vascular: Dorsalis pedis palpableBilateral. Non-palpable PT bilateral.   Dermatologic:   VASC Wound 03/05/21 right hallux  (Active)   Pre Size Length 1 03/05/21 1500   Pre Size Width 0.6 03/05/21 1500   Pre Size Depth 0.2 03/05/21 1500   Pre Total Sq cm 0.6 03/05/21 1500       Wound 08/15/20 Other wound type (comment) Knee Anterior (front) (Active)       Incision 05/06/19 Surgical Foot Right (Active)       Incision 08/18/20 Surgical Leg Right (Active)   Granular base with small area of fibrotic tissue at ulceration medial right hallux. Nails greater  than 6 elongated, thick, yellow with subungal debris. Trophic changes noted including diminished hair growth and shiny skin.  Neurologic: Diminished to light touch Bilateral.  Musculoskeletal: Contracted digits noted Bilateral. Amputated 3rd digit right foot.           Picture:

## 2021-07-02 ENCOUNTER — HOSPITAL ENCOUNTER (EMERGENCY)
Dept: EMERGENCY MEDICINE | Facility: HOSPITAL | Age: 76
Discharge: HOME | End: 2021-07-02
Attending: EMERGENCY MEDICINE
Payer: COMMERCIAL

## 2021-07-02 DIAGNOSIS — S81.802A OPEN WOUND OF LOWER LIMB, LEFT, INITIAL ENCOUNTER: ICD-10-CM

## 2021-07-02 ASSESSMENT — MIFFLIN-ST. JEOR: SCORE: 1839.83

## 2021-07-03 NOTE — ADDENDUM NOTE
Addendum Note by Francis Crook MD at 9/3/2020  5:08 PM     Author: Francis Crook MD Service: -- Author Type: Physician    Filed: 9/3/2020  5:08 PM Encounter Date: 8/31/2020 Status: Signed    : Francis Crook MD (Physician)    Addended by: FRANCIS CROOK on: 9/3/2020 05:08 PM        Modules accepted: Orders

## 2021-07-03 NOTE — ADDENDUM NOTE
Addendum Note by Sudhir Ponce MD at 11/26/2018  1:20 PM     Author: Sudhir Ponce MD Service: -- Author Type: Physician    Filed: 11/26/2018  2:20 PM Encounter Date: 11/26/2018 Status: Signed    : Sudhir Ponce MD (Physician)    Addended by: SUDHIR POCNE on: 11/26/2018 02:20 PM        Modules accepted: Orders

## 2021-07-03 NOTE — ADDENDUM NOTE
Addendum Note by Rj Bourgeois DPM at 2/18/2019  9:00 AM     Author: Rj Bourgeois DPM Service: -- Author Type: Physician    Filed: 2/18/2019  9:18 AM Encounter Date: 2/18/2019 Status: Signed    : Rj Bourgeois DPM (Physician)    Addended by: RJ BOURGEOIS on: 2/18/2019 09:18 AM        Modules accepted: Orders

## 2021-07-03 NOTE — ADDENDUM NOTE
Addendum Note by Sirisha Hilton MD at 11/6/2020 10:40 AM     Author: Sirisha Hilton MD Service: -- Author Type: Physician    Filed: 11/8/2020 12:37 PM Encounter Date: 11/6/2020 Status: Signed    : Sirisha Hilton MD (Physician)    Addended by: SIRISHA HILTON on: 11/8/2020 12:37 PM        Modules accepted: Orders

## 2021-07-04 NOTE — ADDENDUM NOTE
Addendum Note by Maryse Mitchell RN at 3/16/2021  9:31 AM     Author: Maryse Mitchell RN Service: -- Author Type: Registered Nurse    Filed: 3/16/2021  9:31 AM Encounter Date: 3/16/2021 Status: Signed    : Maryse Mitchell RN (Registered Nurse)    Addended by: MARYSE MITCHELL on: 3/16/2021 09:31 AM        Modules accepted: Orders

## 2021-07-04 NOTE — ADDENDUM NOTE
Addendum Note by Collin Singh MD at 2/16/2021  1:00 PM     Author: Collin Singh MD Service: -- Author Type: Physician    Filed: 2/25/2021  3:43 PM Encounter Date: 2/16/2021 Status: Signed    : Collin Singh MD (Physician)    Addended by: COLLIN SINGH on: 2/25/2021 03:43 PM        Modules accepted: Orders

## 2021-07-04 NOTE — ED TRIAGE NOTES
ED Triage Notes by Clint Mckeon, RN at 7/2/2021  7:57 PM     Author: Clint Mckeon RN Service: -- Author Type: Registered Nurse    Filed: 7/2/2021  7:58 PM Date of Service: 7/2/2021  7:57 PM Status: Signed    : Clint Mckeon RN (Registered Nurse)       Pt here by self. Sores on left leg that he thinks may be infected. States that he has had infections in the past due to being type 2 diabetic. Does endorse some pain. No fevers or body aches.

## 2021-07-04 NOTE — ED PROVIDER NOTES
ED Provider Notes by Lisandro Palm DO at 7/2/2021  8:25 PM     Author: Lisandro Palm DO Service: Emergency Medicine Author Type: Physician    Filed: 7/2/2021 10:46 PM Date of Service: 7/2/2021  8:25 PM Status: Signed    : Lisandro Palm DO (Physician)       EMERGENCY DEPARTMENT NOTE     Name: Stefan Diallo    Age/Sex: 76 y.o. male   MRN: [id number]   Evaluation Date & Time:  7/2/2021  7:59 PM    PCP:    Collin Singh MD   ED Provider: Lisandro Palm D.O.       CHIEF COMPLAINT      Chief Complaint   Patient presents with   ? Leg Pain       DIAGNOSIS & DISPOSITION     1. Open wound of lower limb, left, initial encounter      DISPOSITION: Home    At the conclusion of the encounter I discussed the results of all of the tests and the disposition. The questions were answered. The patient or family acknowledged understanding and was agreeable with the care plan.    TOTAL CRITICAL CARE TIME (EXCLUDING PROCEDURES): Not applicable    PROCEDURES:   None    EMERGENCY DEPARTMENT COURSE/MEDICAL DECISION MAKING   8:10 PM I met with the patient to gather history and to perform my initial exam.  We discussed treatment options and the plan for care while in the Emergency Department. PPE: Provider wore gloves, N95 mask, eye protection, surgical cap, and paper mask.  9:35 PM I rechecked the patient and discussed findings. Discussed with the patient and all questioned fully answered. He will return if any problems arise.      Stefan Diallo male with a relevant past history of  type II diabetes mellitus who presents to this ED by personal vehicle for evaluation of leg pain.  Patient is noted to wounds on his left lower leg anterior surface over the past several days.  Denies any history of direct trauma.  He has been applying cortisone but ulcerations are worsening and presents to the ED for evaluation  Vital signs:/88 (Patient Position: Sitting)   Pulse 76   Temp 97.7  F (36.5  C) (Oral)   Resp 16   " Ht 6' 1\" (1.854 m)   Wt (!) 235 lb (106.6 kg)   SpO2 98%   BMI 31.00 kg/m    Pertinent physical exam findings:  Musculoskeletal: Patient on his left lower leg on the anterior shin has 2 ulcerations 1 to 2 cm with minimal surrounding erythema.  No purulent drainage.  He has palpable pulses of the lower extremity and appears well perfused.  He has +1 edema of the leg.  Diagnostic studies:  Imaging: Left lower extremity ultrasound: No DVT  Lab:None  Interventions: Cephalexin, topical bacitracin  Medical decision making: We will start the patient on 7-day course of cephalexin.  He will discontinue hydrocortisone and apply bacitracin with routine wound care.  He is given referral to the wound care clinic for follow-up next week for wound recheck and will also see primary care physician.  Return criteria discussed and if he has increased redness or swelling of the leg despite routine wound care and use of antibiotics will return to the emergency department.    ED INTERVENTIONS     Medications   bacitracin ointment packet 1 packet (1 packet Topical Given 7/2/21 2123)   cephalexin capsule 500 mg (KEFLEX) (500 mg Oral Given 7/2/21 2043)       DISCHARGE MEDICATIONS        Medication List      START taking these medications    cephalexin 500 MG capsule  Commonly known as: KEFLEX  Take 1 capsule (500 mg total) by mouth 2 (two) times a day for 7 days.        CONTINUE taking these medications    blood-glucose meter Misc  Commonly known as: Contour Next Meter  Use once daily     compression socks, x-large Misc  Apply compression socks to the r lower leg daily, remove at bedtime.     generic lancets  Commonly known as: Fingerstix Lancets  Use 1 each As Directed daily. Dispense brand per patient's insurance at pharmacy discretion.     miscellaneous medical supply Misc        ASK your doctor about these medications    acetaminophen 500 MG tablet  Commonly known as: TYLENOL  Take 2 tablets (1,000 mg total) by mouth every 6 (six) " hours as needed for pain.     atenoloL 50 MG tablet  Commonly known as: TENORMIN  TAKE 1/2 TABLET BY MOUTH DAILY     atorvastatin 20 MG tablet  Commonly known as: LIPITOR  TAKE 1 TABLET BY MOUTH EVERY DAY     cholecalciferol (vitamin D3) 1,000 unit (25 mcg) tablet     Contour Next Test Strips strips  Generic drug: blood glucose test  Use 1 each As Directed daily.     diltiazem 120 MG 24 hr capsule  Commonly known as: CARDIZEM CD  TAKE 1 CAPSULE BY MOUTH EVERY DAY     EPINEPHrine 0.3 mg/0.3 mL injection  Commonly known as: EpiPen 2-Jhonatan  Inject 0.3 mL (0.3 mg total) into the shoulder, thigh, or buttocks once as needed (allergic reaction).     gabapentin 300 MG capsule  Commonly known as: NEURONTIN  TAKE 2 CAPSULES (600 MG TOTAL) BY MOUTH AT BEDTIME.     gemfibroziL 600 MG tablet  Commonly known as: LOPID  Take 1 tablet (600 mg total) by mouth 2 (two) times a day.     lisinopriL 5 MG tablet  Commonly known as: PRINIVIL,ZESTRIL  Take 1 tablet (5 mg total) by mouth daily.     metFORMIN 500 MG tablet  Commonly known as: GLUCOPHAGE  TAKE 1 TABLET BY MOUTH EACH MORNING AND 2 TABLETS EACH EVENING     methocarbamoL 500 MG tablet  Commonly known as: ROBAXIN     montelukast 10 mg tablet  Commonly known as: SINGULAIR  Take 1 tablet (10 mg total) by mouth daily.     omeprazole 20 MG capsule  Commonly known as: PriLOSEC  TAKE 1 CAPSULE BY MOUTH EVERY DAY     senna-docusate 8.6-50 mg tablet  Commonly known as: PERICOLACE  Take 1 tablet by mouth 2 (two) times a day as needed for constipation.     vitamin A-vitamin C-vit E-min Tab tablet  Commonly known as: OCUVITE     warfarin ANTICOAGULANT 5 MG tablet  Commonly known as: COUMADIN/JANTOVEN  TAKE 1 TO 1.5 TABLETS BY MOUTH DAILY AS DIRECTED. ADJUST PER INR RESULTS.           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    The patient reports he has had a left shin wound for the past couple days for which he has applied a cortisone cream.  He notes he is diabetic and has had this before.  When he previously had this condition he was given antibiotics with resolve.  The patient denies any allergies.  The patient takes Metformin for his diabetes and his sugars were 129 yesterday.      Of note, the patient reports he recently had a leg ultrasound at his foot specialist who noted his leg circulation is fine.    The patient denies fever, chest pain, shortness of breath, vomiting, diarrhea, and any other symptoms or complaints at this time.      REVIEW OF SYSTEMS:   Constitutional: Negative for  fever.   HENT: Negative for URI symptoms or sore throat.    Eyes: Negative for visual disturbance.   Cardiac: Negative for  chest pain,palpitations, near syncope or syncope  Respiratory: Negative for cough and shortness of breath.    Gastrointestinal: Negative for abdominal pain, nausea, vomiting, constipation, diarrhea, rectal bleeding or melena.  Genitourinary: Negative for dysuria, flank pain and hematuria.   Musculoskeletal: Negative for back pain.   Skin: Negative for  Rash.  Positive for left shin wound.  Neurological: Negative for dizziness, headache, syncope, speech difficulty, unilateral weakness or imbalance with walking.   Hematological: Negative for adenopathy. Does not bruise/bleed easily.   Psychiatric/Behavioral: Negative for confusion.       PATIENT HISTORY     Past Medical History:   Diagnosis Date   ? Atrial fibrillation (H)    ?  Bacteremia    ? GERD (gastroesophageal reflux disease)    ? GI hemorrhage    ? High cholesterol    ? Hyperlipidemia    ? Hypertension    ? Renal artery aneurysm (H)    ? Sleep apnea, obstructive    ? Type 2 diabetes mellitus (H)    ? UTI (lower urinary tract infection)      Patient Active Problem List   Diagnosis   ? Benign Prostatic Hypertrophy   ? Renal Artery Aneurysm   ? Tinea Corporis   ? Shortness Of Breath   ? Chest Pain   ? Lower Back Pain   ? Vertigo   ? Idiopathic Peripheral Neuropathy   ? Myalgia And Myositis   ? Urinary Tract Infection   ? Pain During Urination (Dysuria)   ? Cough   ? Eye Pain   ? Eczematoid Dermatitis   ? Urticaria   ? Obesity   ? Essential hypertension, benign   ? Chronic atrial fibrillation (H)   ? Adult Sleep Apnea   ? Pure hypercholesterolemia   ? Muscle Weakness   ? Dysphagia   ? Bell's palsy   ? Gastroesophageal reflux disease with esophagitis   ? Type II diabetes mellitus with peripheral circulatory disorder (H)   ? Obesity (BMI 35.0-39.9) with comorbidity (H)   ? Atrial fibrillation, unspecified type (H)   ? Cellulitis of foot   ? Osteomyelitis of toe (H)   ? Closed nondisplaced fracture of phalanx of toe of right foot, unspecified toe, initial encounter   ? Amputated toe of right foot (H)   ? Cellulitis   ? Cellulitis and abscess of leg   ? Staph aureus infection   ? Acquired absence of other right toe(s) (H)   ? Status post lumbar surgery  [facility] 10/29/2020 Fusion L3-4 L-S1    ? Diabetic ulcer of toe of right foot associated with type 2 diabetes mellitus, limited to breakdown of skin (H)   ? Fusion of spine, lumbosacral region     Past Surgical History:   Procedure Laterality Date   ? FOOT AMPUTATION Right 5/6/2019    Procedure: AMPUTATION, 3rd TOE RIGHT FOOT;  Surgeon: Ravi Abbasi DPM;  [facility];  Service: Podiatry   ? NV REPAIR COMPL ROTATOR CUFF AVULSN,CHR      Description: Chronic Rotator Cuff Avulsion;  Recorded: 04/11/2011;   ? TENOTOMY  ACHILLES TENDON     ? TRANSURETHRAL RESECTION OF PROSTATE       Family History   Problem Relation Age of Onset   ? Coronary artery disease Mother    ? Coronary artery disease Father    ? Atrial fibrillation Father      Social History     Socioeconomic History   ? Marital status:      Spouse name: Not on file   ? Number of children: 3   ? Years of education: Not on file   ? Highest education level: Not on file   Occupational History   ? Occupation:      Employer: [employer]   Social Needs   ? Financial resource strain: Not on file   ? Food insecurity     Worry: Not on file     Inability: Not on file   ? Transportation needs     Medical: Not on file     Non-medical: Not on file   Tobacco Use   ? Smoking status: Former Smoker     Packs/day: 2.00     Years: 27.00     Pack years: 54.00     Types: Cigarettes     Quit date: 1990     Years since quittin.4   ? Smokeless tobacco: Never Used   Substance and Sexual Activity   ? Alcohol use: No     Frequency: Never   ? Drug use: No   ? Sexual activity: Not Currently   Lifestyle   ? Physical activity     Days per week: Not on file     Minutes per session: Not on file   ? Stress: Not on file   Relationships   ? Social connections     Talks on phone: Not on file     Gets together: Not on file     Attends Restorationism service: Not on file     Active member of club or organization: Not on file     Attends meetings of clubs or organizations: Not on file     Relationship status: Not on file   ? Intimate partner violence     Fear of current or ex partner: Not on file     Emotionally abused: Not on file     Physically abused: Not on file     Forced sexual activity: Not on file   Other Topics Concern   ? Not on file   Social History Narrative   ? Not on file     Allergies   Allergen Reactions   ? Bee Venom Protein (Honey Bee)          OUTPATIENT MEDICATIONS     No current facility-administered medications on file prior to encounter.      Current Outpatient Medications on  File Prior to Encounter   Medication Sig   ? acetaminophen (TYLENOL) 500 MG tablet Take 2 tablets (1,000 mg total) by mouth every 6 (six) hours as needed for pain.   ? atenoloL (TENORMIN) 50 MG tablet TAKE 1/2 TABLET BY MOUTH DAILY   ? atorvastatin (LIPITOR) 20 MG tablet TAKE 1 TABLET BY MOUTH EVERY DAY   ? blood glucose test (CONTOUR NEXT TEST STRIPS) strips Use 1 each As Directed daily.   ? blood-glucose meter (CONTOUR NEXT METER) Misc Use once daily   ? cholecalciferol, vitamin D3, 1,000 unit tablet Take 1,000 Units by mouth daily.          ? compression socks, x-large Misc Apply compression socks to the r lower leg daily, remove at bedtime.   ? diltiazem (CARDIZEM CD) 120 MG 24 hr capsule TAKE 1 CAPSULE BY MOUTH EVERY DAY   ? EPINEPHrine (EPIPEN 2-BRI) 0.3 mg/0.3 mL injection Inject 0.3 mL (0.3 mg total) into the shoulder, thigh, or buttocks once as needed (allergic reaction).   ? gabapentin (NEURONTIN) 300 MG capsule TAKE 2 CAPSULES (600 MG TOTAL) BY MOUTH AT BEDTIME.   ? gemfibroziL (LOPID) 600 MG tablet Take 1 tablet (600 mg total) by mouth 2 (two) times a day.   ? generic lancets (FINGERSTIX LANCETS) Use 1 each As Directed daily. Dispense brand per patient's insurance at pharmacy discretion.   ? lisinopriL (PRINIVIL,ZESTRIL) 5 MG tablet Take 1 tablet (5 mg total) by mouth daily.   ? metFORMIN (GLUCOPHAGE) 500 MG tablet TAKE 1 TABLET BY MOUTH EACH MORNING AND 2 TABLETS EACH EVENING   ? methocarbamoL (ROBAXIN) 500 MG tablet methocarbamol 500 mg tablet   TAKE 1 TABLET BY MOUTH 4 TIMES A DAY AS NEEDED FOR 15 DAYS.   ? miscellaneous medical supply Misc Bipap w heated humidifier, tubing & chamber all x1, FFM w cushion x 1, headgear x 1, filers: disposable and resuable x 1pk both   ? montelukast (SINGULAIR) 10 mg tablet Take 1 tablet (10 mg total) by mouth daily.   ? omeprazole (PRILOSEC) 20 MG capsule TAKE 1 CAPSULE BY MOUTH EVERY DAY   ? senna-docusate (PERICOLACE) 8.6-50 mg tablet Take 1 tablet by mouth 2 (two)  "times a day as needed for constipation.   ? vitamin A-vitamin C-vit E-min (OCUVITE) Tab tablet Take 1 tablet by mouth 2 (two) times a day.          ? warfarin ANTICOAGULANT (COUMADIN/JANTOVEN) 5 MG tablet TAKE 1 TO 1.5 TABLETS BY MOUTH DAILY AS DIRECTED. ADJUST PER INR RESULTS.         PHYSICAL EXAM     Vitals:    07/02/21 1958   BP: 175/88   Patient Position: Sitting   Pulse: 76   Resp: 16   Temp: 97.7  F (36.5  C)   TempSrc: Oral   SpO2: 98%   Weight: (!) 235 lb (106.6 kg)   Height: 6' 1\" (1.854 m)       Physical Exam   Constitutional: Oriented to person, place, and time. Appears well-developed and well-nourished.   HEENT:    Head: Atraumatic.    Nose: Nose normal.    Mouth/Throat: Oropharynx is clear and moist.    Eyes: EOM are normal. Pupils are equal, round, and reactive to light.   Neck: Normal range of motion. Neck supple.   Cardiovascular: Normal rate, regular rhythm and normal heart sounds.    Pulmonary/Chest: Normal effort  and breath sounds normal.   Abdominal: Soft. Bowel sounds are normal.   Musculoskeletal: Normal range of motion. Some edema of left extremity compared to right extremity.  Neurological: Alert and oriented to person, place, and time. Normal strength.No sensory deficit. No cranial nerve deficit . Coordination serial fingers, finger-to-nose normal and gait normal.   Skin: Skin is warm and dry. 2 superficial ulcers with erythema.  Psychiatric: Normal mood and affect. Behavior is normal. Thought content normal.       DIAGNOSTICS    LABORATORY FINDINGS (REVIEWED AND INTERPRETED):  Labs Reviewed - No data to display      IMAGING (REVIEWED AND INTERPRETED):  Us Venous Leg Left    Result Date: 7/2/2021  EXAM: US VENOUS LEG LEFT LOCATION: [facility] DATE/TIME: 7/2/2021 9:16 PM INDICATION: left lower extremity swelling COMPARISON: 08/15/2019 TECHNIQUE: Venous Duplex ultrasound of the left lower extremity with and without compression, augmentation and duplex. Color flow and " spectral Doppler with waveform analysis performed. FINDINGS: Exam includes the common femoral, femoral, popliteal, and contralateral common femoral veins as well as segmentally visualized deep calf veins and greater saphenous vein. LEFT: No deep vein thrombosis. No superficial thrombophlebitis. No popliteal cyst.     1.  No deep venous thrombosis in the left lower extremity.          ECG (REVIEWED AND INTERPRETED):   None    I have reviewed the patient's ECG, with comments made as listed above. Please see scanned image for full interpretation.     I, Jerrell Johnson, am serving as a scribe to document services personally performed by Lisandro Palm D.O., based on my observation and the providers statements to me.    I, Lisandro Palm D.O., attest that Jerrell Johnson is acting in a scribe capacity, has observed my performance of the services and has documented them in accordance with my direction.    Lisandro Palm D.O.  EMERGENCY MEDICINE   07/02/21  [facility] EMERGENCY DEPARTMENT  [address]  [facility] [address]  Dept: [phone]  Loc: [phone]     Lisandro Palm DO  07/02/21 2246

## 2021-07-04 NOTE — ED NOTES
ED Notes by Yasmeen Mckeon RN at 7/2/2021  9:41 PM     Author: Yasmeen Mckeon RN Service: -- Author Type: Registered Nurse    Filed: 7/2/2021  9:43 PM Date of Service: 7/2/2021  9:41 PM Status: Signed    : Yasmeen Mckeon RN (Registered Nurse)       Bacitracin applied to wound and non-stick dressing applied. Pt instructed to keep wound clean and wash wound daily. Also to apply bacitracin and fresh dressing daily. Pt educated on importance of wound care as a diabetic and potential complications of not following wound care directions and/or taking antibiotics as ordered. Pt stated his understanding and ambulated without difficulty from ER.

## 2021-07-05 ENCOUNTER — COMMUNICATION - HEALTHEAST (OUTPATIENT)
Dept: ANTICOAGULATION | Facility: CLINIC | Age: 76
End: 2021-07-05

## 2021-07-05 NOTE — TELEPHONE ENCOUNTER
Telephone Encounter by Jeanne Harris RN at 7/5/2021  1:11 PM     Author: Jeanne Harris RN Service: -- Author Type: Registered Nurse    Filed: 7/5/2021  1:31 PM Encounter Date: 7/5/2021 Status: Signed    : Jeanne Harris RN (Registered Nurse)       ANTICOAGULATION  MANAGEMENT: Discharge Review    Stefan MIRANDA Yoel chart reviewed for anticoagulation continuity of care    Emergency room visit on 7/2 for leg pain, open wound of LLE.    Discharge disposition: Home    INR Results:     No results for input(s): INR in the last 168 hours.    Warfarin inpatient management: not applicable    Warfarin discharge instructions:     Warfarin dosing: home regimen continued  Bridging: No  INR goal Change: No     Medication Changes Affecting Anticoagulation: Yes: keflex (minimal interaction expected)    Additional Factors Affecting Anticoagulation: Yes: LLE wound, pain, infection    Plan     Recommend INR be checked as soon as possible as patient is due for INR check next week and change in health status/start of antibiotic.    Spoke with Stefan. Discussed recent ED visit and starting on keflex.     Advised that since he is coming up on being due for an INR check, that ACN would recommend he check it while in-clinic for vascular appointment on 7/9.  Stefan agrees to this plan.    No adjustment to Anticoagulation Calendar was required    Jeanne Harris, MARILU

## 2021-07-06 VITALS — BODY MASS INDEX: 31.14 KG/M2 | HEIGHT: 73 IN | WEIGHT: 235 LBS

## 2021-07-11 PROBLEM — I48.91 ATRIAL FIBRILLATION, UNSPECIFIED TYPE (H): Status: ACTIVE | Noted: 2021-07-11

## 2021-07-15 ENCOUNTER — ANTICOAGULATION THERAPY VISIT (OUTPATIENT)
Dept: ANTICOAGULATION | Facility: CLINIC | Age: 76
End: 2021-07-15

## 2021-07-15 ENCOUNTER — LAB (OUTPATIENT)
Dept: LAB | Facility: CLINIC | Age: 76
End: 2021-07-15
Payer: COMMERCIAL

## 2021-07-15 DIAGNOSIS — I48.20 CHRONIC ATRIAL FIBRILLATION (H): ICD-10-CM

## 2021-07-15 DIAGNOSIS — I48.91 ATRIAL FIBRILLATION, UNSPECIFIED TYPE (H): ICD-10-CM

## 2021-07-15 DIAGNOSIS — I48.91 ATRIAL FIBRILLATION, UNSPECIFIED TYPE (H): Primary | ICD-10-CM

## 2021-07-15 LAB — INR BLD: 2.3 (ref 0.9–1.1)

## 2021-07-15 PROCEDURE — 85610 PROTHROMBIN TIME: CPT

## 2021-07-15 PROCEDURE — 36416 COLLJ CAPILLARY BLOOD SPEC: CPT

## 2021-07-15 NOTE — PROGRESS NOTES
ANTICOAGULATION MANAGEMENT     Stefan Diallo 76 year old male is on warfarin with therapeutic INR result. (Goal INR 2.0-3.0)    Recent labs: (last 7 days)     07/15/21  1308   INR 2.3*       ASSESSMENT     Source(s): Patient/Caregiver Call       Warfarin doses taken: Warfarin taken as instructed    Diet: No new diet changes identified    New illness, injury, or hospitalization: No    Medication/supplement changes: None noted    Signs or symptoms of bleeding or clotting: No    Previous INR: Therapeutic last 2(+) visits    Additional findings: None     PLAN     Recommended plan for no diet, medication or health factor changes affecting INR     Dosing Instructions: Continue your current warfarin dose with next INR in 6 weeks       Summary  As of 7/15/2021    Full warfarin instructions:  7.5 mg every Sun; 5 mg all other days   Next INR check:               Telephone call with Stefan who verbalizes understanding and agrees to plan    Patient offered & declined to schedule next visit    Education provided: Target INR goal and significance of current INR result and Contact [phone] with any changes, questions or concerns.     Plan made per [facility] anticoagulation protocol    Jeanne Harris RN  Anticoagulation Clinic  7/15/2021    _______________________________________________________________________     Anticoagulation Episode Summary     Current INR goal:  2.0-3.0   TTR:  87.9 % (11.9 mo)   Target end date:     Send INR reminders to:  HILARIO HECTOR    Indications    Atrial fibrillation  unspecified type (H) [I48.91]           Comments:           Anticoagulation Care Providers     Provider Role Specialty Phone number    Collin Singh MD Referring Family Medicine [phone]

## 2021-07-25 DIAGNOSIS — E11.9 TYPE 2 DIABETES MELLITUS WITHOUT COMPLICATIONS (H): ICD-10-CM

## 2021-07-28 NOTE — TELEPHONE ENCOUNTER
"metFORMIN (GLUCOPHAGE) 500 MG [id number]/27/2021NoSig: TAKE 1 TABLET BY MOUTH EACH MORNING AND 2 TABLETS EACH EVENINGSent to pharmacy as: metFORMIN 500 mg tablet (GLUCOPHAGE)E-Prescribing Status: Receipt confirmed by pharmacy (1/27/2021  4:47 PM CST)     Last Written Prescription Date:  1/27/21  Last Fill Quantity: 270,  # refills: 1   Last office visit provider:  2/16/21     Requested Prescriptions   Pending Prescriptions Disp Refills     metFORMIN (GLUCOPHAGE) 500 MG tablet [Pharmacy Med Name: METFORMIN  MG TABLET] 270 tablet 1     Sig: TAKE 1 TABLET BY MOUTH EACH MORNING AND 2 TABLETS EACH EVENING       Biguanide Agents Failed - 7/25/2021  7:57 AM        Failed - Recent (6 mo) or future (30 days) visit within the authorizing provider's specialty     Patient had office visit in the last 6 months or has a visit in the next 30 days with authorizing provider or within the authorizing provider's specialty.  See \"Patient Info\" tab in inbasket, or \"Choose Columns\" in Meds & Orders section of the refill encounter.            Passed - Patient is age 10 or older        Passed - Patient has documented A1c within the specified period of time.     If HgbA1C is 8 or greater, it needs to be on file within the past 3 months.  If less than 8, must be on file within the past 6 months.     Recent Labs   Lab Test 02/16/21  1317   A1C 6.9*             Passed - Patient's CR is NOT>1.4 OR Patient's EGFR is NOT<45 within past 12 mos.     Recent Labs   Lab Test 10/30/20  0656   GFRESTIMATED 83   GFRESTBLACK >90       Recent Labs   Lab Test 10/30/20  0656   CR 0.87             Passed - Patient does NOT have a diagnosis of CHF.        Passed - Medication is active on med list             Tha Grant RN 07/28/21 10:18 AM  "

## 2021-08-07 ENCOUNTER — HOSPITAL ENCOUNTER (EMERGENCY)
Facility: HOSPITAL | Age: 76
Discharge: HOME | End: 2021-08-07
Attending: EMERGENCY MEDICINE | Admitting: EMERGENCY MEDICINE
Payer: COMMERCIAL

## 2021-08-07 VITALS
BODY MASS INDEX: 30.75 KG/M2 | TEMPERATURE: 98.6 F | HEART RATE: 71 BPM | WEIGHT: 232 LBS | RESPIRATION RATE: 16 BRPM | HEIGHT: 73 IN | SYSTOLIC BLOOD PRESSURE: 127 MMHG | DIASTOLIC BLOOD PRESSURE: 99 MMHG | OXYGEN SATURATION: 96 %

## 2021-08-07 DIAGNOSIS — E87.5 HYPERKALEMIA: ICD-10-CM

## 2021-08-07 DIAGNOSIS — Z79.01 ANTICOAGULATED ON WARFARIN: ICD-10-CM

## 2021-08-07 DIAGNOSIS — N30.00 ACUTE CYSTITIS WITHOUT HEMATURIA: ICD-10-CM

## 2021-08-07 LAB
ALBUMIN UR-MCNC: 20 MG/DL
ANION GAP SERPL CALCULATED.3IONS-SCNC: 9 MMOL/L (ref 5–18)
APPEARANCE UR: ABNORMAL
BACTERIA #/AREA URNS HPF: ABNORMAL /HPF
BILIRUB UR QL STRIP: NEGATIVE
BUN SERPL-MCNC: 16 MG/DL (ref 8–28)
CALCIUM SERPL-MCNC: 10.2 MG/DL (ref 8.5–10.5)
CHLORIDE BLD-SCNC: 109 MMOL/L (ref 98–107)
CO2 SERPL-SCNC: 25 MMOL/L (ref 22–31)
COLOR UR AUTO: YELLOW
CREAT SERPL-MCNC: 0.92 MG/DL (ref 0.7–1.3)
GFR SERPL CREATININE-BSD FRML MDRD: 79 ML/MIN/1.73M2
GLUCOSE BLD-MCNC: 123 MG/DL (ref 70–125)
GLUCOSE UR STRIP-MCNC: NEGATIVE MG/DL
HGB UR QL STRIP: ABNORMAL
INR PPP: 2.78 (ref 0.9–1.15)
KETONES UR STRIP-MCNC: NEGATIVE MG/DL
LEUKOCYTE ESTERASE UR QL STRIP: ABNORMAL
MUCOUS THREADS #/AREA URNS LPF: PRESENT /LPF
NITRATE UR QL: POSITIVE
PH UR STRIP: 6 [PH] (ref 5–7)
POTASSIUM BLD-SCNC: 5.3 MMOL/L (ref 3.5–5)
RBC URINE: 12 /HPF
SODIUM SERPL-SCNC: 143 MMOL/L (ref 136–145)
SP GR UR STRIP: 1.02 (ref 1–1.03)
UROBILINOGEN UR STRIP-MCNC: <2 MG/DL
WBC CLUMPS #/AREA URNS HPF: PRESENT /HPF
WBC URINE: >182 /HPF

## 2021-08-07 PROCEDURE — 99283 EMERGENCY DEPT VISIT LOW MDM: CPT

## 2021-08-07 PROCEDURE — 250N000013 HC RX MED GY IP 250 OP 250 PS 637: Performed by: EMERGENCY MEDICINE

## 2021-08-07 PROCEDURE — 81001 URINALYSIS AUTO W/SCOPE: CPT | Performed by: EMERGENCY MEDICINE

## 2021-08-07 PROCEDURE — 87086 URINE CULTURE/COLONY COUNT: CPT | Performed by: EMERGENCY MEDICINE

## 2021-08-07 PROCEDURE — 85610 PROTHROMBIN TIME: CPT | Performed by: EMERGENCY MEDICINE

## 2021-08-07 PROCEDURE — 36415 COLL VENOUS BLD VENIPUNCTURE: CPT | Performed by: EMERGENCY MEDICINE

## 2021-08-07 PROCEDURE — 80048 BASIC METABOLIC PNL TOTAL CA: CPT | Performed by: EMERGENCY MEDICINE

## 2021-08-07 RX ORDER — CEPHALEXIN 500 MG/1
500 CAPSULE ORAL ONCE
Status: COMPLETED | OUTPATIENT
Start: 2021-08-07 | End: 2021-08-07

## 2021-08-07 RX ORDER — CEPHALEXIN 500 MG/1
500 CAPSULE ORAL 2 TIMES DAILY
Qty: 14 CAPSULE | Refills: 0 | Status: SHIPPED | OUTPATIENT
Start: 2021-08-07 | End: 2021-08-14

## 2021-08-07 RX ADMIN — CEPHALEXIN 500 MG: 500 CAPSULE ORAL at 13:44

## 2021-08-07 ASSESSMENT — ENCOUNTER SYMPTOMS
NUMBNESS: 0
HEMATURIA: 0
SORE THROAT: 0
BLOOD IN STOOL: 0
ABDOMINAL PAIN: 0
NAUSEA: 0
DYSURIA: 1
FEVER: 0
DIARRHEA: 0
COUGH: 0
VOMITING: 0
ARTHRALGIAS: 0
LIGHT-HEADEDNESS: 0
CHILLS: 0
HEADACHES: 0
SHORTNESS OF BREATH: 0
DIZZINESS: 0

## 2021-08-07 ASSESSMENT — MIFFLIN-ST. JEOR: SCORE: 1826.23

## 2021-08-07 NOTE — ED PROVIDER NOTES
EMERGENCY DEPARTMENT ENCOUNTER      NAME: Stefan Diallo  AGE: 76 year old male  YOB: 1945  MRN: [id number]  EVALUATION DATE & TIME: 8/7/2021 12:48 PM    PCP: Collin Singh    ED PROVIDER: Rosy Kirby M.D.      CHIEF COMPLAINT     Chief Complaint   Patient presents with     Rule out Urinary Tract Infection         FINAL IMPRESSION:     1. Acute cystitis without hematuria    2. Anticoagulated on warfarin    3. Hyperkalemia          MEDICAL DECISION MAKING:       Pertinent Labs & Imaging studies reviewed. (See chart for details)    76 year old male presents to the Emergency Department for evaluation of concern about urinary tract infection    ED Course as of Aug 07 1450   Sat Aug 07, 2021   1310 68-year-old male presents complaining of frequency and dysuria for the last 2 days.  He has had a urinary tract infection before and had to be admitted to the hospital.  He states he wants to make sure he does not let it go for too long.  He otherwise denies any constitutional symptoms no fevers no chest pain no shortness of breath no abdominal pain.  He has back pain but is secondary to back surgery but denies any testicular pain penile discharge.      1311 Patient is diabetic on oral medication states doing well does have a wound on the left lower extremity that is being followed.      1311 Exam nontoxic appearing cooperative and pleasant with exam.  No abdominal tenderness palpation no CVA tenderness palpation surgical scar from before.      1317 No urine cultures on epic.  Care Everywhere did show most recent urine culture urine stopped.      1318 Patient grew out staph aureous sensitive cefazolin doxycycline Levaquin oxacillin and Bactrim.    Start patient on Keflex.  Patient notified.      1326 Post residual bladder scan 130s.  Patient does have a history of BPH as he has seen urology in the past.      1352 Normal renal function potassium slightly elevated at 5.3.  INR therapeutic at 2.70.  Strict  "discharge instructions and return precautions given to patient.      1442 Differential diagnoses include but not limited to pyelonephritis balanitis testicular torsion renal colic dissection among others.  Patient is well-appearing.  Denies any pain.  Denies testicular pain or penile discharge.  Known history of BPH did have some residual.  Anticoagulated on warfarin INR therapeutic at 2.7.  Microscopic hematuria.  He is otherwise afebrile normotensive not tachycardic reliable given his first dose of Keflex after reviewing previous urine cultures.  Potassium mildly elevated at 5.3 discussed low potassium diet and repeat potassium in clinic next week.  Patient felt comfortable with this plan discharge ambulatory stable condition.      1448 Urea Nitrogen: 16   1449 Mucus Urine(!): Present         Differential Diagnosis (include but not limited to)  UTI pyelorenal colic dissection coagulopathy balanitis STDs among others.      Vital Signs: reviewed  EKG: none  Imaging: none  Home Meds: reviewed  ED meds/abx: keflex  Fluids: oral    Labs  K 5.3  Cr 0.92  UA bacteria clumps of white blood cells positive nitrates present leukocyte esterase        Review of Previous Records  Patient went to [facility] on 7/15/2021 for atrial fibrillation. Patient is on Warfarin with INR result of 2.3 from 7/15/2021. \"Warfarin taken as instructed. No changes in diet. No changes in medication. No signs or symptoms of bleeding or clotting. No additional findings\".    Contains abnormal data Culture and Gram Stain, Surgical Site  Specimen:  Swab - Entire right knee (body structure)  Component 11 mo ago   Culture  1+ Staphylococcus aureusAbnormal        Gram Stain Result  2+ Polymorphonuclear leukocytes       Gram Stain Result  No organisms seen       Resulting Agency [facility]   Susceptibility    Organism Antibiotic Method Susceptibility   Staphylococcus aureus Clindamycin MARYLOU See Comment: Resistant "   Staphylococcus aureus Cefazolin MARYLOU <=2: Sensitive   Staphylococcus aureus Doxycycline MARYLOU <=0.5: Sensitive   Staphylococcus aureus Erythromycin MARYLOU >4: Resistant   Staphylococcus aureus Levofloxacin MARYLOU <=0.5: Sensitive   Staphylococcus aureus Oxacillin MARYLOU 0.5: Sensitive   Staphylococcus aureus Trimethoprim + Sulfamethoxazole MARYLOU <=1/19: Sensitive   Staphylococcus aureus Vancomycin MARYLOU 1: Sensitive   Specimen Collected: 08/18/20  5:42 PM         Consults  None.     ED COURSE   12:54 PM I met with the patient, wearing surgical mask, surgical cap and N95 mask, obtained history, performed an initial exam, and discussed options and plan for diagnostics and treatment here in the ED.    2:17 PM I rechecked and updated the patient on plan for discharge. Patient is comfortable with this plan.     At the conclusion of the encounter I discussed the results of all of the tests and the disposition. The questions were answered. The patient acknowledged understanding and was agreeable with the care plan.           MEDICATIONS GIVEN IN THE EMERGENCY:     Medications   cephALEXin (KEFLEX) capsule 500 mg (500 mg Oral Given 8/7/21 1344)       NEW PRESCRIPTIONS STARTED AT TODAY'S ER VISIT     Discharge Medication List as of 8/7/2021  2:30 PM      START taking these medications    Details   cephALEXin (KEFLEX) 500 MG capsule Take 1 capsule (500 mg) by mouth 2 times daily for 7 days, Disp-14 capsule, R-0, Local Print                =================================================================    [facility]     Patient information was obtained from: patient    Use of : N/A       Stefan Diallo is a 76 year old male who presents with urinary tract infection.     Per patient, since Wednesday, patient has been having burning dysuria and decrease urine output. He denies any blood in his urine. He has had urinary infections in the past and has been given IV antibiotics for treatment. He does follow with Urology for enlarged  prostate. Patient is diabetic and reports no complications with diabetic medication. His glucose levels has been ok.     Denies fever, chills, shortness of breath, chest pain, abdominal pain, testicular pain, penile discharge, leg swelling, nausea, vomiting, diarrhea or any other symptoms at this time.     REVIEW OF SYSTEMS   Review of Systems   Constitutional: Negative for chills and fever.   HENT: Negative for congestion and sore throat.    Eyes: Negative for visual disturbance.   Respiratory: Negative for cough and shortness of breath.    Cardiovascular: Negative for chest pain.   Gastrointestinal: Negative for abdominal pain, blood in stool, diarrhea, nausea and vomiting.   Genitourinary: Positive for decreased urine volume and dysuria. Negative for discharge, hematuria, penile pain, scrotal swelling and testicular pain.   Musculoskeletal: Negative for arthralgias.   Skin: Negative for rash.   Neurological: Negative for dizziness, light-headedness, numbness and headaches.   All other systems reviewed and are negative.       PAST MEDICAL HISTORY:     Past Medical History:   Diagnosis Date     Arthritis      Atrial fibrillation (H)      Bacteremia      Bell's palsy 2015     BPH (benign prostatic hyperplasia)      Diabetes (H)      Gastroesophageal reflux disease      GERD (gastroesophageal reflux disease)      GI hemorrhage      High cholesterol      Hyperlipemia      Hyperlipidemia      Hypertension      Hypertension      Idiopathic peripheral neuropathy      Irregular heart beat     chronic at fib     Renal artery aneurysm (H)      Renal artery aneurysm (H)      Sleep apnea     Bipap     Sleep apnea, obstructive     USES BIPAP     Type 2 diabetes mellitus (H)      UTI (lower urinary tract infection)        PAST SURGICAL HISTORY:     Past Surgical History:   Procedure Laterality Date     AMPUTATE FOOT Right 5/6/2019    Procedure: AMPUTATION, 3rd TOE RIGHT FOOT;  Surgeon: Ravi Abbasi DPM;  Location[facility];  Service: Podiatry     FUSION SPINE POSTERIOR MINIMALLY INVASIVE THREE + LEVELS N/A 10/29/2020    Procedure: L3/4/5S1 oblique lateral lumbar interbody fusion with discectomy L3/4/5S1 Posterior minimally invasive pedicle screw placement and posterolateral instrumentation and fusion  L3/4/5S1 Posterior minimally invasive pedicle screw placement and posterolateral instrumentation and fusion;  Surgeon: Vernon Bynum MD;  Location: [facility] OR     HC REPAIR COMPL ROTATOR CUFF AVULSN,CHR      Description: Chronic Rotator Cuff Avulsion;  Recorded: 04/11/2011;     ORTHOPEDIC SURGERY Right     knee surgery     ORTHOPEDIC SURGERY Right     amputation 3rd toe on right foot     TENOTOMY ACHILLES TENDON       TRANSRECTAL ULTRASONIC, TRANSURETHRAL RESECTION (TUR) OF PROSTATE CYST           CURRENT MEDICATIONS:   cephALEXin (KEFLEX) 500 MG capsule  atenolol (TENORMIN) 50 MG tablet  atorvastatin (LIPITOR) 20 MG tablet  diltiazem ER COATED BEADS (CARDIZEM CD/CARTIA XT) 120 MG 24 hr capsule  EPINEPHrine (ANY BX GENERIC EQUIV) 0.3 MG/0.3ML injection 2-pack  fluticasone (FLONASE) 50 MCG/ACT nasal spray  gabapentin (NEURONTIN) 300 MG capsule  gemfibrozil (LOPID) 600 MG tablet  hydrOXYzine (ATARAX) 10 MG tablet  loratadine (CLARITIN) 10 MG tablet  metFORMIN (GLUCOPHAGE) 500 MG tablet  metFORMIN (GLUCOPHAGE) 500 MG tablet  metFORMIN (GLUCOPHAGE) 500 MG tablet  methocarbamol (ROBAXIN) 500 MG tablet  montelukast (SINGULAIR) 10 MG tablet  multivitamin (OCUVITE) TABS tablet  omeprazole (PRILOSEC) 20 MG DR capsule  oxyCODONE (ROXICODONE) 5 MG tablet  Vitamin D, Cholecalciferol, 25 MCG (1000 UT) TABS  warfarin ANTICOAGULANT (COUMADIN) 5 MG tablet  warfarin ANTICOAGULANT (COUMADIN) 7.5 MG tablet         ALLERGIES:     Allergies   Allergen Reactions     Bees        FAMILY HISTORY:     Family History   Problem Relation Age of Onset     Coronary Artery Disease Mother      Coronary Artery Disease Father      Atrial fibrillation Father   "      SOCIAL HISTORY:     Social History     Socioeconomic History     Marital status:      Spouse name: Not on file     Number of children: 3     Years of education: Not on file     Highest education level: Not on file   Occupational History     Not on file   Tobacco Use     Smoking status: Former Smoker     Packs/day: 2.00     Years: 27.00     Pack years: 54.00     Types: Cigarettes, Cigarettes     Quit date: 1990     Years since quittin.5     Smokeless tobacco: Never Used   Substance and Sexual Activity     Alcohol use: No     Drug use: No     Sexual activity: Not Currently   Other Topics Concern     Not on file   Social History Narrative     Not on file     Social Determinants of Health     Financial Resource Strain:      Difficulty of Paying Living Expenses:    Food Insecurity:      Worried About Running Out of Food in the Last Year:      Ran Out of Food in the Last Year:    Transportation Needs:      Lack of Transportation (Medical):      Lack of Transportation (Non-Medical):    Physical Activity:      Days of Exercise per Week:      Minutes of Exercise per Session:    Stress:      Feeling of Stress :    Social Connections:      Frequency of Communication with Friends and Family:      Frequency of Social Gatherings with Friends and Family:      Attends Congregational Services:      Active Member of Clubs or Organizations:      Attends Club or Organization Meetings:      Marital Status:    Intimate Partner Violence:      Fear of Current or Ex-Partner:      Emotionally Abused:      Physically Abused:      Sexually Abused:        VITALS:   BP (!) 127/99   Pulse 71   Temp 98.6  F (37  C)   Resp 16   Ht 1.854 m (6' 1\")   Wt 105.2 kg (232 lb)   SpO2 96%   BMI 30.61 kg/m      PHYSICAL EXAM     Physical Exam  Vitals and nursing note reviewed.   Constitutional:       Appearance: Normal appearance.   HENT:      Head: Atraumatic.   Cardiovascular:      Rate and Rhythm: Normal rate.   Abdominal:      " Palpations: Abdomen is soft.      Tenderness: There is no abdominal tenderness.   Musculoskeletal:         General: Swelling present.        Arms:       Cervical back: Normal range of motion.      Comments: Bilateral lower extremity edema.  Patient has a bandage in the left lower extremity from an ulcer that he said he is being seen for.  No gross cellulitis.   Skin:     General: Skin is warm.   Neurological:      Mental Status: He is alert.         Physical Exam   Constitutional: nontoxic appearing cooperative and pleasant.    Head: Atraumatic.     Nose: Nose normal.     Mouth/Throat: Oropharynx is clear and moist.     Eyes: EOM are normal. Pupils are equal, round, and reactive to light.     Ears: TMs clear, pearly white bilaterally.     Neck: Normal range of motion. Neck supple.     Cardiovascular: Normal rate, regular rhythm and normal heart sounds.      Pulmonary/Chest: Normal effort  and breath sounds normal.     Abdominal: Soft. Bowel sounds are normal. No abdominal tenderness to palpation. No CVA tenderness to palpation.     Musculoskeletal: Normal range of motion.     Neurological: No focal deficits.     Lymphatics: Bilateral lower extremity edema.    Skin: Skin is warm and dry.     Psychiatric: Normal mood and affect. Behavior is normal.       LAB:     All pertinent labs reviewed and interpreted.  Labs Ordered and Resulted from Time of ED Arrival Up to the Time of Departure from the ED   ROUTINE UA WITH MICROSCOPIC REFLEX TO CULTURE - Abnormal; Notable for the following components:       Result Value    Appearance Urine Turbid (*)     Blood Urine 0.06 mg/dL (*)     Protein Albumin Urine 20  (*)     Nitrite Urine Positive (*)     Leukocyte Esterase Urine 500 Melissa/uL (*)     Bacteria Urine Many (*)     WBC Clumps Urine Present (*)     Mucus Urine Present (*)     RBC Urine 12 (*)     WBC Urine >182 (*)     All other components within normal limits    Narrative:     Urine Culture ordered based on laboratory  criteria   BASIC METABOLIC PANEL - Abnormal; Notable for the following components:    Potassium 5.3 (*)     Chloride 109 (*)     All other components within normal limits   INR - Abnormal; Notable for the following components:    INR 2.78 (*)     All other components within normal limits   BLADDER SCAN   URINE CULTURE        RADIOLOGY:     Reviewed all pertinent imaging. Please see official radiology report.  No orders to display        EKG:       I have independently reviewed and interpreted the EKG(s) documented above.      PROCEDURES:     Procedures      I, Sara Behmanesh , am serving as a scribe to document services personally performed by Dr. Kirby based on my observation and the provider's statements to me. I, Rosy Kirby MD attest that Sara Behmanesh is acting in a scribe capacity, has observed my performance of the services and has documented them in accordance with my direction.    Rosy Kirby M.D.  Emergency Medicine  [facility] EMERGENCY DEPARTMENT  [facility] [facility] [id number] [phone]  Dept: [phone]     Rosy Kirby MD  08/07/21 1603

## 2021-08-07 NOTE — ED TRIAGE NOTES
PATIENTS RATES PAIN AT A .5/10. PATIENTS MORNING MEDICATIONS GIVEN PER ORDER.
PATIENT ASSISTED TO THE BSC AS A 2PA W/FWW. PATIENT WAS ABLE TO VOID. PATIENT
IS BACK IN BED RESTING. PATIENT HAS FOOT SCDS AND TEDHOSE APPLIED TO BILAT LOW
EXT. PATIENTS CRYO REFILLED WITH ICE AND APPLIED TO LEFT KNEE. THEODORE FLASHING
GREEN LIGHT. ON-Q TO 8. NO FURTHER NEEDS. NOTED. CALL LIGHT IN REACH. Pt with c/o of burning with urination, onset Wednesday. Denies any fevers/chills.

## 2021-08-07 NOTE — DISCHARGE INSTRUCTIONS
Read and follow the discharge instructions.    Your urine is concerning for infection.    We will not know exactly what bacteria is growing in the urine until about 48 hours.    If we need to change to antibiotics we will give you a call.    Your INR is 2.78.    Potassium was slightly low at 5.3.  Make sure you eat foods that are low in potassium.    Call the clinic on Monday to make a follow-up appointment to have your potassium rechecked    Return if you have fever, vomiting, unable to urinate, urinating blood, back pain, or any other concerns.

## 2021-08-09 ENCOUNTER — TELEPHONE (OUTPATIENT)
Dept: ANTICOAGULATION | Facility: CLINIC | Age: 76
End: 2021-08-09

## 2021-08-09 DIAGNOSIS — I48.91 ATRIAL FIBRILLATION, UNSPECIFIED TYPE (H): Primary | ICD-10-CM

## 2021-08-09 NOTE — TELEPHONE ENCOUNTER
ANTICOAGULATION  MANAGEMENT: Discharge Review    Stefan Diallo chart reviewed for anticoagulation continuity of care    Emergency room visit on 8/7 for acute cystitis without hematuria.    Discharge disposition: Home    Results:  Recent labs: (last 7 days)     08/07/21  1325   INR 2.78*     Anticoagulation inpatient management:     not applicable     Anticoagulation discharge instructions:     Warfarin dosing: home regimen continued   Bridging: No   INR goal change: No      Medication changes affecting anticoagulation: Yes: keflex started (minimal interaction anticipated)    Additional factors affecting anticoagulation: Yes: infection    Plan     INR from ED dosed as above.      Jeanne Harris RN

## 2021-08-10 ENCOUNTER — TELEPHONE (OUTPATIENT)
Dept: EMERGENCY MEDICINE | Facility: CLINIC | Age: 76
End: 2021-08-10

## 2021-08-10 ENCOUNTER — ANTICOAGULATION THERAPY VISIT (OUTPATIENT)
Dept: ANTICOAGULATION | Facility: CLINIC | Age: 76
End: 2021-08-10

## 2021-08-10 ENCOUNTER — LAB (OUTPATIENT)
Dept: LAB | Facility: CLINIC | Age: 76
End: 2021-08-10
Payer: COMMERCIAL

## 2021-08-10 DIAGNOSIS — I48.91 ATRIAL FIBRILLATION, UNSPECIFIED TYPE (H): Primary | ICD-10-CM

## 2021-08-10 DIAGNOSIS — I48.91 ATRIAL FIBRILLATION, UNSPECIFIED TYPE (H): ICD-10-CM

## 2021-08-10 LAB
BACTERIA UR CULT: ABNORMAL
BACTERIA UR CULT: ABNORMAL
INR BLD: 4 (ref 0.9–1.1)

## 2021-08-10 PROCEDURE — 36416 COLLJ CAPILLARY BLOOD SPEC: CPT

## 2021-08-10 PROCEDURE — 85610 PROTHROMBIN TIME: CPT

## 2021-08-10 RX ORDER — LEVOFLOXACIN 750 MG/1
750 TABLET, FILM COATED ORAL DAILY
Qty: 5 TABLET | Refills: 0 | Status: SHIPPED | OUTPATIENT
Start: 2021-08-10 | End: 2021-08-15

## 2021-08-10 NOTE — PROGRESS NOTES
ANTICOAGULATION MANAGEMENT     Stefan Diallo 76 year old male is on warfarin with supratherapeutic INR result. (Goal INR 2.0-3.0)    Recent labs: (last 7 days)     08/10/21  1312   INR 4.0*       ASSESSMENT     Source(s): Chart Review, Patient/Caregiver Call and Template       Warfarin doses taken: Warfarin taken as instructed    Diet: No new diet changes identified    New illness, injury, or hospitalization: Yes-- in ED for UTI    Medication/supplement changes: levaquin 5 day course (dates: 8/10-8/15) which has potential for interaction; increasing INR    Signs or symptoms of bleeding or clotting: No    Previous INR: Therapeutic last 2(+) visits    Additional findings: None     PLAN     Recommended plan for temporary change(s) affecting INR     Dosing Instructions: Hold dose then continue your current warfarin dose with next INR in 1 week       Summary  As of 8/10/2021    Full warfarin instructions:  8/10: Hold; Otherwise 7.5 mg every Sun; 5 mg all other days   Next INR check:  8/16/2021             Telephone call with Stefan who verbalizes understanding and agrees to plan    Check at provider office visit    Education provided: Target INR goal and significance of current INR result and Contact [phone] with any changes, questions or concerns.     Plan made per [facility] anticoagulation protocol    Jeanne Harris RN  Anticoagulation Clinic  8/10/2021    _______________________________________________________________________     Anticoagulation Episode Summary     Current INR goal:  2.0-3.0   TTR:  87.2 % (11.9 mo)   Target end date:     Send INR reminders to:  HILARIO HECTOR    Indications    Atrial fibrillation  unspecified type (H) [I48.91]           Comments:           Anticoagulation Care Providers     Provider Role Specialty Phone number    Collin Singh MD Referring Family Medicine [phone]

## 2021-08-10 NOTE — PROGRESS NOTES
INR 1.7 pt has been on antibiotics for UTI. Will increase tonight's dose of Warfarin to 7.5 and then return to usual dosing. Retest in 2 weeks. After talking with pt and discussing history of greens/salads and medication change. Pt will  continue  with current diet and dosing of Warfarin.  Continue with moderation of Vit K and green leafy vegetables. Cautioned to call with increase bruising or bleeding. Reminded to call with medication change especially antibiotic. Call with any questions or concerns or any up coming procedures. Cautioned about using Herbal medication.     Noted

## 2021-08-10 NOTE — ED PROVIDER NOTES
Spoke w pt and reviewed U results. Cephalexin will not be effective. Pt w normal kidney function. Reviewed options for fluoroquinolone versus Bactrim in the context of a medication interaction with the patient's warfarin.  They report that Bactrim will increase this more so despite blackbox warning with tendon rupture will plan for levofloxacin 1 time a day for the next 5 days.  Reviewed this with the patient and requested he get an INR check in 2 to 3 days time.  Patient voiced understanding and pharmacy is confirmed.     Serenity Caldwell PA-C  08/10/21 0853

## 2021-08-10 NOTE — TELEPHONE ENCOUNTER
ANTICOAGULATION  MANAGEMENT     Interacting Medication Review    Interacting medication(s): Levofloxacin (Levaquin) with warfarin. (keflex was stopped based on cultures)    Duration: 5 days (8/10 to 8/15)    Indication: UTI    New medication?: Yes, interaction may increase INR and risk of bleeding       PLAN     Stefan was instructed by prescriber-- continue dosing, check INR in 2-3 days    No adjustment to Anticoagulation Calendar was required       Jeanne Harris RN

## 2021-08-11 ENCOUNTER — TELEPHONE (OUTPATIENT)
Dept: FAMILY MEDICINE | Facility: CLINIC | Age: 76
End: 2021-08-11

## 2021-08-11 NOTE — TELEPHONE ENCOUNTER
Left voice message requesting return call to [facility] clinic at [phone] to schedule AWV appointment and  if he needs to cancel or reschedule 8/16 follow up appointment.

## 2021-08-16 ENCOUNTER — ANTICOAGULATION THERAPY VISIT (OUTPATIENT)
Dept: ANTICOAGULATION | Facility: CLINIC | Age: 76
End: 2021-08-16

## 2021-08-16 ENCOUNTER — OFFICE VISIT (OUTPATIENT)
Dept: FAMILY MEDICINE | Facility: CLINIC | Age: 76
End: 2021-08-16
Payer: COMMERCIAL

## 2021-08-16 VITALS
WEIGHT: 234.5 LBS | DIASTOLIC BLOOD PRESSURE: 78 MMHG | SYSTOLIC BLOOD PRESSURE: 106 MMHG | HEIGHT: 73 IN | TEMPERATURE: 97.8 F | RESPIRATION RATE: 16 BRPM | HEART RATE: 76 BPM | BODY MASS INDEX: 31.08 KG/M2

## 2021-08-16 DIAGNOSIS — I48.91 ATRIAL FIBRILLATION, UNSPECIFIED TYPE (H): Primary | ICD-10-CM

## 2021-08-16 DIAGNOSIS — R30.0 DYSURIA: ICD-10-CM

## 2021-08-16 DIAGNOSIS — E11.9 TYPE 2 DIABETES MELLITUS WITHOUT COMPLICATION, UNSPECIFIED WHETHER LONG TERM INSULIN USE (H): Primary | ICD-10-CM

## 2021-08-16 DIAGNOSIS — I48.91 ATRIAL FIBRILLATION, UNSPECIFIED TYPE (H): ICD-10-CM

## 2021-08-16 LAB
ALBUMIN UR-MCNC: NEGATIVE MG/DL
APPEARANCE UR: CLEAR
BACTERIA #/AREA URNS HPF: ABNORMAL /HPF
BILIRUB UR QL STRIP: NEGATIVE
COLOR UR AUTO: YELLOW
GLUCOSE UR STRIP-MCNC: NEGATIVE MG/DL
HBA1C MFR BLD: 6.6 % (ref 0–5.6)
HGB UR QL STRIP: NEGATIVE
HOLD SPECIMEN: NORMAL
HOLD SPECIMEN: NORMAL
INR BLD: 2.7 (ref 0.9–1.1)
KETONES UR STRIP-MCNC: NEGATIVE MG/DL
LEUKOCYTE ESTERASE UR QL STRIP: NEGATIVE
MUCOUS THREADS #/AREA URNS LPF: PRESENT /LPF
NITRATE UR QL: NEGATIVE
PH UR STRIP: 5.5 [PH] (ref 5–7)
RBC #/AREA URNS AUTO: ABNORMAL /HPF
SP GR UR STRIP: 1.02 (ref 1–1.03)
SQUAMOUS #/AREA URNS AUTO: ABNORMAL /LPF
UROBILINOGEN UR STRIP-ACNC: 0.2 E.U./DL
WBC #/AREA URNS AUTO: ABNORMAL /HPF

## 2021-08-16 PROCEDURE — 83036 HEMOGLOBIN GLYCOSYLATED A1C: CPT | Performed by: FAMILY MEDICINE

## 2021-08-16 PROCEDURE — 81001 URINALYSIS AUTO W/SCOPE: CPT | Performed by: FAMILY MEDICINE

## 2021-08-16 PROCEDURE — 99213 OFFICE O/P EST LOW 20 MIN: CPT | Performed by: FAMILY MEDICINE

## 2021-08-16 PROCEDURE — 36416 COLLJ CAPILLARY BLOOD SPEC: CPT | Performed by: FAMILY MEDICINE

## 2021-08-16 PROCEDURE — 36415 COLL VENOUS BLD VENIPUNCTURE: CPT | Performed by: FAMILY MEDICINE

## 2021-08-16 PROCEDURE — 85610 PROTHROMBIN TIME: CPT | Performed by: FAMILY MEDICINE

## 2021-08-16 ASSESSMENT — MIFFLIN-ST. JEOR: SCORE: 1837.57

## 2021-08-16 NOTE — PROGRESS NOTES
ANTICOAGULATION MANAGEMENT     Stefan Diallo 76 year old male is on warfarin with therapeutic INR result. (Goal INR 2.0-3.0)    Recent labs: (last 7 days)     08/16/21  1536   INR 2.7*       ASSESSMENT     Source(s): Patient/Caregiver Call and Template       Warfarin doses taken: Warfarin taken as instructed    Diet: No new diet changes identified    New illness, injury, or hospitalization: No    Medication/supplement changes: None noted    Signs or symptoms of bleeding or clotting: No    Previous INR: Supratherapeutic    Additional findings: None     PLAN     Recommended plan for no diet, medication or health factor changes affecting INR     Dosing Instructions: Continue your current warfarin dose with next INR in 2 weeks       Summary  As of 8/16/2021    Full warfarin instructions:  7.5 mg every Sun; 5 mg all other days   Next INR check:  8/30/2021             Telephone call with Stefan who verbalizes understanding and agrees to plan    Patient offered & declined to schedule next visit    Education provided: Target INR goal and significance of current INR result and Contact [phone] with any changes, questions or concerns.     Plan made per [facility] anticoagulation protocol    Jeanne Harris RN  Anticoagulation Clinic  8/16/2021    _______________________________________________________________________     Anticoagulation Episode Summary     Current INR goal:  2.0-3.0   TTR:  87.1 % (11.9 mo)   Target end date:     Send INR reminders to:  HILARIO HECTOR    Indications    Atrial fibrillation  unspecified type (H) [I48.91]           Comments:           Anticoagulation Care Providers     Provider Role Specialty Phone number    Collin Singh MD Referring Family Medicine [phone]

## 2021-08-22 NOTE — PROGRESS NOTES
"    Assessment & Plan     Type 2 diabetes mellitus without complication, unspecified whether long term insulin use (H)    - Hemoglobin A1c  - Extra Tube  - Hemoglobin A1c  - Extra Tube    Dysuria    - UA with Microscopic reflex to Culture  - UA with Microscopic reflex to Culture  - Urine Microscopic    Atrial fibrillation, unspecified type (H)    - INR point of care               BMI:   Estimated body mass index is 30.94 kg/m  as calculated from the following:    Height as of this encounter: 1.854 m (6' 1\").    Weight as of this encounter: 106.4 kg (234 lb 8 oz).           Return in about 4 months (around 12/16/2021) for DM.    Collin Singh MD, MD  [facility] KRUNAL Aviles is a 76 year old who presents for the following health issues     HPI     He went to ER 8/7/21.  Started Keflex.  UC had Klebsiella, resistant.  Changed to Levofloxacin.      Current Outpatient Medications   Medication Sig Dispense Refill     atenolol (TENORMIN) 50 MG tablet Take 25 mg by mouth daily       atorvastatin (LIPITOR) 20 MG tablet Take 20 mg by mouth daily       diltiazem ER COATED BEADS (CARDIZEM CD/CARTIA XT) 120 MG 24 hr capsule Take 120 mg by mouth daily       EPINEPHrine (ANY BX GENERIC EQUIV) 0.3 MG/0.3ML injection 2-pack Inject 0.3 mg into the muscle as needed for anaphylaxis       fluticasone (FLONASE) 50 MCG/ACT nasal spray Spray 2 sprays into both nostrils daily       gabapentin (NEURONTIN) 300 MG capsule Take 600 mg by mouth At Bedtime        gemfibrozil (LOPID) 600 MG tablet Take 600 mg by mouth 2 times daily (before meals)       hydrOXYzine (ATARAX) 10 MG tablet Take 1 tablet (10 mg) by mouth every 6 hours as needed for itching 60 tablet 3     loratadine (CLARITIN) 10 MG tablet Take 10 mg by mouth daily       metFORMIN (GLUCOPHAGE) 500 MG tablet TAKE 1 TABLET BY MOUTH EACH MORNING AND 2 TABLETS EACH EVENING 270 tablet 0     metFORMIN (GLUCOPHAGE) 500 MG tablet Take 1,000 mg by mouth daily " "(with dinner)       metFORMIN (GLUCOPHAGE) 500 MG tablet Take 500 mg by mouth daily (with breakfast)        methocarbamol (ROBAXIN) 500 MG tablet Take 1 tablet (500 mg) by mouth 4 times daily as needed for muscle spasms 60 tablet 3     montelukast (SINGULAIR) 10 MG tablet Take 10 mg by mouth daily       multivitamin (OCUVITE) TABS tablet Take 1 tablet by mouth 2 times daily       omeprazole (PRILOSEC) 20 MG DR capsule Take 20 mg by mouth daily       Vitamin D, Cholecalciferol, 25 MCG (1000 UT) TABS Take 1 tablet by mouth daily       warfarin ANTICOAGULANT (COUMADIN) 5 MG tablet Take 5 mg by mouth six times a week On Mon, Tues, Wed, Thurs, Fri and Sat       warfarin ANTICOAGULANT (COUMADIN) 7.5 MG tablet Take 7.5 mg by mouth once a week On Sundays       oxyCODONE (ROXICODONE) 5 MG tablet Take 0.5 tablets (2.5 mg) by mouth every 3 hours as needed for pain (Patient not taking: Reported on 8/16/2021) 60 tablet 0         Review of Systems   No fever or cough.      Objective    /78 (BP Location: Right arm, Patient Position: Sitting, Cuff Size: Adult Regular)   Pulse 76   Temp 97.8  F (36.6  C) (Oral)   Resp 16   Ht 1.854 m (6' 1\")   Wt 106.4 kg (234 lb 8 oz)   BMI 30.94 kg/m    Body mass index is 30.94 kg/m .  Physical Exam   Heart: A fib  Lungs normal    A1c 6.6  UA neg            "

## 2021-09-09 ENCOUNTER — TELEPHONE (OUTPATIENT)
Dept: FAMILY MEDICINE | Facility: CLINIC | Age: 76
End: 2021-09-09

## 2021-09-09 NOTE — TELEPHONE ENCOUNTER
ANTICOAGULATION     Stefan Diallo is overdue for INR check.      spoke with Stefan who declined to schedule a lab appointment at this time. If calling back please schedule as soon as possible.    Rossi Spence RN

## 2021-09-11 ENCOUNTER — HEALTH MAINTENANCE LETTER (OUTPATIENT)
Age: 76
End: 2021-09-11

## 2021-09-15 ENCOUNTER — OFFICE VISIT (OUTPATIENT)
Dept: OTOLARYNGOLOGY | Facility: CLINIC | Age: 76
End: 2021-09-15
Payer: COMMERCIAL

## 2021-09-15 DIAGNOSIS — R13.19 OTHER DYSPHAGIA: ICD-10-CM

## 2021-09-15 DIAGNOSIS — R49.0 HOARSENESS: Primary | ICD-10-CM

## 2021-09-15 DIAGNOSIS — J38.7 PRESBYLARYNX: ICD-10-CM

## 2021-09-15 DIAGNOSIS — R49.0 DYSPHONIA: ICD-10-CM

## 2021-09-15 PROBLEM — E11.9 DIABETES MELLITUS, TYPE 2 (H): Status: ACTIVE | Noted: 2021-09-15

## 2021-09-15 PROCEDURE — 31575 DIAGNOSTIC LARYNGOSCOPY: CPT | Performed by: OTOLARYNGOLOGY

## 2021-09-15 PROCEDURE — 99213 OFFICE O/P EST LOW 20 MIN: CPT | Mod: 25 | Performed by: OTOLARYNGOLOGY

## 2021-09-15 NOTE — PROGRESS NOTES
CHIEF COMPLAINT:        HISTORY OF PRESENT ILLNESS    Stefan was seen at the behest of MD jessica Crews.   He intermittenty losses his voice.  Sometimes voice will fatigue half way through a sentence.  Sodas = 1 can a months.   40 cherry tomatoes daily.  No fizzy drinks.  Spagetti = seldom.  No pain with swallowing.  No choking or difficulty swallowing.  Drinks sienna diet tea regularly.  Feel like he losing his singing voice.  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 Occasional dysphagia to solid foods.     RSI = 14       REVIEW OF SYSTEMS    Review of Systems as per HPI and PMHx, otherwise 10 system review system are negative.     Bees     There were no vitals taken for this visit.    HEAD: Normal appearance and symmetry:  No cutaneous lesions.      NECK:  supple     EARS: normal TM, EACs     NOSE:     Dorsum:   straight  Septum:  deviated right  Mucosa:  moist  Inferior turbinates:  wnl        ORAL CAVITY/OROPHARYNX:     Lips:  Normal.  Tongue: normal, midline  Mucosa:   no lesions  Tonsils:  1+     NECK:  Trachea:  midline.              Thyroid:  normal              Adenopathy:  none        NEURO:   Alert and Oriented     GAIT AND STATION:  normal     RESPIRATORY:   Symmetry and Respiratory effort     PSYCH:  Normal mood and affect     SKIN:   warm and dry         FLEX LARYNGOSCOPY:    After obtaining consent, a flexible laryngoscope is used to examine both nasal cavities, the nasopharynx, pharnx, and larynx.      Nasal cavity: wnl  NP: wnl  Pharnyx:  normal  Larynx: TVCs atrophic      IMPRESSION:    Encounter Diagnoses   Name Primary?     Hoarseness Yes     Other dysphagia      Dysphonia      Presbylarynx           RECOMMENDATIONS:    Speech therapy referral  XR esophagram  Return as needed  Consider trial of PPI medication if not improved

## 2021-09-15 NOTE — LETTER
9/15/2021         RE: Stefan Diallo  [address] [facility] [address]        Dear Colleague,    Thank you for referring your patient, Stefan Diallo, to the [facility]. Please see a copy of my visit note below.    CHIEF COMPLAINT:        HISTORY OF PRESENT ILLNESS    Stefan was seen at the behest of MD jessica Crews.   He intermittenty losses his voice.  Sometimes voice will fatigue half way through a sentence.  Sodas = 1 can a months.   40 cherry tomatoes daily.  No fizzy drinks.  Spagetti = seldom.  No pain with swallowing.  No choking or difficulty swallowing.  Drinks sienna diet tea regularly.  Feel like he losing his singing voice.  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 Occasional dysphagia to solid foods.     RSI = 14       REVIEW OF SYSTEMS    Review of Systems as per HPI and PMHx, otherwise 10 system review system are negative.     Bees     There were no vitals taken for this visit.    HEAD: Normal appearance and symmetry:  No cutaneous lesions.      NECK:  supple     EARS: normal TM, EACs     NOSE:     Dorsum:   straight  Septum:  deviated right  Mucosa:  moist  Inferior turbinates:  wnl        ORAL CAVITY/OROPHARYNX:     Lips:  Normal.  Tongue: normal, midline  Mucosa:   no lesions  Tonsils:  1+     NECK:  Trachea:  midline.              Thyroid:  normal              Adenopathy:  none        NEURO:   Alert and Oriented     GAIT AND STATION:  normal     RESPIRATORY:   Symmetry and Respiratory effort     PSYCH:  Normal mood and affect     SKIN:   warm and dry         FLEX LARYNGOSCOPY:    After obtaining consent, a flexible laryngoscope is used to examine both nasal cavities, the nasopharynx, pharnx, and larynx.      Nasal cavity: wnl  NP: wnl  Pharnyx:  normal  Larynx: TVCs atrophic      IMPRESSION:    Encounter Diagnoses   Name Primary?     Hoarseness Yes     Other dysphagia      Dysphonia      Presbylarynx           RECOMMENDATIONS:    Speech therapy  referral  XR esophagram  Return as needed  Consider trial of PPI medication if not improved            Again, thank you for allowing me to participate in the care of your patient.        Sincerely,        Elpidio Ontiveros MD

## 2021-09-30 ENCOUNTER — TELEPHONE (OUTPATIENT)
Dept: ANTICOAGULATION | Facility: CLINIC | Age: 76
End: 2021-09-30

## 2021-09-30 NOTE — TELEPHONE ENCOUNTER
ANTICOAGULATION     Stefan Diallo is overdue for INR check.      Left message for patient to call and schedule lab appointment as soon as possible. If returning call, please schedule.     Vignesh Michelle

## 2021-10-05 ENCOUNTER — LAB (OUTPATIENT)
Dept: LAB | Facility: CLINIC | Age: 76
End: 2021-10-05

## 2021-10-05 ENCOUNTER — ANTICOAGULATION THERAPY VISIT (OUTPATIENT)
Dept: ANTICOAGULATION | Facility: CLINIC | Age: 76
End: 2021-10-05

## 2021-10-05 ENCOUNTER — IMMUNIZATION (OUTPATIENT)
Dept: NURSING | Facility: CLINIC | Age: 76
End: 2021-10-05
Payer: COMMERCIAL

## 2021-10-05 DIAGNOSIS — I48.91 ATRIAL FIBRILLATION, UNSPECIFIED TYPE (H): Primary | ICD-10-CM

## 2021-10-05 DIAGNOSIS — I48.91 ATRIAL FIBRILLATION, UNSPECIFIED TYPE (H): ICD-10-CM

## 2021-10-05 LAB — INR BLD: 1.6 (ref 0.9–1.1)

## 2021-10-05 PROCEDURE — 90471 IMMUNIZATION ADMIN: CPT

## 2021-10-05 PROCEDURE — 36415 COLL VENOUS BLD VENIPUNCTURE: CPT

## 2021-10-05 PROCEDURE — 91300 PR COVID VAC PFIZER DIL RECON 30 MCG/0.3 ML IM: CPT

## 2021-10-05 PROCEDURE — 0003A PR COVID VAC PFIZER DIL RECON 30 MCG/0.3 ML IM: CPT

## 2021-10-05 PROCEDURE — 85610 PROTHROMBIN TIME: CPT

## 2021-10-05 PROCEDURE — 90662 IIV NO PRSV INCREASED AG IM: CPT

## 2021-10-05 NOTE — PROGRESS NOTES
"ANTICOAGULATION MANAGEMENT     Stefan Diallo 76 year old male is on warfarin with subtherapeutic INR result. (Goal INR 2.0-3.0)    Recent labs: (last 7 days)     10/05/21  1246   INR 1.6*       ASSESSMENT     Source(s): Chart Review, Patient/Caregiver Call and Template       Warfarin doses taken: Warfarin taken as instructed. Pt denies missing doses    Diet: No new diet changes identified    New illness, injury, or hospitalization: No    Medication/supplement changes: None noted    Signs or symptoms of bleeding or clotting: No    Previous INR: Therapeutic last visit; previously outside of goal range    Additional findings: Pt wanted to boost and continue same dose. Encouraged 2 week recheck and explained reasoning for, pt stated \"i'll try and get in by the end of the month\"     PLAN     Recommended plan for no diet, medication or health factor changes affecting INR     Dosing Instructions: Booster dose then continue your current warfarin dose with next INR in 2 weeks       Summary  As of 10/5/2021    Full warfarin instructions:  10/5: 10 mg; Otherwise 7.5 mg every Sun; 5 mg all other days   Next INR check:  10/19/2021             Telephone call with Stefan who verbalizes understanding and agrees to plan    Patient offered & declined to schedule next visit    Education provided: Goal range and significance of current result, Importance of therapeutic range and Importance of following up at instructed interval    Plan made per [facility] anticoagulation protocol    Naomie Rodriguez RN  Anticoagulation Clinic  10/5/2021    _______________________________________________________________________     Anticoagulation Episode Summary     Current INR goal:  2.0-3.0   TTR:  89.6 % (11.9 mo)   Target end date:     Send INR reminders to:  [facility]OR    Indications    Atrial fibrillation  unspecified type (H) [I48.91]           Comments:           Anticoagulation Care Providers     Provider Role Specialty Phone number    Francisco, " Collin ALVARADO MD [facility] [phone]

## 2021-10-23 DIAGNOSIS — E11.9 TYPE 2 DIABETES MELLITUS WITHOUT COMPLICATIONS (H): Primary | ICD-10-CM

## 2021-10-25 NOTE — TELEPHONE ENCOUNTER
"Last Written Prescription Date:  7/28/2021  Last Fill Quantity: 270,  # refills: 0   Last office visit provider:  8/16/2021- Dr Singh    metFORMIN (GLUCOPHAGE) 500 MG tablet 270 tablet 0 7/28/2021  No   Sig: TAKE 1 TABLET BY MOUTH EACH MORNING AND 2 TABLETS EACH EVENING   Sent to pharmacy as: metFORMIN HCl 500 MG Oral Tablet (GLUCOPHAGE)   Class: E-Prescribe   Order: [id number]   E-Prescribing Status: Receipt confirmed by pharmacy (7/28/2021 10:20 AM CDT       Requested Prescriptions   Pending Prescriptions Disp Refills     metFORMIN (GLUCOPHAGE) 500 MG tablet [Pharmacy Med Name: METFORMIN  MG TABLET] 270 tablet 0     Sig: TAKE 1 TABLET BY MOUTH EACH MORNING AND 2 TABLETS EACH EVENING       Biguanide Agents Passed - 10/23/2021  7:22 AM        Passed - Patient is age 10 or older        Passed - Patient has documented A1c within the specified period of time.     If HgbA1C is 8 or greater, it needs to be on file within the past 3 months.  If less than 8, must be on file within the past 6 months.     Recent Labs   Lab Test 08/16/21  1536   A1C 6.6*             Passed - Patient's CR is NOT>1.4 OR Patient's EGFR is NOT<45 within past 12 mos.     Recent Labs   Lab Test 08/07/21  1325 10/30/20  0656   GFRESTIMATED 79 83   GFRESTBLACK  --  >90       Recent Labs   Lab Test 08/07/21  1325   CR 0.92             Passed - Patient does NOT have a diagnosis of CHF.        Passed - Medication is active on med list        Passed - Recent (6 mo) or future (30 days) visit within the authorizing provider's specialty     Patient had office visit in the last 6 months or has a visit in the next 30 days with authorizing provider or within the authorizing provider's specialty.  See \"Patient Info\" tab in inbasket, or \"Choose Columns\" in Meds & Orders section of the refill encounter.                 Nadine Baltazar RN 10/25/21 12:43 AM  "

## 2021-10-28 ENCOUNTER — TELEPHONE (OUTPATIENT)
Dept: ANTICOAGULATION | Facility: CLINIC | Age: 76
End: 2021-10-28

## 2021-10-28 ENCOUNTER — LAB (OUTPATIENT)
Dept: LAB | Facility: CLINIC | Age: 76
End: 2021-10-28
Payer: COMMERCIAL

## 2021-10-28 ENCOUNTER — ANTICOAGULATION THERAPY VISIT (OUTPATIENT)
Dept: ANTICOAGULATION | Facility: CLINIC | Age: 76
End: 2021-10-28

## 2021-10-28 DIAGNOSIS — I48.91 ATRIAL FIBRILLATION, UNSPECIFIED TYPE (H): Primary | ICD-10-CM

## 2021-10-28 DIAGNOSIS — I48.91 ATRIAL FIBRILLATION, UNSPECIFIED TYPE (H): ICD-10-CM

## 2021-10-28 LAB — INR BLD: 2.9 (ref 0.9–1.1)

## 2021-10-28 PROCEDURE — 85610 PROTHROMBIN TIME: CPT

## 2021-10-28 PROCEDURE — 36416 COLLJ CAPILLARY BLOOD SPEC: CPT

## 2021-10-28 NOTE — PROGRESS NOTES
ANTICOAGULATION MANAGEMENT     Stefan Diallo 76 year old male is on warfarin with therapeutic INR result. (Goal INR 2.0-3.0)    Recent labs: (last 7 days)     10/28/21  1305   INR 2.9*       ASSESSMENT     Source(s): Chart Review, Patient/Caregiver Call and Template       Warfarin doses taken: Warfarin taken as instructed and Template incorrect; verbally confirmed home dose with Stefan     Diet: No new diet changes identified    New illness, injury, or hospitalization: No    Medication/supplement changes: None noted    Signs or symptoms of bleeding or clotting: No    Previous INR: Subtherapeutic    Additional findings: None     PLAN     Recommended plan for no diet, medication or health factor changes affecting INR     Dosing Instructions: Continue your current warfarin dose with next INR in 3 weeks       Summary  As of 10/28/2021    Full warfarin instructions:  7.5 mg every Sun; 5 mg all other days   Next INR check:  11/18/2021             Telephone call with Stefan who verbalizes understanding and agrees to plan    Patient offered & declined to schedule next visit    Education provided: Goal range and significance of current result and Contact [phone] with any changes, questions or concerns.     Plan made per [facility] anticoagulation protocol    Jeanne Harris RN  Anticoagulation Clinic  10/28/2021    _______________________________________________________________________     Anticoagulation Episode Summary     Current INR goal:  2.0-3.0   TTR:  90.9 % (11.9 mo)   Target end date:     Send INR reminders to:  [facility]IL[facility]IO HECTOR    Indications    Atrial fibrillation  unspecified type (H) [I48.91]           Comments:           Anticoagulation Care Providers     Provider Role Specialty Phone number    Collin Singh MD Referring Family Medicine [phone]

## 2021-10-28 NOTE — TELEPHONE ENCOUNTER
ANTICOAGULATION     Stefan Diallo is overdue for INR check.      Spoke with Stefan and scheduled lab appointment on 10/28    Jeanne Harris RN

## 2021-10-29 DIAGNOSIS — I48.91 UNSPECIFIED ATRIAL FIBRILLATION (H): ICD-10-CM

## 2021-10-30 ENCOUNTER — APPOINTMENT (OUTPATIENT)
Dept: CT IMAGING | Facility: HOSPITAL | Age: 76
End: 2021-10-30
Attending: EMERGENCY MEDICINE
Payer: COMMERCIAL

## 2021-10-30 ENCOUNTER — HOSPITAL ENCOUNTER (EMERGENCY)
Facility: HOSPITAL | Age: 76
Discharge: HOME | End: 2021-10-30
Attending: EMERGENCY MEDICINE | Admitting: EMERGENCY MEDICINE
Payer: COMMERCIAL

## 2021-10-30 VITALS
BODY MASS INDEX: 30.48 KG/M2 | HEIGHT: 73 IN | WEIGHT: 230 LBS | DIASTOLIC BLOOD PRESSURE: 70 MMHG | SYSTOLIC BLOOD PRESSURE: 125 MMHG | RESPIRATION RATE: 16 BRPM | HEART RATE: 75 BPM | OXYGEN SATURATION: 97 % | TEMPERATURE: 97.5 F

## 2021-10-30 DIAGNOSIS — N20.1 CALCULUS OF URETER: ICD-10-CM

## 2021-10-30 DIAGNOSIS — N12 PYELONEPHRITIS: ICD-10-CM

## 2021-10-30 LAB
ALBUMIN UR-MCNC: 20 MG/DL
ANION GAP SERPL CALCULATED.3IONS-SCNC: 8 MMOL/L (ref 5–18)
APPEARANCE UR: ABNORMAL
APTT PPP: 43 SECONDS (ref 22–38)
BACTERIA #/AREA URNS HPF: ABNORMAL /HPF
BASOPHILS # BLD AUTO: 0.1 10E3/UL (ref 0–0.2)
BASOPHILS NFR BLD AUTO: 1 %
BILIRUB UR QL STRIP: NEGATIVE
BUN SERPL-MCNC: 21 MG/DL (ref 8–28)
CALCIUM SERPL-MCNC: 10.3 MG/DL (ref 8.5–10.5)
CHLORIDE BLD-SCNC: 107 MMOL/L (ref 98–107)
CO2 SERPL-SCNC: 28 MMOL/L (ref 22–31)
COLOR UR AUTO: YELLOW
CREAT SERPL-MCNC: 0.97 MG/DL (ref 0.7–1.3)
EOSINOPHIL # BLD AUTO: 0.3 10E3/UL (ref 0–0.7)
EOSINOPHIL NFR BLD AUTO: 2 %
ERYTHROCYTE [DISTWIDTH] IN BLOOD BY AUTOMATED COUNT: 14.4 % (ref 10–15)
GFR SERPL CREATININE-BSD FRML MDRD: 74 ML/MIN/1.73M2
GLUCOSE BLD-MCNC: 180 MG/DL (ref 70–125)
GLUCOSE UR STRIP-MCNC: NEGATIVE MG/DL
HCT VFR BLD AUTO: 42.5 % (ref 40–53)
HGB BLD-MCNC: 13.8 G/DL (ref 13.3–17.7)
HGB UR QL STRIP: ABNORMAL
IMM GRANULOCYTES # BLD: 0.1 10E3/UL
IMM GRANULOCYTES NFR BLD: 1 %
INR PPP: 1.97 (ref 0.85–1.15)
KETONES UR STRIP-MCNC: NEGATIVE MG/DL
LACTATE SERPL-SCNC: 1.6 MMOL/L (ref 0.7–2)
LEUKOCYTE ESTERASE UR QL STRIP: ABNORMAL
LYMPHOCYTES # BLD AUTO: 1.2 10E3/UL (ref 0.8–5.3)
LYMPHOCYTES NFR BLD AUTO: 9 %
MCH RBC QN AUTO: 30.9 PG (ref 26.5–33)
MCHC RBC AUTO-ENTMCNC: 32.5 G/DL (ref 31.5–36.5)
MCV RBC AUTO: 95 FL (ref 78–100)
MONOCYTES # BLD AUTO: 1 10E3/UL (ref 0–1.3)
MONOCYTES NFR BLD AUTO: 8 %
MUCOUS THREADS #/AREA URNS LPF: PRESENT /LPF
NEUTROPHILS # BLD AUTO: 10.7 10E3/UL (ref 1.6–8.3)
NEUTROPHILS NFR BLD AUTO: 79 %
NITRATE UR QL: POSITIVE
NRBC # BLD AUTO: 0 10E3/UL
NRBC BLD AUTO-RTO: 0 /100
PH UR STRIP: 5.5 [PH] (ref 5–7)
PLATELET # BLD AUTO: 303 10E3/UL (ref 150–450)
POTASSIUM BLD-SCNC: 4.7 MMOL/L (ref 3.5–5)
RBC # BLD AUTO: 4.46 10E6/UL (ref 4.4–5.9)
RBC URINE: 109 /HPF
SODIUM SERPL-SCNC: 143 MMOL/L (ref 136–145)
SP GR UR STRIP: 1.01 (ref 1–1.03)
UROBILINOGEN UR STRIP-MCNC: <2 MG/DL
WBC # BLD AUTO: 13.3 10E3/UL (ref 4–11)
WBC CLUMPS #/AREA URNS HPF: PRESENT /HPF
WBC URINE: >182 /HPF

## 2021-10-30 PROCEDURE — 74176 CT ABD & PELVIS W/O CONTRAST: CPT

## 2021-10-30 PROCEDURE — 250N000011 HC RX IP 250 OP 636: Performed by: EMERGENCY MEDICINE

## 2021-10-30 PROCEDURE — 250N000013 HC RX MED GY IP 250 OP 250 PS 637: Performed by: EMERGENCY MEDICINE

## 2021-10-30 PROCEDURE — 85025 COMPLETE CBC W/AUTO DIFF WBC: CPT | Performed by: EMERGENCY MEDICINE

## 2021-10-30 PROCEDURE — 81001 URINALYSIS AUTO W/SCOPE: CPT | Performed by: EMERGENCY MEDICINE

## 2021-10-30 PROCEDURE — 83605 ASSAY OF LACTIC ACID: CPT | Performed by: EMERGENCY MEDICINE

## 2021-10-30 PROCEDURE — 87086 URINE CULTURE/COLONY COUNT: CPT | Performed by: EMERGENCY MEDICINE

## 2021-10-30 PROCEDURE — 85610 PROTHROMBIN TIME: CPT | Performed by: EMERGENCY MEDICINE

## 2021-10-30 PROCEDURE — 96374 THER/PROPH/DIAG INJ IV PUSH: CPT

## 2021-10-30 PROCEDURE — 99285 EMERGENCY DEPT VISIT HI MDM: CPT | Mod: 25

## 2021-10-30 PROCEDURE — 36415 COLL VENOUS BLD VENIPUNCTURE: CPT | Performed by: EMERGENCY MEDICINE

## 2021-10-30 PROCEDURE — 85730 THROMBOPLASTIN TIME PARTIAL: CPT | Performed by: EMERGENCY MEDICINE

## 2021-10-30 PROCEDURE — 80048 BASIC METABOLIC PNL TOTAL CA: CPT | Performed by: EMERGENCY MEDICINE

## 2021-10-30 RX ORDER — CEFDINIR 300 MG/1
300 CAPSULE ORAL ONCE
Status: COMPLETED | OUTPATIENT
Start: 2021-10-30 | End: 2021-10-30

## 2021-10-30 RX ORDER — CEFDINIR 300 MG/1
300 CAPSULE ORAL 2 TIMES DAILY
Qty: 20 CAPSULE | Refills: 0 | Status: SHIPPED | OUTPATIENT
Start: 2021-10-30 | End: 2021-11-09

## 2021-10-30 RX ORDER — FENTANYL CITRATE 50 UG/ML
50 INJECTION, SOLUTION INTRAMUSCULAR; INTRAVENOUS ONCE
Status: COMPLETED | OUTPATIENT
Start: 2021-10-30 | End: 2021-10-30

## 2021-10-30 RX ADMIN — FENTANYL CITRATE 50 MCG: 50 INJECTION INTRAMUSCULAR; INTRAVENOUS at 11:19

## 2021-10-30 RX ADMIN — CEFDINIR 300 MG: 300 CAPSULE ORAL at 13:50

## 2021-10-30 ASSESSMENT — ENCOUNTER SYMPTOMS
ACTIVITY CHANGE: 0
APPETITE CHANGE: 0
NAUSEA: 0
CHILLS: 0
SHORTNESS OF BREATH: 0
VOMITING: 0
DIFFICULTY URINATING: 0
LIGHT-HEADEDNESS: 0
FEVER: 0
COUGH: 0
ABDOMINAL PAIN: 1

## 2021-10-30 ASSESSMENT — MIFFLIN-ST. JEOR: SCORE: 1817.15

## 2021-10-30 NOTE — ED TRIAGE NOTES
Pt states pain in right lateral mid abd pain since 0400.  Pt denies nausea, vomiting of diarrhea.

## 2021-10-30 NOTE — ED PROVIDER NOTES
Emergency Department Encounter     Evaluation Date & Time:   10/30/2021 10:32 AM    CHIEF COMPLAINT:  Abdominal Pain      Triage Note:Pt states pain in right lateral mid abd pain since 0400.  Pt denies nausea, vomiting of diarrhea.          Impression and Plan     ED COURSE & MEDICAL DECISION MAKIN:40 PM updated patient on discharge    Patient presents with sudden onset of right lower quadrant pain that started around 3 in the morning today.  He has a history of kidney stones but states he does not recall if this pain is similar.  On exam, he was tender in the right lower quadrant.  CT scan of the abdomen pelvis is consistent with past ureteral stone with some residual hydronephrosis.  Patient also with some perinephric stranding and urinalysis consistent with UTI.  We will plan to treat for pyelonephritis.  Patient is otherwise well-appearing, nontoxic with stable vital signs.  Patient's pain is improved to a 4 out of 10.  I have referred the patient to follow-up with [facility].  I discussed with him if his symptoms acutely worsen, pain worsens, develops fever, chills, sweats, he should return to the emergency department.  Otherwise, follow-up with [facility].  Patient will be placed on oral antibiotics.    At the conclusion of the encounter I discussed the results of all the tests and the disposition. The questions were answered. The patient or family acknowledged understanding and was agreeable with the care plan.    FINAL IMPRESSION:    ICD-10-CM    1. Calculus of ureter  N20.1 Adult Urology Referral   2. Pyelonephritis  N12        MEDICATIONS GIVEN IN THE EMERGENCY DEPARTMENT:  Medications   fentaNYL (PF) (SUBLIMAZE) injection 50 mcg (50 mcg Intravenous Given 10/30/21 1119)   cefdinir (OMNICEF) capsule 300 mg (300 mg Oral Given 10/30/21 1350)       NEW PRESCRIPTIONS STARTED AT TODAY'S ED VISIT:  New Prescriptions    CEFDINIR (OMNICEF) 300 MG CAPSULE    Take 1 capsule (300 mg) by mouth 2 times daily for 10 days        HPI     HPI     Stefan Diallo is a 76 year old male who presents to this ED for evaluation of abdominal pain.  Patient reports that he was awake around 3 AM when he had onset of right lower quadrant pain that he describes as a sharp, cramping pain.  He currently rates it a 10 out of 10.  The pain does not radiate.  It is a constant pain.  He denies history of similar symptoms.  He denies any previous abdominal surgeries.  He denies fever, chills, sweats.  He has had no urinary symptoms.  No change in his bowel movements.    REVIEW OF SYSTEMS:  Review of Systems   Constitutional: Negative for activity change, appetite change, chills and fever.   HENT: Negative for congestion.    Respiratory: Negative for cough and shortness of breath.    Gastrointestinal: Positive for abdominal pain. Negative for nausea and vomiting.   Genitourinary: Negative for difficulty urinating.   Neurological: Negative for light-headedness.   All other systems reviewed and are negative.      Medical History     Past Medical History:   Diagnosis Date     Arthritis      Atrial fibrillation (H)      Bacteremia      Bell's palsy 2015     BPH (benign prostatic hyperplasia)      Diabetes (H)      Gastroesophageal reflux disease      GERD (gastroesophageal reflux disease)      GI hemorrhage      High cholesterol      Hyperlipemia      Hyperlipidemia      Hypertension      Hypertension      Idiopathic peripheral neuropathy      Irregular heart beat      Renal artery aneurysm (H)      Renal artery aneurysm (H)      Sleep apnea      Sleep apnea, obstructive      Type 2 diabetes mellitus (H)      UTI (lower urinary tract infection)        Past Surgical History:   Procedure Laterality Date     AMPUTATE FOOT Right 5/6/2019    Procedure: AMPUTATION, 3rd TOE RIGHT FOOT;  Surgeon: Ravi Abbasi DPM;  Location: [facility];  Service: Podiatry     FUSION SPINE POSTERIOR MINIMALLY INVASIVE THREE + LEVELS N/A 10/29/2020    Procedure: L3/4/5S1  oblique lateral lumbar interbody fusion with discectomy L3/4/5S1 Posterior minimally invasive pedicle screw placement and posterolateral instrumentation and fusion  L3/4/5S1 Posterior minimally invasive pedicle screw placement and posterolateral instrumentation and fusion;  Surgeon: Vernon Bynum MD;  Location: [facility] OR     HC REPAIR COMPL ROTATOR CUFF AVULSN,CHR      Description: Chronic Rotator Cuff Avulsion;  Recorded: 2011;     ORTHOPEDIC SURGERY Right     knee surgery     ORTHOPEDIC SURGERY Right     amputation 3rd toe on right foot     TENOTOMY ACHILLES TENDON       TRANSRECTAL ULTRASONIC, TRANSURETHRAL RESECTION (TUR) OF PROSTATE CYST         Family History   Problem Relation Age of Onset     Coronary Artery Disease Mother      Coronary Artery Disease Father      Atrial fibrillation Father        Social History     Tobacco Use     Smoking status: Former Smoker     Packs/day: 2.00     Years: 27.00     Pack years: 54.00     Types: Cigarettes, Cigarettes     Quit date: 1990     Years since quittin.7     Smokeless tobacco: Never Used   Substance Use Topics     Alcohol use: No     Drug use: No       cefdinir (OMNICEF) 300 MG capsule  atenolol (TENORMIN) 50 MG tablet  atorvastatin (LIPITOR) 20 MG tablet  diltiazem ER COATED BEADS (CARDIZEM CD/CARTIA XT) 120 MG 24 hr capsule  EPINEPHrine (ANY BX GENERIC EQUIV) 0.3 MG/0.3ML injection 2-pack  fluticasone (FLONASE) 50 MCG/ACT nasal spray  gabapentin (NEURONTIN) 300 MG capsule  gemfibrozil (LOPID) 600 MG tablet  hydrOXYzine (ATARAX) 10 MG tablet  loratadine (CLARITIN) 10 MG tablet  metFORMIN (GLUCOPHAGE) 500 MG tablet  metFORMIN (GLUCOPHAGE) 500 MG tablet  metFORMIN (GLUCOPHAGE) 500 MG tablet  methocarbamol (ROBAXIN) 500 MG tablet  montelukast (SINGULAIR) 10 MG tablet  multivitamin (OCUVITE) TABS tablet  omeprazole (PRILOSEC) 20 MG DR capsule  oxyCODONE (ROXICODONE) 5 MG tablet  Vitamin D, Cholecalciferol, 25 MCG (1000 UT) TABS  warfarin ANTICOAGULANT  "(COUMADIN) 5 MG tablet  warfarin ANTICOAGULANT (COUMADIN) 7.5 MG tablet        Physical Exam     First Vitals:  Patient Vitals for the past 24 hrs:   BP Temp Temp src Pulse Resp SpO2 Height Weight   10/30/21 1253 125/70 97.5  F (36.4  C) Oral 75 16 97 % -- --   10/30/21 1140 129/79 -- -- 88 16 95 % -- --   10/30/21 1122 134/75 -- -- 85 -- 96 % -- --   10/30/21 1035 (!) 183/111 98.1  F (36.7  C) Oral 96 16 98 % 1.854 m (6' 1\") 104.3 kg (230 lb)       PHYSICAL EXAM:   Physical Exam  Constitutional:       Appearance: He is well-developed.   HENT:      Head: Normocephalic.   Eyes:      Extraocular Movements: Extraocular movements intact.   Cardiovascular:      Rate and Rhythm: Normal rate. Rhythm irregular.   Pulmonary:      Effort: Pulmonary effort is normal.      Breath sounds: Normal breath sounds.   Abdominal:      General: Abdomen is flat. Bowel sounds are normal.      Palpations: Abdomen is soft.      Tenderness: There is abdominal tenderness in the right lower quadrant. There is no right CVA tenderness or left CVA tenderness.   Skin:     General: Skin is warm and dry.   Neurological:      General: No focal deficit present.      Mental Status: He is alert.       Results     LAB:  All pertinent labs reviewed and interpreted  Labs Ordered and Resulted from Time of ED Arrival to Time of ED Departure   BASIC METABOLIC PANEL - Abnormal       Result Value    Sodium 143      Potassium 4.7      Chloride 107      Carbon Dioxide (CO2) 28      Anion Gap 8      Urea Nitrogen 21      Creatinine 0.97      Calcium 10.3      Glucose 180 (*)     GFR Estimate 74     INR - Abnormal    INR 1.97 (*)    PARTIAL THROMBOPLASTIN TIME - Abnormal    aPTT 43 (*)    ROUTINE UA WITH MICROSCOPIC REFLEX TO CULTURE - Abnormal    Color Urine Yellow      Appearance Urine Turbid (*)     Glucose Urine Negative      Bilirubin Urine Negative      Ketones Urine Negative      Specific Gravity Urine 1.015      Blood Urine >1.0 mg/dL (*)     pH Urine 5.5   "    Protein Albumin Urine 20  (*)     Urobilinogen Urine <2.0      Nitrite Urine Positive (*)     Leukocyte Esterase Urine 500 Melissa/uL (*)     Bacteria Urine Many (*)     WBC Clumps Urine Present (*)     Mucus Urine Present (*)     RBC Urine 109 (*)     WBC Urine >182 (*)    CBC WITH PLATELETS AND DIFFERENTIAL - Abnormal    WBC Count 13.3 (*)     RBC Count 4.46      Hemoglobin 13.8      Hematocrit 42.5      MCV 95      MCH 30.9      MCHC 32.5      RDW 14.4      Platelet Count 303      % Neutrophils 79      % Lymphocytes 9      % Monocytes 8      % Eosinophils 2      % Basophils 1      % Immature Granulocytes 1      NRBCs per 100 WBC 0      Absolute Neutrophils 10.7 (*)     Absolute Lymphocytes 1.2      Absolute Monocytes 1.0      Absolute Eosinophils 0.3      Absolute Basophils 0.1      Absolute Immature Granulocytes 0.1 (*)     Absolute NRBCs 0.0     LACTIC ACID WHOLE BLOOD - Normal    Lactic Acid 1.6     URINE CULTURE       RADIOLOGY:  CT Abdomen Pelvis w/o Contrast   Final Result   IMPRESSION:    1.  Mild right hydronephrosis with a stone just beyond the right ureteral vesicle junction suggesting recent passage of a right ureteral stone.   2.  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               Yuliya Rodas MD  Emergency Medicine  [facility] EMERGENCY DEPARTMENT       Yuliya Rodas MD  10/30/21 6018

## 2021-10-31 ENCOUNTER — HOSPITAL ENCOUNTER (EMERGENCY)
Facility: CLINIC | Age: 76
Discharge: HOME | End: 2021-10-31
Attending: EMERGENCY MEDICINE | Admitting: EMERGENCY MEDICINE
Payer: COMMERCIAL

## 2021-10-31 VITALS
SYSTOLIC BLOOD PRESSURE: 106 MMHG | RESPIRATION RATE: 18 BRPM | DIASTOLIC BLOOD PRESSURE: 57 MMHG | HEART RATE: 89 BPM | TEMPERATURE: 98.5 F | BODY MASS INDEX: 30.34 KG/M2 | OXYGEN SATURATION: 94 % | WEIGHT: 230 LBS

## 2021-10-31 DIAGNOSIS — N12 PYELONEPHRITIS: ICD-10-CM

## 2021-10-31 DIAGNOSIS — Z79.01 WARFARIN ANTICOAGULATION: ICD-10-CM

## 2021-10-31 DIAGNOSIS — R53.83 OTHER FATIGUE: ICD-10-CM

## 2021-10-31 PROBLEM — L25.9 CONTACT DERMATITIS AND OTHER ECZEMA, DUE TO UNSPECIFIED CAUSE: Status: ACTIVE | Noted: 2021-10-31

## 2021-10-31 PROBLEM — I72.2 RENAL ARTERY ANEURYSM (H): Status: ACTIVE | Noted: 2021-10-31

## 2021-10-31 PROBLEM — R30.0 DYSURIA: Status: ACTIVE | Noted: 2021-10-31

## 2021-10-31 PROBLEM — N40.0 BENIGN PROSTATIC HYPERPLASIA: Status: ACTIVE | Noted: 2021-10-31

## 2021-10-31 PROBLEM — H57.10 EYE PAIN: Status: ACTIVE | Noted: 2021-10-31

## 2021-10-31 PROBLEM — R06.02 SHORTNESS OF BREATH: Status: ACTIVE | Noted: 2021-10-31

## 2021-10-31 PROBLEM — I87.2: Status: ACTIVE | Noted: 2021-07-09

## 2021-10-31 PROBLEM — E66.01 MORBID OBESITY (H): Status: ACTIVE | Noted: 2018-11-26

## 2021-10-31 PROBLEM — S98.131A AMPUTATED TOE OF RIGHT FOOT (H): Status: ACTIVE | Noted: 2019-05-23

## 2021-10-31 PROBLEM — M62.81 MUSCLE WEAKNESS: Status: ACTIVE | Noted: 2021-10-31

## 2021-10-31 PROBLEM — R42 VERTIGO: Status: ACTIVE | Noted: 2021-10-31

## 2021-10-31 PROBLEM — L97.511 DIABETIC ULCER OF TOE OF RIGHT FOOT ASSOCIATED WITH TYPE 2 DIABETES MELLITUS, LIMITED TO BREAKDOWN OF SKIN (H): Status: ACTIVE | Noted: 2021-03-31

## 2021-10-31 PROBLEM — G60.9 IDIOPATHIC PERIPHERAL NEUROPATHY: Status: ACTIVE | Noted: 2021-10-31

## 2021-10-31 PROBLEM — R07.9 CHEST PAIN: Status: ACTIVE | Noted: 2021-10-31

## 2021-10-31 PROBLEM — M54.50 LOWER BACK PAIN: Status: ACTIVE | Noted: 2021-10-31

## 2021-10-31 PROBLEM — Z89.421 ACQUIRED ABSENCE OF OTHER RIGHT TOE(S) (H): Status: ACTIVE | Noted: 2020-11-06

## 2021-10-31 PROBLEM — M86.9 OSTEOMYELITIS OF TOE (H): Status: ACTIVE | Noted: 2021-10-31

## 2021-10-31 PROBLEM — Z98.890 STATUS POST LUMBAR SURGERY: Status: ACTIVE | Noted: 2020-11-06

## 2021-10-31 PROBLEM — E78.00 PURE HYPERCHOLESTEROLEMIA: Status: ACTIVE | Noted: 2021-10-31

## 2021-10-31 PROBLEM — L02.419 CELLULITIS AND ABSCESS OF LEG: Status: ACTIVE | Noted: 2021-10-31

## 2021-10-31 PROBLEM — E11.621 DIABETIC ULCER OF TOE OF RIGHT FOOT ASSOCIATED WITH TYPE 2 DIABETES MELLITUS, LIMITED TO BREAKDOWN OF SKIN (H): Status: ACTIVE | Noted: 2021-03-31

## 2021-10-31 PROBLEM — S92.911A CLOSED NONDISPLACED FRACTURE OF PHALANX OF TOE OF RIGHT FOOT, UNSPECIFIED TOE, INITIAL ENCOUNTER: Status: ACTIVE | Noted: 2021-10-31

## 2021-10-31 PROBLEM — I10 ESSENTIAL HYPERTENSION, BENIGN: Status: ACTIVE | Noted: 2021-10-31

## 2021-10-31 PROBLEM — L03.119 CELLULITIS AND ABSCESS OF LEG: Status: ACTIVE | Noted: 2021-10-31

## 2021-10-31 PROBLEM — L50.9 URTICARIA: Status: ACTIVE | Noted: 2021-10-31

## 2021-10-31 PROBLEM — L97.229: Status: ACTIVE | Noted: 2021-07-09

## 2021-10-31 PROBLEM — B35.4 TINEA CORPORIS: Status: ACTIVE | Noted: 2021-10-31

## 2021-10-31 PROBLEM — N39.0 URINARY TRACT INFECTION: Status: ACTIVE | Noted: 2021-10-31

## 2021-10-31 PROBLEM — G47.30 SLEEP APNEA: Status: ACTIVE | Noted: 2021-10-31

## 2021-10-31 PROBLEM — R05.9 COUGH: Status: ACTIVE | Noted: 2021-10-31

## 2021-10-31 PROBLEM — L03.90 CELLULITIS: Status: ACTIVE | Noted: 2020-08-14

## 2021-10-31 PROBLEM — A49.01 STAPH AUREUS INFECTION: Status: ACTIVE | Noted: 2021-10-31

## 2021-10-31 LAB
ALBUMIN SERPL-MCNC: 2.9 G/DL (ref 3.5–5)
ALBUMIN UR-MCNC: 70 MG/DL
ALP SERPL-CCNC: 93 U/L (ref 45–120)
ALT SERPL W P-5'-P-CCNC: 16 U/L (ref 0–45)
ANION GAP SERPL CALCULATED.3IONS-SCNC: 16 MMOL/L (ref 5–18)
APPEARANCE UR: ABNORMAL
AST SERPL W P-5'-P-CCNC: 18 U/L (ref 0–40)
BACTERIA #/AREA URNS HPF: ABNORMAL /HPF
BASOPHILS # BLD AUTO: 0 10E3/UL (ref 0–0.2)
BASOPHILS NFR BLD AUTO: 0 %
BILIRUB SERPL-MCNC: 0.6 MG/DL (ref 0–1)
BILIRUB UR QL STRIP: NEGATIVE
BUN SERPL-MCNC: 25 MG/DL (ref 8–28)
C REACTIVE PROTEIN LHE: 25.1 MG/DL (ref 0–0.8)
CALCIUM SERPL-MCNC: 10 MG/DL (ref 8.5–10.5)
CHLORIDE BLD-SCNC: 104 MMOL/L (ref 98–107)
CO2 SERPL-SCNC: 19 MMOL/L (ref 22–31)
COLOR UR AUTO: YELLOW
CREAT SERPL-MCNC: 1.12 MG/DL (ref 0.7–1.3)
EOSINOPHIL # BLD AUTO: 0 10E3/UL (ref 0–0.7)
EOSINOPHIL NFR BLD AUTO: 0 %
ERYTHROCYTE [DISTWIDTH] IN BLOOD BY AUTOMATED COUNT: 14.5 % (ref 10–15)
GFR SERPL CREATININE-BSD FRML MDRD: 63 ML/MIN/1.73M2
GLUCOSE BLD-MCNC: 146 MG/DL (ref 70–125)
GLUCOSE UR STRIP-MCNC: NEGATIVE MG/DL
HCT VFR BLD AUTO: 37.6 % (ref 40–53)
HGB BLD-MCNC: 12.6 G/DL (ref 13.3–17.7)
HGB UR QL STRIP: ABNORMAL
HOLD SPECIMEN: NORMAL
IMM GRANULOCYTES # BLD: 0.2 10E3/UL
IMM GRANULOCYTES NFR BLD: 1 %
INR PPP: 2.66 (ref 0.85–1.15)
KETONES UR STRIP-MCNC: NEGATIVE MG/DL
LACTATE SERPL-SCNC: 1.1 MMOL/L (ref 0.7–2)
LEUKOCYTE ESTERASE UR QL STRIP: ABNORMAL
LYMPHOCYTES # BLD AUTO: 1.5 10E3/UL (ref 0.8–5.3)
LYMPHOCYTES NFR BLD AUTO: 8 %
MCH RBC QN AUTO: 31 PG (ref 26.5–33)
MCHC RBC AUTO-ENTMCNC: 33.5 G/DL (ref 31.5–36.5)
MCV RBC AUTO: 93 FL (ref 78–100)
MONOCYTES # BLD AUTO: 1.6 10E3/UL (ref 0–1.3)
MONOCYTES NFR BLD AUTO: 9 %
MUCOUS THREADS #/AREA URNS LPF: PRESENT /LPF
NEUTROPHILS # BLD AUTO: 14.9 10E3/UL (ref 1.6–8.3)
NEUTROPHILS NFR BLD AUTO: 82 %
NITRATE UR QL: POSITIVE
NRBC # BLD AUTO: 0 10E3/UL
NRBC BLD AUTO-RTO: 0 /100
PH UR STRIP: 6 [PH] (ref 5–7)
PLATELET # BLD AUTO: 279 10E3/UL (ref 150–450)
POTASSIUM BLD-SCNC: 3.7 MMOL/L (ref 3.5–5)
PROT SERPL-MCNC: 7.2 G/DL (ref 6–8)
RBC # BLD AUTO: 4.06 10E6/UL (ref 4.4–5.9)
RBC URINE: 13 /HPF
SODIUM SERPL-SCNC: 139 MMOL/L (ref 136–145)
SP GR UR STRIP: 1.02 (ref 1–1.03)
UROBILINOGEN UR STRIP-MCNC: <2 MG/DL
WBC # BLD AUTO: 18.2 10E3/UL (ref 4–11)
WBC CLUMPS #/AREA URNS HPF: PRESENT /HPF
WBC URINE: >182 /HPF

## 2021-10-31 PROCEDURE — 85610 PROTHROMBIN TIME: CPT | Performed by: EMERGENCY MEDICINE

## 2021-10-31 PROCEDURE — 83605 ASSAY OF LACTIC ACID: CPT | Performed by: EMERGENCY MEDICINE

## 2021-10-31 PROCEDURE — 99285 EMERGENCY DEPT VISIT HI MDM: CPT | Mod: 25

## 2021-10-31 PROCEDURE — 250N000011 HC RX IP 250 OP 636: Performed by: EMERGENCY MEDICINE

## 2021-10-31 PROCEDURE — 96365 THER/PROPH/DIAG IV INF INIT: CPT

## 2021-10-31 PROCEDURE — 36415 COLL VENOUS BLD VENIPUNCTURE: CPT | Performed by: EMERGENCY MEDICINE

## 2021-10-31 PROCEDURE — 258N000003 HC RX IP 258 OP 636: Performed by: EMERGENCY MEDICINE

## 2021-10-31 PROCEDURE — 80053 COMPREHEN METABOLIC PANEL: CPT | Performed by: EMERGENCY MEDICINE

## 2021-10-31 PROCEDURE — 86141 C-REACTIVE PROTEIN HS: CPT | Performed by: EMERGENCY MEDICINE

## 2021-10-31 PROCEDURE — 81001 URINALYSIS AUTO W/SCOPE: CPT | Performed by: EMERGENCY MEDICINE

## 2021-10-31 PROCEDURE — 87086 URINE CULTURE/COLONY COUNT: CPT | Performed by: EMERGENCY MEDICINE

## 2021-10-31 PROCEDURE — 85025 COMPLETE CBC W/AUTO DIFF WBC: CPT | Performed by: EMERGENCY MEDICINE

## 2021-10-31 RX ORDER — CEFTRIAXONE 2 G/1
2 INJECTION, POWDER, FOR SOLUTION INTRAMUSCULAR; INTRAVENOUS ONCE
Status: COMPLETED | OUTPATIENT
Start: 2021-10-31 | End: 2021-10-31

## 2021-10-31 RX ADMIN — CEFTRIAXONE SODIUM 2 G: 2 INJECTION, POWDER, FOR SOLUTION INTRAMUSCULAR; INTRAVENOUS at 19:27

## 2021-10-31 RX ADMIN — SODIUM CHLORIDE 1000 ML: 9 INJECTION, SOLUTION INTRAVENOUS at 19:27

## 2021-10-31 ASSESSMENT — ENCOUNTER SYMPTOMS
CHILLS: 1
DYSURIA: 1
FREQUENCY: 1
NAUSEA: 0
VOMITING: 0
FEVER: 1
WEAKNESS: 1
BACK PAIN: 1

## 2021-10-31 NOTE — ED TRIAGE NOTES
Pt started on antibiotics yesterday for UTI. Pt feeling very weak. Feels like the antibiotics are not working. Here with Mikey, sig other.

## 2021-10-31 NOTE — ED PROVIDER NOTES
"EMERGENCY DEPARTMENT ENCOUNTER      NAME: Stefan Diallo  AGE: 76 year old male  YOB: 1945  MRN: [id number]  EVALUATION DATE & TIME: 10/31/2021  6:50 PM    PCP: Collin Singh    ED PROVIDER: Orion Diallo M.D.      Chief Complaint   Patient presents with     Fatigue     Generalized Weakness     UTI         IMPRESSION  1. Pyelonephritis    2. Other fatigue    3. Warfarin anticoagulation        PLAN  - continue previously-prescribed cefdinir  - close PCP follow up  - discharge to home    ED COURSE & MEDICAL DECISION MAKING    ED Course as of Oct 31 2118   Sun Oct 31, 2021   1853 76yoM with history of chronic a-fib (takes warfarin), HTN, obesity, Bell's palsy who was seen in the ED yesterday: CT with recently-passed ureteral stone (just past right UVJ) with suggestion of pyelonephritis, UA with clear UTI; given cefdinir dose and discharged on cefdinir. Returns to the ED tonight for evaluation of ongoing generalized weakness; still has dysuria & increased urinary frequency as well with chills & subjective fevers today. No flank pain, abdominal pain, nausea, vomiting. States \"the antibiotic isn't working\". Daughter (patient lives alone at home) concerned he is getting septic so brought him to the ED.    Normal vitals on presentation. Calm on exam with benign abdomen, no CVA tenderness, clear lungs, normal work of breathing, normal neuro exam. Doubt sepsis clinically but given age and known infection, will check blood tests. Triage UA obtained and not surprisingly unchanged from yesterday---only 1 day of PO antibiotics does not constitute treatment failure. Urine culture from yesterday with GNR to this point; cefdinir seems appropriate to me. Will obtain blood to check for worsening from yesterday while giving IVF, Rocephin here now. Patient and daughter comfortable with this plan; no further questions at this time.        Labs with normal lactic acid, WBC 18 (prior 13), CRP 25 (not checked yesterday), " no GENEVA, no glaring electrolyte abnormality, hemoglobin 12.6. Patient feeling well after IVF with normal vitals; able to ambulate without difficulty here now. Patient wants to go home; daughter comfortable with this as well. Return precautions and need for PCP follow up discussed and understood. No further questions at the time of discharge.      7:05 PM I met with the patient for the initial interview and physical examination. Discussed plan for treatment and workup in the ED.  8:27 PM I rechecked and updated the patient. Discussed the lab results. He is feeling improved, ambulated without difficulty. He and his daughter are comfortable with discharge. We discussed the plan for discharge and the patient is agreeable. Reviewed supportive cares, symptomatic treatment, outpatient follow up, and reasons to return to the Emergency Department. Patient to be discharged by ED RN.        I wore the following PPE during this patient encounter:  N95 mask, face shield w/ eye protection, gloves    MEDICATIONS GIVEN IN THE EMERGENCY DEPARTMENT  Medications   cefTRIAXone (ROCEPHIN) 2 g vial to attach to  ml bag for ADULTS or NS 50 ml bag for PEDS (0 g Intravenous Stopped 10/31/21 2039)   0.9% sodium chloride BOLUS (0 mLs Intravenous Stopped 10/31/21 2039)       NEW PRESCRIPTIONS STARTED AT TODAY'S ER VISIT  Discharge Medication List as of 10/31/2021  8:40 PM      CONTINUE these medications which have NOT CHANGED    Details   atenolol (TENORMIN) 50 MG tablet Take 25 mg by mouth daily, Historical      atorvastatin (LIPITOR) 20 MG tablet Take 20 mg by mouth daily, Historical      cefdinir (OMNICEF) 300 MG capsule Take 1 capsule (300 mg) by mouth 2 times daily for 10 days, Disp-20 capsule, R-0, Local Print      diltiazem ER COATED BEADS (CARDIZEM CD/CARTIA XT) 120 MG 24 hr capsule Take 120 mg by mouth daily, Historical      EPINEPHrine (ANY BX GENERIC EQUIV) 0.3 MG/0.3ML injection 2-pack Inject 0.3 mg into the muscle as needed  for anaphylaxis, Historical      fluticasone (FLONASE) 50 MCG/ACT nasal spray Spray 2 sprays into both nostrils daily, Historical      gabapentin (NEURONTIN) 300 MG capsule Take 600 mg by mouth At Bedtime , Historical      gemfibrozil (LOPID) 600 MG tablet Take 600 mg by mouth 2 times daily (before meals), Historical      hydrOXYzine (ATARAX) 10 MG tablet Take 1 tablet (10 mg) by mouth every 6 hours as needed for itching, Disp-60 tablet, R-3, Local Print      loratadine (CLARITIN) 10 MG tablet Take 10 mg by mouth daily, Historical      !! metFORMIN (GLUCOPHAGE) 500 MG tablet TAKE 1 TABLET BY MOUTH EACH MORNING AND 2 TABLETS EACH EVENING, Disp-270 tablet, R-0, E-Prescribe      !! metFORMIN (GLUCOPHAGE) 500 MG tablet Take 1,000 mg by mouth daily (with dinner), Historical      !! metFORMIN (GLUCOPHAGE) 500 MG tablet Take 500 mg by mouth daily (with breakfast) , Historical      methocarbamol (ROBAXIN) 500 MG tablet Take 1 tablet (500 mg) by mouth 4 times daily as needed for muscle spasms, Disp-60 tablet, R-3, Local Print      montelukast (SINGULAIR) 10 MG tablet Take 10 mg by mouth daily, Historical      multivitamin (OCUVITE) TABS tablet Take 1 tablet by mouth 2 times daily, Historical      omeprazole (PRILOSEC) 20 MG DR capsule Take 20 mg by mouth daily, Historical      oxyCODONE (ROXICODONE) 5 MG tablet Take 0.5 tablets (2.5 mg) by mouth every 3 hours as needed for pain, Disp-60 tablet, R-0, E-Prescribe      Vitamin D, Cholecalciferol, 25 MCG (1000 UT) TABS Take 1 tablet by mouth daily, Historical      !! warfarin ANTICOAGULANT (COUMADIN) 5 MG tablet Take 5 mg by mouth six times a week On Mon, Tues, Wed, Thurs, Fri and Sat, Historical      !! warfarin ANTICOAGULANT (COUMADIN) 7.5 MG tablet Take 7.5 mg by mouth once a week On Sundays, Historical       !! - Potential duplicate medications found. Please discuss with provider.               =================================================================      HPI  Patient information was obtained from: Patient and his daughter    Use of : N/A       Stefan Diallo is a 76 year old male with a pertinent history of UTI, kidney stone, BPH, DM2, atrial fibrillation, HTN, HLD, who presents to this ED by private car with daughter for evaluation of dysuria, weakness.     Patient reports his dysuria that caused him to present to the ER yesterday has continued. He is concerned that the antibiotic he was prescribed is not effective against his infection. His daughter endorses the patient seems increasingly weak today. Patient endorses urinary frequency, chills and subjective fevers. He endorses chronic back pain and leg pain that are unchanged. He lives alone. Denies nausea, vomiting, or any additional symptoms at this time.       REVIEW OF SYSTEMS   Review of Systems   Constitutional: Positive for chills and fever (subjective).   Gastrointestinal: Negative for nausea and vomiting.   Genitourinary: Positive for dysuria and frequency.   Musculoskeletal: Positive for back pain (chronic).        Positive for leg pain (chronic).   Neurological: Positive for weakness.   All other systems reviewed and are negative.      --------------- MEDICAL HISTORY ---------------  PAST MEDICAL HISTORY:  Past Medical History:   Diagnosis Date     Arthritis      Atrial fibrillation (H)      Bacteremia      Bell's palsy 2015     BPH (benign prostatic hyperplasia)      Diabetes (H)      Gastroesophageal reflux disease      GERD (gastroesophageal reflux disease)      GI hemorrhage      High cholesterol      Hyperlipemia      Hyperlipidemia      Hypertension      Hypertension      Idiopathic peripheral neuropathy      Irregular heart beat     chronic at fib     Renal artery aneurysm (H)      Renal artery aneurysm (H)      Sleep apnea     Bipap     Sleep apnea 10/31/2021     Sleep apnea, obstructive     USES BIPAP      Type 2 diabetes mellitus (H)      UTI (lower urinary tract infection)      Patient Active Problem List   Diagnosis     Fusion of spine, lumbosacral region     Atrial fibrillation, unspecified type (H)     Diabetes mellitus, type 2 (H)     Acquired absence of other right toe(s) (H)     Amputated toe of right foot (H)     Bell's palsy     Benign prostatic hyperplasia     Cellulitis     Cellulitis and abscess of leg     Chest pain     Closed nondisplaced fracture of phalanx of toe of right foot, unspecified toe, initial encounter     Contact dermatitis and other eczema, due to unspecified cause     Cough     Diabetic ulcer of toe of right foot associated with type 2 diabetes mellitus, limited to breakdown of skin (H)     Dysphagia     Dysuria     Essential hypertension, benign     Eye pain     Gastroesophageal reflux disease with esophagitis     Idiopathic peripheral neuropathy     Lower back pain     Muscle weakness     Myalgia and myositis, unspecified     Morbid obesity (H)     Osteomyelitis of toe (H)     Pure hypercholesterolemia     Renal artery aneurysm (H)     Shortness of breath     Sleep apnea     Staph aureus infection     Status post lumbar surgery     Tinea corporis     Urinary tract infection     Urticaria     Venous stasis ulcer of left calf without varicose veins, unspecified ulcer stage (H)     Vertigo       PAST SURGICAL HISTORY:  Past Surgical History:   Procedure Laterality Date     AMPUTATE FOOT Right 5/6/2019    Procedure: AMPUTATION, 3rd TOE RIGHT FOOT;  Surgeon: Ravi Abbasi DPM;  Location: [facility];  Service: Podiatry     FUSION SPINE POSTERIOR MINIMALLY INVASIVE THREE + LEVELS N/A 10/29/2020    Procedure: L3/4/5S1 oblique lateral lumbar interbody fusion with discectomy L3/4/5S1 Posterior minimally invasive pedicle screw placement and posterolateral instrumentation and fusion  L3/4/5S1 Posterior minimally invasive pedicle screw placement and posterolateral instrumentation  and fusion;  Surgeon: Vernon Bynum MD;  Location: [facility] OR     HC REPAIR COMPL ROTATOR CUFF AVULSN,CHR      Description: Chronic Rotator Cuff Avulsion;  Recorded: 04/11/2011;     ORTHOPEDIC SURGERY Right     knee surgery     ORTHOPEDIC SURGERY Right     amputation 3rd toe on right foot     TENOTOMY ACHILLES TENDON       TRANSRECTAL ULTRASONIC, TRANSURETHRAL RESECTION (TUR) OF PROSTATE CYST         CURRENT MEDICATIONS:    No current facility-administered medications for this encounter.    Current Outpatient Medications:      atenolol (TENORMIN) 50 MG tablet, Take 25 mg by mouth daily, Disp: , Rfl:      atorvastatin (LIPITOR) 20 MG tablet, Take 20 mg by mouth daily, Disp: , Rfl:      cefdinir (OMNICEF) 300 MG capsule, Take 1 capsule (300 mg) by mouth 2 times daily for 10 days, Disp: 20 capsule, Rfl: 0     diltiazem ER COATED BEADS (CARDIZEM CD/CARTIA XT) 120 MG 24 hr capsule, Take 120 mg by mouth daily, Disp: , Rfl:      EPINEPHrine (ANY BX GENERIC EQUIV) 0.3 MG/0.3ML injection 2-pack, Inject 0.3 mg into the muscle as needed for anaphylaxis, Disp: , Rfl:      fluticasone (FLONASE) 50 MCG/ACT nasal spray, Spray 2 sprays into both nostrils daily, Disp: , Rfl:      gabapentin (NEURONTIN) 300 MG capsule, Take 600 mg by mouth At Bedtime , Disp: , Rfl:      gemfibrozil (LOPID) 600 MG tablet, Take 600 mg by mouth 2 times daily (before meals), Disp: , Rfl:      hydrOXYzine (ATARAX) 10 MG tablet, Take 1 tablet (10 mg) by mouth every 6 hours as needed for itching, Disp: 60 tablet, Rfl: 3     loratadine (CLARITIN) 10 MG tablet, Take 10 mg by mouth daily, Disp: , Rfl:      metFORMIN (GLUCOPHAGE) 500 MG tablet, TAKE 1 TABLET BY MOUTH EACH MORNING AND 2 TABLETS EACH EVENING, Disp: 270 tablet, Rfl: 0     metFORMIN (GLUCOPHAGE) 500 MG tablet, Take 1,000 mg by mouth daily (with dinner), Disp: , Rfl:      metFORMIN (GLUCOPHAGE) 500 MG tablet, Take 500 mg by mouth daily (with breakfast) , Disp: , Rfl:      methocarbamol (ROBAXIN) 500 MG  tablet, Take 1 tablet (500 mg) by mouth 4 times daily as needed for muscle spasms, Disp: 60 tablet, Rfl: 3     montelukast (SINGULAIR) 10 MG tablet, Take 10 mg by mouth daily, Disp: , Rfl:      multivitamin (OCUVITE) TABS tablet, Take 1 tablet by mouth 2 times daily, Disp: , Rfl:      omeprazole (PRILOSEC) 20 MG DR capsule, Take 20 mg by mouth daily, Disp: , Rfl:      oxyCODONE (ROXICODONE) 5 MG tablet, Take 0.5 tablets (2.5 mg) by mouth every 3 hours as needed for pain, Disp: 60 tablet, Rfl: 0     Vitamin D, Cholecalciferol, 25 MCG (1000 UT) TABS, Take 1 tablet by mouth daily, Disp: , Rfl:      warfarin ANTICOAGULANT (COUMADIN) 5 MG tablet, Take 5 mg by mouth six times a week On Mon, Tues, Wed, Thurs, Fri and Sat, Disp: , Rfl:      warfarin ANTICOAGULANT (COUMADIN) 7.5 MG tablet, Take 7.5 mg by mouth once a week On Sundays, Disp: , Rfl:     ALLERGIES:  Allergies   Allergen Reactions     Bees        FAMILY HISTORY:  Family History   Problem Relation Age of Onset     Coronary Artery Disease Mother      Coronary Artery Disease Father      Atrial fibrillation Father        SOCIAL HISTORY:   Social History     Socioeconomic History     Marital status:      Spouse name: None     Number of children: 3     Years of education: None     Highest education level: None   Occupational History     None   Tobacco Use     Smoking status: Former Smoker     Packs/day: 2.00     Years: 27.00     Pack years: 54.00     Types: Cigarettes, Cigarettes     Quit date: 1990     Years since quittin.7     Smokeless tobacco: Never Used   Substance and Sexual Activity     Alcohol use: No     Drug use: No     Sexual activity: Not Currently   Other Topics Concern     None   Social History Narrative     None     Social Determinants of Health     Financial Resource Strain:      Difficulty of Paying Living Expenses:    Food Insecurity:      Worried About Running Out of Food in the Last Year:      Ran Out of Food in the Last Year:     Transportation Needs:      Lack of Transportation (Medical):      Lack of Transportation (Non-Medical):    Physical Activity:      Days of Exercise per Week:      Minutes of Exercise per Session:    Stress:      Feeling of Stress :    Social Connections:      Frequency of Communication with Friends and Family:      Frequency of Social Gatherings with Friends and Family:      Attends Mandaen Services:      Active Member of Clubs or Organizations:      Attends Club or Organization Meetings:      Marital Status:    Intimate Partner Violence:      Fear of Current or Ex-Partner:      Emotionally Abused:      Physically Abused:      Sexually Abused:          --------------- PHYSICAL EXAM ---------------  Nursing notes and vitals reviewed by me.  VITALS:  Vitals:    10/31/21 1915 10/31/21 1930 10/31/21 1945 10/31/21 2000   BP: 116/69 100/58 103/60 106/57   Pulse: 91 90 91 89   Resp:       Temp:       SpO2: 94% 94% 94% 94%   Weight:           PHYSICAL EXAM:    General:  alert, interactive, no distress  Eyes:  conjunctivae clear, conjugate gaze   HENT:  atraumatic, nose with no rhinorrhea, oropharynx clear  Neck:  no meningismus  Cardiovascular:  HR 80s during exam, regular rhythm, no murmurs, brisk cap refill  Chest:  no chest wall tenderness  Pulmonary:  no stridor, normal phonation, normal work of breathing, clear lungs bilaterally  Abdomen:  soft, nondistended, nontender  :  no CVA tenderness  Back:  no midline spinal tenderness  Musculoskeletal:  no pretibial edema, no calf tenderness. Gross ROM intact to joints of extremities with no obvious deformities.  Skin:  warm, dry, no rash  Neuro:  awake, alert, answers questions appropriately, follows commands, moves all limbs  Psych:  calm, normal affect      --------------- RESULTS ---------------  LAB:  Reviewed and interpreted by me.  Results for orders placed or performed during the hospital encounter of 10/31/21   UA with Microscopic reflex to Culture    Specimen:  Urine, Midstream   Result Value Ref Range    Color Urine Yellow Colorless, Straw, Light Yellow, Yellow    Appearance Urine Turbid (A) Clear    Glucose Urine Negative Negative mg/dL    Bilirubin Urine Negative Negative    Ketones Urine Negative Negative mg/dL    Specific Gravity Urine 1.017 1.001 - 1.030    Blood Urine 0.1 mg/dL (A) Negative    pH Urine 6.0 5.0 - 7.0    Protein Albumin Urine 70  (A) Negative mg/dL    Urobilinogen Urine <2.0 <2.0 mg/dL    Nitrite Urine Positive (A) Negative    Leukocyte Esterase Urine 500 Melissa/uL (A) Negative    Bacteria Urine Many (A) None Seen /HPF    WBC Clumps Urine Present (A) None Seen /HPF    Mucus Urine Present (A) None Seen /LPF    RBC Urine 13 (H) <=2 /HPF    WBC Urine >182 (H) <=5 /HPF   Result Value Ref Range    INR 2.66 (H) 0.85 - 1.15   Comprehensive metabolic panel   Result Value Ref Range    Sodium 139 136 - 145 mmol/L    Potassium 3.7 3.5 - 5.0 mmol/L    Chloride 104 98 - 107 mmol/L    Carbon Dioxide (CO2) 19 (L) 22 - 31 mmol/L    Anion Gap 16 5 - 18 mmol/L    Urea Nitrogen 25 8 - 28 mg/dL    Creatinine 1.12 0.70 - 1.30 mg/dL    Calcium 10.0 8.5 - 10.5 mg/dL    Glucose 146 (H) 70 - 125 mg/dL    Alkaline Phosphatase 93 45 - 120 U/L    AST 18 0 - 40 U/L    ALT 16 0 - 45 U/L    Protein Total 7.2 6.0 - 8.0 g/dL    Albumin 2.9 (L) 3.5 - 5.0 g/dL    Bilirubin Total 0.6 0.0 - 1.0 mg/dL    GFR Estimate 63 >60 mL/min/1.73m2   CRP inflammation   Result Value Ref Range    CRP 25.1 (H) 0.0-<0.8 mg/dL   Lactic acid whole blood   Result Value Ref Range    Lactic Acid 1.1 0.7 - 2.0 mmol/L   CBC with platelets and differential   Result Value Ref Range    WBC Count 18.2 (H) 4.0 - 11.0 10e3/uL    RBC Count 4.06 (L) 4.40 - 5.90 10e6/uL    Hemoglobin 12.6 (L) 13.3 - 17.7 g/dL    Hematocrit 37.6 (L) 40.0 - 53.0 %    MCV 93 78 - 100 fL    MCH 31.0 26.5 - 33.0 pg    MCHC 33.5 31.5 - 36.5 g/dL    RDW 14.5 10.0 - 15.0 %    Platelet Count 279 150 - 450 10e3/uL    % Neutrophils 82 %    %  Lymphocytes 8 %    % Monocytes 9 %    % Eosinophils 0 %    % Basophils 0 %    % Immature Granulocytes 1 %    NRBCs per 100 WBC 0 <1 /100    Absolute Neutrophils 14.9 (H) 1.6 - 8.3 10e3/uL    Absolute Lymphocytes 1.5 0.8 - 5.3 10e3/uL    Absolute Monocytes 1.6 (H) 0.0 - 1.3 10e3/uL    Absolute Eosinophils 0.0 0.0 - 0.7 10e3/uL    Absolute Basophils 0.0 0.0 - 0.2 10e3/uL    Absolute Immature Granulocytes 0.2 (H) <=0.0 10e3/uL    Absolute NRBCs 0.0 10e3/uL   Extra Red Top Tube   Result Value Ref Range    Hold Specimen JIC            I, Minerva Summers, am serving as a scribe to document services personally performed by Dr. Orion Diallo based on my observation and the provider's statements to me. I, Orion Diallo MD attest that Minerva Summers is acting in a scribe capacity, has observed my performance of the services and has documented them in accordance with my direction.      Orion Diallo MD  10/31/21  Emergency Medicine  [facility] EMERGENCY ROOM  [address] [id number] [phone]  Dept: [phone]     Orion Diallo MD  10/31/21 8846

## 2021-11-01 ENCOUNTER — TELEPHONE (OUTPATIENT)
Dept: UROLOGY | Facility: CLINIC | Age: 76
End: 2021-11-01

## 2021-11-01 LAB — BACTERIA UR CULT: ABNORMAL

## 2021-11-01 RX ORDER — DILTIAZEM HYDROCHLORIDE 120 MG/1
CAPSULE, COATED, EXTENDED RELEASE ORAL
Qty: 90 CAPSULE | Refills: 3 | Status: SHIPPED | OUTPATIENT
Start: 2021-11-01 | End: 2022-01-01

## 2021-11-01 NOTE — TELEPHONE ENCOUNTER
Spoke with patient who is feeling well today.  He does not wish to schedule an appointment at this time.  Education given regarding medications and number to call with any questions or to schedule an appt.  Magali Lopez RN

## 2021-11-01 NOTE — DISCHARGE INSTRUCTIONS
Continue the previously-prescribed antibiotic (cefdinir) for your urine infection.    Continue all of your other medications as well.    Drink plenty of fluids to stay hydrated.    Follow up with your Primary Care provider in 1 day for a recheck.    Return to the Emergency Department for any difficulty breathing, persistent vomiting, severe worsening, or any other concerns.

## 2021-11-02 ENCOUNTER — TELEPHONE (OUTPATIENT)
Dept: ANTICOAGULATION | Facility: CLINIC | Age: 76
End: 2021-11-02

## 2021-11-02 DIAGNOSIS — I48.91 ATRIAL FIBRILLATION, UNSPECIFIED TYPE (H): Primary | ICD-10-CM

## 2021-11-02 DIAGNOSIS — E78.2 MIXED HYPERLIPIDEMIA: ICD-10-CM

## 2021-11-02 DIAGNOSIS — Z76.0 ENCOUNTER FOR MEDICATION REFILL: Primary | ICD-10-CM

## 2021-11-02 NOTE — TELEPHONE ENCOUNTER
ANTICOAGULATION  MANAGEMENT: Discharge Review    Stefan Diallo chart reviewed for anticoagulation continuity of care    Emergency room visit on 10/30 and 10/31 for abdominal pain/fatigue and weakness.    Discharge disposition: Home    Results:  Recent labs: (last 7 days)     10/28/21  1305 10/30/21  1107 10/31/21  1926   INR 2.9* 1.97* 2.66*     Anticoagulation inpatient management:     not applicable     Anticoagulation discharge instructions:     Warfarin dosing: home regimen continued   Bridging: No   INR goal change: No      Medication changes affecting anticoagulation: Yes: cefdinir started 10/30 for 10 day course    Additional factors affecting anticoagulation: Yes: infection    Plan     No adjustment to warfarin dosing needed.  Recommend to check INR as soon as possible due to potential for medication interaction.    Left a detailed message for Stefan. Informed that cefdinir can interact with warfarin and that it is recommended that INR is checked as soon as possible.    Priority updated to high per protocol due to interaction.      Jeanne Harris RN

## 2021-11-03 ENCOUNTER — TELEPHONE (OUTPATIENT)
Dept: FAMILY MEDICINE | Facility: CLINIC | Age: 76
End: 2021-11-03

## 2021-11-03 DIAGNOSIS — I48.91 ATRIAL FIBRILLATION, UNSPECIFIED TYPE (H): Primary | ICD-10-CM

## 2021-11-03 LAB
BACTERIA UR CULT: ABNORMAL
BACTERIA UR CULT: ABNORMAL

## 2021-11-03 RX ORDER — SULFAMETHOXAZOLE/TRIMETHOPRIM 800-160 MG
1 TABLET ORAL 2 TIMES DAILY
Qty: 20 TABLET | Refills: 0 | Status: SHIPPED | OUTPATIENT
Start: 2021-11-03 | End: 2021-11-13

## 2021-11-03 RX ORDER — GEMFIBROZIL 600 MG/1
TABLET, FILM COATED ORAL
Qty: 180 TABLET | Refills: 2 | Status: SHIPPED | OUTPATIENT
Start: 2021-11-03 | End: 2022-01-01

## 2021-11-03 NOTE — TELEPHONE ENCOUNTER
ANTICOAGULATION  MANAGEMENT     Interacting Medication Review    Interacting medication(s): Sulfamethoxazole-Trimethoprim (Bactrim) with warfarin.    Duration: 10 days  (11/3 to 11/12, he confirms he did  the RX and will start tonight)    Indication: UTI    New medication?: Yes, interaction may increase INR and risk of bleeding       PLAN     Consulted with [facility] pharmacist Kim Fong, recommend to adjust dose to 2.5mg on Thur, 5mg ROW for the time that he will be on the Bactrim. Recheck INR on Mon 11/8    Spoke with Stefan, education provided and appointment scheduled.     Anticoagulation Calendar updated    Rossi Spence RN

## 2021-11-08 ENCOUNTER — ANTICOAGULATION THERAPY VISIT (OUTPATIENT)
Dept: ANTICOAGULATION | Facility: CLINIC | Age: 76
End: 2021-11-08

## 2021-11-08 ENCOUNTER — LAB (OUTPATIENT)
Dept: LAB | Facility: CLINIC | Age: 76
End: 2021-11-08
Payer: COMMERCIAL

## 2021-11-08 DIAGNOSIS — I48.91 ATRIAL FIBRILLATION, UNSPECIFIED TYPE (H): Primary | ICD-10-CM

## 2021-11-08 DIAGNOSIS — I48.91 ATRIAL FIBRILLATION, UNSPECIFIED TYPE (H): ICD-10-CM

## 2021-11-08 LAB — INR BLD: 6.2 (ref 0.9–1.1)

## 2021-11-08 PROCEDURE — 85610 PROTHROMBIN TIME: CPT

## 2021-11-08 PROCEDURE — 36415 COLL VENOUS BLD VENIPUNCTURE: CPT

## 2021-11-08 NOTE — PROGRESS NOTES
ANTICOAGULATION MANAGEMENT     Stefan Diallo 76 year old male is on warfarin with supratherapeutic INR result. (Goal INR 2.0-3.0)    Recent labs: (last 7 days)     11/08/21  1308   INR 6.2*       ASSESSMENT     Source(s): Chart Review, Patient/Caregiver Call and Template       Warfarin doses taken: More warfarin taken than planned which may be affecting INR-- did not reduce dosing as directed on 11/3    Diet: No new diet changes identified    New illness, injury, or hospitalization: Yes: UTI    Medication/supplement changes: cefdinir stopped on 11/3 which has potential for interaction; decreasing INR    bactrim 10 day course (dates: 11/3-11/12) which has potential for interaction; increasing INR    Signs or symptoms of bleeding or clotting: No    Previous INR: Therapeutic last visit; previously outside of goal range    Additional findings: None     PLAN     Recommended plan for temporary change(s) affecting INR     Dosing Instructions: Hold 2 doses with next INR in 2 days       Summary  As of 11/8/2021    Full warfarin instructions:  11/8: Hold; 11/9: Hold; 11/11: 2.5 mg; Otherwise 7.5 mg every Sun; 5 mg all other days   Next INR check:  11/10/2021             Telephone call with Stefan. Provided dosing and instructed to go to ER for evaluation for any falls, if he bumps his head, or cuts himself. Stefan verbalizes understanding and agrees to plan    Lab visit scheduled    Education provided: Goal range and significance of current result, Monitoring for bleeding signs and symptoms, When to seek medical attention/emergency care and Contact [phone] with any changes, questions or concerns.     Plan made with [facility] Pharmacist Heather Harris, RN  Anticoagulation Clinic  11/8/2021    _______________________________________________________________________     Anticoagulation Episode Summary     Current INR goal:  2.0-3.0   TTR:  89.2 % (1 y)   Target end date:     Send INR reminders to:  HILARIO  KRUNAL    Indications    Atrial fibrillation  unspecified type (H) [I48.91]           Comments:           Anticoagulation Care Providers     Provider Role Specialty Phone number    Collin Singh MD Referring Family Medicine [phone]

## 2021-11-10 ENCOUNTER — ANTICOAGULATION THERAPY VISIT (OUTPATIENT)
Dept: ANTICOAGULATION | Facility: CLINIC | Age: 76
End: 2021-11-10

## 2021-11-10 ENCOUNTER — LAB (OUTPATIENT)
Dept: LAB | Facility: CLINIC | Age: 76
End: 2021-11-10
Payer: COMMERCIAL

## 2021-11-10 DIAGNOSIS — I48.91 ATRIAL FIBRILLATION, UNSPECIFIED TYPE (H): ICD-10-CM

## 2021-11-10 DIAGNOSIS — I48.91 ATRIAL FIBRILLATION, UNSPECIFIED TYPE (H): Primary | ICD-10-CM

## 2021-11-10 LAB — INR BLD: 2.2 (ref 0.9–1.1)

## 2021-11-10 PROCEDURE — 85610 PROTHROMBIN TIME: CPT

## 2021-11-10 PROCEDURE — 36415 COLL VENOUS BLD VENIPUNCTURE: CPT

## 2021-11-10 NOTE — PROGRESS NOTES
ANTICOAGULATION MANAGEMENT     Stefan Diallo 76 year old male is on warfarin with therapeutic INR result. (Goal INR 2.0-3.0)    Recent labs: (last 7 days)     11/10/21  1332   INR 2.2*       ASSESSMENT     Source(s): Chart Review, Patient/Caregiver Call and Template       Warfarin doses taken: Warfarin held as instructed and Template incorrect; verbally confirmed home dose with Stefan     Diet: No new diet changes identified    New illness, injury, or hospitalization: No    Medication/supplement changes: continues on Bactrim x 2 more days    Signs or symptoms of bleeding or clotting: No    Previous INR: Supratherapeutic    Additional findings: None     PLAN     Recommended plan for temporary change(s) affecting INR     Dosing Instructions: Partial hold then continue your current warfarin dose with next INR in 1 week       Summary  As of 11/10/2021    Full warfarin instructions:  11/11: 2.5 mg; Otherwise 7.5 mg every Sun; 5 mg all other days   Next INR check:  11/17/2021             Telephone call with Stefan who verbalizes understanding and agrees to plan    Patient offered & declined to schedule next visit    Education provided: Goal range and significance of current result, Importance of following up at instructed interval, Potential interaction between warfarin and Bactrim and Monitoring for bleeding signs and symptoms    Plan made with [facility] Pharmacist Kim Spence, RN  Anticoagulation Clinic  11/10/2021    _______________________________________________________________________     Anticoagulation Episode Summary     Current INR goal:  2.0-3.0   TTR:  88.8 % (1 y)   Target end date:     Send INR reminders to:  HILARIO HECTOR    Indications    Atrial fibrillation  unspecified type (H) [I48.91]           Comments:           Anticoagulation Care Providers     Provider Role Specialty Phone number    Collin Singh MD Referring Family Medicine [phone]

## 2021-11-16 DIAGNOSIS — I48.91 UNSPECIFIED ATRIAL FIBRILLATION (H): ICD-10-CM

## 2021-11-16 DIAGNOSIS — G60.9 HEREDITARY AND IDIOPATHIC NEUROPATHY, UNSPECIFIED: ICD-10-CM

## 2021-11-16 DIAGNOSIS — Z76.0 ENCOUNTER FOR MEDICATION REFILL: Primary | ICD-10-CM

## 2021-11-16 DIAGNOSIS — I10 ESSENTIAL (PRIMARY) HYPERTENSION: ICD-10-CM

## 2021-11-16 DIAGNOSIS — E11.9 TYPE 2 DIABETES MELLITUS WITHOUT COMPLICATIONS (H): ICD-10-CM

## 2021-11-17 RX ORDER — GABAPENTIN 300 MG/1
CAPSULE ORAL
Qty: 180 CAPSULE | Refills: 3 | Status: SHIPPED | OUTPATIENT
Start: 2021-11-17 | End: 2022-01-01

## 2021-11-17 RX ORDER — LISINOPRIL 5 MG/1
TABLET ORAL
Qty: 90 TABLET | Refills: 3 | Status: SHIPPED | OUTPATIENT
Start: 2021-11-17 | End: 2022-01-01

## 2021-11-17 RX ORDER — ATENOLOL 50 MG/1
TABLET ORAL
Qty: 45 TABLET | Refills: 3 | Status: SHIPPED | OUTPATIENT
Start: 2021-11-17 | End: 2022-05-17

## 2021-11-17 NOTE — TELEPHONE ENCOUNTER
Patient scheduled for DM follow up 12/28/2021.     Patient states that he has pain in his side from a kidney stone and would like to see a doctor. CMT advised the patient that our MD's are booking out to end of December-early January and that it would aligned with the clinical protocol to have him speak with a triage RN about his pain. The patient declined the offer to send him to an RN to talk about pain.

## 2021-11-18 ENCOUNTER — TELEPHONE (OUTPATIENT)
Dept: FAMILY MEDICINE | Facility: CLINIC | Age: 76
End: 2021-11-18
Payer: COMMERCIAL

## 2021-11-18 NOTE — TELEPHONE ENCOUNTER
ANTICOAGULATION     Stefan Diallo is overdue for INR check.      Left message for patient to call and schedule lab appointment as soon as possible. If returning call, please schedule.     Rosa Isela Ramirez RN

## 2021-11-26 ENCOUNTER — TELEPHONE (OUTPATIENT)
Dept: ANTICOAGULATION | Facility: CLINIC | Age: 76
End: 2021-11-26
Payer: COMMERCIAL

## 2021-11-26 NOTE — TELEPHONE ENCOUNTER
ANTICOAGULATION     Stefan Diallo is overdue for INR check.      Spoke with Stefan and scheduled lab appointment on 11/29    Jeanne Harris RN

## 2021-11-29 ENCOUNTER — ANTICOAGULATION THERAPY VISIT (OUTPATIENT)
Dept: ANTICOAGULATION | Facility: CLINIC | Age: 76
End: 2021-11-29

## 2021-11-29 ENCOUNTER — LAB (OUTPATIENT)
Dept: LAB | Facility: CLINIC | Age: 76
End: 2021-11-29
Payer: COMMERCIAL

## 2021-11-29 DIAGNOSIS — I48.91 ATRIAL FIBRILLATION, UNSPECIFIED TYPE (H): ICD-10-CM

## 2021-11-29 DIAGNOSIS — I48.91 ATRIAL FIBRILLATION, UNSPECIFIED TYPE (H): Primary | ICD-10-CM

## 2021-11-29 LAB — INR BLD: 2.4 (ref 0.9–1.1)

## 2021-11-29 PROCEDURE — 85610 PROTHROMBIN TIME: CPT

## 2021-11-29 PROCEDURE — 36415 COLL VENOUS BLD VENIPUNCTURE: CPT

## 2021-11-29 NOTE — PROGRESS NOTES
ANTICOAGULATION MANAGEMENT     Stefan Diallo 76 year old male is on warfarin with therapeutic INR result. (Goal INR 2.0-3.0)    Recent labs: (last 7 days)     11/29/21  1248   INR 2.4*       ASSESSMENT     Source(s): Chart Review, Patient/Caregiver Call and Template       Warfarin doses taken: Warfarin taken as instructed    Diet: No new diet changes identified    New illness, injury, or hospitalization: No    Medication/supplement changes: None noted    Signs or symptoms of bleeding or clotting: No    Previous INR: Therapeutic last 2(+) visits    Additional findings: None     PLAN     Recommended plan for no diet, medication or health factor changes affecting INR     Dosing Instructions: Continue your current warfarin dose with next INR in 4 weeks       Summary  As of 11/29/2021    Full warfarin instructions:  7.5 mg every Sun; 5 mg all other days   Next INR check:  12/28/2021             Telephone call with Stefan who verbalizes understanding and agrees to plan    Check at provider office visit    Education provided: Goal range and significance of current result and Contact [phone] with any changes, questions or concerns.     Plan made per [facility] anticoagulation protocol    Jeanne Harris RN  Anticoagulation Clinic  11/29/2021    _______________________________________________________________________     Anticoagulation Episode Summary     Current INR goal:  2.0-3.0   TTR:  88.8 % (1 y)   Target end date:     Send INR reminders to:  HILARIO HECTOR    Indications    Atrial fibrillation  unspecified type (H) [I48.91]           Comments:           Anticoagulation Care Providers     Provider Role Specialty Phone number    Collin Singh MD Referring Family Medicine [phone]

## 2021-12-14 ENCOUNTER — TRANSFERRED RECORDS (OUTPATIENT)
Dept: HEALTH INFORMATION MANAGEMENT | Facility: CLINIC | Age: 76
End: 2021-12-14
Payer: COMMERCIAL

## 2021-12-28 ENCOUNTER — ANTICOAGULATION THERAPY VISIT (OUTPATIENT)
Dept: ANTICOAGULATION | Facility: CLINIC | Age: 76
End: 2021-12-28

## 2021-12-28 ENCOUNTER — OFFICE VISIT (OUTPATIENT)
Dept: FAMILY MEDICINE | Facility: CLINIC | Age: 76
End: 2021-12-28
Payer: COMMERCIAL

## 2021-12-28 VITALS
TEMPERATURE: 98.2 F | DIASTOLIC BLOOD PRESSURE: 82 MMHG | HEART RATE: 78 BPM | OXYGEN SATURATION: 97 % | SYSTOLIC BLOOD PRESSURE: 138 MMHG

## 2021-12-28 DIAGNOSIS — I48.91 ATRIAL FIBRILLATION, UNSPECIFIED TYPE (H): Primary | ICD-10-CM

## 2021-12-28 DIAGNOSIS — E11.9 TYPE 2 DIABETES MELLITUS WITHOUT COMPLICATION, WITHOUT LONG-TERM CURRENT USE OF INSULIN (H): Primary | ICD-10-CM

## 2021-12-28 DIAGNOSIS — E78.2 MIXED HYPERLIPIDEMIA: ICD-10-CM

## 2021-12-28 DIAGNOSIS — K21.00 GASTROESOPHAGEAL REFLUX DISEASE WITH ESOPHAGITIS, UNSPECIFIED WHETHER HEMORRHAGE: ICD-10-CM

## 2021-12-28 LAB
CHOLEST SERPL-MCNC: 152 MG/DL
HBA1C MFR BLD: 6.1 % (ref 0–5.6)
HDLC SERPL-MCNC: 41 MG/DL
HOLD SPECIMEN: NORMAL
INR BLD: 2.6 (ref 0.9–1.1)
LDLC SERPL CALC-MCNC: 91 MG/DL
TRIGL SERPL-MCNC: 100 MG/DL

## 2021-12-28 PROCEDURE — 85610 PROTHROMBIN TIME: CPT | Performed by: FAMILY MEDICINE

## 2021-12-28 PROCEDURE — 80061 LIPID PANEL: CPT | Performed by: FAMILY MEDICINE

## 2021-12-28 PROCEDURE — 99214 OFFICE O/P EST MOD 30 MIN: CPT | Performed by: FAMILY MEDICINE

## 2021-12-28 PROCEDURE — 36415 COLL VENOUS BLD VENIPUNCTURE: CPT | Performed by: FAMILY MEDICINE

## 2021-12-28 PROCEDURE — 83036 HEMOGLOBIN GLYCOSYLATED A1C: CPT | Performed by: FAMILY MEDICINE

## 2021-12-28 NOTE — PROGRESS NOTES
"  Assessment & Plan     Type 2 diabetes mellitus without complication, without long-term current use of insulin (H)    Stable, well controlled.  Continue metformin.    - HEMOGLOBIN A1C    Mixed hyperlipidemia    Stable.  Consider decreasing gemfibrozil.    - Lipid panel reflex to direct LDL Fasting  - Lipid panel reflex to direct LDL Fasting    Gastroesophageal reflux disease with esophagitis, unspecified whether hemorrhage    - omeprazole (PRILOSEC) 20 MG DR capsule  Dispense: 90 capsule; Refill: 3        36 minutes spent on the date of the encounter doing chart review, review of test results and patient visit regarding diabetes, hyperlipidemia, GERD.       BMI:   Estimated body mass index is 30.34 kg/m  as calculated from the following:    Height as of 10/30/21: 1.854 m (6' 1\").    Weight as of 10/31/21: 104.3 kg (230 lb).           Return in about 4 months (around 4/28/2022) for DM.    Collin Singh MD, MD  [facility]     Stefan iDallo is a 76 year old male who presents to clinic today for the following health issues:    HPI     Taking metformin.    Slight cough if he laughs.  CT in August 2020 noted minimal emphysema.    Current Outpatient Medications   Medication Sig Dispense Refill     atenolol (TENORMIN) 50 MG tablet TAKE 1/2 TABLET BY MOUTH EVERY DAY 45 tablet 3     diltiazem ER COATED BEADS (CARDIZEM CD/CARTIA XT) 120 MG 24 hr capsule TAKE 1 CAPSULE BY MOUTH EVERY DAY 90 capsule 3     EPINEPHrine (ANY BX GENERIC EQUIV) 0.3 MG/0.3ML injection 2-pack Inject 0.3 mg into the muscle as needed for anaphylaxis       fluticasone (FLONASE) 50 MCG/ACT nasal spray Spray 2 sprays into both nostrils daily       gabapentin (NEURONTIN) 300 MG capsule TAKE 2 CAPSULES (600 MG TOTAL) BY MOUTH AT BEDTIME. 180 capsule 3     gemfibrozil (LOPID) 600 MG tablet TAKE 1 TABLET BY MOUTH TWICE A  tablet 2     hydrOXYzine (ATARAX) 10 MG tablet Take 1 tablet (10 mg) by mouth every 6 hours " as needed for itching 60 tablet 3     lisinopril (ZESTRIL) 5 MG tablet TAKE 1 TABLET BY MOUTH EVERY DAY 90 tablet 3     loratadine (CLARITIN) 10 MG tablet Take 10 mg by mouth daily       methocarbamol (ROBAXIN) 500 MG tablet Take 1 tablet (500 mg) by mouth 4 times daily as needed for muscle spasms 60 tablet 3     montelukast (SINGULAIR) 10 MG tablet Take 10 mg by mouth daily       multivitamin (OCUVITE) TABS tablet Take 1 tablet by mouth 2 times daily       omeprazole (PRILOSEC) 20 MG DR capsule Take 1 capsule (20 mg) by mouth daily 90 capsule 3     oxyCODONE (ROXICODONE) 5 MG tablet Take 0.5 tablets (2.5 mg) by mouth every 3 hours as needed for pain 60 tablet 0     Vitamin D, Cholecalciferol, 25 MCG (1000 UT) TABS Take 1 tablet by mouth daily       warfarin ANTICOAGULANT (COUMADIN) 5 MG tablet Take 5 mg by mouth six times a week On Mon, Tues, Wed, Thurs, Fri and Sat       warfarin ANTICOAGULANT (COUMADIN) 7.5 MG tablet Take 7.5 mg by mouth once a week On Sundays       atorvastatin (LIPITOR) 20 MG tablet Take 1 tablet (20 mg) by mouth daily 90 tablet 3     metFORMIN (GLUCOPHAGE) 500 MG tablet TAKE 1 TABLET BY MOUTH EACH MORNING AND 2 TABLETS EACH EVENING 270 tablet 3         Review of Systems   No fever or rash.      Objective    /82 (BP Location: Left arm, Cuff Size: Adult Regular)   Pulse 78   Temp 98.2  F (36.8  C)   SpO2 97%   There is no height or weight on file to calculate BMI.  Physical Exam   Heart normal  Lungs normal    A1c 6.1, had been 6.6

## 2021-12-28 NOTE — PROGRESS NOTES
ANTICOAGULATION MANAGEMENT     Stefan Diallo 76 year old male is on warfarin with therapeutic INR result. (Goal INR 2.0-3.0)    Recent labs: (last 7 days)     12/28/21  1304   INR 2.6*       ASSESSMENT     Source(s): Chart Review, Patient/Caregiver Call and Template       Warfarin doses taken: Warfarin taken as instructed (Stefan wrote 7 on Sunday in template but meant 7.5)    Diet: No new diet changes identified    New illness, injury, or hospitalization: No    Medication/supplement changes: None noted    Signs or symptoms of bleeding or clotting: No    Previous INR: Therapeutic last 2(+) visits    Additional findings: None     PLAN     Recommended plan for no diet, medication or health factor changes affecting INR     Dosing Instructions: Continue your current warfarin dose with next INR in 5 weeks       Summary  As of 12/28/2021    Full warfarin instructions:  7.5 mg every Sun; 5 mg all other days   Next INR check:  2/1/2022             Telephone call with Stefan who verbalizes understanding and agrees to plan    Patient offered & declined to schedule next visit    Education provided: Goal range and significance of current result and Contact [phone] with any changes, questions or concerns.     Plan made per [facility] anticoagulation protocol    Jeanne Harris RN  Anticoagulation Clinic  12/28/2021    _______________________________________________________________________     Anticoagulation Episode Summary     Current INR goal:  2.0-3.0   TTR:  88.8 % (1 y)   Target end date:     Send INR reminders to:  HILARIO HECTOR    Indications    Atrial fibrillation  unspecified type (H) [I48.91]           Comments:           Anticoagulation Care Providers     Provider Role Specialty Phone number    Collin Singh MD Referring Family Medicine [phone]

## 2022-01-01 ENCOUNTER — ANTICOAGULATION THERAPY VISIT (OUTPATIENT)
Dept: ANTICOAGULATION | Facility: CLINIC | Age: 77
End: 2022-01-01

## 2022-01-01 ENCOUNTER — TELEPHONE (OUTPATIENT)
Dept: ANTICOAGULATION | Facility: CLINIC | Age: 77
End: 2022-01-01

## 2022-01-01 ENCOUNTER — LAB (OUTPATIENT)
Dept: LAB | Facility: CLINIC | Age: 77
End: 2022-01-01
Payer: COMMERCIAL

## 2022-01-01 ENCOUNTER — TRANSFERRED RECORDS (OUTPATIENT)
Dept: HEALTH INFORMATION MANAGEMENT | Facility: CLINIC | Age: 77
End: 2022-01-01

## 2022-01-01 ENCOUNTER — APPOINTMENT (OUTPATIENT)
Dept: CT IMAGING | Facility: HOSPITAL | Age: 77
End: 2022-01-01
Payer: COMMERCIAL

## 2022-01-01 ENCOUNTER — TELEPHONE (OUTPATIENT)
Dept: FAMILY MEDICINE | Facility: CLINIC | Age: 77
End: 2022-01-01

## 2022-01-01 ENCOUNTER — HOSPITAL ENCOUNTER (EMERGENCY)
Facility: HOSPITAL | Age: 77
Discharge: HOME | End: 2022-08-10
Admitting: NURSE PRACTITIONER
Payer: COMMERCIAL

## 2022-01-01 ENCOUNTER — TELEPHONE (OUTPATIENT)
Dept: SLEEP MEDICINE | Facility: CLINIC | Age: 77
End: 2022-01-01

## 2022-01-01 ENCOUNTER — HEALTH MAINTENANCE LETTER (OUTPATIENT)
Age: 77
End: 2022-01-01

## 2022-01-01 ENCOUNTER — OFFICE VISIT (OUTPATIENT)
Dept: FAMILY MEDICINE | Facility: CLINIC | Age: 77
End: 2022-01-01
Payer: COMMERCIAL

## 2022-01-01 VITALS
RESPIRATION RATE: 16 BRPM | OXYGEN SATURATION: 97 % | HEART RATE: 65 BPM | SYSTOLIC BLOOD PRESSURE: 136 MMHG | TEMPERATURE: 97.8 F | HEIGHT: 73 IN | WEIGHT: 211.5 LBS | DIASTOLIC BLOOD PRESSURE: 76 MMHG | BODY MASS INDEX: 28.03 KG/M2

## 2022-01-01 VITALS
DIASTOLIC BLOOD PRESSURE: 93 MMHG | RESPIRATION RATE: 22 BRPM | TEMPERATURE: 98.8 F | WEIGHT: 207.1 LBS | OXYGEN SATURATION: 97 % | SYSTOLIC BLOOD PRESSURE: 149 MMHG | BODY MASS INDEX: 27.32 KG/M2 | HEART RATE: 80 BPM

## 2022-01-01 DIAGNOSIS — I48.91 ATRIAL FIBRILLATION, UNSPECIFIED TYPE (H): ICD-10-CM

## 2022-01-01 DIAGNOSIS — R35.1 BENIGN PROSTATIC HYPERPLASIA WITH NOCTURIA: ICD-10-CM

## 2022-01-01 DIAGNOSIS — Z76.0 ENCOUNTER FOR MEDICATION REFILL: ICD-10-CM

## 2022-01-01 DIAGNOSIS — E83.42 HYPOMAGNESEMIA: ICD-10-CM

## 2022-01-01 DIAGNOSIS — I48.20 CHRONIC ATRIAL FIBRILLATION (H): Primary | ICD-10-CM

## 2022-01-01 DIAGNOSIS — U07.1 INFECTION DUE TO 2019 NOVEL CORONAVIRUS: ICD-10-CM

## 2022-01-01 DIAGNOSIS — E11.51 TYPE II DIABETES MELLITUS WITH PERIPHERAL CIRCULATORY DISORDER (H): Primary | ICD-10-CM

## 2022-01-01 DIAGNOSIS — I48.91 ATRIAL FIBRILLATION, UNSPECIFIED TYPE (H): Primary | ICD-10-CM

## 2022-01-01 DIAGNOSIS — Z76.0 ENCOUNTER FOR MEDICATION REFILL: Primary | ICD-10-CM

## 2022-01-01 DIAGNOSIS — N40.1 BENIGN PROSTATIC HYPERPLASIA WITH NOCTURIA: ICD-10-CM

## 2022-01-01 DIAGNOSIS — E11.51 TYPE II DIABETES MELLITUS WITH PERIPHERAL CIRCULATORY DISORDER (H): ICD-10-CM

## 2022-01-01 DIAGNOSIS — I25.10 CAD (CORONARY ARTERY DISEASE): ICD-10-CM

## 2022-01-01 DIAGNOSIS — R19.7 DIARRHEA: ICD-10-CM

## 2022-01-01 DIAGNOSIS — I51.7 CARDIAC ENLARGEMENT: ICD-10-CM

## 2022-01-01 DIAGNOSIS — I48.91 UNSPECIFIED ATRIAL FIBRILLATION (H): ICD-10-CM

## 2022-01-01 LAB
ALBUMIN SERPL-MCNC: 3.5 G/DL (ref 3.5–5)
ALP SERPL-CCNC: 98 U/L (ref 45–120)
ALT SERPL W P-5'-P-CCNC: 14 U/L (ref 0–45)
ANION GAP SERPL CALCULATED.3IONS-SCNC: 9 MMOL/L (ref 5–18)
AST SERPL W P-5'-P-CCNC: 15 U/L (ref 0–40)
ATRIAL RATE - MUSE: 117 BPM
BASOPHILS # BLD AUTO: 0 10E3/UL (ref 0–0.2)
BASOPHILS NFR BLD AUTO: 0 %
BILIRUB SERPL-MCNC: 0.5 MG/DL (ref 0–1)
BNP SERPL-MCNC: 188 PG/ML (ref 0–84)
BUN SERPL-MCNC: 21 MG/DL (ref 8–28)
C COLI+JEJUNI+LARI FUSA STL QL NAA+PROBE: NOT DETECTED
C DIFF TOX B STL QL: NEGATIVE
CALCIUM SERPL-MCNC: 9.7 MG/DL (ref 8.5–10.5)
CHLORIDE BLD-SCNC: 106 MMOL/L (ref 98–107)
CO2 SERPL-SCNC: 26 MMOL/L (ref 22–31)
CREAT SERPL-MCNC: 0.96 MG/DL (ref 0.7–1.3)
CREAT UR-MCNC: 98.4 MG/DL
DIASTOLIC BLOOD PRESSURE - MUSE: NORMAL MMHG
EC STX1 GENE STL QL NAA+PROBE: NOT DETECTED
EC STX2 GENE STL QL NAA+PROBE: NOT DETECTED
EOSINOPHIL # BLD AUTO: 0.2 10E3/UL (ref 0–0.7)
EOSINOPHIL NFR BLD AUTO: 3 %
ERYTHROCYTE [DISTWIDTH] IN BLOOD BY AUTOMATED COUNT: 13.5 % (ref 10–15)
FLUAV RNA SPEC QL NAA+PROBE: NEGATIVE
FLUBV RNA RESP QL NAA+PROBE: NEGATIVE
GFR SERPL CREATININE-BSD FRML MDRD: 80 ML/MIN/1.73M2
GLUCOSE BLD-MCNC: 142 MG/DL (ref 70–125)
HBA1C MFR BLD: 6.2 % (ref 0–5.6)
HCT VFR BLD AUTO: 42.4 % (ref 40–53)
HGB BLD-MCNC: 14.3 G/DL (ref 13.3–17.7)
IMM GRANULOCYTES # BLD: 0 10E3/UL
IMM GRANULOCYTES NFR BLD: 1 %
INR BLD: 2.2 (ref 0.9–1.1)
INR BLD: 2.5 (ref 0.9–1.1)
INR BLD: 2.7 (ref 0.9–1.1)
INR BLD: 2.9 (ref 0.9–1.1)
INR BLD: 3.1 (ref 0.9–1.1)
INR BLD: 3.5 (ref 0.9–1.1)
INR BLD: 3.6 (ref 0.9–1.1)
INR BLD: 3.7 (ref 0.9–1.1)
INR PPP: 2.39 (ref 0.85–1.15)
INTERPRETATION ECG - MUSE: NORMAL
LYMPHOCYTES # BLD AUTO: 1.5 10E3/UL (ref 0.8–5.3)
LYMPHOCYTES NFR BLD AUTO: 23 %
MAGNESIUM SERPL-MCNC: 1.5 MG/DL (ref 1.8–2.6)
MAGNESIUM SERPL-MCNC: 1.9 MG/DL (ref 1.7–2.3)
MCH RBC QN AUTO: 31 PG (ref 26.5–33)
MCHC RBC AUTO-ENTMCNC: 33.7 G/DL (ref 31.5–36.5)
MCV RBC AUTO: 92 FL (ref 78–100)
MICROALBUMIN UR-MCNC: 30.7 MG/L
MICROALBUMIN/CREAT UR: 31.2 MG/G CR (ref 0–17)
MONOCYTES # BLD AUTO: 0.7 10E3/UL (ref 0–1.3)
MONOCYTES NFR BLD AUTO: 10 %
NEUTROPHILS # BLD AUTO: 4.3 10E3/UL (ref 1.6–8.3)
NEUTROPHILS NFR BLD AUTO: 63 %
NOROV GI+II ORF1-ORF2 JNC STL QL NAA+PR: NOT DETECTED
NRBC # BLD AUTO: 0 10E3/UL
NRBC BLD AUTO-RTO: 0 /100
P AXIS - MUSE: NORMAL DEGREES
PLATELET # BLD AUTO: 353 10E3/UL (ref 150–450)
POTASSIUM BLD-SCNC: 4 MMOL/L (ref 3.5–5)
PR INTERVAL - MUSE: NORMAL MS
PROT SERPL-MCNC: 7.3 G/DL (ref 6–8)
PSA SERPL-MCNC: 0.68 NG/ML (ref 0–6.5)
QRS DURATION - MUSE: 164 MS
QT - MUSE: 408 MS
QTC - MUSE: 479 MS
R AXIS - MUSE: 31 DEGREES
RBC # BLD AUTO: 4.62 10E6/UL (ref 4.4–5.9)
RETINOPATHY: POSITIVE
RSV RNA SPEC NAA+PROBE: NEGATIVE
RVA NSP5 STL QL NAA+PROBE: NOT DETECTED
SALMONELLA SP RPOD STL QL NAA+PROBE: NOT DETECTED
SARS-COV-2 RNA RESP QL NAA+PROBE: POSITIVE
SHIGELLA SP+EIEC IPAH STL QL NAA+PROBE: NOT DETECTED
SODIUM SERPL-SCNC: 141 MMOL/L (ref 136–145)
SYSTOLIC BLOOD PRESSURE - MUSE: NORMAL MMHG
T AXIS - MUSE: -16 DEGREES
TROPONIN I SERPL-MCNC: 0.02 NG/ML (ref 0–0.29)
TSH SERPL DL<=0.005 MIU/L-ACNC: 0.62 UIU/ML (ref 0.3–5)
V CHOL+PARA RFBL+TRKH+TNAA STL QL NAA+PR: NOT DETECTED
VENTRICULAR RATE- MUSE: 83 BPM
WBC # BLD AUTO: 6.7 10E3/UL (ref 4–11)
Y ENTERO RECN STL QL NAA+PROBE: NOT DETECTED

## 2022-01-01 PROCEDURE — 36416 COLLJ CAPILLARY BLOOD SPEC: CPT

## 2022-01-01 PROCEDURE — 85610 PROTHROMBIN TIME: CPT

## 2022-01-01 PROCEDURE — 84484 ASSAY OF TROPONIN QUANT: CPT | Performed by: NURSE PRACTITIONER

## 2022-01-01 PROCEDURE — 82043 UR ALBUMIN QUANTITATIVE: CPT

## 2022-01-01 PROCEDURE — 84443 ASSAY THYROID STIM HORMONE: CPT | Performed by: NURSE PRACTITIONER

## 2022-01-01 PROCEDURE — 84153 ASSAY OF PSA TOTAL: CPT | Performed by: FAMILY MEDICINE

## 2022-01-01 PROCEDURE — 87506 IADNA-DNA/RNA PROBE TQ 6-11: CPT | Performed by: NURSE PRACTITIONER

## 2022-01-01 PROCEDURE — 99285 EMERGENCY DEPT VISIT HI MDM: CPT | Mod: CS,25

## 2022-01-01 PROCEDURE — 36415 COLL VENOUS BLD VENIPUNCTURE: CPT | Performed by: FAMILY MEDICINE

## 2022-01-01 PROCEDURE — C9803 HOPD COVID-19 SPEC COLLECT: HCPCS

## 2022-01-01 PROCEDURE — 87493 C DIFF AMPLIFIED PROBE: CPT | Mod: 59 | Performed by: NURSE PRACTITIONER

## 2022-01-01 PROCEDURE — 36415 COLL VENOUS BLD VENIPUNCTURE: CPT | Performed by: NURSE PRACTITIONER

## 2022-01-01 PROCEDURE — 36415 COLL VENOUS BLD VENIPUNCTURE: CPT

## 2022-01-01 PROCEDURE — 71275 CT ANGIOGRAPHY CHEST: CPT

## 2022-01-01 PROCEDURE — 250N000013 HC RX MED GY IP 250 OP 250 PS 637: Performed by: NURSE PRACTITIONER

## 2022-01-01 PROCEDURE — 85610 PROTHROMBIN TIME: CPT | Performed by: NURSE PRACTITIONER

## 2022-01-01 PROCEDURE — 250N000011 HC RX IP 250 OP 636: Performed by: NURSE PRACTITIONER

## 2022-01-01 PROCEDURE — 87637 SARSCOV2&INF A&B&RSV AMP PRB: CPT | Performed by: NURSE PRACTITIONER

## 2022-01-01 PROCEDURE — 83880 ASSAY OF NATRIURETIC PEPTIDE: CPT | Performed by: NURSE PRACTITIONER

## 2022-01-01 PROCEDURE — 83735 ASSAY OF MAGNESIUM: CPT | Performed by: NURSE PRACTITIONER

## 2022-01-01 PROCEDURE — 85025 COMPLETE CBC W/AUTO DIFF WBC: CPT | Performed by: NURSE PRACTITIONER

## 2022-01-01 PROCEDURE — 99214 OFFICE O/P EST MOD 30 MIN: CPT | Performed by: FAMILY MEDICINE

## 2022-01-01 PROCEDURE — 74177 CT ABD & PELVIS W/CONTRAST: CPT

## 2022-01-01 PROCEDURE — 85610 PROTHROMBIN TIME: CPT | Performed by: FAMILY MEDICINE

## 2022-01-01 PROCEDURE — 93005 ELECTROCARDIOGRAM TRACING: CPT | Performed by: NURSE PRACTITIONER

## 2022-01-01 PROCEDURE — 80053 COMPREHEN METABOLIC PANEL: CPT | Performed by: NURSE PRACTITIONER

## 2022-01-01 PROCEDURE — 82040 ASSAY OF SERUM ALBUMIN: CPT | Performed by: NURSE PRACTITIONER

## 2022-01-01 PROCEDURE — 83036 HEMOGLOBIN GLYCOSYLATED A1C: CPT | Performed by: FAMILY MEDICINE

## 2022-01-01 PROCEDURE — 83735 ASSAY OF MAGNESIUM: CPT | Performed by: FAMILY MEDICINE

## 2022-01-01 RX ORDER — IOPAMIDOL 755 MG/ML
75 INJECTION, SOLUTION INTRAVASCULAR ONCE
Status: COMPLETED | OUTPATIENT
Start: 2022-01-01 | End: 2022-01-01

## 2022-01-01 RX ORDER — MAGNESIUM OXIDE 400 MG/1
400 TABLET ORAL ONCE
Status: COMPLETED | OUTPATIENT
Start: 2022-01-01 | End: 2022-01-01

## 2022-01-01 RX ORDER — LISINOPRIL 5 MG/1
TABLET ORAL
Qty: 90 TABLET | Refills: 3 | Status: ON HOLD | OUTPATIENT
Start: 2022-01-01 | End: 2023-01-01

## 2022-01-01 RX ORDER — GEMFIBROZIL 600 MG/1
TABLET, FILM COATED ORAL
Qty: 180 TABLET | Refills: 1 | Status: SHIPPED | OUTPATIENT
Start: 2022-01-01 | End: 2023-01-01

## 2022-01-01 RX ORDER — GABAPENTIN 300 MG/1
CAPSULE ORAL
Qty: 180 CAPSULE | Refills: 3 | Status: ON HOLD | OUTPATIENT
Start: 2022-01-01 | End: 2023-01-01

## 2022-01-01 RX ORDER — UREA 10 %
500 LOTION (ML) TOPICAL 2 TIMES DAILY
Qty: 20 TABLET | Refills: 0 | Status: SHIPPED | OUTPATIENT
Start: 2022-01-01 | End: 2022-01-01

## 2022-01-01 RX ORDER — DILTIAZEM HYDROCHLORIDE 120 MG/1
CAPSULE, COATED, EXTENDED RELEASE ORAL
Qty: 90 CAPSULE | Refills: 3 | Status: ON HOLD | OUTPATIENT
Start: 2022-01-01 | End: 2023-01-01

## 2022-01-01 RX ORDER — TRAMADOL HYDROCHLORIDE 50 MG/1
TABLET ORAL
COMMUNITY
Start: 2022-01-01 | End: 2023-01-01

## 2022-01-01 RX ADMIN — IOPAMIDOL 75 ML: 755 INJECTION, SOLUTION INTRAVENOUS at 13:04

## 2022-01-01 RX ADMIN — Medication 400 MG: at 14:04

## 2022-01-01 ASSESSMENT — ENCOUNTER SYMPTOMS
FLANK PAIN: 1
CHILLS: 0
BACK PAIN: 1
UNEXPECTED WEIGHT CHANGE: 1
ABDOMINAL PAIN: 0
FEVER: 0
WEAKNESS: 1
HEADACHES: 0
COUGH: 0
SHORTNESS OF BREATH: 1
DIARRHEA: 1

## 2022-01-01 ASSESSMENT — ACTIVITIES OF DAILY LIVING (ADL): ADLS_ACUITY_SCORE: 35

## 2022-01-03 DIAGNOSIS — Z76.0 ENCOUNTER FOR MEDICATION REFILL: Primary | ICD-10-CM

## 2022-01-03 RX ORDER — ATORVASTATIN CALCIUM 20 MG/1
20 TABLET, FILM COATED ORAL DAILY
Qty: 90 TABLET | Refills: 3 | Status: SHIPPED | OUTPATIENT
Start: 2022-01-03 | End: 2023-01-01

## 2022-02-17 ENCOUNTER — TELEPHONE (OUTPATIENT)
Dept: ANTICOAGULATION | Facility: CLINIC | Age: 77
End: 2022-02-17
Payer: COMMERCIAL

## 2022-02-17 NOTE — TELEPHONE ENCOUNTER
ANTICOAGULATION     Stefan Diallo is overdue for INR check.      Left message for patient to call and schedule lab appointment as soon as possible. If returning call, please schedule.     Jeanne Harris RN

## 2022-02-18 ENCOUNTER — LAB (OUTPATIENT)
Dept: LAB | Facility: CLINIC | Age: 77
End: 2022-02-18
Payer: COMMERCIAL

## 2022-02-18 ENCOUNTER — ANTICOAGULATION THERAPY VISIT (OUTPATIENT)
Dept: ANTICOAGULATION | Facility: CLINIC | Age: 77
End: 2022-02-18

## 2022-02-18 DIAGNOSIS — I48.91 ATRIAL FIBRILLATION, UNSPECIFIED TYPE (H): Primary | ICD-10-CM

## 2022-02-18 LAB — INR BLD: 2.6 (ref 0.9–1.1)

## 2022-02-18 PROCEDURE — 85610 PROTHROMBIN TIME: CPT

## 2022-02-18 PROCEDURE — 36416 COLLJ CAPILLARY BLOOD SPEC: CPT

## 2022-02-18 NOTE — PROGRESS NOTES
ANTICOAGULATION MANAGEMENT     Stefan Diallo 76 year old male is on warfarin with therapeutic INR result. (Goal INR 2.0-3.0)    Recent labs: (last 7 days)     02/18/22  1230   INR 2.6*       ASSESSMENT     Source(s): Chart Review, Patient/Caregiver Call and Template       Warfarin doses taken: Warfarin taken as instructed    Diet: No new diet changes identified    New illness, injury, or hospitalization: No    Medication/supplement changes: None noted    Signs or symptoms of bleeding or clotting: No    Previous INR: Therapeutic last 2(+) visits    Additional findings: None     PLAN     Recommended plan for no diet, medication or health factor changes affecting INR     Dosing Instructions: Continue your current warfarin dose with next INR in 6 weeks       Summary  As of 2/18/2022    Full warfarin instructions:  7.5 mg every Sun; 5 mg all other days   Next INR check:  4/1/2022             Telephone call with Stefan who verbalizes understanding and agrees to plan    Patient offered & declined to schedule next visit    Education provided: Goal range and significance of current result    Plan made per [facility] anticoagulation protocol    Naomie Rodriguez RN  Anticoagulation Clinic  2/18/2022    _______________________________________________________________________     Anticoagulation Episode Summary     Current INR goal:  2.0-3.0   TTR:  88.8 % (1 y)   Target end date:     Send INR reminders to:  HILARIO HECTOR    Indications    Atrial fibrillation  unspecified type (H) [I48.91]           Comments:           Anticoagulation Care Providers     Provider Role Specialty Phone number    Collin Singh MD Referring Family Medicine [phone]

## 2022-03-07 DIAGNOSIS — E11.51 TYPE 2 DIABETES MELLITUS WITH DIABETIC PERIPHERAL ANGIOPATHY WITHOUT GANGRENE (H): ICD-10-CM

## 2022-03-07 DIAGNOSIS — Z76.0 ENCOUNTER FOR MEDICATION REFILL: Primary | ICD-10-CM

## 2022-03-08 ENCOUNTER — OFFICE VISIT (OUTPATIENT)
Dept: VASCULAR SURGERY | Facility: CLINIC | Age: 77
End: 2022-03-08
Attending: PODIATRIST
Payer: COMMERCIAL

## 2022-03-08 VITALS — HEART RATE: 88 BPM | TEMPERATURE: 97.7 F | SYSTOLIC BLOOD PRESSURE: 132 MMHG | DIASTOLIC BLOOD PRESSURE: 80 MMHG

## 2022-03-08 DIAGNOSIS — S90.811A ABRASION OF RIGHT FOOT, INITIAL ENCOUNTER: Primary | ICD-10-CM

## 2022-03-08 DIAGNOSIS — B35.1 ONYCHOMYCOSIS: ICD-10-CM

## 2022-03-08 DIAGNOSIS — M20.42 HAMMER TOES OF BOTH FEET: ICD-10-CM

## 2022-03-08 DIAGNOSIS — M20.41 HAMMER TOES OF BOTH FEET: ICD-10-CM

## 2022-03-08 PROCEDURE — 99212 OFFICE O/P EST SF 10 MIN: CPT | Mod: 25 | Performed by: PODIATRIST

## 2022-03-08 PROCEDURE — 11721 DEBRIDE NAIL 6 OR MORE: CPT | Mod: Q7 | Performed by: PODIATRIST

## 2022-03-08 PROCEDURE — G0463 HOSPITAL OUTPT CLINIC VISIT: HCPCS

## 2022-03-08 ASSESSMENT — PAIN SCALES - GENERAL: PAINLEVEL: NO PAIN (1)

## 2022-03-08 NOTE — PATIENT INSTRUCTIONS
Podiatry Clinics that offer foot and toenail care  [facility]  [facility]  [phone]    [facility], [facility]  [phone]    [facility]  [place] - [address] on Tuesdays  [phone]    [facility]  [place]  [phone]    [facility]  [facility] [place]  [phone]    [facility]  [phone]    [facility]  [phone]    [facility]  They have several locations and have a team of registered nurses that offer diabetic foot care.  They do not bill to insurance and the average cost per visit is $37.  [facility]  [phone]    [facility]  *Nurse comes to your home for nail care.  Skyla Almeida RN Foot Specialist  [phone]

## 2022-03-08 NOTE — PROGRESS NOTES
FOOT AND ANKLE SURGERY/PODIATRY PROGRESS NOTE        ASSESSMENT:   Abrasion right foot  Onychomycosis  Hammertoe  PAD      TREATMENT:  Abrasion right foot: There is skin irritation along the plantar 2nd digit right foot with minimal callus tissue formation.     -I discussed with the patient that there is no open lesion. I recommend he continue to monitor and return with concerns.     Onychomycosis: I discussed with the patient that the distal margin of the left hallux is free from the nail bed, but the nail plate is otherwise intact.     -I debrided nails greater than 6 in length and thickness.     -Patient's questions invited and answered. He was encouraged to call my office with any further questions or concerns.     Quinn Miller DPM  [facility] Podiatry/Foot & Ankle Surgery      HPI: Stefan MIRANDA Yoel was seen again today for concerns related to skin discoloration along the plantar 2nd digit right foot. The patient states it's hard for him to see the bottom of his feet and wanted to get looked at. He also would like to have the left hallux nail removed because it appears loose.     Past Medical History:   Diagnosis Date     Arthritis      Atrial fibrillation (H)      Bacteremia      Bell's palsy 2015     BPH (benign prostatic hyperplasia)      Diabetes (H)      Gastroesophageal reflux disease      GERD (gastroesophageal reflux disease)      GI hemorrhage      High cholesterol      Hyperlipemia      Hyperlipidemia      Hypertension      Hypertension      Idiopathic peripheral neuropathy      Irregular heart beat     chronic at fib     Renal artery aneurysm (H)      Renal artery aneurysm (H)      Sleep apnea     Bipap     Sleep apnea 10/31/2021     Sleep apnea, obstructive     USES BIPAP     Type 2 diabetes mellitus (H)      UTI (lower urinary tract infection)        Past Surgical History:   Procedure Laterality Date     AMPUTATE FOOT Right 5/6/2019    Procedure: AMPUTATION, 3rd TOE RIGHT FOOT;  Surgeon:  Ravi Abbasi DPM;  Location: [facility] [place];  Service: Podiatry     FUSION SPINE POSTERIOR MINIMALLY INVASIVE THREE + LEVELS N/A 10/29/2020    Procedure: L3/4/5S1 oblique lateral lumbar interbody fusion with discectomy L3/4/5S1 Posterior minimally invasive pedicle screw placement and posterolateral instrumentation and fusion  L3/4/5S1 Posterior minimally invasive pedicle screw placement and posterolateral instrumentation and fusion;  Surgeon: Vernon Bynum MD;  Location: [facility] OR     HC REPAIR COMPL ROTATOR CUFF AVULSN,CHR      Description: Chronic Rotator Cuff Avulsion;  Recorded: 04/11/2011;     ORTHOPEDIC SURGERY Right     knee surgery     ORTHOPEDIC SURGERY Right     amputation 3rd toe on right foot     TENOTOMY ACHILLES TENDON       TRANSRECTAL ULTRASONIC, TRANSURETHRAL RESECTION (TUR) OF PROSTATE CYST         Allergies   Allergen Reactions     Bees Swelling     Reports using an epi pen if stung         Current Outpatient Medications:      atenolol (TENORMIN) 50 MG tablet, TAKE 1/2 TABLET BY MOUTH EVERY DAY, Disp: 45 tablet, Rfl: 3     atorvastatin (LIPITOR) 20 MG tablet, Take 1 tablet (20 mg) by mouth daily, Disp: 90 tablet, Rfl: 3     CONTOUR NEXT TEST test strip, USE 1 EACH AS DIRECTED DAILY., Disp: 100 strip, Rfl: 5     diltiazem ER COATED BEADS (CARDIZEM CD/CARTIA XT) 120 MG 24 hr capsule, TAKE 1 CAPSULE BY MOUTH EVERY DAY, Disp: 90 capsule, Rfl: 3     EPINEPHrine (ANY BX GENERIC EQUIV) 0.3 MG/0.3ML injection 2-pack, Inject 0.3 mg into the muscle as needed for anaphylaxis, Disp: , Rfl:      fluticasone (FLONASE) 50 MCG/ACT nasal spray, Spray 2 sprays into both nostrils daily, Disp: , Rfl:      gabapentin (NEURONTIN) 300 MG capsule, TAKE 2 CAPSULES (600 MG TOTAL) BY MOUTH AT BEDTIME., Disp: 180 capsule, Rfl: 3     gemfibrozil (LOPID) 600 MG tablet, TAKE 1 TABLET BY MOUTH TWICE A DAY, Disp: 180 tablet, Rfl: 2     hydrOXYzine (ATARAX) 10 MG tablet, Take 1 tablet (10 mg) by mouth every 6 hours as  needed for itching, Disp: 60 tablet, Rfl: 3     lisinopril (ZESTRIL) 5 MG tablet, TAKE 1 TABLET BY MOUTH EVERY DAY, Disp: 90 tablet, Rfl: 3     loratadine (CLARITIN) 10 MG tablet, Take 10 mg by mouth daily, Disp: , Rfl:      metFORMIN (GLUCOPHAGE) 500 MG tablet, TAKE 1 TABLET BY MOUTH EACH MORNING AND 2 TABLETS EACH EVENING, Disp: 270 tablet, Rfl: 3     methocarbamol (ROBAXIN) 500 MG tablet, Take 1 tablet (500 mg) by mouth 4 times daily as needed for muscle spasms, Disp: 60 tablet, Rfl: 3     montelukast (SINGULAIR) 10 MG tablet, Take 10 mg by mouth daily, Disp: , Rfl:      multivitamin (OCUVITE) TABS tablet, Take 1 tablet by mouth 2 times daily, Disp: , Rfl:      omeprazole (PRILOSEC) 20 MG DR capsule, Take 1 capsule (20 mg) by mouth daily, Disp: 90 capsule, Rfl: 3     oxyCODONE (ROXICODONE) 5 MG tablet, Take 0.5 tablets (2.5 mg) by mouth every 3 hours as needed for pain, Disp: 60 tablet, Rfl: 0     Vitamin D, Cholecalciferol, 25 MCG (1000 UT) TABS, Take 1 tablet by mouth daily, Disp: , Rfl:      warfarin ANTICOAGULANT (COUMADIN) 5 MG tablet, Take 5 mg by mouth six times a week On Mon, Tues, Wed, Thurs, Fri and Sat, Disp: , Rfl:      warfarin ANTICOAGULANT (COUMADIN) 7.5 MG tablet, Take 7.5 mg by mouth once a week On Sundays, Disp: , Rfl:     Family History   Problem Relation Age of Onset     Coronary Artery Disease Mother      Coronary Artery Disease Father      Atrial fibrillation Father        Social History     Socioeconomic History     Marital status:      Spouse name: Not on file     Number of children: 3     Years of education: Not on file     Highest education level: Not on file   Occupational History     Not on file   Tobacco Use     Smoking status: Former Smoker     Packs/day: 2.00     Years: 27.00     Pack years: 54.00     Types: Cigarettes, Cigarettes     Quit date: 1990     Years since quittin.1     Smokeless tobacco: Never Used   Substance and Sexual Activity     Alcohol use: No      Drug use: No     Sexual activity: Not Currently   Other Topics Concern     Not on file   Social History Narrative     Not on file     Social Determinants of Health     Financial Resource Strain: Not on file   Food Insecurity: Not on file   Transportation Needs: Not on file   Physical Activity: Not on file   Stress: Not on file   Social Connections: Not on file   Intimate Partner Violence: Not on file   Housing Stability: Not on file       10 point Review of Systems is negative except for fungal nails which is noted in HPI.     /80   Pulse 88   Temp 97.7  F (36.5  C)     BMI= There is no height or weight on file to calculate BMI.    OBJECTIVE:  General appearance: Patient is alert and fully cooperative with history & exam.  No sign of distress is noted during the visit.    Vascular: Dorsalis pedis and posterior tibial pulses are non-palpable.   Dermatologic: Skin irritation with minimal hyperkeratotic tissue plantar 2nd digit right foot. Nails greater than 6 elongated, thick, yellow with subungal debris. Trophic changes noted including diminished hair growth and shiny skin.  Neurologic: Diminished to light touch bilateral.   Musculoskeletal: Contracted digits bilateral. Amputated 3rd digit right foot.     No results found.

## 2022-04-01 ENCOUNTER — DOCUMENTATION ONLY (OUTPATIENT)
Dept: ANTICOAGULATION | Facility: CLINIC | Age: 77
End: 2022-04-01
Payer: COMMERCIAL

## 2022-04-01 DIAGNOSIS — I48.91 ATRIAL FIBRILLATION, UNSPECIFIED TYPE (H): Primary | ICD-10-CM

## 2022-04-01 NOTE — PROGRESS NOTES
ANTICOAGULATION MANAGEMENT      Stefan Diallo due for annual renewal of referral to anticoagulation monitoring. Order pended for your review and signature.      ANTICOAGULATION SUMMARY      Warfarin indication(s)     Atrial fibrillation    Heart valve present?  NO       Current goal range   INR: 2.0-3.0     Goal appropriate for indication? Yes, INR 2-3 appropriate for hx of DVT, PE, hypercoagulable state, Afib, LVAD, or bileaflet AVR without risk factors     Current duration of therapy not indicated on last referral   Time in Therapeutic Range (TTR)  (Goal > 60%) 88.8%       Office visit with referring provider's group within last year yes on 12/28/21       Jeanne Harris RN

## 2022-04-07 ENCOUNTER — ANTICOAGULATION THERAPY VISIT (OUTPATIENT)
Dept: ANTICOAGULATION | Facility: CLINIC | Age: 77
End: 2022-04-07

## 2022-04-07 ENCOUNTER — LAB (OUTPATIENT)
Dept: LAB | Facility: CLINIC | Age: 77
End: 2022-04-07
Payer: COMMERCIAL

## 2022-04-07 DIAGNOSIS — I48.91 ATRIAL FIBRILLATION, UNSPECIFIED TYPE (H): Primary | ICD-10-CM

## 2022-04-07 DIAGNOSIS — I48.91 ATRIAL FIBRILLATION, UNSPECIFIED TYPE (H): ICD-10-CM

## 2022-04-07 LAB — INR BLD: 3 (ref 0.9–1.1)

## 2022-04-07 PROCEDURE — 36416 COLLJ CAPILLARY BLOOD SPEC: CPT

## 2022-04-07 PROCEDURE — 85610 PROTHROMBIN TIME: CPT

## 2022-04-07 NOTE — PROGRESS NOTES
ANTICOAGULATION MANAGEMENT     Stefan Diallo 76 year old male is on warfarin with therapeutic INR result. (Goal INR 2.0-3.0)    Recent labs: (last 7 days)     04/07/22  1202   INR 3.0*       ASSESSMENT       Source(s): Chart Review, Patient/Caregiver Call and Template       Warfarin doses taken: Warfarin taken as instructed    Diet: No new diet changes identified    New illness, injury, or hospitalization: No    Medication/supplement changes: None noted    Signs or symptoms of bleeding or clotting: No    Previous INR: Therapeutic last 2(+) visits    Additional findings: None       PLAN     Recommended plan for no diet, medication or health factor changes affecting INR     Dosing Instructions: continue your current warfarin dose with next INR in 6 weeks       Summary  As of 4/7/2022    Full warfarin instructions:  7.5 mg every Sun; 5 mg all other days   Next INR check:  5/19/2022             Telephone call with Stefan who verbalizes understanding and agrees to plan    Patient offered & declined to schedule next visit    Education provided: Goal range and significance of current result and Contact [phone] with any changes, questions or concerns.     Plan made per [facility] anticoagulation protocol    Jeanne Harris RN  Anticoagulation Clinic  4/7/2022    _______________________________________________________________________     Anticoagulation Episode Summary     Current INR goal:  2.0-3.0   TTR:  88.8 % (1 y)   Target end date:  Indefinite   Send INR reminders to:  HILARIO HECTOR    Indications    Atrial fibrillation  unspecified type (H) [I48.91]           Comments:           Anticoagulation Care Providers     Provider Role Specialty Phone number    Collin Singh MD Referring Family Medicine [phone]

## 2022-05-12 DIAGNOSIS — I48.20 CHRONIC ATRIAL FIBRILLATION, UNSPECIFIED (H): ICD-10-CM

## 2022-05-12 DIAGNOSIS — Z76.0 ENCOUNTER FOR MEDICATION REFILL: Primary | ICD-10-CM

## 2022-05-16 ENCOUNTER — OFFICE VISIT (OUTPATIENT)
Dept: FAMILY MEDICINE | Facility: CLINIC | Age: 77
End: 2022-05-16
Payer: COMMERCIAL

## 2022-05-16 ENCOUNTER — ANTICOAGULATION THERAPY VISIT (OUTPATIENT)
Dept: ANTICOAGULATION | Facility: CLINIC | Age: 77
End: 2022-05-16

## 2022-05-16 VITALS
SYSTOLIC BLOOD PRESSURE: 114 MMHG | OXYGEN SATURATION: 97 % | TEMPERATURE: 98.6 F | WEIGHT: 222.75 LBS | RESPIRATION RATE: 20 BRPM | BODY MASS INDEX: 29.52 KG/M2 | HEIGHT: 73 IN | HEART RATE: 78 BPM | DIASTOLIC BLOOD PRESSURE: 64 MMHG

## 2022-05-16 DIAGNOSIS — R05.3 CHRONIC COUGH: ICD-10-CM

## 2022-05-16 DIAGNOSIS — E11.9 TYPE 2 DIABETES MELLITUS WITHOUT COMPLICATION, WITHOUT LONG-TERM CURRENT USE OF INSULIN (H): ICD-10-CM

## 2022-05-16 DIAGNOSIS — I48.91 ATRIAL FIBRILLATION, UNSPECIFIED TYPE (H): ICD-10-CM

## 2022-05-16 DIAGNOSIS — R07.1 CHEST PAIN ON BREATHING: ICD-10-CM

## 2022-05-16 DIAGNOSIS — I48.91 ATRIAL FIBRILLATION, UNSPECIFIED TYPE (H): Primary | ICD-10-CM

## 2022-05-16 DIAGNOSIS — R10.31 RLQ ABDOMINAL PAIN: Primary | ICD-10-CM

## 2022-05-16 LAB
HBA1C MFR BLD: 6.8 % (ref 0–5.6)
INR BLD: 3.8 (ref 0.9–1.1)

## 2022-05-16 PROCEDURE — 85610 PROTHROMBIN TIME: CPT | Performed by: FAMILY MEDICINE

## 2022-05-16 PROCEDURE — 99214 OFFICE O/P EST MOD 30 MIN: CPT | Performed by: FAMILY MEDICINE

## 2022-05-16 PROCEDURE — 36415 COLL VENOUS BLD VENIPUNCTURE: CPT | Performed by: FAMILY MEDICINE

## 2022-05-16 PROCEDURE — 36416 COLLJ CAPILLARY BLOOD SPEC: CPT | Performed by: FAMILY MEDICINE

## 2022-05-16 PROCEDURE — 83036 HEMOGLOBIN GLYCOSYLATED A1C: CPT | Performed by: FAMILY MEDICINE

## 2022-05-16 NOTE — PROGRESS NOTES
ANTICOAGULATION MANAGEMENT     Stefan Diallo 76 year old male is on warfarin with supratherapeutic INR result. (Goal INR 2.0-3.0)    Recent labs: (last 7 days)     05/16/22  1620   INR 3.8*       ASSESSMENT       Source(s): Chart Review, Patient/Caregiver Call and Template       Warfarin doses taken: Warfarin taken as instructed    Diet: No new diet changes identified    New illness, injury, or hospitalization: Yes: abdominal pain - possibly sciatic nerve vs hernia vs kidney stone (pt saw PCP today - note not completed)    Medication/supplement changes: Tylenol as needed use which has potential for interaction; increasing INR    Signs or symptoms of bleeding or clotting: No    Previous INR: Therapeutic last 2(+) visits    Additional findings: None       PLAN     Recommended plan for temporary change(s) affecting INR     Dosing Instructions: hold dose then continue your current warfarin dose with next INR in 2 weeks       Summary  As of 5/16/2022    Full warfarin instructions:  5/16: Hold; Otherwise 7.5 mg every Sun; 5 mg all other days   Next INR check:  5/30/2022             Telephone call with Stefan who agrees to plan and repeated back plan correctly    Patient offered & declined to schedule next visit    Education provided: Please call back if any changes to your diet, medications or how you've been taking warfarin, Potential interaction between warfarin and Tylenol and Monitoring for bleeding signs and symptoms    Plan made per [facility] anticoagulation protocol    Naomie Rodriguez RN  Anticoagulation Clinic  5/16/2022    _______________________________________________________________________     Anticoagulation Episode Summary     Current INR goal:  2.0-3.0   TTR:  78.0 % (1 y)   Target end date:  Indefinite   Send INR reminders to:  HILARIO HECTOR    Indications    Atrial fibrillation  unspecified type (H) [I48.91]           Comments:           Anticoagulation Care Providers     Provider Role Specialty Phone  number    Collin Singh MD [facility] Medicine [phone]

## 2022-05-16 NOTE — PROGRESS NOTES
"VANDANA Diallo is a 76 year old male here for two issues:    1. Pain in right side of abdomen, especially when sitting down. It is right where his belt pushes into his side. The pain only occurs when he sits and has this pressure. It is not a burning pain, not associated with food. Doesn't feel a sensation of bulging. He wonders if his sciatic nerve could be radiating pain to this area because he does have sciatica on the right side. Had L3-L4 fusion in 10/2020 and is going back to see the spine surgeon on June 7th. This right side pain started a month ago. No pain with urination, no fevers. No changes in stool (no diarrhea, constipation, or blood). He did have pyelonephritis back in October, this doesn't feel like that. Also had a kidney stone in the 80s, doesn't feel like that.     2. Coughs when laughs.Mentioned this to Dr. Singh back in December. When he coughs, has pain in his chest. Does NOT have chest pain on exertion. He does not cough up mucous.      Smoked for 30-40 years, though apparently not heavily. Quit in the 90s.     Uses CPAP.     Also due for A1c.   ?  O  /64   Pulse 78   Temp 98.6  F (37  C) (Oral)   Resp 20   Ht 1.854 m (6' 1\")   Wt 101 kg (222 lb 12 oz)   SpO2 97%   BMI 29.39 kg/m     Vitals reviewed. Nursing note reviewed.  General Appearance: Pleasant and alert, in no acute distress  HEENT: mucous membranes moist  CV: irregularly irregular. 2/5 systolic murmur.   Resp: No respiratory distress. Clear to auscultation bilaterally. No wheezes, rales, rhonchi  Abd: Soft, nontender, nondistended, bowel sounds present. No masses. Reports some slight tenderness with palpation of RLQ. No inguinal hernias observed.   Ext: No peripheral edema, good distal perfusion  Skin: warm, dry, intact, no rash noted  Neuro: no focal deficits, CNs II-XII normal.   Psych: mood and affect are normal.    A/P  Stefan was seen today for pain.    Diagnoses and all orders for this visit:    RLQ abd pain: " this does seem to be at the location where his belt is pushing into his side. Since it has been going on for about a month doesn't seem consistent with diverticulitis (has known diverticulosis), or UTI. Explained that sciatica does not typically radiate to this area. He did have a ct scan back in October and this region was normal at that time. If this is worsening, might re-image.     Last colnoscopy was in 11/2018, due for next one in 2023.     Type 2 diabetes mellitus without complication, without long-term current use of insulin (H): A1C up to 6.8 today. He feels he is eating too many sweets and plans to cut down. Declined to increase metformin from 500 to 1000 mg BID.   -     Hemoglobin A1c; Future  -     Hemoglobin A1c    Atrial fibrillation, unspecified type (H)  -     INR point of care    Chest pain on breathing: due to smoking history and the fact that this has been going on since December at least, recommend CT chest.   -     CT Chest w/o Contrast; Future    Chronic cough  -     CT Chest w/o Contrast; Future    No follow-ups on file.      Options for treatment and follow-up care were reviewed with the patient and/or guardian. Stefan Diallo and/or guardian engaged in the decision making process and verbalized understanding of the options discussed and agreed with the final plan.    Catrachita Mak MD

## 2022-05-17 RX ORDER — ATENOLOL 50 MG/1
25 TABLET ORAL DAILY
Qty: 45 TABLET | Refills: 3 | Status: ON HOLD | OUTPATIENT
Start: 2022-05-17 | End: 2023-01-01

## 2022-05-17 RX ORDER — WARFARIN SODIUM 5 MG/1
TABLET ORAL
Qty: 135 TABLET | Refills: 3 | Status: ON HOLD | OUTPATIENT
Start: 2022-05-17 | End: 2023-01-01

## 2022-05-17 NOTE — TELEPHONE ENCOUNTER
Reason for Call:  Medication or medication refill:    Do you use a [facility]?  Name of the pharmacy and phone number for the current request:  [facility] [id number] [place] [facility] [address] [place] [facility] [place], [place] - [address]  [phone]    Name of the medication requested:   - warfarin ANTICOAGULANT (COUMADIN) 5 MG tablet  - atenolol (TENORMIN) 50 MG tablet    Other request: Patient will be out of medication soon.    Can we leave a detailed message on this number? YES    Phone number patient can be reached at: Home number on file [phone] (home)    Best Time: Anytime    Call taken on 5/17/2022 at 1:12 PM by Mya Trevizo

## 2022-05-19 ENCOUNTER — NURSE TRIAGE (OUTPATIENT)
Dept: NURSING | Facility: CLINIC | Age: 77
End: 2022-05-19
Payer: COMMERCIAL

## 2022-05-19 NOTE — TELEPHONE ENCOUNTER
"Patient states his last reading for INR was 3.8 and states that he has back pain and he is currently taking tylenol for pain.  Patient was told that it interferes with INR.  Patient goes to his back MD on June 7 th.   Patient is nerve pain in his lower back.  Patient states that his pain is \"15\".  Patient had surgery before for pain and now pain is on the right side. Patient is requesting to speak with Collin Guerin as to what he should take for pain as he is currently taking Coumadin.  Patient wants to know if it is alright to take Aleve.  Please phone patient within 2 hours.  Patient does not want to go to ER as he has an upcoming appointment.    COVID 19 Nurse Triage Plan/Patient Instructions    Please be aware that novel coronavirus (COVID-19) may be circulating in the community. If you develop symptoms such as fever, cough, or SOB or if you have concerns about the presence of another infection including coronavirus (COVID-19), please contact your health care provider or visit [email or web address].     Disposition/Instructions    ED Visit recommended. Follow protocol based instructions.     Bring Your Own Device:  Please also bring your smart device(s) (smart phones, tablets, laptops) and their charging cables for your personal use and to communicate with your care team during your visit.    Thank you for taking steps to prevent the spread of this virus.  o Limit your contact with others.  o Wear a simple mask to cover your cough.  o Wash your hands well and often.    Resources    [facility]: About COVID-19: [email or web address]    CDC: What to Do If You're Sick: www.cdc.gov/coronavirus/2019-ncov/about/steps-when-sick.html    CDC: Ending Home Isolation: www.cdc.gov/coronavirus/2019-ncov/hcp/disposition-in-home-patients.html     CDC: Caring for Someone: www.cdc.gov/coronavirus/2019-ncov/if-you-are-sick/care-for-someone.html     [facility]: Interim Guidance for Hospital Discharge to Home: " www.health.[email or web address].[place]/diseases/coronavirus/hcp/hospdischarge.pdf    [facility] clinical trials (COVID-19 research studies): [email or web address]     Below are the COVID-19 hotlines at the [place] Department of Health ([facility]). Interpreters are available.   o For health questions: Call 948-729-2035 or 1-330.984.2840 (7 a.m. to 7 p.m.)  o For questions about schools and childcare: Call 830-942-7319 or 1-230.765.3741 (7 a.m. to 7 p.m.)                       Reason for Disposition    SEVERE back pain of sudden onset and age > 60    Additional Information    Negative: Passed out (i.e., fainted, collapsed and was not responding)    Negative: Shock suspected (e.g., cold/pale/clammy skin, too weak to stand, low BP, rapid pulse)    Negative: Sounds like a life-threatening emergency to the triager    Protocols used: BACK PAIN-A-[place]

## 2022-05-19 NOTE — TELEPHONE ENCOUNTER
Provider is not in clinic today  RN spoke with covering provider who recommends no Aleve, to use tylenol    RN call to patient who states he was informed by anticoagulation team to discontinue tylenol for pain      Recent anticoagulation notes: Medication/supplement changes: Tylenol as needed use which has potential for interaction; increasing INR    Patient sees specialist for his back pain. RN advised next step to contact that provider/specialist for recommendation    Patient in agreement with plan.     RN routing to covering provider. If in agreement with plan detailed above, no further action needed.     Minerva Yee RN on 5/19/2022 at 1:03 PM

## 2022-05-20 ENCOUNTER — NURSE TRIAGE (OUTPATIENT)
Dept: NURSING | Facility: CLINIC | Age: 77
End: 2022-05-20
Payer: COMMERCIAL

## 2022-05-20 NOTE — TELEPHONE ENCOUNTER
Patient calling for continued pain but patient received call from clinic before triage and call got dropped.    After taking another call, RN noted that provider called patient and recommended plan for ER.    Marina Maza RN  [facility]

## 2022-05-21 ENCOUNTER — HOSPITAL ENCOUNTER (EMERGENCY)
Facility: CLINIC | Age: 77
Discharge: HOME | End: 2022-05-21
Attending: STUDENT IN AN ORGANIZED HEALTH CARE EDUCATION/TRAINING PROGRAM | Admitting: STUDENT IN AN ORGANIZED HEALTH CARE EDUCATION/TRAINING PROGRAM
Payer: COMMERCIAL

## 2022-05-21 ENCOUNTER — APPOINTMENT (OUTPATIENT)
Dept: CT IMAGING | Facility: CLINIC | Age: 77
End: 2022-05-21
Attending: STUDENT IN AN ORGANIZED HEALTH CARE EDUCATION/TRAINING PROGRAM
Payer: COMMERCIAL

## 2022-05-21 VITALS
TEMPERATURE: 98.6 F | SYSTOLIC BLOOD PRESSURE: 165 MMHG | HEART RATE: 75 BPM | WEIGHT: 222 LBS | OXYGEN SATURATION: 99 % | RESPIRATION RATE: 18 BRPM | DIASTOLIC BLOOD PRESSURE: 89 MMHG | BODY MASS INDEX: 29.29 KG/M2

## 2022-05-21 DIAGNOSIS — R10.31 RLQ ABDOMINAL PAIN: ICD-10-CM

## 2022-05-21 LAB
ALBUMIN SERPL-MCNC: 3.4 G/DL (ref 3.5–5)
ALBUMIN UR-MCNC: 10 MG/DL
ALP SERPL-CCNC: 105 U/L (ref 45–120)
ALT SERPL W P-5'-P-CCNC: 11 U/L (ref 0–45)
ANION GAP SERPL CALCULATED.3IONS-SCNC: 11 MMOL/L (ref 5–18)
APPEARANCE UR: CLEAR
AST SERPL W P-5'-P-CCNC: 15 U/L (ref 0–40)
BILIRUB SERPL-MCNC: 0.5 MG/DL (ref 0–1)
BILIRUB UR QL STRIP: NEGATIVE
BUN SERPL-MCNC: 17 MG/DL (ref 8–28)
CALCIUM SERPL-MCNC: 9.5 MG/DL (ref 8.5–10.5)
CHLORIDE BLD-SCNC: 109 MMOL/L (ref 98–107)
CO2 SERPL-SCNC: 22 MMOL/L (ref 22–31)
COLOR UR AUTO: ABNORMAL
CREAT SERPL-MCNC: 0.76 MG/DL (ref 0.7–1.3)
ERYTHROCYTE [DISTWIDTH] IN BLOOD BY AUTOMATED COUNT: 13.4 % (ref 10–15)
GFR SERPL CREATININE-BSD FRML MDRD: >90 ML/MIN/1.73M2
GLUCOSE BLD-MCNC: 120 MG/DL (ref 70–125)
GLUCOSE UR STRIP-MCNC: NEGATIVE MG/DL
HCT VFR BLD AUTO: 42.3 % (ref 40–53)
HGB BLD-MCNC: 14.2 G/DL (ref 13.3–17.7)
HGB UR QL STRIP: NEGATIVE
INR PPP: 2.59 (ref 0.85–1.15)
KETONES UR STRIP-MCNC: NEGATIVE MG/DL
LEUKOCYTE ESTERASE UR QL STRIP: NEGATIVE
MCH RBC QN AUTO: 30 PG (ref 26.5–33)
MCHC RBC AUTO-ENTMCNC: 33.6 G/DL (ref 31.5–36.5)
MCV RBC AUTO: 89 FL (ref 78–100)
MUCOUS THREADS #/AREA URNS LPF: PRESENT /LPF
NITRATE UR QL: NEGATIVE
PH UR STRIP: 6 [PH] (ref 5–7)
PLATELET # BLD AUTO: 287 10E3/UL (ref 150–450)
POTASSIUM BLD-SCNC: 4 MMOL/L (ref 3.5–5)
PROT SERPL-MCNC: 7.4 G/DL (ref 6–8)
RBC # BLD AUTO: 4.74 10E6/UL (ref 4.4–5.9)
RBC URINE: 3 /HPF
SODIUM SERPL-SCNC: 142 MMOL/L (ref 136–145)
SP GR UR STRIP: 1.02 (ref 1–1.03)
UROBILINOGEN UR STRIP-MCNC: <2 MG/DL
WBC # BLD AUTO: 7.1 10E3/UL (ref 4–11)
WBC URINE: 2 /HPF

## 2022-05-21 PROCEDURE — 85610 PROTHROMBIN TIME: CPT | Performed by: STUDENT IN AN ORGANIZED HEALTH CARE EDUCATION/TRAINING PROGRAM

## 2022-05-21 PROCEDURE — 250N000011 HC RX IP 250 OP 636: Performed by: STUDENT IN AN ORGANIZED HEALTH CARE EDUCATION/TRAINING PROGRAM

## 2022-05-21 PROCEDURE — 74177 CT ABD & PELVIS W/CONTRAST: CPT

## 2022-05-21 PROCEDURE — 85027 COMPLETE CBC AUTOMATED: CPT | Performed by: STUDENT IN AN ORGANIZED HEALTH CARE EDUCATION/TRAINING PROGRAM

## 2022-05-21 PROCEDURE — 80053 COMPREHEN METABOLIC PANEL: CPT | Performed by: STUDENT IN AN ORGANIZED HEALTH CARE EDUCATION/TRAINING PROGRAM

## 2022-05-21 PROCEDURE — 36415 COLL VENOUS BLD VENIPUNCTURE: CPT | Performed by: STUDENT IN AN ORGANIZED HEALTH CARE EDUCATION/TRAINING PROGRAM

## 2022-05-21 PROCEDURE — 99285 EMERGENCY DEPT VISIT HI MDM: CPT | Mod: 25

## 2022-05-21 PROCEDURE — 81001 URINALYSIS AUTO W/SCOPE: CPT | Performed by: STUDENT IN AN ORGANIZED HEALTH CARE EDUCATION/TRAINING PROGRAM

## 2022-05-21 RX ORDER — IOPAMIDOL 755 MG/ML
100 INJECTION, SOLUTION INTRAVASCULAR ONCE
Status: COMPLETED | OUTPATIENT
Start: 2022-05-21 | End: 2022-05-21

## 2022-05-21 RX ADMIN — IOPAMIDOL 100 ML: 755 INJECTION, SOLUTION INTRAVENOUS at 14:17

## 2022-05-21 ASSESSMENT — ENCOUNTER SYMPTOMS
COUGH: 1
VOMITING: 0
BLOOD IN STOOL: 0
WEAKNESS: 0
SHORTNESS OF BREATH: 0
BACK PAIN: 1
DIARRHEA: 1
ABDOMINAL PAIN: 1
HEMATURIA: 0
FEVER: 0
DYSURIA: 0
NUMBNESS: 1
NAUSEA: 0

## 2022-05-21 NOTE — DISCHARGE INSTRUCTIONS
Thankfully you scan and lab work was reassuring today  I do want you to follow  closely with your primary care doctor  Return with significant increase in pain, unable to keep down foods/fluids, bloody/black stool, new numbness/focal weakness or any other worsening symptoms

## 2022-05-21 NOTE — TELEPHONE ENCOUNTER
I called pt.  Right flank pain is not improving.  10/10 now.  No hematuria.  History of kidney stones.  Removed belt, did not help.  Plan) ER.  dlh

## 2022-05-21 NOTE — ED PROVIDER NOTES
NAME: Stefan Diallo  AGE: 76 year old male  YOB: 1945  MRN: [id number]  EVALUATION DATE & TIME: 2022 12:57 PM    PCP: Collin Singh    ED PROVIDER: Skyla Warren MD.      Chief Complaint   Patient presents with     Abdominal Pain         FINAL IMPRESSION:  1. RLQ abdominal pain        MEDICAL DECISION MAKIN:05 PM I met with the patient, obtained history, performed an initial exam, and discussed options and plan for diagnostics and treatment here in the ED.   2:50 PM We discussed the plan for discharge and the patient is agreeable. Reviewed supportive cares, symptomatic treatment, outpatient follow up, and reasons to return to the Emergency Department. Patient to be discharged by ED RN.     MDM: 77 y/o M with history of a fib on warfarin, type 2 diabetes, obesity, hypertension, renal artery aneurysm, kidney stones who present with RLQ pain. Pain has been there for last month, is located where his belt hits RLQ. No vomiting, diarrhea, vomiting, fevers, urinary changes, testicular/scrotal pain. Dx includes but not limited to appendicitis, cholecystitis, pancreatitis, nephrolithiasis, dyspepsia, gastritis, pyelonephritis, UTI,, mesenteric ischemia, strangulated hernia, aortic aneurysm. INR 2.59. UA without blood or signs of infection. CMP and CBC are reassuring.    Pain does not appear out of proportion to exam and I do not suspect mesenteric ischemia.He reports he has some chronic right sided low back pain that he is going to see his spine doctor for--this pain could be referred from this back. He has no bowel/bladder changes or new numbness/weakness (has some chronic neuropathy in his feet), no recent falls/injuries--I have lower suspicion for acute spinal process such as fracture, cauda equina, epidural abscess/hematoma, discitis, osteomyelitis, etc. No chest pain or shortness of breath to suspect PE, dissection or atypical ACS. He is comfortable appearing here and has not required  any pain medications, recommend he follow closely with clinic providers and strict return precautions were discussed at length    Pertinent Labs & Imaging studies reviewed. (See chart for details)     MEDICATIONS GIVEN IN THE EMERGENCY:  Medications   iopamidol (ISOVUE-370) solution 100 mL (100 mLs Intravenous Given 5/21/22 1416)       NEW PRESCRIPTIONS STARTED AT TODAY'S ER VISIT:  Discharge Medication List as of 5/21/2022  3:06 PM             =================================================================    HPI    Patient information was obtained from: Patient    Use of : N/A       Stefan Diallo is a 76 year old male with a past medical history of afib on warfarin, type 2 diabetes, obesity, hypertension, renal artery aneurysm, kidney stones, and renal polyps, who presents via private car for abdominal pain.    Patient endorses right lower quadrant abdominal pain for the past month. Patient notes it feels like prior kidney stones. Patient denies any fever, vomiting, nausea, black/bloody stool, shortness of breath, chest pain, scrotum/testicle pain, hematuria, dysuria. Patient endorses intermittent diarrhea and coughing.       REVIEW OF SYSTEMS   Review of Systems   Constitutional: Negative for fever.   Respiratory: Positive for cough. Negative for shortness of breath.    Cardiovascular: Negative for chest pain and leg swelling.   Gastrointestinal: Positive for abdominal pain and diarrhea. Negative for blood in stool, nausea and vomiting.   Genitourinary: Negative for dysuria, hematuria and testicular pain.   Musculoskeletal: Positive for back pain.   Neurological: Positive for numbness (chronic neuropathy, no new numbness). Negative for weakness.        PAST MEDICAL HISTORY:  Past Medical History:   Diagnosis Date     Arthritis      Atrial fibrillation (H)      Bacteremia      Bell's palsy 2015     BPH (benign prostatic hyperplasia)      Diabetes (H)      Gastroesophageal reflux disease      GERD  (gastroesophageal reflux disease)      GI hemorrhage      High cholesterol      Hyperlipemia      Hyperlipidemia      Hypertension      Hypertension      Idiopathic peripheral neuropathy      Irregular heart beat     chronic at fib     Renal artery aneurysm (H)      Renal artery aneurysm (H)      Sleep apnea     Bipap     Sleep apnea 10/31/2021     Sleep apnea, obstructive     USES BIPAP     Type 2 diabetes mellitus (H)      UTI (lower urinary tract infection)        PAST SURGICAL HISTORY:  Past Surgical History:   Procedure Laterality Date     AMPUTATE FOOT Right 5/6/2019    Procedure: AMPUTATION, 3rd TOE RIGHT FOOT;  Surgeon: Ravi Abbasi DPM;  [place] [facility]  Service: [place]iatry     FUSION SPINE POSTERIOR MINIMALLY INVASIVE THREE + LEVELS N/A 10/29/2020    Procedure: L3/4/5S1 oblique lateral lumbar interbody fusion with discectomy L3/4/5S1 Posterior minimally invasive pedicle screw placement and posterolateral instrumentation and fusion  L3/4/5S1 Posterior minimally invasive pedicle screw placement and posterolateral instrumentation and fusion;  Surgeon: Vernon Bynum MD;  Location:  OR      REPAIR COMPL ROTATOR CUFF AVULSN,CHR      Description: Chronic Rotator Cuff Avulsion;  Recorded: 04/11/2011;     ORTHOPEDIC SURGERY Right     knee surgery     ORTHOPEDIC SURGERY Right     amputation 3rd toe on right foot     TENOTOMY ACHILLES TENDON       TRANSRECTAL ULTRASONIC, TRANSURETHRAL RESECTION (TUR) OF PROSTATE CYST         CURRENT MEDICATIONS:    No current facility-administered medications for this encounter.    Current Outpatient Medications:      atenolol (TENORMIN) 50 MG tablet, Take 0.5 tablets (25 mg) by mouth daily, Disp: 45 tablet, Rfl: 3     atorvastatin (LIPITOR) 20 MG tablet, Take 1 tablet (20 mg) by mouth daily, Disp: 90 tablet, Rfl: 3     CONTOUR NEXT TEST test strip, USE 1 EACH AS DIRECTED DAILY., Disp: 100 strip, Rfl: 5     diltiazem ER COATED BEADS (CARDIZEM CD/CARTIA XT) 120  MG 24 hr capsule, TAKE 1 CAPSULE BY MOUTH EVERY DAY, Disp: 90 capsule, Rfl: 3     EPINEPHrine (ANY BX GENERIC EQUIV) 0.3 MG/0.3ML injection 2-pack, Inject 0.3 mg into the muscle as needed for anaphylaxis (Patient not taking: Reported on 5/16/2022), Disp: , Rfl:      fluticasone (FLONASE) 50 MCG/ACT nasal spray, Spray 2 sprays into both nostrils daily (Patient not taking: Reported on 5/16/2022), Disp: , Rfl:      gabapentin (NEURONTIN) 300 MG capsule, TAKE 2 CAPSULES (600 MG TOTAL) BY MOUTH AT BEDTIME., Disp: 180 capsule, Rfl: 3     gemfibrozil (LOPID) 600 MG tablet, TAKE 1 TABLET BY MOUTH TWICE A DAY, Disp: 180 tablet, Rfl: 2     hydrOXYzine (ATARAX) 10 MG tablet, Take 1 tablet (10 mg) by mouth every 6 hours as needed for itching, Disp: 60 tablet, Rfl: 3     lisinopril (ZESTRIL) 5 MG tablet, TAKE 1 TABLET BY MOUTH EVERY DAY, Disp: 90 tablet, Rfl: 3     loratadine (CLARITIN) 10 MG tablet, Take 10 mg by mouth daily (Patient not taking: Reported on 5/16/2022), Disp: , Rfl:      metFORMIN (GLUCOPHAGE) 500 MG tablet, TAKE 1 TABLET BY MOUTH EACH MORNING AND 2 TABLETS EACH EVENING, Disp: 270 tablet, Rfl: 3     methocarbamol (ROBAXIN) 500 MG tablet, Take 1 tablet (500 mg) by mouth 4 times daily as needed for muscle spasms (Patient not taking: Reported on 5/16/2022), Disp: 60 tablet, Rfl: 3     montelukast (SINGULAIR) 10 MG tablet, Take 10 mg by mouth daily, Disp: , Rfl:      multivitamin (OCUVITE) TABS tablet, Take 1 tablet by mouth 2 times daily (Patient not taking: Reported on 5/16/2022), Disp: , Rfl:      omeprazole (PRILOSEC) 20 MG DR capsule, Take 1 capsule (20 mg) by mouth daily, Disp: 90 capsule, Rfl: 3     oxyCODONE (ROXICODONE) 5 MG tablet, Take 0.5 tablets (2.5 mg) by mouth every 3 hours as needed for pain (Patient not taking: Reported on 5/16/2022), Disp: 60 tablet, Rfl: 0     Vitamin D, Cholecalciferol, 25 MCG (1000 UT) TABS, Take 1 tablet by mouth daily, Disp: , Rfl:      warfarin ANTICOAGULANT (COUMADIN) 5 MG  tablet, TAKE 1 TO 1.5 TABLETS BY MOUTH DAILY AS DIRECTED. ADJUST PER INR RESULTS., Disp: 135 tablet, Rfl: 3     warfarin ANTICOAGULANT (COUMADIN) 7.5 MG tablet, Take 7.5 mg by mouth once a week On Sundays, Disp: , Rfl:     ALLERGIES:  Allergies   Allergen Reactions     Bees Swelling     Reports using an epi pen if stung       FAMILY HISTORY:  Family History   Problem Relation Age of Onset     Coronary Artery Disease Mother      Coronary Artery Disease Father      Atrial fibrillation Father        SOCIAL HISTORY:   Social History     Socioeconomic History     Marital status:      Number of children: 3   Tobacco Use     Smoking status: Former Smoker     Packs/day: 2.00     Years: 27.00     Pack years: 54.00     Types: Cigarettes, Cigarettes     Quit date: 1990     Years since quittin.3     Smokeless tobacco: Never Used   Substance and Sexual Activity     Alcohol use: No     Drug use: No     Sexual activity: Not Currently       PHYSICAL EXAM:    Vitals: BP (!) 165/89   Pulse 75   Temp 98.6  F (37  C) (Temporal)   Resp 18   Wt 100.7 kg (222 lb)   SpO2 99%   BMI 29.29 kg/m     Constitutional: Well developed, well nourished. Comfortable appearing.  HENT: Normocephalic, atraumatic, mucous membranes moist, nose normal. Neck- Supple, gross ROM intact.   Eyes: Pupils mid-range, sclera white, no discharge  Respiratory: Clear to auscultation bilaterally, no respiratory distress, no wheezing, speaks full sentences easily.  Cardiovascular: Normal heart rate, regular rhythm, no murmurs. No lower extremity edema   GI: Soft, No masses. Mild RLQ tenderness.   Musculoskeletal: Moving all 4 extremities intentionally and without pain. No obvious deformity.  Skin: Warm, dry, no rash.  Neurologic: Alert & oriented x 3, speech clear, moving all extremities spontaneously. Strength and sensation intact in the lower extremities.  Psychiatric: Affect normal, cooperative.     LAB:  All pertinent labs reviewed and  interpreted.  Labs Ordered and Resulted from Time of ED Arrival to Time of ED Departure   INR - Abnormal       Result Value    INR 2.59 (*)    COMPREHENSIVE METABOLIC PANEL - Abnormal    Sodium 142      Potassium 4.0      Chloride 109 (*)     Carbon Dioxide (CO2) 22      Anion Gap 11      Urea Nitrogen 17      Creatinine 0.76      Calcium 9.5      Glucose 120      Alkaline Phosphatase 105      AST 15      ALT 11      Protein Total 7.4      Albumin 3.4 (*)     Bilirubin Total 0.5      GFR Estimate >90     ROUTINE UA WITH MICROSCOPIC REFLEX TO CULTURE - Abnormal    Color Urine Light Yellow      Appearance Urine Clear      Glucose Urine Negative      Bilirubin Urine Negative      Ketones Urine Negative      Specific Gravity Urine 1.020      Blood Urine Negative      pH Urine 6.0      Protein Albumin Urine 10  (*)     Urobilinogen Urine <2.0      Nitrite Urine Negative      Leukocyte Esterase Urine Negative      Mucus Urine Present (*)     RBC Urine 3 (*)     WBC Urine 2     CBC WITH PLATELETS - Normal    WBC Count 7.1      RBC Count 4.74      Hemoglobin 14.2      Hematocrit 42.3      MCV 89      MCH 30.0      MCHC 33.6      RDW 13.4      Platelet Count 287         RADIOLOGY:  CT Abdomen Pelvis w Contrast   Final Result   IMPRESSION:    1.  No acute findings in the abdomen and pelvis to explain the patient's pain.      2.  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          EKG:   N/A    PROCEDURES:   Procedures       I, Marlo Trevizo, am serving as a scribe to document services personally performed by Dr. Skyla Warren based on my observation and the provider's statements to me. I, Skyla Warren MD attest that Marlo Trevizo is acting in a scribe capacity, has observed my performance of the services and has documented them in accordance with my direction.      Skyla Warren M.D.  Emergency Medicine  [facility] EMERGENCY ROOM  [address]  [address]  [phone]  Dept: [phone]     Skyla Warren MD  05/21/22 8214

## 2022-05-21 NOTE — ED TRIAGE NOTES
Patient is here with RLQ pain that has been going on for one month and worsening. He does have a hx of kidney stone. Hx of polyp removal with colonoscopies in the past.

## 2022-05-23 ENCOUNTER — DOCUMENTATION ONLY (OUTPATIENT)
Dept: ANTICOAGULATION | Facility: CLINIC | Age: 77
End: 2022-05-23
Payer: COMMERCIAL

## 2022-05-23 DIAGNOSIS — I48.91 ATRIAL FIBRILLATION, UNSPECIFIED TYPE (H): Primary | ICD-10-CM

## 2022-05-23 NOTE — PROGRESS NOTES
ANTICOAGULATION  MANAGEMENT: Discharge Review    Stefan Diallo chart reviewed for anticoagulation continuity of care    Emergency room visit on 5/21/22 for RLQ pain.    Discharge disposition: Home    Results:    Recent labs: (last 7 days)     05/16/22  1620 05/21/22  1319   INR 3.8* 2.59*     Anticoagulation inpatient management:     not applicable     Anticoagulation discharge instructions:     Warfarin dosing: home regimen continued   Bridging: No   INR goal change: No      Medication changes affecting anticoagulation: No    Additional factors affecting anticoagulation: No    Plan     No adjustment to anticoagulation plan needed    Patient not contacted    Anticoagulation Calendar updated    Lucía Hardin RN

## 2022-06-02 ENCOUNTER — HOSPITAL ENCOUNTER (OUTPATIENT)
Dept: CT IMAGING | Facility: HOSPITAL | Age: 77
Discharge: HOME | End: 2022-06-02
Attending: FAMILY MEDICINE | Admitting: FAMILY MEDICINE
Payer: COMMERCIAL

## 2022-06-02 DIAGNOSIS — R05.3 CHRONIC COUGH: ICD-10-CM

## 2022-06-02 DIAGNOSIS — R07.1 CHEST PAIN ON BREATHING: ICD-10-CM

## 2022-06-02 PROCEDURE — 71250 CT THORAX DX C-: CPT

## 2022-06-03 ENCOUNTER — TELEPHONE (OUTPATIENT)
Dept: FAMILY MEDICINE | Facility: CLINIC | Age: 77
End: 2022-06-03
Payer: COMMERCIAL

## 2022-06-03 NOTE — TELEPHONE ENCOUNTER
Pt called and said insurance was denied - pt of course had the CT and is now very upset and will not pay for CT.      Please look under referral for CT for notes from PA team.  Thanks    Pt wants a callback today.  Thanks

## 2022-06-03 NOTE — TELEPHONE ENCOUNTER
I reviewed the notes on the referral. Appears that a isnx-ue-rwzx review can be done- I will have Dr. Mak address this next week on Monday when she returns- she is out of the office so will be unable to call patient back today.

## 2022-06-03 NOTE — TELEPHONE ENCOUNTER
Please call Stefan and explain his CT looked normal and did not show any reason for his ongoing cough. So I am not worried about a problem in his lungs.     Catrachita Mak MD

## 2022-06-06 NOTE — TELEPHONE ENCOUNTER
Call to inform patient providers are in the process of working with his insurance to get the CT scan be paid for.  Either the provider or triage nurse will call him back with outcome.     ANA MedellinN, RN  [facility]

## 2022-06-07 ENCOUNTER — TRANSFERRED RECORDS (OUTPATIENT)
Dept: HEALTH INFORMATION MANAGEMENT | Facility: CLINIC | Age: 77
End: 2022-06-07
Payer: COMMERCIAL

## 2022-06-10 ENCOUNTER — TELEPHONE (OUTPATIENT)
Dept: ANTICOAGULATION | Facility: CLINIC | Age: 77
End: 2022-06-10
Payer: COMMERCIAL

## 2022-06-10 NOTE — TELEPHONE ENCOUNTER
ANTICOAGULATION     Stefan MIRANDA Yoel is overdue for INR check.      Spoke with Stefan who declined to schedule a lab appointment at this time. If calling back please schedule as soon as possible.     He asked what he can take for pain and I advised Tylenol, no more than 4,000 mg in 24 hours for only a day or two. He expressed understanding.    Lucía Hardin RN

## 2022-06-13 NOTE — TELEPHONE ENCOUNTER
Patient call back to check on status of CT scan being call to insurance to be paid for by patient health insurance.    Please advise.

## 2022-06-13 NOTE — TELEPHONE ENCOUNTER
Unable to reach. The imaging center is responsible for running high-ticket imaging through insurance prior to scanning patient. Patient needs to contact imaging center if not already done and follow up with them. This is not a call for MD unless the order needs to be altered or changed. Thanks! Advise patient when they call back.

## 2022-06-15 NOTE — TELEPHONE ENCOUNTER
Pt called back and I relay msg below to pt as indicated. Pt was very upset as he been waiting for a call back and stated he never got call back. Pt will talk to Imaging to dispute insurance claim for CT.

## 2022-06-17 ENCOUNTER — MYC MEDICAL ADVICE (OUTPATIENT)
Dept: ANTICOAGULATION | Facility: CLINIC | Age: 77
End: 2022-06-17
Payer: COMMERCIAL

## 2022-06-17 NOTE — TELEPHONE ENCOUNTER
ANTICOAGULATION     Stefan Diallo is overdue for INR check.      [employer] message sent.    Lucía Hardin RN

## 2022-06-29 ENCOUNTER — DOCUMENTATION ONLY (OUTPATIENT)
Dept: ANTICOAGULATION | Facility: CLINIC | Age: 77
End: 2022-06-29

## 2022-06-29 NOTE — LETTER
June 29, 2022      Stefan Diallo  [address] [address]              Dear Stefan,    You are currently under the care of [facility] for your warfarin (Coumadin , Jantoven ) therapy.  We are contacting you because our records show you were due for an INR on 5/31/22.    There are potentially serious risks when taking warfarin without careful monitoring and we want to make sure you are safely managed.  Routine lab monitoring is required for warfarin refills.     Please call [phone] as soon as possible to schedule an appointment.  If there has been a change in your care or other concerns, please let us know so we can help and or update our records.     Sincerely,       [facility]

## 2022-06-29 NOTE — PROGRESS NOTES
ANTICOAGULATION     Stefan Diallo is overdue for INR check.      Reminder letter sent    Jeanne Harris RN

## 2022-07-01 ENCOUNTER — ANTICOAGULATION THERAPY VISIT (OUTPATIENT)
Dept: ANTICOAGULATION | Facility: CLINIC | Age: 77
End: 2022-07-01

## 2022-07-01 ENCOUNTER — LAB (OUTPATIENT)
Dept: LAB | Facility: CLINIC | Age: 77
End: 2022-07-01
Payer: COMMERCIAL

## 2022-07-01 DIAGNOSIS — I48.91 ATRIAL FIBRILLATION, UNSPECIFIED TYPE (H): Primary | ICD-10-CM

## 2022-07-01 DIAGNOSIS — I48.91 ATRIAL FIBRILLATION, UNSPECIFIED TYPE (H): ICD-10-CM

## 2022-07-01 LAB — INR BLD: 3.5 (ref 0.9–1.1)

## 2022-07-01 PROCEDURE — 36415 COLL VENOUS BLD VENIPUNCTURE: CPT

## 2022-07-01 PROCEDURE — 85610 PROTHROMBIN TIME: CPT

## 2022-07-01 NOTE — PROGRESS NOTES
ANTICOAGULATION MANAGEMENT     Stefan Diallo 77 year old male is on warfarin with supratherapeutic INR result. (Goal INR 2.0-3.0)    Recent labs: (last 7 days)     07/01/22  1215   INR 3.5*       ASSESSMENT       Source(s): Chart Review, Patient/Caregiver Call and Template       Warfarin doses taken: Warfarin taken as instructed    Diet: No new diet changes identified    New illness, injury, or hospitalization: No-- back/abdominal pain continues; had MRI yesterday which will determine if an injection is possible    Medication/supplement changes: None noted-- states he is not taking tylenol    Signs or symptoms of bleeding or clotting: No    Previous INR: Supratherapeutic    Additional findings: doing PT for his back       PLAN     Recommended plan for no diet, medication or health factor changes affecting INR     Dosing Instructions: decrease your warfarin dose (13.3% change) with next INR in 2 weeks       Summary  As of 7/1/2022    Full warfarin instructions:  2.5 mg every Fri; 5 mg all other days   Next INR check:  7/15/2022             Telephone call with Stefan who agrees to plan and repeated back plan correctly    Patient offered & declined to schedule next visit    Education provided: Contact [phone] with any changes, questions or concerns.     Plan made per [facility] anticoagulation protocol    Jeanne Harris RN  Anticoagulation Clinic  7/1/2022    _______________________________________________________________________     Anticoagulation Episode Summary     Current INR goal:  2.0-3.0   TTR:  71.0 % (1 y)   Target end date:  Indefinite   Send INR reminders to:  HILARIO HECTOR    Indications    Atrial fibrillation  unspecified type (H) [I48.91]           Comments:           Anticoagulation Care Providers     Provider Role Specialty Phone number    Collin Singh MD Referring Family Medicine [phone]

## 2022-07-19 NOTE — PROGRESS NOTES
ANTICOAGULATION MANAGEMENT     Stefan Diallo 77 year old male is on warfarin with supratherapeutic INR result. (Goal INR 2.0-3.0)    Recent labs: (last 7 days)     07/19/22  1313   INR 3.7*       ASSESSMENT       Source(s): Chart Review, Patient/Caregiver Call and Template       Warfarin doses taken: More warfarin taken than planned which may be affecting INR. Was taking 5 mg daily instead of 2.5 mg Fri and 5 mg row.    Diet: No new diet changes identified    New illness, injury, or hospitalization: No    Medication/supplement changes: None noted    Signs or symptoms of bleeding or clotting: No    Previous INR: Supratherapeutic    Additional findings: Upcoming surgery/procedure Cortisone injection some time after he finishes PT.       PLAN     Recommended plan for ongoing change(s) affecting INR     Dosing Instructions: hold dose then decrease your warfarin dose (14% change) with next INR in 2 weeks       Summary  As of 7/19/2022    Full warfarin instructions:  7/19: Hold; Otherwise 2.5 mg every Wed, Sat; 5 mg all other days   Next INR check:  8/2/2022             Telephone call with Stefan who verbalizes understanding and agrees to plan and who agrees to plan and repeated back plan correctly    Patient offered & declined to schedule next visit    Education provided: Goal range and significance of current result, Importance of following up at instructed interval, Importance of taking warfarin as instructed, Monitoring for bleeding signs and symptoms, Importance of notifying clinic of upcoming surgeries and procedures 2 weeks in advance and Contact [phone] with any changes, questions or concerns.     Plan made per [facility] anticoagulation protocol    Lucía Hardin RN  Anticoagulation Clinic  7/19/2022    _______________________________________________________________________     Anticoagulation Episode Summary     Current INR goal:  2.0-3.0   TTR:  66.1 % (1 y)   Target end date:  Indefinite   Send INR reminders to:   HILARIO HECTOR    Indications    Atrial fibrillation  unspecified type (H) [I48.91]           Comments:           Anticoagulation Care Providers     Provider Role Specialty Phone number    Collin Singh MD Referring Family Medicine [phone]

## 2022-07-29 NOTE — PROGRESS NOTES
ANTICOAGULATION MANAGEMENT     Stefan Diallo 77 year old male is on warfarin with therapeutic INR result. (Goal INR 2.0-3.0)    Recent labs: (last 7 days)     07/29/22  1001   INR 2.5*       ASSESSMENT       Source(s): Chart Review, Patient/Caregiver Call and Template       Warfarin doses taken: Warfarin taken as instructed and Template incorrect; verbally confirmed home dose with Stefan     Diet: No new diet changes identified    New illness, injury, or hospitalization: No    Medication/supplement changes: None noted    Signs or symptoms of bleeding or clotting: No    Previous INR: Supratherapeutic    Additional findings: Upcoming surgery/procedure on 8/4/22 having injection done at pain clinic. Pt says he was informed by them to hold warfarin x5 days.       PLAN     Recommended plan for no diet, medication or health factor changes affecting INR     Dosing Instructions: Continue warfarin same dose, Hold x5 days before procedure, resume usual dose evening of procedure if provider approves. with next INR in 1 week after procedure.       Summary  As of 7/29/2022    Full warfarin instructions:  7/30: Hold; 7/31: Hold; 8/1: Hold; 8/2: Hold; 8/3: Hold; Otherwise 2.5 mg every Wed, Sat; 5 mg all other days   Next INR check:  8/11/2022             Telephone call with Stefan who verbalizes understanding and agrees to plan    Patient offered & declined to schedule next visit    Education provided: Goal range and significance of current result, Importance of therapeutic range, Importance of following up at instructed interval, Importance of taking warfarin as instructed, Monitoring for bleeding signs and symptoms, When to seek medical attention/emergency care, Importance of notifying clinic for changes in medications; a sooner lab recheck maybe needed., Importance of notifying clinic of upcoming surgeries and procedures 2 weeks in advance and Contact [phone] with any changes, questions or concerns.     Plan made per [facility]  anticoagulation protocol    Lucía Hardin RN  Anticoagulation Clinic  7/29/2022    _______________________________________________________________________     Anticoagulation Episode Summary     Current INR goal:  2.0-3.0   TTR:  64.5 % (1 y)   Target end date:  Indefinite   Send INR reminders to:  HILARIO HECTOR    Indications    Atrial fibrillation  unspecified type (H) [I48.91]           Comments:           Anticoagulation Care Providers     Provider Role Specialty Phone number    Collin Singh MD Referring Family Medicine [phone]

## 2022-08-05 NOTE — TELEPHONE ENCOUNTER
Reason for Call:  Other call back    Detailed comments: Would to speak to an INR nurse in regards to dosing.     Phone Number Patient can be reached at: Cell number on file:    Telephone Information:   Mobile [phone]       Best Time: anytime     Can we leave a detailed message on this number? YES    Call taken on 8/5/2022 at 10:07 AM by Nona Patel  ]

## 2022-08-05 NOTE — TELEPHONE ENCOUNTER
Called and spoke with patient.  Patient had spinal injection yesterday-- resumed warfarin in the evening.   Had questions about ongoing dosing and next recheck.   Advised to continue maintenance dose and recheck in 1 week.   Declined scheduling at this time.    Jeanne Harris RN  [facility] Anticoagulation  [phone]

## 2022-08-08 NOTE — TELEPHONE ENCOUNTER
"Routing refill request to provider for review/approval because:  Drug interaction warning    Last Written Prescription Date:  11/3/21  Last Fill Quantity: 180,  # refills: 2   Last office visit provider:  5/16/22     Requested Prescriptions   Pending Prescriptions Disp Refills     gemfibrozil (LOPID) 600 MG tablet [Pharmacy Med Name: GEMFIBROZIL 600 MG TABLET] 180 tablet 2     Sig: TAKE 1 TABLET BY MOUTH TWICE A DAY       Fibrates Passed - 8/7/2022  8:49 AM        Passed - Lipid panel on file in past 12 months     Recent Labs   Lab Test 12/28/21  1304   CHOL 152   TRIG 100   HDL 41   LDL 91               Passed - No abnormal creatine kinase in past 12 months     No lab results found.             Passed - Recent (12 mo) or future (30 days) visit within the authorizing provider's specialty     Patient has had an office visit with the authorizing provider or a provider within the authorizing providers department within the previous 12 mos or has a future within next 30 days. See \"Patient Info\" tab in inbasket, or \"Choose Columns\" in Meds & Orders section of the refill encounter.              Passed - Medication is active on med list        Passed - Patient is age 18 or older             Odalys Zimmer RN 08/08/22 6:54 AM  "

## 2022-08-10 PROBLEM — E66.9 OBESITY: Status: ACTIVE | Noted: 2018-11-26

## 2022-08-10 PROBLEM — I48.20 CHRONIC ATRIAL FIBRILLATION (H): Status: ACTIVE | Noted: 2019-02-10

## 2022-08-10 PROBLEM — E11.51 TYPE II DIABETES MELLITUS WITH PERIPHERAL CIRCULATORY DISORDER (H): Status: ACTIVE | Noted: 2017-06-16

## 2022-08-10 PROBLEM — L03.119 CELLULITIS OF FOOT: Status: ACTIVE | Noted: 2019-05-05

## 2022-08-10 NOTE — ED PROVIDER NOTES
EMERGENCY DEPARTMENT ENCOUNTER      NAME: Stefan Diallo  AGE: 77 year old male  YOB: 1945  MRN: [id number]  EVALUATION DATE & TIME: No admission date for patient encounter.    PCP: Collin Singh    ED PROVIDER: LEO Cardona, CNP      Chief Complaint   Patient presents with     Weight Loss     Generalized Weakness         FINAL IMPRESSION:  1. Infection due to 2019 novel coronavirus    2. Hypomagnesemia    3. Diarrhea    4. Cardiac enlargement    5. CAD (coronary artery disease)          ED COURSE & MEDICAL DECISION MAKIN:15 AM I met with the patient, obtained history, performed an initial exam, and discussed options and plan for treatment here in the ED.  2:31 PM Checked in on and updated patient.    Pertinent Labs & Imaging studies reviewed. (See chart for details)  77 year old male presents to the Emergency Department for evaluation of weight loss and weakness. COVID is positive. This may account for some of his symptoms. With concern for malignancy in the setting of dyspnea and COVID, CT PE study along with CT A/P was obtained. No obvious malignancy was found. No infection or other clear etiology for symptoms was noted. CT did show cardiac enlargement but no evidence for acute failure. CAD also noted. Magnesium low. Given oral replacement. Blood work otherwise appeared stable. He does have follow up with PCP on the . Will send Rx for magnesium replacement. Advised to return here for new / worsening symptoms or for other concerns.     At the conclusion of the encounter I discussed the results of all of the tests and the disposition. The questions were answered. The patient or family acknowledged understanding and was agreeable with the care plan.     MEDICATIONS GIVEN IN THE EMERGENCY:  Medications   magnesium oxide (MAG-OX) tablet 400 mg (400 mg Oral Given 8/10/22 1404)   iopamidol (ISOVUE-370) solution 75 mL (75 mLs Intravenous Given 8/10/22 1304)       NEW PRESCRIPTIONS  STARTED AT TODAY'S ER VISIT  New Prescriptions    MAGNESIUM GLUCONATE (MAGONATE) 500 (27 MG) MG TABLET    Take 1 tablet (500 mg) by mouth 2 times daily for 10 days            =================================================================    HPI    Patient information was obtained from: patient    Use of Intrepreter: N/A      Stefan Diallo is a 77 year old male with a history of atrial fibrillation, Bell's palsy, hypertension, hyperlipidemia, GERD, idiopathic peripheral neuropathy, KISHA, and type 2 diabetes who presents to the ED by walk in for evaluation of weight loss and weakness.    For the past month, the patient has been experiencing weight loss, diarrhea, weakness, and shortness of breath. The patient is weighed at his clinic and has lost ~40 lbs in a month. The patient also reports some lower right back pain and right flank pain, which is due to a pinched back nerve. He has received a cortisone shot in the area and was taking pain medication for it but has since finished the course.     He reports a history of diabetes. Patient takes daily medication, including a blood thinner, and was able to take his medications this morning. Reports being a former smoker. The patient is unsure if he is experiencing leg swelling due his weight loss. The patient denies night sweats, chills, fever, chest pain, abdominal pain, leg pain, headache, cough, or any other complaints at this time.     REVIEW OF SYSTEMS   Review of Systems   Constitutional: Positive for unexpected weight change (-40 lbs in 1 month). Negative for chills and fever.        Negative for night sweats.   Respiratory: Positive for shortness of breath. Negative for cough.    Cardiovascular: Negative for chest pain.   Gastrointestinal: Positive for diarrhea. Negative for abdominal pain.   Genitourinary: Positive for flank pain (right, chronic).   Musculoskeletal: Positive for back pain (lower right, chronic).        Negative for leg pain.   Neurological:  Positive for weakness. Negative for headaches.   All other systems reviewed and are negative.       PAST MEDICAL HISTORY:  Past Medical History:   Diagnosis Date     Arthritis      Atrial fibrillation (H)      Bacteremia      Bell's palsy 2015     BPH (benign prostatic hyperplasia)      Diabetes (H)      Gastroesophageal reflux disease      GERD (gastroesophageal reflux disease)      GI hemorrhage      High cholesterol      Hyperlipemia      Hyperlipidemia      Hypertension      Hypertension      Idiopathic peripheral neuropathy      Irregular heart beat     chronic at fib     Renal artery aneurysm (H)      Renal artery aneurysm (H)      Sleep apnea     Bipap     Sleep apnea 10/31/2021     Sleep apnea, obstructive     USES BIPAP     Type 2 diabetes mellitus (H)      UTI (lower urinary tract infection)        PAST SURGICAL HISTORY:  Past Surgical History:   Procedure Laterality Date     AMPUTATE FOOT Right 5/6/2019    Procedure: AMPUTATION, 3rd TOE RIGHT FOOT;  Surgeon: Ravi Abbasi DPM;  Location: [facility];  Service: Podiatry     FUSION SPINE POSTERIOR MINIMALLY INVASIVE THREE + LEVELS N/A 10/29/2020    Procedure: L3/4/5S1 oblique lateral lumbar interbody fusion with discectomy L3/4/5S1 Posterior minimally invasive pedicle screw placement and posterolateral instrumentation and fusion  L3/4/5S1 Posterior minimally invasive pedicle screw placement and posterolateral instrumentation and fusion;  Surgeon: Vernon Bynum MD;  Location:  OR      REPAIR COMPL ROTATOR CUFF AVULSN,CHR      Description: Chronic Rotator Cuff Avulsion;  Recorded: 04/11/2011;     ORTHOPEDIC SURGERY Right     knee surgery     ORTHOPEDIC SURGERY Right     amputation 3rd toe on right foot     TENOTOMY ACHILLES TENDON       TRANSRECTAL ULTRASONIC, TRANSURETHRAL RESECTION (TUR) OF PROSTATE CYST             CURRENT MEDICATIONS:    No current facility-administered medications for this encounter.     Current Outpatient Medications    Medication     magnesium gluconate (MAGONATE) 500 (27 Mg) MG tablet     atenolol (TENORMIN) 50 MG tablet     atorvastatin (LIPITOR) 20 MG tablet     CONTOUR NEXT TEST test strip     diltiazem ER COATED BEADS (CARDIZEM CD/CARTIA XT) 120 MG 24 hr capsule     EPINEPHrine (ANY BX GENERIC EQUIV) 0.3 MG/0.3ML injection 2-pack     fluticasone (FLONASE) 50 MCG/ACT nasal spray     gabapentin (NEURONTIN) 300 MG capsule     gemfibrozil (LOPID) 600 MG tablet     hydrOXYzine (ATARAX) 10 MG tablet     lisinopril (ZESTRIL) 5 MG tablet     loratadine (CLARITIN) 10 MG tablet     metFORMIN (GLUCOPHAGE) 500 MG tablet     methocarbamol (ROBAXIN) 500 MG tablet     montelukast (SINGULAIR) 10 MG tablet     multivitamin (OCUVITE) TABS tablet     omeprazole (PRILOSEC) 20 MG DR capsule     oxyCODONE (ROXICODONE) 5 MG tablet     Vitamin D, Cholecalciferol, 25 MCG (1000 UT) TABS     warfarin ANTICOAGULANT (COUMADIN) 5 MG tablet     warfarin ANTICOAGULANT (COUMADIN) 7.5 MG tablet         ALLERGIES:  Allergies   Allergen Reactions     Bees Swelling     Reports using an epi pen if stung       FAMILY HISTORY:  Family History   Problem Relation Age of Onset     Coronary Artery Disease Mother      Coronary Artery Disease Father      Atrial fibrillation Father        SOCIAL HISTORY:   Social History     Socioeconomic History     Marital status:      Spouse name: None     Number of children: 3     Years of education: None     Highest education level: None   Tobacco Use     Smoking status: Former Smoker     Packs/day: 2.00     Years: 27.00     Pack years: 54.00     Types: Cigarettes, Cigarettes     Quit date: 1990     Years since quittin.5     Smokeless tobacco: Never Used   Substance and Sexual Activity     Alcohol use: No     Drug use: No     Sexual activity: Not Currently         VITALS:  Patient Vitals for the past 24 hrs:   BP Temp Temp src Pulse Resp SpO2 Weight   08/10/22 1430 (!) 170/95 -- -- 79 -- 90 % --   08/10/22 1415  137/71 -- -- 75 -- 98 % --   08/10/22 1400 (!) 142/80 -- -- 75 -- 98 % --   08/10/22 1345 (!) 150/86 -- -- 79 -- 99 % --   08/10/22 1330 (!) 167/85 -- -- -- -- -- --   08/10/22 0820 (!) 188/99 98.8  F (37.1  C) Temporal 92 22 97 % 93.9 kg (207 lb 1.6 oz)       PHYSICAL EXAM    Constitutional:  Alert, no distress  EYES: Conjunctivae clear  HENT:  Atraumatic, normocephalic  Respiratory:  No respiratory distress, normal breath sounds  Cardiovascular:  Normal rate, normal rhythm, no murmurs  GI:  Soft, nondistended, mild diffuse tenderness, no palpable masses, no rebound, no guarding   Musculoskeletal:  No edema.  No cyanosis.  Range of motion major extremities intact  Integument: Warm, Dry, No erythema, No rash.   Neurologic:  Alert & oriented x 3              LAB:  All pertinent labs reviewed and interpreted.  Labs Ordered and Resulted from Time of ED Arrival to Time of ED Departure   MAGNESIUM - Abnormal       Result Value    Magnesium 1.5 (*)    COMPREHENSIVE METABOLIC PANEL - Abnormal    Sodium 141      Potassium 4.0      Chloride 106      Carbon Dioxide (CO2) 26      Anion Gap 9      Urea Nitrogen 21      Creatinine 0.96      Calcium 9.7      Glucose 142 (*)     Alkaline Phosphatase 98      AST 15      ALT 14      Protein Total 7.3      Albumin 3.5      Bilirubin Total 0.5      GFR Estimate 80     INFLUENZA A/B & SARS-COV2 PCR MULTIPLEX - Abnormal    Influenza A PCR Negative      Influenza B PCR Negative      RSV PCR Negative      SARS CoV2 PCR Positive (*)    B-TYPE NATRIURETIC PEPTIDE ([facility] ONLY) - Abnormal     (*)    INR - Abnormal    INR 2.39 (*)    TSH WITH FREE T4 REFLEX - Normal    TSH 0.62     TROPONIN I - Normal    Troponin I 0.02     CBC WITH PLATELETS AND DIFFERENTIAL    WBC Count 6.7      RBC Count 4.62      Hemoglobin 14.3      Hematocrit 42.4      MCV 92      MCH 31.0      MCHC 33.7      RDW 13.5      Platelet Count 353      % Neutrophils 63      % Lymphocytes 23      % Monocytes 10      %  Eosinophils 3      % Basophils 0      % Immature Granulocytes 1      NRBCs per 100 WBC 0      Absolute Neutrophils 4.3      Absolute Lymphocytes 1.5      Absolute Monocytes 0.7      Absolute Eosinophils 0.2      Absolute Basophils 0.0      Absolute Immature Granulocytes 0.0      Absolute NRBCs 0.0     CLOSTRIDIUM DIFFICILE TOXIN B   ENTERIC BACTERIA AND VIRUS PANEL BY KAT [facility]         RADIOLOGY:  Reviewed all pertinent imaging. Please see official radiology report.  CT Abdomen Pelvis w Contrast   Final Result   IMPRESSION:    1.  No etiology for patient's new symptoms. Nothing obstructive or inflammatory involving bowel. No concerning lesion.   2.  Stable benign renal cysts. There are 2 tiny nonobstructing left kidney stones.   3.  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      CT Chest Pulmonary Embolism w Contrast   Final Result   IMPRESSION:      1.  No pulmonary embolism.      2.  No acute abnormality or specific explanation for weight loss and cough identified. Minimal paraseptal emphysema is unchanged.      3.  Marked cardiomegaly with biatrial dilation and moderate coronary arterial calcifications.          EKG Interpretation  8/10/2022 at 9:30 AM    Rhythm: a-fib  Rate: 83 BPM  Axis: normal  Ectopy: none  Conduction: RBBB  ST Segments: no acute change  T Waves: no acute change  Q Waves: none    Clinical Impression: A. fib with ventricular rate of 83 bpm.  Right bundle branch block pattern.  No acute appearing ST or T wave abnormalities.  EKG does appear similar to prior from 10/7/2020.    I have independentlyreviewed and interpreted the patient's EKG with comments made as listed above.  Please see scanned image for full interpretation        PROCEDURES:   None      IVicki, am serving as a scribe to document services personally performed by Henrry Pride CNP. based on my observation and the provider's statements to me. I, Henrry Pride CNP attest that Vicki Leon is acting in a scribe  capacity, has observed my performance of the services and has documented them in accordance with my direction.    LEO Cardona, CNP  Emergency Medicine  [facility] EMERGENCY DEPARTMENT  [address] [place] [id number] [phone]  Dept: [phone]         Henrry Pride APRN CNP  08/10/22 1449

## 2022-08-10 NOTE — DISCHARGE INSTRUCTIONS
Your covid test is positive. There is no specific treatment for this. Please work to stay hydrated and eat meals daily to maintain your weight.    Take the magnesium supplement as prescribed.    Your testing did not show any other obvious cause for your concerns.    Follow up with your doctor on the 19th as planned.    Return to the ER for worsening symptoms.

## 2022-08-10 NOTE — ED TRIAGE NOTES
Patient states he has had diarrhea x 1 month and increased weakness with intermittent shortness of breath. Patient states he used to weight around 260lbs.

## 2022-08-11 NOTE — PROGRESS NOTES
ANTICOAGULATION MANAGEMENT     Stefan Diallo 77 year old male is on warfarin with therapeutic INR result. (Goal INR 2.0-3.0)    Recent labs: (last 7 days)     08/10/22  1017   INR 2.39*       ASSESSMENT       Source(s): Chart Review and Patient/Caregiver Call       Warfarin doses taken: Warfarin taken as instructed    Diet: No new diet changes identified    New illness, injury, or hospitalization: Yes: COVID+ on 8/10/22    Medication/supplement changes: magnesium gluconate started on 8/10 No interaction anticipated    Signs or symptoms of bleeding or clotting: No    Previous INR: Therapeutic last visit; previously outside of goal range    Additional findings: None       PLAN     Recommended plan for temporary change(s) affecting INR     Dosing Instructions: Continue your current warfarin dose with next INR in 1 week       Summary  As of 8/11/2022    Full warfarin instructions:  2.5 mg every Wed, Sat; 5 mg all other days   Next INR check:  8/19/2022             Telephone call with Stefan who verbalizes understanding and agrees to plan    Check at provider office visit    Education provided: Contact [phone] with any changes, questions or concerns.     Plan made per [facility] anticoagulation protocol    Jeanne Harris RN  Anticoagulation Clinic  8/11/2022    _______________________________________________________________________     Anticoagulation Episode Summary     Current INR goal:  2.0-3.0   TTR:  65.2 % (1 y)   Target end date:  Indefinite   Send INR reminders to:  HILARIO HECTOR    Indications    Chronic atrial fibrillation (H) [I48.20]           Comments:           Anticoagulation Care Providers     Provider Role Specialty Phone number    Collin Singh MD Referring Family Medicine [phone]

## 2022-08-19 NOTE — TELEPHONE ENCOUNTER
Called pt in regards to their transfer of care for CPAP.  Explained they will need a current RX on file for CPAP supplies.  Suggested pt contact their PCP to see if they are willing to write a rx for CPAP supplies or get a referral for sleep provider. Also let know he could contact their previous provider, who is with [facility], and see if they can see pt. Pt understood. Call ended.

## 2022-08-19 NOTE — PROGRESS NOTES
ANTICOAGULATION MANAGEMENT     Stefan Diallo 77 year old male is on warfarin with supratherapeutic INR result. (Goal INR 2.0-3.0)    Recent labs: (last 7 days)     08/19/22  1621   INR 3.6*       ASSESSMENT       Source(s): Chart Review and Patient/Caregiver Call       Warfarin doses taken: More warfarin taken than planned which may be affecting INR and states he has never taken 0.5 tablets, states he has been taking 5mg all days of the week except saturday when he takes 1.5 tablets. (7.5mg) template updated to show what he has been taking. states he has been doing this same dosing for a long time.    Diet: decreased intake with cold symptoms, postive covid recently    New illness, injury, or hospitalization: Yes: er 8/10/2022 covid        Medication/supplement changes: using tylenol for cold symptoms    Signs or symptoms of bleeding or clotting: No    Previous INR: Therapeutic last visit; previously outside of goal range    Additional findings: no mg in todays templates       PLAN     Recommended plan for temporary change(s) and ongoing change(s) affecting INR     Dosing Instructions: hold dose then continue your current warfarin dose with next INR in 1 week (will have Stefan continue the weekly plan, with hold to verify dosing)        Summary  As of 8/19/2022    Full warfarin instructions:  8/19: Hold; Otherwise 7.5 mg every Sat; 5 mg all other days   Next INR check:  8/26/2022             Telephone call with Stefan who verbalizes understanding and agrees to plan    Lab visit scheduled    Education provided: Goal range and significance of current result, Importance of therapeutic range, Importance of following up at instructed interval, Importance of taking warfarin as instructed, Importance of notifying clinic for changes in medications; a sooner lab recheck maybe needed. and Contact [phone]  with any changes, questions or concerns.     Plan made per [facility] anticoagulation protocol    Isabela Foster,  RN  Anticoagulation Clinic  8/19/2022    _______________________________________________________________________     Anticoagulation Episode Summary     Current INR goal:  2.0-3.0   TTR:  65.2 % (1 y)   Target end date:  Indefinite   Send INR reminders to:  HILARIO HECTOR    Indications    Chronic atrial fibrillation (H) [I48.20]           Comments:           Anticoagulation Care Providers     Provider Role Specialty Phone number    Collin Singh MD Referring Family Medicine [phone]

## 2022-08-23 NOTE — TELEPHONE ENCOUNTER
Test Results    Contacts       Type Contact Phone/Fax    08/23/2022 02:43 PM CDT Phone (Incoming) Stefan Diallo (Self) [phone] (M)          Who ordered the test:  PCP    Type of test: Lab    Date of test:  8/19/22    Where was the test performed:  PSA tumor marker - normal; A1C was 6.2. magnesium was normal, and INR was 3.6  How does PCP wish to proceed?      What are your questions/concerns?:  Pt having diarrhea x a month, how can it be stopped  Labs for CDiff and Bacteria all negative.  Pt was positive for COVID 3 weeks ago.  Can PCP order something to stop the diarrhea?      Could we send this information to you in [employer] or would you prefer to receive a phone call?:   Patient would prefer a phone call     Okay to leave a detailed message?: Yes at Cell number on file:    Telephone Information:   Mobile [phone]       Call taken on 8/23/22 at 247 pm by PAM Lopez

## 2022-08-24 NOTE — PROGRESS NOTES
"  Assessment & Plan     Type II diabetes mellitus with peripheral circulatory disorder (H)    Stable.    - Albumin Random Urine Quantitative with Creat Ratio  - Hemoglobin A1c  - Hemoglobin A1c    Benign prostatic hyperplasia with nocturia    Stable.    - PSA, tumor marker  - PSA, tumor marker    Hypomagnesemia    Started supplement.    - Magnesium  - Magnesium    Atrial fibrillation, unspecified type (H)    Stable.    - INR point of care      Ordering of each unique test         BMI:   Estimated body mass index is 27.9 kg/m  as calculated from the following:    Height as of this encounter: 1.854 m (6' 1\").    Weight as of this encounter: 95.9 kg (211 lb 8 oz).           Return in about 4 months (around 12/19/2022) for DM.    Collin Singh MD, MD  [facility] KRUNAL Aviles is a 77 year old, presenting for the following health issues:  INR Followup (Check level), Diarrhea, and Fatigue (/)      HPI     He was seen at ER 8/10/22.  Covid positive.  Loose stools.  C diff was negative.  Magnesium 1.5, so he takes a supplement now.    Taking metformin for diabetes.    Current Outpatient Medications   Medication Sig Dispense Refill     atenolol (TENORMIN) 50 MG tablet Take 0.5 tablets (25 mg) by mouth daily 45 tablet 3     atorvastatin (LIPITOR) 20 MG tablet Take 1 tablet (20 mg) by mouth daily 90 tablet 3     CONTOUR NEXT TEST test strip USE 1 EACH AS DIRECTED DAILY. 100 strip 5     diltiazem ER COATED BEADS (CARDIZEM CD/CARTIA XT) 120 MG 24 hr capsule TAKE 1 CAPSULE BY MOUTH EVERY DAY 90 capsule 3     EPINEPHrine (ANY BX GENERIC EQUIV) 0.3 MG/0.3ML injection 2-pack Inject 0.3 mg into the muscle as needed for anaphylaxis       gabapentin (NEURONTIN) 300 MG capsule TAKE 2 CAPSULES (600 MG TOTAL) BY MOUTH AT BEDTIME. 180 capsule 3     gemfibrozil (LOPID) 600 MG tablet TAKE 1 TABLET BY MOUTH TWICE A  tablet 1     lisinopril (ZESTRIL) 5 MG tablet TAKE 1 TABLET BY MOUTH EVERY DAY 90 tablet " "3     loratadine (CLARITIN) 10 MG tablet Take 10 mg by mouth daily       metFORMIN (GLUCOPHAGE) 500 MG tablet TAKE 1 TABLET BY MOUTH EACH MORNING AND 2 TABLETS EACH EVENING 270 tablet 3     methocarbamol (ROBAXIN) 500 MG tablet Take 1 tablet (500 mg) by mouth 4 times daily as needed for muscle spasms 60 tablet 3     montelukast (SINGULAIR) 10 MG tablet Take 10 mg by mouth daily       multivitamin (OCUVITE) TABS tablet Take 1 tablet by mouth 2 times daily       omeprazole (PRILOSEC) 20 MG DR capsule Take 1 capsule (20 mg) by mouth daily 90 capsule 3     traMADol (ULTRAM) 50 MG tablet TAKE 1 TABLET BY MOUTH EVERY 6 HOURS AS NEEDED FOR PAIN       Vitamin D, Cholecalciferol, 25 MCG (1000 UT) TABS Take 1 tablet by mouth daily       warfarin ANTICOAGULANT (COUMADIN) 5 MG tablet TAKE 1 TO 1.5 TABLETS BY MOUTH DAILY AS DIRECTED. ADJUST PER INR RESULTS. 135 tablet 3     warfarin ANTICOAGULANT (COUMADIN) 7.5 MG tablet Take 7.5 mg by mouth once a week On Sundays       fluticasone (FLONASE) 50 MCG/ACT nasal spray Spray 2 sprays into both nostrils daily (Patient not taking: No sig reported)       hydrOXYzine (ATARAX) 10 MG tablet Take 1 tablet (10 mg) by mouth every 6 hours as needed for itching (Patient not taking: Reported on 8/19/2022) 60 tablet 3         Review of Systems   No cough or fever.      Objective    /76   Pulse 65   Temp 97.8  F (36.6  C) (Oral)   Resp 16   Ht 1.854 m (6' 1\")   Wt 95.9 kg (211 lb 8 oz)   SpO2 97%   BMI 27.90 kg/m    Body mass index is 27.9 kg/m .  Physical Exam   Heart A fib  Lungs normal  Abdomen normal                .  ..  "

## 2022-09-02 NOTE — PROGRESS NOTES
ANTICOAGULATION MANAGEMENT     Stefan Diallo 77 year old male is on warfarin with therapeutic INR result. (Goal INR 2.0-3.0)    Recent labs: (last 7 days)     09/02/22  1224   INR 2.7*       ASSESSMENT       Source(s): Chart Review and Patient/Caregiver Call       Warfarin doses taken: Warfarin taken as instructed    Diet: No new diet changes identified    New illness, injury, or hospitalization: No    Medication/supplement changes: Metformin dose decreased due to diarrhea. No interaction anticipated.    Signs or symptoms of bleeding or clotting: No    Previous INR: Supratherapeutic    Additional findings: Says he will have cortisone shots soon and knows he will have to be off warfarin for a few days before. Nothing is scheduled yet.       PLAN     Recommended plan for no diet, medication or health factor changes affecting INR     Dosing Instructions: Continue your current warfarin dose with next INR in 3 weeks       Summary  As of 9/2/2022    Full warfarin instructions:  7.5 mg every Sat; 5 mg all other days   Next INR check:  9/23/2022             Telephone call with Stefan who verbalizes understanding and agrees to plan    Patient offered & declined to schedule next visit    Education provided: Goal range and significance of current result, Importance of following up at instructed interval, Importance of notifying clinic of upcoming surgeries and procedures 2 weeks in advance and Contact [phone] with any changes, questions or concerns.     Plan made per [facility] anticoagulation protocol    Lucía Hardin RN  Anticoagulation Clinic  9/2/2022    _______________________________________________________________________     Anticoagulation Episode Summary     Current INR goal:  2.0-3.0   TTR:  62.7 % (1 y)   Target end date:  Indefinite   Send INR reminders to:  HILARIO HECTOR    Indications    Chronic atrial fibrillation (H) [I48.20]           Comments:           Anticoagulation Care Providers     Provider Role  Specialty Phone number    Collin Singh MD Referring Family Medicine [phone]

## 2022-09-27 NOTE — TELEPHONE ENCOUNTER
ANTICOAGULATION     Stefan Diallo is overdue for INR check.     Spoke with Stefan-- states that he can come in for an INR, but he will start holding his warfarin starting 10/1 for a spinal injection at [facility] on 10/6/22. Stefan states that [facility] needed MD approval to schedule the injection so PCP is aware of the injection-- per chart review it does not appear any hold plan for spinal injection has been discussed with [facility] or PCP. Will route to PCP to confirm approval for hold x5 for spinal injection.     Jeanne Harris RN

## 2022-09-28 NOTE — TELEPHONE ENCOUNTER
Re-routed to provider for review regarding the hold of medication for upcoming procedure.    CATHY Medellin, RN  [facility]

## 2022-09-30 NOTE — TELEPHONE ENCOUNTER
OK to hold warfarin for 5 days, no bridge.  No need to do INR right now while he will be holding the medication.  [facility] faxed a form regarding that, and we faxed it back to them.  andre

## 2022-09-30 NOTE — TELEPHONE ENCOUNTER
Spoke to Stefan, reviewed orders as below. He verbalized understanding and had no questions. He did come in for an INR today, see [facility] dosing encounter.

## 2022-09-30 NOTE — PROGRESS NOTES
ANTICOAGULATION MANAGEMENT     Stefan Diallo 77 year old male is on warfarin with supratherapeutic INR result. (Goal INR 2.0-3.0)    Recent labs: (last 7 days)     09/30/22  1239   INR 3.1*       ASSESSMENT       Source(s): Chart Review and Patient/Caregiver Call       Warfarin doses taken: Warfarin taken as instructed    Diet: No new diet changes identified    New illness, injury, or hospitalization: Yes: pain and inflammation, he has an injection with [facility] next week. See encounter 9/27. He will hold x 5, no bridge per PCP    Medication/supplement changes: None noted    Signs or symptoms of bleeding or clotting: No    Previous INR: Therapeutic last visit; previously outside of goal range    Additional findings: Upcoming surgery/procedure spinal injection next week       PLAN     Recommended plan for temporary change(s) affecting INR     Dosing Instructions: Hold warfarin x 5 starting tomorrow. When okayed by [facility] resume warfarin with a boost dose and then continue your current warfarin dose with next INR 1 week after resuming.    Summary  As of 9/30/2022    Full warfarin instructions:  10/1: Hold; 10/2: Hold; 10/3: Hold; 10/4: Hold; 10/5: Hold; 10/6: 7.5 mg; Otherwise 7.5 mg every Sat; 5 mg all other days   Next INR check:  10/13/2022             Telephone call with Stefan who verbalizes understanding and agrees to plan    Patient offered & declined to schedule next visit    Education provided: Goal range and significance of current result    Plan made per [facility] anticoagulation protocol    Rossi Spence RN  Anticoagulation Clinic  9/30/2022    _______________________________________________________________________     Anticoagulation Episode Summary     Current INR goal:  2.0-3.0   TTR:  64.2 % (1 y)   Target end date:  Indefinite   Send INR reminders to:  HILARIO HECTOR    Indications    Chronic atrial fibrillation (H) [I48.20]           Comments:           Anticoagulation Care Providers     Provider  Role Specialty Phone number    Collin Singh MD Referring Family Medicine [phone]

## 2022-10-03 NOTE — TELEPHONE ENCOUNTER
"Last Written Prescription Date:  11/17/2021  Last Fill Quantity: 90,  # refills: 3   Last office visit provider:  8/19/2022     Requested Prescriptions   Pending Prescriptions Disp Refills     lisinopril (ZESTRIL) 5 MG tablet [Pharmacy Med Name: LISINOPRIL 5 MG TABLET] 90 tablet 3     Sig: TAKE 1 TABLET BY MOUTH EVERY DAY       ACE Inhibitors (Including Combos) Protocol Passed - 10/1/2022  8:43 AM        Passed - Blood pressure under 140/90 in past 12 months     BP Readings from Last 3 Encounters:   08/19/22 136/76   08/10/22 (!) 149/93   05/21/22 (!) 165/89                 Passed - Recent (12 mo) or future (30 days) visit within the authorizing provider's specialty     Patient has had an office visit with the authorizing provider or a provider within the authorizing providers department within the previous 12 mos or has a future within next 30 days. See \"Patient Info\" tab in inbasket, or \"Choose Columns\" in Meds & Orders section of the refill encounter.              Passed - Medication is active on med list        Passed - Patient is age 18 or older        Passed - Normal serum creatinine on file in past 12 months     Recent Labs   Lab Test 08/10/22  1017   CR 0.96       Ok to refill medication if creatinine is low          Passed - Normal serum potassium on file in past 12 months     Recent Labs   Lab Test 08/10/22  1017   POTASSIUM 4.0                  Анна Lang RN 10/03/22 12:27 AM  "

## 2022-10-14 NOTE — PROGRESS NOTES
ANTICOAGULATION MANAGEMENT     Stefan Diallo 77 year old male is on warfarin with therapeutic INR result. (Goal INR 2.0-3.0)    Recent labs: (last 7 days)     10/14/22  1331   INR 2.2*       ASSESSMENT       Source(s): Chart Review, Patient/Caregiver Call and Template       Warfarin doses taken: Warfarin taken as instructed-- did hold x5 for spinal injection, resumed on 10/6    Diet: No new diet changes identified    New illness, injury, or hospitalization: No    Medication/supplement changes: None noted    Signs or symptoms of bleeding or clotting: No    Previous INR: Supratherapeutic    Additional findings: None       PLAN     Recommended plan for no diet, medication or health factor changes affecting INR     Dosing Instructions: Continue your current warfarin dose with next INR in 3 weeks       Summary  As of 10/14/2022    Full warfarin instructions:  7.5 mg every Sat; 5 mg all other days   Next INR check:  11/4/2022             Telephone call with Stefan who verbalizes understanding and agrees to plan    Patient offered & declined to schedule next visit    Education provided: Contact [phone] with any changes, questions or concerns.     Plan made per [facility] anticoagulation protocol    Jeanne Harris RN  Anticoagulation Clinic  10/14/2022    _______________________________________________________________________     Anticoagulation Episode Summary     Current INR goal:  2.0-3.0   TTR:  67.2 % (1 y)   Target end date:  Indefinite   Send INR reminders to:  HILARIO HECTOR    Indications    Chronic atrial fibrillation (H) [I48.20]           Comments:           Anticoagulation Care Providers     Provider Role Specialty Phone number    Collin Singh MD Referring Family Medicine [phone]

## 2022-10-14 NOTE — TELEPHONE ENCOUNTER
ANTICOAGULATION     Stefan Diallo is overdue for INR check.      Spoke with Stefan who declined to schedule a lab appointment at this time. If calling back please schedule as soon as possible.    Jeanne Harris RN

## 2022-11-08 NOTE — TELEPHONE ENCOUNTER
ANTICOAGULATION     Stefan Diallo is overdue for INR check.      Spoke with Stefan who declined to schedule a lab appointment at this time. If calling back please schedule as soon as possible.     Said he will just stop in tomorrow for INR.    Lucía Hardin RN

## 2022-11-11 NOTE — PROGRESS NOTES
ANTICOAGULATION MANAGEMENT     Stefan Diallo 77 year old male is on warfarin with therapeutic INR result. (Goal INR 2.0-3.0)    Recent labs: (last 7 days)     11/11/22  1112   INR 2.9*       ASSESSMENT       Source(s): Chart Review, Patient/Caregiver Call and Template       Warfarin doses taken: Warfarin taken as instructed    Diet: No new diet changes identified    New illness, injury, or hospitalization: No    Medication/supplement changes: None noted    Signs or symptoms of bleeding or clotting: No    Previous INR: Therapeutic last visit; previously outside of goal range    Additional findings: Upcoming surgery/procedure having a nerve stimulator placed for back pain-- not yet scheduled, will contact [facility] once it is scheduled       PLAN     Recommended plan for no diet, medication or health factor changes affecting INR     Dosing Instructions: Continue your current warfarin dose with next INR in 4 weeks unless stimulator placement is scheduled sooner    Summary  As of 11/11/2022    Full warfarin instructions:  7.5 mg every Sat; 5 mg all other days; Starting 11/11/2022   Next INR check:  12/9/2022             Telephone call with Stefan who verbalizes understanding and agrees to plan    Patient offered & declined to schedule next visit    Education provided:     Importance of notifying anticoagulation clinic for: upcoming surgeries and procedures 2 weeks in advance    Contact [phone] with any changes, questions or concerns.     Plan made per [facility] anticoagulation protocol    Jeanne Harris RN  Anticoagulation Clinic  11/11/2022    _______________________________________________________________________     Anticoagulation Episode Summary     Current INR goal:  2.0-3.0   TTR:  69.6 % (1 y)   Target end date:  Indefinite   Send INR reminders to:  HILARIO HECTOR    Indications    Chronic atrial fibrillation (H) [I48.20]           Comments:           Anticoagulation Care Providers     Provider Role Specialty  Phone number    Collin Singh MD Referring Family Medicine [phone]

## 2022-11-22 NOTE — TELEPHONE ENCOUNTER
Received call from patient regarding upcoming procedure on 11/29.  Patient requested writer call [facility] to discuss his warfarin.  Patient was confused by instructions from her, stated he was told to hold for 7 days and then again for 14 days so he is not sure what to do.    Called and spoke with Chace.  She states that patient is having a nerve stimulator trial for back pain.  Procedure requires a warfarin hold prior and while leads are present.  Trial usually last 7 days, Chace states can be as short as 3 days if necessary based on anticoagulation needs.    They are requesting warfarin hold from 11/24 through 12/7.  Writer informed Chace that [facility] will check with provider regarding this request and will notify her if any concerns or changes needed to requested plan.    Per chart review does not appear patient has bridged with Lovenox in the past.  Indication of afib for anticoagulation.    Routing to [facility] and PCP for review.      Jeanne Harris RN  [facility] Anticoagulation  [phone]

## 2022-11-22 NOTE — TELEPHONE ENCOUNTER
[facility]-PROCED[facility] ANTICOAGULATION  MANAGEMENT    ASSESSMENT     Warfarin interruption plan for Nerve Stimulator Trial on 11/29.    Requested warfarin hold duration: 11/24 through 12/7      Extension of Hold 11/29-12/7 is due to no warfarin while leads are in placed; can short this time to as little as 3 days per PCP advice on anticoagulation    Indication for Anticoagulation: Atrial Fibrillation      [id number] = 4 (Hypertension, Age >= 75 and Diabetes)       Cleared to hold without bridge for spinal injection 10/2022; 10/2020 spine surgery (7 day hold)      [facility]-Procedure Risk stratification for thromboembolism: low (2022 Chest guidelines and 2017 [facility] periprocedure pathway for NVAF Expert Consensus)    AFIB: 2022 CHEST Perioperative Management guidelines recommends against bridging for patients with atrial fibrillation except in high risk stratification patients.  NVAF: 2017 ACC periprocedure pathway for NVAF advises NO bridge for low risk stratification ([id number] score <=4 and no prior hx of stroke, TIA or systemic embolism)     RECOMMENDATION       Pre-Procedure:  o Hold warfarin for 5 days, until after procedure starting: Thur 11/24   o No Bridge      Post-Procedure:  o Continue to hold warfarin until Leads are removed  - Request Dr. Singh input if 7 day trial is okay or if a shorter one is needed in order to resume anticoagulation    o Resume warfarin dose if okay with provider doing procedure AFTER leads removed: 7.5 mg daily x 2 then resume current dose based on date of resumption  o Recheck INR ~ 7 days after resuming warfarin       Plan routed to referring provider for approval  ?   Kim Fong, [facility]    SUBJECTIVE/OBJECTIVE     Stefan Diallo, a 77 year old male    Goal INR Range: 2.0-3.0     Lab Results   Component Value Date    INR 2.9 (H) 11/11/2022    INR 2.2 (H) 10/14/2022    INR 3.1 (H) 09/30/2022

## 2022-11-22 NOTE — TELEPHONE ENCOUNTER
Reason for Call:  Other returning call    Detailed comments: Please give patient a call back. Wrong phone number not listed in notes    Phone Number Patient can be reached at: Home number on file [phone] (home)    Best Time: anytime    Can we leave a detailed message on this number? YES    Call taken on 11/22/2022 at 4:02 PM by Yamile Key

## 2022-11-22 NOTE — TELEPHONE ENCOUNTER
Called and spoke with patient.  Informed that we have not heard back from PCP yet; informed that [facility] will reach out tomorrow to discuss.  Tomorrow would be last day of warfarin prior to hold if approved.    Jeanne Harris RN  [facility]  [phone]

## 2022-11-23 NOTE — TELEPHONE ENCOUNTER
Called and spoke with patient.  Informed Stefan of approval from Dr. Singh for hold up to 14 days for nerve stimulator trial procedure.  Provided instructions for resuming warfarin and when to recheck following resuming.  Anticoagulation calendar updated with hold.    Called and left voicemail for Rosa Isela with clinic doing procedure; advised that approval from primary care provider was received and patient has been notified.  Provided phone number to call back with any questions.    Jeanne Harris RN  [facility]  [phone]

## 2022-12-20 NOTE — TELEPHONE ENCOUNTER
ANTICOAGULATION     Stefan Diallo is overdue for INR check.      Spoke with Stefan who declined to schedule a lab appointment at this time. If calling back please schedule as soon as possible.   Said he will try and get in next week.  Taking Tylenol 1000 mg bid for pain.    Lucía Hardin RN

## 2022-12-22 NOTE — PROGRESS NOTES
"ANTICOAGULATION MANAGEMENT     Stefan Diallo 77 year old male is on warfarin with supratherapeutic INR result. (Goal INR 2.0-3.0)    Recent labs: (last 7 days)     12/22/22  1335   INR 3.5*       ASSESSMENT       Source(s): Chart Review, Patient/Caregiver Call and Template       Warfarin doses taken: cannot remember exactly when he resumed his warfarin but said it was more than a week ago.    Diet: No new diet changes identified    New illness, injury, or hospitalization: No    Medication/supplement changes: has been taking \"a lot of tylenol\" lately for back pain-- ~2g/day    Signs or symptoms of bleeding or clotting: No    Previous INR: Therapeutic last 2(+) visits    Additional findings: nerve stimulator was \"worthless\"-- has an appt tomorrow to discuss other options (possibly a pain pump)       PLAN     Recommended plan for ongoing change(s) affecting INR     Dosing Instructions: partial hold then decrease your warfarin dose (6.2% change) with next INR in 2 weeks       Summary  As of 12/22/2022    Full warfarin instructions:  12/22: 2.5 mg; Otherwise 5 mg every day   Next INR check:  1/5/2023             Telephone call with Stefan-- patient stated that if he holds a full dose today and then goes back to his normal dosing \"it will come right back down and be fine by next time\". Discussed that the hold will drop his INR but keeping the 7.5mg on Sunday may cause it to rise again. Patient insistent on \"at least giving it a try\"-- states he will hold a full dose tonight and then keep maintenance dose the same-- will recheck in 2 weeks.      Education provided:     Symptom monitoring: monitoring for bleeding signs and symptoms, monitoring for clotting signs and symptoms, monitoring for stroke signs and symptoms, when to seek medical attention/emergency care and if you hit your head or have a bad fall seek emergency care    Contact [phone] with any changes, questions or concerns.     Plan made per [facility] " anticoagulation protocol    Jeanne Harris RN  Anticoagulation Clinic  12/22/2022    _______________________________________________________________________     Anticoagulation Episode Summary     Current INR goal:  2.0-3.0   TTR:  60.2 % (1 y)   Target end date:  Indefinite   Send INR reminders to:  HILARIO HECTOR    Indications    Chronic atrial fibrillation (H) [I48.20]           Comments:           Anticoagulation Care Providers     Provider Role Specialty Phone number    Collin Singh MD Referring Family Medicine [phone]

## 2023-01-01 ENCOUNTER — TELEPHONE (OUTPATIENT)
Dept: NURSING | Facility: CLINIC | Age: 78
End: 2023-01-01

## 2023-01-01 ENCOUNTER — APPOINTMENT (OUTPATIENT)
Dept: OCCUPATIONAL THERAPY | Facility: CLINIC | Age: 78
DRG: 064 | End: 2023-01-01
Payer: COMMERCIAL

## 2023-01-01 ENCOUNTER — APPOINTMENT (OUTPATIENT)
Dept: CT IMAGING | Facility: HOSPITAL | Age: 78
End: 2023-01-01
Attending: EMERGENCY MEDICINE
Payer: COMMERCIAL

## 2023-01-01 ENCOUNTER — APPOINTMENT (OUTPATIENT)
Dept: PHYSICAL THERAPY | Facility: CLINIC | Age: 78
DRG: 056 | End: 2023-01-01
Attending: PHYSICAL MEDICINE & REHABILITATION
Payer: COMMERCIAL

## 2023-01-01 ENCOUNTER — APPOINTMENT (OUTPATIENT)
Dept: SPEECH THERAPY | Facility: CLINIC | Age: 78
DRG: 056 | End: 2023-01-01
Attending: PHYSICAL MEDICINE & REHABILITATION
Payer: COMMERCIAL

## 2023-01-01 ENCOUNTER — APPOINTMENT (OUTPATIENT)
Dept: GENERAL RADIOLOGY | Facility: CLINIC | Age: 78
DRG: 064 | End: 2023-01-01
Attending: HOSPITALIST
Payer: COMMERCIAL

## 2023-01-01 ENCOUNTER — PATIENT OUTREACH (OUTPATIENT)
Dept: CARE COORDINATION | Facility: CLINIC | Age: 78
End: 2023-01-01
Payer: COMMERCIAL

## 2023-01-01 ENCOUNTER — APPOINTMENT (OUTPATIENT)
Dept: PHYSICAL THERAPY | Facility: CLINIC | Age: 78
DRG: 064 | End: 2023-01-01
Payer: COMMERCIAL

## 2023-01-01 ENCOUNTER — TRANSITIONAL CARE UNIT VISIT (OUTPATIENT)
Dept: GERIATRICS | Facility: CLINIC | Age: 78
End: 2023-01-01
Payer: COMMERCIAL

## 2023-01-01 ENCOUNTER — APPOINTMENT (OUTPATIENT)
Dept: OCCUPATIONAL THERAPY | Facility: CLINIC | Age: 78
DRG: 689 | End: 2023-01-01
Attending: STUDENT IN AN ORGANIZED HEALTH CARE EDUCATION/TRAINING PROGRAM
Payer: COMMERCIAL

## 2023-01-01 ENCOUNTER — APPOINTMENT (OUTPATIENT)
Dept: EDUCATION SERVICES | Facility: CLINIC | Age: 78
End: 2023-01-01
Attending: HOSPITALIST
Payer: COMMERCIAL

## 2023-01-01 ENCOUNTER — APPOINTMENT (OUTPATIENT)
Dept: OCCUPATIONAL THERAPY | Facility: CLINIC | Age: 78
DRG: 056 | End: 2023-01-01
Attending: PHYSICAL MEDICINE & REHABILITATION
Payer: COMMERCIAL

## 2023-01-01 ENCOUNTER — APPOINTMENT (OUTPATIENT)
Dept: GENERAL RADIOLOGY | Facility: CLINIC | Age: 78
DRG: 064 | End: 2023-01-01
Attending: STUDENT IN AN ORGANIZED HEALTH CARE EDUCATION/TRAINING PROGRAM
Payer: COMMERCIAL

## 2023-01-01 ENCOUNTER — APPOINTMENT (OUTPATIENT)
Dept: PHYSICAL THERAPY | Facility: CLINIC | Age: 78
DRG: 689 | End: 2023-01-01
Attending: STUDENT IN AN ORGANIZED HEALTH CARE EDUCATION/TRAINING PROGRAM
Payer: COMMERCIAL

## 2023-01-01 ENCOUNTER — ANTICOAGULATION THERAPY VISIT (OUTPATIENT)
Dept: ANTICOAGULATION | Facility: CLINIC | Age: 78
End: 2023-01-01

## 2023-01-01 ENCOUNTER — APPOINTMENT (OUTPATIENT)
Dept: SPEECH THERAPY | Facility: CLINIC | Age: 78
DRG: 064 | End: 2023-01-01
Payer: COMMERCIAL

## 2023-01-01 ENCOUNTER — TELEPHONE (OUTPATIENT)
Dept: ANTICOAGULATION | Facility: CLINIC | Age: 78
End: 2023-01-01
Payer: COMMERCIAL

## 2023-01-01 ENCOUNTER — APPOINTMENT (OUTPATIENT)
Dept: MRI IMAGING | Facility: HOSPITAL | Age: 78
End: 2023-01-01
Attending: PHYSICIAN ASSISTANT
Payer: COMMERCIAL

## 2023-01-01 ENCOUNTER — TELEPHONE (OUTPATIENT)
Dept: GERIATRICS | Facility: CLINIC | Age: 78
End: 2023-01-01
Payer: COMMERCIAL

## 2023-01-01 ENCOUNTER — HOME INFUSION (PRE-WILLOW HOME INFUSION) (OUTPATIENT)
Dept: PHARMACY | Facility: CLINIC | Age: 78
End: 2023-01-01
Payer: COMMERCIAL

## 2023-01-01 ENCOUNTER — APPOINTMENT (OUTPATIENT)
Dept: PHYSICAL THERAPY | Facility: CLINIC | Age: 78
DRG: 689 | End: 2023-01-01
Attending: INTERNAL MEDICINE
Payer: COMMERCIAL

## 2023-01-01 ENCOUNTER — DOCUMENTATION ONLY (OUTPATIENT)
Dept: ANTICOAGULATION | Facility: CLINIC | Age: 78
End: 2023-01-01

## 2023-01-01 ENCOUNTER — APPOINTMENT (OUTPATIENT)
Dept: GENERAL RADIOLOGY | Facility: CLINIC | Age: 78
DRG: 689 | End: 2023-01-01
Attending: INTERNAL MEDICINE
Payer: COMMERCIAL

## 2023-01-01 ENCOUNTER — APPOINTMENT (OUTPATIENT)
Dept: MRI IMAGING | Facility: CLINIC | Age: 78
DRG: 064 | End: 2023-01-01
Attending: PHYSICIAN ASSISTANT
Payer: COMMERCIAL

## 2023-01-01 ENCOUNTER — OFFICE VISIT (OUTPATIENT)
Dept: FAMILY MEDICINE | Facility: CLINIC | Age: 78
End: 2023-01-01
Payer: COMMERCIAL

## 2023-01-01 ENCOUNTER — HOSPITAL ENCOUNTER (OUTPATIENT)
Facility: CLINIC | Age: 78
Setting detail: OBSERVATION
Discharge: HOME | End: 2023-04-28
Attending: EMERGENCY MEDICINE | Admitting: INTERNAL MEDICINE
Payer: COMMERCIAL

## 2023-01-01 ENCOUNTER — APPOINTMENT (OUTPATIENT)
Dept: SPEECH THERAPY | Facility: CLINIC | Age: 78
DRG: 689 | End: 2023-01-01
Attending: STUDENT IN AN ORGANIZED HEALTH CARE EDUCATION/TRAINING PROGRAM
Payer: COMMERCIAL

## 2023-01-01 ENCOUNTER — HOSPITAL ENCOUNTER (EMERGENCY)
Facility: HOSPITAL | Age: 78
Discharge: HOME | End: 2023-01-21
Attending: EMERGENCY MEDICINE | Admitting: EMERGENCY MEDICINE
Payer: COMMERCIAL

## 2023-01-01 ENCOUNTER — APPOINTMENT (OUTPATIENT)
Dept: CT IMAGING | Facility: CLINIC | Age: 78
DRG: 064 | End: 2023-01-01
Payer: COMMERCIAL

## 2023-01-01 ENCOUNTER — APPOINTMENT (OUTPATIENT)
Dept: OCCUPATIONAL THERAPY | Facility: CLINIC | Age: 78
DRG: 064 | End: 2023-01-01
Attending: HOSPITALIST
Payer: COMMERCIAL

## 2023-01-01 ENCOUNTER — HOSPITAL ENCOUNTER (EMERGENCY)
Facility: HOSPITAL | Age: 78
Discharge: STILL ADMITTED OR ADMINISTRATIVE | End: 2023-03-07
Attending: EMERGENCY MEDICINE | Admitting: EMERGENCY MEDICINE
Payer: COMMERCIAL

## 2023-01-01 ENCOUNTER — APPOINTMENT (OUTPATIENT)
Dept: SPEECH THERAPY | Facility: CLINIC | Age: 78
DRG: 689 | End: 2023-01-01
Attending: INTERNAL MEDICINE
Payer: COMMERCIAL

## 2023-01-01 ENCOUNTER — ANTICOAGULATION THERAPY VISIT (OUTPATIENT)
Dept: ANTICOAGULATION | Facility: CLINIC | Age: 78
End: 2023-01-01
Payer: COMMERCIAL

## 2023-01-01 ENCOUNTER — APPOINTMENT (OUTPATIENT)
Dept: CT IMAGING | Facility: CLINIC | Age: 78
DRG: 056 | End: 2023-01-01
Attending: PHYSICAL MEDICINE & REHABILITATION
Payer: COMMERCIAL

## 2023-01-01 ENCOUNTER — APPOINTMENT (OUTPATIENT)
Dept: EDUCATION SERVICES | Facility: CLINIC | Age: 78
DRG: 056 | End: 2023-01-01
Attending: PHYSICAL MEDICINE & REHABILITATION
Payer: COMMERCIAL

## 2023-01-01 ENCOUNTER — APPOINTMENT (OUTPATIENT)
Dept: PHYSICAL THERAPY | Facility: CLINIC | Age: 78
End: 2023-01-01
Attending: INTERNAL MEDICINE
Payer: COMMERCIAL

## 2023-01-01 ENCOUNTER — TELEPHONE (OUTPATIENT)
Dept: ANTICOAGULATION | Facility: CLINIC | Age: 78
End: 2023-01-01

## 2023-01-01 ENCOUNTER — APPOINTMENT (OUTPATIENT)
Dept: CT IMAGING | Facility: CLINIC | Age: 78
DRG: 064 | End: 2023-01-01
Attending: PHYSICIAN ASSISTANT
Payer: COMMERCIAL

## 2023-01-01 ENCOUNTER — LAB (OUTPATIENT)
Dept: LAB | Facility: CLINIC | Age: 78
End: 2023-01-01
Payer: COMMERCIAL

## 2023-01-01 ENCOUNTER — HOSPITAL ENCOUNTER (INPATIENT)
Facility: CLINIC | Age: 78
LOS: 9 days | Discharge: NURSING, REHAB OR LONG-TERM CARE | DRG: 064 | End: 2023-03-16
Attending: EMERGENCY MEDICINE | Admitting: INTERNAL MEDICINE
Payer: COMMERCIAL

## 2023-01-01 ENCOUNTER — LAB REQUISITION (OUTPATIENT)
Dept: LAB | Facility: CLINIC | Age: 78
End: 2023-01-01
Payer: COMMERCIAL

## 2023-01-01 ENCOUNTER — APPOINTMENT (OUTPATIENT)
Dept: CARDIOLOGY | Facility: CLINIC | Age: 78
DRG: 064 | End: 2023-01-01
Attending: INTERNAL MEDICINE
Payer: COMMERCIAL

## 2023-01-01 ENCOUNTER — HOSPITAL ENCOUNTER (INPATIENT)
Facility: CLINIC | Age: 78
LOS: 15 days | Discharge: NURSING, REHAB OR LONG-TERM CARE | DRG: 689 | End: 2023-04-26
Attending: STUDENT IN AN ORGANIZED HEALTH CARE EDUCATION/TRAINING PROGRAM | Admitting: INTERNAL MEDICINE
Payer: COMMERCIAL

## 2023-01-01 ENCOUNTER — APPOINTMENT (OUTPATIENT)
Dept: GENERAL RADIOLOGY | Facility: CLINIC | Age: 78
DRG: 056 | End: 2023-01-01
Attending: PHYSICIAN ASSISTANT
Payer: COMMERCIAL

## 2023-01-01 ENCOUNTER — APPOINTMENT (OUTPATIENT)
Dept: GENERAL RADIOLOGY | Facility: CLINIC | Age: 78
DRG: 056 | End: 2023-01-01
Attending: STUDENT IN AN ORGANIZED HEALTH CARE EDUCATION/TRAINING PROGRAM
Payer: COMMERCIAL

## 2023-01-01 ENCOUNTER — APPOINTMENT (OUTPATIENT)
Dept: CT IMAGING | Facility: CLINIC | Age: 78
DRG: 689 | End: 2023-01-01
Attending: INTERNAL MEDICINE
Payer: COMMERCIAL

## 2023-01-01 ENCOUNTER — APPOINTMENT (OUTPATIENT)
Dept: SPEECH THERAPY | Facility: CLINIC | Age: 78
DRG: 064 | End: 2023-01-01
Attending: INTERNAL MEDICINE
Payer: COMMERCIAL

## 2023-01-01 ENCOUNTER — APPOINTMENT (OUTPATIENT)
Dept: INTERVENTIONAL RADIOLOGY/VASCULAR | Facility: CLINIC | Age: 78
DRG: 064 | End: 2023-01-01
Attending: STUDENT IN AN ORGANIZED HEALTH CARE EDUCATION/TRAINING PROGRAM
Payer: COMMERCIAL

## 2023-01-01 ENCOUNTER — APPOINTMENT (OUTPATIENT)
Dept: OCCUPATIONAL THERAPY | Facility: CLINIC | Age: 78
DRG: 689 | End: 2023-01-01
Attending: INTERNAL MEDICINE
Payer: COMMERCIAL

## 2023-01-01 ENCOUNTER — APPOINTMENT (OUTPATIENT)
Dept: PHYSICAL THERAPY | Facility: CLINIC | Age: 78
DRG: 064 | End: 2023-01-01
Attending: INTERNAL MEDICINE
Payer: COMMERCIAL

## 2023-01-01 ENCOUNTER — HOSPITAL ENCOUNTER (INPATIENT)
Facility: CLINIC | Age: 78
LOS: 26 days | Discharge: ANOTHER HOSPITAL | DRG: 056 | End: 2023-04-11
Attending: PHYSICAL MEDICINE & REHABILITATION | Admitting: PHYSICAL MEDICINE & REHABILITATION
Payer: COMMERCIAL

## 2023-01-01 ENCOUNTER — HOSPITAL (OUTPATIENT)
Dept: NEUROLOGY | Facility: CLINIC | Age: 78
End: 2023-01-01
Payer: COMMERCIAL

## 2023-01-01 VITALS
RESPIRATION RATE: 14 BRPM | BODY MASS INDEX: 29.12 KG/M2 | HEART RATE: 73 BPM | HEIGHT: 72 IN | WEIGHT: 215 LBS | TEMPERATURE: 98.2 F | SYSTOLIC BLOOD PRESSURE: 134 MMHG | OXYGEN SATURATION: 97 % | DIASTOLIC BLOOD PRESSURE: 69 MMHG

## 2023-01-01 VITALS
TEMPERATURE: 97.5 F | SYSTOLIC BLOOD PRESSURE: 121 MMHG | RESPIRATION RATE: 16 BRPM | HEART RATE: 65 BPM | DIASTOLIC BLOOD PRESSURE: 65 MMHG | OXYGEN SATURATION: 95 % | WEIGHT: 203.71 LBS | BODY MASS INDEX: 26.88 KG/M2

## 2023-01-01 VITALS
WEIGHT: 197.2 LBS | OXYGEN SATURATION: 96 % | BODY MASS INDEX: 26.14 KG/M2 | HEIGHT: 73 IN | RESPIRATION RATE: 20 BRPM | TEMPERATURE: 97.2 F | SYSTOLIC BLOOD PRESSURE: 123 MMHG | DIASTOLIC BLOOD PRESSURE: 72 MMHG | HEART RATE: 91 BPM

## 2023-01-01 VITALS
OXYGEN SATURATION: 95 % | BODY MASS INDEX: 26.14 KG/M2 | SYSTOLIC BLOOD PRESSURE: 121 MMHG | WEIGHT: 197.2 LBS | RESPIRATION RATE: 18 BRPM | HEART RATE: 89 BPM | HEIGHT: 73 IN | DIASTOLIC BLOOD PRESSURE: 81 MMHG | TEMPERATURE: 97.5 F

## 2023-01-01 VITALS
OXYGEN SATURATION: 96 % | WEIGHT: 201.28 LBS | HEART RATE: 78 BPM | BODY MASS INDEX: 26.68 KG/M2 | DIASTOLIC BLOOD PRESSURE: 77 MMHG | RESPIRATION RATE: 16 BRPM | HEIGHT: 73 IN | SYSTOLIC BLOOD PRESSURE: 128 MMHG | TEMPERATURE: 98.8 F

## 2023-01-01 VITALS
RESPIRATION RATE: 16 BRPM | DIASTOLIC BLOOD PRESSURE: 71 MMHG | BODY MASS INDEX: 28.08 KG/M2 | HEART RATE: 88 BPM | SYSTOLIC BLOOD PRESSURE: 111 MMHG | WEIGHT: 211.86 LBS | OXYGEN SATURATION: 95 % | TEMPERATURE: 98.4 F | HEIGHT: 73 IN

## 2023-01-01 VITALS
BODY MASS INDEX: 30.88 KG/M2 | HEIGHT: 73 IN | SYSTOLIC BLOOD PRESSURE: 149 MMHG | TEMPERATURE: 97.6 F | DIASTOLIC BLOOD PRESSURE: 77 MMHG | WEIGHT: 233 LBS | RESPIRATION RATE: 18 BRPM | OXYGEN SATURATION: 96 % | HEART RATE: 78 BPM

## 2023-01-01 VITALS
TEMPERATURE: 97.2 F | SYSTOLIC BLOOD PRESSURE: 140 MMHG | HEART RATE: 79 BPM | DIASTOLIC BLOOD PRESSURE: 76 MMHG | WEIGHT: 216.05 LBS | RESPIRATION RATE: 16 BRPM | BODY MASS INDEX: 29.3 KG/M2 | OXYGEN SATURATION: 97 %

## 2023-01-01 VITALS
TEMPERATURE: 98 F | OXYGEN SATURATION: 98 % | HEART RATE: 72 BPM | SYSTOLIC BLOOD PRESSURE: 136 MMHG | WEIGHT: 218 LBS | RESPIRATION RATE: 16 BRPM | BODY MASS INDEX: 28.89 KG/M2 | DIASTOLIC BLOOD PRESSURE: 82 MMHG | HEIGHT: 73 IN

## 2023-01-01 VITALS
HEART RATE: 94 BPM | BODY MASS INDEX: 26.11 KG/M2 | OXYGEN SATURATION: 94 % | WEIGHT: 197 LBS | RESPIRATION RATE: 20 BRPM | SYSTOLIC BLOOD PRESSURE: 137 MMHG | HEIGHT: 73 IN | TEMPERATURE: 97.5 F | DIASTOLIC BLOOD PRESSURE: 68 MMHG

## 2023-01-01 DIAGNOSIS — E11.51 TYPE II DIABETES MELLITUS WITH PERIPHERAL CIRCULATORY DISORDER (H): Primary | ICD-10-CM

## 2023-01-01 DIAGNOSIS — Z76.0 ENCOUNTER FOR MEDICATION REFILL: ICD-10-CM

## 2023-01-01 DIAGNOSIS — I48.91 ATRIAL FIBRILLATION, UNSPECIFIED TYPE (H): ICD-10-CM

## 2023-01-01 DIAGNOSIS — I48.20 CHRONIC ATRIAL FIBRILLATION (H): Primary | ICD-10-CM

## 2023-01-01 DIAGNOSIS — I63.512 CEREBROVASCULAR ACCIDENT (CVA) DUE TO OCCLUSION OF LEFT MIDDLE CEREBRAL ARTERY (H): ICD-10-CM

## 2023-01-01 DIAGNOSIS — R35.1 BENIGN PROSTATIC HYPERPLASIA WITH NOCTURIA: ICD-10-CM

## 2023-01-01 DIAGNOSIS — R53.81 PHYSICAL DECONDITIONING: ICD-10-CM

## 2023-01-01 DIAGNOSIS — E11.51 TYPE II DIABETES MELLITUS WITH PERIPHERAL CIRCULATORY DISORDER (H): ICD-10-CM

## 2023-01-01 DIAGNOSIS — K94.20 COMPLICATION OF FEEDING TUBE (H): ICD-10-CM

## 2023-01-01 DIAGNOSIS — N30.00 ACUTE CYSTITIS WITHOUT HEMATURIA: ICD-10-CM

## 2023-01-01 DIAGNOSIS — Z01.818 PREOPERATIVE EXAMINATION: Primary | ICD-10-CM

## 2023-01-01 DIAGNOSIS — M62.81 MUSCLE WEAKNESS: ICD-10-CM

## 2023-01-01 DIAGNOSIS — K94.20 COMPLICATION OF FEEDING TUBE (H): Primary | ICD-10-CM

## 2023-01-01 DIAGNOSIS — I10 ESSENTIAL (PRIMARY) HYPERTENSION: ICD-10-CM

## 2023-01-01 DIAGNOSIS — N40.1 BENIGN PROSTATIC HYPERPLASIA WITH NOCTURIA: ICD-10-CM

## 2023-01-01 DIAGNOSIS — I63.512 CEREBROVASCULAR ACCIDENT (CVA) DUE TO OCCLUSION OF LEFT MIDDLE CEREBRAL ARTERY (H): Primary | ICD-10-CM

## 2023-01-01 DIAGNOSIS — M54.50 CHRONIC MIDLINE LOW BACK PAIN, UNSPECIFIED WHETHER SCIATICA PRESENT: ICD-10-CM

## 2023-01-01 DIAGNOSIS — I48.20 CHRONIC ATRIAL FIBRILLATION (H): ICD-10-CM

## 2023-01-01 DIAGNOSIS — Z89.421 ACQUIRED ABSENCE OF OTHER RIGHT TOE(S) (H): ICD-10-CM

## 2023-01-01 DIAGNOSIS — L97.229 VENOUS STASIS ULCER OF LEFT CALF WITHOUT VARICOSE VEINS, UNSPECIFIED ULCER STAGE (H): ICD-10-CM

## 2023-01-01 DIAGNOSIS — G89.29 CHRONIC BILATERAL LOW BACK PAIN WITH LEFT-SIDED SCIATICA: ICD-10-CM

## 2023-01-01 DIAGNOSIS — M54.42 CHRONIC BILATERAL LOW BACK PAIN WITH LEFT-SIDED SCIATICA: ICD-10-CM

## 2023-01-01 DIAGNOSIS — K21.00 GASTROESOPHAGEAL REFLUX DISEASE WITH ESOPHAGITIS, UNSPECIFIED WHETHER HEMORRHAGE: ICD-10-CM

## 2023-01-01 DIAGNOSIS — R79.1 SUPRATHERAPEUTIC INR: ICD-10-CM

## 2023-01-01 DIAGNOSIS — I10 ESSENTIAL HYPERTENSION, BENIGN: ICD-10-CM

## 2023-01-01 DIAGNOSIS — I63.9 ACUTE CVA (CEREBROVASCULAR ACCIDENT) (H): ICD-10-CM

## 2023-01-01 DIAGNOSIS — R29.898 BILATERAL LEG WEAKNESS: ICD-10-CM

## 2023-01-01 DIAGNOSIS — G89.29 CHRONIC BILATERAL LOW BACK PAIN WITHOUT SCIATICA: ICD-10-CM

## 2023-01-01 DIAGNOSIS — R05.1 ACUTE COUGH: ICD-10-CM

## 2023-01-01 DIAGNOSIS — Z76.0 ENCOUNTER FOR MEDICATION REFILL: Primary | ICD-10-CM

## 2023-01-01 DIAGNOSIS — G89.29 CHRONIC MIDLINE LOW BACK PAIN, UNSPECIFIED WHETHER SCIATICA PRESENT: ICD-10-CM

## 2023-01-01 DIAGNOSIS — E66.01 MORBID OBESITY (H): ICD-10-CM

## 2023-01-01 DIAGNOSIS — M54.50 CHRONIC BILATERAL LOW BACK PAIN WITHOUT SCIATICA: ICD-10-CM

## 2023-01-01 DIAGNOSIS — G47.00 INSOMNIA, UNSPECIFIED TYPE: ICD-10-CM

## 2023-01-01 DIAGNOSIS — E78.2 MIXED HYPERLIPIDEMIA: Primary | ICD-10-CM

## 2023-01-01 DIAGNOSIS — R20.2 PARESTHESIAS: ICD-10-CM

## 2023-01-01 DIAGNOSIS — G40.89 OTHER SEIZURES (H): ICD-10-CM

## 2023-01-01 DIAGNOSIS — I48.20 CHRONIC ATRIAL FIBRILLATION, UNSPECIFIED (H): ICD-10-CM

## 2023-01-01 DIAGNOSIS — G60.9 IDIOPATHIC PERIPHERAL NEUROPATHY: ICD-10-CM

## 2023-01-01 DIAGNOSIS — I87.2 VENOUS STASIS ULCER OF LEFT CALF WITHOUT VARICOSE VEINS, UNSPECIFIED ULCER STAGE (H): ICD-10-CM

## 2023-01-01 DIAGNOSIS — E11.9 TYPE 2 DIABETES MELLITUS WITHOUT COMPLICATION, WITHOUT LONG-TERM CURRENT USE OF INSULIN (H): ICD-10-CM

## 2023-01-01 LAB
ALBUMIN SERPL BCG-MCNC: 3.3 G/DL (ref 3.5–5.2)
ALBUMIN SERPL BCG-MCNC: 3.4 G/DL (ref 3.5–5.2)
ALBUMIN SERPL BCG-MCNC: 3.6 G/DL (ref 3.5–5.2)
ALBUMIN SERPL BCG-MCNC: 4 G/DL (ref 3.5–5.2)
ALBUMIN UR-MCNC: 100 MG/DL
ALBUMIN UR-MCNC: 50 MG/DL
ALBUMIN UR-MCNC: NEGATIVE MG/DL
ALLEN'S TEST: YES
ALP SERPL-CCNC: 116 U/L (ref 40–129)
ALP SERPL-CCNC: 122 U/L (ref 40–129)
ALP SERPL-CCNC: 128 U/L (ref 40–129)
ALP SERPL-CCNC: 129 U/L (ref 40–129)
ALT SERPL W P-5'-P-CCNC: 10 U/L (ref 10–50)
ALT SERPL W P-5'-P-CCNC: 16 U/L (ref 10–50)
ALT SERPL W P-5'-P-CCNC: 16 U/L (ref 10–50)
ALT SERPL W P-5'-P-CCNC: 21 U/L (ref 10–50)
AMORPH CRY #/AREA URNS HPF: ABNORMAL /HPF
ANION GAP SERPL CALCULATED.3IONS-SCNC: 10 MMOL/L (ref 7–15)
ANION GAP SERPL CALCULATED.3IONS-SCNC: 11 MMOL/L (ref 7–15)
ANION GAP SERPL CALCULATED.3IONS-SCNC: 12 MMOL/L (ref 7–15)
ANION GAP SERPL CALCULATED.3IONS-SCNC: 13 MMOL/L (ref 7–15)
ANION GAP SERPL CALCULATED.3IONS-SCNC: 14 MMOL/L (ref 7–15)
ANION GAP SERPL CALCULATED.3IONS-SCNC: 15 MMOL/L (ref 7–15)
ANION GAP SERPL CALCULATED.3IONS-SCNC: 16 MMOL/L (ref 7–15)
ANION GAP SERPL CALCULATED.3IONS-SCNC: 9 MMOL/L (ref 7–15)
APPEARANCE UR: ABNORMAL
APPEARANCE UR: ABNORMAL
APPEARANCE UR: CLEAR
APTT PPP: 31 SECONDS (ref 22–38)
AST SERPL W P-5'-P-CCNC: 18 U/L (ref 10–50)
AST SERPL W P-5'-P-CCNC: 20 U/L (ref 10–50)
AST SERPL W P-5'-P-CCNC: 22 U/L (ref 10–50)
AST SERPL W P-5'-P-CCNC: 25 U/L (ref 10–50)
ATRIAL RATE - MUSE: 131 BPM
ATRIAL RATE - MUSE: 166 BPM
ATRIAL RATE - MUSE: 357 BPM
ATRIAL RATE - MUSE: 64 BPM
ATRIAL RATE - MUSE: 70 BPM
BACTERIA #/AREA URNS HPF: ABNORMAL /HPF
BACTERIA #/AREA URNS HPF: ABNORMAL /HPF
BACTERIA BLD CULT: NO GROWTH
BACTERIA UR CULT: ABNORMAL
BASE EXCESS BLDA CALC-SCNC: 4.7 MMOL/L (ref -9–1.8)
BASE EXCESS BLDV CALC-SCNC: 2.7 MMOL/L (ref -7.7–1.9)
BASOPHILS # BLD AUTO: 0 10E3/UL (ref 0–0.2)
BASOPHILS # BLD AUTO: 0.1 10E3/UL (ref 0–0.2)
BASOPHILS NFR BLD AUTO: 0 %
BASOPHILS NFR BLD AUTO: 1 %
BILIRUB SERPL-MCNC: 0.4 MG/DL
BILIRUB SERPL-MCNC: 0.4 MG/DL
BILIRUB SERPL-MCNC: 0.5 MG/DL
BILIRUB SERPL-MCNC: 0.7 MG/DL
BILIRUB UR QL STRIP: NEGATIVE
BUN SERPL-MCNC: 12 MG/DL (ref 8–23)
BUN SERPL-MCNC: 14.8 MG/DL (ref 8–23)
BUN SERPL-MCNC: 15.6 MG/DL (ref 8–23)
BUN SERPL-MCNC: 17.3 MG/DL (ref 8–23)
BUN SERPL-MCNC: 19.3 MG/DL (ref 8–23)
BUN SERPL-MCNC: 20.1 MG/DL (ref 8–23)
BUN SERPL-MCNC: 20.2 MG/DL (ref 8–23)
BUN SERPL-MCNC: 20.4 MG/DL (ref 8–23)
BUN SERPL-MCNC: 20.8 MG/DL (ref 8–23)
BUN SERPL-MCNC: 20.9 MG/DL (ref 8–23)
BUN SERPL-MCNC: 21.5 MG/DL (ref 8–23)
BUN SERPL-MCNC: 22.3 MG/DL (ref 8–23)
BUN SERPL-MCNC: 22.7 MG/DL (ref 8–23)
BUN SERPL-MCNC: 22.8 MG/DL (ref 8–23)
BUN SERPL-MCNC: 22.8 MG/DL (ref 8–23)
BUN SERPL-MCNC: 23.8 MG/DL (ref 8–23)
BUN SERPL-MCNC: 23.9 MG/DL (ref 8–23)
BUN SERPL-MCNC: 24.1 MG/DL (ref 8–23)
BUN SERPL-MCNC: 24.3 MG/DL (ref 8–23)
BUN SERPL-MCNC: 24.4 MG/DL (ref 8–23)
BUN SERPL-MCNC: 24.4 MG/DL (ref 8–23)
BUN SERPL-MCNC: 24.5 MG/DL (ref 8–23)
BUN SERPL-MCNC: 24.6 MG/DL (ref 8–23)
BUN SERPL-MCNC: 25 MG/DL (ref 8–23)
BUN SERPL-MCNC: 31.6 MG/DL (ref 8–23)
BUN SERPL-MCNC: 31.7 MG/DL (ref 8–23)
BUN SERPL-MCNC: 32.1 MG/DL (ref 8–23)
BUN SERPL-MCNC: 32.1 MG/DL (ref 8–23)
BUN SERPL-MCNC: 33.2 MG/DL (ref 8–23)
BUN SERPL-MCNC: 36.2 MG/DL (ref 8–23)
BUN SERPL-MCNC: 40.3 MG/DL (ref 8–23)
CA-I BLD-MCNC: 4.7 MG/DL (ref 4.4–5.2)
CALCIUM SERPL-MCNC: 10 MG/DL (ref 8.8–10.2)
CALCIUM SERPL-MCNC: 10.1 MG/DL (ref 8.8–10.2)
CALCIUM SERPL-MCNC: 10.2 MG/DL (ref 8.8–10.2)
CALCIUM SERPL-MCNC: 9.2 MG/DL (ref 8.8–10.2)
CALCIUM SERPL-MCNC: 9.3 MG/DL (ref 8.8–10.2)
CALCIUM SERPL-MCNC: 9.3 MG/DL (ref 8.8–10.2)
CALCIUM SERPL-MCNC: 9.4 MG/DL (ref 8.8–10.2)
CALCIUM SERPL-MCNC: 9.5 MG/DL (ref 8.8–10.2)
CALCIUM SERPL-MCNC: 9.6 MG/DL (ref 8.8–10.2)
CALCIUM SERPL-MCNC: 9.7 MG/DL (ref 8.8–10.2)
CALCIUM SERPL-MCNC: 9.8 MG/DL (ref 8.8–10.2)
CALCIUM SERPL-MCNC: 9.9 MG/DL (ref 8.8–10.2)
CHLORIDE SERPL-SCNC: 100 MMOL/L (ref 98–107)
CHLORIDE SERPL-SCNC: 101 MMOL/L (ref 98–107)
CHLORIDE SERPL-SCNC: 102 MMOL/L (ref 98–107)
CHLORIDE SERPL-SCNC: 103 MMOL/L (ref 98–107)
CHLORIDE SERPL-SCNC: 104 MMOL/L (ref 98–107)
CHLORIDE SERPL-SCNC: 105 MMOL/L (ref 98–107)
CHLORIDE SERPL-SCNC: 106 MMOL/L (ref 98–107)
CHLORIDE SERPL-SCNC: 99 MMOL/L (ref 98–107)
CHOLEST SERPL-MCNC: 120 MG/DL
COLOR UR AUTO: YELLOW
CREAT SERPL-MCNC: 0.6 MG/DL (ref 0.67–1.17)
CREAT SERPL-MCNC: 0.68 MG/DL (ref 0.67–1.17)
CREAT SERPL-MCNC: 0.7 MG/DL (ref 0.67–1.17)
CREAT SERPL-MCNC: 0.7 MG/DL (ref 0.67–1.17)
CREAT SERPL-MCNC: 0.71 MG/DL (ref 0.67–1.17)
CREAT SERPL-MCNC: 0.71 MG/DL (ref 0.67–1.17)
CREAT SERPL-MCNC: 0.75 MG/DL (ref 0.67–1.17)
CREAT SERPL-MCNC: 0.76 MG/DL (ref 0.67–1.17)
CREAT SERPL-MCNC: 0.77 MG/DL (ref 0.67–1.17)
CREAT SERPL-MCNC: 0.78 MG/DL (ref 0.67–1.17)
CREAT SERPL-MCNC: 0.78 MG/DL (ref 0.67–1.17)
CREAT SERPL-MCNC: 0.79 MG/DL (ref 0.67–1.17)
CREAT SERPL-MCNC: 0.8 MG/DL (ref 0.67–1.17)
CREAT SERPL-MCNC: 0.82 MG/DL (ref 0.67–1.17)
CREAT SERPL-MCNC: 0.82 MG/DL (ref 0.67–1.17)
CREAT SERPL-MCNC: 0.83 MG/DL (ref 0.67–1.17)
CREAT SERPL-MCNC: 0.84 MG/DL (ref 0.67–1.17)
CREAT SERPL-MCNC: 0.85 MG/DL (ref 0.67–1.17)
CREAT SERPL-MCNC: 0.86 MG/DL (ref 0.67–1.17)
CREAT SERPL-MCNC: 0.87 MG/DL (ref 0.67–1.17)
CREAT SERPL-MCNC: 0.88 MG/DL (ref 0.67–1.17)
CREAT SERPL-MCNC: 0.89 MG/DL (ref 0.67–1.17)
CREAT SERPL-MCNC: 0.89 MG/DL (ref 0.67–1.17)
CREAT SERPL-MCNC: 0.91 MG/DL (ref 0.67–1.17)
CREAT SERPL-MCNC: 0.92 MG/DL (ref 0.67–1.17)
CREAT SERPL-MCNC: 0.96 MG/DL (ref 0.67–1.17)
CRP SERPL-MCNC: 135.55 MG/L
CRP SERPL-MCNC: 240.44 MG/L
CRP SERPL-MCNC: 3.2 MG/L
CRP SERPL-MCNC: 312.4 MG/L
CRP SERPL-MCNC: 44.64 MG/L
CRP SERPL-MCNC: 56.89 MG/L
CRP SERPL-MCNC: 9.98 MG/L
DEPRECATED HCO3 PLAS-SCNC: 21 MMOL/L (ref 22–29)
DEPRECATED HCO3 PLAS-SCNC: 22 MMOL/L (ref 22–29)
DEPRECATED HCO3 PLAS-SCNC: 23 MMOL/L (ref 22–29)
DEPRECATED HCO3 PLAS-SCNC: 24 MMOL/L (ref 22–29)
DEPRECATED HCO3 PLAS-SCNC: 25 MMOL/L (ref 22–29)
DEPRECATED HCO3 PLAS-SCNC: 26 MMOL/L (ref 22–29)
DEPRECATED HCO3 PLAS-SCNC: 27 MMOL/L (ref 22–29)
DEPRECATED HCO3 PLAS-SCNC: 28 MMOL/L (ref 22–29)
DIASTOLIC BLOOD PRESSURE - MUSE: 68 MMHG
DIASTOLIC BLOOD PRESSURE - MUSE: NORMAL MMHG
EOSINOPHIL # BLD AUTO: 0.1 10E3/UL (ref 0–0.7)
EOSINOPHIL # BLD AUTO: 0.1 10E3/UL (ref 0–0.7)
EOSINOPHIL # BLD AUTO: 0.2 10E3/UL (ref 0–0.7)
EOSINOPHIL # BLD AUTO: 0.3 10E3/UL (ref 0–0.7)
EOSINOPHIL # BLD AUTO: 0.4 10E3/UL (ref 0–0.7)
EOSINOPHIL # BLD AUTO: 0.4 10E3/UL (ref 0–0.7)
EOSINOPHIL NFR BLD AUTO: 0 %
EOSINOPHIL NFR BLD AUTO: 2 %
EOSINOPHIL NFR BLD AUTO: 3 %
ERYTHROCYTE [DISTWIDTH] IN BLOOD BY AUTOMATED COUNT: 12.8 % (ref 10–15)
ERYTHROCYTE [DISTWIDTH] IN BLOOD BY AUTOMATED COUNT: 13 % (ref 10–15)
ERYTHROCYTE [DISTWIDTH] IN BLOOD BY AUTOMATED COUNT: 13.1 % (ref 10–15)
ERYTHROCYTE [DISTWIDTH] IN BLOOD BY AUTOMATED COUNT: 13.2 % (ref 10–15)
ERYTHROCYTE [DISTWIDTH] IN BLOOD BY AUTOMATED COUNT: 13.3 % (ref 10–15)
ERYTHROCYTE [DISTWIDTH] IN BLOOD BY AUTOMATED COUNT: 13.4 % (ref 10–15)
ERYTHROCYTE [DISTWIDTH] IN BLOOD BY AUTOMATED COUNT: 13.4 % (ref 10–15)
ERYTHROCYTE [DISTWIDTH] IN BLOOD BY AUTOMATED COUNT: 13.5 % (ref 10–15)
ERYTHROCYTE [DISTWIDTH] IN BLOOD BY AUTOMATED COUNT: 13.6 % (ref 10–15)
ERYTHROCYTE [DISTWIDTH] IN BLOOD BY AUTOMATED COUNT: 13.6 % (ref 10–15)
ERYTHROCYTE [DISTWIDTH] IN BLOOD BY AUTOMATED COUNT: 13.7 % (ref 10–15)
ERYTHROCYTE [DISTWIDTH] IN BLOOD BY AUTOMATED COUNT: 14 % (ref 10–15)
ERYTHROCYTE [DISTWIDTH] IN BLOOD BY AUTOMATED COUNT: 14.1 % (ref 10–15)
ERYTHROCYTE [DISTWIDTH] IN BLOOD BY AUTOMATED COUNT: 14.2 % (ref 10–15)
ERYTHROCYTE [DISTWIDTH] IN BLOOD BY AUTOMATED COUNT: 14.4 % (ref 10–15)
ERYTHROCYTE [DISTWIDTH] IN BLOOD BY AUTOMATED COUNT: 14.6 % (ref 10–15)
ERYTHROCYTE [DISTWIDTH] IN BLOOD BY AUTOMATED COUNT: 14.6 % (ref 10–15)
ERYTHROCYTE [DISTWIDTH] IN BLOOD BY AUTOMATED COUNT: 14.7 % (ref 10–15)
ERYTHROCYTE [DISTWIDTH] IN BLOOD BY AUTOMATED COUNT: 15 % (ref 10–15)
ERYTHROCYTE [DISTWIDTH] IN BLOOD BY AUTOMATED COUNT: 15.1 % (ref 10–15)
ERYTHROCYTE [SEDIMENTATION RATE] IN BLOOD BY WESTERGREN METHOD: 16 MM/HR (ref 0–15)
FOLATE SERPL-MCNC: 6.3 NG/ML (ref 4.6–34.8)
GFR SERPL CREATININE-BSD FRML MDRD: 80 ML/MIN/1.73M2
GFR SERPL CREATININE-BSD FRML MDRD: 85 ML/MIN/1.73M2
GFR SERPL CREATININE-BSD FRML MDRD: 86 ML/MIN/1.73M2
GFR SERPL CREATININE-BSD FRML MDRD: 87 ML/MIN/1.73M2
GFR SERPL CREATININE-BSD FRML MDRD: 88 ML/MIN/1.73M2
GFR SERPL CREATININE-BSD FRML MDRD: 89 ML/MIN/1.73M2
GFR SERPL CREATININE-BSD FRML MDRD: 90 ML/MIN/1.73M2
GFR SERPL CREATININE-BSD FRML MDRD: 90 ML/MIN/1.73M2
GFR SERPL CREATININE-BSD FRML MDRD: >90 ML/MIN/1.73M2
GLUCOSE BLDC GLUCOMTR-MCNC: 100 MG/DL (ref 70–99)
GLUCOSE BLDC GLUCOMTR-MCNC: 100 MG/DL (ref 70–99)
GLUCOSE BLDC GLUCOMTR-MCNC: 101 MG/DL (ref 70–99)
GLUCOSE BLDC GLUCOMTR-MCNC: 104 MG/DL (ref 70–99)
GLUCOSE BLDC GLUCOMTR-MCNC: 104 MG/DL (ref 70–99)
GLUCOSE BLDC GLUCOMTR-MCNC: 107 MG/DL (ref 70–99)
GLUCOSE BLDC GLUCOMTR-MCNC: 108 MG/DL (ref 70–99)
GLUCOSE BLDC GLUCOMTR-MCNC: 108 MG/DL (ref 70–99)
GLUCOSE BLDC GLUCOMTR-MCNC: 110 MG/DL (ref 70–99)
GLUCOSE BLDC GLUCOMTR-MCNC: 111 MG/DL (ref 70–99)
GLUCOSE BLDC GLUCOMTR-MCNC: 111 MG/DL (ref 70–99)
GLUCOSE BLDC GLUCOMTR-MCNC: 113 MG/DL (ref 70–99)
GLUCOSE BLDC GLUCOMTR-MCNC: 113 MG/DL (ref 70–99)
GLUCOSE BLDC GLUCOMTR-MCNC: 114 MG/DL (ref 70–99)
GLUCOSE BLDC GLUCOMTR-MCNC: 116 MG/DL (ref 70–99)
GLUCOSE BLDC GLUCOMTR-MCNC: 118 MG/DL (ref 70–99)
GLUCOSE BLDC GLUCOMTR-MCNC: 121 MG/DL (ref 70–99)
GLUCOSE BLDC GLUCOMTR-MCNC: 122 MG/DL (ref 70–99)
GLUCOSE BLDC GLUCOMTR-MCNC: 123 MG/DL (ref 70–99)
GLUCOSE BLDC GLUCOMTR-MCNC: 123 MG/DL (ref 70–99)
GLUCOSE BLDC GLUCOMTR-MCNC: 124 MG/DL (ref 70–99)
GLUCOSE BLDC GLUCOMTR-MCNC: 126 MG/DL (ref 70–99)
GLUCOSE BLDC GLUCOMTR-MCNC: 126 MG/DL (ref 70–99)
GLUCOSE BLDC GLUCOMTR-MCNC: 127 MG/DL (ref 70–99)
GLUCOSE BLDC GLUCOMTR-MCNC: 128 MG/DL (ref 70–99)
GLUCOSE BLDC GLUCOMTR-MCNC: 129 MG/DL (ref 70–99)
GLUCOSE BLDC GLUCOMTR-MCNC: 129 MG/DL (ref 70–99)
GLUCOSE BLDC GLUCOMTR-MCNC: 130 MG/DL (ref 70–99)
GLUCOSE BLDC GLUCOMTR-MCNC: 131 MG/DL (ref 70–99)
GLUCOSE BLDC GLUCOMTR-MCNC: 132 MG/DL (ref 70–99)
GLUCOSE BLDC GLUCOMTR-MCNC: 133 MG/DL (ref 70–99)
GLUCOSE BLDC GLUCOMTR-MCNC: 135 MG/DL (ref 70–99)
GLUCOSE BLDC GLUCOMTR-MCNC: 135 MG/DL (ref 70–99)
GLUCOSE BLDC GLUCOMTR-MCNC: 136 MG/DL (ref 70–99)
GLUCOSE BLDC GLUCOMTR-MCNC: 137 MG/DL (ref 70–99)
GLUCOSE BLDC GLUCOMTR-MCNC: 138 MG/DL (ref 70–99)
GLUCOSE BLDC GLUCOMTR-MCNC: 139 MG/DL (ref 70–99)
GLUCOSE BLDC GLUCOMTR-MCNC: 140 MG/DL (ref 70–99)
GLUCOSE BLDC GLUCOMTR-MCNC: 141 MG/DL (ref 70–99)
GLUCOSE BLDC GLUCOMTR-MCNC: 141 MG/DL (ref 70–99)
GLUCOSE BLDC GLUCOMTR-MCNC: 142 MG/DL (ref 70–99)
GLUCOSE BLDC GLUCOMTR-MCNC: 142 MG/DL (ref 70–99)
GLUCOSE BLDC GLUCOMTR-MCNC: 143 MG/DL (ref 70–99)
GLUCOSE BLDC GLUCOMTR-MCNC: 144 MG/DL (ref 70–99)
GLUCOSE BLDC GLUCOMTR-MCNC: 145 MG/DL (ref 70–99)
GLUCOSE BLDC GLUCOMTR-MCNC: 145 MG/DL (ref 70–99)
GLUCOSE BLDC GLUCOMTR-MCNC: 146 MG/DL (ref 70–99)
GLUCOSE BLDC GLUCOMTR-MCNC: 146 MG/DL (ref 70–99)
GLUCOSE BLDC GLUCOMTR-MCNC: 147 MG/DL (ref 70–99)
GLUCOSE BLDC GLUCOMTR-MCNC: 147 MG/DL (ref 70–99)
GLUCOSE BLDC GLUCOMTR-MCNC: 148 MG/DL (ref 70–99)
GLUCOSE BLDC GLUCOMTR-MCNC: 149 MG/DL (ref 70–99)
GLUCOSE BLDC GLUCOMTR-MCNC: 150 MG/DL (ref 70–99)
GLUCOSE BLDC GLUCOMTR-MCNC: 151 MG/DL (ref 70–99)
GLUCOSE BLDC GLUCOMTR-MCNC: 152 MG/DL (ref 70–99)
GLUCOSE BLDC GLUCOMTR-MCNC: 153 MG/DL (ref 70–99)
GLUCOSE BLDC GLUCOMTR-MCNC: 154 MG/DL (ref 70–99)
GLUCOSE BLDC GLUCOMTR-MCNC: 154 MG/DL (ref 70–99)
GLUCOSE BLDC GLUCOMTR-MCNC: 155 MG/DL (ref 70–99)
GLUCOSE BLDC GLUCOMTR-MCNC: 155 MG/DL (ref 70–99)
GLUCOSE BLDC GLUCOMTR-MCNC: 156 MG/DL (ref 70–99)
GLUCOSE BLDC GLUCOMTR-MCNC: 157 MG/DL (ref 70–99)
GLUCOSE BLDC GLUCOMTR-MCNC: 158 MG/DL (ref 70–99)
GLUCOSE BLDC GLUCOMTR-MCNC: 159 MG/DL (ref 70–99)
GLUCOSE BLDC GLUCOMTR-MCNC: 160 MG/DL (ref 70–99)
GLUCOSE BLDC GLUCOMTR-MCNC: 161 MG/DL (ref 70–99)
GLUCOSE BLDC GLUCOMTR-MCNC: 162 MG/DL (ref 70–99)
GLUCOSE BLDC GLUCOMTR-MCNC: 162 MG/DL (ref 70–99)
GLUCOSE BLDC GLUCOMTR-MCNC: 163 MG/DL (ref 70–99)
GLUCOSE BLDC GLUCOMTR-MCNC: 163 MG/DL (ref 70–99)
GLUCOSE BLDC GLUCOMTR-MCNC: 164 MG/DL (ref 70–99)
GLUCOSE BLDC GLUCOMTR-MCNC: 164 MG/DL (ref 70–99)
GLUCOSE BLDC GLUCOMTR-MCNC: 165 MG/DL (ref 70–99)
GLUCOSE BLDC GLUCOMTR-MCNC: 165 MG/DL (ref 70–99)
GLUCOSE BLDC GLUCOMTR-MCNC: 166 MG/DL (ref 70–99)
GLUCOSE BLDC GLUCOMTR-MCNC: 167 MG/DL (ref 70–99)
GLUCOSE BLDC GLUCOMTR-MCNC: 168 MG/DL (ref 70–99)
GLUCOSE BLDC GLUCOMTR-MCNC: 168 MG/DL (ref 70–99)
GLUCOSE BLDC GLUCOMTR-MCNC: 169 MG/DL (ref 70–99)
GLUCOSE BLDC GLUCOMTR-MCNC: 169 MG/DL (ref 70–99)
GLUCOSE BLDC GLUCOMTR-MCNC: 170 MG/DL (ref 70–99)
GLUCOSE BLDC GLUCOMTR-MCNC: 171 MG/DL (ref 70–99)
GLUCOSE BLDC GLUCOMTR-MCNC: 171 MG/DL (ref 70–99)
GLUCOSE BLDC GLUCOMTR-MCNC: 172 MG/DL (ref 70–99)
GLUCOSE BLDC GLUCOMTR-MCNC: 174 MG/DL (ref 70–99)
GLUCOSE BLDC GLUCOMTR-MCNC: 176 MG/DL (ref 70–99)
GLUCOSE BLDC GLUCOMTR-MCNC: 178 MG/DL (ref 70–99)
GLUCOSE BLDC GLUCOMTR-MCNC: 179 MG/DL (ref 70–99)
GLUCOSE BLDC GLUCOMTR-MCNC: 180 MG/DL (ref 70–99)
GLUCOSE BLDC GLUCOMTR-MCNC: 180 MG/DL (ref 70–99)
GLUCOSE BLDC GLUCOMTR-MCNC: 182 MG/DL (ref 70–99)
GLUCOSE BLDC GLUCOMTR-MCNC: 183 MG/DL (ref 70–99)
GLUCOSE BLDC GLUCOMTR-MCNC: 184 MG/DL (ref 70–99)
GLUCOSE BLDC GLUCOMTR-MCNC: 185 MG/DL (ref 70–99)
GLUCOSE BLDC GLUCOMTR-MCNC: 189 MG/DL (ref 70–99)
GLUCOSE BLDC GLUCOMTR-MCNC: 189 MG/DL (ref 70–99)
GLUCOSE BLDC GLUCOMTR-MCNC: 191 MG/DL (ref 70–99)
GLUCOSE BLDC GLUCOMTR-MCNC: 192 MG/DL (ref 70–99)
GLUCOSE BLDC GLUCOMTR-MCNC: 194 MG/DL (ref 70–99)
GLUCOSE BLDC GLUCOMTR-MCNC: 195 MG/DL (ref 70–99)
GLUCOSE BLDC GLUCOMTR-MCNC: 196 MG/DL (ref 70–99)
GLUCOSE BLDC GLUCOMTR-MCNC: 196 MG/DL (ref 70–99)
GLUCOSE BLDC GLUCOMTR-MCNC: 197 MG/DL (ref 70–99)
GLUCOSE BLDC GLUCOMTR-MCNC: 198 MG/DL (ref 70–99)
GLUCOSE BLDC GLUCOMTR-MCNC: 199 MG/DL (ref 70–99)
GLUCOSE BLDC GLUCOMTR-MCNC: 199 MG/DL (ref 70–99)
GLUCOSE BLDC GLUCOMTR-MCNC: 201 MG/DL (ref 70–99)
GLUCOSE BLDC GLUCOMTR-MCNC: 202 MG/DL (ref 70–99)
GLUCOSE BLDC GLUCOMTR-MCNC: 202 MG/DL (ref 70–99)
GLUCOSE BLDC GLUCOMTR-MCNC: 203 MG/DL (ref 70–99)
GLUCOSE BLDC GLUCOMTR-MCNC: 204 MG/DL (ref 70–99)
GLUCOSE BLDC GLUCOMTR-MCNC: 205 MG/DL (ref 70–99)
GLUCOSE BLDC GLUCOMTR-MCNC: 206 MG/DL (ref 70–99)
GLUCOSE BLDC GLUCOMTR-MCNC: 206 MG/DL (ref 70–99)
GLUCOSE BLDC GLUCOMTR-MCNC: 207 MG/DL (ref 70–99)
GLUCOSE BLDC GLUCOMTR-MCNC: 207 MG/DL (ref 70–99)
GLUCOSE BLDC GLUCOMTR-MCNC: 208 MG/DL (ref 70–99)
GLUCOSE BLDC GLUCOMTR-MCNC: 209 MG/DL (ref 70–99)
GLUCOSE BLDC GLUCOMTR-MCNC: 211 MG/DL (ref 70–99)
GLUCOSE BLDC GLUCOMTR-MCNC: 211 MG/DL (ref 70–99)
GLUCOSE BLDC GLUCOMTR-MCNC: 213 MG/DL (ref 70–99)
GLUCOSE BLDC GLUCOMTR-MCNC: 213 MG/DL (ref 70–99)
GLUCOSE BLDC GLUCOMTR-MCNC: 215 MG/DL (ref 70–99)
GLUCOSE BLDC GLUCOMTR-MCNC: 215 MG/DL (ref 70–99)
GLUCOSE BLDC GLUCOMTR-MCNC: 216 MG/DL (ref 70–99)
GLUCOSE BLDC GLUCOMTR-MCNC: 217 MG/DL (ref 70–99)
GLUCOSE BLDC GLUCOMTR-MCNC: 218 MG/DL (ref 70–99)
GLUCOSE BLDC GLUCOMTR-MCNC: 218 MG/DL (ref 70–99)
GLUCOSE BLDC GLUCOMTR-MCNC: 219 MG/DL (ref 70–99)
GLUCOSE BLDC GLUCOMTR-MCNC: 219 MG/DL (ref 70–99)
GLUCOSE BLDC GLUCOMTR-MCNC: 220 MG/DL (ref 70–99)
GLUCOSE BLDC GLUCOMTR-MCNC: 221 MG/DL (ref 70–99)
GLUCOSE BLDC GLUCOMTR-MCNC: 222 MG/DL (ref 70–99)
GLUCOSE BLDC GLUCOMTR-MCNC: 223 MG/DL (ref 70–99)
GLUCOSE BLDC GLUCOMTR-MCNC: 226 MG/DL (ref 70–99)
GLUCOSE BLDC GLUCOMTR-MCNC: 227 MG/DL (ref 70–99)
GLUCOSE BLDC GLUCOMTR-MCNC: 228 MG/DL (ref 70–99)
GLUCOSE BLDC GLUCOMTR-MCNC: 228 MG/DL (ref 70–99)
GLUCOSE BLDC GLUCOMTR-MCNC: 230 MG/DL (ref 70–99)
GLUCOSE BLDC GLUCOMTR-MCNC: 231 MG/DL (ref 70–99)
GLUCOSE BLDC GLUCOMTR-MCNC: 232 MG/DL (ref 70–99)
GLUCOSE BLDC GLUCOMTR-MCNC: 236 MG/DL (ref 70–99)
GLUCOSE BLDC GLUCOMTR-MCNC: 239 MG/DL (ref 70–99)
GLUCOSE BLDC GLUCOMTR-MCNC: 242 MG/DL (ref 70–99)
GLUCOSE BLDC GLUCOMTR-MCNC: 246 MG/DL (ref 70–99)
GLUCOSE BLDC GLUCOMTR-MCNC: 249 MG/DL (ref 70–99)
GLUCOSE BLDC GLUCOMTR-MCNC: 252 MG/DL (ref 70–99)
GLUCOSE BLDC GLUCOMTR-MCNC: 255 MG/DL (ref 70–99)
GLUCOSE BLDC GLUCOMTR-MCNC: 256 MG/DL (ref 70–99)
GLUCOSE BLDC GLUCOMTR-MCNC: 259 MG/DL (ref 70–99)
GLUCOSE BLDC GLUCOMTR-MCNC: 261 MG/DL (ref 70–99)
GLUCOSE BLDC GLUCOMTR-MCNC: 268 MG/DL (ref 70–99)
GLUCOSE BLDC GLUCOMTR-MCNC: 286 MG/DL (ref 70–99)
GLUCOSE BLDC GLUCOMTR-MCNC: 57 MG/DL (ref 70–99)
GLUCOSE BLDC GLUCOMTR-MCNC: 58 MG/DL (ref 70–99)
GLUCOSE BLDC GLUCOMTR-MCNC: 70 MG/DL (ref 70–99)
GLUCOSE BLDC GLUCOMTR-MCNC: 70 MG/DL (ref 70–99)
GLUCOSE BLDC GLUCOMTR-MCNC: 75 MG/DL (ref 70–99)
GLUCOSE BLDC GLUCOMTR-MCNC: 79 MG/DL (ref 70–99)
GLUCOSE BLDC GLUCOMTR-MCNC: 85 MG/DL (ref 70–99)
GLUCOSE BLDC GLUCOMTR-MCNC: 86 MG/DL (ref 70–99)
GLUCOSE BLDC GLUCOMTR-MCNC: 87 MG/DL (ref 70–99)
GLUCOSE BLDC GLUCOMTR-MCNC: 88 MG/DL (ref 70–99)
GLUCOSE BLDC GLUCOMTR-MCNC: 89 MG/DL (ref 70–99)
GLUCOSE BLDC GLUCOMTR-MCNC: 91 MG/DL (ref 70–99)
GLUCOSE BLDC GLUCOMTR-MCNC: 92 MG/DL (ref 70–99)
GLUCOSE BLDC GLUCOMTR-MCNC: 92 MG/DL (ref 70–99)
GLUCOSE BLDC GLUCOMTR-MCNC: 96 MG/DL (ref 70–99)
GLUCOSE BLDC GLUCOMTR-MCNC: 97 MG/DL (ref 70–99)
GLUCOSE BLDC GLUCOMTR-MCNC: 98 MG/DL (ref 70–99)
GLUCOSE BLDC GLUCOMTR-MCNC: 99 MG/DL (ref 70–99)
GLUCOSE SERPL-MCNC: 107 MG/DL (ref 70–99)
GLUCOSE SERPL-MCNC: 132 MG/DL (ref 70–99)
GLUCOSE SERPL-MCNC: 132 MG/DL (ref 70–99)
GLUCOSE SERPL-MCNC: 137 MG/DL (ref 70–99)
GLUCOSE SERPL-MCNC: 140 MG/DL (ref 70–99)
GLUCOSE SERPL-MCNC: 140 MG/DL (ref 70–99)
GLUCOSE SERPL-MCNC: 142 MG/DL (ref 70–99)
GLUCOSE SERPL-MCNC: 147 MG/DL (ref 70–99)
GLUCOSE SERPL-MCNC: 151 MG/DL (ref 70–99)
GLUCOSE SERPL-MCNC: 160 MG/DL (ref 70–99)
GLUCOSE SERPL-MCNC: 166 MG/DL (ref 70–99)
GLUCOSE SERPL-MCNC: 170 MG/DL (ref 70–99)
GLUCOSE SERPL-MCNC: 172 MG/DL (ref 70–99)
GLUCOSE SERPL-MCNC: 173 MG/DL (ref 70–99)
GLUCOSE SERPL-MCNC: 189 MG/DL (ref 70–99)
GLUCOSE SERPL-MCNC: 194 MG/DL (ref 70–99)
GLUCOSE SERPL-MCNC: 202 MG/DL (ref 70–99)
GLUCOSE SERPL-MCNC: 210 MG/DL (ref 70–99)
GLUCOSE SERPL-MCNC: 213 MG/DL (ref 70–99)
GLUCOSE SERPL-MCNC: 217 MG/DL (ref 70–99)
GLUCOSE SERPL-MCNC: 226 MG/DL (ref 70–99)
GLUCOSE SERPL-MCNC: 229 MG/DL (ref 70–99)
GLUCOSE SERPL-MCNC: 239 MG/DL (ref 70–99)
GLUCOSE SERPL-MCNC: 239 MG/DL (ref 70–99)
GLUCOSE SERPL-MCNC: 240 MG/DL (ref 70–99)
GLUCOSE SERPL-MCNC: 250 MG/DL (ref 70–99)
GLUCOSE SERPL-MCNC: 252 MG/DL (ref 70–99)
GLUCOSE SERPL-MCNC: 265 MG/DL (ref 70–99)
GLUCOSE SERPL-MCNC: 96 MG/DL (ref 70–99)
GLUCOSE UR STRIP-MCNC: 100 MG/DL
GLUCOSE UR STRIP-MCNC: NEGATIVE MG/DL
GLUCOSE UR STRIP-MCNC: NEGATIVE MG/DL
HBA1C MFR BLD: 6.4 % (ref 0–5.6)
HCO3 BLD-SCNC: 27 MMOL/L (ref 21–28)
HCO3 BLDV-SCNC: 27 MMOL/L (ref 21–28)
HCT VFR BLD AUTO: 33.8 % (ref 40–53)
HCT VFR BLD AUTO: 34.3 % (ref 40–53)
HCT VFR BLD AUTO: 35 % (ref 40–53)
HCT VFR BLD AUTO: 35.2 % (ref 40–53)
HCT VFR BLD AUTO: 36.5 % (ref 40–53)
HCT VFR BLD AUTO: 36.7 % (ref 40–53)
HCT VFR BLD AUTO: 36.7 % (ref 40–53)
HCT VFR BLD AUTO: 36.8 % (ref 40–53)
HCT VFR BLD AUTO: 37 % (ref 40–53)
HCT VFR BLD AUTO: 37.8 % (ref 40–53)
HCT VFR BLD AUTO: 37.8 % (ref 40–53)
HCT VFR BLD AUTO: 37.9 % (ref 40–53)
HCT VFR BLD AUTO: 38.2 % (ref 40–53)
HCT VFR BLD AUTO: 38.4 % (ref 40–53)
HCT VFR BLD AUTO: 38.4 % (ref 40–53)
HCT VFR BLD AUTO: 38.8 % (ref 40–53)
HCT VFR BLD AUTO: 38.9 % (ref 40–53)
HCT VFR BLD AUTO: 39 % (ref 40–53)
HCT VFR BLD AUTO: 39.7 % (ref 40–53)
HCT VFR BLD AUTO: 39.8 % (ref 40–53)
HCT VFR BLD AUTO: 40.2 % (ref 40–53)
HCT VFR BLD AUTO: 40.4 % (ref 40–53)
HCT VFR BLD AUTO: 41.3 % (ref 40–53)
HCT VFR BLD AUTO: 41.3 % (ref 40–53)
HCT VFR BLD AUTO: 41.5 % (ref 40–53)
HCT VFR BLD AUTO: 41.6 % (ref 40–53)
HCT VFR BLD AUTO: 41.9 % (ref 40–53)
HCT VFR BLD AUTO: 42.3 % (ref 40–53)
HDLC SERPL-MCNC: 44 MG/DL
HGB BLD-MCNC: 11 G/DL (ref 13.3–17.7)
HGB BLD-MCNC: 11.3 G/DL (ref 13.3–17.7)
HGB BLD-MCNC: 11.5 G/DL (ref 13.3–17.7)
HGB BLD-MCNC: 11.6 G/DL (ref 13.3–17.7)
HGB BLD-MCNC: 11.9 G/DL (ref 13.3–17.7)
HGB BLD-MCNC: 11.9 G/DL (ref 13.3–17.7)
HGB BLD-MCNC: 12.1 G/DL (ref 13.3–17.7)
HGB BLD-MCNC: 12.2 G/DL (ref 13.3–17.7)
HGB BLD-MCNC: 12.2 G/DL (ref 13.3–17.7)
HGB BLD-MCNC: 12.3 G/DL (ref 13.3–17.7)
HGB BLD-MCNC: 12.4 G/DL (ref 13.3–17.7)
HGB BLD-MCNC: 12.5 G/DL (ref 13.3–17.7)
HGB BLD-MCNC: 12.5 G/DL (ref 13.3–17.7)
HGB BLD-MCNC: 12.7 G/DL (ref 13.3–17.7)
HGB BLD-MCNC: 12.7 G/DL (ref 13.3–17.7)
HGB BLD-MCNC: 12.8 G/DL (ref 13.3–17.7)
HGB BLD-MCNC: 13.1 G/DL (ref 13.3–17.7)
HGB BLD-MCNC: 13.1 G/DL (ref 13.3–17.7)
HGB BLD-MCNC: 13.2 G/DL (ref 13.3–17.7)
HGB BLD-MCNC: 13.3 G/DL (ref 13.3–17.7)
HGB BLD-MCNC: 13.5 G/DL (ref 13.3–17.7)
HGB BLD-MCNC: 13.6 G/DL (ref 13.3–17.7)
HGB BLD-MCNC: 13.6 G/DL (ref 13.3–17.7)
HGB BLD-MCNC: 13.7 G/DL (ref 13.3–17.7)
HGB BLD-MCNC: 13.8 G/DL (ref 13.3–17.7)
HGB BLD-MCNC: 14 G/DL (ref 13.3–17.7)
HGB BLD-MCNC: 14 G/DL (ref 13.3–17.7)
HGB UR QL STRIP: ABNORMAL
HGB UR QL STRIP: NEGATIVE
HGB UR QL STRIP: NEGATIVE
HOLD SPECIMEN: NORMAL
IMM GRANULOCYTES # BLD: 0 10E3/UL
IMM GRANULOCYTES # BLD: 0.1 10E3/UL
IMM GRANULOCYTES # BLD: 0.2 10E3/UL
IMM GRANULOCYTES NFR BLD: 0 %
IMM GRANULOCYTES NFR BLD: 0 %
IMM GRANULOCYTES NFR BLD: 1 %
INR BLD: 1.8 (ref 0.9–1.1)
INR BLD: 2.7 (ref 0.9–1.1)
INR PPP: 1.12 (ref 0.85–1.15)
INR PPP: 1.21 (ref 0.85–1.15)
INR PPP: 3.27 (ref 0.85–1.15)
INTERPRETATION ECG - MUSE: NORMAL
KETONES UR STRIP-MCNC: NEGATIVE MG/DL
LACTATE SERPL-SCNC: 0.9 MMOL/L (ref 0.7–2)
LACTATE SERPL-SCNC: 0.9 MMOL/L (ref 0.7–2)
LACTATE SERPL-SCNC: 1.1 MMOL/L (ref 0.7–2)
LACTATE SERPL-SCNC: 1.2 MMOL/L (ref 0.7–2)
LACTATE SERPL-SCNC: 1.6 MMOL/L (ref 0.7–2)
LACTATE SERPL-SCNC: 1.6 MMOL/L (ref 0.7–2)
LACTATE SERPL-SCNC: 1.9 MMOL/L (ref 0.7–2)
LACTATE SERPL-SCNC: 2 MMOL/L (ref 0.7–2)
LACTATE SERPL-SCNC: 2.1 MMOL/L (ref 0.7–2)
LACTATE SERPL-SCNC: 2.4 MMOL/L (ref 0.7–2)
LACTATE SERPL-SCNC: 2.5 MMOL/L (ref 0.7–2)
LACTATE SERPL-SCNC: 3.5 MMOL/L (ref 0.7–2)
LACTATE SERPL-SCNC: 3.5 MMOL/L (ref 0.7–2)
LACTATE SERPL-SCNC: 3.9 MMOL/L (ref 0.7–2)
LDLC SERPL CALC-MCNC: 64 MG/DL
LEUKOCYTE ESTERASE UR QL STRIP: ABNORMAL
LEUKOCYTE ESTERASE UR QL STRIP: ABNORMAL
LEUKOCYTE ESTERASE UR QL STRIP: NEGATIVE
LVEF ECHO: NORMAL
LYMPHOCYTES # BLD AUTO: 1.4 10E3/UL (ref 0.8–5.3)
LYMPHOCYTES # BLD AUTO: 1.7 10E3/UL (ref 0.8–5.3)
LYMPHOCYTES # BLD AUTO: 1.8 10E3/UL (ref 0.8–5.3)
LYMPHOCYTES # BLD AUTO: 1.9 10E3/UL (ref 0.8–5.3)
LYMPHOCYTES # BLD AUTO: 2 10E3/UL (ref 0.8–5.3)
LYMPHOCYTES # BLD AUTO: 2.1 10E3/UL (ref 0.8–5.3)
LYMPHOCYTES # BLD AUTO: 2.2 10E3/UL (ref 0.8–5.3)
LYMPHOCYTES # BLD AUTO: 2.4 10E3/UL (ref 0.8–5.3)
LYMPHOCYTES # BLD AUTO: 2.4 10E3/UL (ref 0.8–5.3)
LYMPHOCYTES NFR BLD AUTO: 16 %
LYMPHOCYTES NFR BLD AUTO: 20 %
LYMPHOCYTES NFR BLD AUTO: 20 %
LYMPHOCYTES NFR BLD AUTO: 23 %
LYMPHOCYTES NFR BLD AUTO: 23 %
LYMPHOCYTES NFR BLD AUTO: 26 %
LYMPHOCYTES NFR BLD AUTO: 7 %
MAGNESIUM SERPL-MCNC: 1.8 MG/DL (ref 1.7–2.3)
MAGNESIUM SERPL-MCNC: 1.8 MG/DL (ref 1.7–2.3)
MAGNESIUM SERPL-MCNC: 1.9 MG/DL (ref 1.7–2.3)
MAGNESIUM SERPL-MCNC: 2 MG/DL (ref 1.7–2.3)
MAGNESIUM SERPL-MCNC: 2.3 MG/DL (ref 1.7–2.3)
MAGNESIUM SERPL-MCNC: 3.8 MG/DL (ref 1.7–2.3)
MCH RBC QN AUTO: 30 PG (ref 26.5–33)
MCH RBC QN AUTO: 30.4 PG (ref 26.5–33)
MCH RBC QN AUTO: 30.5 PG (ref 26.5–33)
MCH RBC QN AUTO: 30.5 PG (ref 26.5–33)
MCH RBC QN AUTO: 30.7 PG (ref 26.5–33)
MCH RBC QN AUTO: 30.7 PG (ref 26.5–33)
MCH RBC QN AUTO: 30.8 PG (ref 26.5–33)
MCH RBC QN AUTO: 30.9 PG (ref 26.5–33)
MCH RBC QN AUTO: 31 PG (ref 26.5–33)
MCH RBC QN AUTO: 31.1 PG (ref 26.5–33)
MCH RBC QN AUTO: 31.1 PG (ref 26.5–33)
MCH RBC QN AUTO: 31.2 PG (ref 26.5–33)
MCH RBC QN AUTO: 31.3 PG (ref 26.5–33)
MCH RBC QN AUTO: 31.6 PG (ref 26.5–33)
MCHC RBC AUTO-ENTMCNC: 31.7 G/DL (ref 31.5–36.5)
MCHC RBC AUTO-ENTMCNC: 31.8 G/DL (ref 31.5–36.5)
MCHC RBC AUTO-ENTMCNC: 32.1 G/DL (ref 31.5–36.5)
MCHC RBC AUTO-ENTMCNC: 32.2 G/DL (ref 31.5–36.5)
MCHC RBC AUTO-ENTMCNC: 32.3 G/DL (ref 31.5–36.5)
MCHC RBC AUTO-ENTMCNC: 32.5 G/DL (ref 31.5–36.5)
MCHC RBC AUTO-ENTMCNC: 32.5 G/DL (ref 31.5–36.5)
MCHC RBC AUTO-ENTMCNC: 32.6 G/DL (ref 31.5–36.5)
MCHC RBC AUTO-ENTMCNC: 32.6 G/DL (ref 31.5–36.5)
MCHC RBC AUTO-ENTMCNC: 32.7 G/DL (ref 31.5–36.5)
MCHC RBC AUTO-ENTMCNC: 32.8 G/DL (ref 31.5–36.5)
MCHC RBC AUTO-ENTMCNC: 32.9 G/DL (ref 31.5–36.5)
MCHC RBC AUTO-ENTMCNC: 33 G/DL (ref 31.5–36.5)
MCHC RBC AUTO-ENTMCNC: 33.1 G/DL (ref 31.5–36.5)
MCHC RBC AUTO-ENTMCNC: 33.2 G/DL (ref 31.5–36.5)
MCHC RBC AUTO-ENTMCNC: 33.4 G/DL (ref 31.5–36.5)
MCHC RBC AUTO-ENTMCNC: 33.5 G/DL (ref 31.5–36.5)
MCHC RBC AUTO-ENTMCNC: 33.8 G/DL (ref 31.5–36.5)
MCHC RBC AUTO-ENTMCNC: 33.8 G/DL (ref 31.5–36.5)
MCHC RBC AUTO-ENTMCNC: 33.9 G/DL (ref 31.5–36.5)
MCHC RBC AUTO-ENTMCNC: 34.3 G/DL (ref 31.5–36.5)
MCV RBC AUTO: 91 FL (ref 78–100)
MCV RBC AUTO: 92 FL (ref 78–100)
MCV RBC AUTO: 93 FL (ref 78–100)
MCV RBC AUTO: 94 FL (ref 78–100)
MCV RBC AUTO: 95 FL (ref 78–100)
MCV RBC AUTO: 96 FL (ref 78–100)
MCV RBC AUTO: 96 FL (ref 78–100)
MCV RBC AUTO: 98 FL (ref 78–100)
MONOCYTES # BLD AUTO: 0.6 10E3/UL (ref 0–1.3)
MONOCYTES # BLD AUTO: 0.6 10E3/UL (ref 0–1.3)
MONOCYTES # BLD AUTO: 0.8 10E3/UL (ref 0–1.3)
MONOCYTES # BLD AUTO: 0.9 10E3/UL (ref 0–1.3)
MONOCYTES # BLD AUTO: 0.9 10E3/UL (ref 0–1.3)
MONOCYTES # BLD AUTO: 1 10E3/UL (ref 0–1.3)
MONOCYTES # BLD AUTO: 1.1 10E3/UL (ref 0–1.3)
MONOCYTES # BLD AUTO: 1.6 10E3/UL (ref 0–1.3)
MONOCYTES # BLD AUTO: 1.9 10E3/UL (ref 0–1.3)
MONOCYTES NFR BLD AUTO: 12 %
MONOCYTES NFR BLD AUTO: 12 %
MONOCYTES NFR BLD AUTO: 14 %
MONOCYTES NFR BLD AUTO: 6 %
MONOCYTES NFR BLD AUTO: 7 %
MONOCYTES NFR BLD AUTO: 7 %
MONOCYTES NFR BLD AUTO: 8 %
MONOCYTES NFR BLD AUTO: 8 %
MONOCYTES NFR BLD AUTO: 9 %
MRSA DNA SPEC QL NAA+PROBE: NEGATIVE
MRSA DNA SPEC QL NAA+PROBE: NEGATIVE
MUCOUS THREADS #/AREA URNS LPF: PRESENT /LPF
NEUTROPHILS # BLD AUTO: 10.9 10E3/UL (ref 1.6–8.3)
NEUTROPHILS # BLD AUTO: 22.9 10E3/UL (ref 1.6–8.3)
NEUTROPHILS # BLD AUTO: 4.7 10E3/UL (ref 1.6–8.3)
NEUTROPHILS # BLD AUTO: 4.8 10E3/UL (ref 1.6–8.3)
NEUTROPHILS # BLD AUTO: 4.8 10E3/UL (ref 1.6–8.3)
NEUTROPHILS # BLD AUTO: 4.9 10E3/UL (ref 1.6–8.3)
NEUTROPHILS # BLD AUTO: 8.3 10E3/UL (ref 1.6–8.3)
NEUTROPHILS # BLD AUTO: 8.9 10E3/UL (ref 1.6–8.3)
NEUTROPHILS # BLD AUTO: 9.6 10E3/UL (ref 1.6–8.3)
NEUTROPHILS NFR BLD AUTO: 58 %
NEUTROPHILS NFR BLD AUTO: 58 %
NEUTROPHILS NFR BLD AUTO: 65 %
NEUTROPHILS NFR BLD AUTO: 68 %
NEUTROPHILS NFR BLD AUTO: 69 %
NEUTROPHILS NFR BLD AUTO: 69 %
NEUTROPHILS NFR BLD AUTO: 73 %
NEUTROPHILS NFR BLD AUTO: 74 %
NEUTROPHILS NFR BLD AUTO: 85 %
NITRATE UR QL: NEGATIVE
NONHDLC SERPL-MCNC: 76 MG/DL
NRBC # BLD AUTO: 0 10E3/UL
NRBC BLD AUTO-RTO: 0 /100
O2/TOTAL GAS SETTING VFR VENT: 24 %
O2/TOTAL GAS SETTING VFR VENT: 94 %
P AXIS - MUSE: NORMAL DEGREES
PCO2 BLD: 33 MM HG (ref 35–45)
PCO2 BLDV: 39 MM HG (ref 40–50)
PH BLD: 7.53 [PH] (ref 7.35–7.45)
PH BLDV: 7.45 [PH] (ref 7.32–7.43)
PH UR STRIP: 5.5 [PH] (ref 5–7)
PH UR STRIP: 7.5 [PH] (ref 5–7)
PH UR STRIP: 7.5 [PH] (ref 5–7)
PHOSPHATE SERPL-MCNC: 2.8 MG/DL (ref 2.5–4.5)
PHOSPHATE SERPL-MCNC: 3.1 MG/DL (ref 2.5–4.5)
PHOSPHATE SERPL-MCNC: 3.2 MG/DL (ref 2.5–4.5)
PHOSPHATE SERPL-MCNC: 3.2 MG/DL (ref 2.5–4.5)
PHOSPHATE SERPL-MCNC: 3.3 MG/DL (ref 2.5–4.5)
PHOSPHATE SERPL-MCNC: 3.5 MG/DL (ref 2.5–4.5)
PHOSPHATE SERPL-MCNC: 3.6 MG/DL (ref 2.5–4.5)
PHOSPHATE SERPL-MCNC: 3.7 MG/DL (ref 2.5–4.5)
PHOSPHATE SERPL-MCNC: 3.7 MG/DL (ref 2.5–4.5)
PHOSPHATE SERPL-MCNC: 3.8 MG/DL (ref 2.5–4.5)
PHOSPHATE SERPL-MCNC: 4 MG/DL (ref 2.5–4.5)
PLATELET # BLD AUTO: 199 10E3/UL (ref 150–450)
PLATELET # BLD AUTO: 224 10E3/UL (ref 150–450)
PLATELET # BLD AUTO: 227 10E3/UL (ref 150–450)
PLATELET # BLD AUTO: 233 10E3/UL (ref 150–450)
PLATELET # BLD AUTO: 233 10E3/UL (ref 150–450)
PLATELET # BLD AUTO: 239 10E3/UL (ref 150–450)
PLATELET # BLD AUTO: 241 10E3/UL (ref 150–450)
PLATELET # BLD AUTO: 243 10E3/UL (ref 150–450)
PLATELET # BLD AUTO: 245 10E3/UL (ref 150–450)
PLATELET # BLD AUTO: 248 10E3/UL (ref 150–450)
PLATELET # BLD AUTO: 248 10E3/UL (ref 150–450)
PLATELET # BLD AUTO: 252 10E3/UL (ref 150–450)
PLATELET # BLD AUTO: 253 10E3/UL (ref 150–450)
PLATELET # BLD AUTO: 256 10E3/UL (ref 150–450)
PLATELET # BLD AUTO: 282 10E3/UL (ref 150–450)
PLATELET # BLD AUTO: 294 10E3/UL (ref 150–450)
PLATELET # BLD AUTO: 308 10E3/UL (ref 150–450)
PLATELET # BLD AUTO: 316 10E3/UL (ref 150–450)
PLATELET # BLD AUTO: 328 10E3/UL (ref 150–450)
PLATELET # BLD AUTO: 333 10E3/UL (ref 150–450)
PLATELET # BLD AUTO: 345 10E3/UL (ref 150–450)
PLATELET # BLD AUTO: 351 10E3/UL (ref 150–450)
PLATELET # BLD AUTO: 352 10E3/UL (ref 150–450)
PLATELET # BLD AUTO: 357 10E3/UL (ref 150–450)
PLATELET # BLD AUTO: 363 10E3/UL (ref 150–450)
PLATELET # BLD AUTO: 379 10E3/UL (ref 150–450)
PLATELET # BLD AUTO: 430 10E3/UL (ref 150–450)
PLATELET # BLD AUTO: 445 10E3/UL (ref 150–450)
PLATELET # BLD AUTO: 445 10E3/UL (ref 150–450)
PLATELET # BLD AUTO: 464 10E3/UL (ref 150–450)
PO2 BLD: 132 MM HG (ref 80–105)
PO2 BLDV: 59 MM HG (ref 25–47)
POTASSIUM SERPL-SCNC: 3.4 MMOL/L (ref 3.4–5.3)
POTASSIUM SERPL-SCNC: 3.8 MMOL/L (ref 3.4–5.3)
POTASSIUM SERPL-SCNC: 3.9 MMOL/L (ref 3.4–5.3)
POTASSIUM SERPL-SCNC: 4 MMOL/L (ref 3.4–5.3)
POTASSIUM SERPL-SCNC: 4.1 MMOL/L (ref 3.4–5.3)
POTASSIUM SERPL-SCNC: 4.2 MMOL/L (ref 3.4–5.3)
POTASSIUM SERPL-SCNC: 4.3 MMOL/L (ref 3.4–5.3)
POTASSIUM SERPL-SCNC: 4.4 MMOL/L (ref 3.4–5.3)
POTASSIUM SERPL-SCNC: 4.8 MMOL/L (ref 3.4–5.3)
PR INTERVAL - MUSE: NORMAL MS
PROCALCITONIN SERPL IA-MCNC: 0.06 NG/ML
PROCALCITONIN SERPL IA-MCNC: 0.07 NG/ML
PROCALCITONIN SERPL IA-MCNC: 0.13 NG/ML
PROCALCITONIN SERPL IA-MCNC: 0.85 NG/ML
PROT SERPL-MCNC: 6.9 G/DL (ref 6.4–8.3)
PROT SERPL-MCNC: 7.1 G/DL (ref 6.4–8.3)
PROT SERPL-MCNC: 7.3 G/DL (ref 6.4–8.3)
PROT SERPL-MCNC: 7.6 G/DL (ref 6.4–8.3)
QRS DURATION - MUSE: 138 MS
QRS DURATION - MUSE: 154 MS
QRS DURATION - MUSE: 160 MS
QRS DURATION - MUSE: 166 MS
QRS DURATION - MUSE: 168 MS
QT - MUSE: 402 MS
QT - MUSE: 406 MS
QT - MUSE: 408 MS
QT - MUSE: 414 MS
QT - MUSE: 432 MS
QTC - MUSE: 418 MS
QTC - MUSE: 471 MS
QTC - MUSE: 472 MS
QTC - MUSE: 492 MS
QTC - MUSE: 512 MS
R AXIS - MUSE: 20 DEGREES
R AXIS - MUSE: 30 DEGREES
R AXIS - MUSE: 30 DEGREES
R AXIS - MUSE: 48 DEGREES
R AXIS - MUSE: 58 DEGREES
RBC # BLD AUTO: 3.55 10E6/UL (ref 4.4–5.9)
RBC # BLD AUTO: 3.58 10E6/UL (ref 4.4–5.9)
RBC # BLD AUTO: 3.75 10E6/UL (ref 4.4–5.9)
RBC # BLD AUTO: 3.78 10E6/UL (ref 4.4–5.9)
RBC # BLD AUTO: 3.91 10E6/UL (ref 4.4–5.9)
RBC # BLD AUTO: 3.92 10E6/UL (ref 4.4–5.9)
RBC # BLD AUTO: 3.94 10E6/UL (ref 4.4–5.9)
RBC # BLD AUTO: 3.94 10E6/UL (ref 4.4–5.9)
RBC # BLD AUTO: 3.96 10E6/UL (ref 4.4–5.9)
RBC # BLD AUTO: 3.98 10E6/UL (ref 4.4–5.9)
RBC # BLD AUTO: 3.98 10E6/UL (ref 4.4–5.9)
RBC # BLD AUTO: 4.03 10E6/UL (ref 4.4–5.9)
RBC # BLD AUTO: 4.03 10E6/UL (ref 4.4–5.9)
RBC # BLD AUTO: 4.04 10E6/UL (ref 4.4–5.9)
RBC # BLD AUTO: 4.05 10E6/UL (ref 4.4–5.9)
RBC # BLD AUTO: 4.06 10E6/UL (ref 4.4–5.9)
RBC # BLD AUTO: 4.11 10E6/UL (ref 4.4–5.9)
RBC # BLD AUTO: 4.11 10E6/UL (ref 4.4–5.9)
RBC # BLD AUTO: 4.17 10E6/UL (ref 4.4–5.9)
RBC # BLD AUTO: 4.25 10E6/UL (ref 4.4–5.9)
RBC # BLD AUTO: 4.25 10E6/UL (ref 4.4–5.9)
RBC # BLD AUTO: 4.26 10E6/UL (ref 4.4–5.9)
RBC # BLD AUTO: 4.34 10E6/UL (ref 4.4–5.9)
RBC # BLD AUTO: 4.38 10E6/UL (ref 4.4–5.9)
RBC # BLD AUTO: 4.41 10E6/UL (ref 4.4–5.9)
RBC # BLD AUTO: 4.42 10E6/UL (ref 4.4–5.9)
RBC # BLD AUTO: 4.49 10E6/UL (ref 4.4–5.9)
RBC # BLD AUTO: 4.51 10E6/UL (ref 4.4–5.9)
RBC URINE: 2 /HPF
RBC URINE: 5 /HPF
RBC URINE: 9 /HPF
SA TARGET DNA: NEGATIVE
SA TARGET DNA: POSITIVE
SARS-COV-2 RNA RESP QL NAA+PROBE: NEGATIVE
SARS-COV-2 RNA RESP QL NAA+PROBE: NEGATIVE
SCANNED LAB RESULT: NORMAL
SODIUM SERPL-SCNC: 135 MMOL/L (ref 136–145)
SODIUM SERPL-SCNC: 136 MMOL/L (ref 136–145)
SODIUM SERPL-SCNC: 137 MMOL/L (ref 136–145)
SODIUM SERPL-SCNC: 138 MMOL/L (ref 136–145)
SODIUM SERPL-SCNC: 139 MMOL/L (ref 136–145)
SODIUM SERPL-SCNC: 140 MMOL/L (ref 136–145)
SODIUM SERPL-SCNC: 141 MMOL/L (ref 136–145)
SODIUM SERPL-SCNC: 142 MMOL/L (ref 136–145)
SODIUM SERPL-SCNC: 143 MMOL/L (ref 136–145)
SP GR UR STRIP: 1.02 (ref 1–1.03)
SP GR UR STRIP: 1.02 (ref 1–1.03)
SP GR UR STRIP: 1.03 (ref 1–1.03)
SQUAMOUS EPITHELIAL: <1 /HPF
SQUAMOUS EPITHELIAL: <1 /HPF
SYSTOLIC BLOOD PRESSURE - MUSE: 129 MMHG
SYSTOLIC BLOOD PRESSURE - MUSE: NORMAL MMHG
T AXIS - MUSE: -12 DEGREES
T AXIS - MUSE: -14 DEGREES
T AXIS - MUSE: -3 DEGREES
T AXIS - MUSE: 6 DEGREES
T AXIS - MUSE: 6 DEGREES
TRIGL SERPL-MCNC: 61 MG/DL
TROPONIN T SERPL HS-MCNC: 36 NG/L
TROPONIN T SERPL HS-MCNC: 39 NG/L
TROPONIN T SERPL HS-MCNC: 66 NG/L
TROPONIN T SERPL HS-MCNC: 75 NG/L
TROPONIN T SERPL HS-MCNC: 81 NG/L
TSH SERPL DL<=0.005 MIU/L-ACNC: 1.26 UIU/ML (ref 0.3–4.2)
UROBILINOGEN UR STRIP-MCNC: 2 MG/DL
VANCOMYCIN SERPL-MCNC: 11 UG/ML
VENTRICULAR RATE- MUSE: 64 BPM
VENTRICULAR RATE- MUSE: 78 BPM
VENTRICULAR RATE- MUSE: 78 BPM
VENTRICULAR RATE- MUSE: 83 BPM
VENTRICULAR RATE- MUSE: 95 BPM
VIT B12 SERPL-MCNC: 879 PG/ML (ref 232–1245)
WBC # BLD AUTO: 10.5 10E3/UL (ref 4–11)
WBC # BLD AUTO: 10.7 10E3/UL (ref 4–11)
WBC # BLD AUTO: 11.7 10E3/UL (ref 4–11)
WBC # BLD AUTO: 11.7 10E3/UL (ref 4–11)
WBC # BLD AUTO: 11.9 10E3/UL (ref 4–11)
WBC # BLD AUTO: 12.1 10E3/UL (ref 4–11)
WBC # BLD AUTO: 12.6 10E3/UL (ref 4–11)
WBC # BLD AUTO: 12.7 10E3/UL (ref 4–11)
WBC # BLD AUTO: 12.9 10E3/UL (ref 4–11)
WBC # BLD AUTO: 13 10E3/UL (ref 4–11)
WBC # BLD AUTO: 13.5 10E3/UL (ref 4–11)
WBC # BLD AUTO: 14.1 10E3/UL (ref 4–11)
WBC # BLD AUTO: 14.8 10E3/UL (ref 4–11)
WBC # BLD AUTO: 21.6 10E3/UL (ref 4–11)
WBC # BLD AUTO: 26.7 10E3/UL (ref 4–11)
WBC # BLD AUTO: 26.7 10E3/UL (ref 4–11)
WBC # BLD AUTO: 6.9 10E3/UL (ref 4–11)
WBC # BLD AUTO: 7.1 10E3/UL (ref 4–11)
WBC # BLD AUTO: 7.2 10E3/UL (ref 4–11)
WBC # BLD AUTO: 7.3 10E3/UL (ref 4–11)
WBC # BLD AUTO: 7.4 10E3/UL (ref 4–11)
WBC # BLD AUTO: 7.5 10E3/UL (ref 4–11)
WBC # BLD AUTO: 8.1 10E3/UL (ref 4–11)
WBC # BLD AUTO: 8.2 10E3/UL (ref 4–11)
WBC # BLD AUTO: 8.2 10E3/UL (ref 4–11)
WBC # BLD AUTO: 8.3 10E3/UL (ref 4–11)
WBC # BLD AUTO: 8.6 10E3/UL (ref 4–11)
WBC # BLD AUTO: 9.2 10E3/UL (ref 4–11)
WBC # BLD AUTO: 9.3 10E3/UL (ref 4–11)
WBC # BLD AUTO: 9.8 10E3/UL (ref 4–11)
WBC # BLD AUTO: 9.9 10E3/UL (ref 4–11)
WBC URINE: 3 /HPF
WBC URINE: 9 /HPF
WBC URINE: >182 /HPF

## 2023-01-01 PROCEDURE — 97110 THERAPEUTIC EXERCISES: CPT | Mod: GP | Performed by: REHABILITATION PRACTITIONER

## 2023-01-01 PROCEDURE — 97116 GAIT TRAINING THERAPY: CPT | Mod: GP

## 2023-01-01 PROCEDURE — 97129 THER IVNTJ 1ST 15 MIN: CPT | Mod: GN | Performed by: SPEECH-LANGUAGE PATHOLOGIST

## 2023-01-01 PROCEDURE — 250N000013 HC RX MED GY IP 250 OP 250 PS 637: Performed by: INTERNAL MEDICINE

## 2023-01-01 PROCEDURE — 97535 SELF CARE MNGMENT TRAINING: CPT | Mod: GO

## 2023-01-01 PROCEDURE — 80048 BASIC METABOLIC PNL TOTAL CA: CPT | Performed by: STUDENT IN AN ORGANIZED HEALTH CARE EDUCATION/TRAINING PROGRAM

## 2023-01-01 PROCEDURE — G0378 HOSPITAL OBSERVATION PER HR: HCPCS

## 2023-01-01 PROCEDURE — 258N000003 HC RX IP 258 OP 636: Performed by: INTERNAL MEDICINE

## 2023-01-01 PROCEDURE — 36415 COLL VENOUS BLD VENIPUNCTURE: CPT | Performed by: STUDENT IN AN ORGANIZED HEALTH CARE EDUCATION/TRAINING PROGRAM

## 2023-01-01 PROCEDURE — 128N000003 HC R&B REHAB

## 2023-01-01 PROCEDURE — 97112 NEUROMUSCULAR REEDUCATION: CPT | Mod: GP | Performed by: PHYSICAL THERAPIST

## 2023-01-01 PROCEDURE — 92526 ORAL FUNCTION THERAPY: CPT | Mod: GN

## 2023-01-01 PROCEDURE — 250N000013 HC RX MED GY IP 250 OP 250 PS 637: Performed by: HOSPITALIST

## 2023-01-01 PROCEDURE — 84100 ASSAY OF PHOSPHORUS: CPT | Performed by: PHYSICIAN ASSISTANT

## 2023-01-01 PROCEDURE — 74230 X-RAY XM SWLNG FUNCJ C+: CPT

## 2023-01-01 PROCEDURE — 99232 SBSQ HOSP IP/OBS MODERATE 35: CPT | Mod: FS | Performed by: PHYSICIAN ASSISTANT

## 2023-01-01 PROCEDURE — 86140 C-REACTIVE PROTEIN: CPT | Performed by: PHYSICIAN ASSISTANT

## 2023-01-01 PROCEDURE — 250N000013 HC RX MED GY IP 250 OP 250 PS 637: Performed by: STUDENT IN AN ORGANIZED HEALTH CARE EDUCATION/TRAINING PROGRAM

## 2023-01-01 PROCEDURE — 97530 THERAPEUTIC ACTIVITIES: CPT | Mod: GP | Performed by: PHYSICAL THERAPIST

## 2023-01-01 PROCEDURE — 36415 COLL VENOUS BLD VENIPUNCTURE: CPT | Performed by: EMERGENCY MEDICINE

## 2023-01-01 PROCEDURE — 80048 BASIC METABOLIC PNL TOTAL CA: CPT | Performed by: PHYSICIAN ASSISTANT

## 2023-01-01 PROCEDURE — 85041 AUTOMATED RBC COUNT: CPT | Performed by: EMERGENCY MEDICINE

## 2023-01-01 PROCEDURE — 87641 MR-STAPH DNA AMP PROBE: CPT | Performed by: STUDENT IN AN ORGANIZED HEALTH CARE EDUCATION/TRAINING PROGRAM

## 2023-01-01 PROCEDURE — 250N000013 HC RX MED GY IP 250 OP 250 PS 637: Performed by: PHYSICIAN ASSISTANT

## 2023-01-01 PROCEDURE — 200N000001 HC R&B ICU

## 2023-01-01 PROCEDURE — 93010 ELECTROCARDIOGRAM REPORT: CPT | Performed by: INTERNAL MEDICINE

## 2023-01-01 PROCEDURE — 999N000157 HC STATISTIC RCP TIME EA 10 MIN

## 2023-01-01 PROCEDURE — 97535 SELF CARE MNGMENT TRAINING: CPT | Mod: GO | Performed by: OCCUPATIONAL THERAPIST

## 2023-01-01 PROCEDURE — 99233 SBSQ HOSP IP/OBS HIGH 50: CPT | Performed by: PHYSICAL MEDICINE & REHABILITATION

## 2023-01-01 PROCEDURE — 250N000011 HC RX IP 250 OP 636: Performed by: INTERNAL MEDICINE

## 2023-01-01 PROCEDURE — 85025 COMPLETE CBC W/AUTO DIFF WBC: CPT | Performed by: INTERNAL MEDICINE

## 2023-01-01 PROCEDURE — 71046 X-RAY EXAM CHEST 2 VIEWS: CPT

## 2023-01-01 PROCEDURE — 85027 COMPLETE CBC AUTOMATED: CPT | Performed by: INTERNAL MEDICINE

## 2023-01-01 PROCEDURE — 83605 ASSAY OF LACTIC ACID: CPT | Performed by: STUDENT IN AN ORGANIZED HEALTH CARE EDUCATION/TRAINING PROGRAM

## 2023-01-01 PROCEDURE — 99305 1ST NF CARE MODERATE MDM 35: CPT | Performed by: FAMILY MEDICINE

## 2023-01-01 PROCEDURE — 99309 SBSQ NF CARE MODERATE MDM 30: CPT | Performed by: NURSE PRACTITIONER

## 2023-01-01 PROCEDURE — 97130 THER IVNTJ EA ADDL 15 MIN: CPT | Mod: GN

## 2023-01-01 PROCEDURE — 80048 BASIC METABOLIC PNL TOTAL CA: CPT | Performed by: INTERNAL MEDICINE

## 2023-01-01 PROCEDURE — 82803 BLOOD GASES ANY COMBINATION: CPT | Performed by: INTERNAL MEDICINE

## 2023-01-01 PROCEDURE — 92526 ORAL FUNCTION THERAPY: CPT | Mod: GN | Performed by: SPEECH-LANGUAGE PATHOLOGIST

## 2023-01-01 PROCEDURE — 99233 SBSQ HOSP IP/OBS HIGH 50: CPT | Performed by: INTERNAL MEDICINE

## 2023-01-01 PROCEDURE — 36415 COLL VENOUS BLD VENIPUNCTURE: CPT | Mod: ORL | Performed by: NURSE PRACTITIONER

## 2023-01-01 PROCEDURE — 96132 NRPSYC TST EVAL PHYS/QHP 1ST: CPT | Performed by: CLINICAL NEUROPSYCHOLOGIST

## 2023-01-01 PROCEDURE — 120N000002 HC R&B MED SURG/OB UMMC

## 2023-01-01 PROCEDURE — 120N000001 HC R&B MED SURG/OB

## 2023-01-01 PROCEDURE — 99292 CRITICAL CARE ADDL 30 MIN: CPT

## 2023-01-01 PROCEDURE — 97530 THERAPEUTIC ACTIVITIES: CPT | Mod: GP

## 2023-01-01 PROCEDURE — 94640 AIRWAY INHALATION TREATMENT: CPT

## 2023-01-01 PROCEDURE — 99223 1ST HOSP IP/OBS HIGH 75: CPT | Mod: AI | Performed by: PHYSICIAN ASSISTANT

## 2023-01-01 PROCEDURE — 82310 ASSAY OF CALCIUM: CPT | Performed by: PHYSICIAN ASSISTANT

## 2023-01-01 PROCEDURE — 85652 RBC SED RATE AUTOMATED: CPT | Performed by: PHYSICIAN ASSISTANT

## 2023-01-01 PROCEDURE — 97167 OT EVAL HIGH COMPLEX 60 MIN: CPT | Mod: GO

## 2023-01-01 PROCEDURE — 99231 SBSQ HOSP IP/OBS SF/LOW 25: CPT | Performed by: INTERNAL MEDICINE

## 2023-01-01 PROCEDURE — 999N000125 HC STATISTIC PATIENT MED CONFERENCE < 30 MIN: Performed by: OCCUPATIONAL THERAPIST

## 2023-01-01 PROCEDURE — 36415 COLL VENOUS BLD VENIPUNCTURE: CPT | Performed by: PHYSICIAN ASSISTANT

## 2023-01-01 PROCEDURE — 84100 ASSAY OF PHOSPHORUS: CPT | Performed by: STUDENT IN AN ORGANIZED HEALTH CARE EDUCATION/TRAINING PROGRAM

## 2023-01-01 PROCEDURE — 97116 GAIT TRAINING THERAPY: CPT | Mod: GP | Performed by: PHYSICAL THERAPIST

## 2023-01-01 PROCEDURE — 83735 ASSAY OF MAGNESIUM: CPT | Performed by: PHYSICIAN ASSISTANT

## 2023-01-01 PROCEDURE — 250N000009 HC RX 250: Performed by: STUDENT IN AN ORGANIZED HEALTH CARE EDUCATION/TRAINING PROGRAM

## 2023-01-01 PROCEDURE — 97129 THER IVNTJ 1ST 15 MIN: CPT | Mod: GN

## 2023-01-01 PROCEDURE — 85610 PROTHROMBIN TIME: CPT

## 2023-01-01 PROCEDURE — 99207 PR CONSULT E&M CHANGED TO INITIAL LEVEL: CPT | Performed by: PSYCHIATRY & NEUROLOGY

## 2023-01-01 PROCEDURE — 83605 ASSAY OF LACTIC ACID: CPT | Performed by: PHYSICIAN ASSISTANT

## 2023-01-01 PROCEDURE — 36415 COLL VENOUS BLD VENIPUNCTURE: CPT | Performed by: INTERNAL MEDICINE

## 2023-01-01 PROCEDURE — 87040 BLOOD CULTURE FOR BACTERIA: CPT | Performed by: PHYSICIAN ASSISTANT

## 2023-01-01 PROCEDURE — 85027 COMPLETE CBC AUTOMATED: CPT | Performed by: PHYSICIAN ASSISTANT

## 2023-01-01 PROCEDURE — 99233 SBSQ HOSP IP/OBS HIGH 50: CPT | Mod: FS | Performed by: PHYSICIAN ASSISTANT

## 2023-01-01 PROCEDURE — 250N000011 HC RX IP 250 OP 636: Performed by: STUDENT IN AN ORGANIZED HEALTH CARE EDUCATION/TRAINING PROGRAM

## 2023-01-01 PROCEDURE — 70450 CT HEAD/BRAIN W/O DYE: CPT

## 2023-01-01 PROCEDURE — 71046 X-RAY EXAM CHEST 2 VIEWS: CPT | Mod: 26 | Performed by: STUDENT IN AN ORGANIZED HEALTH CARE EDUCATION/TRAINING PROGRAM

## 2023-01-01 PROCEDURE — 258N000003 HC RX IP 258 OP 636

## 2023-01-01 PROCEDURE — 97530 THERAPEUTIC ACTIVITIES: CPT | Mod: GO | Performed by: OCCUPATIONAL THERAPIST

## 2023-01-01 PROCEDURE — 97163 PT EVAL HIGH COMPLEX 45 MIN: CPT | Mod: GP

## 2023-01-01 PROCEDURE — 93306 TTE W/DOPPLER COMPLETE: CPT | Mod: 26 | Performed by: INTERNAL MEDICINE

## 2023-01-01 PROCEDURE — 70496 CT ANGIOGRAPHY HEAD: CPT | Mod: XE

## 2023-01-01 PROCEDURE — 250N000012 HC RX MED GY IP 250 OP 636 PS 637: Performed by: INTERNAL MEDICINE

## 2023-01-01 PROCEDURE — 84484 ASSAY OF TROPONIN QUANT: CPT | Performed by: INTERNAL MEDICINE

## 2023-01-01 PROCEDURE — 250N000013 HC RX MED GY IP 250 OP 250 PS 637: Performed by: PHYSICAL MEDICINE & REHABILITATION

## 2023-01-01 PROCEDURE — 99232 SBSQ HOSP IP/OBS MODERATE 35: CPT | Performed by: INTERNAL MEDICINE

## 2023-01-01 PROCEDURE — 82310 ASSAY OF CALCIUM: CPT | Performed by: EMERGENCY MEDICINE

## 2023-01-01 PROCEDURE — 99232 SBSQ HOSP IP/OBS MODERATE 35: CPT | Performed by: STUDENT IN AN ORGANIZED HEALTH CARE EDUCATION/TRAINING PROGRAM

## 2023-01-01 PROCEDURE — 84443 ASSAY THYROID STIM HORMONE: CPT | Performed by: INTERNAL MEDICINE

## 2023-01-01 PROCEDURE — 92611 MOTION FLUOROSCOPY/SWALLOW: CPT | Mod: GN

## 2023-01-01 PROCEDURE — 99291 CRITICAL CARE FIRST HOUR: CPT | Mod: FS | Performed by: PHYSICIAN ASSISTANT

## 2023-01-01 PROCEDURE — 97112 NEUROMUSCULAR REEDUCATION: CPT | Mod: GP

## 2023-01-01 PROCEDURE — 250N000009 HC RX 250: Performed by: PHYSICIAN ASSISTANT

## 2023-01-01 PROCEDURE — 85018 HEMOGLOBIN: CPT | Performed by: INTERNAL MEDICINE

## 2023-01-01 PROCEDURE — 97750 PHYSICAL PERFORMANCE TEST: CPT | Mod: GP

## 2023-01-01 PROCEDURE — 255N000002 HC RX 255 OP 636: Performed by: INTERNAL MEDICINE

## 2023-01-01 PROCEDURE — 97130 THER IVNTJ EA ADDL 15 MIN: CPT | Mod: GN | Performed by: SPEECH-LANGUAGE PATHOLOGIST

## 2023-01-01 PROCEDURE — 99152 MOD SED SAME PHYS/QHP 5/>YRS: CPT

## 2023-01-01 PROCEDURE — 258N000003 HC RX IP 258 OP 636: Performed by: STUDENT IN AN ORGANIZED HEALTH CARE EDUCATION/TRAINING PROGRAM

## 2023-01-01 PROCEDURE — 250N000012 HC RX MED GY IP 250 OP 636 PS 637: Performed by: HOSPITALIST

## 2023-01-01 PROCEDURE — 99285 EMERGENCY DEPT VISIT HI MDM: CPT | Mod: 25

## 2023-01-01 PROCEDURE — 71045 X-RAY EXAM CHEST 1 VIEW: CPT | Mod: 26 | Performed by: RADIOLOGY

## 2023-01-01 PROCEDURE — 99222 1ST HOSP IP/OBS MODERATE 55: CPT | Performed by: INTERNAL MEDICINE

## 2023-01-01 PROCEDURE — 97162 PT EVAL MOD COMPLEX 30 MIN: CPT | Mod: GP

## 2023-01-01 PROCEDURE — 80048 BASIC METABOLIC PNL TOTAL CA: CPT | Mod: ORL | Performed by: NURSE PRACTITIONER

## 2023-01-01 PROCEDURE — 5A09357 ASSISTANCE WITH RESPIRATORY VENTILATION, LESS THAN 24 CONSECUTIVE HOURS, CONTINUOUS POSITIVE AIRWAY PRESSURE: ICD-10-PCS | Performed by: PHYSICAL MEDICINE & REHABILITATION

## 2023-01-01 PROCEDURE — 83605 ASSAY OF LACTIC ACID: CPT

## 2023-01-01 PROCEDURE — 99238 HOSP IP/OBS DSCHRG MGMT 30/<: CPT | Performed by: INTERNAL MEDICINE

## 2023-01-01 PROCEDURE — 99231 SBSQ HOSP IP/OBS SF/LOW 25: CPT | Mod: GC | Performed by: PHYSICAL MEDICINE & REHABILITATION

## 2023-01-01 PROCEDURE — 93005 ELECTROCARDIOGRAM TRACING: CPT | Performed by: EMERGENCY MEDICINE

## 2023-01-01 PROCEDURE — 97110 THERAPEUTIC EXERCISES: CPT | Mod: GO

## 2023-01-01 PROCEDURE — 97161 PT EVAL LOW COMPLEX 20 MIN: CPT | Mod: GP | Performed by: PHYSICAL THERAPIST

## 2023-01-01 PROCEDURE — 93005 ELECTROCARDIOGRAM TRACING: CPT

## 2023-01-01 PROCEDURE — 97530 THERAPEUTIC ACTIVITIES: CPT | Mod: GO

## 2023-01-01 PROCEDURE — 250N000011 HC RX IP 250 OP 636: Performed by: EMERGENCY MEDICINE

## 2023-01-01 PROCEDURE — 83605 ASSAY OF LACTIC ACID: CPT | Performed by: INTERNAL MEDICINE

## 2023-01-01 PROCEDURE — 250N000013 HC RX MED GY IP 250 OP 250 PS 637

## 2023-01-01 PROCEDURE — 85027 COMPLETE CBC AUTOMATED: CPT | Performed by: STUDENT IN AN ORGANIZED HEALTH CARE EDUCATION/TRAINING PROGRAM

## 2023-01-01 PROCEDURE — 85027 COMPLETE CBC AUTOMATED: CPT | Performed by: PHYSICAL MEDICINE & REHABILITATION

## 2023-01-01 PROCEDURE — 93005 ELECTROCARDIOGRAM TRACING: CPT | Performed by: FAMILY MEDICINE

## 2023-01-01 PROCEDURE — 97533 SENSORY INTEGRATION: CPT | Mod: GO

## 2023-01-01 PROCEDURE — 84132 ASSAY OF SERUM POTASSIUM: CPT | Performed by: STUDENT IN AN ORGANIZED HEALTH CARE EDUCATION/TRAINING PROGRAM

## 2023-01-01 PROCEDURE — 87040 BLOOD CULTURE FOR BACTERIA: CPT | Performed by: STUDENT IN AN ORGANIZED HEALTH CARE EDUCATION/TRAINING PROGRAM

## 2023-01-01 PROCEDURE — 99233 SBSQ HOSP IP/OBS HIGH 50: CPT | Performed by: STUDENT IN AN ORGANIZED HEALTH CARE EDUCATION/TRAINING PROGRAM

## 2023-01-01 PROCEDURE — 81001 URINALYSIS AUTO W/SCOPE: CPT | Performed by: STUDENT IN AN ORGANIZED HEALTH CARE EDUCATION/TRAINING PROGRAM

## 2023-01-01 PROCEDURE — G0463 HOSPITAL OUTPT CLINIC VISIT: HCPCS

## 2023-01-01 PROCEDURE — 82607 VITAMIN B-12: CPT | Performed by: INTERNAL MEDICINE

## 2023-01-01 PROCEDURE — 99239 HOSP IP/OBS DSCHRG MGMT >30: CPT | Mod: FS | Performed by: PHYSICIAN ASSISTANT

## 2023-01-01 PROCEDURE — U0005 INFEC AGEN DETEC AMPLI PROBE: HCPCS | Performed by: STUDENT IN AN ORGANIZED HEALTH CARE EDUCATION/TRAINING PROGRAM

## 2023-01-01 PROCEDURE — 90791 PSYCH DIAGNOSTIC EVALUATION: CPT | Performed by: CLINICAL NEUROPSYCHOLOGIST

## 2023-01-01 PROCEDURE — 99223 1ST HOSP IP/OBS HIGH 75: CPT | Performed by: PSYCHIATRY & NEUROLOGY

## 2023-01-01 PROCEDURE — 71045 X-RAY EXAM CHEST 1 VIEW: CPT

## 2023-01-01 PROCEDURE — 96375 TX/PRO/DX INJ NEW DRUG ADDON: CPT | Mod: 59

## 2023-01-01 PROCEDURE — 92526 ORAL FUNCTION THERAPY: CPT | Mod: GN | Performed by: REHABILITATION PRACTITIONER

## 2023-01-01 PROCEDURE — 92610 EVALUATE SWALLOWING FUNCTION: CPT | Mod: GN

## 2023-01-01 PROCEDURE — 99207 PR NO BILLABLE SERVICE THIS VISIT: CPT | Performed by: INTERNAL MEDICINE

## 2023-01-01 PROCEDURE — 97161 PT EVAL LOW COMPLEX 20 MIN: CPT | Mod: GP

## 2023-01-01 PROCEDURE — 83735 ASSAY OF MAGNESIUM: CPT | Performed by: STUDENT IN AN ORGANIZED HEALTH CARE EDUCATION/TRAINING PROGRAM

## 2023-01-01 PROCEDURE — 36415 COLL VENOUS BLD VENIPUNCTURE: CPT | Performed by: FAMILY MEDICINE

## 2023-01-01 PROCEDURE — 81001 URINALYSIS AUTO W/SCOPE: CPT | Performed by: PHYSICIAN ASSISTANT

## 2023-01-01 PROCEDURE — 84484 ASSAY OF TROPONIN QUANT: CPT | Performed by: PHYSICAL MEDICINE & REHABILITATION

## 2023-01-01 PROCEDURE — 85610 PROTHROMBIN TIME: CPT | Performed by: EMERGENCY MEDICINE

## 2023-01-01 PROCEDURE — 97110 THERAPEUTIC EXERCISES: CPT | Mod: GP

## 2023-01-01 PROCEDURE — 37195 THROMBOLYTIC THERAPY STROKE: CPT

## 2023-01-01 PROCEDURE — 84100 ASSAY OF PHOSPHORUS: CPT | Performed by: INTERNAL MEDICINE

## 2023-01-01 PROCEDURE — 250N000013 HC RX MED GY IP 250 OP 250 PS 637: Performed by: EMERGENCY MEDICINE

## 2023-01-01 PROCEDURE — 85014 HEMATOCRIT: CPT | Performed by: STUDENT IN AN ORGANIZED HEALTH CARE EDUCATION/TRAINING PROGRAM

## 2023-01-01 PROCEDURE — 99231 SBSQ HOSP IP/OBS SF/LOW 25: CPT | Performed by: PHYSICAL MEDICINE & REHABILITATION

## 2023-01-01 PROCEDURE — 83605 ASSAY OF LACTIC ACID: CPT | Performed by: PHYSICAL MEDICINE & REHABILITATION

## 2023-01-01 PROCEDURE — 999N000150 HC STATISTIC PT MED CONFERENCE < 30 MIN

## 2023-01-01 PROCEDURE — 258N000003 HC RX IP 258 OP 636: Performed by: HOSPITALIST

## 2023-01-01 PROCEDURE — 258N000003 HC RX IP 258 OP 636: Performed by: PHYSICIAN ASSISTANT

## 2023-01-01 PROCEDURE — 250N000013 HC RX MED GY IP 250 OP 250 PS 637: Performed by: PSYCHIATRY & NEUROLOGY

## 2023-01-01 PROCEDURE — 82330 ASSAY OF CALCIUM: CPT | Performed by: INTERNAL MEDICINE

## 2023-01-01 PROCEDURE — 97112 NEUROMUSCULAR REEDUCATION: CPT | Mod: GO

## 2023-01-01 PROCEDURE — 0DH63UZ INSERTION OF FEEDING DEVICE INTO STOMACH, PERCUTANEOUS APPROACH: ICD-10-PCS | Performed by: RADIOLOGY

## 2023-01-01 PROCEDURE — 80053 COMPREHEN METABOLIC PANEL: CPT | Performed by: PHYSICAL MEDICINE & REHABILITATION

## 2023-01-01 PROCEDURE — 86140 C-REACTIVE PROTEIN: CPT | Performed by: INTERNAL MEDICINE

## 2023-01-01 PROCEDURE — 99223 1ST HOSP IP/OBS HIGH 75: CPT | Mod: AI | Performed by: INTERNAL MEDICINE

## 2023-01-01 PROCEDURE — 74230 X-RAY XM SWLNG FUNCJ C+: CPT | Mod: 26 | Performed by: STUDENT IN AN ORGANIZED HEALTH CARE EDUCATION/TRAINING PROGRAM

## 2023-01-01 PROCEDURE — 99153 MOD SED SAME PHYS/QHP EA: CPT

## 2023-01-01 PROCEDURE — 250N000009 HC RX 250: Performed by: INTERNAL MEDICINE

## 2023-01-01 PROCEDURE — 97166 OT EVAL MOD COMPLEX 45 MIN: CPT | Mod: GO

## 2023-01-01 PROCEDURE — 99239 HOSP IP/OBS DSCHRG MGMT >30: CPT | Performed by: STUDENT IN AN ORGANIZED HEALTH CARE EDUCATION/TRAINING PROGRAM

## 2023-01-01 PROCEDURE — 36415 COLL VENOUS BLD VENIPUNCTURE: CPT

## 2023-01-01 PROCEDURE — 92611 MOTION FLUOROSCOPY/SWALLOW: CPT | Mod: GN | Performed by: SPEECH-LANGUAGE PATHOLOGIST

## 2023-01-01 PROCEDURE — U0005 INFEC AGEN DETEC AMPLI PROBE: HCPCS | Performed by: PHYSICIAN ASSISTANT

## 2023-01-01 PROCEDURE — 250N000011 HC RX IP 250 OP 636: Performed by: PHYSICIAN ASSISTANT

## 2023-01-01 PROCEDURE — 258N000001 HC RX 258: Performed by: HOSPITALIST

## 2023-01-01 PROCEDURE — 71046 X-RAY EXAM CHEST 2 VIEWS: CPT | Mod: 26 | Performed by: RADIOLOGY

## 2023-01-01 PROCEDURE — 70496 CT ANGIOGRAPHY HEAD: CPT

## 2023-01-01 PROCEDURE — 97110 THERAPEUTIC EXERCISES: CPT | Mod: GP | Performed by: STUDENT IN AN ORGANIZED HEALTH CARE EDUCATION/TRAINING PROGRAM

## 2023-01-01 PROCEDURE — 70450 CT HEAD/BRAIN W/O DYE: CPT | Mod: 26 | Performed by: RADIOLOGY

## 2023-01-01 PROCEDURE — 36600 WITHDRAWAL OF ARTERIAL BLOOD: CPT

## 2023-01-01 PROCEDURE — 70498 CT ANGIOGRAPHY NECK: CPT | Mod: XE

## 2023-01-01 PROCEDURE — 99233 SBSQ HOSP IP/OBS HIGH 50: CPT | Mod: AI | Performed by: PHYSICIAN ASSISTANT

## 2023-01-01 PROCEDURE — 82310 ASSAY OF CALCIUM: CPT | Performed by: INTERNAL MEDICINE

## 2023-01-01 PROCEDURE — 70553 MRI BRAIN STEM W/O & W/DYE: CPT

## 2023-01-01 PROCEDURE — 70498 CT ANGIOGRAPHY NECK: CPT

## 2023-01-01 PROCEDURE — 80053 COMPREHEN METABOLIC PANEL: CPT | Performed by: INTERNAL MEDICINE

## 2023-01-01 PROCEDURE — 87086 URINE CULTURE/COLONY COUNT: CPT | Performed by: STUDENT IN AN ORGANIZED HEALTH CARE EDUCATION/TRAINING PROGRAM

## 2023-01-01 PROCEDURE — 85025 COMPLETE CBC W/AUTO DIFF WBC: CPT | Performed by: EMERGENCY MEDICINE

## 2023-01-01 PROCEDURE — 85610 PROTHROMBIN TIME: CPT | Performed by: PHYSICIAN ASSISTANT

## 2023-01-01 PROCEDURE — 99232 SBSQ HOSP IP/OBS MODERATE 35: CPT | Performed by: HOSPITALIST

## 2023-01-01 PROCEDURE — 36415 COLL VENOUS BLD VENIPUNCTURE: CPT | Performed by: PHYSICAL MEDICINE & REHABILITATION

## 2023-01-01 PROCEDURE — 99222 1ST HOSP IP/OBS MODERATE 55: CPT | Mod: AI | Performed by: INTERNAL MEDICINE

## 2023-01-01 PROCEDURE — 84145 PROCALCITONIN (PCT): CPT | Performed by: PHYSICIAN ASSISTANT

## 2023-01-01 PROCEDURE — 86140 C-REACTIVE PROTEIN: CPT | Performed by: STUDENT IN AN ORGANIZED HEALTH CARE EDUCATION/TRAINING PROGRAM

## 2023-01-01 PROCEDURE — 99239 HOSP IP/OBS DSCHRG MGMT >30: CPT | Performed by: INTERNAL MEDICINE

## 2023-01-01 PROCEDURE — 83735 ASSAY OF MAGNESIUM: CPT | Performed by: INTERNAL MEDICINE

## 2023-01-01 PROCEDURE — 99291 CRITICAL CARE FIRST HOUR: CPT | Mod: 25

## 2023-01-01 PROCEDURE — 72158 MRI LUMBAR SPINE W/O & W/DYE: CPT

## 2023-01-01 PROCEDURE — 999N000208 ECHOCARDIOGRAM COMPLETE

## 2023-01-01 PROCEDURE — 255N000002 HC RX 255 OP 636: Performed by: EMERGENCY MEDICINE

## 2023-01-01 PROCEDURE — 49440 PLACE GASTROSTOMY TUBE PERC: CPT

## 2023-01-01 PROCEDURE — 80202 ASSAY OF VANCOMYCIN: CPT | Performed by: STUDENT IN AN ORGANIZED HEALTH CARE EDUCATION/TRAINING PROGRAM

## 2023-01-01 PROCEDURE — 82962 GLUCOSE BLOOD TEST: CPT

## 2023-01-01 PROCEDURE — 250N000012 HC RX MED GY IP 250 OP 636 PS 637: Performed by: PHYSICIAN ASSISTANT

## 2023-01-01 PROCEDURE — A9585 GADOBUTROL INJECTION: HCPCS | Performed by: INTERNAL MEDICINE

## 2023-01-01 PROCEDURE — 83036 HEMOGLOBIN GLYCOSYLATED A1C: CPT | Performed by: FAMILY MEDICINE

## 2023-01-01 PROCEDURE — 92610 EVALUATE SWALLOWING FUNCTION: CPT | Mod: GN | Performed by: SPEECH-LANGUAGE PATHOLOGIST

## 2023-01-01 PROCEDURE — 99222 1ST HOSP IP/OBS MODERATE 55: CPT

## 2023-01-01 PROCEDURE — 84145 PROCALCITONIN (PCT): CPT | Performed by: STUDENT IN AN ORGANIZED HEALTH CARE EDUCATION/TRAINING PROGRAM

## 2023-01-01 PROCEDURE — 96374 THER/PROPH/DIAG INJ IV PUSH: CPT | Mod: 59

## 2023-01-01 PROCEDURE — 92523 SPEECH SOUND LANG COMPREHEN: CPT | Mod: GN

## 2023-01-01 PROCEDURE — 250N000009 HC RX 250: Performed by: EMERGENCY MEDICINE

## 2023-01-01 PROCEDURE — 0042T CT HEAD PERFUSION W CONTRAST: CPT

## 2023-01-01 PROCEDURE — 84484 ASSAY OF TROPONIN QUANT: CPT | Performed by: EMERGENCY MEDICINE

## 2023-01-01 PROCEDURE — 92507 TX SP LANG VOICE COMM INDIV: CPT | Mod: GN

## 2023-01-01 PROCEDURE — 36416 COLLJ CAPILLARY BLOOD SPEC: CPT

## 2023-01-01 PROCEDURE — 272N000187 HC ACCESSORY CR11

## 2023-01-01 PROCEDURE — 99292 CRITICAL CARE ADDL 30 MIN: CPT | Performed by: PSYCHIATRY & NEUROLOGY

## 2023-01-01 PROCEDURE — 99215 OFFICE O/P EST HI 40 MIN: CPT | Performed by: FAMILY MEDICINE

## 2023-01-01 PROCEDURE — 82746 ASSAY OF FOLIC ACID SERUM: CPT | Performed by: INTERNAL MEDICINE

## 2023-01-01 PROCEDURE — 97110 THERAPEUTIC EXERCISES: CPT | Mod: GP | Performed by: PHYSICAL THERAPIST

## 2023-01-01 PROCEDURE — 999N000125 HC STATISTIC PATIENT MED CONFERENCE < 30 MIN

## 2023-01-01 PROCEDURE — 87641 MR-STAPH DNA AMP PROBE: CPT | Performed by: INTERNAL MEDICINE

## 2023-01-01 PROCEDURE — 94640 AIRWAY INHALATION TREATMENT: CPT | Mod: 76

## 2023-01-01 PROCEDURE — 82310 ASSAY OF CALCIUM: CPT | Performed by: STUDENT IN AN ORGANIZED HEALTH CARE EDUCATION/TRAINING PROGRAM

## 2023-01-01 PROCEDURE — 80061 LIPID PANEL: CPT | Performed by: INTERNAL MEDICINE

## 2023-01-01 PROCEDURE — 999N000125 HC STATISTIC PATIENT MED CONFERENCE < 30 MIN: Performed by: SPEECH-LANGUAGE PATHOLOGIST

## 2023-01-01 PROCEDURE — 99231 SBSQ HOSP IP/OBS SF/LOW 25: CPT

## 2023-01-01 PROCEDURE — 85730 THROMBOPLASTIN TIME PARTIAL: CPT | Performed by: EMERGENCY MEDICINE

## 2023-01-01 PROCEDURE — 70450 CT HEAD/BRAIN W/O DYE: CPT | Mod: 77

## 2023-01-01 PROCEDURE — 80053 COMPREHEN METABOLIC PANEL: CPT | Performed by: PHYSICIAN ASSISTANT

## 2023-01-01 PROCEDURE — P9604 ONE-WAY ALLOW PRORATED TRIP: HCPCS | Mod: ORL | Performed by: NURSE PRACTITIONER

## 2023-01-01 PROCEDURE — 97530 THERAPEUTIC ACTIVITIES: CPT | Mod: GP | Performed by: REHABILITATION PRACTITIONER

## 2023-01-01 PROCEDURE — 97530 THERAPEUTIC ACTIVITIES: CPT | Mod: GP | Performed by: STUDENT IN AN ORGANIZED HEALTH CARE EDUCATION/TRAINING PROGRAM

## 2023-01-01 PROCEDURE — A9585 GADOBUTROL INJECTION: HCPCS | Performed by: EMERGENCY MEDICINE

## 2023-01-01 RX ORDER — BARIUM SULFATE 400 MG/ML
SUSPENSION ORAL ONCE
Status: COMPLETED | OUTPATIENT
Start: 2023-01-01 | End: 2023-01-01

## 2023-01-01 RX ORDER — MIRTAZAPINE 15 MG/1
15 TABLET, FILM COATED ORAL AT BEDTIME
DISCHARGE
Start: 2023-01-01

## 2023-01-01 RX ORDER — ONDANSETRON 4 MG/1
4 TABLET, ORALLY DISINTEGRATING ORAL EVERY 6 HOURS PRN
Status: DISCONTINUED | OUTPATIENT
Start: 2023-01-01 | End: 2023-01-01 | Stop reason: HOSPADM

## 2023-01-01 RX ORDER — ASPIRIN 300 MG/1
300 SUPPOSITORY RECTAL EVERY EVENING
Status: DISCONTINUED | OUTPATIENT
Start: 2023-01-01 | End: 2023-01-01

## 2023-01-01 RX ORDER — DEXTROSE MONOHYDRATE 25 G/50ML
25-50 INJECTION, SOLUTION INTRAVENOUS
Status: DISCONTINUED | OUTPATIENT
Start: 2023-01-01 | End: 2023-01-01 | Stop reason: HOSPADM

## 2023-01-01 RX ORDER — IOPAMIDOL 755 MG/ML
75 INJECTION, SOLUTION INTRAVASCULAR ONCE
Status: COMPLETED | OUTPATIENT
Start: 2023-01-01 | End: 2023-01-01

## 2023-01-01 RX ORDER — LIDOCAINE 40 MG/G
CREAM TOPICAL
Status: DISCONTINUED | OUTPATIENT
Start: 2023-01-01 | End: 2023-01-01 | Stop reason: HOSPADM

## 2023-01-01 RX ORDER — L. ACIDOPHILUS/L.BULGARICUS 1MM CELL
TABLET ORAL DAILY
Status: DISCONTINUED | OUTPATIENT
Start: 2023-01-01 | End: 2023-01-01

## 2023-01-01 RX ORDER — ATENOLOL 25 MG/1
12.5 TABLET ORAL DAILY
COMMUNITY
Start: 2023-01-01

## 2023-01-01 RX ORDER — AMPICILLIN AND SULBACTAM 2; 1 G/1; G/1
3 INJECTION, POWDER, FOR SOLUTION INTRAMUSCULAR; INTRAVENOUS EVERY 6 HOURS
Status: COMPLETED | OUTPATIENT
Start: 2023-01-01 | End: 2023-01-01

## 2023-01-01 RX ORDER — LEVETIRACETAM 500 MG/1
500 TABLET ORAL 2 TIMES DAILY
Status: DISCONTINUED | OUTPATIENT
Start: 2023-01-01 | End: 2023-01-01 | Stop reason: HOSPADM

## 2023-01-01 RX ORDER — ACETAMINOPHEN 325 MG/1
325-650 TABLET ORAL EVERY 6 HOURS PRN
Status: ON HOLD | COMMUNITY
End: 2023-01-01

## 2023-01-01 RX ORDER — SODIUM CHLORIDE 9 MG/ML
INJECTION, SOLUTION INTRAVENOUS CONTINUOUS
Status: DISCONTINUED | OUTPATIENT
Start: 2023-01-01 | End: 2023-01-01 | Stop reason: HOSPADM

## 2023-01-01 RX ORDER — AMANTADINE HYDROCHLORIDE 100 MG/1
100 CAPSULE, GELATIN COATED ORAL 2 TIMES DAILY
Status: DISCONTINUED | OUTPATIENT
Start: 2023-01-01 | End: 2023-01-01 | Stop reason: HOSPADM

## 2023-01-01 RX ORDER — DEXTROSE MONOHYDRATE 100 MG/ML
INJECTION, SOLUTION INTRAVENOUS CONTINUOUS PRN
Status: DISCONTINUED | OUTPATIENT
Start: 2023-01-01 | End: 2023-01-01 | Stop reason: HOSPADM

## 2023-01-01 RX ORDER — POLYETHYLENE GLYCOL 3350 17 G/17G
17 POWDER, FOR SOLUTION ORAL DAILY
Qty: 510 G | Status: ON HOLD | DISCHARGE
Start: 2023-01-01 | End: 2023-01-01

## 2023-01-01 RX ORDER — SODIUM CHLORIDE 9 MG/ML
INJECTION, SOLUTION INTRAVENOUS
Status: COMPLETED
Start: 2023-01-01 | End: 2023-01-01

## 2023-01-01 RX ORDER — DILTIAZEM HCL 60 MG
60 TABLET ORAL EVERY 8 HOURS SCHEDULED
Status: DISCONTINUED | OUTPATIENT
Start: 2023-01-01 | End: 2023-01-01 | Stop reason: HOSPADM

## 2023-01-01 RX ORDER — PROCHLORPERAZINE MALEATE 5 MG
5 TABLET ORAL EVERY 6 HOURS PRN
Status: DISCONTINUED | OUTPATIENT
Start: 2023-01-01 | End: 2023-01-01

## 2023-01-01 RX ORDER — ATROPINE SULFATE 10 MG/ML
SOLUTION/ DROPS OPHTHALMIC
COMMUNITY
Start: 2023-01-01

## 2023-01-01 RX ORDER — MIRTAZAPINE 7.5 MG/1
7.5 TABLET, FILM COATED ORAL AT BEDTIME
Status: DISCONTINUED | OUTPATIENT
Start: 2023-01-01 | End: 2023-01-01

## 2023-01-01 RX ORDER — MIRTAZAPINE 15 MG/1
15 TABLET, FILM COATED ORAL AT BEDTIME
Status: DISCONTINUED | OUTPATIENT
Start: 2023-01-01 | End: 2023-01-01 | Stop reason: HOSPADM

## 2023-01-01 RX ORDER — CEFDINIR 250 MG/5ML
300 POWDER, FOR SUSPENSION ORAL EVERY 12 HOURS SCHEDULED
Status: COMPLETED | OUTPATIENT
Start: 2023-01-01 | End: 2023-01-01

## 2023-01-01 RX ORDER — GABAPENTIN 250 MG/5ML
600 SOLUTION ORAL AT BEDTIME
Status: DISCONTINUED | OUTPATIENT
Start: 2023-01-01 | End: 2023-01-01

## 2023-01-01 RX ORDER — FLUTICASONE PROPIONATE 50 MCG
1 SPRAY, SUSPENSION (ML) NASAL DAILY
DISCHARGE
Start: 2023-01-01

## 2023-01-01 RX ORDER — DILTIAZEM HCL 60 MG
60 TABLET ORAL EVERY 8 HOURS
DISCHARGE
Start: 2023-01-01

## 2023-01-01 RX ORDER — LORAZEPAM 2 MG/ML
2 INJECTION INTRAMUSCULAR DAILY PRN
Status: DISCONTINUED | OUTPATIENT
Start: 2023-01-01 | End: 2023-01-01 | Stop reason: HOSPADM

## 2023-01-01 RX ORDER — NALOXONE HYDROCHLORIDE 0.4 MG/ML
0.4 INJECTION, SOLUTION INTRAMUSCULAR; INTRAVENOUS; SUBCUTANEOUS
Status: DISCONTINUED | OUTPATIENT
Start: 2023-01-01 | End: 2023-01-01 | Stop reason: HOSPADM

## 2023-01-01 RX ORDER — SODIUM CHLORIDE, SODIUM LACTATE, POTASSIUM CHLORIDE, CALCIUM CHLORIDE 600; 310; 30; 20 MG/100ML; MG/100ML; MG/100ML; MG/100ML
INJECTION, SOLUTION INTRAVENOUS
Status: DISPENSED
Start: 2023-01-01 | End: 2023-01-01

## 2023-01-01 RX ORDER — DILTIAZEM HYDROCHLORIDE 120 MG/1
120 CAPSULE, COATED, EXTENDED RELEASE ORAL DAILY
Status: DISCONTINUED | OUTPATIENT
Start: 2023-01-01 | End: 2023-01-01

## 2023-01-01 RX ORDER — AMOXICILLIN 250 MG
2 CAPSULE ORAL 2 TIMES DAILY PRN
Status: DISCONTINUED | OUTPATIENT
Start: 2023-01-01 | End: 2023-01-01 | Stop reason: HOSPADM

## 2023-01-01 RX ORDER — GABAPENTIN 250 MG/5ML
600 SOLUTION ORAL AT BEDTIME
Status: DISCONTINUED | OUTPATIENT
Start: 2023-01-01 | End: 2023-01-01 | Stop reason: HOSPADM

## 2023-01-01 RX ORDER — NICOTINE POLACRILEX 4 MG
15-30 LOZENGE BUCCAL
Status: DISCONTINUED | OUTPATIENT
Start: 2023-01-01 | End: 2023-01-01

## 2023-01-01 RX ORDER — POTASSIUM CHLORIDE 1.5 G/1.58G
40 POWDER, FOR SOLUTION ORAL ONCE
Status: COMPLETED | OUTPATIENT
Start: 2023-01-01 | End: 2023-01-01

## 2023-01-01 RX ORDER — IOPAMIDOL 755 MG/ML
125 INJECTION, SOLUTION INTRAVASCULAR ONCE
Status: COMPLETED | OUTPATIENT
Start: 2023-01-01 | End: 2023-01-01

## 2023-01-01 RX ORDER — GADOBUTROL 604.72 MG/ML
10 INJECTION INTRAVENOUS ONCE
Status: COMPLETED | OUTPATIENT
Start: 2023-01-01 | End: 2023-01-01

## 2023-01-01 RX ORDER — LIDOCAINE 4 G/G
2 PATCH TOPICAL
Status: DISCONTINUED | OUTPATIENT
Start: 2023-01-01 | End: 2023-01-01

## 2023-01-01 RX ORDER — NICOTINE POLACRILEX 4 MG
15-30 LOZENGE BUCCAL
Status: DISCONTINUED | OUTPATIENT
Start: 2023-01-01 | End: 2023-01-01 | Stop reason: HOSPADM

## 2023-01-01 RX ORDER — LACTOBACILLUS RHAMNOSUS GG 10B CELL
1 CAPSULE ORAL 2 TIMES DAILY
Status: DISCONTINUED | OUTPATIENT
Start: 2023-01-01 | End: 2023-01-01 | Stop reason: HOSPADM

## 2023-01-01 RX ORDER — DILTIAZEM HCL 60 MG
60 TABLET ORAL EVERY 8 HOURS
Status: DISCONTINUED | OUTPATIENT
Start: 2023-01-01 | End: 2023-01-01 | Stop reason: HOSPADM

## 2023-01-01 RX ORDER — ASPIRIN 325 MG
325 TABLET ORAL DAILY
Status: DISCONTINUED | OUTPATIENT
Start: 2023-01-01 | End: 2023-01-01

## 2023-01-01 RX ORDER — AMANTADINE HYDROCHLORIDE 100 MG/1
100 CAPSULE, GELATIN COATED ORAL DAILY
Qty: 3 CAPSULE | Refills: 0
Start: 2023-01-01 | End: 2023-01-01

## 2023-01-01 RX ORDER — LIDOCAINE 4 G/G
2 PATCH TOPICAL
Status: DISCONTINUED | OUTPATIENT
Start: 2023-01-01 | End: 2023-01-01 | Stop reason: HOSPADM

## 2023-01-01 RX ORDER — GABAPENTIN 250 MG/5ML
600 SOLUTION ORAL AT BEDTIME
Status: ON HOLD | DISCHARGE
Start: 2023-01-01 | End: 2023-01-01

## 2023-01-01 RX ORDER — LABETALOL HYDROCHLORIDE 5 MG/ML
10 INJECTION, SOLUTION INTRAVENOUS EVERY 10 MIN PRN
Status: DISCONTINUED | OUTPATIENT
Start: 2023-01-01 | End: 2023-01-01 | Stop reason: HOSPADM

## 2023-01-01 RX ORDER — LEVETIRACETAM 100 MG/ML
500 SOLUTION ORAL 2 TIMES DAILY
Status: DISCONTINUED | OUTPATIENT
Start: 2023-01-01 | End: 2023-01-01 | Stop reason: HOSPADM

## 2023-01-01 RX ORDER — ACETAMINOPHEN 325 MG/10.15ML
650 LIQUID ORAL EVERY 4 HOURS PRN
Status: DISCONTINUED | OUTPATIENT
Start: 2023-01-01 | End: 2023-01-01 | Stop reason: HOSPADM

## 2023-01-01 RX ORDER — AMANTADINE HYDROCHLORIDE 100 MG/1
100 CAPSULE, GELATIN COATED ORAL DAILY
Status: DISCONTINUED | OUTPATIENT
Start: 2023-01-01 | End: 2023-01-01

## 2023-01-01 RX ORDER — HYDRALAZINE HYDROCHLORIDE 20 MG/ML
10 INJECTION INTRAMUSCULAR; INTRAVENOUS EVERY 10 MIN PRN
Status: DISCONTINUED | OUTPATIENT
Start: 2023-01-01 | End: 2023-01-01 | Stop reason: HOSPADM

## 2023-01-01 RX ORDER — GABAPENTIN 250 MG/5ML
300 SOLUTION ORAL AT BEDTIME
DISCHARGE
Start: 2023-01-01

## 2023-01-01 RX ORDER — LEVETIRACETAM 750 MG/1
750 TABLET ORAL 2 TIMES DAILY
Status: DISCONTINUED | OUTPATIENT
Start: 2023-01-01 | End: 2023-01-01

## 2023-01-01 RX ORDER — AMOXICILLIN AND CLAVULANATE POTASSIUM 400; 57 MG/5ML; MG/5ML
875 POWDER, FOR SUSPENSION ORAL 2 TIMES DAILY
Status: COMPLETED | OUTPATIENT
Start: 2023-01-01 | End: 2023-01-01

## 2023-01-01 RX ORDER — GABAPENTIN 250 MG/5ML
300 SOLUTION ORAL AT BEDTIME
Status: DISCONTINUED | OUTPATIENT
Start: 2023-01-01 | End: 2023-01-01

## 2023-01-01 RX ORDER — AMANTADINE HYDROCHLORIDE 100 MG/1
100 CAPSULE, GELATIN COATED ORAL DAILY
Status: ON HOLD | COMMUNITY
Start: 2023-01-01 | End: 2023-01-01

## 2023-01-01 RX ORDER — CEFTRIAXONE 2 G/1
2 INJECTION, POWDER, FOR SOLUTION INTRAMUSCULAR; INTRAVENOUS EVERY 24 HOURS
Status: ACTIVE | DISCHARGE
Start: 2023-01-01 | End: 2023-01-01

## 2023-01-01 RX ORDER — ATENOLOL 25 MG/1
12.5 TABLET ORAL DAILY
Status: DISCONTINUED | OUTPATIENT
Start: 2023-01-01 | End: 2023-01-01

## 2023-01-01 RX ORDER — INSULIN LISPRO 100 [IU]/ML
1-7 INJECTION, SOLUTION INTRAVENOUS; SUBCUTANEOUS
Qty: 15 ML | Refills: 0 | Status: SHIPPED | OUTPATIENT
Start: 2023-01-01

## 2023-01-01 RX ORDER — AMPICILLIN AND SULBACTAM 2; 1 G/1; G/1
3 INJECTION, POWDER, FOR SOLUTION INTRAMUSCULAR; INTRAVENOUS EVERY 6 HOURS
Status: DISCONTINUED | OUTPATIENT
Start: 2023-01-01 | End: 2023-01-01

## 2023-01-01 RX ORDER — ASPIRIN 325 MG
325 TABLET ORAL EVERY EVENING
Status: DISCONTINUED | OUTPATIENT
Start: 2023-01-01 | End: 2023-01-01

## 2023-01-01 RX ORDER — FLUTICASONE PROPIONATE 50 MCG
1 SPRAY, SUSPENSION (ML) NASAL DAILY
Status: DISCONTINUED | OUTPATIENT
Start: 2023-01-01 | End: 2023-01-01 | Stop reason: HOSPADM

## 2023-01-01 RX ORDER — AMOXICILLIN 250 MG
2 CAPSULE ORAL 2 TIMES DAILY
Status: ON HOLD | DISCHARGE
Start: 2023-01-01 | End: 2023-01-01

## 2023-01-01 RX ORDER — AMOXICILLIN 250 MG
2 CAPSULE ORAL 2 TIMES DAILY
Status: DISCONTINUED | OUTPATIENT
Start: 2023-01-01 | End: 2023-01-01 | Stop reason: HOSPADM

## 2023-01-01 RX ORDER — LIDOCAINE 4 G/G
2 PATCH TOPICAL EVERY 24 HOURS
DISCHARGE
Start: 2023-01-01

## 2023-01-01 RX ORDER — LEVETIRACETAM 500 MG/1
500 TABLET ORAL 2 TIMES DAILY
COMMUNITY
Start: 2023-01-01

## 2023-01-01 RX ORDER — ONDANSETRON 2 MG/ML
4 INJECTION INTRAMUSCULAR; INTRAVENOUS EVERY 6 HOURS PRN
Status: DISCONTINUED | OUTPATIENT
Start: 2023-01-01 | End: 2023-01-01 | Stop reason: HOSPADM

## 2023-01-01 RX ORDER — LORAZEPAM 2 MG/ML
0.5 INJECTION INTRAMUSCULAR ONCE
Status: COMPLETED | OUTPATIENT
Start: 2023-01-01 | End: 2023-01-01

## 2023-01-01 RX ORDER — GUAIFENESIN 200 MG/10ML
10 LIQUID ORAL EVERY 4 HOURS PRN
Status: DISCONTINUED | OUTPATIENT
Start: 2023-01-01 | End: 2023-01-01 | Stop reason: HOSPADM

## 2023-01-01 RX ORDER — ACETAMINOPHEN 325 MG/1
650 TABLET ORAL EVERY 4 HOURS PRN
Status: DISCONTINUED | OUTPATIENT
Start: 2023-01-01 | End: 2023-01-01 | Stop reason: HOSPADM

## 2023-01-01 RX ORDER — FENTANYL CITRATE 50 UG/ML
25-50 INJECTION, SOLUTION INTRAMUSCULAR; INTRAVENOUS EVERY 5 MIN PRN
Status: DISCONTINUED | OUTPATIENT
Start: 2023-01-01 | End: 2023-01-01 | Stop reason: HOSPADM

## 2023-01-01 RX ORDER — MICONAZOLE NITRATE 20 MG/G
CREAM TOPICAL 2 TIMES DAILY
Status: DISCONTINUED | OUTPATIENT
Start: 2023-01-01 | End: 2023-01-01 | Stop reason: HOSPADM

## 2023-01-01 RX ORDER — POLYETHYLENE GLYCOL 3350 17 G/17G
17 POWDER, FOR SOLUTION ORAL DAILY PRN
Status: DISCONTINUED | OUTPATIENT
Start: 2023-01-01 | End: 2023-01-01 | Stop reason: HOSPADM

## 2023-01-01 RX ORDER — AMANTADINE HYDROCHLORIDE 100 MG/1
100 CAPSULE, GELATIN COATED ORAL 2 TIMES DAILY
Status: ON HOLD | DISCHARGE
Start: 2023-01-01 | End: 2023-01-01

## 2023-01-01 RX ORDER — ATORVASTATIN CALCIUM 20 MG/1
20 TABLET, FILM COATED ORAL AT BEDTIME
DISCHARGE
Start: 2023-01-01

## 2023-01-01 RX ORDER — DIPHENHYDRAMINE HYDROCHLORIDE 50 MG/ML
25 INJECTION INTRAMUSCULAR; INTRAVENOUS ONCE
Status: COMPLETED | OUTPATIENT
Start: 2023-01-01 | End: 2023-01-01

## 2023-01-01 RX ORDER — BENZOCAINE/MENTHOL 6 MG-10 MG
LOZENGE MUCOUS MEMBRANE 3 TIMES DAILY
Status: COMPLETED | OUTPATIENT
Start: 2023-01-01 | End: 2023-01-01

## 2023-01-01 RX ORDER — MIRTAZAPINE 15 MG/1
15 TABLET, FILM COATED ORAL AT BEDTIME
Status: DISCONTINUED | OUTPATIENT
Start: 2023-01-01 | End: 2023-01-01

## 2023-01-01 RX ORDER — NALOXONE HYDROCHLORIDE 0.4 MG/ML
0.2 INJECTION, SOLUTION INTRAMUSCULAR; INTRAVENOUS; SUBCUTANEOUS
Status: DISCONTINUED | OUTPATIENT
Start: 2023-01-01 | End: 2023-01-01 | Stop reason: HOSPADM

## 2023-01-01 RX ORDER — ASPIRIN 300 MG/1
300 SUPPOSITORY RECTAL DAILY
Status: DISCONTINUED | OUTPATIENT
Start: 2023-01-01 | End: 2023-01-01

## 2023-01-01 RX ORDER — VANCOMYCIN HYDROCHLORIDE 1 G/200ML
1000 INJECTION, SOLUTION INTRAVENOUS EVERY 12 HOURS
Status: DISCONTINUED | OUTPATIENT
Start: 2023-01-01 | End: 2023-01-01

## 2023-01-01 RX ORDER — GABAPENTIN 300 MG/1
600 CAPSULE ORAL AT BEDTIME
Status: DISCONTINUED | OUTPATIENT
Start: 2023-01-01 | End: 2023-01-01

## 2023-01-01 RX ORDER — PIPERACILLIN SODIUM, TAZOBACTAM SODIUM 4; .5 G/20ML; G/20ML
4.5 INJECTION, POWDER, LYOPHILIZED, FOR SOLUTION INTRAVENOUS EVERY 6 HOURS
Status: DISCONTINUED | OUTPATIENT
Start: 2023-01-01 | End: 2023-01-01

## 2023-01-01 RX ORDER — POTASSIUM CHLORIDE 20MEQ/15ML
40 LIQUID (ML) ORAL ONCE
Status: COMPLETED | OUTPATIENT
Start: 2023-01-01 | End: 2023-01-01

## 2023-01-01 RX ORDER — AMOXICILLIN 250 MG
2 CAPSULE ORAL 2 TIMES DAILY PRN
DISCHARGE
Start: 2023-01-01

## 2023-01-01 RX ORDER — ONDANSETRON 2 MG/ML
4 INJECTION INTRAMUSCULAR; INTRAVENOUS ONCE
Status: COMPLETED | OUTPATIENT
Start: 2023-01-01 | End: 2023-01-01

## 2023-01-01 RX ORDER — AMANTADINE HYDROCHLORIDE 100 MG/1
100 CAPSULE, GELATIN COATED ORAL 2 TIMES DAILY
Status: DISCONTINUED | OUTPATIENT
Start: 2023-01-01 | End: 2023-01-01

## 2023-01-01 RX ORDER — CEFTRIAXONE 1 G/1
1 INJECTION, POWDER, FOR SOLUTION INTRAMUSCULAR; INTRAVENOUS EVERY 24 HOURS
Status: COMPLETED | OUTPATIENT
Start: 2023-01-01 | End: 2023-01-01

## 2023-01-01 RX ORDER — CEFTRIAXONE 2 G/1
2 INJECTION, POWDER, FOR SOLUTION INTRAMUSCULAR; INTRAVENOUS EVERY 24 HOURS
Status: DISCONTINUED | OUTPATIENT
Start: 2023-01-01 | End: 2023-01-01

## 2023-01-01 RX ORDER — AMANTADINE HYDROCHLORIDE 100 MG/1
100 CAPSULE, GELATIN COATED ORAL DAILY
Status: COMPLETED | OUTPATIENT
Start: 2023-01-01 | End: 2023-01-01

## 2023-01-01 RX ORDER — ATORVASTATIN CALCIUM 20 MG/1
TABLET, FILM COATED ORAL
Qty: 90 TABLET | Refills: 3 | Status: ON HOLD | OUTPATIENT
Start: 2023-01-01 | End: 2023-01-01

## 2023-01-01 RX ORDER — GEMFIBROZIL 600 MG/1
TABLET, FILM COATED ORAL
Qty: 180 TABLET | Refills: 1 | Status: ON HOLD | OUTPATIENT
Start: 2023-01-01 | End: 2023-01-01

## 2023-01-01 RX ORDER — ATENOLOL 25 MG/1
12.5 TABLET ORAL DAILY
Status: DISCONTINUED | OUTPATIENT
Start: 2023-01-01 | End: 2023-01-01 | Stop reason: HOSPADM

## 2023-01-01 RX ORDER — PSEUDOEPHEDRINE HCL 30 MG
30 TABLET ORAL
Status: DISCONTINUED | OUTPATIENT
Start: 2023-01-01 | End: 2023-01-01

## 2023-01-01 RX ORDER — GABAPENTIN 300 MG/1
300 CAPSULE ORAL AT BEDTIME
Status: DISCONTINUED | OUTPATIENT
Start: 2023-01-01 | End: 2023-01-01

## 2023-01-01 RX ORDER — MICONAZOLE NITRATE 20 MG/G
CREAM TOPICAL 2 TIMES DAILY
DISCHARGE
Start: 2023-01-01

## 2023-01-01 RX ORDER — BISACODYL 10 MG
10 SUPPOSITORY, RECTAL RECTAL ONCE
Status: COMPLETED | OUTPATIENT
Start: 2023-01-01 | End: 2023-01-01

## 2023-01-01 RX ORDER — ATENOLOL 25 MG/1
12.5 TABLET ORAL DAILY
Status: ON HOLD | DISCHARGE
Start: 2023-01-01 | End: 2023-01-01

## 2023-01-01 RX ORDER — METFORMIN HCL 500 MG
500 TABLET, EXTENDED RELEASE 24 HR ORAL 2 TIMES DAILY WITH MEALS
Status: DISCONTINUED | OUTPATIENT
Start: 2023-01-01 | End: 2023-01-01

## 2023-01-01 RX ORDER — MORPHINE SULFATE 4 MG/ML
4 INJECTION, SOLUTION INTRAMUSCULAR; INTRAVENOUS ONCE
Status: COMPLETED | OUTPATIENT
Start: 2023-01-01 | End: 2023-01-01

## 2023-01-01 RX ORDER — ATORVASTATIN CALCIUM 20 MG/1
20 TABLET, FILM COATED ORAL AT BEDTIME
Status: DISCONTINUED | OUTPATIENT
Start: 2023-01-01 | End: 2023-01-01 | Stop reason: HOSPADM

## 2023-01-01 RX ORDER — CEFTRIAXONE 2 G/1
2 INJECTION, POWDER, FOR SOLUTION INTRAMUSCULAR; INTRAVENOUS EVERY 24 HOURS
Status: COMPLETED | OUTPATIENT
Start: 2023-01-01 | End: 2023-01-01

## 2023-01-01 RX ORDER — LACTOSE-REDUCED FOOD
LIQUID (ML) ORAL
COMMUNITY

## 2023-01-01 RX ORDER — LACTOBACILLUS RHAMNOSUS GG 10B CELL
1 CAPSULE ORAL 2 TIMES DAILY
DISCHARGE
Start: 2023-01-01 | End: 2023-01-01

## 2023-01-01 RX ORDER — PANTOPRAZOLE SODIUM 40 MG/1
40 TABLET, DELAYED RELEASE ORAL
Status: DISCONTINUED | OUTPATIENT
Start: 2023-01-01 | End: 2023-01-01 | Stop reason: ALTCHOICE

## 2023-01-01 RX ORDER — MUSCLE RUB CREAM 100; 150 MG/G; MG/G
CREAM TOPICAL EVERY 6 HOURS PRN
Status: DISCONTINUED | OUTPATIENT
Start: 2023-01-01 | End: 2023-01-01

## 2023-01-01 RX ORDER — PEN NEEDLE, DIABETIC 30 GX3/16"
1 NEEDLE, DISPOSABLE MISCELLANEOUS 3 TIMES DAILY
Qty: 100 EACH | Refills: 0 | Status: SHIPPED | OUTPATIENT
Start: 2023-01-01

## 2023-01-01 RX ORDER — PROCHLORPERAZINE 25 MG
12.5 SUPPOSITORY, RECTAL RECTAL EVERY 12 HOURS PRN
Status: DISCONTINUED | OUTPATIENT
Start: 2023-01-01 | End: 2023-01-01

## 2023-01-01 RX ORDER — CEFAZOLIN SODIUM 2 G/100ML
2 INJECTION, SOLUTION INTRAVENOUS
Status: COMPLETED | OUTPATIENT
Start: 2023-01-01 | End: 2023-01-01

## 2023-01-01 RX ORDER — PIPERACILLIN SODIUM, TAZOBACTAM SODIUM 3; .375 G/15ML; G/15ML
3.38 INJECTION, POWDER, LYOPHILIZED, FOR SOLUTION INTRAVENOUS EVERY 6 HOURS
Status: DISCONTINUED | OUTPATIENT
Start: 2023-01-01 | End: 2023-01-01

## 2023-01-01 RX ORDER — AMOXICILLIN AND CLAVULANATE POTASSIUM 400; 57 MG/5ML; MG/5ML
875 POWDER, FOR SUSPENSION ORAL 2 TIMES DAILY
Status: ON HOLD | DISCHARGE
Start: 2023-01-01 | End: 2023-01-01

## 2023-01-01 RX ORDER — DEXTROSE MONOHYDRATE 25 G/50ML
25-50 INJECTION, SOLUTION INTRAVENOUS
Status: DISCONTINUED | OUTPATIENT
Start: 2023-01-01 | End: 2023-01-01

## 2023-01-01 RX ORDER — IODINE/SODIUM IODIDE 2 %
TINCTURE TOPICAL
Status: DISCONTINUED | OUTPATIENT
Start: 2023-01-01 | End: 2023-01-01

## 2023-01-01 RX ORDER — DILTIAZEM HCL 60 MG
60 TABLET ORAL EVERY 12 HOURS
Status: DISCONTINUED | OUTPATIENT
Start: 2023-01-01 | End: 2023-01-01

## 2023-01-01 RX ORDER — LEVETIRACETAM 500 MG/1
500 TABLET ORAL 2 TIMES DAILY
Status: DISCONTINUED | OUTPATIENT
Start: 2023-01-01 | End: 2023-01-01

## 2023-01-01 RX ORDER — ENOXAPARIN SODIUM 100 MG/ML
40 INJECTION SUBCUTANEOUS EVERY 24 HOURS
Status: DISCONTINUED | OUTPATIENT
Start: 2023-01-01 | End: 2023-01-01

## 2023-01-01 RX ORDER — DEXTROSE MONOHYDRATE 100 MG/ML
INJECTION, SOLUTION INTRAVENOUS CONTINUOUS PRN
Status: DISCONTINUED | OUTPATIENT
Start: 2023-01-01 | End: 2023-01-01

## 2023-01-01 RX ORDER — LEVETIRACETAM 750 MG/1
750 TABLET ORAL 2 TIMES DAILY
Start: 2023-01-01 | End: 2023-01-01

## 2023-01-01 RX ORDER — PROCHLORPERAZINE MALEATE 5 MG
5 TABLET ORAL EVERY 6 HOURS PRN
Status: DISCONTINUED | OUTPATIENT
Start: 2023-01-01 | End: 2023-01-01 | Stop reason: HOSPADM

## 2023-01-01 RX ORDER — GABAPENTIN 250 MG/5ML
300 SOLUTION ORAL AT BEDTIME
Status: DISCONTINUED | OUTPATIENT
Start: 2023-01-01 | End: 2023-01-01 | Stop reason: HOSPADM

## 2023-01-01 RX ORDER — ACETAMINOPHEN 325 MG/10.15ML
650 LIQUID ORAL EVERY 4 HOURS PRN
DISCHARGE
Start: 2023-01-01

## 2023-01-01 RX ORDER — SODIUM CHLORIDE 9 MG/ML
INJECTION, SOLUTION INTRAVENOUS CONTINUOUS PRN
Status: DISCONTINUED | OUTPATIENT
Start: 2023-01-01 | End: 2023-01-01

## 2023-01-01 RX ORDER — SODIUM CHLORIDE 9 MG/ML
INJECTION, SOLUTION INTRAVENOUS CONTINUOUS
Status: DISCONTINUED | OUTPATIENT
Start: 2023-01-01 | End: 2023-01-01

## 2023-01-01 RX ORDER — MIRTAZAPINE 7.5 MG/1
15 TABLET, FILM COATED ORAL AT BEDTIME
Status: DISCONTINUED | OUTPATIENT
Start: 2023-01-01 | End: 2023-01-01 | Stop reason: HOSPADM

## 2023-01-01 RX ORDER — AMOXICILLIN AND CLAVULANATE POTASSIUM 400; 57 MG/5ML; MG/5ML
875 POWDER, FOR SUSPENSION ORAL 2 TIMES DAILY
Status: DISCONTINUED | OUTPATIENT
Start: 2023-01-01 | End: 2023-01-01 | Stop reason: HOSPADM

## 2023-01-01 RX ORDER — LORAZEPAM 2 MG/ML
0.25 INJECTION INTRAMUSCULAR ONCE
Status: COMPLETED | OUTPATIENT
Start: 2023-01-01 | End: 2023-01-01

## 2023-01-01 RX ORDER — LEVETIRACETAM 500 MG/1
500 TABLET ORAL ONCE
Status: COMPLETED | OUTPATIENT
Start: 2023-01-01 | End: 2023-01-01

## 2023-01-01 RX ORDER — SODIUM CHLORIDE 9 MG/ML
INJECTION, SOLUTION INTRAVENOUS CONTINUOUS PRN
Status: DISCONTINUED | OUTPATIENT
Start: 2023-01-01 | End: 2023-01-01 | Stop reason: HOSPADM

## 2023-01-01 RX ORDER — CEFTRIAXONE 2 G/1
2 INJECTION, POWDER, FOR SOLUTION INTRAMUSCULAR; INTRAVENOUS EVERY 24 HOURS
Status: DISCONTINUED | OUTPATIENT
Start: 2023-01-01 | End: 2023-01-01 | Stop reason: HOSPADM

## 2023-01-01 RX ORDER — DOCOSANOL 100 MG/G
CREAM TOPICAL
Status: COMPLETED | OUTPATIENT
Start: 2023-01-01 | End: 2023-01-01

## 2023-01-01 RX ORDER — FLUMAZENIL 0.1 MG/ML
0.2 INJECTION, SOLUTION INTRAVENOUS
Status: DISCONTINUED | OUTPATIENT
Start: 2023-01-01 | End: 2023-01-01 | Stop reason: HOSPADM

## 2023-01-01 RX ORDER — LIDOCAINE 4 G/G
1 PATCH TOPICAL
Status: DISCONTINUED | OUTPATIENT
Start: 2023-01-01 | End: 2023-01-01 | Stop reason: HOSPADM

## 2023-01-01 RX ORDER — ALBUTEROL SULFATE 0.83 MG/ML
2.5 SOLUTION RESPIRATORY (INHALATION) EVERY 12 HOURS
Status: DISCONTINUED | OUTPATIENT
Start: 2023-01-01 | End: 2023-01-01 | Stop reason: HOSPADM

## 2023-01-01 RX ORDER — PROCHLORPERAZINE 25 MG
12.5 SUPPOSITORY, RECTAL RECTAL EVERY 12 HOURS PRN
Status: DISCONTINUED | OUTPATIENT
Start: 2023-01-01 | End: 2023-01-01 | Stop reason: HOSPADM

## 2023-01-01 RX ORDER — LACTOBACILLUS RHAMNOSUS GG 10B CELL
1 CAPSULE ORAL DAILY
Status: DISCONTINUED | OUTPATIENT
Start: 2023-01-01 | End: 2023-01-01 | Stop reason: HOSPADM

## 2023-01-01 RX ORDER — CEFDINIR 300 MG/1
300 CAPSULE ORAL EVERY 12 HOURS SCHEDULED
Status: DISCONTINUED | OUTPATIENT
Start: 2023-01-01 | End: 2023-01-01

## 2023-01-01 RX ADMIN — INSULIN ASPART 1 UNITS: 100 INJECTION, SOLUTION INTRAVENOUS; SUBCUTANEOUS at 10:15

## 2023-01-01 RX ADMIN — AMANTADINE HYDROCHLORIDE 100 MG: 100 CAPSULE, LIQUID FILLED ORAL at 13:04

## 2023-01-01 RX ADMIN — Medication 12.5 MG: at 08:45

## 2023-01-01 RX ADMIN — ATORVASTATIN CALCIUM 20 MG: 20 TABLET, FILM COATED ORAL at 21:14

## 2023-01-01 RX ADMIN — GABAPENTIN 600 MG: 250 SOLUTION ORAL at 21:18

## 2023-01-01 RX ADMIN — METFORMIN HYDROCHLORIDE 1000 MG: 500 TABLET ORAL at 17:49

## 2023-01-01 RX ADMIN — DILTIAZEM HYDROCHLORIDE 60 MG: 60 TABLET, FILM COATED ORAL at 06:06

## 2023-01-01 RX ADMIN — DOCOSANOL: 100 CREAM TOPICAL at 13:19

## 2023-01-01 RX ADMIN — INSULIN ASPART 1 UNITS: 100 INJECTION, SOLUTION INTRAVENOUS; SUBCUTANEOUS at 21:27

## 2023-01-01 RX ADMIN — GABAPENTIN 600 MG: 250 SOLUTION ORAL at 21:48

## 2023-01-01 RX ADMIN — APIXABAN 5 MG: 5 TABLET, FILM COATED ORAL at 08:39

## 2023-01-01 RX ADMIN — VANCOMYCIN HYDROCHLORIDE 2000 MG: 10 INJECTION, POWDER, LYOPHILIZED, FOR SOLUTION INTRAVENOUS at 16:58

## 2023-01-01 RX ADMIN — DILTIAZEM HYDROCHLORIDE 60 MG: 60 TABLET, FILM COATED ORAL at 01:13

## 2023-01-01 RX ADMIN — MIRTAZAPINE 15 MG: 15 TABLET, FILM COATED ORAL at 21:14

## 2023-01-01 RX ADMIN — Medication 40 MG: at 06:34

## 2023-01-01 RX ADMIN — Medication 12.5 MG: at 08:52

## 2023-01-01 RX ADMIN — DILTIAZEM HYDROCHLORIDE 60 MG: 60 TABLET, FILM COATED ORAL at 07:48

## 2023-01-01 RX ADMIN — ATORVASTATIN CALCIUM 20 MG: 20 TABLET, FILM COATED ORAL at 22:07

## 2023-01-01 RX ADMIN — DILTIAZEM HYDROCHLORIDE 60 MG: 60 TABLET, FILM COATED ORAL at 17:41

## 2023-01-01 RX ADMIN — Medication 1 CAPSULE: at 08:00

## 2023-01-01 RX ADMIN — DOCOSANOL: 100 CREAM TOPICAL at 17:41

## 2023-01-01 RX ADMIN — DILTIAZEM HYDROCHLORIDE 60 MG: 60 TABLET, FILM COATED ORAL at 08:03

## 2023-01-01 RX ADMIN — DILTIAZEM HYDROCHLORIDE 60 MG: 60 TABLET, FILM COATED ORAL at 01:16

## 2023-01-01 RX ADMIN — MICONAZOLE NITRATE: 20 CREAM TOPICAL at 21:50

## 2023-01-01 RX ADMIN — DOCOSANOL: 100 CREAM TOPICAL at 08:31

## 2023-01-01 RX ADMIN — APIXABAN 5 MG: 5 TABLET, FILM COATED ORAL at 21:10

## 2023-01-01 RX ADMIN — DILTIAZEM HYDROCHLORIDE 60 MG: 60 TABLET, FILM COATED ORAL at 08:38

## 2023-01-01 RX ADMIN — DILTIAZEM HYDROCHLORIDE 60 MG: 60 TABLET, FILM COATED ORAL at 08:14

## 2023-01-01 RX ADMIN — MICONAZOLE NITRATE: 20 CREAM TOPICAL at 21:36

## 2023-01-01 RX ADMIN — DEXTROSE MONOHYDRATE 25 ML: 25 INJECTION, SOLUTION INTRAVENOUS at 20:18

## 2023-01-01 RX ADMIN — Medication 1 CAPSULE: at 20:42

## 2023-01-01 RX ADMIN — AMANTADINE HYDROCHLORIDE 100 MG: 100 CAPSULE, LIQUID FILLED ORAL at 05:31

## 2023-01-01 RX ADMIN — DILTIAZEM HYDROCHLORIDE 60 MG: 60 TABLET, FILM COATED ORAL at 07:42

## 2023-01-01 RX ADMIN — PIPERACILLIN AND TAZOBACTAM 4.5 G: 4; .5 INJECTION, POWDER, FOR SOLUTION INTRAVENOUS at 07:47

## 2023-01-01 RX ADMIN — MIRTAZAPINE 15 MG: 7.5 TABLET, FILM COATED ORAL at 20:28

## 2023-01-01 RX ADMIN — MIRTAZAPINE 15 MG: 15 TABLET, FILM COATED ORAL at 21:46

## 2023-01-01 RX ADMIN — METFORMIN HYDROCHLORIDE 1000 MG: 500 TABLET ORAL at 09:28

## 2023-01-01 RX ADMIN — GABAPENTIN 600 MG: 250 SOLUTION ORAL at 21:30

## 2023-01-01 RX ADMIN — INSULIN ASPART 1 UNITS: 100 INJECTION, SOLUTION INTRAVENOUS; SUBCUTANEOUS at 11:51

## 2023-01-01 RX ADMIN — DILTIAZEM HYDROCHLORIDE 60 MG: 60 TABLET, FILM COATED ORAL at 08:00

## 2023-01-01 RX ADMIN — HYDROCORTISONE: 1 CREAM TOPICAL at 13:22

## 2023-01-01 RX ADMIN — METFORMIN HYDROCHLORIDE 1000 MG: 500 TABLET ORAL at 17:07

## 2023-01-01 RX ADMIN — Medication 40 MG: at 08:30

## 2023-01-01 RX ADMIN — MIRTAZAPINE 15 MG: 7.5 TABLET, FILM COATED ORAL at 21:43

## 2023-01-01 RX ADMIN — Medication 12.5 MG: at 09:29

## 2023-01-01 RX ADMIN — DILTIAZEM HYDROCHLORIDE 60 MG: 60 TABLET, FILM COATED ORAL at 21:29

## 2023-01-01 RX ADMIN — FLUTICASONE PROPIONATE 1 SPRAY: 50 SPRAY, METERED NASAL at 21:40

## 2023-01-01 RX ADMIN — METFORMIN HYDROCHLORIDE 1000 MG: 500 TABLET ORAL at 08:34

## 2023-01-01 RX ADMIN — DILTIAZEM HYDROCHLORIDE 60 MG: 60 TABLET, FILM COATED ORAL at 07:43

## 2023-01-01 RX ADMIN — MIRTAZAPINE 15 MG: 7.5 TABLET, FILM COATED ORAL at 21:10

## 2023-01-01 RX ADMIN — Medication 40 MG: at 05:31

## 2023-01-01 RX ADMIN — Medication 1 CAPSULE: at 10:14

## 2023-01-01 RX ADMIN — AMANTADINE HYDROCHLORIDE 100 MG: 100 CAPSULE, LIQUID FILLED ORAL at 14:51

## 2023-01-01 RX ADMIN — DILTIAZEM HYDROCHLORIDE 60 MG: 60 TABLET, FILM COATED ORAL at 17:26

## 2023-01-01 RX ADMIN — DILTIAZEM HYDROCHLORIDE 60 MG: 60 TABLET, FILM COATED ORAL at 17:18

## 2023-01-01 RX ADMIN — APIXABAN 5 MG: 5 TABLET, FILM COATED ORAL at 09:29

## 2023-01-01 RX ADMIN — Medication 40 MG: at 06:27

## 2023-01-01 RX ADMIN — ATORVASTATIN CALCIUM 20 MG: 20 TABLET, FILM COATED ORAL at 21:39

## 2023-01-01 RX ADMIN — AMANTADINE HYDROCHLORIDE 100 MG: 100 CAPSULE ORAL at 19:32

## 2023-01-01 RX ADMIN — Medication 1 CAPSULE: at 09:35

## 2023-01-01 RX ADMIN — DILTIAZEM HYDROCHLORIDE 60 MG: 60 TABLET, FILM COATED ORAL at 08:34

## 2023-01-01 RX ADMIN — Medication 12.5 MG: at 08:38

## 2023-01-01 RX ADMIN — Medication 40 MG: at 06:02

## 2023-01-01 RX ADMIN — APIXABAN 5 MG: 2.5 TABLET, FILM COATED ORAL at 20:44

## 2023-01-01 RX ADMIN — DOCOSANOL: 100 CREAM TOPICAL at 21:37

## 2023-01-01 RX ADMIN — ATORVASTATIN CALCIUM 20 MG: 20 TABLET, FILM COATED ORAL at 21:34

## 2023-01-01 RX ADMIN — METFORMIN HYDROCHLORIDE 1000 MG: 500 TABLET ORAL at 09:35

## 2023-01-01 RX ADMIN — Medication 40 MG: at 05:46

## 2023-01-01 RX ADMIN — AMANTADINE HYDROCHLORIDE 100 MG: 100 CAPSULE ORAL at 09:03

## 2023-01-01 RX ADMIN — FLUTICASONE PROPIONATE 1 SPRAY: 50 SPRAY, METERED NASAL at 07:59

## 2023-01-01 RX ADMIN — AMPICILLIN SODIUM AND SULBACTAM SODIUM 3 G: 2; 1 INJECTION, POWDER, FOR SOLUTION INTRAMUSCULAR; INTRAVENOUS at 16:36

## 2023-01-01 RX ADMIN — AMANTADINE HYDROCHLORIDE 100 MG: 100 CAPSULE, LIQUID FILLED ORAL at 14:12

## 2023-01-01 RX ADMIN — MIRTAZAPINE 15 MG: 15 TABLET, FILM COATED ORAL at 22:26

## 2023-01-01 RX ADMIN — DILTIAZEM HYDROCHLORIDE 60 MG: 60 TABLET, FILM COATED ORAL at 10:00

## 2023-01-01 RX ADMIN — AMANTADINE HYDROCHLORIDE 100 MG: 100 CAPSULE, LIQUID FILLED ORAL at 06:29

## 2023-01-01 RX ADMIN — MIRTAZAPINE 15 MG: 15 TABLET, FILM COATED ORAL at 22:31

## 2023-01-01 RX ADMIN — AMANTADINE HYDROCHLORIDE 100 MG: 100 CAPSULE ORAL at 20:41

## 2023-01-01 RX ADMIN — Medication 1 CAPSULE: at 20:12

## 2023-01-01 RX ADMIN — MIRTAZAPINE 15 MG: 7.5 TABLET, FILM COATED ORAL at 21:30

## 2023-01-01 RX ADMIN — AMANTADINE HYDROCHLORIDE 100 MG: 100 CAPSULE ORAL at 07:52

## 2023-01-01 RX ADMIN — METFORMIN HYDROCHLORIDE 1000 MG: 500 TABLET ORAL at 07:48

## 2023-01-01 RX ADMIN — DILTIAZEM HYDROCHLORIDE 60 MG: 60 TABLET, FILM COATED ORAL at 18:17

## 2023-01-01 RX ADMIN — DILTIAZEM HYDROCHLORIDE 60 MG: 60 TABLET, FILM COATED ORAL at 17:29

## 2023-01-01 RX ADMIN — HYDROCORTISONE: 1 CREAM TOPICAL at 13:40

## 2023-01-01 RX ADMIN — ACETAMINOPHEN 650 MG: 160 SOLUTION ORAL at 22:17

## 2023-01-01 RX ADMIN — MICONAZOLE NITRATE: 20 CREAM TOPICAL at 08:02

## 2023-01-01 RX ADMIN — APIXABAN 5 MG: 2.5 TABLET, FILM COATED ORAL at 07:52

## 2023-01-01 RX ADMIN — DILTIAZEM HYDROCHLORIDE 60 MG: 60 TABLET, FILM COATED ORAL at 17:19

## 2023-01-01 RX ADMIN — LIDOCAINE PATCH 4% 1 PATCH: 40 PATCH TOPICAL at 09:42

## 2023-01-01 RX ADMIN — INSULIN ASPART 1 UNITS: 100 INJECTION, SOLUTION INTRAVENOUS; SUBCUTANEOUS at 13:40

## 2023-01-01 RX ADMIN — APIXABAN 5 MG: 5 TABLET, FILM COATED ORAL at 08:42

## 2023-01-01 RX ADMIN — GABAPENTIN 600 MG: 250 SOLUTION ORAL at 20:31

## 2023-01-01 RX ADMIN — SENNOSIDES AND DOCUSATE SODIUM 2 TABLET: 50; 8.6 TABLET ORAL at 09:14

## 2023-01-01 RX ADMIN — MICONAZOLE NITRATE: 20 CREAM TOPICAL at 08:37

## 2023-01-01 RX ADMIN — LIDOCAINE PATCH 4% 2 PATCH: 40 PATCH TOPICAL at 08:39

## 2023-01-01 RX ADMIN — MICONAZOLE NITRATE: 20 CREAM TOPICAL at 21:49

## 2023-01-01 RX ADMIN — PIPERACILLIN AND TAZOBACTAM 4.5 G: 4; .5 INJECTION, POWDER, FOR SOLUTION INTRAVENOUS at 21:01

## 2023-01-01 RX ADMIN — APIXABAN 5 MG: 5 TABLET, FILM COATED ORAL at 21:43

## 2023-01-01 RX ADMIN — SENNOSIDES AND DOCUSATE SODIUM 2 TABLET: 50; 8.6 TABLET ORAL at 21:06

## 2023-01-01 RX ADMIN — SODIUM CHLORIDE: 9 INJECTION, SOLUTION INTRAVENOUS at 09:01

## 2023-01-01 RX ADMIN — HYDROCORTISONE: 1 CREAM TOPICAL at 20:20

## 2023-01-01 RX ADMIN — CEFDINIR 300 MG: 250 POWDER, FOR SUSPENSION ORAL at 20:31

## 2023-01-01 RX ADMIN — DILTIAZEM HYDROCHLORIDE 60 MG: 60 TABLET, FILM COATED ORAL at 09:07

## 2023-01-01 RX ADMIN — DILTIAZEM HYDROCHLORIDE 60 MG: 60 TABLET, FILM COATED ORAL at 00:54

## 2023-01-01 RX ADMIN — AMANTADINE HYDROCHLORIDE 100 MG: 100 CAPSULE ORAL at 09:44

## 2023-01-01 RX ADMIN — METFORMIN HYDROCHLORIDE 500 MG: 500 TABLET ORAL at 10:13

## 2023-01-01 RX ADMIN — DILTIAZEM HYDROCHLORIDE 60 MG: 60 TABLET, FILM COATED ORAL at 03:26

## 2023-01-01 RX ADMIN — DILTIAZEM HYDROCHLORIDE 60 MG: 60 TABLET, FILM COATED ORAL at 00:49

## 2023-01-01 RX ADMIN — INSULIN ASPART 1 UNITS: 100 INJECTION, SOLUTION INTRAVENOUS; SUBCUTANEOUS at 12:08

## 2023-01-01 RX ADMIN — ATENOLOL 12.5 MG: 25 TABLET ORAL at 09:35

## 2023-01-01 RX ADMIN — DILTIAZEM HYDROCHLORIDE 60 MG: 60 TABLET, FILM COATED ORAL at 16:00

## 2023-01-01 RX ADMIN — ATORVASTATIN CALCIUM 20 MG: 20 TABLET, FILM COATED ORAL at 01:25

## 2023-01-01 RX ADMIN — AMANTADINE HYDROCHLORIDE 100 MG: 100 CAPSULE, LIQUID FILLED ORAL at 06:09

## 2023-01-01 RX ADMIN — AMANTADINE HYDROCHLORIDE 100 MG: 100 CAPSULE ORAL at 09:07

## 2023-01-01 RX ADMIN — APIXABAN 5 MG: 5 TABLET, FILM COATED ORAL at 09:35

## 2023-01-01 RX ADMIN — ALBUTEROL SULFATE 2.5 MG: 2.5 SOLUTION RESPIRATORY (INHALATION) at 19:53

## 2023-01-01 RX ADMIN — DOCOSANOL: 100 CREAM TOPICAL at 21:29

## 2023-01-01 RX ADMIN — AMANTADINE HYDROCHLORIDE 100 MG: 100 CAPSULE, LIQUID FILLED ORAL at 13:57

## 2023-01-01 RX ADMIN — APIXABAN 5 MG: 5 TABLET, FILM COATED ORAL at 20:37

## 2023-01-01 RX ADMIN — INSULIN ASPART 2 UNITS: 100 INJECTION, SOLUTION INTRAVENOUS; SUBCUTANEOUS at 17:26

## 2023-01-01 RX ADMIN — Medication 1 CAPSULE: at 08:44

## 2023-01-01 RX ADMIN — GUAIFENESIN 10 ML: 200 SOLUTION ORAL at 01:50

## 2023-01-01 RX ADMIN — INSULIN GLARGINE 3 UNITS: 100 INJECTION, SOLUTION SUBCUTANEOUS at 22:52

## 2023-01-01 RX ADMIN — SODIUM CHLORIDE: 9 INJECTION, SOLUTION INTRAVENOUS at 16:15

## 2023-01-01 RX ADMIN — DILTIAZEM HYDROCHLORIDE 60 MG: 60 TABLET, FILM COATED ORAL at 17:32

## 2023-01-01 RX ADMIN — MICONAZOLE NITRATE: 20 CREAM TOPICAL at 19:00

## 2023-01-01 RX ADMIN — DILTIAZEM HYDROCHLORIDE 60 MG: 60 TABLET, FILM COATED ORAL at 01:45

## 2023-01-01 RX ADMIN — METFORMIN HYDROCHLORIDE 500 MG: 500 TABLET ORAL at 17:10

## 2023-01-01 RX ADMIN — ACETAMINOPHEN 650 MG: 325 SOLUTION ORAL at 22:00

## 2023-01-01 RX ADMIN — FLUTICASONE PROPIONATE 1 SPRAY: 50 SPRAY, METERED NASAL at 08:04

## 2023-01-01 RX ADMIN — PIPERACILLIN AND TAZOBACTAM 4.5 G: 4; .5 INJECTION, POWDER, FOR SOLUTION INTRAVENOUS at 03:02

## 2023-01-01 RX ADMIN — APIXABAN 5 MG: 2.5 TABLET, FILM COATED ORAL at 08:02

## 2023-01-01 RX ADMIN — DILTIAZEM HYDROCHLORIDE 60 MG: 60 TABLET, FILM COATED ORAL at 05:45

## 2023-01-01 RX ADMIN — ATORVASTATIN CALCIUM 20 MG: 20 TABLET, FILM COATED ORAL at 21:19

## 2023-01-01 RX ADMIN — DILTIAZEM HYDROCHLORIDE 60 MG: 60 TABLET, FILM COATED ORAL at 17:14

## 2023-01-01 RX ADMIN — DILTIAZEM HYDROCHLORIDE 60 MG: 60 TABLET, FILM COATED ORAL at 10:01

## 2023-01-01 RX ADMIN — APIXABAN 5 MG: 2.5 TABLET, FILM COATED ORAL at 09:29

## 2023-01-01 RX ADMIN — APIXABAN 5 MG: 5 TABLET, FILM COATED ORAL at 21:29

## 2023-01-01 RX ADMIN — AMANTADINE HYDROCHLORIDE 100 MG: 100 CAPSULE, LIQUID FILLED ORAL at 12:42

## 2023-01-01 RX ADMIN — FERRIC OXIDE RED: 8; 8 LOTION TOPICAL at 03:06

## 2023-01-01 RX ADMIN — DILTIAZEM HYDROCHLORIDE 60 MG: 60 TABLET, FILM COATED ORAL at 17:13

## 2023-01-01 RX ADMIN — ATORVASTATIN CALCIUM 20 MG: 20 TABLET, FILM COATED ORAL at 21:45

## 2023-01-01 RX ADMIN — FLUTICASONE PROPIONATE 1 SPRAY: 50 SPRAY, METERED NASAL at 09:07

## 2023-01-01 RX ADMIN — SENNOSIDES AND DOCUSATE SODIUM 2 TABLET: 50; 8.6 TABLET ORAL at 21:30

## 2023-01-01 RX ADMIN — DILTIAZEM HYDROCHLORIDE 60 MG: 60 TABLET, FILM COATED ORAL at 01:00

## 2023-01-01 RX ADMIN — INSULIN ASPART 1 UNITS: 100 INJECTION, SOLUTION INTRAVENOUS; SUBCUTANEOUS at 08:51

## 2023-01-01 RX ADMIN — Medication 40 MG: at 09:45

## 2023-01-01 RX ADMIN — DILTIAZEM HYDROCHLORIDE 60 MG: 60 TABLET, FILM COATED ORAL at 17:49

## 2023-01-01 RX ADMIN — APIXABAN 5 MG: 2.5 TABLET, FILM COATED ORAL at 09:44

## 2023-01-01 RX ADMIN — AMANTADINE HYDROCHLORIDE 100 MG: 100 CAPSULE ORAL at 08:05

## 2023-01-01 RX ADMIN — ATORVASTATIN CALCIUM 20 MG: 20 TABLET, FILM COATED ORAL at 21:36

## 2023-01-01 RX ADMIN — LEVETIRACETAM 750 MG: 750 TABLET, FILM COATED ORAL at 10:31

## 2023-01-01 RX ADMIN — DOCOSANOL: 100 CREAM TOPICAL at 08:08

## 2023-01-01 RX ADMIN — Medication 40 MG: at 06:33

## 2023-01-01 RX ADMIN — DILTIAZEM HYDROCHLORIDE 60 MG: 60 TABLET, FILM COATED ORAL at 18:01

## 2023-01-01 RX ADMIN — AMANTADINE HYDROCHLORIDE 100 MG: 100 CAPSULE, LIQUID FILLED ORAL at 05:57

## 2023-01-01 RX ADMIN — AMPICILLIN SODIUM AND SULBACTAM SODIUM 3 G: 2; 1 INJECTION, POWDER, FOR SOLUTION INTRAMUSCULAR; INTRAVENOUS at 15:49

## 2023-01-01 RX ADMIN — AMANTADINE HYDROCHLORIDE 100 MG: 100 CAPSULE, LIQUID FILLED ORAL at 06:24

## 2023-01-01 RX ADMIN — ATORVASTATIN CALCIUM 20 MG: 20 TABLET, FILM COATED ORAL at 20:46

## 2023-01-01 RX ADMIN — LEVETIRACETAM 500 MG: 500 TABLET, FILM COATED ORAL at 21:46

## 2023-01-01 RX ADMIN — MIRTAZAPINE 15 MG: 7.5 TABLET, FILM COATED ORAL at 21:40

## 2023-01-01 RX ADMIN — LIDOCAINE PATCH 4% 2 PATCH: 40 PATCH TOPICAL at 08:37

## 2023-01-01 RX ADMIN — INSULIN ASPART 1 UNITS: 100 INJECTION, SOLUTION INTRAVENOUS; SUBCUTANEOUS at 17:48

## 2023-01-01 RX ADMIN — AMANTADINE HYDROCHLORIDE 100 MG: 100 CAPSULE ORAL at 21:26

## 2023-01-01 RX ADMIN — AMPICILLIN SODIUM AND SULBACTAM SODIUM 3 G: 2; 1 INJECTION, POWDER, FOR SOLUTION INTRAMUSCULAR; INTRAVENOUS at 21:06

## 2023-01-01 RX ADMIN — INSULIN ASPART 1 UNITS: 100 INJECTION, SOLUTION INTRAVENOUS; SUBCUTANEOUS at 16:55

## 2023-01-01 RX ADMIN — DILTIAZEM HYDROCHLORIDE 60 MG: 60 TABLET, FILM COATED ORAL at 17:09

## 2023-01-01 RX ADMIN — METFORMIN HYDROCHLORIDE 1000 MG: 500 TABLET, FILM COATED ORAL at 17:32

## 2023-01-01 RX ADMIN — METFORMIN HYDROCHLORIDE 1000 MG: 500 TABLET ORAL at 09:37

## 2023-01-01 RX ADMIN — CEFDINIR 300 MG: 300 CAPSULE ORAL at 13:04

## 2023-01-01 RX ADMIN — DOCOSANOL: 100 CREAM TOPICAL at 14:17

## 2023-01-01 RX ADMIN — IOPAMIDOL 125 ML: 755 INJECTION, SOLUTION INTRAVENOUS at 06:44

## 2023-01-01 RX ADMIN — METFORMIN HYDROCHLORIDE 1000 MG: 500 TABLET ORAL at 17:19

## 2023-01-01 RX ADMIN — APIXABAN 5 MG: 5 TABLET, FILM COATED ORAL at 09:37

## 2023-01-01 RX ADMIN — APIXABAN 5 MG: 2.5 TABLET, FILM COATED ORAL at 09:59

## 2023-01-01 RX ADMIN — Medication 40 MG: at 07:08

## 2023-01-01 RX ADMIN — MIRTAZAPINE 15 MG: 15 TABLET, FILM COATED ORAL at 21:35

## 2023-01-01 RX ADMIN — DILTIAZEM HYDROCHLORIDE 60 MG: 60 TABLET, FILM COATED ORAL at 13:37

## 2023-01-01 RX ADMIN — DILTIAZEM HYDROCHLORIDE 60 MG: 60 TABLET, FILM COATED ORAL at 01:47

## 2023-01-01 RX ADMIN — FERRIC OXIDE RED: 8; 8 LOTION TOPICAL at 21:38

## 2023-01-01 RX ADMIN — DOCOSANOL: 100 CREAM TOPICAL at 21:06

## 2023-01-01 RX ADMIN — MIRTAZAPINE 15 MG: 15 TABLET, FILM COATED ORAL at 22:17

## 2023-01-01 RX ADMIN — DILTIAZEM HYDROCHLORIDE 60 MG: 60 TABLET, FILM COATED ORAL at 00:51

## 2023-01-01 RX ADMIN — MICONAZOLE NITRATE: 20 CREAM TOPICAL at 19:53

## 2023-01-01 RX ADMIN — DILTIAZEM HYDROCHLORIDE 60 MG: 60 TABLET, FILM COATED ORAL at 09:14

## 2023-01-01 RX ADMIN — APIXABAN 5 MG: 5 TABLET, FILM COATED ORAL at 21:40

## 2023-01-01 RX ADMIN — ATORVASTATIN CALCIUM 20 MG: 20 TABLET, FILM COATED ORAL at 21:58

## 2023-01-01 RX ADMIN — MICONAZOLE NITRATE: 20 CREAM TOPICAL at 21:40

## 2023-01-01 RX ADMIN — APIXABAN 5 MG: 2.5 TABLET, FILM COATED ORAL at 09:08

## 2023-01-01 RX ADMIN — HYDROCORTISONE: 1 CREAM TOPICAL at 14:12

## 2023-01-01 RX ADMIN — LIDOCAINE PATCH 4% 2 PATCH: 40 PATCH TOPICAL at 10:19

## 2023-01-01 RX ADMIN — AMANTADINE HYDROCHLORIDE 100 MG: 100 CAPSULE ORAL at 08:43

## 2023-01-01 RX ADMIN — METFORMIN HYDROCHLORIDE 500 MG: 500 TABLET, FILM COATED ORAL at 19:11

## 2023-01-01 RX ADMIN — SODIUM CHLORIDE: 9 INJECTION, SOLUTION INTRAVENOUS at 19:15

## 2023-01-01 RX ADMIN — MIRTAZAPINE 7.5 MG: 7.5 TABLET, FILM COATED ORAL at 20:46

## 2023-01-01 RX ADMIN — METFORMIN HYDROCHLORIDE 1000 MG: 500 TABLET ORAL at 07:42

## 2023-01-01 RX ADMIN — MIRTAZAPINE 15 MG: 15 TABLET, FILM COATED ORAL at 01:48

## 2023-01-01 RX ADMIN — ATORVASTATIN CALCIUM 20 MG: 20 TABLET, FILM COATED ORAL at 21:48

## 2023-01-01 RX ADMIN — DOCOSANOL: 100 CREAM TOPICAL at 17:16

## 2023-01-01 RX ADMIN — MIRTAZAPINE 15 MG: 7.5 TABLET, FILM COATED ORAL at 20:49

## 2023-01-01 RX ADMIN — SODIUM CHLORIDE, POTASSIUM CHLORIDE, SODIUM LACTATE AND CALCIUM CHLORIDE 1000 ML: 600; 310; 30; 20 INJECTION, SOLUTION INTRAVENOUS at 10:16

## 2023-01-01 RX ADMIN — AMANTADINE HYDROCHLORIDE 100 MG: 100 CAPSULE, LIQUID FILLED ORAL at 06:16

## 2023-01-01 RX ADMIN — GABAPENTIN 600 MG: 250 SOLUTION ORAL at 21:27

## 2023-01-01 RX ADMIN — GABAPENTIN 600 MG: 250 SOLUTION ORAL at 21:42

## 2023-01-01 RX ADMIN — GABAPENTIN 600 MG: 250 SOLUTION ORAL at 21:01

## 2023-01-01 RX ADMIN — LIDOCAINE PATCH 4% 2 PATCH: 40 PATCH TOPICAL at 08:40

## 2023-01-01 RX ADMIN — DILTIAZEM HYDROCHLORIDE 60 MG: 60 TABLET, FILM COATED ORAL at 08:46

## 2023-01-01 RX ADMIN — LIDOCAINE PATCH 4% 2 PATCH: 40 PATCH TOPICAL at 09:00

## 2023-01-01 RX ADMIN — DILTIAZEM HYDROCHLORIDE 60 MG: 60 TABLET, FILM COATED ORAL at 15:32

## 2023-01-01 RX ADMIN — Medication 12.5 MG: at 09:44

## 2023-01-01 RX ADMIN — APIXABAN 5 MG: 2.5 TABLET, FILM COATED ORAL at 21:26

## 2023-01-01 RX ADMIN — INSULIN ASPART 1 UNITS: 100 INJECTION, SOLUTION INTRAVENOUS; SUBCUTANEOUS at 23:43

## 2023-01-01 RX ADMIN — Medication 1 CAPSULE: at 20:37

## 2023-01-01 RX ADMIN — APIXABAN 5 MG: 2.5 TABLET, FILM COATED ORAL at 08:14

## 2023-01-01 RX ADMIN — CEFDINIR 300 MG: 250 POWDER, FOR SUSPENSION ORAL at 20:51

## 2023-01-01 RX ADMIN — METFORMIN HYDROCHLORIDE 500 MG: 500 TABLET ORAL at 08:05

## 2023-01-01 RX ADMIN — MICONAZOLE NITRATE: 20 CREAM TOPICAL at 09:45

## 2023-01-01 RX ADMIN — FERRIC OXIDE RED: 8; 8 LOTION TOPICAL at 16:19

## 2023-01-01 RX ADMIN — INSULIN ASPART 2 UNITS: 100 INJECTION, SOLUTION INTRAVENOUS; SUBCUTANEOUS at 22:08

## 2023-01-01 RX ADMIN — AMANTADINE HYDROCHLORIDE 100 MG: 100 CAPSULE, LIQUID FILLED ORAL at 13:32

## 2023-01-01 RX ADMIN — AMANTADINE HYDROCHLORIDE 100 MG: 100 CAPSULE ORAL at 08:31

## 2023-01-01 RX ADMIN — IOPAMIDOL 125 ML: 755 INJECTION, SOLUTION INTRAVENOUS at 13:29

## 2023-01-01 RX ADMIN — AMPICILLIN SODIUM AND SULBACTAM SODIUM 3 G: 2; 1 INJECTION, POWDER, FOR SOLUTION INTRAMUSCULAR; INTRAVENOUS at 03:23

## 2023-01-01 RX ADMIN — DOCOSANOL: 100 CREAM TOPICAL at 09:14

## 2023-01-01 RX ADMIN — APIXABAN 5 MG: 5 TABLET, FILM COATED ORAL at 08:00

## 2023-01-01 RX ADMIN — Medication 40 MG: at 08:43

## 2023-01-01 RX ADMIN — MICONAZOLE NITRATE: 20 CREAM TOPICAL at 09:15

## 2023-01-01 RX ADMIN — GUAIFENESIN 10 ML: 200 SOLUTION ORAL at 01:19

## 2023-01-01 RX ADMIN — DILTIAZEM HYDROCHLORIDE 60 MG: 60 TABLET, FILM COATED ORAL at 07:52

## 2023-01-01 RX ADMIN — METFORMIN HYDROCHLORIDE 1000 MG: 500 TABLET ORAL at 18:01

## 2023-01-01 RX ADMIN — LEVETIRACETAM 500 MG: 500 TABLET, FILM COATED ORAL at 20:52

## 2023-01-01 RX ADMIN — METFORMIN HYDROCHLORIDE 500 MG: 500 TABLET ORAL at 17:19

## 2023-01-01 RX ADMIN — METFORMIN HYDROCHLORIDE 1000 MG: 500 TABLET ORAL at 08:01

## 2023-01-01 RX ADMIN — SODIUM CHLORIDE, POTASSIUM CHLORIDE, SODIUM LACTATE AND CALCIUM CHLORIDE 500 ML: 600; 310; 30; 20 INJECTION, SOLUTION INTRAVENOUS at 13:36

## 2023-01-01 RX ADMIN — LIDOCAINE PATCH 4% 2 PATCH: 40 PATCH TOPICAL at 07:52

## 2023-01-01 RX ADMIN — GABAPENTIN 600 MG: 250 SOLUTION ORAL at 21:06

## 2023-01-01 RX ADMIN — DILTIAZEM HYDROCHLORIDE 60 MG: 60 TABLET, FILM COATED ORAL at 17:35

## 2023-01-01 RX ADMIN — MICONAZOLE NITRATE: 20 CREAM TOPICAL at 21:26

## 2023-01-01 RX ADMIN — METFORMIN HYDROCHLORIDE 1000 MG: 500 TABLET ORAL at 10:01

## 2023-01-01 RX ADMIN — ATORVASTATIN CALCIUM 20 MG: 20 TABLET, FILM COATED ORAL at 21:00

## 2023-01-01 RX ADMIN — GABAPENTIN 600 MG: 250 SOLUTION ORAL at 21:34

## 2023-01-01 RX ADMIN — ATORVASTATIN CALCIUM 20 MG: 20 TABLET, FILM COATED ORAL at 20:55

## 2023-01-01 RX ADMIN — CEFTRIAXONE SODIUM 2 G: 2 INJECTION, POWDER, FOR SOLUTION INTRAMUSCULAR; INTRAVENOUS at 00:57

## 2023-01-01 RX ADMIN — ATORVASTATIN CALCIUM 20 MG: 20 TABLET, FILM COATED ORAL at 22:10

## 2023-01-01 RX ADMIN — AMANTADINE HYDROCHLORIDE 100 MG: 100 CAPSULE ORAL at 09:59

## 2023-01-01 RX ADMIN — DILTIAZEM HYDROCHLORIDE 60 MG: 60 TABLET, FILM COATED ORAL at 00:33

## 2023-01-01 RX ADMIN — INSULIN ASPART 1 UNITS: 100 INJECTION, SOLUTION INTRAVENOUS; SUBCUTANEOUS at 12:35

## 2023-01-01 RX ADMIN — AMPICILLIN SODIUM AND SULBACTAM SODIUM 3 G: 2; 1 INJECTION, POWDER, FOR SOLUTION INTRAMUSCULAR; INTRAVENOUS at 10:52

## 2023-01-01 RX ADMIN — GABAPENTIN 300 MG: 300 CAPSULE ORAL at 01:48

## 2023-01-01 RX ADMIN — FLUTICASONE PROPIONATE 1 SPRAY: 50 SPRAY, METERED NASAL at 09:30

## 2023-01-01 RX ADMIN — INSULIN ASPART 1 UNITS: 100 INJECTION, SOLUTION INTRAVENOUS; SUBCUTANEOUS at 09:14

## 2023-01-01 RX ADMIN — MIRTAZAPINE 15 MG: 15 TABLET, FILM COATED ORAL at 21:36

## 2023-01-01 RX ADMIN — ATORVASTATIN CALCIUM 20 MG: 20 TABLET, FILM COATED ORAL at 21:26

## 2023-01-01 RX ADMIN — FLUTICASONE PROPIONATE 1 SPRAY: 50 SPRAY, METERED NASAL at 08:31

## 2023-01-01 RX ADMIN — DILTIAZEM HYDROCHLORIDE 60 MG: 60 TABLET, FILM COATED ORAL at 08:45

## 2023-01-01 RX ADMIN — HYDROCORTISONE: 1 CREAM TOPICAL at 10:48

## 2023-01-01 RX ADMIN — DOCOSANOL: 100 CREAM TOPICAL at 13:22

## 2023-01-01 RX ADMIN — DOCOSANOL: 100 CREAM TOPICAL at 18:00

## 2023-01-01 RX ADMIN — VANCOMYCIN HYDROCHLORIDE 1000 MG: 1 INJECTION, SOLUTION INTRAVENOUS at 18:55

## 2023-01-01 RX ADMIN — DILTIAZEM HYDROCHLORIDE 60 MG: 60 TABLET, FILM COATED ORAL at 02:08

## 2023-01-01 RX ADMIN — DOCOSANOL: 100 CREAM TOPICAL at 09:16

## 2023-01-01 RX ADMIN — Medication 12.5 MG: at 09:59

## 2023-01-01 RX ADMIN — INSULIN ASPART 1 UNITS: 100 INJECTION, SOLUTION INTRAVENOUS; SUBCUTANEOUS at 05:42

## 2023-01-01 RX ADMIN — Medication 40 MG: at 06:03

## 2023-01-01 RX ADMIN — LEVETIRACETAM 750 MG: 750 TABLET, FILM COATED ORAL at 22:00

## 2023-01-01 RX ADMIN — Medication 12.5 MG: at 08:14

## 2023-01-01 RX ADMIN — DOCOSANOL: 100 CREAM TOPICAL at 09:29

## 2023-01-01 RX ADMIN — APIXABAN 5 MG: 5 TABLET, FILM COATED ORAL at 17:49

## 2023-01-01 RX ADMIN — MICONAZOLE NITRATE: 20 CREAM TOPICAL at 08:10

## 2023-01-01 RX ADMIN — Medication 40 MG: at 06:31

## 2023-01-01 RX ADMIN — MICONAZOLE NITRATE: 20 CREAM TOPICAL at 20:49

## 2023-01-01 RX ADMIN — DOCOSANOL: 100 CREAM TOPICAL at 12:12

## 2023-01-01 RX ADMIN — METFORMIN HYDROCHLORIDE 500 MG: 500 TABLET ORAL at 18:09

## 2023-01-01 RX ADMIN — APIXABAN 5 MG: 5 TABLET, FILM COATED ORAL at 08:09

## 2023-01-01 RX ADMIN — METFORMIN HYDROCHLORIDE 1000 MG: 500 TABLET ORAL at 17:43

## 2023-01-01 RX ADMIN — DILTIAZEM HYDROCHLORIDE 60 MG: 60 TABLET, FILM COATED ORAL at 19:24

## 2023-01-01 RX ADMIN — ACETAMINOPHEN 650 MG: 325 SOLUTION ORAL at 22:01

## 2023-01-01 RX ADMIN — GABAPENTIN 600 MG: 250 SOLUTION ORAL at 20:52

## 2023-01-01 RX ADMIN — AMPICILLIN SODIUM AND SULBACTAM SODIUM 3 G: 2; 1 INJECTION, POWDER, FOR SOLUTION INTRAMUSCULAR; INTRAVENOUS at 15:54

## 2023-01-01 RX ADMIN — AMANTADINE HYDROCHLORIDE 100 MG: 100 CAPSULE, LIQUID FILLED ORAL at 06:03

## 2023-01-01 RX ADMIN — APIXABAN 5 MG: 5 TABLET, FILM COATED ORAL at 20:55

## 2023-01-01 RX ADMIN — GABAPENTIN 600 MG: 250 SOLUTION ORAL at 21:44

## 2023-01-01 RX ADMIN — MICONAZOLE NITRATE: 20 CREAM TOPICAL at 09:30

## 2023-01-01 RX ADMIN — GABAPENTIN 600 MG: 250 SOLUTION ORAL at 21:23

## 2023-01-01 RX ADMIN — APIXABAN 5 MG: 5 TABLET, FILM COATED ORAL at 09:52

## 2023-01-01 RX ADMIN — CEFDINIR 300 MG: 250 POWDER, FOR SUSPENSION ORAL at 20:27

## 2023-01-01 RX ADMIN — APIXABAN 5 MG: 5 TABLET, FILM COATED ORAL at 08:27

## 2023-01-01 RX ADMIN — METFORMIN HYDROCHLORIDE 1000 MG: 500 TABLET ORAL at 09:52

## 2023-01-01 RX ADMIN — HYDROCORTISONE: 1 CREAM TOPICAL at 21:37

## 2023-01-01 RX ADMIN — INSULIN ASPART 1 UNITS: 100 INJECTION, SOLUTION INTRAVENOUS; SUBCUTANEOUS at 17:04

## 2023-01-01 RX ADMIN — APIXABAN 5 MG: 2.5 TABLET, FILM COATED ORAL at 20:41

## 2023-01-01 RX ADMIN — CEFAZOLIN SODIUM 2 G: 2 INJECTION, SOLUTION INTRAVENOUS at 15:40

## 2023-01-01 RX ADMIN — INSULIN ASPART 1 UNITS: 100 INJECTION, SOLUTION INTRAVENOUS; SUBCUTANEOUS at 21:22

## 2023-01-01 RX ADMIN — SENNOSIDES AND DOCUSATE SODIUM 2 TABLET: 50; 8.6 TABLET ORAL at 08:46

## 2023-01-01 RX ADMIN — AMANTADINE HYDROCHLORIDE 100 MG: 100 CAPSULE, LIQUID FILLED ORAL at 06:22

## 2023-01-01 RX ADMIN — AMANTADINE HYDROCHLORIDE 100 MG: 100 CAPSULE ORAL at 08:00

## 2023-01-01 RX ADMIN — HYDROCORTISONE: 1 CREAM TOPICAL at 19:06

## 2023-01-01 RX ADMIN — ALBUTEROL SULFATE 2.5 MG: 2.5 SOLUTION RESPIRATORY (INHALATION) at 21:28

## 2023-01-01 RX ADMIN — AMANTADINE HYDROCHLORIDE 100 MG: 100 CAPSULE, LIQUID FILLED ORAL at 13:59

## 2023-01-01 RX ADMIN — HUMAN ALBUMIN MICROSPHERES AND PERFLUTREN 3 ML: 10; .22 INJECTION, SOLUTION INTRAVENOUS at 16:40

## 2023-01-01 RX ADMIN — FLUTICASONE PROPIONATE 1 SPRAY: 50 SPRAY, METERED NASAL at 21:02

## 2023-01-01 RX ADMIN — INSULIN ASPART 1 UNITS: 100 INJECTION, SOLUTION INTRAVENOUS; SUBCUTANEOUS at 00:39

## 2023-01-01 RX ADMIN — Medication 1 CAPSULE: at 08:38

## 2023-01-01 RX ADMIN — AMANTADINE HYDROCHLORIDE 100 MG: 100 CAPSULE, LIQUID FILLED ORAL at 06:26

## 2023-01-01 RX ADMIN — METFORMIN HYDROCHLORIDE 500 MG: 500 TABLET ORAL at 09:16

## 2023-01-01 RX ADMIN — CEFTRIAXONE SODIUM 2 G: 2 INJECTION, POWDER, FOR SOLUTION INTRAMUSCULAR; INTRAVENOUS at 01:08

## 2023-01-01 RX ADMIN — APIXABAN 5 MG: 5 TABLET, FILM COATED ORAL at 09:13

## 2023-01-01 RX ADMIN — FLUTICASONE PROPIONATE 1 SPRAY: 50 SPRAY, METERED NASAL at 09:45

## 2023-01-01 RX ADMIN — FERRIC OXIDE RED: 8; 8 LOTION TOPICAL at 15:24

## 2023-01-01 RX ADMIN — ATORVASTATIN CALCIUM 20 MG: 20 TABLET, FILM COATED ORAL at 21:01

## 2023-01-01 RX ADMIN — APIXABAN 5 MG: 5 TABLET, FILM COATED ORAL at 21:23

## 2023-01-01 RX ADMIN — FLUTICASONE PROPIONATE 1 SPRAY: 50 SPRAY, METERED NASAL at 21:41

## 2023-01-01 RX ADMIN — FLUTICASONE PROPIONATE 1 SPRAY: 50 SPRAY, METERED NASAL at 20:47

## 2023-01-01 RX ADMIN — PIPERACILLIN AND TAZOBACTAM 4.5 G: 4; .5 INJECTION, POWDER, FOR SOLUTION INTRAVENOUS at 10:06

## 2023-01-01 RX ADMIN — ATORVASTATIN CALCIUM 20 MG: 20 TABLET, FILM COATED ORAL at 21:30

## 2023-01-01 RX ADMIN — Medication 1 CAPSULE: at 08:31

## 2023-01-01 RX ADMIN — Medication 40 MG: at 05:55

## 2023-01-01 RX ADMIN — LIDOCAINE PATCH 4% 2 PATCH: 40 PATCH TOPICAL at 08:51

## 2023-01-01 RX ADMIN — Medication 1 CAPSULE: at 08:13

## 2023-01-01 RX ADMIN — DOCOSANOL: 100 CREAM TOPICAL at 22:00

## 2023-01-01 RX ADMIN — FLUTICASONE PROPIONATE 1 SPRAY: 50 SPRAY, METERED NASAL at 20:42

## 2023-01-01 RX ADMIN — FLUTICASONE PROPIONATE 1 SPRAY: 50 SPRAY, METERED NASAL at 21:30

## 2023-01-01 RX ADMIN — MIRTAZAPINE 15 MG: 15 TABLET, FILM COATED ORAL at 21:26

## 2023-01-01 RX ADMIN — APIXABAN 5 MG: 5 TABLET, FILM COATED ORAL at 08:30

## 2023-01-01 RX ADMIN — METFORMIN HYDROCHLORIDE 1000 MG: 500 TABLET ORAL at 17:14

## 2023-01-01 RX ADMIN — LEVETIRACETAM 500 MG: 500 TABLET, FILM COATED ORAL at 08:44

## 2023-01-01 RX ADMIN — ATORVASTATIN CALCIUM 20 MG: 20 TABLET, FILM COATED ORAL at 21:43

## 2023-01-01 RX ADMIN — SENNOSIDES AND DOCUSATE SODIUM 2 TABLET: 50; 8.6 TABLET ORAL at 07:53

## 2023-01-01 RX ADMIN — FLUTICASONE PROPIONATE 1 SPRAY: 50 SPRAY, METERED NASAL at 20:55

## 2023-01-01 RX ADMIN — Medication 40 MG: at 06:42

## 2023-01-01 RX ADMIN — AMANTADINE HYDROCHLORIDE 100 MG: 100 CAPSULE, LIQUID FILLED ORAL at 13:40

## 2023-01-01 RX ADMIN — APIXABAN 5 MG: 5 TABLET, FILM COATED ORAL at 20:52

## 2023-01-01 RX ADMIN — GABAPENTIN 300 MG: 250 SOLUTION ORAL at 22:31

## 2023-01-01 RX ADMIN — CEFTRIAXONE SODIUM 1 G: 1 INJECTION, POWDER, FOR SOLUTION INTRAMUSCULAR; INTRAVENOUS at 14:31

## 2023-01-01 RX ADMIN — METFORMIN HYDROCHLORIDE 1000 MG: 500 TABLET ORAL at 09:12

## 2023-01-01 RX ADMIN — ACETAMINOPHEN 650 MG: 160 SOLUTION ORAL at 12:14

## 2023-01-01 RX ADMIN — DILTIAZEM HYDROCHLORIDE 60 MG: 60 TABLET, FILM COATED ORAL at 09:44

## 2023-01-01 RX ADMIN — FLUTICASONE PROPIONATE 1 SPRAY: 50 SPRAY, METERED NASAL at 20:40

## 2023-01-01 RX ADMIN — MIRTAZAPINE 15 MG: 15 TABLET, FILM COATED ORAL at 01:25

## 2023-01-01 RX ADMIN — APIXABAN 5 MG: 2.5 TABLET, FILM COATED ORAL at 20:03

## 2023-01-01 RX ADMIN — AMANTADINE HYDROCHLORIDE 100 MG: 100 CAPSULE, LIQUID FILLED ORAL at 05:46

## 2023-01-01 RX ADMIN — AMOXICILLIN AND CLAVULANATE POTASSIUM 875 MG: 400; 57 POWDER, FOR SUSPENSION ORAL at 08:59

## 2023-01-01 RX ADMIN — APIXABAN 5 MG: 5 TABLET, FILM COATED ORAL at 21:34

## 2023-01-01 RX ADMIN — Medication 1 CAPSULE: at 20:03

## 2023-01-01 RX ADMIN — INSULIN ASPART 1 UNITS: 100 INJECTION, SOLUTION INTRAVENOUS; SUBCUTANEOUS at 13:19

## 2023-01-01 RX ADMIN — APIXABAN 5 MG: 2.5 TABLET, FILM COATED ORAL at 09:16

## 2023-01-01 RX ADMIN — ATORVASTATIN CALCIUM 20 MG: 20 TABLET, FILM COATED ORAL at 21:40

## 2023-01-01 RX ADMIN — DILTIAZEM HYDROCHLORIDE 60 MG: 60 TABLET, FILM COATED ORAL at 00:39

## 2023-01-01 RX ADMIN — LIDOCAINE PATCH 4% 2 PATCH: 40 PATCH TOPICAL at 08:48

## 2023-01-01 RX ADMIN — ACETAMINOPHEN 650 MG: 325 SUSPENSION ORAL at 06:25

## 2023-01-01 RX ADMIN — INSULIN ASPART 1 UNITS: 100 INJECTION, SOLUTION INTRAVENOUS; SUBCUTANEOUS at 12:49

## 2023-01-01 RX ADMIN — DILTIAZEM HYDROCHLORIDE 60 MG: 60 TABLET, FILM COATED ORAL at 00:42

## 2023-01-01 RX ADMIN — METFORMIN HYDROCHLORIDE 500 MG: 500 TABLET ORAL at 17:44

## 2023-01-01 RX ADMIN — Medication 40 MG: at 06:29

## 2023-01-01 RX ADMIN — Medication 1 CAPSULE: at 09:08

## 2023-01-01 RX ADMIN — GABAPENTIN 600 MG: 250 SOLUTION ORAL at 22:20

## 2023-01-01 RX ADMIN — FLUTICASONE PROPIONATE 1 SPRAY: 50 SPRAY, METERED NASAL at 08:50

## 2023-01-01 RX ADMIN — MICONAZOLE NITRATE: 20 CREAM TOPICAL at 08:57

## 2023-01-01 RX ADMIN — POTASSIUM CHLORIDE 40 MEQ: 1.5 POWDER, FOR SOLUTION ORAL at 16:54

## 2023-01-01 RX ADMIN — Medication 40 MG: at 09:07

## 2023-01-01 RX ADMIN — GABAPENTIN 300 MG: 250 SOLUTION ORAL at 22:10

## 2023-01-01 RX ADMIN — ASPIRIN 325 MG ORAL TABLET 325 MG: 325 PILL ORAL at 10:13

## 2023-01-01 RX ADMIN — INSULIN ASPART 2 UNITS: 100 INJECTION, SOLUTION INTRAVENOUS; SUBCUTANEOUS at 09:00

## 2023-01-01 RX ADMIN — METFORMIN HYDROCHLORIDE 500 MG: 500 TABLET ORAL at 08:02

## 2023-01-01 RX ADMIN — Medication 12.5 MG: at 07:47

## 2023-01-01 RX ADMIN — Medication 40 MG: at 06:11

## 2023-01-01 RX ADMIN — ATORVASTATIN CALCIUM 20 MG: 20 TABLET, FILM COATED ORAL at 21:35

## 2023-01-01 RX ADMIN — METFORMIN HYDROCHLORIDE 500 MG: 500 TABLET ORAL at 08:45

## 2023-01-01 RX ADMIN — GABAPENTIN 600 MG: 250 SOLUTION ORAL at 21:35

## 2023-01-01 RX ADMIN — APIXABAN 5 MG: 2.5 TABLET, FILM COATED ORAL at 20:19

## 2023-01-01 RX ADMIN — FERRIC OXIDE RED: 8; 8 LOTION TOPICAL at 06:44

## 2023-01-01 RX ADMIN — Medication 40 MG: at 06:13

## 2023-01-01 RX ADMIN — GABAPENTIN 600 MG: 250 SOLUTION ORAL at 02:31

## 2023-01-01 RX ADMIN — GABAPENTIN 600 MG: 250 SOLUTION ORAL at 21:58

## 2023-01-01 RX ADMIN — Medication 40 MG: at 05:58

## 2023-01-01 RX ADMIN — APIXABAN 5 MG: 5 TABLET, FILM COATED ORAL at 20:46

## 2023-01-01 RX ADMIN — METFORMIN HYDROCHLORIDE 500 MG: 500 TABLET, FILM COATED ORAL at 08:00

## 2023-01-01 RX ADMIN — AMANTADINE HYDROCHLORIDE 100 MG: 100 CAPSULE, LIQUID FILLED ORAL at 05:58

## 2023-01-01 RX ADMIN — Medication 40 MG: at 09:02

## 2023-01-01 RX ADMIN — APIXABAN 5 MG: 5 TABLET, FILM COATED ORAL at 07:43

## 2023-01-01 RX ADMIN — AMANTADINE HYDROCHLORIDE 100 MG: 100 CAPSULE, LIQUID FILLED ORAL at 06:48

## 2023-01-01 RX ADMIN — APIXABAN 5 MG: 2.5 TABLET, FILM COATED ORAL at 10:13

## 2023-01-01 RX ADMIN — APIXABAN 5 MG: 5 TABLET, FILM COATED ORAL at 21:36

## 2023-01-01 RX ADMIN — FLUTICASONE PROPIONATE 1 SPRAY: 50 SPRAY, METERED NASAL at 08:43

## 2023-01-01 RX ADMIN — MICONAZOLE NITRATE: 20 CREAM TOPICAL at 21:23

## 2023-01-01 RX ADMIN — MIRTAZAPINE 7.5 MG: 7.5 TABLET, FILM COATED ORAL at 21:39

## 2023-01-01 RX ADMIN — INSULIN ASPART 1 UNITS: 100 INJECTION, SOLUTION INTRAVENOUS; SUBCUTANEOUS at 00:22

## 2023-01-01 RX ADMIN — INSULIN ASPART 1 UNITS: 100 INJECTION, SOLUTION INTRAVENOUS; SUBCUTANEOUS at 20:06

## 2023-01-01 RX ADMIN — DOCOSANOL: 100 CREAM TOPICAL at 10:52

## 2023-01-01 RX ADMIN — AMANTADINE HYDROCHLORIDE 100 MG: 100 CAPSULE ORAL at 08:13

## 2023-01-01 RX ADMIN — ATORVASTATIN CALCIUM 20 MG: 20 TABLET, FILM COATED ORAL at 20:52

## 2023-01-01 RX ADMIN — DILTIAZEM HYDROCHLORIDE 60 MG: 60 TABLET, FILM COATED ORAL at 18:09

## 2023-01-01 RX ADMIN — FLUTICASONE PROPIONATE 1 SPRAY: 50 SPRAY, METERED NASAL at 21:44

## 2023-01-01 RX ADMIN — VANCOMYCIN HYDROCHLORIDE 1000 MG: 1 INJECTION, SOLUTION INTRAVENOUS at 05:46

## 2023-01-01 RX ADMIN — PIPERACILLIN AND TAZOBACTAM 4.5 G: 4; .5 INJECTION, POWDER, FOR SOLUTION INTRAVENOUS at 21:19

## 2023-01-01 RX ADMIN — AMANTADINE HYDROCHLORIDE 100 MG: 100 CAPSULE ORAL at 09:16

## 2023-01-01 RX ADMIN — Medication 40 MG: at 06:15

## 2023-01-01 RX ADMIN — SODIUM CHLORIDE 500 ML: 9 INJECTION, SOLUTION INTRAVENOUS at 15:48

## 2023-01-01 RX ADMIN — GABAPENTIN 300 MG: 250 SOLUTION ORAL at 21:36

## 2023-01-01 RX ADMIN — MICONAZOLE NITRATE: 20 CREAM TOPICAL at 08:58

## 2023-01-01 RX ADMIN — Medication 1 CAPSULE: at 07:48

## 2023-01-01 RX ADMIN — METFORMIN HYDROCHLORIDE 1000 MG: 500 TABLET ORAL at 18:18

## 2023-01-01 RX ADMIN — DILTIAZEM HYDROCHLORIDE 60 MG: 60 TABLET, FILM COATED ORAL at 09:02

## 2023-01-01 RX ADMIN — DOCOSANOL: 100 CREAM TOPICAL at 14:18

## 2023-01-01 RX ADMIN — DEXTROSE MONOHYDRATE 25 ML: 25 INJECTION, SOLUTION INTRAVENOUS at 06:55

## 2023-01-01 RX ADMIN — DILTIAZEM HYDROCHLORIDE 60 MG: 60 TABLET, FILM COATED ORAL at 16:24

## 2023-01-01 RX ADMIN — ASPIRIN 300 MG: 300 SUPPOSITORY RECTAL at 12:32

## 2023-01-01 RX ADMIN — LIDOCAINE PATCH 4% 1 PATCH: 40 PATCH TOPICAL at 08:45

## 2023-01-01 RX ADMIN — APIXABAN 5 MG: 2.5 TABLET, FILM COATED ORAL at 22:01

## 2023-01-01 RX ADMIN — ENOXAPARIN SODIUM 40 MG: 40 INJECTION SUBCUTANEOUS at 16:54

## 2023-01-01 RX ADMIN — DOCOSANOL: 100 CREAM TOPICAL at 21:42

## 2023-01-01 RX ADMIN — Medication 1 CAPSULE: at 21:46

## 2023-01-01 RX ADMIN — INSULIN ASPART 1 UNITS: 100 INJECTION, SOLUTION INTRAVENOUS; SUBCUTANEOUS at 20:43

## 2023-01-01 RX ADMIN — AMPICILLIN SODIUM AND SULBACTAM SODIUM 3 G: 2; 1 INJECTION, POWDER, FOR SOLUTION INTRAMUSCULAR; INTRAVENOUS at 21:35

## 2023-01-01 RX ADMIN — DILTIAZEM HYDROCHLORIDE 60 MG: 60 TABLET, FILM COATED ORAL at 17:44

## 2023-01-01 RX ADMIN — METFORMIN HYDROCHLORIDE 500 MG: 500 TABLET ORAL at 18:42

## 2023-01-01 RX ADMIN — MICONAZOLE NITRATE: 20 CREAM TOPICAL at 19:26

## 2023-01-01 RX ADMIN — FLUTICASONE PROPIONATE 1 SPRAY: 50 SPRAY, METERED NASAL at 10:19

## 2023-01-01 RX ADMIN — INSULIN ASPART 1 UNITS: 100 INJECTION, SOLUTION INTRAVENOUS; SUBCUTANEOUS at 12:51

## 2023-01-01 RX ADMIN — APIXABAN 5 MG: 2.5 TABLET, FILM COATED ORAL at 19:32

## 2023-01-01 RX ADMIN — MICONAZOLE NITRATE: 20 CREAM TOPICAL at 10:07

## 2023-01-01 RX ADMIN — DILTIAZEM HYDROCHLORIDE 60 MG: 60 TABLET, FILM COATED ORAL at 14:21

## 2023-01-01 RX ADMIN — APIXABAN 5 MG: 2.5 TABLET, FILM COATED ORAL at 21:46

## 2023-01-01 RX ADMIN — APIXABAN 5 MG: 2.5 TABLET, FILM COATED ORAL at 20:20

## 2023-01-01 RX ADMIN — LIDOCAINE PATCH 4% 2 PATCH: 40 PATCH TOPICAL at 09:32

## 2023-01-01 RX ADMIN — MIRTAZAPINE 15 MG: 7.5 TABLET, FILM COATED ORAL at 21:26

## 2023-01-01 RX ADMIN — CEFTRIAXONE SODIUM 2 G: 2 INJECTION, POWDER, FOR SOLUTION INTRAMUSCULAR; INTRAVENOUS at 00:32

## 2023-01-01 RX ADMIN — AMANTADINE HYDROCHLORIDE 100 MG: 100 CAPSULE, LIQUID FILLED ORAL at 06:35

## 2023-01-01 RX ADMIN — AMANTADINE HYDROCHLORIDE 100 MG: 100 CAPSULE ORAL at 09:13

## 2023-01-01 RX ADMIN — AMANTADINE HYDROCHLORIDE 100 MG: 100 CAPSULE, LIQUID FILLED ORAL at 14:01

## 2023-01-01 RX ADMIN — LIDOCAINE PATCH 4% 2 PATCH: 40 PATCH TOPICAL at 08:54

## 2023-01-01 RX ADMIN — METFORMIN HYDROCHLORIDE 500 MG: 500 TABLET ORAL at 17:23

## 2023-01-01 RX ADMIN — DILTIAZEM HYDROCHLORIDE 60 MG: 60 TABLET, FILM COATED ORAL at 01:59

## 2023-01-01 RX ADMIN — MIRTAZAPINE 15 MG: 15 TABLET, FILM COATED ORAL at 22:35

## 2023-01-01 RX ADMIN — DILTIAZEM HYDROCHLORIDE 60 MG: 60 TABLET, FILM COATED ORAL at 01:34

## 2023-01-01 RX ADMIN — ATORVASTATIN CALCIUM 20 MG: 20 TABLET, FILM COATED ORAL at 21:07

## 2023-01-01 RX ADMIN — FLUTICASONE PROPIONATE 1 SPRAY: 50 SPRAY, METERED NASAL at 09:13

## 2023-01-01 RX ADMIN — INSULIN ASPART 1 UNITS: 100 INJECTION, SOLUTION INTRAVENOUS; SUBCUTANEOUS at 10:24

## 2023-01-01 RX ADMIN — ACETAMINOPHEN 650 MG: 325 SUSPENSION ORAL at 22:01

## 2023-01-01 RX ADMIN — DILTIAZEM HYDROCHLORIDE 60 MG: 60 TABLET, FILM COATED ORAL at 00:08

## 2023-01-01 RX ADMIN — LIDOCAINE PATCH 4% 2 PATCH: 40 PATCH TOPICAL at 07:53

## 2023-01-01 RX ADMIN — DOCOSANOL: 100 CREAM TOPICAL at 17:21

## 2023-01-01 RX ADMIN — GABAPENTIN 600 MG: 250 SOLUTION ORAL at 22:07

## 2023-01-01 RX ADMIN — DILTIAZEM HYDROCHLORIDE 60 MG: 60 TABLET, FILM COATED ORAL at 00:27

## 2023-01-01 RX ADMIN — METFORMIN HYDROCHLORIDE 500 MG: 500 TABLET, FILM COATED ORAL at 08:03

## 2023-01-01 RX ADMIN — Medication 12.5 MG: at 07:59

## 2023-01-01 RX ADMIN — ATORVASTATIN CALCIUM 20 MG: 20 TABLET, FILM COATED ORAL at 20:37

## 2023-01-01 RX ADMIN — ASPIRIN 300 MG: 300 SUPPOSITORY RECTAL at 16:48

## 2023-01-01 RX ADMIN — MICONAZOLE NITRATE: 20 CREAM TOPICAL at 09:00

## 2023-01-01 RX ADMIN — PIPERACILLIN AND TAZOBACTAM 4.5 G: 4; .5 INJECTION, POWDER, FOR SOLUTION INTRAVENOUS at 02:40

## 2023-01-01 RX ADMIN — AMANTADINE HYDROCHLORIDE 100 MG: 100 CAPSULE, LIQUID FILLED ORAL at 06:12

## 2023-01-01 RX ADMIN — HYDROCORTISONE: 1 CREAM TOPICAL at 07:44

## 2023-01-01 RX ADMIN — ATORVASTATIN CALCIUM 20 MG: 20 TABLET, FILM COATED ORAL at 20:28

## 2023-01-01 RX ADMIN — DILTIAZEM HYDROCHLORIDE 60 MG: 60 TABLET, FILM COATED ORAL at 09:57

## 2023-01-01 RX ADMIN — APIXABAN 5 MG: 5 TABLET, FILM COATED ORAL at 07:52

## 2023-01-01 RX ADMIN — GUAIFENESIN 10 ML: 200 SOLUTION ORAL at 09:54

## 2023-01-01 RX ADMIN — Medication 1 CAPSULE: at 21:26

## 2023-01-01 RX ADMIN — AMPICILLIN SODIUM AND SULBACTAM SODIUM 3 G: 2; 1 INJECTION, POWDER, FOR SOLUTION INTRAMUSCULAR; INTRAVENOUS at 21:48

## 2023-01-01 RX ADMIN — DILTIAZEM HYDROCHLORIDE 60 MG: 60 TABLET, FILM COATED ORAL at 08:44

## 2023-01-01 RX ADMIN — METFORMIN HYDROCHLORIDE 1000 MG: 500 TABLET ORAL at 17:08

## 2023-01-01 RX ADMIN — MICONAZOLE NITRATE: 20 CREAM TOPICAL at 08:29

## 2023-01-01 RX ADMIN — CEFDINIR 300 MG: 250 POWDER, FOR SUSPENSION ORAL at 08:52

## 2023-01-01 RX ADMIN — ACETAMINOPHEN 650 MG: 160 SOLUTION ORAL at 00:48

## 2023-01-01 RX ADMIN — DILTIAZEM HYDROCHLORIDE 60 MG: 60 TABLET, FILM COATED ORAL at 00:55

## 2023-01-01 RX ADMIN — GADOBUTROL 10 ML: 604.72 INJECTION INTRAVENOUS at 22:22

## 2023-01-01 RX ADMIN — PIPERACILLIN AND TAZOBACTAM 4.5 G: 4; .5 INJECTION, POWDER, FOR SOLUTION INTRAVENOUS at 15:00

## 2023-01-01 RX ADMIN — POLYETHYLENE GLYCOL 3350 17 G: 17 POWDER, FOR SOLUTION ORAL at 04:08

## 2023-01-01 RX ADMIN — LEVETIRACETAM 500 MG: 500 TABLET, FILM COATED ORAL at 08:45

## 2023-01-01 RX ADMIN — AMOXICILLIN AND CLAVULANATE POTASSIUM 875 MG: 400; 57 POWDER, FOR SUSPENSION ORAL at 08:44

## 2023-01-01 RX ADMIN — GADOBUTROL 10 ML: 604.72 INJECTION INTRAVENOUS at 04:06

## 2023-01-01 RX ADMIN — AMPICILLIN SODIUM AND SULBACTAM SODIUM 3 G: 2; 1 INJECTION, POWDER, FOR SOLUTION INTRAMUSCULAR; INTRAVENOUS at 04:55

## 2023-01-01 RX ADMIN — GABAPENTIN 600 MG: 250 SOLUTION ORAL at 22:35

## 2023-01-01 RX ADMIN — GABAPENTIN 600 MG: 250 SOLUTION ORAL at 22:22

## 2023-01-01 RX ADMIN — FERRIC OXIDE RED: 8; 8 LOTION TOPICAL at 00:50

## 2023-01-01 RX ADMIN — MIRTAZAPINE 15 MG: 7.5 TABLET, FILM COATED ORAL at 21:18

## 2023-01-01 RX ADMIN — SODIUM CHLORIDE: 9 INJECTION, SOLUTION INTRAVENOUS at 04:56

## 2023-01-01 RX ADMIN — LEVETIRACETAM 500 MG: 500 TABLET, FILM COATED ORAL at 09:35

## 2023-01-01 RX ADMIN — APIXABAN 5 MG: 2.5 TABLET, FILM COATED ORAL at 08:45

## 2023-01-01 RX ADMIN — HYDROCORTISONE: 1 CREAM TOPICAL at 09:14

## 2023-01-01 RX ADMIN — INSULIN ASPART 1 UNITS: 100 INJECTION, SOLUTION INTRAVENOUS; SUBCUTANEOUS at 18:36

## 2023-01-01 RX ADMIN — GABAPENTIN 600 MG: 250 SOLUTION ORAL at 21:13

## 2023-01-01 RX ADMIN — DILTIAZEM HYDROCHLORIDE 60 MG: 60 TABLET, FILM COATED ORAL at 01:11

## 2023-01-01 RX ADMIN — HYDROCORTISONE: 1 CREAM TOPICAL at 14:01

## 2023-01-01 RX ADMIN — DOCOSANOL: 100 CREAM TOPICAL at 08:02

## 2023-01-01 RX ADMIN — METFORMIN HYDROCHLORIDE 850 MG: 850 TABLET, FILM COATED ORAL at 17:29

## 2023-01-01 RX ADMIN — ALBUTEROL SULFATE 2.5 MG: 2.5 SOLUTION RESPIRATORY (INHALATION) at 07:50

## 2023-01-01 RX ADMIN — MICONAZOLE NITRATE: 20 CREAM TOPICAL at 21:00

## 2023-01-01 RX ADMIN — ATORVASTATIN CALCIUM 20 MG: 20 TABLET, FILM COATED ORAL at 21:59

## 2023-01-01 RX ADMIN — MICONAZOLE NITRATE: 20 CREAM TOPICAL at 20:20

## 2023-01-01 RX ADMIN — LORAZEPAM 0.5 MG: 2 INJECTION INTRAMUSCULAR at 19:05

## 2023-01-01 RX ADMIN — HYDROCORTISONE: 1 CREAM TOPICAL at 20:53

## 2023-01-01 RX ADMIN — DILTIAZEM HYDROCHLORIDE 60 MG: 60 TABLET, FILM COATED ORAL at 18:29

## 2023-01-01 RX ADMIN — LEVETIRACETAM 500 MG: 100 SOLUTION ORAL at 08:31

## 2023-01-01 RX ADMIN — Medication 1 CAPSULE: at 07:52

## 2023-01-01 RX ADMIN — FERRIC OXIDE RED: 8; 8 LOTION TOPICAL at 03:33

## 2023-01-01 RX ADMIN — METFORMIN HYDROCHLORIDE 500 MG: 500 TABLET, FILM COATED ORAL at 08:27

## 2023-01-01 RX ADMIN — Medication 12.5 MG: at 09:02

## 2023-01-01 RX ADMIN — APIXABAN 5 MG: 5 TABLET, FILM COATED ORAL at 09:14

## 2023-01-01 RX ADMIN — ATORVASTATIN CALCIUM 20 MG: 20 TABLET, FILM COATED ORAL at 21:11

## 2023-01-01 RX ADMIN — DILTIAZEM HYDROCHLORIDE 60 MG: 60 TABLET, FILM COATED ORAL at 21:41

## 2023-01-01 RX ADMIN — Medication 1 CAPSULE: at 22:01

## 2023-01-01 RX ADMIN — LIDOCAINE PATCH 4% 1 PATCH: 40 PATCH TOPICAL at 09:35

## 2023-01-01 RX ADMIN — GABAPENTIN 600 MG: 250 SOLUTION ORAL at 21:39

## 2023-01-01 RX ADMIN — HYDROCORTISONE: 1 CREAM TOPICAL at 08:31

## 2023-01-01 RX ADMIN — DILTIAZEM HYDROCHLORIDE 60 MG: 60 TABLET, FILM COATED ORAL at 18:59

## 2023-01-01 RX ADMIN — METFORMIN HYDROCHLORIDE 1000 MG: 500 TABLET ORAL at 08:31

## 2023-01-01 RX ADMIN — MIRTAZAPINE 15 MG: 15 TABLET, FILM COATED ORAL at 22:10

## 2023-01-01 RX ADMIN — FLUTICASONE PROPIONATE 1 SPRAY: 50 SPRAY, METERED NASAL at 21:27

## 2023-01-01 RX ADMIN — ACETAMINOPHEN 650 MG: 160 SOLUTION ORAL at 01:13

## 2023-01-01 RX ADMIN — MICONAZOLE NITRATE: 20 CREAM TOPICAL at 10:20

## 2023-01-01 RX ADMIN — DILTIAZEM HYDROCHLORIDE 60 MG: 60 TABLET, FILM COATED ORAL at 09:16

## 2023-01-01 RX ADMIN — DILTIAZEM HYDROCHLORIDE 60 MG: 60 TABLET, FILM COATED ORAL at 01:46

## 2023-01-01 RX ADMIN — LIDOCAINE PATCH 4% 2 PATCH: 40 PATCH TOPICAL at 08:05

## 2023-01-01 RX ADMIN — MICONAZOLE NITRATE: 20 CREAM TOPICAL at 22:27

## 2023-01-01 RX ADMIN — ALBUTEROL SULFATE 2.5 MG: 2.5 SOLUTION RESPIRATORY (INHALATION) at 07:38

## 2023-01-01 RX ADMIN — METFORMIN HYDROCHLORIDE 1000 MG: 500 TABLET ORAL at 17:45

## 2023-01-01 RX ADMIN — MICONAZOLE NITRATE: 20 CREAM TOPICAL at 08:46

## 2023-01-01 RX ADMIN — LIDOCAINE 2 PATCH: 560 PATCH PERCUTANEOUS; TOPICAL; TRANSDERMAL at 09:34

## 2023-01-01 RX ADMIN — DILTIAZEM HYDROCHLORIDE 60 MG: 60 TABLET, FILM COATED ORAL at 09:12

## 2023-01-01 RX ADMIN — ATORVASTATIN CALCIUM 20 MG: 20 TABLET, FILM COATED ORAL at 22:00

## 2023-01-01 RX ADMIN — ACETAMINOPHEN 650 MG: 325 SUSPENSION ORAL at 15:54

## 2023-01-01 RX ADMIN — DOCOSANOL: 100 CREAM TOPICAL at 17:29

## 2023-01-01 RX ADMIN — MIRTAZAPINE 15 MG: 15 TABLET, FILM COATED ORAL at 21:00

## 2023-01-01 RX ADMIN — SENNOSIDES AND DOCUSATE SODIUM 2 TABLET: 50; 8.6 TABLET ORAL at 08:51

## 2023-01-01 RX ADMIN — APIXABAN 5 MG: 5 TABLET, FILM COATED ORAL at 21:37

## 2023-01-01 RX ADMIN — FERRIC OXIDE RED: 8; 8 LOTION TOPICAL at 12:13

## 2023-01-01 RX ADMIN — Medication 40 MG: at 06:23

## 2023-01-01 RX ADMIN — LORAZEPAM 0.25 MG: 2 INJECTION INTRAMUSCULAR; INTRAVENOUS at 03:26

## 2023-01-01 RX ADMIN — SODIUM CHLORIDE: 9 INJECTION, SOLUTION INTRAVENOUS at 17:00

## 2023-01-01 RX ADMIN — Medication 40 MG: at 05:57

## 2023-01-01 RX ADMIN — APIXABAN 5 MG: 5 TABLET, FILM COATED ORAL at 21:58

## 2023-01-01 RX ADMIN — METFORMIN HYDROCHLORIDE 1000 MG: 500 TABLET ORAL at 08:50

## 2023-01-01 RX ADMIN — INSULIN ASPART 2 UNITS: 100 INJECTION, SOLUTION INTRAVENOUS; SUBCUTANEOUS at 20:56

## 2023-01-01 RX ADMIN — AMANTADINE HYDROCHLORIDE 100 MG: 100 CAPSULE, LIQUID FILLED ORAL at 12:59

## 2023-01-01 RX ADMIN — METFORMIN HYDROCHLORIDE 1000 MG: 500 TABLET ORAL at 17:18

## 2023-01-01 RX ADMIN — DILTIAZEM HYDROCHLORIDE 60 MG: 60 TABLET, FILM COATED ORAL at 02:18

## 2023-01-01 RX ADMIN — LEVETIRACETAM 500 MG: 100 SOLUTION ORAL at 21:36

## 2023-01-01 RX ADMIN — HYDROCORTISONE: 1 CREAM TOPICAL at 14:19

## 2023-01-01 RX ADMIN — ALBUTEROL SULFATE 2.5 MG: 2.5 SOLUTION RESPIRATORY (INHALATION) at 17:25

## 2023-01-01 RX ADMIN — INSULIN GLARGINE 3 UNITS: 100 INJECTION, SOLUTION SUBCUTANEOUS at 08:59

## 2023-01-01 RX ADMIN — Medication 12.5 MG: at 07:52

## 2023-01-01 RX ADMIN — AMANTADINE HYDROCHLORIDE 100 MG: 100 CAPSULE, LIQUID FILLED ORAL at 05:54

## 2023-01-01 RX ADMIN — AMOXICILLIN AND CLAVULANATE POTASSIUM 875 MG: 400; 57 POWDER, FOR SUSPENSION ORAL at 22:19

## 2023-01-01 RX ADMIN — ATORVASTATIN CALCIUM 20 MG: 20 TABLET, FILM COATED ORAL at 22:17

## 2023-01-01 RX ADMIN — DILTIAZEM HYDROCHLORIDE 60 MG: 60 TABLET, FILM COATED ORAL at 08:47

## 2023-01-01 RX ADMIN — APIXABAN 5 MG: 5 TABLET, FILM COATED ORAL at 20:28

## 2023-01-01 RX ADMIN — LIDOCAINE PATCH 4% 2 PATCH: 40 PATCH TOPICAL at 08:38

## 2023-01-01 RX ADMIN — INSULIN ASPART 1 UNITS: 100 INJECTION, SOLUTION INTRAVENOUS; SUBCUTANEOUS at 03:40

## 2023-01-01 RX ADMIN — Medication 1 CAPSULE: at 20:55

## 2023-01-01 RX ADMIN — INSULIN ASPART 1 UNITS: 100 INJECTION, SOLUTION INTRAVENOUS; SUBCUTANEOUS at 12:58

## 2023-01-01 RX ADMIN — AMANTADINE HYDROCHLORIDE 100 MG: 100 CAPSULE ORAL at 21:39

## 2023-01-01 RX ADMIN — APIXABAN 5 MG: 5 TABLET, FILM COATED ORAL at 08:46

## 2023-01-01 RX ADMIN — Medication 40 MG: at 06:16

## 2023-01-01 RX ADMIN — DILTIAZEM HYDROCHLORIDE 60 MG: 60 TABLET, FILM COATED ORAL at 01:03

## 2023-01-01 RX ADMIN — MIRTAZAPINE 15 MG: 15 TABLET, FILM COATED ORAL at 22:00

## 2023-01-01 RX ADMIN — Medication 40 MG: at 08:01

## 2023-01-01 RX ADMIN — METFORMIN HYDROCHLORIDE 1000 MG: 500 TABLET ORAL at 09:13

## 2023-01-01 RX ADMIN — DILTIAZEM HYDROCHLORIDE 60 MG: 60 TABLET, FILM COATED ORAL at 18:42

## 2023-01-01 RX ADMIN — METFORMIN HYDROCHLORIDE 1000 MG: 500 TABLET ORAL at 08:32

## 2023-01-01 RX ADMIN — GABAPENTIN 600 MG: 300 CAPSULE ORAL at 21:11

## 2023-01-01 RX ADMIN — DILTIAZEM HYDROCHLORIDE 60 MG: 60 TABLET, FILM COATED ORAL at 16:46

## 2023-01-01 RX ADMIN — Medication 40 MG: at 06:08

## 2023-01-01 RX ADMIN — SENNOSIDES AND DOCUSATE SODIUM 2 TABLET: 50; 8.6 TABLET ORAL at 20:34

## 2023-01-01 RX ADMIN — APIXABAN 5 MG: 5 TABLET, FILM COATED ORAL at 20:20

## 2023-01-01 RX ADMIN — LEVETIRACETAM 500 MG: 500 TABLET, FILM COATED ORAL at 20:03

## 2023-01-01 RX ADMIN — Medication 40 MG: at 08:39

## 2023-01-01 RX ADMIN — INSULIN ASPART 1 UNITS: 100 INJECTION, SOLUTION INTRAVENOUS; SUBCUTANEOUS at 04:36

## 2023-01-01 RX ADMIN — MIRTAZAPINE 15 MG: 15 TABLET, FILM COATED ORAL at 22:07

## 2023-01-01 RX ADMIN — INSULIN ASPART 1 UNITS: 100 INJECTION, SOLUTION INTRAVENOUS; SUBCUTANEOUS at 13:59

## 2023-01-01 RX ADMIN — DILTIAZEM HYDROCHLORIDE 60 MG: 60 TABLET, FILM COATED ORAL at 21:36

## 2023-01-01 RX ADMIN — APIXABAN 5 MG: 5 TABLET, FILM COATED ORAL at 22:19

## 2023-01-01 RX ADMIN — APIXABAN 5 MG: 2.5 TABLET, FILM COATED ORAL at 08:43

## 2023-01-01 RX ADMIN — METFORMIN HYDROCHLORIDE 1000 MG: 500 TABLET ORAL at 08:09

## 2023-01-01 RX ADMIN — GABAPENTIN 600 MG: 250 SOLUTION ORAL at 21:12

## 2023-01-01 RX ADMIN — ATENOLOL 12.5 MG: 25 TABLET ORAL at 08:31

## 2023-01-01 RX ADMIN — DOCOSANOL: 100 CREAM TOPICAL at 21:46

## 2023-01-01 RX ADMIN — GABAPENTIN 600 MG: 250 SOLUTION ORAL at 20:27

## 2023-01-01 RX ADMIN — Medication 40 MG: at 08:14

## 2023-01-01 RX ADMIN — GABAPENTIN 600 MG: 250 SOLUTION ORAL at 21:45

## 2023-01-01 RX ADMIN — DOCOSANOL: 100 CREAM TOPICAL at 14:41

## 2023-01-01 RX ADMIN — INSULIN ASPART 1 UNITS: 100 INJECTION, SOLUTION INTRAVENOUS; SUBCUTANEOUS at 13:01

## 2023-01-01 RX ADMIN — GABAPENTIN 600 MG: 250 SOLUTION ORAL at 22:26

## 2023-01-01 RX ADMIN — INSULIN ASPART 1 UNITS: 100 INJECTION, SOLUTION INTRAVENOUS; SUBCUTANEOUS at 13:44

## 2023-01-01 RX ADMIN — DILTIAZEM HYDROCHLORIDE 60 MG: 60 TABLET, FILM COATED ORAL at 17:20

## 2023-01-01 RX ADMIN — FLUTICASONE PROPIONATE 1 SPRAY: 50 SPRAY, METERED NASAL at 07:52

## 2023-01-01 RX ADMIN — AMANTADINE HYDROCHLORIDE 100 MG: 100 CAPSULE, LIQUID FILLED ORAL at 13:38

## 2023-01-01 RX ADMIN — INSULIN ASPART 1 UNITS: 100 INJECTION, SOLUTION INTRAVENOUS; SUBCUTANEOUS at 16:43

## 2023-01-01 RX ADMIN — SODIUM CHLORIDE: 9 INJECTION, SOLUTION INTRAVENOUS at 10:07

## 2023-01-01 RX ADMIN — ATORVASTATIN CALCIUM 20 MG: 20 TABLET, FILM COATED ORAL at 21:10

## 2023-01-01 RX ADMIN — DILTIAZEM HYDROCHLORIDE 60 MG: 60 TABLET, FILM COATED ORAL at 09:29

## 2023-01-01 RX ADMIN — Medication 1 CAPSULE: at 21:40

## 2023-01-01 RX ADMIN — Medication 1 CAPSULE: at 19:53

## 2023-01-01 RX ADMIN — Medication 1 CAPSULE: at 20:32

## 2023-01-01 RX ADMIN — MIDAZOLAM 1 MG: 1 INJECTION INTRAMUSCULAR; INTRAVENOUS at 15:42

## 2023-01-01 RX ADMIN — DILTIAZEM HYDROCHLORIDE 60 MG: 60 TABLET, FILM COATED ORAL at 17:04

## 2023-01-01 RX ADMIN — HYDROCORTISONE: 1 CREAM TOPICAL at 07:54

## 2023-01-01 RX ADMIN — LIDOCAINE PATCH 4% 2 PATCH: 40 PATCH TOPICAL at 09:01

## 2023-01-01 RX ADMIN — INSULIN ASPART 1 UNITS: 100 INJECTION, SOLUTION INTRAVENOUS; SUBCUTANEOUS at 18:38

## 2023-01-01 RX ADMIN — INSULIN ASPART 1 UNITS: 100 INJECTION, SOLUTION INTRAVENOUS; SUBCUTANEOUS at 04:03

## 2023-01-01 RX ADMIN — Medication 40 MG: at 06:14

## 2023-01-01 RX ADMIN — METFORMIN HYDROCHLORIDE 500 MG: 500 TABLET ORAL at 08:00

## 2023-01-01 RX ADMIN — LIDOCAINE PATCH 4% 1 PATCH: 40 PATCH TOPICAL at 08:44

## 2023-01-01 RX ADMIN — Medication 40 MG: at 06:07

## 2023-01-01 RX ADMIN — MICONAZOLE NITRATE: 20 CREAM TOPICAL at 22:02

## 2023-01-01 RX ADMIN — AMOXICILLIN AND CLAVULANATE POTASSIUM 875 MG: 400; 57 POWDER, FOR SUSPENSION ORAL at 08:26

## 2023-01-01 RX ADMIN — SENNOSIDES AND DOCUSATE SODIUM 2 TABLET: 50; 8.6 TABLET ORAL at 21:41

## 2023-01-01 RX ADMIN — DILTIAZEM HYDROCHLORIDE 60 MG: 60 TABLET, FILM COATED ORAL at 08:26

## 2023-01-01 RX ADMIN — METFORMIN HYDROCHLORIDE 1000 MG: 500 TABLET ORAL at 08:47

## 2023-01-01 RX ADMIN — LIDOCAINE PATCH 4% 2 PATCH: 40 PATCH TOPICAL at 10:00

## 2023-01-01 RX ADMIN — SODIUM CHLORIDE 100 ML: 900 INJECTION INTRAVENOUS at 13:29

## 2023-01-01 RX ADMIN — INSULIN ASPART 2 UNITS: 100 INJECTION, SOLUTION INTRAVENOUS; SUBCUTANEOUS at 09:13

## 2023-01-01 RX ADMIN — LEVETIRACETAM 500 MG: 500 TABLET, FILM COATED ORAL at 20:12

## 2023-01-01 RX ADMIN — Medication 12.5 MG: at 09:16

## 2023-01-01 RX ADMIN — GABAPENTIN 600 MG: 250 SOLUTION ORAL at 21:38

## 2023-01-01 RX ADMIN — MICONAZOLE NITRATE: 20 CREAM TOPICAL at 07:47

## 2023-01-01 RX ADMIN — APIXABAN 5 MG: 2.5 TABLET, FILM COATED ORAL at 08:39

## 2023-01-01 RX ADMIN — LIDOCAINE PATCH 4% 2 PATCH: 40 PATCH TOPICAL at 09:02

## 2023-01-01 RX ADMIN — DILTIAZEM HYDROCHLORIDE 60 MG: 60 TABLET, FILM COATED ORAL at 02:10

## 2023-01-01 RX ADMIN — SODIUM CHLORIDE, POTASSIUM CHLORIDE, SODIUM LACTATE AND CALCIUM CHLORIDE 1000 ML: 600; 310; 30; 20 INJECTION, SOLUTION INTRAVENOUS at 13:34

## 2023-01-01 RX ADMIN — DILTIAZEM HYDROCHLORIDE 60 MG: 60 TABLET, FILM COATED ORAL at 08:31

## 2023-01-01 RX ADMIN — GABAPENTIN 600 MG: 300 CAPSULE ORAL at 21:59

## 2023-01-01 RX ADMIN — APIXABAN 5 MG: 2.5 TABLET, FILM COATED ORAL at 20:12

## 2023-01-01 RX ADMIN — METFORMIN HYDROCHLORIDE 1000 MG: 500 TABLET ORAL at 18:59

## 2023-01-01 RX ADMIN — FENTANYL CITRATE 50 MCG: 50 INJECTION, SOLUTION INTRAMUSCULAR; INTRAVENOUS at 15:48

## 2023-01-01 RX ADMIN — INSULIN GLARGINE 3 UNITS: 100 INJECTION, SOLUTION SUBCUTANEOUS at 22:20

## 2023-01-01 RX ADMIN — APIXABAN 5 MG: 5 TABLET, FILM COATED ORAL at 21:35

## 2023-01-01 RX ADMIN — ACETAMINOPHEN 650 MG: 160 SOLUTION ORAL at 01:31

## 2023-01-01 RX ADMIN — AMPICILLIN SODIUM AND SULBACTAM SODIUM 3 G: 2; 1 INJECTION, POWDER, FOR SOLUTION INTRAMUSCULAR; INTRAVENOUS at 21:12

## 2023-01-01 RX ADMIN — DILTIAZEM HYDROCHLORIDE 60 MG: 60 TABLET, FILM COATED ORAL at 01:12

## 2023-01-01 RX ADMIN — GABAPENTIN 600 MG: 250 SOLUTION ORAL at 22:48

## 2023-01-01 RX ADMIN — DILTIAZEM HYDROCHLORIDE 60 MG: 60 TABLET, FILM COATED ORAL at 17:03

## 2023-01-01 RX ADMIN — GABAPENTIN 600 MG: 250 SOLUTION ORAL at 22:00

## 2023-01-01 RX ADMIN — Medication 1 CAPSULE: at 20:19

## 2023-01-01 RX ADMIN — CEFTRIAXONE 2 G: 2 INJECTION, POWDER, FOR SOLUTION INTRAMUSCULAR; INTRAVENOUS at 15:40

## 2023-01-01 RX ADMIN — Medication 1 CAPSULE: at 09:15

## 2023-01-01 RX ADMIN — VANCOMYCIN HYDROCHLORIDE 1000 MG: 1 INJECTION, SOLUTION INTRAVENOUS at 17:59

## 2023-01-01 RX ADMIN — AMANTADINE HYDROCHLORIDE 100 MG: 100 CAPSULE, LIQUID FILLED ORAL at 06:04

## 2023-01-01 RX ADMIN — DILTIAZEM HYDROCHLORIDE 60 MG: 60 TABLET, FILM COATED ORAL at 09:28

## 2023-01-01 RX ADMIN — MORPHINE SULFATE 4 MG: 4 INJECTION INTRAVENOUS at 17:54

## 2023-01-01 RX ADMIN — LEVETIRACETAM 750 MG: 750 TABLET, FILM COATED ORAL at 09:15

## 2023-01-01 RX ADMIN — SODIUM CHLORIDE: 9 INJECTION, SOLUTION INTRAVENOUS at 10:14

## 2023-01-01 RX ADMIN — AMPICILLIN SODIUM AND SULBACTAM SODIUM 3 G: 2; 1 INJECTION, POWDER, FOR SOLUTION INTRAMUSCULAR; INTRAVENOUS at 03:43

## 2023-01-01 RX ADMIN — Medication 12.5 MG: at 08:44

## 2023-01-01 RX ADMIN — Medication 40 MG: at 07:52

## 2023-01-01 RX ADMIN — INSULIN ASPART 2 UNITS: 100 INJECTION, SOLUTION INTRAVENOUS; SUBCUTANEOUS at 08:05

## 2023-01-01 RX ADMIN — Medication 24.5 MG: at 11:23

## 2023-01-01 RX ADMIN — Medication 12.5 MG: at 10:13

## 2023-01-01 RX ADMIN — AMPICILLIN SODIUM AND SULBACTAM SODIUM 3 G: 2; 1 INJECTION, POWDER, FOR SOLUTION INTRAMUSCULAR; INTRAVENOUS at 16:54

## 2023-01-01 RX ADMIN — APIXABAN 5 MG: 5 TABLET, FILM COATED ORAL at 08:02

## 2023-01-01 RX ADMIN — ATORVASTATIN CALCIUM 20 MG: 20 TABLET, FILM COATED ORAL at 21:06

## 2023-01-01 RX ADMIN — INSULIN ASPART 2 UNITS: 100 INJECTION, SOLUTION INTRAVENOUS; SUBCUTANEOUS at 22:01

## 2023-01-01 RX ADMIN — Medication 1 CAPSULE: at 19:32

## 2023-01-01 RX ADMIN — Medication 12.5 MG: at 08:39

## 2023-01-01 RX ADMIN — ATORVASTATIN CALCIUM 20 MG: 20 TABLET, FILM COATED ORAL at 22:01

## 2023-01-01 RX ADMIN — APIXABAN 5 MG: 5 TABLET, FILM COATED ORAL at 10:01

## 2023-01-01 RX ADMIN — DILTIAZEM HYDROCHLORIDE 60 MG: 60 TABLET, FILM COATED ORAL at 09:58

## 2023-01-01 RX ADMIN — ONDANSETRON 4 MG: 2 INJECTION INTRAMUSCULAR; INTRAVENOUS at 17:54

## 2023-01-01 RX ADMIN — METFORMIN HYDROCHLORIDE 1000 MG: 500 TABLET ORAL at 07:43

## 2023-01-01 RX ADMIN — APIXABAN 5 MG: 5 TABLET, FILM COATED ORAL at 20:48

## 2023-01-01 RX ADMIN — METFORMIN HYDROCHLORIDE 1000 MG: 500 TABLET ORAL at 17:20

## 2023-01-01 RX ADMIN — SENNOSIDES AND DOCUSATE SODIUM 2 TABLET: 50; 8.6 TABLET ORAL at 21:11

## 2023-01-01 RX ADMIN — Medication 1 CAPSULE: at 09:44

## 2023-01-01 RX ADMIN — VANCOMYCIN HYDROCHLORIDE 1000 MG: 1 INJECTION, SOLUTION INTRAVENOUS at 05:56

## 2023-01-01 RX ADMIN — ACETAMINOPHEN 650 MG: 160 SOLUTION ORAL at 22:00

## 2023-01-01 RX ADMIN — APIXABAN 5 MG: 5 TABLET, FILM COATED ORAL at 08:52

## 2023-01-01 RX ADMIN — LIDOCAINE HYDROCHLORIDE 10 ML: 10 INJECTION, SOLUTION INFILTRATION; PERINEURAL at 15:47

## 2023-01-01 RX ADMIN — APIXABAN 5 MG: 5 TABLET, FILM COATED ORAL at 08:48

## 2023-01-01 RX ADMIN — ASPIRIN 325 MG ORAL TABLET 325 MG: 325 PILL ORAL at 09:14

## 2023-01-01 RX ADMIN — AMANTADINE HYDROCHLORIDE 100 MG: 100 CAPSULE, LIQUID FILLED ORAL at 12:55

## 2023-01-01 RX ADMIN — FLUTICASONE PROPIONATE 1 SPRAY: 50 SPRAY, METERED NASAL at 12:14

## 2023-01-01 RX ADMIN — SODIUM CHLORIDE 100 ML: 900 INJECTION INTRAVENOUS at 06:44

## 2023-01-01 RX ADMIN — METFORMIN HYDROCHLORIDE 500 MG: 500 TABLET ORAL at 09:07

## 2023-01-01 RX ADMIN — METFORMIN HYDROCHLORIDE 500 MG: 500 TABLET ORAL at 09:02

## 2023-01-01 RX ADMIN — Medication 1 CAPSULE: at 08:05

## 2023-01-01 RX ADMIN — ATORVASTATIN CALCIUM 20 MG: 20 TABLET, FILM COATED ORAL at 21:23

## 2023-01-01 RX ADMIN — BARIUM SULFATE 15 ML: 400 SUSPENSION ORAL at 10:20

## 2023-01-01 RX ADMIN — LEVETIRACETAM 500 MG: 500 TABLET, FILM COATED ORAL at 08:02

## 2023-01-01 RX ADMIN — APIXABAN 5 MG: 5 TABLET, FILM COATED ORAL at 21:07

## 2023-01-01 RX ADMIN — DOCOSANOL: 100 CREAM TOPICAL at 12:16

## 2023-01-01 RX ADMIN — DILTIAZEM HYDROCHLORIDE 60 MG: 60 TABLET, FILM COATED ORAL at 00:32

## 2023-01-01 RX ADMIN — DILTIAZEM HYDROCHLORIDE 60 MG: 60 TABLET, FILM COATED ORAL at 10:13

## 2023-01-01 RX ADMIN — DILTIAZEM HYDROCHLORIDE 60 MG: 60 TABLET, FILM COATED ORAL at 17:08

## 2023-01-01 RX ADMIN — MICONAZOLE NITRATE: 20 CREAM TOPICAL at 22:25

## 2023-01-01 RX ADMIN — INSULIN ASPART 1 UNITS: 100 INJECTION, SOLUTION INTRAVENOUS; SUBCUTANEOUS at 13:49

## 2023-01-01 RX ADMIN — INSULIN ASPART 1 UNITS: 100 INJECTION, SOLUTION INTRAVENOUS; SUBCUTANEOUS at 09:43

## 2023-01-01 RX ADMIN — Medication 12.5 MG: at 09:07

## 2023-01-01 RX ADMIN — Medication 12.5 MG: at 08:05

## 2023-01-01 RX ADMIN — DILTIAZEM HYDROCHLORIDE 60 MG: 60 TABLET, FILM COATED ORAL at 17:23

## 2023-01-01 RX ADMIN — METFORMIN HYDROCHLORIDE 1000 MG: 500 TABLET ORAL at 18:03

## 2023-01-01 RX ADMIN — APIXABAN 5 MG: 2.5 TABLET, FILM COATED ORAL at 21:39

## 2023-01-01 RX ADMIN — Medication 1 CAPSULE: at 08:40

## 2023-01-01 RX ADMIN — GABAPENTIN 600 MG: 250 SOLUTION ORAL at 21:31

## 2023-01-01 RX ADMIN — GABAPENTIN 600 MG: 250 SOLUTION ORAL at 20:42

## 2023-01-01 RX ADMIN — CEFTRIAXONE SODIUM 2 G: 2 INJECTION, POWDER, FOR SOLUTION INTRAMUSCULAR; INTRAVENOUS at 00:54

## 2023-01-01 RX ADMIN — ATORVASTATIN CALCIUM 20 MG: 20 TABLET, FILM COATED ORAL at 20:32

## 2023-01-01 RX ADMIN — DOCOSANOL: 100 CREAM TOPICAL at 21:02

## 2023-01-01 RX ADMIN — ATORVASTATIN CALCIUM 20 MG: 20 TABLET, FILM COATED ORAL at 22:31

## 2023-01-01 RX ADMIN — LIDOCAINE PATCH 4% 2 PATCH: 40 PATCH TOPICAL at 07:48

## 2023-01-01 RX ADMIN — LEVETIRACETAM 500 MG: 500 TABLET, FILM COATED ORAL at 09:29

## 2023-01-01 RX ADMIN — METFORMIN HYDROCHLORIDE 1000 MG: 500 TABLET ORAL at 17:09

## 2023-01-01 RX ADMIN — LEVETIRACETAM 500 MG: 500 TABLET, FILM COATED ORAL at 09:42

## 2023-01-01 RX ADMIN — DILTIAZEM HYDROCHLORIDE 60 MG: 60 TABLET, FILM COATED ORAL at 01:31

## 2023-01-01 RX ADMIN — DILTIAZEM HYDROCHLORIDE 60 MG: 60 TABLET, FILM COATED ORAL at 08:01

## 2023-01-01 RX ADMIN — AMANTADINE HYDROCHLORIDE 100 MG: 100 CAPSULE ORAL at 20:19

## 2023-01-01 RX ADMIN — HYDROCORTISONE: 1 CREAM TOPICAL at 20:56

## 2023-01-01 RX ADMIN — METFORMIN HYDROCHLORIDE 500 MG: 500 TABLET ORAL at 17:35

## 2023-01-01 RX ADMIN — IOPAMIDOL 75 ML: 755 INJECTION, SOLUTION INTRAVENOUS at 10:42

## 2023-01-01 RX ADMIN — PIPERACILLIN AND TAZOBACTAM 4.5 G: 4; .5 INJECTION, POWDER, FOR SOLUTION INTRAVENOUS at 14:52

## 2023-01-01 RX ADMIN — DILTIAZEM HYDROCHLORIDE 60 MG: 60 TABLET, FILM COATED ORAL at 18:15

## 2023-01-01 RX ADMIN — MIRTAZAPINE 15 MG: 7.5 TABLET, FILM COATED ORAL at 20:55

## 2023-01-01 RX ADMIN — METFORMIN HYDROCHLORIDE 1000 MG: 500 TABLET ORAL at 08:52

## 2023-01-01 RX ADMIN — APIXABAN 5 MG: 5 TABLET, FILM COATED ORAL at 21:31

## 2023-01-01 RX ADMIN — GABAPENTIN 600 MG: 250 SOLUTION ORAL at 22:27

## 2023-01-01 RX ADMIN — DILTIAZEM HYDROCHLORIDE 60 MG: 60 TABLET, FILM COATED ORAL at 18:25

## 2023-01-01 RX ADMIN — Medication 1 CAPSULE: at 08:34

## 2023-01-01 RX ADMIN — Medication 40 MG: at 08:46

## 2023-01-01 RX ADMIN — ACETAMINOPHEN 650 MG: 160 SOLUTION ORAL at 22:12

## 2023-01-01 RX ADMIN — METFORMIN HYDROCHLORIDE 500 MG: 500 TABLET ORAL at 17:04

## 2023-01-01 RX ADMIN — Medication 40 MG: at 08:05

## 2023-01-01 RX ADMIN — APIXABAN 5 MG: 5 TABLET, FILM COATED ORAL at 21:39

## 2023-01-01 RX ADMIN — AMANTADINE HYDROCHLORIDE 100 MG: 100 CAPSULE, LIQUID FILLED ORAL at 13:29

## 2023-01-01 RX ADMIN — Medication 40 MG: at 08:00

## 2023-01-01 RX ADMIN — DILTIAZEM HYDROCHLORIDE 60 MG: 60 TABLET, FILM COATED ORAL at 01:32

## 2023-01-01 RX ADMIN — METFORMIN HYDROCHLORIDE 500 MG: 500 TABLET, FILM COATED ORAL at 17:41

## 2023-01-01 RX ADMIN — APIXABAN 5 MG: 5 TABLET, FILM COATED ORAL at 08:31

## 2023-01-01 RX ADMIN — ATORVASTATIN CALCIUM 20 MG: 20 TABLET, FILM COATED ORAL at 22:05

## 2023-01-01 RX ADMIN — AMANTADINE HYDROCHLORIDE 100 MG: 100 CAPSULE, LIQUID FILLED ORAL at 12:07

## 2023-01-01 RX ADMIN — ASPIRIN 325 MG ORAL TABLET 325 MG: 325 PILL ORAL at 20:35

## 2023-01-01 RX ADMIN — AMANTADINE HYDROCHLORIDE 100 MG: 100 CAPSULE, LIQUID FILLED ORAL at 14:00

## 2023-01-01 RX ADMIN — LIDOCAINE PATCH 4% 2 PATCH: 40 PATCH TOPICAL at 07:37

## 2023-01-01 RX ADMIN — POTASSIUM CHLORIDE 40 MEQ: 20 SOLUTION ORAL at 15:41

## 2023-01-01 RX ADMIN — DILTIAZEM HYDROCHLORIDE 60 MG: 60 TABLET, FILM COATED ORAL at 08:05

## 2023-01-01 RX ADMIN — APIXABAN 5 MG: 5 TABLET, FILM COATED ORAL at 21:41

## 2023-01-01 RX ADMIN — METFORMIN HYDROCHLORIDE 500 MG: 500 TABLET ORAL at 08:43

## 2023-01-01 RX ADMIN — FLUTICASONE PROPIONATE 1 SPRAY: 50 SPRAY, METERED NASAL at 09:18

## 2023-01-01 RX ADMIN — MICONAZOLE NITRATE: 20 CREAM TOPICAL at 20:37

## 2023-01-01 RX ADMIN — MIRTAZAPINE 15 MG: 7.5 TABLET, FILM COATED ORAL at 21:24

## 2023-01-01 RX ADMIN — DILTIAZEM HYDROCHLORIDE 60 MG: 60 TABLET, FILM COATED ORAL at 02:30

## 2023-01-01 RX ADMIN — Medication 40 MG: at 05:48

## 2023-01-01 RX ADMIN — CEFTRIAXONE SODIUM 2 G: 2 INJECTION, POWDER, FOR SOLUTION INTRAMUSCULAR; INTRAVENOUS at 00:24

## 2023-01-01 RX ADMIN — Medication 40 MG: at 06:09

## 2023-01-01 RX ADMIN — ALBUTEROL SULFATE 2.5 MG: 2.5 SOLUTION RESPIRATORY (INHALATION) at 07:16

## 2023-01-01 RX ADMIN — INSULIN ASPART 1 UNITS: 100 INJECTION, SOLUTION INTRAVENOUS; SUBCUTANEOUS at 12:18

## 2023-01-01 RX ADMIN — BISACODYL 10 MG: 10 SUPPOSITORY RECTAL at 10:52

## 2023-01-01 RX ADMIN — INSULIN ASPART 1 UNITS: 100 INJECTION, SOLUTION INTRAVENOUS; SUBCUTANEOUS at 00:37

## 2023-01-01 RX ADMIN — APIXABAN 5 MG: 5 TABLET, FILM COATED ORAL at 08:01

## 2023-01-01 RX ADMIN — AMANTADINE HYDROCHLORIDE 100 MG: 100 CAPSULE ORAL at 20:44

## 2023-01-01 RX ADMIN — DILTIAZEM HYDROCHLORIDE 60 MG: 60 TABLET, FILM COATED ORAL at 08:39

## 2023-01-01 RX ADMIN — MICONAZOLE NITRATE: 20 CREAM TOPICAL at 09:01

## 2023-01-01 RX ADMIN — DOCOSANOL: 100 CREAM TOPICAL at 10:02

## 2023-01-01 RX ADMIN — MICONAZOLE NITRATE: 20 CREAM TOPICAL at 08:43

## 2023-01-01 RX ADMIN — ATORVASTATIN CALCIUM 20 MG: 20 TABLET, FILM COATED ORAL at 21:37

## 2023-01-01 RX ADMIN — CEFDINIR 300 MG: 250 POWDER, FOR SUSPENSION ORAL at 10:15

## 2023-01-01 RX ADMIN — Medication 1 CAPSULE: at 21:39

## 2023-01-01 RX ADMIN — Medication 1 CAPSULE: at 08:09

## 2023-01-01 RX ADMIN — METFORMIN HYDROCHLORIDE 1000 MG: 500 TABLET ORAL at 07:52

## 2023-01-01 RX ADMIN — DILTIAZEM HYDROCHLORIDE 60 MG: 60 TABLET, FILM COATED ORAL at 15:49

## 2023-01-01 RX ADMIN — AMPICILLIN SODIUM AND SULBACTAM SODIUM 3 G: 2; 1 INJECTION, POWDER, FOR SOLUTION INTRAMUSCULAR; INTRAVENOUS at 10:37

## 2023-01-01 RX ADMIN — AMOXICILLIN AND CLAVULANATE POTASSIUM 875 MG: 400; 57 POWDER, FOR SUSPENSION ORAL at 22:05

## 2023-01-01 RX ADMIN — MIRTAZAPINE 15 MG: 7.5 TABLET, FILM COATED ORAL at 20:37

## 2023-01-01 RX ADMIN — MICONAZOLE NITRATE: 20 CREAM TOPICAL at 20:03

## 2023-01-01 RX ADMIN — ACETAMINOPHEN 650 MG: 325 SUSPENSION ORAL at 21:49

## 2023-01-01 RX ADMIN — DOCOSANOL: 100 CREAM TOPICAL at 15:24

## 2023-01-01 RX ADMIN — LIDOCAINE PATCH 4% 2 PATCH: 40 PATCH TOPICAL at 09:46

## 2023-01-01 RX ADMIN — AMANTADINE HYDROCHLORIDE 100 MG: 100 CAPSULE, LIQUID FILLED ORAL at 12:02

## 2023-01-01 RX ADMIN — LIDOCAINE PATCH 4% 2 PATCH: 40 PATCH TOPICAL at 08:47

## 2023-01-01 RX ADMIN — Medication 1 CAPSULE: at 20:28

## 2023-01-01 RX ADMIN — DILTIAZEM HYDROCHLORIDE 60 MG: 60 TABLET, FILM COATED ORAL at 14:23

## 2023-01-01 RX ADMIN — APIXABAN 5 MG: 2.5 TABLET, FILM COATED ORAL at 22:00

## 2023-01-01 RX ADMIN — ATORVASTATIN CALCIUM 20 MG: 20 TABLET, FILM COATED ORAL at 21:31

## 2023-01-01 RX ADMIN — Medication 40 MG: at 06:24

## 2023-01-01 RX ADMIN — DOCOSANOL: 100 CREAM TOPICAL at 17:50

## 2023-01-01 RX ADMIN — APIXABAN 5 MG: 5 TABLET, FILM COATED ORAL at 07:49

## 2023-01-01 RX ADMIN — METFORMIN HYDROCHLORIDE 500 MG: 500 TABLET ORAL at 09:44

## 2023-01-01 RX ADMIN — METFORMIN HYDROCHLORIDE 1000 MG: 500 TABLET ORAL at 18:26

## 2023-01-01 RX ADMIN — MICONAZOLE NITRATE: 20 CREAM TOPICAL at 09:09

## 2023-01-01 RX ADMIN — CEFDINIR 300 MG: 250 POWDER, FOR SUSPENSION ORAL at 08:09

## 2023-01-01 RX ADMIN — GABAPENTIN 600 MG: 250 SOLUTION ORAL at 21:37

## 2023-01-01 RX ADMIN — Medication 1 CAPSULE: at 08:46

## 2023-01-01 RX ADMIN — DIPHENHYDRAMINE HYDROCHLORIDE 25 MG: 50 INJECTION, SOLUTION INTRAMUSCULAR; INTRAVENOUS at 19:05

## 2023-01-01 RX ADMIN — AMOXICILLIN AND CLAVULANATE POTASSIUM 875 MG: 400; 57 POWDER, FOR SUSPENSION ORAL at 09:14

## 2023-01-01 RX ADMIN — MICONAZOLE NITRATE: 20 CREAM TOPICAL at 08:32

## 2023-01-01 RX ADMIN — METFORMIN HYDROCHLORIDE 500 MG: 500 TABLET ORAL at 17:29

## 2023-01-01 RX ADMIN — METFORMIN HYDROCHLORIDE 500 MG: 500 TABLET ORAL at 17:17

## 2023-01-01 RX ADMIN — MICONAZOLE NITRATE: 20 CREAM TOPICAL at 08:40

## 2023-01-01 RX ADMIN — DILTIAZEM HYDROCHLORIDE 60 MG: 60 TABLET, FILM COATED ORAL at 19:11

## 2023-01-01 RX ADMIN — FLUTICASONE PROPIONATE 1 SPRAY: 50 SPRAY, METERED NASAL at 20:28

## 2023-01-01 RX ADMIN — INSULIN ASPART 1 UNITS: 100 INJECTION, SOLUTION INTRAVENOUS; SUBCUTANEOUS at 00:43

## 2023-01-01 RX ADMIN — AMPICILLIN SODIUM AND SULBACTAM SODIUM 3 G: 2; 1 INJECTION, POWDER, FOR SOLUTION INTRAMUSCULAR; INTRAVENOUS at 09:13

## 2023-01-01 RX ADMIN — GABAPENTIN 600 MG: 250 SOLUTION ORAL at 20:53

## 2023-01-01 RX ADMIN — AMANTADINE HYDROCHLORIDE 100 MG: 100 CAPSULE ORAL at 10:19

## 2023-01-01 RX ADMIN — APIXABAN 5 MG: 2.5 TABLET, FILM COATED ORAL at 09:02

## 2023-01-01 RX ADMIN — DILTIAZEM HYDROCHLORIDE 60 MG: 60 TABLET, FILM COATED ORAL at 18:02

## 2023-01-01 RX ADMIN — APIXABAN 5 MG: 5 TABLET, FILM COATED ORAL at 22:05

## 2023-01-01 RX ADMIN — DILTIAZEM HYDROCHLORIDE 60 MG: 60 TABLET, FILM COATED ORAL at 00:48

## 2023-01-01 RX ADMIN — DILTIAZEM HYDROCHLORIDE 60 MG: 60 TABLET, FILM COATED ORAL at 08:49

## 2023-01-01 RX ADMIN — PANTOPRAZOLE SODIUM 40 MG: 40 TABLET, DELAYED RELEASE ORAL at 06:34

## 2023-01-01 RX ADMIN — Medication 1 CAPSULE: at 09:30

## 2023-01-01 RX ADMIN — LEVETIRACETAM 500 MG: 500 TABLET, FILM COATED ORAL at 23:09

## 2023-01-01 RX ADMIN — ACETAMINOPHEN 650 MG: 325 SUSPENSION ORAL at 11:39

## 2023-01-01 RX ADMIN — DOCOSANOL: 100 CREAM TOPICAL at 12:20

## 2023-01-01 RX ADMIN — METFORMIN HYDROCHLORIDE 1000 MG: 500 TABLET ORAL at 17:30

## 2023-01-01 RX ADMIN — Medication 1 CAPSULE: at 08:03

## 2023-01-01 RX ADMIN — MICONAZOLE NITRATE: 20 CREAM TOPICAL at 08:05

## 2023-01-01 RX ADMIN — DILTIAZEM HYDROCHLORIDE 60 MG: 60 TABLET, FILM COATED ORAL at 01:36

## 2023-01-01 RX ADMIN — MIRTAZAPINE 15 MG: 7.5 TABLET, FILM COATED ORAL at 20:32

## 2023-01-01 RX ADMIN — ACETAMINOPHEN 650 MG: 160 SOLUTION ORAL at 21:58

## 2023-01-01 RX ADMIN — APIXABAN 5 MG: 5 TABLET, FILM COATED ORAL at 21:19

## 2023-01-01 RX ADMIN — INSULIN ASPART 1 UNITS: 100 INJECTION, SOLUTION INTRAVENOUS; SUBCUTANEOUS at 20:34

## 2023-01-01 RX ADMIN — METFORMIN HYDROCHLORIDE 1000 MG: 500 TABLET ORAL at 19:24

## 2023-01-01 RX ADMIN — AMANTADINE HYDROCHLORIDE 100 MG: 100 CAPSULE, LIQUID FILLED ORAL at 12:00

## 2023-01-01 RX ADMIN — AMANTADINE HYDROCHLORIDE 100 MG: 100 CAPSULE ORAL at 08:39

## 2023-01-01 RX ADMIN — GABAPENTIN 600 MG: 250 SOLUTION ORAL at 22:01

## 2023-01-01 RX ADMIN — DILTIAZEM HYDROCHLORIDE 60 MG: 60 TABLET, FILM COATED ORAL at 17:17

## 2023-01-01 RX ADMIN — SENNOSIDES AND DOCUSATE SODIUM 2 TABLET: 50; 8.6 TABLET ORAL at 09:17

## 2023-01-01 RX ADMIN — ATORVASTATIN CALCIUM 20 MG: 20 TABLET, FILM COATED ORAL at 22:35

## 2023-01-01 RX ADMIN — INSULIN ASPART 1 UNITS: 100 INJECTION, SOLUTION INTRAVENOUS; SUBCUTANEOUS at 08:29

## 2023-01-01 RX ADMIN — Medication 1 CAPSULE: at 09:59

## 2023-01-01 RX ADMIN — Medication 1 CAPSULE: at 20:52

## 2023-01-01 RX ADMIN — FLUTICASONE PROPIONATE 1 SPRAY: 50 SPRAY, METERED NASAL at 21:19

## 2023-01-01 RX ADMIN — APIXABAN 5 MG: 2.5 TABLET, FILM COATED ORAL at 20:52

## 2023-01-01 RX ADMIN — Medication 40 MG: at 09:13

## 2023-01-01 RX ADMIN — APIXABAN 5 MG: 5 TABLET, FILM COATED ORAL at 08:32

## 2023-01-01 RX ADMIN — DILTIAZEM HYDROCHLORIDE 60 MG: 60 TABLET, FILM COATED ORAL at 04:56

## 2023-01-01 RX ADMIN — Medication 1 CAPSULE: at 22:00

## 2023-01-01 RX ADMIN — DILTIAZEM HYDROCHLORIDE 60 MG: 60 TABLET, FILM COATED ORAL at 06:31

## 2023-01-01 RX ADMIN — ACETAMINOPHEN 650 MG: 160 SOLUTION ORAL at 13:29

## 2023-01-01 RX ADMIN — GABAPENTIN 600 MG: 250 SOLUTION ORAL at 22:06

## 2023-01-01 RX ADMIN — APIXABAN 5 MG: 5 TABLET, FILM COATED ORAL at 07:42

## 2023-01-01 RX ADMIN — APIXABAN 5 MG: 5 TABLET, FILM COATED ORAL at 08:34

## 2023-01-01 RX ADMIN — DILTIAZEM HYDROCHLORIDE 60 MG: 60 TABLET, FILM COATED ORAL at 08:32

## 2023-01-01 RX ADMIN — Medication 1 CAPSULE: at 20:44

## 2023-01-01 RX ADMIN — MICONAZOLE NITRATE: 20 CREAM TOPICAL at 20:12

## 2023-01-01 RX ADMIN — ATORVASTATIN CALCIUM 20 MG: 20 TABLET, FILM COATED ORAL at 22:26

## 2023-01-01 RX ADMIN — DILTIAZEM HYDROCHLORIDE 60 MG: 60 TABLET, FILM COATED ORAL at 00:23

## 2023-01-01 RX ADMIN — DILTIAZEM HYDROCHLORIDE 60 MG: 60 TABLET, FILM COATED ORAL at 01:19

## 2023-01-01 RX ADMIN — INSULIN ASPART 1 UNITS: 100 INJECTION, SOLUTION INTRAVENOUS; SUBCUTANEOUS at 07:59

## 2023-01-01 RX ADMIN — AMANTADINE HYDROCHLORIDE 100 MG: 100 CAPSULE, LIQUID FILLED ORAL at 13:22

## 2023-01-01 RX ADMIN — Medication 40 MG: at 06:04

## 2023-01-01 RX ADMIN — METFORMIN HYDROCHLORIDE 1000 MG: 500 TABLET ORAL at 17:42

## 2023-01-01 RX ADMIN — DILTIAZEM HYDROCHLORIDE 60 MG: 60 TABLET, FILM COATED ORAL at 17:10

## 2023-01-01 RX ADMIN — APIXABAN 5 MG: 2.5 TABLET, FILM COATED ORAL at 08:00

## 2023-01-01 RX ADMIN — LEVETIRACETAM 750 MG: 750 TABLET, FILM COATED ORAL at 22:01

## 2023-01-01 RX ADMIN — MICONAZOLE NITRATE: 20 CREAM TOPICAL at 21:44

## 2023-01-01 RX ADMIN — MICONAZOLE NITRATE: 20 CREAM TOPICAL at 08:39

## 2023-01-01 RX ADMIN — INSULIN ASPART 2 UNITS: 100 INJECTION, SOLUTION INTRAVENOUS; SUBCUTANEOUS at 17:13

## 2023-01-01 RX ADMIN — DILTIAZEM HYDROCHLORIDE 60 MG: 60 TABLET, FILM COATED ORAL at 00:59

## 2023-01-01 RX ADMIN — DOCOSANOL: 100 CREAM TOPICAL at 11:49

## 2023-01-01 RX ADMIN — GABAPENTIN 600 MG: 250 SOLUTION ORAL at 21:29

## 2023-01-01 RX ADMIN — ATORVASTATIN CALCIUM 20 MG: 20 TABLET, FILM COATED ORAL at 21:41

## 2023-01-01 RX ADMIN — DILTIAZEM HYDROCHLORIDE 60 MG: 60 TABLET, FILM COATED ORAL at 08:09

## 2023-01-01 RX ADMIN — Medication 40 MG: at 07:33

## 2023-01-01 RX ADMIN — INSULIN ASPART 1 UNITS: 100 INJECTION, SOLUTION INTRAVENOUS; SUBCUTANEOUS at 18:00

## 2023-01-01 RX ADMIN — METFORMIN HYDROCHLORIDE 500 MG: 500 TABLET ORAL at 17:09

## 2023-01-01 RX ADMIN — LEVETIRACETAM 500 MG: 500 TABLET, FILM COATED ORAL at 20:20

## 2023-01-01 RX ADMIN — LIDOCAINE PATCH 4% 1 PATCH: 40 PATCH TOPICAL at 08:03

## 2023-01-01 RX ADMIN — INSULIN ASPART 1 UNITS: 100 INJECTION, SOLUTION INTRAVENOUS; SUBCUTANEOUS at 11:52

## 2023-01-01 RX ADMIN — PANTOPRAZOLE SODIUM 40 MG: 40 TABLET, DELAYED RELEASE ORAL at 09:50

## 2023-01-01 RX ADMIN — DILTIAZEM HYDROCHLORIDE 60 MG: 60 TABLET, FILM COATED ORAL at 01:01

## 2023-01-01 RX ADMIN — METFORMIN HYDROCHLORIDE 500 MG: 500 TABLET ORAL at 07:52

## 2023-01-01 RX ADMIN — MIRTAZAPINE 15 MG: 15 TABLET, FILM COATED ORAL at 21:39

## 2023-01-01 RX ADMIN — DILTIAZEM HYDROCHLORIDE 60 MG: 60 TABLET, FILM COATED ORAL at 08:52

## 2023-01-01 RX ADMIN — MICONAZOLE NITRATE: 20 CREAM TOPICAL at 07:54

## 2023-01-01 RX ADMIN — APIXABAN 5 MG: 5 TABLET, FILM COATED ORAL at 07:47

## 2023-01-01 RX ADMIN — DILTIAZEM HYDROCHLORIDE 60 MG: 60 TABLET, FILM COATED ORAL at 17:51

## 2023-01-01 RX ADMIN — MICONAZOLE NITRATE: 20 CREAM TOPICAL at 08:53

## 2023-01-01 RX ADMIN — ATORVASTATIN CALCIUM 20 MG: 20 TABLET, FILM COATED ORAL at 21:21

## 2023-01-01 RX ADMIN — APIXABAN 5 MG: 2.5 TABLET, FILM COATED ORAL at 19:53

## 2023-01-01 RX ADMIN — APIXABAN 5 MG: 5 TABLET, FILM COATED ORAL at 20:32

## 2023-01-01 RX ADMIN — METFORMIN HYDROCHLORIDE 1000 MG: 500 TABLET ORAL at 17:44

## 2023-01-01 RX ADMIN — METFORMIN HYDROCHLORIDE 500 MG: 500 TABLET ORAL at 17:51

## 2023-01-01 RX ADMIN — DILTIAZEM HYDROCHLORIDE 60 MG: 60 TABLET, FILM COATED ORAL at 10:53

## 2023-01-01 RX ADMIN — AMOXICILLIN AND CLAVULANATE POTASSIUM 875 MG: 400; 57 POWDER, FOR SUSPENSION ORAL at 21:42

## 2023-01-01 RX ADMIN — APIXABAN 5 MG: 5 TABLET, FILM COATED ORAL at 21:45

## 2023-01-01 RX ADMIN — INSULIN ASPART 1 UNITS: 100 INJECTION, SOLUTION INTRAVENOUS; SUBCUTANEOUS at 12:20

## 2023-01-01 RX ADMIN — ATORVASTATIN CALCIUM 20 MG: 20 TABLET, FILM COATED ORAL at 21:46

## 2023-01-01 RX ADMIN — DILTIAZEM HYDROCHLORIDE 60 MG: 60 TABLET, FILM COATED ORAL at 17:43

## 2023-01-01 RX ADMIN — SODIUM CHLORIDE, POTASSIUM CHLORIDE, SODIUM LACTATE AND CALCIUM CHLORIDE 500 ML: 600; 310; 30; 20 INJECTION, SOLUTION INTRAVENOUS at 19:07

## 2023-01-01 RX ADMIN — INSULIN ASPART 1 UNITS: 100 INJECTION, SOLUTION INTRAVENOUS; SUBCUTANEOUS at 04:23

## 2023-01-01 RX ADMIN — INSULIN ASPART 1 UNITS: 100 INJECTION, SOLUTION INTRAVENOUS; SUBCUTANEOUS at 17:08

## 2023-01-01 RX ADMIN — GABAPENTIN 600 MG: 250 SOLUTION ORAL at 21:20

## 2023-01-01 RX ADMIN — Medication 1 CAPSULE: at 21:23

## 2023-01-01 RX ADMIN — ATORVASTATIN CALCIUM 20 MG: 20 TABLET, FILM COATED ORAL at 22:20

## 2023-01-01 RX ADMIN — DILTIAZEM HYDROCHLORIDE 60 MG: 60 TABLET, FILM COATED ORAL at 06:33

## 2023-01-01 RX ADMIN — ATORVASTATIN CALCIUM 20 MG: 20 TABLET, FILM COATED ORAL at 01:48

## 2023-01-01 RX ADMIN — METFORMIN HYDROCHLORIDE 1000 MG: 500 TABLET ORAL at 08:39

## 2023-01-01 RX ADMIN — APIXABAN 5 MG: 2.5 TABLET, FILM COATED ORAL at 08:05

## 2023-01-01 RX ADMIN — MIRTAZAPINE 15 MG: 15 TABLET, FILM COATED ORAL at 22:01

## 2023-01-01 RX ADMIN — GABAPENTIN 600 MG: 250 SOLUTION ORAL at 22:03

## 2023-01-01 RX ADMIN — Medication 1 CAPSULE: at 08:52

## 2023-01-01 RX ADMIN — ALBUTEROL SULFATE 2.5 MG: 2.5 SOLUTION RESPIRATORY (INHALATION) at 19:50

## 2023-01-01 RX ADMIN — ATORVASTATIN CALCIUM 20 MG: 20 TABLET, FILM COATED ORAL at 20:49

## 2023-01-01 RX ADMIN — APIXABAN 5 MG: 5 TABLET, FILM COATED ORAL at 21:26

## 2023-01-01 RX ADMIN — FLUTICASONE PROPIONATE 1 SPRAY: 50 SPRAY, METERED NASAL at 08:28

## 2023-01-01 RX ADMIN — DILTIAZEM HYDROCHLORIDE 60 MG: 60 TABLET, FILM COATED ORAL at 17:42

## 2023-01-01 RX ADMIN — CEFTRIAXONE SODIUM 1 G: 1 INJECTION, POWDER, FOR SOLUTION INTRAMUSCULAR; INTRAVENOUS at 13:28

## 2023-01-01 RX ADMIN — GABAPENTIN 600 MG: 250 SOLUTION ORAL at 20:55

## 2023-01-01 RX ADMIN — Medication 1 CAPSULE: at 09:03

## 2023-01-01 RX ADMIN — ACETAMINOPHEN 650 MG: 160 SOLUTION ORAL at 02:10

## 2023-01-01 RX ADMIN — FLUTICASONE PROPIONATE 1 SPRAY: 50 SPRAY, METERED NASAL at 08:46

## 2023-01-01 RX ADMIN — Medication 40 MG: at 10:13

## 2023-01-01 RX ADMIN — APIXABAN 5 MG: 5 TABLET, FILM COATED ORAL at 08:49

## 2023-01-01 RX ADMIN — ASPIRIN 325 MG ORAL TABLET 325 MG: 325 PILL ORAL at 10:15

## 2023-01-01 RX ADMIN — FLUTICASONE PROPIONATE 1 SPRAY: 50 SPRAY, METERED NASAL at 09:58

## 2023-01-01 RX ADMIN — LIDOCAINE 2 PATCH: 560 PATCH PERCUTANEOUS; TOPICAL; TRANSDERMAL at 08:31

## 2023-01-01 RX ADMIN — DILTIAZEM HYDROCHLORIDE 60 MG: 60 TABLET, FILM COATED ORAL at 01:20

## 2023-01-01 RX ADMIN — METFORMIN HYDROCHLORIDE 500 MG: 500 TABLET ORAL at 09:29

## 2023-01-01 ASSESSMENT — ACTIVITIES OF DAILY LIVING (ADL)
ADLS_ACUITY_SCORE: 57
ADLS_ACUITY_SCORE: 54
ADLS_ACUITY_SCORE: 43
ADLS_ACUITY_SCORE: 57
ADLS_ACUITY_SCORE: 46
ADLS_ACUITY_SCORE: 46
ADLS_ACUITY_SCORE: 48
ADLS_ACUITY_SCORE: 61
ADLS_ACUITY_SCORE: 26
ADLS_ACUITY_SCORE: 54
ADLS_ACUITY_SCORE: 42
ADLS_ACUITY_SCORE: 34
ADLS_ACUITY_SCORE: 55
ADLS_ACUITY_SCORE: 32
ADLS_ACUITY_SCORE: 52
ADLS_ACUITY_SCORE: 22
ADLS_ACUITY_SCORE: 41
ADLS_ACUITY_SCORE: 32
ADLS_ACUITY_SCORE: 52
ADLS_ACUITY_SCORE: 46
ADLS_ACUITY_SCORE: 50
ADLS_ACUITY_SCORE: 46
ADLS_ACUITY_SCORE: 43
ADLS_ACUITY_SCORE: 34
ADLS_ACUITY_SCORE: 36
ADLS_ACUITY_SCORE: 50
ADLS_ACUITY_SCORE: 57
DEPENDENT_IADLS:: INCONTINENCE;TRANSPORTATION
ADLS_ACUITY_SCORE: 32
ADLS_ACUITY_SCORE: 52
ADLS_ACUITY_SCORE: 45
ADLS_ACUITY_SCORE: 22
WALKING_OR_CLIMBING_STAIRS: OTHER (SEE COMMENTS)
ADLS_ACUITY_SCORE: 61
ADLS_ACUITY_SCORE: 50
ADLS_ACUITY_SCORE: 44
ADLS_ACUITY_SCORE: 52
ADLS_ACUITY_SCORE: 34
ADLS_ACUITY_SCORE: 34
ADLS_ACUITY_SCORE: 22
ADLS_ACUITY_SCORE: 52
ADLS_ACUITY_SCORE: 44
ADLS_ACUITY_SCORE: 57
ADLS_ACUITY_SCORE: 41
ADLS_ACUITY_SCORE: 47
ADLS_ACUITY_SCORE: 55
ADLS_ACUITY_SCORE: 34
ADLS_ACUITY_SCORE: 52
ADLS_ACUITY_SCORE: 22
ADLS_ACUITY_SCORE: 24
ADLS_ACUITY_SCORE: 52
ADLS_ACUITY_SCORE: 36
ADLS_ACUITY_SCORE: 54
ADLS_ACUITY_SCORE: 47
FALL_HISTORY_WITHIN_LAST_SIX_MONTHS: YES
ADLS_ACUITY_SCORE: 59
ADLS_ACUITY_SCORE: 46
ADLS_ACUITY_SCORE: 46
ADLS_ACUITY_SCORE: 43
ADLS_ACUITY_SCORE: 43
TOILETING_ISSUES: NO
ADLS_ACUITY_SCORE: 34
ADLS_ACUITY_SCORE: 34
ADLS_ACUITY_SCORE: 52
ADLS_ACUITY_SCORE: 34
ADLS_ACUITY_SCORE: 39
ADLS_ACUITY_SCORE: 47
ADLS_ACUITY_SCORE: 46
WALKING_OR_CLIMBING_STAIRS_DIFFICULTY: YES
ADLS_ACUITY_SCORE: 50
ADLS_ACUITY_SCORE: 32
ADLS_ACUITY_SCORE: 46
ADLS_ACUITY_SCORE: 52
ADLS_ACUITY_SCORE: 34
ADLS_ACUITY_SCORE: 50
ADLS_ACUITY_SCORE: 26
ADLS_ACUITY_SCORE: 46
ADLS_ACUITY_SCORE: 50
ADLS_ACUITY_SCORE: 47
ADLS_ACUITY_SCORE: 46
ADLS_ACUITY_SCORE: 52
ADLS_ACUITY_SCORE: 52
ADLS_ACUITY_SCORE: 50
ADLS_ACUITY_SCORE: 50
ADLS_ACUITY_SCORE: 57
ADLS_ACUITY_SCORE: 53
CHANGE_IN_FUNCTIONAL_STATUS_SINCE_ONSET_OF_CURRENT_ILLNESS/INJURY: YES
ADLS_ACUITY_SCORE: 51
ADLS_ACUITY_SCORE: 32
ADLS_ACUITY_SCORE: 46
ADLS_ACUITY_SCORE: 34
ADLS_ACUITY_SCORE: 34
ADLS_ACUITY_SCORE: 40
ADLS_ACUITY_SCORE: 55
ADLS_ACUITY_SCORE: 26
ADLS_ACUITY_SCORE: 22
ADLS_ACUITY_SCORE: 61
ADLS_ACUITY_SCORE: 43
ADLS_ACUITY_SCORE: 54
ADLS_ACUITY_SCORE: 42
ADLS_ACUITY_SCORE: 46
ADLS_ACUITY_SCORE: 34
ADLS_ACUITY_SCORE: 35
ADLS_ACUITY_SCORE: 50
ADLS_ACUITY_SCORE: 52
ADLS_ACUITY_SCORE: 43
ADLS_ACUITY_SCORE: 57
ADLS_ACUITY_SCORE: 50
ADLS_ACUITY_SCORE: 46
ADLS_ACUITY_SCORE: 57
ADLS_ACUITY_SCORE: 34
ADLS_ACUITY_SCORE: 46
ADLS_ACUITY_SCORE: 48
ADLS_ACUITY_SCORE: 50
ADLS_ACUITY_SCORE: 32
WALKING_OR_CLIMBING_STAIRS_DIFFICULTY: YES
ADLS_ACUITY_SCORE: 46
ADLS_ACUITY_SCORE: 22
ADLS_ACUITY_SCORE: 43
ADLS_ACUITY_SCORE: 57
ADLS_ACUITY_SCORE: 44
ADLS_ACUITY_SCORE: 52
ADLS_ACUITY_SCORE: 57
ADLS_ACUITY_SCORE: 61
ADLS_ACUITY_SCORE: 46
ADLS_ACUITY_SCORE: 46
ADLS_ACUITY_SCORE: 41
ADLS_ACUITY_SCORE: 61
ADLS_ACUITY_SCORE: 57
ADLS_ACUITY_SCORE: 46
ADLS_ACUITY_SCORE: 57
ADLS_ACUITY_SCORE: 54
ADLS_ACUITY_SCORE: 44
ADLS_ACUITY_SCORE: 50
ADLS_ACUITY_SCORE: 54
ADLS_ACUITY_SCORE: 48
ADLS_ACUITY_SCORE: 46
ADLS_ACUITY_SCORE: 57
ADLS_ACUITY_SCORE: 52
ADLS_ACUITY_SCORE: 54
ADLS_ACUITY_SCORE: 34
ADLS_ACUITY_SCORE: 24
ADLS_ACUITY_SCORE: 55
ADLS_ACUITY_SCORE: 28
ADLS_ACUITY_SCORE: 48
FALL_HISTORY_WITHIN_LAST_SIX_MONTHS: YES
ADLS_ACUITY_SCORE: 32
ADLS_ACUITY_SCORE: 52
ADLS_ACUITY_SCORE: 34
ADLS_ACUITY_SCORE: 48
ADLS_ACUITY_SCORE: 46
ADLS_ACUITY_SCORE: 50
ADLS_ACUITY_SCORE: 50
ADLS_ACUITY_SCORE: 46
ADLS_ACUITY_SCORE: 44
ADLS_ACUITY_SCORE: 48
ADLS_ACUITY_SCORE: 50
ADLS_ACUITY_SCORE: 46
ADLS_ACUITY_SCORE: 52
ADLS_ACUITY_SCORE: 45
ADLS_ACUITY_SCORE: 46
ADLS_ACUITY_SCORE: 34
ADLS_ACUITY_SCORE: 57
ADLS_ACUITY_SCORE: 41
ADLS_ACUITY_SCORE: 43
ADLS_ACUITY_SCORE: 55
ADLS_ACUITY_SCORE: 44
ADLS_ACUITY_SCORE: 57
ADLS_ACUITY_SCORE: 48
ADLS_ACUITY_SCORE: 44
ADLS_ACUITY_SCORE: 22
ADLS_ACUITY_SCORE: 57
ADLS_ACUITY_SCORE: 39
ADLS_ACUITY_SCORE: 45
ADLS_ACUITY_SCORE: 57
ADLS_ACUITY_SCORE: 51
ADLS_ACUITY_SCORE: 57
ADLS_ACUITY_SCORE: 55
ADLS_ACUITY_SCORE: 46
ADLS_ACUITY_SCORE: 22
ADLS_ACUITY_SCORE: 59
ADLS_ACUITY_SCORE: 42
ADLS_ACUITY_SCORE: 54
ADLS_ACUITY_SCORE: 52
ADLS_ACUITY_SCORE: 43
ADLS_ACUITY_SCORE: 46
ADLS_ACUITY_SCORE: 46
ADLS_ACUITY_SCORE: 54
ADLS_ACUITY_SCORE: 52
ADLS_ACUITY_SCORE: 46
ADLS_ACUITY_SCORE: 42
ADLS_ACUITY_SCORE: 54
ADLS_ACUITY_SCORE: 48
ADLS_ACUITY_SCORE: 48
ADLS_ACUITY_SCORE: 55
ADLS_ACUITY_SCORE: 47
ADLS_ACUITY_SCORE: 32
ADLS_ACUITY_SCORE: 55
ADLS_ACUITY_SCORE: 50
ADLS_ACUITY_SCORE: 59
ADLS_ACUITY_SCORE: 54
ADLS_ACUITY_SCORE: 42
ADLS_ACUITY_SCORE: 57
ADLS_ACUITY_SCORE: 46
ADLS_ACUITY_SCORE: 48
ADLS_ACUITY_SCORE: 61
ADLS_ACUITY_SCORE: 46
ADLS_ACUITY_SCORE: 54
ADLS_ACUITY_SCORE: 46
ADLS_ACUITY_SCORE: 34
ADLS_ACUITY_SCORE: 57
ADLS_ACUITY_SCORE: 43
ADLS_ACUITY_SCORE: 54
ADLS_ACUITY_SCORE: 34
ADLS_ACUITY_SCORE: 34
ADLS_ACUITY_SCORE: 50
WALKING_OR_CLIMBING_STAIRS: OTHER (SEE COMMENTS)
ADLS_ACUITY_SCORE: 35
ADLS_ACUITY_SCORE: 46
ADLS_ACUITY_SCORE: 42
ADLS_ACUITY_SCORE: 50
ADLS_ACUITY_SCORE: 47
ADLS_ACUITY_SCORE: 59
ADLS_ACUITY_SCORE: 52
ADLS_ACUITY_SCORE: 57
ADLS_ACUITY_SCORE: 61
TOILETING_ISSUES: NO
ADLS_ACUITY_SCORE: 34
ADLS_ACUITY_SCORE: 43
ADLS_ACUITY_SCORE: 41
ADLS_ACUITY_SCORE: 32
ADLS_ACUITY_SCORE: 54
ADLS_ACUITY_SCORE: 45
ADLS_ACUITY_SCORE: 46
ADLS_ACUITY_SCORE: 50
ADLS_ACUITY_SCORE: 47
ADLS_ACUITY_SCORE: 61
ADLS_ACUITY_SCORE: 46
ADLS_ACUITY_SCORE: 44
ADLS_ACUITY_SCORE: 37
ADLS_ACUITY_SCORE: 46
ADLS_ACUITY_SCORE: 46
ADLS_ACUITY_SCORE: 43
ADLS_ACUITY_SCORE: 45
ADLS_ACUITY_SCORE: 24
EATING/SWALLOWING: OTHER (SEE COMMENTS);EATING
ADLS_ACUITY_SCORE: 34
ADLS_ACUITY_SCORE: 46
ADLS_ACUITY_SCORE: 54
ADLS_ACUITY_SCORE: 51
ADLS_ACUITY_SCORE: 22
ADLS_ACUITY_SCORE: 43
ADLS_ACUITY_SCORE: 46
ADLS_ACUITY_SCORE: 59
ADLS_ACUITY_SCORE: 50
ADLS_ACUITY_SCORE: 52
ADLS_ACUITY_SCORE: 41
ADLS_ACUITY_SCORE: 48
ADLS_ACUITY_SCORE: 52
ADLS_ACUITY_SCORE: 46
DRESSING/BATHING_DIFFICULTY: NO
ADLS_ACUITY_SCORE: 34
ADLS_ACUITY_SCORE: 47
ADLS_ACUITY_SCORE: 46
ADLS_ACUITY_SCORE: 51
ADLS_ACUITY_SCORE: 46
ADLS_ACUITY_SCORE: 54
ADLS_ACUITY_SCORE: 59
ADLS_ACUITY_SCORE: 22
ADLS_ACUITY_SCORE: 48
ADLS_ACUITY_SCORE: 52
ADLS_ACUITY_SCORE: 32
ADLS_ACUITY_SCORE: 46
ADLS_ACUITY_SCORE: 32
ADLS_ACUITY_SCORE: 46
ADLS_ACUITY_SCORE: 52
ADLS_ACUITY_SCORE: 57
ADLS_ACUITY_SCORE: 46
ADLS_ACUITY_SCORE: 44
ADLS_ACUITY_SCORE: 43
ADLS_ACUITY_SCORE: 34
ADLS_ACUITY_SCORE: 41
ADLS_ACUITY_SCORE: 48
ADLS_ACUITY_SCORE: 35
ADLS_ACUITY_SCORE: 45
ADLS_ACUITY_SCORE: 34
ADLS_ACUITY_SCORE: 46
ADLS_ACUITY_SCORE: 34
ADLS_ACUITY_SCORE: 57
ADLS_ACUITY_SCORE: 46
ADLS_ACUITY_SCORE: 61
ADLS_ACUITY_SCORE: 50
ADLS_ACUITY_SCORE: 46
ADLS_ACUITY_SCORE: 43
ADLS_ACUITY_SCORE: 46
ADLS_ACUITY_SCORE: 43
ADLS_ACUITY_SCORE: 22
ADLS_ACUITY_SCORE: 46
ADLS_ACUITY_SCORE: 46
ADLS_ACUITY_SCORE: 55
ADLS_ACUITY_SCORE: 24
ADLS_ACUITY_SCORE: 34
ADLS_ACUITY_SCORE: 50
ADLS_ACUITY_SCORE: 46
ADLS_ACUITY_SCORE: 36
WEAR_GLASSES_OR_BLIND: NO
ADLS_ACUITY_SCORE: 52
ADLS_ACUITY_SCORE: 46
ADLS_ACUITY_SCORE: 44
ADLS_ACUITY_SCORE: 47
ADLS_ACUITY_SCORE: 57
ADLS_ACUITY_SCORE: 55
ADLS_ACUITY_SCORE: 34
ADLS_ACUITY_SCORE: 52
ADLS_ACUITY_SCORE: 34
ADLS_ACUITY_SCORE: 43
WEAR_GLASSES_OR_BLIND: NO
ADLS_ACUITY_SCORE: 34
ADLS_ACUITY_SCORE: 61
ADLS_ACUITY_SCORE: 50
ADLS_ACUITY_SCORE: 24
ADLS_ACUITY_SCORE: 54
ADLS_ACUITY_SCORE: 52
ADLS_ACUITY_SCORE: 41
ADLS_ACUITY_SCORE: 59
ADLS_ACUITY_SCORE: 41
ADLS_ACUITY_SCORE: 46
ADLS_ACUITY_SCORE: 40
ADLS_ACUITY_SCORE: 50
ADLS_ACUITY_SCORE: 55
ADLS_ACUITY_SCORE: 45
ADLS_ACUITY_SCORE: 61
ADLS_ACUITY_SCORE: 57
ADLS_ACUITY_SCORE: 47
ADLS_ACUITY_SCORE: 34
ADLS_ACUITY_SCORE: 50
ADLS_ACUITY_SCORE: 22
ADLS_ACUITY_SCORE: 47
ADLS_ACUITY_SCORE: 44
ADLS_ACUITY_SCORE: 34
ADLS_ACUITY_SCORE: 50
ADLS_ACUITY_SCORE: 61
ADLS_ACUITY_SCORE: 44
ADLS_ACUITY_SCORE: 61
ADLS_ACUITY_SCORE: 61
ADLS_ACUITY_SCORE: 52
ADLS_ACUITY_SCORE: 47
ADLS_ACUITY_SCORE: 46
NUMBER_OF_TIMES_PATIENT_HAS_FALLEN_WITHIN_LAST_SIX_MONTHS: 1
ADLS_ACUITY_SCORE: 40
ADLS_ACUITY_SCORE: 55
ADLS_ACUITY_SCORE: 50
ADLS_ACUITY_SCORE: 57
ADLS_ACUITY_SCORE: 46
ADLS_ACUITY_SCORE: 41
ADLS_ACUITY_SCORE: 34
ADLS_ACUITY_SCORE: 24
ADLS_ACUITY_SCORE: 52
ADLS_ACUITY_SCORE: 33
ADLS_ACUITY_SCORE: 55
NUMBER_OF_TIMES_PATIENT_HAS_FALLEN_WITHIN_LAST_SIX_MONTHS: 2
COMMUNICATION: DIFFICULTY SPEAKING
DOING_ERRANDS_INDEPENDENTLY_DIFFICULTY: NO
ADLS_ACUITY_SCORE: 59
ADLS_ACUITY_SCORE: 26
DIFFICULTY_COMMUNICATING: NO
DIFFICULTY_EATING/SWALLOWING: YES
ADLS_ACUITY_SCORE: 46
ADLS_ACUITY_SCORE: 36
ADLS_ACUITY_SCORE: 22
ADLS_ACUITY_SCORE: 22
ADLS_ACUITY_SCORE: 57
ADLS_ACUITY_SCORE: 45
ADLS_ACUITY_SCORE: 51
ADLS_ACUITY_SCORE: 52
ADLS_ACUITY_SCORE: 46
NUMBER_OF_TIMES_PATIENT_HAS_FALLEN_WITHIN_LAST_SIX_MONTHS: 1
ADLS_ACUITY_SCORE: 34
ADLS_ACUITY_SCORE: 50
ADLS_ACUITY_SCORE: 50
ADLS_ACUITY_SCORE: 43
ADLS_ACUITY_SCORE: 52
ADLS_ACUITY_SCORE: 46
ADLS_ACUITY_SCORE: 46
ADLS_ACUITY_SCORE: 55
ADLS_ACUITY_SCORE: 54
ADLS_ACUITY_SCORE: 44
ADLS_ACUITY_SCORE: 51
ADLS_ACUITY_SCORE: 52
ADLS_ACUITY_SCORE: 54
ADLS_ACUITY_SCORE: 34
ADLS_ACUITY_SCORE: 55
ADLS_ACUITY_SCORE: 43
ADLS_ACUITY_SCORE: 34
ADLS_ACUITY_SCORE: 52
ADLS_ACUITY_SCORE: 22
ADLS_ACUITY_SCORE: 34
ADLS_ACUITY_SCORE: 46
ADLS_ACUITY_SCORE: 43
ADLS_ACUITY_SCORE: 46
ADLS_ACUITY_SCORE: 57
ADLS_ACUITY_SCORE: 52
ADLS_ACUITY_SCORE: 45
ADLS_ACUITY_SCORE: 22
ADLS_ACUITY_SCORE: 41
ADLS_ACUITY_SCORE: 52
ADLS_ACUITY_SCORE: 46
ADLS_ACUITY_SCORE: 59
ADLS_ACUITY_SCORE: 50
ADLS_ACUITY_SCORE: 26
ADLS_ACUITY_SCORE: 50
ADLS_ACUITY_SCORE: 55
ADLS_ACUITY_SCORE: 43
ADLS_ACUITY_SCORE: 34
ADLS_ACUITY_SCORE: 32
ADLS_ACUITY_SCORE: 24
ADLS_ACUITY_SCORE: 45
ADLS_ACUITY_SCORE: 46
ADLS_ACUITY_SCORE: 50
ADLS_ACUITY_SCORE: 46
ADLS_ACUITY_SCORE: 34
ADLS_ACUITY_SCORE: 45
ADLS_ACUITY_SCORE: 43
ADLS_ACUITY_SCORE: 52
ADLS_ACUITY_SCORE: 50
ADLS_ACUITY_SCORE: 55
ADLS_ACUITY_SCORE: 46
ADLS_ACUITY_SCORE: 44
ADLS_ACUITY_SCORE: 52
ADLS_ACUITY_SCORE: 48
FALL_HISTORY_WITHIN_LAST_SIX_MONTHS: YES
ADLS_ACUITY_SCORE: 55
ADLS_ACUITY_SCORE: 34
ADLS_ACUITY_SCORE: 41
ADLS_ACUITY_SCORE: 52
ADLS_ACUITY_SCORE: 40
ADLS_ACUITY_SCORE: 55
ADLS_ACUITY_SCORE: 61
DOING_ERRANDS_INDEPENDENTLY_DIFFICULTY: NO
ADLS_ACUITY_SCORE: 47
ADLS_ACUITY_SCORE: 52
ADLS_ACUITY_SCORE: 46
DIFFICULTY_COMMUNICATING: NO
ADLS_ACUITY_SCORE: 50
ADLS_ACUITY_SCORE: 34
ADLS_ACUITY_SCORE: 37
ADLS_ACUITY_SCORE: 47
ADLS_ACUITY_SCORE: 48
ADLS_ACUITY_SCORE: 40
ADLS_ACUITY_SCORE: 55
ADLS_ACUITY_SCORE: 34
ADLS_ACUITY_SCORE: 59
ADLS_ACUITY_SCORE: 52
ADLS_ACUITY_SCORE: 41
WALKING_OR_CLIMBING_STAIRS_DIFFICULTY: NO
ADLS_ACUITY_SCORE: 50
ADLS_ACUITY_SCORE: 46
ADLS_ACUITY_SCORE: 46
ADLS_ACUITY_SCORE: 40
ADLS_ACUITY_SCORE: 32
ADLS_ACUITY_SCORE: 41
ADLS_ACUITY_SCORE: 44
ADLS_ACUITY_SCORE: 52
ADLS_ACUITY_SCORE: 44
ADLS_ACUITY_SCORE: 47
ADLS_ACUITY_SCORE: 51
HEARING_DIFFICULTY_OR_DEAF: NO
ADLS_ACUITY_SCORE: 46
ADLS_ACUITY_SCORE: 52
ADLS_ACUITY_SCORE: 52
ADLS_ACUITY_SCORE: 44
ADLS_ACUITY_SCORE: 57
ADLS_ACUITY_SCORE: 37
ADLS_ACUITY_SCORE: 22
ADLS_ACUITY_SCORE: 47
ADLS_ACUITY_SCORE: 45
ADLS_ACUITY_SCORE: 24
ADLS_ACUITY_SCORE: 34
ADLS_ACUITY_SCORE: 54
ADLS_ACUITY_SCORE: 46
ADLS_ACUITY_SCORE: 43
ADLS_ACUITY_SCORE: 50
ADLS_ACUITY_SCORE: 40
ADLS_ACUITY_SCORE: 52
ADLS_ACUITY_SCORE: 43
ADLS_ACUITY_SCORE: 32
ADLS_ACUITY_SCORE: 46
ADLS_ACUITY_SCORE: 50
ADLS_ACUITY_SCORE: 46
TOILETING_ISSUES: NO
ADLS_ACUITY_SCORE: 35
ADLS_ACUITY_SCORE: 61
ADLS_ACUITY_SCORE: 34
ADLS_ACUITY_SCORE: 52
ADLS_ACUITY_SCORE: 33
ADLS_ACUITY_SCORE: 57
ADLS_ACUITY_SCORE: 59
CHANGE_IN_FUNCTIONAL_STATUS_SINCE_ONSET_OF_CURRENT_ILLNESS/INJURY: YES
ADLS_ACUITY_SCORE: 41
ADLS_ACUITY_SCORE: 52
DIFFICULTY_EATING/SWALLOWING: NO
ADLS_ACUITY_SCORE: 57
ADLS_ACUITY_SCORE: 51
ADLS_ACUITY_SCORE: 47
ADLS_ACUITY_SCORE: 41
ADLS_ACUITY_SCORE: 46
ADLS_ACUITY_SCORE: 46
ADLS_ACUITY_SCORE: 43
ADLS_ACUITY_SCORE: 46
ADLS_ACUITY_SCORE: 52
ADLS_ACUITY_SCORE: 57
ADLS_ACUITY_SCORE: 52
ADLS_ACUITY_SCORE: 55
ADLS_ACUITY_SCORE: 52
ADLS_ACUITY_SCORE: 47
ADLS_ACUITY_SCORE: 55
ADLS_ACUITY_SCORE: 46
ADLS_ACUITY_SCORE: 46
ADLS_ACUITY_SCORE: 50
ADLS_ACUITY_SCORE: 51
ADLS_ACUITY_SCORE: 52
ADLS_ACUITY_SCORE: 43
ADLS_ACUITY_SCORE: 46
ADLS_ACUITY_SCORE: 55
ADLS_ACUITY_SCORE: 22
ADLS_ACUITY_SCORE: 61
ADLS_ACUITY_SCORE: 46
ADLS_ACUITY_SCORE: 52
CONCENTRATING,_REMEMBERING_OR_MAKING_DECISIONS_DIFFICULTY: NO
ADLS_ACUITY_SCORE: 48
ADLS_ACUITY_SCORE: 32
ADLS_ACUITY_SCORE: 54
ADLS_ACUITY_SCORE: 59
ADLS_ACUITY_SCORE: 41
ADLS_ACUITY_SCORE: 46
ADLS_ACUITY_SCORE: 52
ADLS_ACUITY_SCORE: 52
ADLS_ACUITY_SCORE: 37
DRESSING/BATHING_DIFFICULTY: NO
ADLS_ACUITY_SCORE: 46
ADLS_ACUITY_SCORE: 50
ADLS_ACUITY_SCORE: 22
ADLS_ACUITY_SCORE: 43
ADLS_ACUITY_SCORE: 57
ADLS_ACUITY_SCORE: 50
ADLS_ACUITY_SCORE: 50
CONCENTRATING,_REMEMBERING_OR_MAKING_DECISIONS_DIFFICULTY: NO
ADLS_ACUITY_SCORE: 40
ADLS_ACUITY_SCORE: 36
ADLS_ACUITY_SCORE: 59
ADLS_ACUITY_SCORE: 50
ADLS_ACUITY_SCORE: 46
ADLS_ACUITY_SCORE: 43
DRESSING/BATHING_DIFFICULTY: NO
ADLS_ACUITY_SCORE: 55
ADLS_ACUITY_SCORE: 52
ADLS_ACUITY_SCORE: 24
ADLS_ACUITY_SCORE: 42
ADLS_ACUITY_SCORE: 45
ADLS_ACUITY_SCORE: 32
ADLS_ACUITY_SCORE: 54
ADLS_ACUITY_SCORE: 54
ADLS_ACUITY_SCORE: 34
ADLS_ACUITY_SCORE: 61
ADLS_ACUITY_SCORE: 47
ADLS_ACUITY_SCORE: 57
ADLS_ACUITY_SCORE: 52
ADLS_ACUITY_SCORE: 34
ADLS_ACUITY_SCORE: 44
ADLS_ACUITY_SCORE: 52
CONCENTRATING,_REMEMBERING_OR_MAKING_DECISIONS_DIFFICULTY: NO
ADLS_ACUITY_SCORE: 43
ADLS_ACUITY_SCORE: 55
ADLS_ACUITY_SCORE: 41
ADLS_ACUITY_SCORE: 34
ADLS_ACUITY_SCORE: 34
ADLS_ACUITY_SCORE: 36
ADLS_ACUITY_SCORE: 48
ADLS_ACUITY_SCORE: 57
ADLS_ACUITY_SCORE: 52
ADLS_ACUITY_SCORE: 35
ADLS_ACUITY_SCORE: 52
ADLS_ACUITY_SCORE: 61
ADLS_ACUITY_SCORE: 41
ADLS_ACUITY_SCORE: 54
ADLS_ACUITY_SCORE: 50
ADLS_ACUITY_SCORE: 46
ADLS_ACUITY_SCORE: 54
ADLS_ACUITY_SCORE: 57
WEAR_GLASSES_OR_BLIND: NO
DIFFICULTY_EATING/SWALLOWING: YES
ADLS_ACUITY_SCORE: 50
ADLS_ACUITY_SCORE: 46
ADLS_ACUITY_SCORE: 46
ADLS_ACUITY_SCORE: 32
ADLS_ACUITY_SCORE: 43
ADLS_ACUITY_SCORE: 22
ADLS_ACUITY_SCORE: 54
ADLS_ACUITY_SCORE: 44
ADLS_ACUITY_SCORE: 47
ADLS_ACUITY_SCORE: 54
ADLS_ACUITY_SCORE: 34
CHANGE_IN_FUNCTIONAL_STATUS_SINCE_ONSET_OF_CURRENT_ILLNESS/INJURY: YES
ADLS_ACUITY_SCORE: 43
EATING/SWALLOWING: EATING
ADLS_ACUITY_SCORE: 48
ADLS_ACUITY_SCORE: 46
ADLS_ACUITY_SCORE: 59
ADLS_ACUITY_SCORE: 46
ADLS_ACUITY_SCORE: 43
ADLS_ACUITY_SCORE: 44
ADLS_ACUITY_SCORE: 45
ADLS_ACUITY_SCORE: 32
ADLS_ACUITY_SCORE: 50
ADLS_ACUITY_SCORE: 35
ADLS_ACUITY_SCORE: 59
ADLS_ACUITY_SCORE: 59
PREVIOUS_RESPONSIBILITIES: MEAL PREP;HOUSEKEEPING;LAUNDRY;SHOPPING;YARDWORK;MEDICATION MANAGEMENT;FINANCES;DRIVING
ADLS_ACUITY_SCORE: 55
ADLS_ACUITY_SCORE: 46
ADLS_ACUITY_SCORE: 32
ADLS_ACUITY_SCORE: 43
ADLS_ACUITY_SCORE: 47
ADLS_ACUITY_SCORE: 22
ADLS_ACUITY_SCORE: 43
ADLS_ACUITY_SCORE: 54
ADLS_ACUITY_SCORE: 46
ADLS_ACUITY_SCORE: 46
ADLS_ACUITY_SCORE: 57
ADLS_ACUITY_SCORE: 57
ADLS_ACUITY_SCORE: 41
ADLS_ACUITY_SCORE: 52
ADLS_ACUITY_SCORE: 44
ADLS_ACUITY_SCORE: 43
ADLS_ACUITY_SCORE: 44
ADLS_ACUITY_SCORE: 55
ADLS_ACUITY_SCORE: 26
ADLS_ACUITY_SCORE: 52
ADLS_ACUITY_SCORE: 46
ADLS_ACUITY_SCORE: 34
ADLS_ACUITY_SCORE: 48
ADLS_ACUITY_SCORE: 57
ADLS_ACUITY_SCORE: 55
ADLS_ACUITY_SCORE: 33
ADLS_ACUITY_SCORE: 47
ADLS_ACUITY_SCORE: 55
ADLS_ACUITY_SCORE: 46
ADLS_ACUITY_SCORE: 50
ADLS_ACUITY_SCORE: 54
ADLS_ACUITY_SCORE: 32
ADLS_ACUITY_SCORE: 53
ADLS_ACUITY_SCORE: 57
ADLS_ACUITY_SCORE: 46
ADLS_ACUITY_SCORE: 46
ADLS_ACUITY_SCORE: 32
ADLS_ACUITY_SCORE: 55
DOING_ERRANDS_INDEPENDENTLY_DIFFICULTY: NO
ADLS_ACUITY_SCORE: 44
ADLS_ACUITY_SCORE: 24
ADLS_ACUITY_SCORE: 32
ADLS_ACUITY_SCORE: 57

## 2023-01-01 ASSESSMENT — COLUMBIA-SUICIDE SEVERITY RATING SCALE - C-SSRS
6. HAVE YOU EVER DONE ANYTHING, STARTED TO DO ANYTHING, OR PREPARED TO DO ANYTHING TO END YOUR LIFE?: NO
4. HAVE YOU HAD THESE THOUGHTS AND HAD SOME INTENTION OF ACTING ON THEM?: NO
3. HAVE YOU BEEN THINKING ABOUT HOW YOU MIGHT KILL YOURSELF?: NO
2. HAVE YOU ACTUALLY HAD ANY THOUGHTS OF KILLING YOURSELF IN THE PAST MONTH?: NO
1. IN THE PAST MONTH, HAVE YOU WISHED YOU WERE DEAD OR WISHED YOU COULD GO TO SLEEP AND NOT WAKE UP?: NO
5. HAVE YOU STARTED TO WORK OUT OR WORKED OUT THE DETAILS OF HOW TO KILL YOURSELF? DO YOU INTEND TO CARRY OUT THIS PLAN?: NO

## 2023-01-10 NOTE — TELEPHONE ENCOUNTER
ANTICOAGULATION     Stefan Diallo is overdue for INR check.      Spoke with Stefan who declined to schedule a lab appointment at this time. If calling back please schedule as soon as possible.    Lucía Hardin RN

## 2023-01-11 NOTE — TELEPHONE ENCOUNTER
Reason for Call:  Other call back    Detailed comments: Patient called stating he is having an procedure on the 18th and he will schedule and INR appointment after that .    Phone Number Patient can be reached at: Home number on file [phone] (home)    Best Time: anytime    Can we leave a detailed message on this number? YES    Call taken on 1/11/2023 at 11:39 AM by Rhonda Olmos

## 2023-01-16 NOTE — TELEPHONE ENCOUNTER
Medication refill queued and pended. Prescriber please review, sign, and send if appropriate. Thank you.

## 2023-01-17 NOTE — TELEPHONE ENCOUNTER
ANTICOAGULATION     Stefan Diallo is overdue for INR check.      He is having procedure tomorrow so is currently holding warfarin. I asked him to come for INR on 1/24 or 1/25 and he said he would think about it.    Lucía Hardin RN

## 2023-01-20 NOTE — ED TRIAGE NOTES
Patient reports that while he was walking he developed bilateral leg weakness/numbness.  Pt has procedure 2 days ago, injection in back, with plans to have implant placed.  Pt was able to stand to get out of car, just difficulties moving feet.

## 2023-01-20 NOTE — ED PROVIDER NOTES
"EMERGENCY DEPARTMENT ENCOUNTER      NAME: Stefan Diallo  AGE: 77 year old male  YOB: 1945  MRN: [id number]  EVALUATION DATE & TIME: No admission date for patient encounter.    PCP: Collin Singh    ED PROVIDER: Georgina Barnhart PA-C      Chief Complaint   Patient presents with     Foot Problems     Numbness         FINAL IMPRESSION:  1. Supratherapeutic INR    2. Bilateral leg weakness    3. Paresthesias    4. Chronic midline low back pain, unspecified whether sciatica present          ED COURSE & MEDICAL DECISION MAKIN:04 PM I introduced myself to patient, performed initial HPI and examination.   5:31 PM Reexamined patient on a bed, notable weakness in left leg but no upper extremity deficits.   6:03 PM staffed patient with Dr. Rodas.   6:48 PM Patient reporting itching after pain medicine, benadryl ordered.   11:21 PM MRI resulted, unremarkable. Patient sleeping comfortably. Updated patient on results, feels leg has \"loosened up.\" Will try to ambulate with walker. If passes ambulatory trial, will plan to discharge home vs admit for observation.   11:51 PM Patient ambulated well in the emergency department, feels safe and comfortable with discharge to home.         77 year old male with PMH chronic atrial fibrillation, type 2 diabetes, chronic lower back pain presents to the Emergency Department for evaluation of leg weakness, numbness and heaviness L>R but bilaterally. Does have baseline left leg neuropathy. Reports numbness fingertips left finger but objective sensation intact, no focal deficits to upper extremities on examination. No other neurologic deficits. Recent spinal injection x 2 days ago at an [facility]. Does not take chronic narcotics. Is on warfarin, held warfarin for procedure.     Differential includes epidural hematoma, epidural abscess, cauda equina, and others. Not consistent with CVA.     VSS, afebrile.   Exam with paresthesias to left leg (neuropathy at " baseline, unclear if this is at his baseline but does follow typical distribution). Can move lower extremity, dorsiflexion and plantarflexion strength intact but unable to lift left leg up against gravity. Right leg grossly unremarkable.   Labs with no leukocytosis, no anemia. No notable electrolyte derangement. Creatinine is WNL. ESR and CRP grossly unremarkable (ESR 16, CRP 3.2). INR slightly supratherapeutic (INR 3.27).     Given weakness and recent procedure, MRI obtained. MRI shows post operative changes, no other concerning findings.     Patient ultimately able to ambulate in the emergency department with walker. Suspect symptoms related to his baseline peripheral neuropathy, chronic back pain. No cauda equina epidural abscess or hematoma or other catastrophic etiology. With patient being able to ambulate and comfortable returning home with no acute findings on work-up tonight, no indication for admission. Patient will be discharged to home with close follow up. Instructed on red flags/indications to return to the emergency department. All questions were answered to the best of my ability and patient is agreeable with plan.      Medical Decision Making    History:    Supplemental history from: Documented in chart, if applicable    External Record(s) reviewed: Documented in chart, if applicable.    Work Up:    Chart documentation includes differential considered and any EKGs or imaging independently interpreted by provider.    In additional to work up documented, I considered the following work up: Documented in chart, if applicable.    External consultation:    Discussion of management with another provider: Documented in chart, if applicable    Complicating factors:    Care impacted by chronic illness: Anticoagulated State, Diabetes and Other: Peripheral neuropathy, Chronic back pain    Care affected by social determinants of health: N/A    Disposition considerations: Discharge. No recommendations on  "prescription strength medication(s). Admission consideration documented above, if applicable.      MEDICATIONS GIVEN IN THE EMERGENCY:  Medications   morphine (PF) injection 4 mg (4 mg Intravenous Given 1/20/23 1754)   ondansetron (ZOFRAN) injection 4 mg (4 mg Intravenous Given 1/20/23 1754)   diphenhydrAMINE (BENADRYL) injection 25 mg (25 mg Intravenous Given 1/20/23 1905)   LORazepam (ATIVAN) injection 0.5 mg (0.5 mg Intravenous Given 1/20/23 1905)   gadobutrol (GADAVIST) injection 10 mL (10 mLs Intravenous Given 1/20/23 2222)       NEW PRESCRIPTIONS STARTED AT TODAY'S ER VISIT  New Prescriptions    No medications on file          =================================================================    HPI    Patient information was obtained from: Patient    Use of : N/A        Stefan Diallo is a 77 year old male with a pertinent history of chronic atrial fibrillation, type 2 diabetes, chronic lower back pain who presents to this ED with family member for evaluation of leg weakness. Reports recent back injection through pain clinic in [place] x 2 days ago for back pain and planning for implantation placement. Today at 1500 patient developed \"heaviness\", weakness, and numbness in both legs, L>R. Also endorses some numbness at the tips of left finger which have been constant since. No headaches, dizziness, or upper extremity weakness. No associated vision changes. No fevers or chills. Patient did hold his warfarin for the procedure. No recent falls or injuries. Patient does report he always has left leg neuropathy just above the knee, unsure if sensation feels different than his normal neuropathy.     Patient here with a family member, reports patient has been unable to ambulate since this happened. He does live independently and is concerned for his home safety.      Takes Tylenol at home for pain, denies narcotics or stronger pain medicines.     REVIEW OF SYSTEMS   ROS reviewed and otherwise negative unless " noted in HPI.     PAST MEDICAL HISTORY:  Past Medical History:   Diagnosis Date     Arthritis      Atrial fibrillation (H)      Bacteremia      Bell's palsy 2015     BPH (benign prostatic hyperplasia)      Diabetes (H)      Gastroesophageal reflux disease      GERD (gastroesophageal reflux disease)      GI hemorrhage      High cholesterol      Hyperlipemia      Hyperlipidemia      Hypertension      Hypertension      Idiopathic peripheral neuropathy      Irregular heart beat     chronic at fib     Renal artery aneurysm (H)      Renal artery aneurysm (H)      Sleep apnea     Bipap     Sleep apnea 10/31/2021     Sleep apnea, obstructive     USES BIPAP     Type 2 diabetes mellitus (H)      UTI (lower urinary tract infection)        PAST SURGICAL HISTORY:  Past Surgical History:   Procedure Laterality Date     AMPUTATE FOOT Right 5/6/2019    Procedure: AMPUTATION, 3rd TOE RIGHT FOOT;  Surgeon: Ravi Abbasi DPM;  [facility];  Service: Podiatry     FUSION SPINE POSTERIOR MINIMALLY INVASIVE THREE + LEVELS N/A 10/29/2020    Procedure: L3/4/5S1 oblique lateral lumbar interbody fusion with discectomy L3/4/5S1 Posterior minimally invasive pedicle screw placement and posterolateral instrumentation and fusion  L3/4/5S1 Posterior minimally invasive pedicle screw placement and posterolateral instrumentation and fusion;  Surgeon: Vernon Bynum MD;  Location:  OR      REPAIR COMPL ROTATOR CUFF AVULSN,CHR      Description: Chronic Rotator Cuff Avulsion;  Recorded: 04/11/2011;     ORTHOPEDIC SURGERY Right     knee surgery     ORTHOPEDIC SURGERY Right     amputation 3rd toe on right foot     TENOTOMY ACHILLES TENDON       TRANSRECTAL ULTRASONIC, TRANSURETHRAL RESECTION (TUR) OF PROSTATE CYST         CURRENT MEDICATIONS:    atenolol (TENORMIN) 50 MG tablet  atorvastatin (LIPITOR) 20 MG tablet  CONTOUR NEXT TEST test strip  diltiazem ER COATED BEADS (CARDIZEM CD/CARTIA XT) 120 MG 24 hr capsule  EPINEPHrine  "(ANY BX GENERIC EQUIV) 0.3 MG/0.3ML injection 2-pack  fluticasone (FLONASE) 50 MCG/ACT nasal spray  gabapentin (NEURONTIN) 300 MG capsule  gemfibrozil (LOPID) 600 MG tablet  hydrOXYzine (ATARAX) 10 MG tablet  lisinopril (ZESTRIL) 5 MG tablet  loratadine (CLARITIN) 10 MG tablet  metFORMIN (GLUCOPHAGE) 500 MG tablet  methocarbamol (ROBAXIN) 500 MG tablet  montelukast (SINGULAIR) 10 MG tablet  multivitamin (OCUVITE) TABS tablet  omeprazole (PRILOSEC) 20 MG DR capsule  traMADol (ULTRAM) 50 MG tablet  Vitamin D, Cholecalciferol, 25 MCG (1000 UT) TABS  warfarin ANTICOAGULANT (COUMADIN) 5 MG tablet  warfarin ANTICOAGULANT (COUMADIN) 7.5 MG tablet        ALLERGIES:  Allergies   Allergen Reactions     Bees Swelling     Reports using an epi pen if stung       FAMILY HISTORY:  Family History   Problem Relation Age of Onset     Coronary Artery Disease Mother      Coronary Artery Disease Father      Atrial fibrillation Father        SOCIAL HISTORY:   Social History     Socioeconomic History     Marital status:      Number of children: 3   Tobacco Use     Smoking status: Former     Packs/day: 2.00     Years: 27.00     Pack years: 54.00     Types: Cigarettes     Quit date: 1990     Years since quittin.0     Smokeless tobacco: Never   Substance and Sexual Activity     Alcohol use: No     Drug use: No     Sexual activity: Not Currently       VITALS:  BP (!) 145/78   Pulse 71   Temp 97.6  F (36.4  C) (Temporal)   Resp 18   Ht 1.854 m (6' 1\")   Wt 105.7 kg (233 lb)   SpO2 95%   BMI 30.74 kg/m      PHYSICAL EXAM    Constitutional: Well developed, Well nourished, NAD, GCS 15   HENT: Normocephalic, Atraumatic  Neck- Supple, Nontender. Normal ROM.  Eyes: Conjunctiva normal. PERRL. EOM intact.   Respiratory: No respiratory distress, speaking in full sentences.   Cardiovascular: Normal heart rate, No murmurs.   GI: Soft, nontender  Musculoskeletal: Dorsiflexion and plantarflexion strength symmetric. Bouncing left " leg while sitting in chair but unable to raise leg against gravity on bed. Right leg strength intact. Upper extremity strength equal. Tenderness with palpation to mid lumbar spine at approximately L4-S1 region and paraspinal musculature. Trace edema to bilateral lower extremities, slightly more prominent to left leg.   Integument: Warm, Dry, No erythema, ecchymosis, or rash.   Neurologic: Alert & oriented x 3, Normal sensation to upper extremities. Left lower extremity with diminished sensation when compared to right - patient unsure if this is at baseline or worse.   Psychiatric: Affect normal, Judgment normal, Mood normal. Cooperative.      LAB:  All pertinent labs reviewed and interpreted.  Results for orders placed or performed during the hospital encounter of 01/20/23   MR Lumbar Spine w/o & w Contrast    Impression    IMPRESSION:  1.  No acute findings.  2.  Advanced multilevel degenerative changes with chronic posterior decompression and posterior lumbar interbody fusion L3-S1.  3.  At L2-L3 there is moderate central stenosis.  4.  At L2-L3 there is severe right foraminal stenosis.       CBC (+ platelets, no diff)   Result Value Ref Range    WBC Count 7.3 4.0 - 11.0 10e3/uL    RBC Count 4.49 4.40 - 5.90 10e6/uL    Hemoglobin 14.0 13.3 - 17.7 g/dL    Hematocrit 42.3 40.0 - 53.0 %    MCV 94 78 - 100 fL    MCH 31.2 26.5 - 33.0 pg    MCHC 33.1 31.5 - 36.5 g/dL    RDW 13.2 10.0 - 15.0 %    Platelet Count 248 150 - 450 10e3/uL   Basic metabolic panel   Result Value Ref Range    Sodium 143 136 - 145 mmol/L    Potassium 4.8 3.4 - 5.3 mmol/L    Chloride 106 98 - 107 mmol/L    Carbon Dioxide (CO2) 26 22 - 29 mmol/L    Anion Gap 11 7 - 15 mmol/L    Urea Nitrogen 22.3 8.0 - 23.0 mg/dL    Creatinine 0.91 0.67 - 1.17 mg/dL    Calcium 10.2 8.8 - 10.2 mg/dL    Glucose 132 (H) 70 - 99 mg/dL    GFR Estimate 86 >60 mL/min/1.73m2   Erythrocyte sedimentation rate auto   Result Value Ref Range    Erythrocyte Sedimentation Rate 16  (H) 0 - 15 mm/hr   Result Value Ref Range    CRP Inflammation 3.20 <5.00 mg/L   Result Value Ref Range    INR 3.27 (H) 0.85 - 1.15       RADIOLOGY:  Reviewed all pertinent imaging. Please see official radiology report.  MR Lumbar Spine w/o & w Contrast   Final Result   IMPRESSION:   1.  No acute findings.   2.  Advanced multilevel degenerative changes with chronic posterior decompression and posterior lumbar interbody fusion L3-S1.   3.  At L2-L3 there is moderate central stenosis.   4.  At L2-L3 there is severe right foraminal stenosis.                EKG:    None    PROCEDURES:   None        Georgina Barnhart PA-C  Emergency Medicine  [facility] EMERGENCY DEPARTMENT  [address] [address] [phone]             Georgina Barnhart PA-C  01/20/23 4010

## 2023-01-21 NOTE — DISCHARGE INSTRUCTIONS
Your labs are reassuring - no sign of infection. Your INR is slightly elevated (3.27). Hold your warfarin tomorrow and call INR clinic for further warfarin dosing and recheck of your INR.    Use tylenol as needed for pain.     Continue to work with pain clinic as planned.    Continue to monitor your symptoms.    Return to the emergency department if you develop fevers, worsening/changing pain, weakness, numbness, headaches, confusion, or any other concerning symptoms. We would be happy to see you.

## 2023-01-21 NOTE — ED NOTES
"EMERGENCY DEPARTMENT SIGN OUT NOTE        ED COURSE AND MEDICAL DECISION MAKING  Patient was signed out to me by Dr Yuliya Rodas at 10:26 PM.    In brief, Stefan Diallo is a 77 year old male who initially presented with leg weakness. Patient had a spinal injection ~2 days ago and was told to hold his Warfarin. He endorses worsening left leg weakness today and reports that he has neuropathy in this leg at baseline. He notes that it feels like something is \"sitting\" on his left thigh.    At time of sign out, disposition was pending MRI lumbar spine and probable admission.  2345-able to ambulate with minimal difficulty and no significant findings on mri.  Patient is comfortable with discharge.    FINAL IMPRESSION    1. Supratherapeutic INR    2. Bilateral leg weakness    3. Paresthesias    4. Chronic midline low back pain, unspecified whether sciatica present        ED MEDS  Medications   morphine (PF) injection 4 mg (4 mg Intravenous Given 1/20/23 1754)   ondansetron (ZOFRAN) injection 4 mg (4 mg Intravenous Given 1/20/23 1754)   diphenhydrAMINE (BENADRYL) injection 25 mg (25 mg Intravenous Given 1/20/23 1905)   LORazepam (ATIVAN) injection 0.5 mg (0.5 mg Intravenous Given 1/20/23 1905)   gadobutrol (GADAVIST) injection 10 mL (10 mLs Intravenous Given 1/20/23 2222)       LAB  Labs Ordered and Resulted from Time of ED Arrival to Time of ED Departure   BASIC METABOLIC PANEL - Abnormal       Result Value    Sodium 143      Potassium 4.8      Chloride 106      Carbon Dioxide (CO2) 26      Anion Gap 11      Urea Nitrogen 22.3      Creatinine 0.91      Calcium 10.2      Glucose 132 (*)     GFR Estimate 86     ERYTHROCYTE SEDIMENTATION RATE AUTO - Abnormal    Erythrocyte Sedimentation Rate 16 (*)    INR - Abnormal    INR 3.27 (*)    CBC WITH PLATELETS - Normal    WBC Count 7.3      RBC Count 4.49      Hemoglobin 14.0      Hematocrit 42.3      MCV 94      MCH 31.2      MCHC 33.1      RDW 13.2      Platelet Count 248   "   CRP INFLAMMATION - Normal    CRP Inflammation 3.20           RADIOLOGY    MR Lumbar Spine w/o & w Contrast   Final Result   IMPRESSION:   1.  No acute findings.   2.  Advanced multilevel degenerative changes with chronic posterior decompression and posterior lumbar interbody fusion L3-S1.   3.  At L2-L3 there is moderate central stenosis.   4.  At L2-L3 there is severe right foraminal stenosis.                    I, Yanet Oliveira, am serving as a scribe to document services personally performed by Dr. Kirk based on my observation and the provider's statements to me. I, Yamileth Kirk MD attest that Yanet Oliveira is acting in a scribe capacity, has observed my performance of the services and has documented them in accordance with my direction.    Yamileth Kirk MD  Emergency Medicine  [facility] EMERGENCY DEPARTMENT  [address] [address]  [phone]  Dept: [phone]     Yamileth Kirk MD  01/21/23 0009

## 2023-01-21 NOTE — ED PROVIDER NOTES
"ED CONSULTATION  Date/Time:1/20/2023 6:59 PM    I am seeing this patient along with GABRIELLA Pérez.  I, Yuliya Rodas MD have reviewed the documentation, personally taken the patient's history, performed an exam and agree with the physical finds, diagnosis and management plan.    HPI: Stefan Diallo is a 77 year old male who presents to the ED with leg weakness. The patient had a a spinal injection 2 days ago.  He was told to hold his warfarin prior to the procedure and reports that he did.  Today, the patient noted that he had worsening left leg weakness.  He reports that he has neuropathy and weakness in his left leg at baseline, but today he feels like it is weaker, like there is something \"sitting\" on his left thigh.  He is also having lower back pain.      The creation of this record is based on the scribe s observations of the work being performed by Yuliya Rodas MD and the provider s statements to them. It was created on her behalf by Angelito [email or web address] trained medical scribe. This document has been checked and approved by the attending provider.    Physical Exam:/72   Pulse 73   Temp 97.6  F (36.4  C) (Temporal)   Resp 18   Ht 1.854 m (6' 1\")   Wt 105.7 kg (233 lb)   SpO2 96%   BMI 30.74 kg/m    Constitutional: Well developed, well nourished, no acute distress   EYES: Conjunctivae clear  HENT: Atraumatic, external ears normal, nose normal, oropharynx moist. Neck- supple   Respiratory: No respiratory distress, normal breath sounds, no rales, no wheezing   Cardiovascular: Normal rate, normal rhythm, no murmurs, no gallops, no rubs. No chest tenderness  : No CVA tenderness  Musculoskeletal: 3/5 strength in the left lower leg, 5/5 strength in the right lower leg  Integument: Warm, Dry, No erythema, No rash.   Neurologic: Alert & oriented x 3, no focal deficits noted, ambulatory  Psych: Affect normal, Judgment normal, Mood normal.    MDM:Medical Decision " Making    History:    Supplemental history from: Documented in chart, if applicable    External Record(s) reviewed: Documented in chart, if applicable.    Work Up:    Chart documentation includes differential considered and any EKGs or imaging independently interpreted by provider, where specified.    In additional to work up documented, I considered the following work up: Documented in chart, if applicable.    External consultation:    Discussion of management with another provider: Documented in chart, if applicable    Complicating factors:    Care impacted by chronic illness: N/A    Care affected by social determinants of health: N/A    Disposition considerations: Admission considered. Patient was signed out to the oncoming physician, disposition pending.      Results for orders placed or performed during the hospital encounter of 01/20/23   CBC (+ platelets, no diff)   Result Value Ref Range    WBC Count 7.3 4.0 - 11.0 10e3/uL    RBC Count 4.49 4.40 - 5.90 10e6/uL    Hemoglobin 14.0 13.3 - 17.7 g/dL    Hematocrit 42.3 40.0 - 53.0 %    MCV 94 78 - 100 fL    MCH 31.2 26.5 - 33.0 pg    MCHC 33.1 31.5 - 36.5 g/dL    RDW 13.2 10.0 - 15.0 %    Platelet Count 248 150 - 450 10e3/uL   Basic metabolic panel   Result Value Ref Range    Sodium 143 136 - 145 mmol/L    Potassium 4.8 3.4 - 5.3 mmol/L    Chloride 106 98 - 107 mmol/L    Carbon Dioxide (CO2) 26 22 - 29 mmol/L    Anion Gap 11 7 - 15 mmol/L    Urea Nitrogen 22.3 8.0 - 23.0 mg/dL    Creatinine 0.91 0.67 - 1.17 mg/dL    Calcium 10.2 8.8 - 10.2 mg/dL    Glucose 132 (H) 70 - 99 mg/dL    GFR Estimate 86 >60 mL/min/1.73m2   Erythrocyte sedimentation rate auto   Result Value Ref Range    Erythrocyte Sedimentation Rate 16 (H) 0 - 15 mm/hr   Result Value Ref Range    CRP Inflammation 3.20 <5.00 mg/L   Result Value Ref Range    INR 3.27 (H) 0.85 - 1.15     [unfilled]    No diagnosis found.      New Prescriptions    No medications on file       Final disposition will be  per the depending diagnostic studies and patient's clinical trajectory.    This is an accurate record of my words and actions during this visit as documented by the scribe.     Yuliya Rodas MD  Emergency Medicine  [facility] EMERGENCY DEPARTMENT  [address] [place] [id number] [phone]     Yuliya Rodas MD  01/20/23 0528

## 2023-01-21 NOTE — ED NOTES
Pt handed off to Анна, information passed along to charge MARILU STAPLETON, and primary nurse can contact myself with any questions.

## 2023-01-23 NOTE — PROGRESS NOTES
ANTICOAGULATION MANAGEMENT     Stefan Diallo 77 year old male is on warfarin with supratherapeutic INR result. (Goal INR 2.0-3.0)    Recent labs: (last 7 days)     01/20/23  1730   INR 3.27*       ASSESSMENT       Source(s): Chart Review    Previous INR was Supratherapeutic    Medication, diet, health changes since last INR-- in ER on 1/20 for back pain/numbness; no concerns found on exam or imaging.     Unlikely that warfarin was held 1/13-1/17 given INR of 3.27 on 1/20 (would have been only 2 doses leading up to the INR)       PLAN     Unable to reach Stefan today.    Left message to continue current dose of warfarin 5 mg tonight. Request call back for assessment.    Follow up required to confirm warfarin dose taken (did hold occur?) and assess for changes, discuss out of range result  and discuss dosing instructions and confirm understanding of instructions. Will also need to inquire about upcoming surgery for pain pump-- pre-op on 2/1 with noted DOS 2/6      Jeanne Harris RN  Anticoagulation Clinic  1/23/2023

## 2023-01-24 NOTE — PROGRESS NOTES
ANTICOAGULATION MANAGEMENT     Stefan Diallo 77 year old male is on warfarin with supratherapeutic INR result. (Goal INR 2.0-3.0)    Recent labs: (last 7 days)     01/20/23  1730   INR 3.27*       ASSESSMENT       Source(s): Chart Review    Previous INR was Supratherapeutic    Medication, diet, health changes since last INR-- in ER on 1/20 for back pain/numbness; no concerns found on exam or imaging.     Unlikely that warfarin was held 1/13-1/17 given INR of 3.27 on 1/20 (would have been only 2 doses leading up to the INR)       PLAN     Recommended plan for no diet, medication or health factor changes affecting INR     Dosing Instructions: decrease your warfarin dose (6.7% change) with next INR in 1 week       Summary  As of 1/23/2023    Full warfarin instructions:  5 mg every day   Next INR check:  2/1/2023             Detailed voice message left for Stefan with dosing instructions and follow up date.     Can check at OV on 2/1 if unable to check sooner    Education provided:     Please call back if any changes to your diet, medications or how you've been taking warfarin    Goal range and lab monitoring: goal range and significance of current result    Plan made per [facility] anticoagulation protocol    Jeanne Harris, RN  Anticoagulation Clinic  1/24/2023    _______________________________________________________________________     Anticoagulation Episode Summary     Current INR goal:  2.0-3.0   TTR:  51.7 % (1 y)   Target end date:  Indefinite   Send INR reminders to:  HILARIO HECTOR    Indications    Chronic atrial fibrillation (H) [I48.20]           Comments:           Anticoagulation Care Providers     Provider Role Specialty Phone number    Collin Singh MD Referring Family Medicine [phone]

## 2023-01-27 NOTE — PROGRESS NOTES
ANTICOAGULATION MANAGEMENT     Stefan Diallo 77 year old male is on warfarin with subtherapeutic INR result. (Goal INR 2.0-3.0)    Recent labs: (last 7 days)     01/27/23  1129   INR 1.8*       ASSESSMENT       Source(s): Chart Review and Patient/Caregiver Call       Warfarin doses taken: Warfarin taken as instructed    Diet: No new diet changes identified    New illness, injury, or hospitalization: No    Medication/supplement changes: None noted    Signs or symptoms of bleeding or clotting: No    Previous INR: Supratherapeutic    Additional findings: Stefan says his pain is really bad and he is hoping the pain pump implant on 2/6/23 makes a difference. He has a pre op on 2/1/23. Message has been sent to PCP and H requesting Warfarin hold directions for procedure on 2/6/23.       PLAN     Recommended plan for no diet, medication or health factor changes affecting INR     Dosing Instructions: Continue your current warfarin dose with next INR in 5 days.      **Stefan prefers to keep warfarin dosing the same for now as he will be going off for 5 days in preparation for his pain pump implant procedure on 2/6/23.        Summary  As of 1/27/2023    Full warfarin instructions:  5 mg every day   Next INR check:  2/1/2023             Telephone call with Stefan who verbalizes understanding and agrees to plan    Check at provider office visit    Education provided:     Goal range and lab monitoring: goal range and significance of current result and Importance of following up at instructed interval    Symptom monitoring: monitoring for bleeding signs and symptoms and monitoring for clotting signs and symptoms    Contact [phone] with any changes, questions or concerns.     Plan made per [facility] anticoagulation protocol    Lucía Hardin RN  Anticoagulation Clinic  1/27/2023    _______________________________________________________________________     Anticoagulation Episode Summary     Current INR goal:  2.0-3.0   TTR:  51.6 % (1  y)   Target end date:  Indefinite   Send INR reminders to:  HILARIO HECTOR    Indications    Chronic atrial fibrillation (H) [I48.20]           Comments:           Anticoagulation Care Providers     Provider Role Specialty Phone number    Collin Singh MD Referring Family Medicine [phone]

## 2023-01-27 NOTE — PROGRESS NOTES
Scheduled pain pump placement on 2/6/23. Has pre op with provider 2/1/23.  Previous warfarin hold directions have not required bridging.    RPH and PCP to decide on hold instructions for upcoming procedure.    Thank you,    Lucía Hardin RN

## 2023-01-31 NOTE — PROGRESS NOTES
"[facility]DI-PROCEDURAL ANTICOAGULATION  MANAGEMENT    ASSESSMENT     Warfarin interruption plan for Pain Pump Placement on 2/6/23.    Indication for Anticoagulation: Atrial Fibrillation      [id number] = 4 (Hypertension, Age >= 75 and Diabetes)        Cleared to hold without bridge for 11/29-nerve stimulator trial (5 day pre procedure hold, 7 days during trial), spinal injection 10/2022; 10/2020 spine surgery (7 day hold)      Spoke to Angela at [facility] and verified 5 day hold required for procedure      Leydi-Procedure Risk stratification for thromboembolism: low (2022 Chest guidelines and 2017 [facility] periprocedure pathway for NVAF Expert Consensus)    NVAF: 2017 ACC periprocedure pathway for NVAF advises NO bridge for low risk stratification ([id number] score <=4 and no prior hx of stroke, TIA or systemic embolism)     RECOMMENDATION       Pre-Procedure:  o Hold warfarin for 5 days, until after procedure starting: Wed 2/1/23   o No Bridge      Post-Procedure:  o Resume warfarin dose if okay with provider doing procedure on night of procedure, 2/6 PM: 7.5 mg daily x 2 then resume current dose  o Recheck INR ~ 7 days after resuming warfarin       Plan routed to referring provider for approval  ?   Kim Fong, [facility]    SUBJECTIVE/OBJECTIVE     Stefan Diallo, a 77 year old male    Goal INR Range: 2.0-3.0     Wt Readings from Last 3 Encounters:   01/20/23 105.7 kg (233 lb)   08/19/22 95.9 kg (211 lb 8 oz)   08/10/22 93.9 kg (207 lb 1.6 oz)      Ideal body weight: 79.9 kg (176 lb 2.4 oz)  Adjusted ideal body weight: 90.2 kg (198 lb 14.2 oz)     Estimated body mass index is 30.74 kg/m  as calculated from the following:    Height as of 1/20/23: 1.854 m (6' 1\").    Weight as of 1/20/23: 105.7 kg (233 lb).    Lab Results   Component Value Date    INR 1.8 (H) 01/27/2023    INR 3.27 (H) 01/20/2023    INR 3.5 (H) 12/22/2022     Lab Results   Component Value Date    HGB 14.0 01/20/2023    HCT 42.3 01/20/2023     " 01/20/2023     Lab Results   Component Value Date    CR 0.91 01/20/2023    CR 0.96 08/10/2022    CR 0.76 05/21/2022     Estimated Creatinine Clearance: 84 mL/min (based on SCr of 0.91 mg/dL).

## 2023-02-01 NOTE — PROGRESS NOTES
Spoke with patient and he had pre op today. He found out that his procedure has not yet been approved by his insurance. He may not have this done on 2/6. He was told at pre op to start his hold tonight and if not approved to have procedure on Monday to restart. ACN did discuss his 5 day warfarin hold and resumption dosing. He also did mention he is still taking the 7.5 mg Sundays and 5 mg all other days. He will keep ACN up to date with his procedure.

## 2023-02-01 NOTE — PROGRESS NOTES
[address]  [place] [address]  Phone: [phone]  Fax: [phone]  Primary Provider: Collin Singh  Pre-op Performing Provider: COLLIN SINGH      PREOPERATIVE EVALUATION:  Today's date: 2/1/2023    Stefan Diallo is a 77 year old male who presents for a preoperative evaluation.    Surgical Information:  Surgery/Procedure: Intrathecal Pain Pump Implant   Surgery Location: [facility]   Surgeon: Clem Ravi MD   Surgery Date: 02/06/2023  Time of Surgery: TBD  Where patient plans to recover: At home with family  Fax number for surgical facility: [phone]    Type of Anesthesia Anticipated: Choice    Assessment & Plan     The proposed surgical procedure is considered LOW risk.    Preoperative examination      Chronic bilateral low back pain with left-sided sciatica    Pain not controlled.      Type 2 diabetes mellitus without complication, without long-term current use of insulin (H)    Stable.    - Hemoglobin A1c  - Hemoglobin A1c    Chronic atrial fibrillation, unspecified (H)    Stable.    - EKG 12-lead, tracing only          Medication Instructions:  Patient is to take all scheduled medications on the day of surgery EXCEPT for modifications listed below:   - warfarin: Thromboembolic risk is low (<4%/year). HOLD warfarin for 5 days without bridging before procedure.    - metformin: HOLD day of surgery.    RECOMMENDATION:  APPROVAL GIVEN to proceed with proposed procedure, without further diagnostic evaluation.      61 minutes spent on the date of the encounter doing chart review, review of test results, interpretation of tests, patient visit and documentation regarding back pain, diabetes, A fib.      Subjective     HPI related to upcoming procedure: Low back pain.  Prior fusion L3-S1.  Diabetes, stable, A1c 6.4.  Takes 1-2 metformin in evening.      Preop Questions 2/1/2023   1. Have you ever had a heart attack or stroke? No    2. Have you ever had surgery on your heart or blood vessels, such as a stent placement, a coronary artery bypass, or surgery on an artery in your head, neck, heart, or legs? No   3. Do you have chest pain with activity? No   4. Do you have a history of  heart failure? YES    5. Do you currently have a cold, bronchitis or symptoms of other infection? No   6. Do you have a cough, shortness of breath, or wheezing? No   7. Do you or anyone in your family have previous history of blood clots? No   8. Do you or does anyone in your family have a serious bleeding problem such as prolonged bleeding following surgeries or cuts? No   9. Have you ever had problems with anemia or been told to take iron pills? No   10. Have you had any abnormal blood loss such as black, tarry or bloody stools? No   11. Have you ever had a blood transfusion? No   12. Are you willing to have a blood transfusion if it is medically needed before, during, or after your surgery? Yes   13. Have you or any of your relatives ever had problems with anesthesia? No   14. Do you have sleep apnea, excessive snoring or daytime drowsiness? YES    14a. Do you have a CPAP machine? Yes   15. Do you have any artifical heart valves or other implanted medical devices like a pacemaker, defibrillator, or continuous glucose monitor? No   16. Do you have artificial joints? No   17. Are you allergic to latex? No       Preoperative Review of :   reviewed - Had 15 oxycodone 11/29/22.  Gabapentin.          Review of Systems  CONSTITUTIONAL: NEGATIVE for fever, chills, change in weight  INTEGUMENTARY/SKIN: NEGATIVE for worrisome rashes, moles or lesions  EYES: NEGATIVE for vision changes or irritation  ENT/MOUTH: NEGATIVE for ear, mouth and throat problems  RESP: NEGATIVE for significant cough or SOB  CV: NEGATIVE for chest pain, palpitations or peripheral edema  GI: NEGATIVE for nausea, abdominal pain, heartburn, or change in bowel habits  : NEGATIVE for frequency,  dysuria, or hematuria  MUSCULOSKELETAL: NEGATIVE for significant arthralgias or myalgia  NEURO: NEGATIVE for weakness, dizziness or paresthesias  ENDOCRINE: NEGATIVE for temperature intolerance, skin/hair changes  HEME: NEGATIVE for bleeding problems  PSYCHIATRIC: NEGATIVE for changes in mood or affect    Patient Active Problem List    Diagnosis Date Noted     Abrasion of right foot, initial encounter 03/08/2022     Priority: Medium     Onychomycosis 03/08/2022     Priority: Medium     Hammer toes of both feet 03/08/2022     Priority: Medium     Mixed hyperlipidemia 12/28/2021     Priority: Medium     Benign prostatic hyperplasia 10/31/2021     Priority: Medium     Formatting of this note might be different from the original.  Created by Conversion       Chest pain 10/31/2021     Priority: Medium     Formatting of this note might be different from the original.  Created by Conversion       Closed nondisplaced fracture of phalanx of toe of right foot, unspecified toe, initial encounter 10/31/2021     Priority: Medium     Contact dermatitis and other eczema, due to unspecified cause 10/31/2021     Priority: Medium     Formatting of this note might be different from the original.  Created by Conversion       Cough 10/31/2021     Priority: Medium     Formatting of this note might be different from the original.  Created by Conversion       Dysuria 10/31/2021     Priority: Medium     Formatting of this note might be different from the original.  Created by Conversion       Essential hypertension, benign 10/31/2021     Priority: Medium     Formatting of this note might be different from the original.  Created by Conversion    Replacement Utility updated for latest IMO load       Eye pain 10/31/2021     Priority: Medium     Formatting of this note might be different from the original.  Created by Conversion       Idiopathic peripheral neuropathy 10/31/2021     Priority: Medium     Formatting of this note might be different  from the original.  Created by [facility] Annotation: Apr 5 2011  9:57Collin Robbins: feet?related to   back problems    Replacement Utility updated for latest IMO load       Lower back pain 10/31/2021     Priority: Medium     Formatting of this note might be different from the original.  Created by Conversion       Muscle weakness 10/31/2021     Priority: Medium     Formatting of this note might be different from the original.  Created by Conversion       Myalgia and myositis, unspecified 10/31/2021     Priority: Medium     Formatting of this note might be different from the original.  Created by Conversion       Osteomyelitis of toe (H) 10/31/2021     Priority: Medium     Pure hypercholesterolemia 10/31/2021     Priority: Medium     Formatting of this note might be different from the original.  Created by Conversion       Renal artery aneurysm (H) 10/31/2021     Priority: Medium     Formatting of this note might be different from the original.  Created by Conversion  [employer] Annotation: Dec 31 2009  8:40Collin Robbins: 1.9 cmDr Stefan Sellers felt no intervention until it was 2.5cm    f/u CT angio 3/10       Shortness of breath 10/31/2021     Priority: Medium     Formatting of this note might be different from the original.  Created by Conversion       Sleep apnea 10/31/2021     Priority: Medium     Formatting of this note might be different from the original.  Created by Conversion    Replacement Utility updated for latest IMO load       Staph aureus infection 10/31/2021     Priority: Medium     Tinea corporis 10/31/2021     Priority: Medium     Formatting of this note might be different from the original.  Created by Conversion    Replacement Utility updated for latest IMO load       Urinary tract infection 10/31/2021     Priority: Medium     Formatting of this note might be different from the original.  Created by Conversion       Urticaria 10/31/2021     Priority: Medium      Formatting of this note might be different from the original.  Created by Conversion       Vertigo 10/31/2021     Priority: Medium     Formatting of this note might be different from the original.  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  Created by Conversion  [facility] Annotation: Dec 31 2009  8:40AM - Collin Singh: INR's monitored   by Dr Garland([facility])Permanent       Obesity 11/26/2018     Priority: Medium     Formatting of this note might be different from the original.  Created by Conversion       Type II diabetes mellitus with peripheral circulatory disorder (H) 06/16/2017     Priority: Medium     Gastroesophageal reflux disease with esophagitis 06/21/2016     Priority: Medium     Bell's palsy 09/14/2015     Priority: Medium     Dysphagia 04/13/2015     Priority: Medium      Past Medical History:   Diagnosis Date     Arthritis      Atrial fibrillation (H)      Bacteremia      Bell's palsy 2015     BPH (benign prostatic hyperplasia)      Diabetes (H)      Gastroesophageal reflux disease      GERD (gastroesophageal reflux disease)      GI hemorrhage      High cholesterol      Hyperlipemia      Hyperlipidemia      Hypertension      Hypertension      Idiopathic peripheral neuropathy      Irregular heart beat     chronic at fib     Renal artery aneurysm (H)      Renal artery aneurysm (H)       Sleep apnea     Bipap     Sleep apnea 10/31/2021     Sleep apnea, obstructive     USES BIPAP     Type 2 diabetes mellitus (H)      UTI (lower urinary tract infection)      Past Surgical History:   Procedure Laterality Date     AMPUTATE FOOT Right 5/6/2019    Procedure: AMPUTATION, 3rd TOE RIGHT FOOT;  Surgeon: Ravi Abbasi DPM;  Location: [facility];  Service: Podiatry     FUSION SPINE POSTERIOR MINIMALLY INVASIVE THREE + LEVELS N/A 10/29/2020    Procedure: L3/4/5S1 oblique lateral lumbar interbody fusion with discectomy L3/4/5S1 Posterior minimally invasive pedicle screw placement and posterolateral instrumentation and fusion  L3/4/5S1 Posterior minimally invasive pedicle screw placement and posterolateral instrumentation and fusion;  Surgeon: Vernon Bynum MD;  Location:  OR      REPAIR COMPL ROTATOR CUFF AVULSN,CHR      Description: Chronic Rotator Cuff Avulsion;  Recorded: 04/11/2011;     ORTHOPEDIC SURGERY Right     knee surgery     ORTHOPEDIC SURGERY Right     amputation 3rd toe on right foot     TENOTOMY ACHILLES TENDON       TRANSRECTAL ULTRASONIC, TRANSURETHRAL RESECTION (TUR) OF PROSTATE CYST       Current Outpatient Medications   Medication Sig Dispense Refill     atenolol (TENORMIN) 50 MG tablet Take 0.5 tablets (25 mg) by mouth daily 45 tablet 3     atorvastatin (LIPITOR) 20 MG tablet TAKE 1 TABLET BY MOUTH EVERY DAY 90 tablet 3     CONTOUR NEXT TEST test strip USE 1 EACH AS DIRECTED DAILY. 100 strip 5     diltiazem ER COATED BEADS (CARDIZEM CD/CARTIA XT) 120 MG 24 hr capsule TAKE 1 CAPSULE BY MOUTH EVERY DAY 90 capsule 3     EPINEPHrine (ANY BX GENERIC EQUIV) 0.3 MG/0.3ML injection 2-pack Inject 0.3 mg into the muscle as needed for anaphylaxis       fluticasone (FLONASE) 50 MCG/ACT nasal spray Spray 2 sprays into both nostrils daily       gabapentin (NEURONTIN) 300 MG capsule TAKE 2 CAPSULES BY MOUTH AT BEDTIME. 180 capsule 3     gemfibrozil (LOPID) 600 MG tablet TAKE 1 TABLET BY  "MOUTH TWICE A  tablet 1     hydrOXYzine (ATARAX) 10 MG tablet Take 1 tablet (10 mg) by mouth every 6 hours as needed for itching 60 tablet 3     lisinopril (ZESTRIL) 5 MG tablet TAKE 1 TABLET BY MOUTH EVERY DAY 90 tablet 3     loratadine (CLARITIN) 10 MG tablet Take 10 mg by mouth daily       metFORMIN (GLUCOPHAGE) 500 MG tablet TAKE 1 TABLET BY MOUTH EACH MORNING AND 2 TABLETS EACH EVENING 270 tablet 3     methocarbamol (ROBAXIN) 500 MG tablet Take 1 tablet (500 mg) by mouth 4 times daily as needed for muscle spasms 60 tablet 3     montelukast (SINGULAIR) 10 MG tablet Take 10 mg by mouth daily       multivitamin (OCUVITE) TABS tablet Take 1 tablet by mouth 2 times daily       omeprazole (PRILOSEC) 20 MG DR capsule TAKE 1 CAPSULE BY MOUTH EVERY DAY 90 capsule 3     traMADol (ULTRAM) 50 MG tablet TAKE 1 TABLET BY MOUTH EVERY 6 HOURS AS NEEDED FOR PAIN       Vitamin D, Cholecalciferol, 25 MCG (1000 UT) TABS Take 1 tablet by mouth daily       warfarin ANTICOAGULANT (COUMADIN) 5 MG tablet TAKE 1 TO 1.5 TABLETS BY MOUTH DAILY AS DIRECTED. ADJUST PER INR RESULTS. 135 tablet 3     warfarin ANTICOAGULANT (COUMADIN) 7.5 MG tablet Take 7.5 mg by mouth once a week On Sundays         Allergies   Allergen Reactions     Bees Swelling     Reports using an epi pen if stung        Social History     Tobacco Use     Smoking status: Former     Packs/day: 2.00     Years: 27.00     Pack years: 54.00     Types: Cigarettes     Quit date: 1990     Years since quittin.0     Smokeless tobacco: Never   Substance Use Topics     Alcohol use: No       History   Drug Use No         Objective     /82   Pulse 72   Temp 98  F (36.7  C) (Oral)   Resp 16   Ht 1.854 m (6' 1\")   Wt 98.9 kg (218 lb)   SpO2 98%   BMI 28.76 kg/m      Physical Exam  Eyes: EOM full, pupils normal, conjunctivae normal  Ears: TM's and canals normal  Oropharynx: dentures  Neck: supple without adenopathy or thyromegaly  Lungs: normal  Heart: A fib, " no murmur  Abdomen: no HSM, mass or tenderness  Extremities: FROM, normal strength and sensation      Recent Labs   Lab Test 01/27/23  1129 01/20/23  1730 09/02/22  1224 08/19/22  1621 08/10/22  1017 05/21/22  1319 05/16/22  1701   HGB  --  14.0  --   --  14.3   < >  --    PLT  --  248  --   --  353   < >  --    INR 1.8* 3.27*   < > 3.6* 2.39*   < >  --    NA  --  143  --   --  141   < >  --    POTASSIUM  --  4.8  --   --  4.0   < >  --    CR  --  0.91  --   --  0.96   < >  --    A1C  --   --   --  6.2*  --   --  6.8*    < > = values in this interval not displayed.        Diagnostics:  Recent Results (from the past 240 hour(s))   INR point of care    Collection Time: 01/27/23 11:29 AM   Result Value Ref Range    INR 1.8 (H) 0.9 - 1.1   Hemoglobin A1c    Collection Time: 02/01/23  1:39 PM   Result Value Ref Range    Hemoglobin A1C 6.4 (H) 0.0 - 5.6 %   EKG 12-lead, tracing only    Collection Time: 02/01/23  1:52 PM   Result Value Ref Range    Systolic Blood Pressure  mmHg    Diastolic Blood Pressure  mmHg    Ventricular Rate 78 BPM    Atrial Rate 357 BPM    OH Interval  ms    QRS Duration 160 ms     ms    QTc 471 ms    P Axis  degrees    R AXIS 30 degrees    T Axis 6 degrees    Interpretation ECG       Atrial fibrillation with premature ventricular or aberrantly conducted complexes  Right bundle branch block  Abnormal ECG  When compared with ECG of 10-AUG-2022 09:30,  No significant change was found        EKG: atrial fibrillation, rate 78, normal axis, normal intervals, no acute ST/T changes c/w ischemia, no LVH by voltage criteria, Right Bundle Branch Block, unchanged from previous tracings    Revised Cardiac Risk Index (RCRI):  The patient has the following serious cardiovascular risks for perioperative complications:   - No serious cardiac risks = 0 points     RCRI Interpretation: 0 points: Class I (very low risk - 0.4% complication rate)           Signed Electronically by: Collin Singh MD  Copy of this  evaluation report is provided to requesting physician.

## 2023-02-01 NOTE — PROGRESS NOTES
Clearance for 5 day hold given in pre-op with Dr. Singh today    Kim Fong, [facility]

## 2023-02-03 NOTE — PROGRESS NOTES
"Called and spoke with patient-- \"still no update\". States he will call [facility] if/when he hears back from them. Will wait for patient to reach out with updates.    Jeanne Harris RN  [facility]  [phone]    "

## 2023-02-08 NOTE — PROGRESS NOTES
Called and spoke with patient-- he reports there is still no update and he will contact [facility] with any changes.    Jeanne Harris RN  [facility]  [phone]

## 2023-02-14 NOTE — PROGRESS NOTES
Will sign encounter today. No procedure rescheduled as of yet. Patient will contact us when it is scheduled.  Lucía Hardin RN

## 2023-02-14 NOTE — TELEPHONE ENCOUNTER
ANTICOAGULATION     Stefan Diallo is overdue for INR check.      Spoke with Stefan who declined to schedule a lab appointment at this time. If calling back please schedule as soon as possible.     Said he would stop by a clinic to do it next week.    Lucía Hardin RN

## 2023-02-22 NOTE — TELEPHONE ENCOUNTER
ANTICOAGULATION     Stefan Diallo is overdue for INR check.     Pump placement rescheduled for 3/3/23. He said he stops warfarin on 2/26/23 and will stop in for INR before that.    I will update documentation about warfarin hold and forward to appropriate pool(s).    Lucía Hardin RN

## 2023-02-22 NOTE — PROGRESS NOTES
Spoke to Stefan today.  His pain pump placement is scheduled for 3/3/23.  He said he will stop warfarin on 2/26/23.  He is past due for an INR and he said he will stop in to have one done in a day or two.    Per hold plan made below on 1/31/23 Stefan is to hold warfarin x 5 days with no bridging then take 7.5 mg daily x2 days and resume usual dose after that. This was also discussed at pre op on 2/1/23.    Please verify patient understanding of dosing instructions when he checks INR later this week. When I spoke to him he seemed in a hurry to get off the phone so was not able to verify restart dosing with him for after procedure.    Lucía Hardin RN

## 2023-02-24 NOTE — PROGRESS NOTES
ANTICOAGULATION MANAGEMENT     Stefan Diallo 77 year old male is on warfarin with therapeutic INR result. (Goal INR 2.0-3.0)    Recent labs: (last 7 days)     02/24/23  1411   INR 2.7*       ASSESSMENT       Source(s): Chart Review and Patient/Caregiver Call       Warfarin doses taken: Warfarin taken as instructed    Diet: No new diet changes identified    New illness, injury, or hospitalization: No    Medication/supplement changes: None noted    Signs or symptoms of bleeding or clotting: No    Previous INR: Subtherapeutic    Additional findings: Upcoming surgery/procedure 3/3 pain pump placement, 5 day warfarin hold and no bridge         PLAN     Recommended plan for temporary change(s) affecting INR     Dosing Instructions:  Start Warfarin hold on 2/26.  Resume Warfarin on 3/3 with 7.5mg x2 then normal dosing and recheck INR 1 week later.         Summary  As of 2/24/2023    Full warfarin instructions:  2/26: Hold; 2/27: Hold; 2/28: Hold; 3/1: Hold; 3/2: Hold; 3/3: 7.5 mg; 3/4: 7.5 mg; Otherwise 5 mg every day   Next INR check:  3/10/2023             Telephone call with Stefan who verbalizes understanding and agrees to plan    Patient offered & declined to schedule next visit    Education provided:     Goal range and lab monitoring: goal range and significance of current result    Contact [phone] with any changes, questions or concerns.     Plan made per [facility] anticoagulation protocol    Otilia Flores RN  Anticoagulation Clinic  2/24/2023    _______________________________________________________________________     Anticoagulation Episode Summary     Current INR goal:  2.0-3.0   TTR:  50.0 % (1 y)   Target end date:  Indefinite   Send INR reminders to:  HILARIO HECTOR    Indications    Chronic atrial fibrillation (H) [I48.20]           Comments:           Anticoagulation Care Providers     Provider Role Specialty Phone number    Collin Singh MD Referring Family Medicine [phone]

## 2023-03-07 PROBLEM — I63.512 CEREBROVASCULAR ACCIDENT (CVA) DUE TO OCCLUSION OF LEFT MIDDLE CEREBRAL ARTERY (H): Status: ACTIVE | Noted: 2023-01-01

## 2023-03-07 NOTE — ED NOTES
Neuro evaluating pt through [facility]. Left sided neglect. Left sided weakness remains. Plan to administer tenectaplase and transfer.

## 2023-03-07 NOTE — ED TRIAGE NOTES
Pt transferred from [facility] ED. Pt received TNK due to stroke. Pt transferred here for IR and ICU care     Triage Assessment     Row Name 03/07/23 1303       Triage Assessment (Adult)    Airway WDL WDL       Respiratory WDL    Respiratory WDL WDL       Skin Circulation/Temperature WDL    Skin Circulation/Temperature WDL WDL       Cardiac WDL    Cardiac WDL WDL       Peripheral/Neurovascular WDL    Peripheral Neurovascular WDL WDL

## 2023-03-07 NOTE — ED NOTES
Per Neuro request, pt stood with assistance of 2 and walker. Pt able to take 10 steps with assistance of 1 and walker.

## 2023-03-07 NOTE — ED PROVIDER NOTES
EMERGENCY DEPARTMENT ENCOUNTER      NAME: Stefan Diallo  AGE: 77 year old male  YOB: 1945  MRN: [id number]  EVALUATION DATE & TIME: No admission date for patient encounter.    PCP: Collin Singh    ED PROVIDER: Otis Guerra M.D.      Chief Complaint   Patient presents with     Stroke Symptoms     Left sided facial droops, arm and leg weakness.          FINAL IMPRESSION:  1. Acute CVA (cerebrovascular accident) (H)          ED COURSE & MEDICAL DECISION MAKIN year old male presents to the Emergency Department for evaluation of stroke symptoms.  Patient had sudden onset of left-sided weakness starting at 9:45 AM today.  He was with family when this started.  He arrives to the emergency department with a dense left-sided facial droop and left hemibody weakness although is able to resist gravity in his left upper and lower extremity has pronounced pronator drift.  He also has neglect on the left side.  He was expedited to CT and CT angiography.  This revealed concern for a right M2 segment thrombus, possibly embolic.  Upon further review with the stroke team he has not been taking his Coumadin for the last 2 weeks in preparation for a stimulator to be placed.  His INR is 1.2 today.  EKG does show chronic atrial fibrillation.  Patient was consented for thrombolytics and TNK was given here.  He was reevaluated a couple of times after this, did not seem to have any meaningful change in his neurological deficits but remained otherwise stable.  Case was reviewed between stroke neurology and neuro IR.  Plan will be for transfer to [facility] for stroke evaluation and likely thrombectomy.  Confirmed this plan with Dr. Pickett and Dr. Morejon at the [facility] emergency department.      10:28 AM Took report from EMS. I met with the patient, obtained history, performed an initial exam, and discussed options and plan for diagnostics and treatment here in the ED.  11:16 AM Spoke with  stroke-neuro, Dr. Pickett.  11:50 [facility]        Critical Care  Performed by: Otis Guerra MD  Authorized by: Otis Guerra MD     Total critical care time: 65 minutes  Critical care time was exclusive of separately billable procedures and treating other patients.  Critical care was necessary to treat or prevent imminent or life-threatening deterioration of the following conditions: Acute stroke receiving thrombolytics and transferred for neuro IR  Critical care was time spent personally by me on the following activities: development of treatment plan with patient or surrogate, discussions with consultants, examination of patient, evaluation of patient's response to treatment, obtaining history from patient or surrogate, ordering and performing treatments and interventions, ordering and review of laboratory studies, ordering and review of radiographic studies and re-evaluation of patient's condition, this excludes any separately billable procedures.        MEDICATIONS GIVEN IN THE EMERGENCY:  Medications   sodium chloride 0.9% infusion (has no administration in time range)   labetalol (NORMODYNE/TRANDATE) injection 10 mg (has no administration in time range)     Or   hydrALAZINE (APRESOLINE) injection 10 mg (has no administration in time range)   niCARdipine 40 mg in 200 mL NS (CARDENE) infusion (has no administration in time range)   iopamidol (ISOVUE-370) solution 75 mL (75 mLs Intravenous $Given 3/7/23 1042)   tenecteplase (TNKase) injection 24.5 mg (24.5 mg Intravenous $Given 3/7/23 1123)       NEW PRESCRIPTIONS STARTED AT TODAY'S ER VISIT  New Prescriptions    No medications on file          =================================================================    HPI    Patient information was obtained from: EMS    Use of : N/A       Stefan Diallo is a 77 year old male with a pertinent history of type 2 diabetes mellitus, HTN, HLD, GERD, atrial fibrillation, renal  artery aneurysm, who presents to this ED by EMS for evaluation of weakness.    Per chart review,  1/20/23~ Patient seen at  ED for evaluation of leg weakness, history of left leg neuropathy at baseline. Patient had recent spinal injection x 2 days prior at an [facility]. MRI in the ED was unremarkable. Patient's symptoms improved after treatment pain medicine. Discharged home in stable condition.     Per EMS,  Patient was visiting his son at this place of work when he went to sit down, but missed the chair. Patient was then noticeably weak. Patient last known well at 9:45 AM. Patient has left-sided weakness and left-sided facial droop.    HPI limited due to acuity of patient condition.       REVIEW OF SYSTEMS   Endorses weakness.   ROS limited due to acuity of patient condition.    PAST MEDICAL HISTORY:  Past Medical History:   Diagnosis Date     Arthritis      Atrial fibrillation (H)      Bacteremia      Bell's palsy 2015     BPH (benign prostatic hyperplasia)      Diabetes (H)      Gastroesophageal reflux disease      GERD (gastroesophageal reflux disease)      GI hemorrhage      High cholesterol      Hyperlipemia      Hyperlipidemia      Hypertension      Hypertension      Idiopathic peripheral neuropathy      Irregular heart beat     chronic at fib     Renal artery aneurysm (H)      Renal artery aneurysm (H)      Sleep apnea     Bipap     Sleep apnea 10/31/2021     Sleep apnea, obstructive     USES BIPAP     Type 2 diabetes mellitus (H)      UTI (lower urinary tract infection)        PAST SURGICAL HISTORY:  Past Surgical History:   Procedure Laterality Date     AMPUTATE FOOT Right 5/6/2019    Procedure: AMPUTATION, 3rd TOE RIGHT FOOT;  Surgeon: Ravi Abbasi DPM;  Location: [facility];  Service: Podiatry     FUSION SPINE POSTERIOR MINIMALLY INVASIVE THREE + LEVELS N/A 10/29/2020    Procedure: L3/4/5S1 oblique lateral lumbar interbody fusion with discectomy L3/4/5S1 Posterior minimally  invasive pedicle screw placement and posterolateral instrumentation and fusion  L3/4/5S1 Posterior minimally invasive pedicle screw placement and posterolateral instrumentation and fusion;  Surgeon: Vernon Bynum MD;  Location: [facility] OR     HC REPAIR COMPL ROTATOR CUFF AVULSN,CHR      Description: Chronic Rotator Cuff Avulsion;  Recorded: 04/11/2011;     ORTHOPEDIC SURGERY Right     knee surgery     ORTHOPEDIC SURGERY Right     amputation 3rd toe on right foot     TENOTOMY ACHILLES TENDON       TRANSRECTAL ULTRASONIC, TRANSURETHRAL RESECTION (TUR) OF PROSTATE CYST             CURRENT MEDICATIONS:    Current Facility-Administered Medications   Medication     labetalol (NORMODYNE/TRANDATE) injection 10 mg    Or     hydrALAZINE (APRESOLINE) injection 10 mg     niCARdipine 40 mg in 200 mL NS (CARDENE) infusion     sodium chloride 0.9% infusion     Current Outpatient Medications   Medication     atenolol (TENORMIN) 50 MG tablet     atorvastatin (LIPITOR) 20 MG tablet     CONTOUR NEXT TEST test strip     diltiazem ER COATED BEADS (CARDIZEM CD/CARTIA XT) 120 MG 24 hr capsule     EPINEPHrine (ANY BX GENERIC EQUIV) 0.3 MG/0.3ML injection 2-pack     fluticasone (FLONASE) 50 MCG/ACT nasal spray     gabapentin (NEURONTIN) 300 MG capsule     gemfibrozil (LOPID) 600 MG tablet     hydrOXYzine (ATARAX) 10 MG tablet     lisinopril (ZESTRIL) 5 MG tablet     loratadine (CLARITIN) 10 MG tablet     metFORMIN (GLUCOPHAGE) 500 MG tablet     methocarbamol (ROBAXIN) 500 MG tablet     montelukast (SINGULAIR) 10 MG tablet     multivitamin (OCUVITE) TABS tablet     omeprazole (PRILOSEC) 20 MG DR capsule     traMADol (ULTRAM) 50 MG tablet     Vitamin D, Cholecalciferol, 25 MCG (1000 UT) TABS     warfarin ANTICOAGULANT (COUMADIN) 5 MG tablet     warfarin ANTICOAGULANT (COUMADIN) 7.5 MG tablet         ALLERGIES:  Allergies   Allergen Reactions     Bees Swelling     Reports using an epi pen if stung       FAMILY HISTORY:  Family History   Problem  Relation Age of Onset     Coronary Artery Disease Mother      Coronary Artery Disease Father      Atrial fibrillation Father        SOCIAL HISTORY:   Social History     Socioeconomic History     Marital status:      Number of children: 3   Tobacco Use     Smoking status: Former     Packs/day: 2.00     Years: 27.00     Pack years: 54.00     Types: Cigarettes     Quit date: 1990     Years since quittin.1     Smokeless tobacco: Never   Substance and Sexual Activity     Alcohol use: No     Drug use: No     Sexual activity: Not Currently       VITALS:  /85   Pulse 81   Temp 98.2  F (36.8  C) (Oral)   Resp 19   Ht 1.829 m (6')   Wt 97.5 kg (215 lb)   SpO2 96%   BMI 29.16 kg/m      PHYSICAL EXAM    Constitutional: Well developed, Well nourished, NAD.  HENT: Normocephalic, Atraumatic. Neck Supple.  Eyes: EOMI, Conjunctiva normal.  Respiratory: Breathing comfortably on room air. Speaks full sentences easily. Lungs clear to ascultation.  Cardiovascular: Normal heart rate, Regular rhythm. No peripheral edema.  Abdomen: Soft  Musculoskeletal: Good range of motion in all major joints. No major deformities noted.  Integument: Warm, Dry.  Neurologic: See NIH below.  Patient is awake alert and oriented.  Has a mild right-sided gaze preference.  Decreased peripheral vision on the left side.  Speech is normal and intelligible.  There is a significant left-sided facial droop.  There is pronator drift on the left upper and lower extremities.  Right upper and lower extremity strength is preserved  Psychiatric: Cooperative. Affect appropriate.         National Institutes of Health Stroke Scale  Exam Interval: Baseline   Score    Level of consciousness: (0)   Alert, keenly responsive    LOC questions: (0)   Answers both questions correctly    LOC commands: (0)   Performs both tasks correctly    Best gaze: (1)   Partial gaze palsy    Visual: (2)   Complete hemianopia    Facial palsy: (2)   Partial paralysis  (total/near total of lower face)    Motor arm (left): (1)   Drift    Motor arm (right): (0)   No drift    Motor leg (left): (1)   Drift    Motor leg (right): (0)   No drift    Limb ataxia: (1)   Present in one limb    Sensory: (0)   Normal- no sensory loss    Best language: (0)   Normal- no aphasia    Dysarthria: (0)   Normal    Extinction and inattention: (0)   No abnormality        Total Score:  8             LAB:  All pertinent labs reviewed and interpreted.  Labs Ordered and Resulted from Time of ED Arrival to Time of ED Departure   BASIC METABOLIC PANEL - Abnormal       Result Value    Sodium 135 (*)     Potassium 4.3      Chloride 101      Carbon Dioxide (CO2) 22      Anion Gap 12      Urea Nitrogen 25.0 (*)     Creatinine 0.89      Calcium 9.3      Glucose 142 (*)     GFR Estimate 87     INR - Abnormal    INR 1.21 (*)    TROPONIN T, HIGH SENSITIVITY - Abnormal    Troponin T, High Sensitivity 66 (*)    CBC WITH PLATELETS AND DIFFERENTIAL - Abnormal    WBC Count 6.9      RBC Count 4.25 (*)     Hemoglobin 13.1 (*)     Hematocrit 39.8 (*)     MCV 94      MCH 30.8      MCHC 32.9      RDW 13.5      Platelet Count 239      % Neutrophils 68      % Lymphocytes 20      % Monocytes 8      % Eosinophils 3      % Basophils 0      % Immature Granulocytes 1      NRBCs per 100 WBC 0      Absolute Neutrophils 4.8      Absolute Lymphocytes 1.4      Absolute Monocytes 0.6      Absolute Eosinophils 0.2      Absolute Basophils 0.0      Absolute Immature Granulocytes 0.0      Absolute NRBCs 0.0     PARTIAL THROMBOPLASTIN TIME - Normal    aPTT 31     GLUCOSE MONITOR NURSING POCT       RADIOLOGY:  Reviewed all pertinent imaging. Please see official radiology report.  CTA Head Neck with Contrast   Preliminary Result   IMPRESSION:    HEAD CT:   1.  No acute hemorrhage.   2.  Increased attenuation within the right superior M2 branch of the middle cerebral artery concerning for thromboembolus. No early ischemic changes.      HEAD CTA:     1.  Occlusion of right superior M2 branch middle cerebral artery.   2.  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      NECK CTA:   1.  Scattered plaque within the aortic arch and carotid bifurcations.   2.  No significant stenosis of either carotid or vertebral artery.      The results were communicated to Dr. Guerra at 10:40 AM on 03/07/2023.          EKG:    Performed at: 10:49 AM    Impression: Atrial fibrillation. Right bundle branch block.     Rate: 78 bpm  Rhythm: Atrial fibrillation  Axis: 58, -12  QRS Interval: 168 ms  QTc Interval: 492 ms  Comparison: When compared with ECG of 2/1/23, no significant change was found.     I have independently reviewed and interpreted the EKG(s) documented above.        I, Angelo Acosta, am serving as a scribe to document services personally performed by Dr. Otis Guerra, based on my observation and the provider's statements to me. I, Otis Guerra MD attest that Angelo Acosta is acting in a scribe capacity, has observed my performance of the services and has documented them in accordance with my direction.    Otis Guerra M.D.  Emergency Medicine  [facility] EMERGENCY DEPARTMENT  [facility] [facility] [id number] [phone]  Dept: [phone]     Otis Guerra MD  03/07/23 5522

## 2023-03-07 NOTE — PROGRESS NOTES
Brief Neuro IR note    Saw and assessed pt with Stroke Neuro team in ED. Symptoms improved, NIHSS score 6, gait baseline. Will not proceed with thrombectomy at this time due to relatively mild symptoms, matched CTP profile, and pt preference. Will monitor closely.    The patient was discussed with the attending. Dr. Pepe.    Kimberly Wing MD  Endovascular Surgical Neuroradiology Fellow, PGY-6  [id number]

## 2023-03-07 NOTE — CONSULTS
[facility]    Stroke Consult Note    Reason for Consult: Stroke Code     Chief Complaint: Stroke Symptoms (Left sided facial droops, arm and leg weakness. )      HPI  Stefan Diallo is a 77 year old male with PMH HTN, HLD, DM2, afib on warfarin. He was at work this morning (3/7) when he went to sit in a chair and missed the chair falling onto his L side without striking his head or inury. When bystanders tried to help him up he was noted to have L facial droop, L facial weakness, R gaze preference, can follow commands on the R side. Onset of symptoms 0945 with LKW immediately prior.    Warfarin has been held since 2/25 in preparation for pain pump placement tomorrow. Current INR 1.21. Confirmed no contraindications to thrombolytics with patient and family.      Imaging Findings  HEAD CT:  1.  No acute hemorrhage.  2.  Increased attenuation within the right superior M2 branch of the middle cerebral artery concerning for thromboembolus. No early ischemic changes.    HEAD CTA:   1.  Occlusion of right superior M2 branch middle cerebral artery.  2.  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  Possible subocclusive thrombus at the R MCA bifurcation.    NECK CTA:  1.  Scattered plaque within the aortic arch and carotid bifurcations.  2.  No significant stenosis of either carotid or vertebral artery.    Intravenous Thrombolysis  Risks (including potential for bleeding and death), benefits, and alternatives to thrombolytic therapy were discussed with Patient and Family. Tenecteplase (TNK) administered without delay.    Endovascular Treatment  patient to be transferred to [facility] with reassessment of deficits at that time and consideration for mechanical thrombectomy of R MCA M2 branch occluson, possible R MCA bifurcation subocclusive thrombus    Impression   Acute ischemic stroke of R MCA territory due to cardioembolism. CTA showed R MCA M2 branch occlusion, possible sub-occlusive  "thrombus R MCA bifurcation.      Recommendations  - Patient being transferred to [facility] ED at which time he will be re-evaluated and if no significant improvement in deficits may go for mechanical thrombectomy. IR team has been notified.  - Neurochecks and vital signs per post-thrombolytic orders and monitor closely for any evidence of CNS hemorrhage, bleeding, or orolingual angioedema  - Goal BP <180 / 105  - Hold all antithrombotic and anticoagulant medications for 24 hrs post-thrombolytic  - Hold pharmacologic VTE prophylaxis for 24 hrs post-thrombolytic  - Statin:  pending LDL  - Repeat Head CT 24 hrs post-thrombolytic  - MRI Brain with and without contrast  - TTE (with Bubble Study if age 60 yrs or less)  - Telemetry, EKG  - Bedside Glucose Monitoring  - Nutrition: keep NPO  - A1c, Lipid Panel, Troponin x 3  - PT/OT/SLP  - Stroke Education  - Euthermia, Euglycemia    -Discussed with stroke attending Dr. Pickett.    Thank you for this consult.      Jami Echeverria PA-C  Vascular Neurology    To page me or covering stroke neurology team member, click here: AMCOM  Choose \"On Call\" tab at top, then select \"NEUROLOGY/ALL SITES\" from middle drop-down box, press Enter, then look for \"stroke\" or \"telestroke\" for your site.    ______________________________________________________    Clinically Significant Risk Factors Present on Admission               # Drug Induced Coagulation Defect: home medication list includes an anticoagulant medication    # Hypertension: home medication list includes antihypertensive(s)      # Overweight: Estimated body mass index is 29.16 kg/m  as calculated from the following:    Height as of this encounter: 1.829 m (6').    Weight as of this encounter: 97.5 kg (215 lb).         Past Medical History   Past Medical History:   Diagnosis Date     Arthritis      Atrial fibrillation (H)      Bacteremia      Bell's palsy 2015     BPH (benign prostatic hyperplasia)      Diabetes (H)      " Gastroesophageal reflux disease      GERD (gastroesophageal reflux disease)      GI hemorrhage      High cholesterol      Hyperlipemia      Hyperlipidemia      Hypertension      Hypertension      Idiopathic peripheral neuropathy      Irregular heart beat     chronic at fib     Renal artery aneurysm (H)      Renal artery aneurysm (H)      Sleep apnea     Bipap     Sleep apnea 10/31/2021     Sleep apnea, obstructive     USES BIPAP     Type 2 diabetes mellitus (H)      UTI (lower urinary tract infection)      Past Surgical History   Past Surgical History:   Procedure Laterality Date     AMPUTATE FOOT Right 5/6/2019    Procedure: AMPUTATION, 3rd TOE RIGHT FOOT;  Surgeon: Ravi Abbasi DPM;  Location: [facility];  Service: Podiatry     FUSION SPINE POSTERIOR MINIMALLY INVASIVE THREE + LEVELS N/A 10/29/2020    Procedure: L3/4/5S1 oblique lateral lumbar interbody fusion with discectomy L3/4/5S1 Posterior minimally invasive pedicle screw placement and posterolateral instrumentation and fusion  L3/4/5S1 Posterior minimally invasive pedicle screw placement and posterolateral instrumentation and fusion;  Surgeon: Vernon Bynum MD;  Location:  OR      REPAIR COMPL ROTATOR CUFF AVULSN,CHR      Description: Chronic Rotator Cuff Avulsion;  Recorded: 04/11/2011;     ORTHOPEDIC SURGERY Right     knee surgery     ORTHOPEDIC SURGERY Right     amputation 3rd toe on right foot     TENOTOMY ACHILLES TENDON       TRANSRECTAL ULTRASONIC, TRANSURETHRAL RESECTION (TUR) OF PROSTATE CYST       Medications   Home Meds  Prior to Admission medications    Medication Sig Start Date End Date Taking? Authorizing Provider   atenolol (TENORMIN) 50 MG tablet Take 0.5 tablets (25 mg) by mouth daily 5/17/22   Collin Singh MD   atorvastatin (LIPITOR) 20 MG tablet TAKE 1 TABLET BY MOUTH EVERY DAY 1/16/23   Collin Singh MD   CONTOUR NEXT TEST test strip USE 1 EACH AS DIRECTED DAILY. 3/8/22   Collin Singh MD   diltiazem ER  COATED BEADS (CARDIZEM CD/CARTIA XT) 120 MG 24 hr capsule TAKE 1 CAPSULE BY MOUTH EVERY DAY 10/3/22   Collin Singh MD   EPINEPHrine (ANY BX GENERIC EQUIV) 0.3 MG/0.3ML injection 2-pack Inject 0.3 mg into the muscle as needed for anaphylaxis    Reported, Patient   fluticasone (FLONASE) 50 MCG/ACT nasal spray Spray 2 sprays into both nostrils daily    Reported, Patient   gabapentin (NEURONTIN) 300 MG capsule TAKE 2 CAPSULES BY MOUTH AT BEDTIME. 10/3/22   Collin Singh MD   gemfibrozil (LOPID) 600 MG tablet TAKE 1 TABLET BY MOUTH TWICE A DAY 2/2/23   Collin Singh MD   hydrOXYzine (ATARAX) 10 MG tablet Take 1 tablet (10 mg) by mouth every 6 hours as needed for itching 10/30/20   Vernon Bynum MD   lisinopril (ZESTRIL) 5 MG tablet TAKE 1 TABLET BY MOUTH EVERY DAY 10/3/22   Collin Singh MD   loratadine (CLARITIN) 10 MG tablet Take 10 mg by mouth daily    Reported, Patient   metFORMIN (GLUCOPHAGE) 500 MG tablet TAKE 1 TABLET BY MOUTH EACH MORNING AND 2 TABLETS EACH EVENING 1/16/23   Collin Singh MD   methocarbamol (ROBAXIN) 500 MG tablet Take 1 tablet (500 mg) by mouth 4 times daily as needed for muscle spasms 10/30/20   Vernon Bynum MD   montelukast (SINGULAIR) 10 MG tablet Take 10 mg by mouth daily    Reported, Patient   multivitamin (OCUVITE) TABS tablet Take 1 tablet by mouth 2 times daily    Unknown, Entered By History   omeprazole (PRILOSEC) 20 MG DR capsule TAKE 1 CAPSULE BY MOUTH EVERY DAY 1/16/23   Collin Singh MD   traMADol (ULTRAM) 50 MG tablet TAKE 1 TABLET BY MOUTH EVERY 6 HOURS AS NEEDED FOR PAIN 7/28/22   Reported, Patient   Vitamin D, Cholecalciferol, 25 MCG (1000 UT) TABS Take 1 tablet by mouth daily    Unknown, Entered By History   warfarin ANTICOAGULANT (COUMADIN) 5 MG tablet TAKE 1 TO 1.5 TABLETS BY MOUTH DAILY AS DIRECTED. ADJUST PER INR RESULTS. 5/17/22   Collin Singh MD   warfarin ANTICOAGULANT (COUMADIN) 7.5 MG tablet Take 7.5 mg by mouth once a week On Sundays     Unknown, Entered By History       Scheduled Meds      Infusion Meds    niCARdipine       sodium chloride         PRN Meds  labetalol **OR** hydrALAZINE, niCARdipine, sodium chloride    Allergies   Allergies   Allergen Reactions     Bees Swelling     Reports using an epi pen if stung     Family History   Family History   Problem Relation Age of Onset     Coronary Artery Disease Mother      Coronary Artery Disease Father      Atrial fibrillation Father      Social History   Social History     Tobacco Use     Smoking status: Former     Packs/day: 2.00     Years: 27.00     Pack years: 54.00     Types: Cigarettes     Quit date: 1990     Years since quittin.1     Smokeless tobacco: Never   Substance Use Topics     Alcohol use: No     Drug use: No       Review of Systems   The 10 point Review of Systems is negative other than noted in the HPI or here.        PHYSICAL EXAMINATION  Temp:  [98.2  F (36.8  C)] 98.2  F (36.8  C)  Pulse:  [77-84] 81  Resp:  [16-22] 19  BP: (129-137)/(68-85) 137/85  SpO2:  [96 %-98 %] 96 %     General Exam  General:  patient lying in bed without any acute distress    HEENT:  normocephalic/atraumatic  Pulmonary:  no respiratory distress    Neuro Exam  Mental Status:  alert, oriented x 3, follows commands, naming and repetition normal, speech midly dysarthric  Cranial Nerves:  hearing not formally tested but intact to conversation, L visual field cut vs neglect, L lower facial droop, facial sensation symmetric, mild dysarthria  Motor:  no abnormal movements, LUE/LLE drift but does not hit bed, RUE/RLE without drift   Reflexes:  unable to test (telestroke)  Sensory:  decreased sensation to touch LUE, extinction to double simultaneous stimulation LUE  Coordination:  no incoordination out of proportion to weakness  Station/Gait:  unable to test due to telestroke    Dysphagia Screen  Dysarthria or facial droop present - Maintain NPO, consult SLP    Stroke Scales    NIHSS  1a. Level of  Consciousness 0-->Alert, keenly responsive   1b. LOC Questions 0-->Answers both questions correctly   1c. LOC Commands 0-->Performs both tasks correctly   2.   Best Gaze 0-->Normal   3.   Visual 2-->Complete hemianopia   4.   Facial Palsy 2-->Partial paralysis (total or near-total paralysis of lower face)   5a. Motor Arm, Left 1-->Drift, limb holds 90 (or 45) degrees, but drifts down before full 10 seconds, does not hit bed or other support   5b. Motor Arm, Right 0-->No drift, limb holds 90 (or 45) degrees for full 10 secs   6a. Motor Leg, Left 1-->Drift, leg falls by the end of the 5-sec period but does not hit bed   6b. Motor Leg, right 0-->No drift, leg holds 30 degree position for full 5 secs   7.   Limb Ataxia 0-->Absent   8.   Sensory 2-->Severe to total sensory loss, patient is not aware of being touched in the face, arm, and leg   9.   Best Language 0-->No aphasia, normal   10. Dysarthria 1-->Mild-to-moderate dysarthria, patient slurs at least some words and, at worst, can be understood with some difficulty   11. Extinction and Inattention  1-->Visual, tactile, auditory, spatial, or personal inattention or extinction to bilateral simultaneous stimulation in one of the sensory modalities   Total 9 (03/07/23 1104)       Modified [place] Score (Pre-morbid)  0 - No symptoms.    Imaging  I personally reviewed all imaging; relevant findings per HPI.     Lab Results Data   CBC  Recent Labs   Lab 03/07/23  1047   WBC 6.9   RBC 4.25*   HGB 13.1*   HCT 39.8*        Basic Metabolic Panel    Recent Labs   Lab 03/07/23  1047   *   POTASSIUM 4.3   CHLORIDE 101   CO2 22   BUN 25.0*   CR 0.89   *   LUTHER 9.3     Liver Panel  No results for input(s): PROTTOTAL, ALBUMIN, BILITOTAL, ALKPHOS, AST, ALT, BILIDIRECT in the last 168 hours.  INR    Recent Labs   Lab Test 03/07/23  1047 02/24/23  1411 01/27/23  1129   INR 1.21* 2.7* 1.8*      Lipid Profile    Recent Labs   Lab Test 12/28/21  1304 11/06/20  1110  02/05/19  1512   CHOL 152 151  --    HDL 41 40  --    LDL 91 83 93   TRIG 100 138  --      A1C    Recent Labs   Lab Test 02/01/23  1339 08/19/22  1621 05/16/22  1701   A1C 6.4* 6.2* 6.8*     Troponin    Recent Labs   Lab 03/07/23  1047   CTROPT 66*          Stroke Code Data Data   Stroke Code Data  (for stroke code with tele)  Stroke code activated 03/07/23   1028   First stroke provider response 03/07/23   1031   Video start time 03/07/23   1059   Video end time     1127   Last known normal 03/07/23   0945   Time of discovery  (or onset of symptoms)  03/07/23   0945   Head CT read by Stroke Neuro Dr/Provider 03/07/23   1034   Was stroke code de-escalated? No               Telestroke Service Details  Type of service telemedicine diagnostic assessment of acute neurological changes   Reason telemedicine is appropriate patient requires assessment with a specialist for diagnosis and treatment of neurological symptoms   Mode of transmission secure interactive audio and video communication per Lacy   Originating site (patient location) [facility]    Distant site (provider location) [facility]       I personally examined and evaluated the patient today. At the time of my evaluation and management the patient was in critical condition today due to stroke code. I personally managed: history, exam, discussion with patient/family. Key decisions made today included: TNK, transfer. I spent a total of 90 minutes providing critical care services, evaluating the patient, directing care and reviewing laboratory values and radiologic reports.

## 2023-03-07 NOTE — ED NOTES
Pt presented via  [facility]. Pt fell off the chair and symptoms started with Lt facial droops, Left arm and Left leg weakness. LKW 0969. Pt has hx of a.fib and on warfarin but have been holding warfarin for 9 days due to some kind of procedure. Pt just came back from CT to Room 1.

## 2023-03-07 NOTE — ED NOTES
Pt back from CT. Left remains weaker. Alert and oriented. Family at bedside. Telestroke awaiting MD.

## 2023-03-07 NOTE — ED PROVIDER NOTES
History     Chief Complaint:  Stroke       HPI   Stefan Diallo is a 77 year old male past medical history significant for A-fib typically on chronic Coumadin however has been off this as they are waiting to place a nerve stimulator secondary to chronic back pain, type 2 diabetes renal artery aneurysm presenting from [facility] where he presented approximately 3 hours prior to arrival here for evaluation of sudden onset left-sided facial droop, left arm and left leg weakness found to have acute ischemic stroke involving left M2 segment with occlusion on CTA.  He was empirically given TNKase and transferred to [facility] for bedside evaluation by stroke neuro for consideration of emergent thrombectomy.  On arrival here, he denies headache, no reported chest pain, chest pressure or shortness of breath.  He feels well and is unaware of the deficits on his left.  EMS reports with right with some mildly elevated blood pressures in the 140s to 150s.  Stroke neuro was present at the patient's time of arrival.    Preliminary Result   IMPRESSION:    HEAD CT:   1.  No acute hemorrhage.   2.  Increased attenuation within the right superior M2 branch of the middle cerebral artery concerning for thromboembolus. No early ischemic changes.       HEAD CTA:    1.  Occlusion of right superior M2 branch middle cerebral artery.   2.  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       NECK CTA:   1.  Scattered plaque within the aortic arch and carotid bifurcations.   2.  No significant stenosis of either carotid or vertebral artery.       The results were communicated to Dr. Guerra at 10:40 AM on 03/07/2023.         Independent Historian:   Patient is able to provide an independent history however this was supplemented by direct conversation with the treating emergency medicine physician at [facility].     Review of External Notes: ED note from 2/7/2023, treated with TNK therefore left-sided facial droop, arm and  leg weakness as well as neglect.  Symptom onset 0 945.  Sent to [facility] for consideration of emergent thrombectomy after CTA showed left-sided M2 occlusion.    ROS:  Review of Systems  10 point ROS completed, negative except as indicated in the HPI.    Allergies:  Bees     Medications:    atenolol (TENORMIN) 50 MG tablet  atorvastatin (LIPITOR) 20 MG tablet  CONTOUR NEXT TEST test strip  diltiazem ER COATED BEADS (CARDIZEM CD/CARTIA XT) 120 MG 24 hr capsule  EPINEPHrine (ANY BX GENERIC EQUIV) 0.3 MG/0.3ML injection 2-pack  fluticasone (FLONASE) 50 MCG/ACT nasal spray  gabapentin (NEURONTIN) 300 MG capsule  gemfibrozil (LOPID) 600 MG tablet  hydrOXYzine (ATARAX) 10 MG tablet  lisinopril (ZESTRIL) 5 MG tablet  loratadine (CLARITIN) 10 MG tablet  metFORMIN (GLUCOPHAGE) 500 MG tablet  methocarbamol (ROBAXIN) 500 MG tablet  montelukast (SINGULAIR) 10 MG tablet  multivitamin (OCUVITE) TABS tablet  omeprazole (PRILOSEC) 20 MG DR capsule  traMADol (ULTRAM) 50 MG tablet  Vitamin D, Cholecalciferol, 25 MCG (1000 UT) TABS  warfarin ANTICOAGULANT (COUMADIN) 5 MG tablet  warfarin ANTICOAGULANT (COUMADIN) 7.5 MG tablet        Past Medical History:    Past Medical History:   Diagnosis Date     Arthritis      Atrial fibrillation (H)      Bacteremia      Bell's palsy 2015     BPH (benign prostatic hyperplasia)      Diabetes (H)      Gastroesophageal reflux disease      GERD (gastroesophageal reflux disease)      GI hemorrhage      High cholesterol      Hyperlipemia      Hyperlipidemia      Hypertension      Hypertension      Idiopathic peripheral neuropathy      Irregular heart beat      Renal artery aneurysm (H)      Renal artery aneurysm (H)      Sleep apnea      Sleep apnea 10/31/2021     Sleep apnea, obstructive      Type 2 diabetes mellitus (H)      UTI (lower urinary tract infection)        Past Surgical History:    Past Surgical History:   Procedure Laterality Date     AMPUTATE FOOT Right 5/6/2019    Procedure: AMPUTATION,  3rd TOE RIGHT FOOT;  Surgeon: Ravi Abbasi DPM;  Location: [facility];  Service: Podiatry     FUSION SPINE POSTERIOR MINIMALLY INVASIVE THREE + LEVELS N/A 10/29/2020    Procedure: L3/4/5S1 oblique lateral lumbar interbody fusion with discectomy L3/4/5S1 Posterior minimally invasive pedicle screw placement and posterolateral instrumentation and fusion  L3/4/5S1 Posterior minimally invasive pedicle screw placement and posterolateral instrumentation and fusion;  Surgeon: Vernon Bynum MD;  Location: [facility] OR     HC REPAIR COMPL ROTATOR CUFF AVULSN,CHR      Description: Chronic Rotator Cuff Avulsion;  Recorded: 04/11/2011;     ORTHOPEDIC SURGERY Right     knee surgery     ORTHOPEDIC SURGERY Right     amputation 3rd toe on right foot     TENOTOMY ACHILLES TENDON       TRANSRECTAL ULTRASONIC, TRANSURETHRAL RESECTION (TUR) OF PROSTATE CYST          Family History:    family history includes Atrial fibrillation in his father; Coronary Artery Disease in his father and mother.    Social History:   reports that he quit smoking about 33 years ago. His smoking use included cigarettes and cigarettes. He has a 54.00 pack-year smoking history. He has never used smokeless tobacco. He reports that he does not drink alcohol and does not use drugs.  PCP: Collin Singh     Physical Exam     Patient Vitals for the past 24 hrs:   BP Temp Temp src Pulse Resp SpO2 Weight   03/07/23 1348 -- -- -- 68 17 98 % --   03/07/23 1345 120/78 -- -- 79 -- -- --   03/07/23 1326 (!) 145/86 -- -- 67 13 97 % --   03/07/23 1308 (!) 154/96 97.9  F (36.6  C) Temporal 78 18 98 % 99.7 kg (219 lb 12.8 oz)        Physical Exam  Constitutional: Alert, attentive, GCS 15   Eyes: EOM are normal, anicteric, conjugate gaze  CV: distal extremities warm, well perfused  Chest: Non-labored breathing on RA  GI:  non tender. No distension. No guarding or rebound.    Neurological: Alert, attentive, left-sided facial droop is noticeable, he has neglect of the  left including face, arm, leg, no clear drift upper or lower extremities, he is mentating well.  Skin: Skin is warm and dry.        Emergency Department Course     Imaging:  CT Head w/o Contrast   Preliminary Result   IMPRESSION:   1. Redemonstrated hyperdense intraluminal thrombus in the right MCA   distribution. This is accompanied by an interval development of a   hypoattenuation obscuring the gray-white junction at the right insula   extending to the right frontal opercular region compatible with an   evolving component of right MCA distribution infarct. No intracranial   hemorrhage.      2.  Background age-related changes as above.      Findings and impression discussed with Pamela Lopez PA-C by phone   at 1340 hours on 3/7/2023.      CTA Head Neck with Contrast    (Results Pending)   CT Head Perfusion w Contrast    (Results Pending)      Report per radiology    Laboratory:  Repeat INR in process,  CBC, CMP pending.      Emergency Department Course & Assessments:             Interventions:  Medications   sodium chloride 0.9% infusion (has no administration in time range)   labetalol (NORMODYNE/TRANDATE) injection 10 mg (has no administration in time range)     Or   hydrALAZINE (APRESOLINE) injection 10 mg (has no administration in time range)   niCARdipine 40 mg in 200 mL NS (CARDENE) infusion (has no administration in time range)   iopamidol (ISOVUE-370) solution 125 mL (125 mLs Intravenous $Given 3/7/23 1329)   100mL Saline Flush (100 mLs Intravenous $Given 3/7/23 1329)        Independent Interpretation (X-rays, CTs, rhythm strip):  None    Consultations/Discussion of Management or Tests:  Stroke Neuro, prehospital and at time of arrival.     Social Determinants of Health affecting care:   None    Disposition:  The patient was admitted to the hospital under the care of Dr. Manning, to ICU.     Impression & Plan      Medical Decision Makin-year-old male past medical history seen for chronic A-fib off  his Coumadin x2 weeks for consideration of nerve stimulator for chronic low back pain who presented to outside hospital ([facility]) approximately 3 hours prior to arrival here for sudden onset left-sided facial droop, weakness and neglect, he was seen by stroke neuro there, they gave TNK with CT imaging showing left M2 segment occlusion.  He was transferred here emergently for bedside stroke neuro eval and consideration of thrombectomy.  Antonette Lopez, stroke neuro PA was present upon patient's arrival, given his improvements (no drift left upper or lower extremities) I do not feel he was a thrombectomy candidate.  As such, we will plan for ICU admission for post TNK cares and close neuro monitoring.  Blood pressure tiffanie well within goal parameter of under 185.    Critical Care time:  was 35 minutes for this patient excluding procedures, prehospital discussion with stroke neurology, coordinating bed placement within the ER.     Diagnosis:    ICD-10-CM    1. Cerebrovascular accident (CVA) due to occlusion of left middle cerebral artery (H)  I63.512            Stefan Morejon MD  [employer]  2:01 PM 03/07/23          Stefan Morejon MD  03/07/23 1401

## 2023-03-07 NOTE — H&P
[facility]    History and Physical - Hospitalist Service       Date of Admission:  3/7/2023    Assessment & Plan      Stefan Diallo is a 77 year old male with past medical history of hypertension, hyperlipidemia, type 2 diabetes, A-fib (warfarin on hold for procedure since 2/24) presenting with left sided weakness, facial droop. He is being admitted for acute ischemic stroke s/p TNK.      Acute ischemic stroke of R MCA territory due to cardioembolism status post tenecteplase on 3/7  Patient with history of atrial fibrillation and on chronic warfarin anticoagulation.  However, warfarin has been held since 2/25 for pain pump placement and INR ofAnd admission requested for further management.  CT head-Increased attenuation within the right superior M2 branch of the middle cerebral artery concerning for thromboembolus. No early ischemic changes.  HEAD CTA: 1.  Occlusion of right superior M2 branch middle cerebral artery. 2.  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 Possible subocclusive thrombus at the R MCA bifurcation  -Goal -180/105. Discussed with neuro, plan not to drop SBP <120 to avoid hypoperfusion  - MRI brain with and without contrast and repeat head CT per neurology  -Transthoracic echocardiogram  - A1c 6.4% on 2/1/23, no need to repeat at this time  - lipid panel and troponin  -start IVF, had multiple scans with contrast today  -PT/OT/SLP  -Neurology consultation  -Neurochecks and vitals per postthrombolytic orders  -hold antiplatelets and anticoagulant meds for 24hrs post lytic    Atrial fibrillation  On chronic anticoagulation with warfarin. However, has been held since 2/25 for planned pain pump placement.   - did not start meds at this time to avoid hypotension/brain hypoperfusion  - consider resuming PTA meds when appropriate from neurology stand point    Dyslipidemia  - lipid panel ordered  - resume PTA statin once passes swallow eval    Hypertension  Blood  pressure is currently controlled, last documented 120/78.  - hold PTA PO meds at this time to minimized hypotension    Type II DM  - A1c6.4% on 2/1/23  - hold PTA Metformin  - sliding scale insulin     KISHA  Resume CPAP per home setting    Chronic back pain  Patient planned for pain pump placement 3/8/23.  States he has chronic back pain from nerve impingement and uses tylenol PTA for pain.   - Tylenol prn for pain       Diet:   regular diet once passes swallow eval  DVT Prophylaxis: Pneumatic Compression Devices  Scruggs Catheter: Not present  Lines: None     Cardiac Monitoring: None  Code Status:   Full code, discussed with patient     Clinically Significant Risk Factors Present on Admission               # Drug Induced Coagulation Defect: home medication list includes an anticoagulant medication    # Hypertension: home medication list includes antihypertensive(s)      # Overweight: Estimated body mass index is 29.81 kg/m  as calculated from the following:    Height as of an earlier encounter on 3/7/23: 1.829 m (6').    Weight as of this encounter: 99.7 kg (219 lb 12.8 oz).           Disposition Plan  admit to ICU     Expected Discharge Date: 03/09/2023                  Raad Manning MD  Hospitalist Service  [facility]  Securely message with [employer] (more info)  Text page via [facility] Paging/Directory     ______________________________________________________________________    Chief Complaint   Left-sided weakness    History is obtained from the patient, significant other, chart review and discussion with ED provider    History of Present Illness   Stefan Diallo is a 77 year old male with past medical history of hypertension, hyperlipidemia, type 2 diabetes, A-fib on warfarin therapy who presented to [facility] emergency room for left-sided facial droop, arm and leg weakness.  Patient's warfarin has been held since 2/25 for anticipated pain pump placement tomorrow (3/8). Patient was at  work this morning when he went to sit in a chair and missed the chair falling onto his left side. His son, who is his boss, went to help patient and noted left sided facial droop, left sided weakness and activated EMS.  He otherwise denies headache, n/v, cough, sob, fever or chills.     At outside ED, code stroke was activated. CT head showed increased attenuation within the right superior M2 branch of the middle cerebral artery concerning for thromboembolus. No early ischemic changes. HEAD CTA: 1.  Occlusion of right superior M2 branch middle cerebral artery. 2.  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 He received TNK. Transferred to [facility] ED for reassessment and if no significant improvement in deficit for consideration of mechanical thrombectomy.     [facility] had repeat head CT perfusion Elevated mean transit time and decreased cerebral blood  flow in the anterior right MCA distribution with areas of decreased cerebral blood volume within this distribution. Overall appearance suggests anterior right MCA distribution ischemia with a central component of core infarct. Neurology evaluated in ED, mechanical thrombectomy is deferred given some improvement at this time and plan for ICU admission.       Past Medical History    Past Medical History:   Diagnosis Date     Arthritis      Atrial fibrillation (H)      Bacteremia      Bell's palsy 2015     BPH (benign prostatic hyperplasia)      Diabetes (H)      Gastroesophageal reflux disease      GERD (gastroesophageal reflux disease)      GI hemorrhage      High cholesterol      Hyperlipemia      Hyperlipidemia      Hypertension      Hypertension      Idiopathic peripheral neuropathy      Irregular heart beat     chronic at fib     Renal artery aneurysm (H)      Renal artery aneurysm (H)      Sleep apnea     Bipap     Sleep apnea 10/31/2021     Sleep apnea, obstructive     USES BIPAP     Type 2 diabetes mellitus (H)      UTI (lower urinary tract  infection)        Past Surgical History   Past Surgical History:   Procedure Laterality Date     AMPUTATE FOOT Right 5/6/2019    Procedure: AMPUTATION, 3rd TOE RIGHT FOOT;  Surgeon: Ravi Abbasi DPM;  Location: [facility] [place];  Service: Podiatry     FUSION SPINE POSTERIOR MINIMALLY INVASIVE THREE + LEVELS N/A 10/29/2020    Procedure: L3/4/5S1 oblique lateral lumbar interbody fusion with discectomy L3/4/5S1 Posterior minimally invasive pedicle screw placement and posterolateral instrumentation and fusion  L3/4/5S1 Posterior minimally invasive pedicle screw placement and posterolateral instrumentation and fusion;  Surgeon: Vernon Bynum MD;  Location:  OR      REPAIR COMPL ROTATOR CUFF AVULSN,CHR      Description: Chronic Rotator Cuff Avulsion;  Recorded: 04/11/2011;     ORTHOPEDIC SURGERY Right     knee surgery     ORTHOPEDIC SURGERY Right     amputation 3rd toe on right foot     TENOTOMY ACHILLES TENDON       TRANSRECTAL ULTRASONIC, TRANSURETHRAL RESECTION (TUR) OF PROSTATE CYST         Prior to Admission Medications   Prior to Admission Medications   Prescriptions Last Dose Informant Patient Reported? Taking?   CONTOUR NEXT TEST test strip   No No   Sig: USE 1 EACH AS DIRECTED DAILY.   EPINEPHrine (ANY BX GENERIC EQUIV) 0.3 MG/0.3ML injection 2-pack   Yes No   Sig: Inject 0.3 mg into the muscle as needed for anaphylaxis   Vitamin D, Cholecalciferol, 25 MCG (1000 UT) TABS 3/7/2023  Yes Yes   Sig: Take 1 tablet by mouth daily   acetaminophen (TYLENOL) 325 MG tablet   Yes Yes   Sig: Take 325-650 mg by mouth every 6 hours as needed for mild pain   atenolol (TENORMIN) 50 MG tablet 3/7/2023  No Yes   Sig: Take 0.5 tablets (25 mg) by mouth daily   atorvastatin (LIPITOR) 20 MG tablet 3/7/2023  No Yes   Sig: TAKE 1 TABLET BY MOUTH EVERY DAY   diltiazem ER COATED BEADS (CARDIZEM CD/CARTIA XT) 120 MG 24 hr capsule 3/7/2023  No Yes   Sig: TAKE 1 CAPSULE BY MOUTH EVERY DAY   fluticasone (FLONASE) 50 MCG/ACT  nasal spray   Yes No   Sig: Spray 2 sprays into both nostrils daily as needed   gabapentin (NEURONTIN) 300 MG capsule 3/6/2023  No Yes   Sig: TAKE 2 CAPSULES BY MOUTH AT BEDTIME.   gemfibrozil (LOPID) 600 MG tablet 3/7/2023  No Yes   Sig: TAKE 1 TABLET BY MOUTH TWICE A DAY   lisinopril (ZESTRIL) 5 MG tablet 3/7/2023  No Yes   Sig: TAKE 1 TABLET BY MOUTH EVERY DAY   metFORMIN (GLUCOPHAGE) 500 MG tablet 3/7/2023  No Yes   Sig: TAKE 1 TABLET BY MOUTH EACH MORNING AND 2 TABLETS EACH EVENING   multivitamin (OCUVITE) TABS tablet 3/7/2023  Yes Yes   Sig: Take 1 tablet by mouth 2 times daily   omeprazole (PRILOSEC) 20 MG DR capsule 3/7/2023  No Yes   Sig: TAKE 1 CAPSULE BY MOUTH EVERY DAY   warfarin ANTICOAGULANT (COUMADIN) 5 MG tablet Past Month  No Yes   Sig: TAKE 1 TO 1.5 TABLETS BY MOUTH DAILY AS DIRECTED. ADJUST PER INR RESULTS.   Patient taking differently: 7.5mg on ; 5mg rest of the week      Facility-Administered Medications: None        Review of Systems    The 10 point Review of Systems is negative other than noted in the HPI    Social History   I have reviewed this patient's social history and updated it with pertinent information if needed.  Social History     Tobacco Use     Smoking status: Former     Packs/day: 2.00     Years: 27.00     Pack years: 54.00     Types: Cigarettes     Quit date: 1990     Years since quittin.1     Smokeless tobacco: Never   Substance Use Topics     Alcohol use: No     Drug use: No       Family History   I have reviewed this patient's family history and updated it with pertinent information if needed.  Family History   Problem Relation Age of Onset     Coronary Artery Disease Mother      Coronary Artery Disease Father      Atrial fibrillation Father        Allergies   Allergies   Allergen Reactions     Bees Swelling     Reports using an epi pen if stung        Physical Exam   Vital Signs: Temp: 97.9  F (36.6  C) Temp src: Temporal BP: (!) 134/97 Pulse: 81   Resp: 17  SpO2: 99 % O2 Device: None (Room air)    Weight: 219 lbs 12.78 oz    General Appearance: alert, awake and no apparent distress  HEENT: NCAT, oral mucosa is moist no pharyngeal erythema  Respiratory: CTAB, no wheezing  Cardiovascular: irregularly irregular  GI: soft and non-tender  Skin: warm and dry.   Musculoskeletal: normal muscle tone, no obvious joint knee/elbow joint effusion or swellling  Neurologic: alert and oriented, +left facial droop, +left arm drift  Psychiatric: mood and affect are normal    Medical Decision Making       80 MINUTES SPENT BY ME on the date of service doing chart review, history, exam, documentation & further activities per the note.  ------------------ MEDICAL DECISION MAKING ------------------------------------------------------------------------------------------------------  MANAGEMENT DISCUSSED with the following over the past 24 hours: patient, significant other, neurologist   NOTE(S)/MEDICAL RECORDS REVIEWED over the past 24 hours: neurology note, ED notes  Tests ORDERED & REVIEWED in the past 24 hours:  - BMP  - CMP  - CBC  - Coags/INR  Tests personally interpreted in the past 24 hours:  - CTH, CTA head, neck showing as above      Data     I have personally reviewed the following data over the past 24 hrs:    7.1  \   13.7   / 248     139 103 22.8 /  132 (H)   4.1 24 0.86 \       ALT: 10 AST: 22 AP: 116 TBILI: 0.4   ALB: 4.0 TOT PROTEIN: 7.3 LIPASE: N/A       Trop: 66 (H) BNP: N/A       INR:  1.12 PTT:  31   D-dimer:  N/A Fibrinogen:  N/A       Imaging results reviewed over the past 24 hrs:   Recent Results (from the past 24 hour(s))   CTA Head Neck with Contrast    Narrative    EXAM: CTA HEAD NECK W CONTRAST  LOCATION: [facility]  DATE/TIME: 3/7/2023 10:42 AM    INDICATION: Code Stroke to evaluate for potential thrombolysis and thrombectomy. PLEASE READ IMMEDIATELY. Left-sided weakness and facial droop.  COMPARISON: None.  CONTRAST: IsoVue-370 75  mL.  TECHNIQUE: Head and neck CT angiogram with IV contrast. Noncontrast head CT followed by axial helical CT images of the head and neck vessels obtained during the arterial phase of intravenous contrast administration. Axial 2D reconstructed images and   multiplanar 3D MIP reconstructed images of the head and neck vessels were performed by the technologist. Dose reduction techniques were used. All stenosis measurements made according to NASCET criteria unless otherwise specified.    FINDINGS:   NONCONTRAST HEAD CT:   INTRACRANIAL CONTENTS: No intracranial hemorrhage, extraaxial collection, or mass effect. Increased attenuation within a right superior M2 branch of the middle cerebral artery (coronal image 28-29, sagittal image 19), concerning for thromboembolus. No CT   evidence of acute infarct. Mild to moderate presumed chronic small vessel ischemic changes. Mild generalized volume loss. No hydrocephalus.     VISUALIZED ORBITS/SINUSES/MASTOIDS: No intraorbital abnormality. No paranasal sinus mucosal disease. No middle ear or mastoid effusion.    BONES/SOFT TISSUES: No acute abnormality.    HEAD CTA:  ANTERIOR CIRCULATION: Occlusion of superior division right M2 branch of the middle cerebral artery. No other large artery stenosis or occlusion. Saccular aneurysm at the MCA bifurcation measuring 3 mm. Fetal origin of the right posterior cerebral artery   from the anterior circulation.    POSTERIOR CIRCULATION: No stenosis/occlusion, aneurysm, or high flow vascular malformation. Dominant right and smaller left vertebral artery contribute to a normal basilar artery.     DURAL VENOUS SINUSES: Not well evaluated on a technical basis.    NECK CTA:  RIGHT CAROTID: Atherosclerotic plaque results in less than 50% stenosis in the right ICA. No dissection.    LEFT CAROTID: Atherosclerotic plaque results in less than 50% stenosis in the left ICA. No dissection.    VERTEBRAL ARTERIES: No focal stenosis or dissection. Dominant  right and smaller left vertebral arteries.    AORTIC ARCH: Classic aortic arch anatomy with no significant stenosis at the origin of the great vessels.    NONVASCULAR STRUCTURES: Advanced cervical spondylosis.      Impression    IMPRESSION:   HEAD CT:  1.  No acute hemorrhage.  2.  Increased attenuation within the right superior M2 branch of the middle cerebral artery concerning for thromboembolus. No early ischemic changes.    HEAD CTA:   1.  Occlusion of right superior M2 branch middle cerebral artery.  2.  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    NECK CTA:  1.  Scattered plaque within the aortic arch and carotid bifurcations.  2.  No significant stenosis of either carotid or vertebral artery.    The results were communicated to Dr. Guerra at 10:40 AM on 03/07/2023.   CT Head w/o Contrast    Narrative    CT HEAD WITHOUT CONTRAST 3/7/2023 1:34 PM    INDICATION: Right MCA syndrome.  TECHNIQUE: CT scan of the head without contrast. Dose reduction  techniques were used.  CONTRAST: None.  COMPARISON: 3/7/2023 head CT/head and neck CTA at 1034 hours.    FINDINGS:   Redemonstrated hyperdense intraluminal thrombus in the right MCA.  Developing hypoattenuation obscuring the gray-white junction at the  right insula extending slightly into the right frontal opercular  region relates to an evolving right MCA distribution infarct. No  associated intracranial hemorrhage. No extra-axial collection.  Background mild burden scattered chronic small vessel ischemic change.  Mild to moderate diffuse parenchymal volume loss. Ventricular size is  in keeping with this volume loss. Calvarium intact. Postoperative  changes to the bilateral lenses. Right-sided scleral band. Polyp  versus mucus retention cyst in the right maxillary sinus.      Impression    IMPRESSION:  1. Redemonstrated hyperdense intraluminal thrombus in the right MCA  distribution. This is accompanied by an interval development of a  hypoattenuation obscuring  the gray-white junction at the right insula  extending to the right frontal opercular region compatible with an  evolving component of right MCA distribution infarct. No intracranial  hemorrhage.    2.  Background age-related changes as above.    Findings and impression discussed with Pamela Lopez PA-C by phone  at 1340 hours on 3/7/2023.    LEON MARTE MD         SYSTEM ID:  [id number]   CTA Head Neck with Contrast    Narrative    CTA HEAD AND NECK WITH CONTRAST 3/7/2023 1:36 PM    INDICATION: Right MCA syndrome.  TECHNIQUE: Head and neck CT angiogram with IV contrast. CT images of  the head and neck vessels obtained during the arterial phase of  intravenous contrast administration. Axial helical 2D reconstructed  images and multiplanar 3D MIP reconstructed images of the head and  neck vessels were performed on a separate workstation. Dose reduction  techniques were used.  CONTRAST: 75mL Isovue-370  COMPARISON: 3/7/2023 head and neck CTA at 1039 hours.     FINDINGS:  HEAD CTA: Calcification of the distal internal carotid arteries  bilaterally without significant stenosis. Redemonstrated occlusion of  the proximal right M2 segment supplying the superior division. With  this, there is markedly diminished/absent opacification of the  anterior right MCA distal branch vessels. Additional moderate stenosis  of the proximal right M2 segment supplying the posterior right MCA  distribution. Unchanged 3 mm small aneurysm at the right MCA  bifurcation. Bilateral JOHN are patent. Right vertebral artery  dominance with multifocal mild narrowings in the nondominant left  vertebral artery. Mild narrowings in the basilar artery. Fetal origin  right PCA. Bilateral PCA are patent.    NECK CTA: Mild narrowings in the right common carotid artery. Less  than 50% stenosis at the proximal right internal carotid artery.  Approximately 40% stenosis in the mid left common carotid artery with  mild narrowing in the proximal left internal  carotid artery. Dominant  right vertebral artery with mild right vertebral artery ostial  stenosis. Nondominant left vertebral artery is patent. Cervical spine  degenerative change and partially visualized upper thoracic spine  degenerative change.      Impression    IMPRESSION:  HEAD CTA:  1.  Redemonstrated occlusion of the proximal right M2 segment  supplying the superior division with markedly diminished/absent  opacification of the distal right MCA branch vessels of the anterior  right MCA distribution. This is overall unchanged.    2.  Unchanged 3 mm aneurysm at the right MCA bifurcation.    3.  Moderate narrowing of the proximal right M2 segment supplying the  posterior right MCA distribution with multifocal mild narrowings in  the nondominant left vertebral artery.    NECK CTA:  1.  Less than 50% stenosis of the proximal right internal carotid  artery and approximately 40% stenosis in the mid left common carotid  artery are unchanged. Additional mild narrowings of the neck vessels  elsewhere.    Findings were discussed with TAO WOMACK PA-C via telephone at  1340 hours on 3/7/2023.    LEON MARTE MD         SYSTEM ID:  [id number]   CT Head Perfusion w Contrast    Narrative    CT BRAIN PERFUSION March 7, 2023 1:46 PM    HISTORY: Right MCA syndrome.    TECHNIQUE: Time sequential axial CT images of the head were acquired  during the administration of intravenous contrast (50mL Isovue-370).  CTA images of the [place] as well as color perfusion maps of  the brain were created from this time sequential axial source data.  Radiation dose for this scan was reduced using automated exposure  control, adjustment of the mA and/or kV according to patient size, or  iterative reconstruction technique.    COMPARISON: 3/7/2023 head and neck CTAs.    FINDINGS: Elevated mean transit time and decreased cerebral blood flow  with areas of decreased cerebral blood volume in the anterior right  MCA distribution. This  is compatible with an anterior right MCA  distribution infarct with marginal ischemia. Rapid analysis with 13 mL  cerebral blood flow less than 30% and 100 mL of T-Max greater than 6  seconds with a mismatch volume of 87 mL and a mismatch ratio of 7.7.  Please note that the area of mismatch is potentially slightly  overestimated, as areas of the posterior fossa and inferior occipital  lobes demonstrating elevated T-Max are presumably artifactual.      Impression    IMPRESSION: Elevated mean transit time and decreased cerebral blood  flow in the anterior right MCA distribution with areas of decreased  cerebral blood volume within this distribution. Overall appearance  suggests anterior right MCA distribution ischemia with a central  component of core infarct.    Findings and impression discussed with Chantelle Lopez PA-C by phone at  1355 hours on 3/7/2023.      LEON MARTE MD         SYSTEM ID:  [id number]

## 2023-03-07 NOTE — ED NOTES
Medics here to tranfer pt. Report given to medics and Charge nurse Hemal at [facility]. Neuro unchanged.

## 2023-03-07 NOTE — CONSULTS
[facility]    Stroke Consult Note    Reason for Consult:  stroke    Chief Complaint: Stroke       HPI  Stefan Diallo is a 77 year old male who presented to [facility] as a stroke code this morning, please see note from my colleague Jami More for details. He was last well around 0945 this morning then found to have R MCA syndrome, and R MCA occlusion was seen on imaging. He was evaluated on camera over telemedicine and treated with tenecteplase at 11:23.  On arrival in [facility] ED he feels well without complaints but his fiancee at bedside does notice that he is dysarthric with L side weakness.  BP in 120-160 range in ED.    Stroke Evaluation Summarized    MRI/Head CT Head CT shows no large stroke or bleed, some early ischemic changes possibly in R insula   Intracranial Vasculature CTA head shows R M2 occusion with an area of aneurysm as well at the R bifurcation   Cervical Vasculature CTA neck <50% stenosis R ICA, 40% L common carotid     Echocardiogram Pending*   EKG/Telemetry EKG pending   Other Testing Not Applicable      LDL  No lab value available in past 30 days   A1C  2/1/2023: 6.4 %   Troponin 3/7/2023: 66 ng/L       Impression  Acute R MCA stroke likely secondary to cardioembolism from atrial fibrillation off anticoagulation for an anticipated procedure.     Recommendations  Acute stroke management  - Neuro checks every 1 hours  - Page neurology stat with any worsening in neuro exam, could be a candidate for thrombectomy  - Continue permissive hypertension goal 120-180mmHg, want to avoid hypotension  - PT/OT/ST consults  - Maintain normoglycemia, normonatremia, normothermia    Diagnostic testing  - Continue telemetry while inpatient  - Transthoracic echo today  - MRI brain in the morning, ideally 3-7 am. Head CT stat sooner if any change in neuro exam especially headache with worse deficits.  - Check LDL, A1c    Secondary stroke prevention  - No antiplatelets for now, within 24  "hrs of TNK.  - Statin for goal LDL 40-70  - Long term BP goal <135/80 to be achieved as outpatient within several weeks  - Better control of DM2 needed, goal A1C <7.0  - Education: [facility] stroke education consult  - Mediterranean diet and daily exercise      Patient Follow-up    - final recommendation pending work-up    Thank you for this consult. We will continue to follow.     Chantelle Lopez PA-C  Vascular Neurology    To page me or covering stroke neurology team member, click here: [facility]COM  Choose \"On Call\" tab at top, then select \"NEUROLOGY/ALL SITES\" from middle drop-down box, press Enter, then look for \"stroke\" or \"telestroke\" for your site.    _____________________________________________________    Clinically Significant Risk Factors Present on Admission               # Drug Induced Coagulation Defect: home medication list includes an anticoagulant medication    # Hypertension: home medication list includes antihypertensive(s)      # Overweight: Estimated body mass index is 29.81 kg/m  as calculated from the following:    Height as of an earlier encounter on 3/7/23: 1.829 m (6').    Weight as of this encounter: 99.7 kg (219 lb 12.8 oz).           Past Medical History   Past Medical History:   Diagnosis Date     Arthritis      Atrial fibrillation (H)      Bacteremia      Bell's palsy 2015     BPH (benign prostatic hyperplasia)      Diabetes (H)      Gastroesophageal reflux disease      GERD (gastroesophageal reflux disease)      GI hemorrhage      High cholesterol      Hyperlipemia      Hyperlipidemia      Hypertension      Hypertension      Idiopathic peripheral neuropathy      Irregular heart beat     chronic at fib     Renal artery aneurysm (H)      Renal artery aneurysm (H)      Sleep apnea     Bipap     Sleep apnea 10/31/2021     Sleep apnea, obstructive     USES BIPAP     Type 2 diabetes mellitus (H)      UTI (lower urinary tract infection)      Past Surgical History   Past Surgical History:   Procedure " Laterality Date     AMPUTATE FOOT Right 5/6/2019    Procedure: AMPUTATION, 3rd TOE RIGHT FOOT;  Surgeon: Ravi Abbasi DPM;  [place] [facility];  Service: Podiatry     FUSION SPINE POSTERIOR MINIMALLY INVASIVE THREE + LEVELS N/A 10/29/2020    Procedure: L3/4/5S1 oblique lateral lumbar interbody fusion with discectomy L3/4/5S1 Posterior minimally invasive pedicle screw placement and posterolateral instrumentation and fusion  L3/4/5S1 Posterior minimally invasive pedicle screw placement and posterolateral instrumentation and fusion;  Surgeon: Vernon Bynum MD;  Location: [facility] OR     HC REPAIR COMPL ROTATOR CUFF AVULSN,CHR      Description: Chronic Rotator Cuff Avulsion;  Recorded: 04/11/2011;     ORTHOPEDIC SURGERY Right     knee surgery     ORTHOPEDIC SURGERY Right     amputation 3rd toe on right foot     TENOTOMY ACHILLES TENDON       TRANSRECTAL ULTRASONIC, TRANSURETHRAL RESECTION (TUR) OF PROSTATE CYST       Medications   Home Meds  Prior to Admission medications    Medication Sig Start Date End Date Taking? Authorizing Provider   atenolol (TENORMIN) 50 MG tablet Take 0.5 tablets (25 mg) by mouth daily 5/17/22   Collin Singh MD   atorvastatin (LIPITOR) 20 MG tablet TAKE 1 TABLET BY MOUTH EVERY DAY 1/16/23   Collin Singh MD   CONTOUR NEXT TEST test strip USE 1 EACH AS DIRECTED DAILY. 3/8/22   Collin Singh MD   diltiazem ER COATED BEADS (CARDIZEM CD/CARTIA XT) 120 MG 24 hr capsule TAKE 1 CAPSULE BY MOUTH EVERY DAY 10/3/22   Collin Singh MD   EPINEPHrine (ANY BX GENERIC EQUIV) 0.3 MG/0.3ML injection 2-pack Inject 0.3 mg into the muscle as needed for anaphylaxis    Reported, Patient   fluticasone (FLONASE) 50 MCG/ACT nasal spray Spray 2 sprays into both nostrils daily    Reported, Patient   gabapentin (NEURONTIN) 300 MG capsule TAKE 2 CAPSULES BY MOUTH AT BEDTIME. 10/3/22   Collin Singh MD   gemfibrozil (LOPID) 600 MG tablet TAKE 1 TABLET BY MOUTH TWICE A DAY 2/2/23   Francisco  Collin ALVARADO MD   hydrOXYzine (ATARAX) 10 MG tablet Take 1 tablet (10 mg) by mouth every 6 hours as needed for itching 10/30/20   Vernon Bynum MD   lisinopril (ZESTRIL) 5 MG tablet TAKE 1 TABLET BY MOUTH EVERY DAY 10/3/22   Collin Singh MD   loratadine (CLARITIN) 10 MG tablet Take 10 mg by mouth daily    Reported, Patient   metFORMIN (GLUCOPHAGE) 500 MG tablet TAKE 1 TABLET BY MOUTH EACH MORNING AND 2 TABLETS EACH EVENING 1/16/23   Collin Singh MD   methocarbamol (ROBAXIN) 500 MG tablet Take 1 tablet (500 mg) by mouth 4 times daily as needed for muscle spasms 10/30/20   Vernon Bynum MD   montelukast (SINGULAIR) 10 MG tablet Take 10 mg by mouth daily    Reported, Patient   multivitamin (OCUVITE) TABS tablet Take 1 tablet by mouth 2 times daily    Unknown, Entered By History   omeprazole (PRILOSEC) 20 MG DR capsule TAKE 1 CAPSULE BY MOUTH EVERY DAY 1/16/23   Collin Singh MD   traMADol (ULTRAM) 50 MG tablet TAKE 1 TABLET BY MOUTH EVERY 6 HOURS AS NEEDED FOR PAIN 7/28/22   Reported, Patient   Vitamin D, Cholecalciferol, 25 MCG (1000 UT) TABS Take 1 tablet by mouth daily    Unknown, Entered By History   warfarin ANTICOAGULANT (COUMADIN) 5 MG tablet TAKE 1 TO 1.5 TABLETS BY MOUTH DAILY AS DIRECTED. ADJUST PER INR RESULTS. 5/17/22   Collin Singh MD   warfarin ANTICOAGULANT (COUMADIN) 7.5 MG tablet Take 7.5 mg by mouth once a week On Sundays    Unknown, Entered By History       Scheduled Meds      Infusion Meds    niCARdipine       sodium chloride         PRN Meds  labetalol **OR** hydrALAZINE, niCARdipine, sodium chloride    Allergies   Allergies   Allergen Reactions     Bees Swelling     Reports using an epi pen if stung     Family History   Family History   Problem Relation Age of Onset     Coronary Artery Disease Mother      Coronary Artery Disease Father      Atrial fibrillation Father      Social History   Social History     Tobacco Use     Smoking status: Former     Packs/day: 2.00     Years:  27.00     Pack years: 54.00     Types: Cigarettes     Quit date: 1990     Years since quittin.1     Smokeless tobacco: Never   Substance Use Topics     Alcohol use: No     Drug use: No       Review of Systems   Review of systems not obtained due to patient factors - confusion       PHYSICAL EXAMINATION   Temp:  [97.9  F (36.6  C)-98.2  F (36.8  C)] 97.9  F (36.6  C)  Pulse:  [69-84] 78  Resp:  [14-22] 18  BP: (110-154)/(58-96) 154/96  SpO2:  [96 %-98 %] 98 %    Neurologic  Mental Status:  alert, oriented to place, month, names and repeats without aphasia  Cranial Nerves:  PERRL, EOMI with normal smooth pursuit, facial sensation intact and symmetric, L facial droop, intermittent L visual neglect, tongue midline  Motor:  normal muscle tone and bulk, no abnormal movements, able to move all limbs spontaneously, no pronator drift initially but some seen later  Reflexes:  deferred  Sensory:  intermittent numbness to light touch L leg >L arm but significant extinction o nthe L to double simultaneous stimulation  Coordination:  normal finger-to-nose and heel-to-shin bilaterally without dysmetria, rapid alternating movements symmetric  Station/Gait:  relatively stable walking with walker    Dysphagia Screen  Per Nursing    Stroke Scales    NIHSS  1a. Level of Consciousness 0-->Alert, keenly responsive   1b. LOC Questions 0-->Answers both questions correctly   1c. LOC Commands 0-->Performs both tasks correctly   2.   Best Gaze 0-->Normal   3.   Visual 0   4.   Facial Palsy 2-->Partial paralysis (total or near-total paralysis of lower face)   5a. Motor Arm, Left 0-->No drift, limb holds 90 (or 45) degrees for full 10 secs   5b. Motor Arm, Right 0-->No drift, limb holds 90 (or 45) degrees for full 10 secs   6a. Motor Leg, Left 0   6b. Motor Leg, right 0   7.   Limb Ataxia 0-->Absent   8.   Sensory 1-->Mild-to-moderate sensory loss, patient feels pinprick is less sharp or is dull on the affected side, or there is a  loss of superficial pain with pinprick, but patient is aware of being touched   9.   Best Language 0-->No aphasia, normal   10. Dysarthria 1-->Mild-to-moderate dysarthria, patient slurs at least some words and, at worst, can be understood with some difficulty   11. Extinction and Inattention  2-->Profound dante-inattention/extinction more than 1 modality   Total 6         Imaging  I personally reviewed all imaging; relevant findings per HPI.    Labs Data   CBC  Recent Labs   Lab 03/07/23  1047   WBC 6.9   RBC 4.25*   HGB 13.1*   HCT 39.8*        Basic Metabolic Panel   Recent Labs   Lab 03/07/23  1047   *   POTASSIUM 4.3   CHLORIDE 101   CO2 22   BUN 25.0*   CR 0.89   *   LUTHER 9.3     Liver Panel  No results for input(s): PROTTOTAL, ALBUMIN, BILITOTAL, ALKPHOS, AST, ALT, BILIDIRECT in the last 168 hours.  INR    Recent Labs   Lab Test 03/07/23  1047 02/24/23  1411 01/27/23  1129   INR 1.21* 2.7* 1.8*      Lipid Profile    Recent Labs   Lab Test 12/28/21  1304 11/06/20  1113 02/05/19  1512   CHOL 152 151  --    HDL 41 40  --    LDL 91 83 93   TRIG 100 138  --      A1C    Recent Labs   Lab Test 02/01/23  1339 08/19/22  1621 05/16/22  1701   A1C 6.4* 6.2* 6.8*     Troponin    Recent Labs   Lab 03/07/23  1047   CTROPT 66*          Stroke Consult Data Data     I personally examined and evaluated the patient today. At the time of my evaluation and management the patient was in critical condition today due to stroke. I personally managed NIHSS exam. I spent a total of 90 minutes providing critical care services, evaluating the patient, directing care and reviewing laboratory values and radiologic reports.

## 2023-03-07 NOTE — PHARMACY-ADMISSION MEDICATION HISTORY
Pharmacy Medication History  Admission medication history interview status for the 3/7/2023  admission is complete. See EPIC admission navigator for prior to admission medications     Location of Interview: Patient room  Medication history sources: Patient and Patient's family/friend (brynn )    Significant changes made to the medication list:      In the past week, patient estimated taking medication this percent of the time: greater than 90%    Medication Affordability:       Additional medication history information:       Medication reconciliation completed by provider prior to medication history? No    Time spent in this activity: 25min     Prior to Admission medications    Medication Sig Last Dose Taking? Auth Provider Long Term End Date   acetaminophen (TYLENOL) 325 MG tablet Take 325-650 mg by mouth every 6 hours as needed for mild pain  Yes Unknown, Entered By History     atenolol (TENORMIN) 50 MG tablet Take 0.5 tablets (25 mg) by mouth daily 3/7/2023 Yes Collin Singh MD Yes    atorvastatin (LIPITOR) 20 MG tablet TAKE 1 TABLET BY MOUTH EVERY DAY 3/7/2023 Yes Collin Singh MD Yes    diltiazem ER COATED BEADS (CARDIZEM CD/CARTIA XT) 120 MG 24 hr capsule TAKE 1 CAPSULE BY MOUTH EVERY DAY 3/7/2023 Yes Collin Singh MD Yes    gabapentin (NEURONTIN) 300 MG capsule TAKE 2 CAPSULES BY MOUTH AT BEDTIME. 3/6/2023 Yes Collin Singh MD Yes    gemfibrozil (LOPID) 600 MG tablet TAKE 1 TABLET BY MOUTH TWICE A DAY 3/7/2023 Yes Collin Singh MD Yes    lisinopril (ZESTRIL) 5 MG tablet TAKE 1 TABLET BY MOUTH EVERY DAY 3/7/2023 Yes Collin Singh MD Yes    metFORMIN (GLUCOPHAGE) 500 MG tablet TAKE 1 TABLET BY MOUTH EACH MORNING AND 2 TABLETS EACH EVENING 3/7/2023 Yes Collin Singh MD Yes    multivitamin (OCUVITE) TABS tablet Take 1 tablet by mouth 2 times daily 3/7/2023 Yes Unknown, Entered By History     omeprazole (PRILOSEC) 20 MG DR capsule TAKE 1 CAPSULE BY MOUTH EVERY DAY 3/7/2023 Yes  Collin Singh MD Yes    Vitamin D, Cholecalciferol, 25 MCG (1000 UT) TABS Take 1 tablet by mouth daily 3/7/2023 Yes Unknown, Entered By History     warfarin ANTICOAGULANT (COUMADIN) 5 MG tablet TAKE 1 TO 1.5 TABLETS BY MOUTH DAILY AS DIRECTED. ADJUST PER INR RESULTS.  Patient taking differently: 7.5mg on Sunday; 5mg rest of the week Past Month Yes Collin Singh MD     CONTOUR NEXT TEST test strip USE 1 EACH AS DIRECTED DAILY.   Collin Singh MD     EPINEPHrine (ANY BX GENERIC EQUIV) 0.3 MG/0.3ML injection 2-pack Inject 0.3 mg into the muscle as needed for anaphylaxis   Reported, Patient     fluticasone (FLONASE) 50 MCG/ACT nasal spray Spray 2 sprays into both nostrils daily as needed   Reported, Patient         The information provided in this note is only as accurate as the sources available at the time of update(s)   Rhea NagelD

## 2023-03-08 NOTE — PROVIDER NOTIFICATION
Neuro paged regarding /67. Per notes goal BP is systolic 120-180. MD okay with this BP for now given pt has no neuro changes.

## 2023-03-08 NOTE — PLAN OF CARE
Pt alert during neuro exams, dosing off in between cares . Pt arrives to ICU at 1500 from ED.  Pt oriented to self, place, time and situation with occasional forgetting name of hospital.  Pt with Left sided facial droop, Left sided neglect, left sided visual loss occasionally in one field, and L sided pronator drift.  Pt strong x 4 extremities.  Pt turns self in bed.  Pt with dry cough.  Pt NPO.  Pt used urinal x 3 and had large amt urinary incontinence x 2.  Pt c/o chronic back pain.  Pt and pt significant other Kayce updated on plan of care.

## 2023-03-08 NOTE — PROGRESS NOTES
Brief Clinical Note:      Overnight developed worsening left arm weakness with inability to hold off the bed, he has left leg weakness as well. He previously had left arm weakness that improved post-TNK with an NIHSS initially of 8-->2 now back up to 10. He has had adequate blood pressure in the 120-140 range. He had an MRI performed that showed completed L M2 divisions stroke consistent with his known occlusion.   Given leg involvement will further evaluate to rule out propagation of known occlusion vs completion of stroke, there is a watershed territory component to the stroke that could result in some leg and arm involvement.     Plan:  -Repeat head Ct/CTA/CTP   - IVF  -SBP goal 120-180    Silvia Posey  Vascular Stroke Fellow      Stroke staff is Dr. Parikh

## 2023-03-08 NOTE — PROGRESS NOTES
Infection Prevention Note:  This patient is part of a Candida auris surveillance group recommended by CHELSEA.  Screening test ordered for surveillance purpose on 3/8/23. Please contact Infection Prevention with any questions.  Pamela Finnegan, Infection Prevention on 3/8/2023 at 2:22 PM

## 2023-03-08 NOTE — PHARMACY-CONSULT NOTE
Pharmacy Consult to evaluate for medication related stroke core measures    Stefan Diallo, 77 year old male admitted for acute ischemic stroke on 3/7/2023.    Thrombolytic was given on 3/7/23 at 11:23.    VTE Prophylaxis: PCDs ordered 3/7/23    Antithrombotic: aspirin started on 3/8/23, as appropriate by end of hospital day 2. Continue antithrombotic therapy on discharge to meet quality measures, unless contraindicated.    Anticoagulation if history of A-fib/flutter: History of Afib, on warfarin PTA, defer continuation of anticoagulant to neurology    LDL Cholesterol Calculated   Date Value Ref Range Status   03/08/2023 64 <=100 mg/dL Final     LDL Cholesterol Direct   Date Value Ref Range Status   02/05/2019 93 <=129 mg/dl Final       Home atorvastatin not yet resumed, currently NPO    Recommendations: Defer anticoagulation to neurology recommendations. Resume statin once enteral route available.    Thank you for the consult.    Pamela Araya [facility] 3/8/2023 5:08 PM

## 2023-03-08 NOTE — PROGRESS NOTES
"      [facility]    Stroke Progress Note    Interval EventsWorsening NIHSS overnight with increased L side weakness,repeat CT/CTA/CTP unremarkable. 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   Although his NIHSS is a little worse today, he appears very tired on exam and most new points are nonfocal    Plan  Acute stroke management  - Neuro checks every 1 hour for now, decrease to q4 if afternoon head CT ok  - Continue SBP goal 120-180mmHg, no need to lower blood pressure acutely while hospitalized  - PT/OT/ST consults  - Maintain normoglycemia, normonatremia, normothermia    Diagnostic testing  - Check head CT this afternoon 1pm    Secondary stroke prevention  - Continue aspirin 325mg daily starting this afternoon if CT ok  - Statin for goal LDL 40-70, continue home lipitor  - Long term BP goal <135/80 to be achieved as outpatient within several weeks  - Education: [facility] stroke education consult  - Mediterranean diet and daily exercise        Patient Follow-up    - final recommendation pending work-up    We will continue to follow.     Chantelle Lopez PA-C  Vascular Neurology    To page me or covering stroke neurology team member, click here: [facility]  Choose \"On Call\" tab at top, then select \"NEUROLOGY/ALL SITES\" " "from middle drop-down box, press Enter, then look for \"stroke\" or \"telestroke\" for your site.    ______________________________________________________    Clinically Significant Risk Factors Present on Admission               # Drug Induced Coagulation Defect: home medication list includes an anticoagulant medication    # Hypertension: home medication list includes antihypertensive(s)   # Acute Respiratory Failure: Documented O2 saturation < 91%.  Continue supplemental oxygen as needed     # Overweight: Estimated body mass index is 28.88 kg/m  as calculated from the following:    Height as of an earlier encounter on 3/7/23: 1.829 m (6').    Weight as of this encounter: 96.6 kg (212 lb 15.4 oz).         Medications   Scheduled Meds    insulin aspart  1-4 Units Subcutaneous Q4H     sodium chloride (PF)  3 mL Intracatheter Q8H       Infusion Meds    - MEDICATION INSTRUCTIONS -       niCARdipine       sodium chloride 75 mL/hr at 03/08/23 0456       PRN Meds  acetaminophen **OR** acetaminophen, glucose **OR** dextrose **OR** glucagon, labetalol **OR** hydrALAZINE, lidocaine 4%, lidocaine (buffered or not buffered), - MEDICATION INSTRUCTIONS -, niCARdipine, sodium chloride (PF)       PHYSICAL EXAMINATION  Temp:  [97.9  F (36.6  C)-98.3  F (36.8  C)] 98.3  F (36.8  C)  Pulse:  [65-84] 75  Resp:  [10-29] 29  BP: (110-164)/(58-98) 142/80  SpO2:  [84 %-100 %] 97 %      Neurologic  Mental Status:  very drowsy, oriented to place, month, names and repeats without aphasia  Cranial Nerves:  PERRL, EOMI with normal smooth pursuit, facial sensation intact and symmetric, L facial droop, no VF cut, tongue midline, moderate dysarthria  Motor:  normal muscle tone and bulk, no abnormal movements, able to move all limbs spontaneously, L arm moderate drift, both legs drift but better with coaching  Reflexes:  deferred  Sensory:  intermittent numbness to light touch L leg >L arm but significant extinction o nthe L to double simultaneous " stimulation  Coordination:  not awake enough to particiapte  Station/Gait:  deferred    Stroke Scales    NIHSS  1a. Level of Consciousness 1-->Not alert, but arousable by minor stimulation to obey, answer, or respond   1b. LOC Questions 0-->Answers both questions correctly   1c. LOC Commands 0-->Performs both tasks correctly   2.   Best Gaze 0-->Normal   3.   Visual 0-->No visual loss   4.   Facial Palsy 2-->Partial paralysis (total or near-total paralysis of lower face)   5a. Motor Arm, Left 2-->Some effort against gravity, limb cannot get to or maintain (if cued) 90 (or 45) degrees, drifts down to bed, but has some effort against gravity   5b. Motor Arm, Right 0-->No drift, limb holds 90 (or 45) degrees for full 10 secs   6a. Motor Leg, Left 1-->Drift, leg falls by the end of the 5-sec period but does not hit bed   6b. Motor Leg, right 1-->Drift, leg falls by the end of the 5-sec period but does not hit bed   7.   Limb Ataxia 0-->Absent   8.   Sensory 1-->Mild-to-moderate sensory loss, patient feels pinprick is less sharp or is dull on the affected side, or there is a loss of superficial pain with pinprick, but patient is aware of being touched   9.   Best Language 0-->No aphasia, normal   10. Dysarthria 1-->Mild-to-moderate dysarthria, patient slurs at least some words and, at worst, can be understood with some difficulty   11. Extinction and Inattention  2-->Profound dante-inattention/extinction more than 1 modality   Total 11 (03/08/23 0815)       Imaging  I personally reviewed all imaging; relevant findings per HPI.     Lab Results Data   CBC  Recent Labs   Lab 03/08/23  0455 03/07/23  1312 03/07/23  1047   WBC 8.6 7.1 6.9   RBC 4.38* 4.38* 4.25*   HGB 13.6 13.7 13.1*   HCT 40.2 41.3 39.8*    248 239     Basic Metabolic Panel    Recent Labs   Lab 03/08/23  0909 03/08/23  0500 03/08/23  0455 03/07/23  1604 03/07/23  1312 03/07/23  1047   NA  --   --  141  --  139 135*   POTASSIUM  --   --  4.0  --  4.1  4.3   CHLORIDE  --   --  104  --  103 101   CO2  --   --  23  --  24 22   BUN  --   --  17.3  --  22.8 25.0*   CR  --   --  0.96  --  0.86 0.89   GLC 89 75 96   < > 132* 142*   LUTHER  --   --  9.6  --  9.8 9.3    < > = values in this interval not displayed.     Liver Panel  Recent Labs   Lab 03/07/23  1312   PROTTOTAL 7.3   ALBUMIN 4.0   BILITOTAL 0.4   ALKPHOS 116   AST 22   ALT 10     INR    Recent Labs   Lab Test 03/07/23  1312 03/07/23  1047 02/24/23  1411   INR 1.12 1.21* 2.7*      Lipid Profile    Recent Labs   Lab Test 12/28/21  1304 11/06/20  1113 02/05/19  1512   CHOL 152 151  --    HDL 41 40  --    LDL 91 83 93   TRIG 100 138  --      A1C    Recent Labs   Lab Test 02/01/23  1339 08/19/22  1621 05/16/22  1701   A1C 6.4* 6.2* 6.8*     Troponin    Recent Labs   Lab 03/07/23  2229 03/07/23  1759 03/07/23  1047   CTROPT 81* 75* 66*          Data   I have personally spent a total of 40 minutes providing care today, time spent in reviewing medical records and reviewing tests, examining the patient and obtaining history, coordination of care, and discussion with the patient and/or family regarding diagnostic results, prognosis, symptom management, risks and benefits of management options, and development of plan of care. Greater than 50% was spent in counseling and coordination of care.

## 2023-03-08 NOTE — PLAN OF CARE
Pt lethargic, arouses to voice. A&Ox4. Pupils PRELA. L field cut. L facial droop. Slurs speech. L side neglect. L leg slightly weaker than R. Pt received ativan before MRI, more lethargic after and was unable to keep L arm up when raising. However,  strength remained strong.. Notified neuro, instructed to continue to monitor for now. 0620 neuro updated again, pt now not picking L arm off of bed (is making effort). CT ordered and obtained. Afib with rate in 70s/80s. LS diminished, intermittent cough. NPO until speech evaluates. Voiding in urinal, incontinent at times. 18 g x2. R PIV infusing.

## 2023-03-08 NOTE — PROGRESS NOTES
[facility]    Hospitalist Progress Note    Interval History     Patient is sleepy but arousable this morning.  Is moving all extremities but continues to have some left upper and lower extremity weakness.  Denies any headache.    Overnight patient did have worsening left arm weakness with NIH stroke scale going from 8-2 and back up to 10.  This morning he continues to have again improved left arm weakness and is able to lift it up.    -Data reviewed today: I reviewed all new labs and imaging results over the last 24 hours. I personally reviewed .  Head CT shows subacute infarct in the right MCA territory similar to prior CT.  No hemorrhagic transformation.    CT head perfusion study shows total hypoperfused area in the anterior right MCA territory measuring 122 mL with core infarction of 5 mL.    CT angio head with contrast shows proximally occluded right M2 superior division.  Redemonstrated right MCA bifurcation aneurysm    Physical Exam   Temp: 98.3  F (36.8  C) Temp src: Oral BP: (!) 142/80 Pulse: 75   Resp: 29 SpO2: 97 % O2 Device: None (Room air)    Vitals:    03/07/23 1308 03/07/23 1500 03/08/23 0200   Weight: 99.7 kg (219 lb 12.8 oz) 97.2 kg (214 lb 4.6 oz) 96.6 kg (212 lb 15.4 oz)     Vital Signs with Ranges  Temp:  [97.9  F (36.6  C)-98.3  F (36.8  C)] 98.3  F (36.8  C)  Pulse:  [65-84] 75  Resp:  [10-29] 29  BP: (110-164)/(58-98) 142/80  SpO2:  [84 %-100 %] 97 %  I/O last 3 completed shifts:  In: 1200 [I.V.:1200]  Out: 950 [Urine:950]    Physical Exam  Constitutional:       Comments: Very sleepy but arousable    HENT:      Head: Normocephalic.   Eyes:      Pupils: Pupils are equal, round, and reactive to light.   Cardiovascular:      Rate and Rhythm: Normal rate and regular rhythm.      Pulses: Normal pulses.      Heart sounds: Normal heart sounds.   Pulmonary:      Effort: Pulmonary effort is normal. No respiratory distress.      Breath sounds: Normal breath sounds.   Abdominal:       General: Abdomen is flat. Bowel sounds are normal. There is no distension.      Tenderness: There is no abdominal tenderness. There is no guarding.   Musculoskeletal:         General: Normal range of motion.      Cervical back: Normal range of motion.   Skin:     General: Skin is warm and dry.   Neurological:      Comments: Left facial droop, left upper and lower extremity weakness with 3/5 strength . Slurred speech. Very drowsy            Medications     - MEDICATION INSTRUCTIONS -       niCARdipine       sodium chloride 75 mL/hr at 03/08/23 0456       insulin aspart  1-4 Units Subcutaneous Q4H     sodium chloride (PF)  3 mL Intracatheter Q8H       Data   Recent Labs   Lab 03/08/23  0909 03/08/23  0500 03/08/23  0455 03/07/23  1604 03/07/23  1312 03/07/23  1047   WBC  --   --  8.6  --  7.1 6.9   HGB  --   --  13.6  --  13.7 13.1*   MCV  --   --  92  --  94 94   PLT  --   --  241  --  248 239   INR  --   --   --   --  1.12 1.21*   NA  --   --  141  --  139 135*   POTASSIUM  --   --  4.0  --  4.1 4.3   CHLORIDE  --   --  104  --  103 101   CO2  --   --  23  --  24 22   BUN  --   --  17.3  --  22.8 25.0*   CR  --   --  0.96  --  0.86 0.89   ANIONGAP  --   --  14  --  12 12   LUTHER  --   --  9.6  --  9.8 9.3   GLC 89 75 96   < > 132* 142*   ALBUMIN  --   --   --   --  4.0  --    PROTTOTAL  --   --   --   --  7.3  --    BILITOTAL  --   --   --   --  0.4  --    ALKPHOS  --   --   --   --  116  --    ALT  --   --   --   --  10  --    AST  --   --   --   --  22  --     < > = values in this interval not displayed.       Recent Results (from the past 24 hour(s))   CTA Head Neck with Contrast    Narrative    EXAM: CTA HEAD NECK W CONTRAST  LOCATION: [facility]  DATE/TIME: 3/7/2023 10:42 AM    INDICATION: Code Stroke to evaluate for potential thrombolysis and thrombectomy. PLEASE READ IMMEDIATELY. Left-sided weakness and facial droop.  COMPARISON: None.  CONTRAST: IsoVue-370 75 mL.  TECHNIQUE: Head  and neck CT angiogram with IV contrast. Noncontrast head CT followed by axial helical CT images of the head and neck vessels obtained during the arterial phase of intravenous contrast administration. Axial 2D reconstructed images and   multiplanar 3D MIP reconstructed images of the head and neck vessels were performed by the technologist. Dose reduction techniques were used. All stenosis measurements made according to NASCET criteria unless otherwise specified.    FINDINGS:   NONCONTRAST HEAD CT:   INTRACRANIAL CONTENTS: No intracranial hemorrhage, extraaxial collection, or mass effect. Increased attenuation within a right superior M2 branch of the middle cerebral artery (coronal image 28-29, sagittal image 19), concerning for thromboembolus. No CT   evidence of acute infarct. Mild to moderate presumed chronic small vessel ischemic changes. Mild generalized volume loss. No hydrocephalus.     VISUALIZED ORBITS/SINUSES/MASTOIDS: No intraorbital abnormality. No paranasal sinus mucosal disease. No middle ear or mastoid effusion.    BONES/SOFT TISSUES: No acute abnormality.    HEAD CTA:  ANTERIOR CIRCULATION: Occlusion of superior division right M2 branch of the middle cerebral artery. No other large artery stenosis or occlusion. Saccular aneurysm at the MCA bifurcation measuring 3 mm. Fetal origin of the right posterior cerebral artery   from the anterior circulation.    POSTERIOR CIRCULATION: No stenosis/occlusion, aneurysm, or high flow vascular malformation. Dominant right and smaller left vertebral artery contribute to a normal basilar artery.     DURAL VENOUS SINUSES: Not well evaluated on a technical basis.    NECK CTA:  RIGHT CAROTID: Atherosclerotic plaque results in less than 50% stenosis in the right ICA. No dissection.    LEFT CAROTID: Atherosclerotic plaque results in less than 50% stenosis in the left ICA. No dissection.    VERTEBRAL ARTERIES: No focal stenosis or dissection. Dominant right and smaller left  vertebral arteries.    AORTIC ARCH: Classic aortic arch anatomy with no significant stenosis at the origin of the great vessels.    NONVASCULAR STRUCTURES: Advanced cervical spondylosis.      Impression    IMPRESSION:   HEAD CT:  1.  No acute hemorrhage.  2.  Increased attenuation within the right superior M2 branch of the middle cerebral artery concerning for thromboembolus. No early ischemic changes.    HEAD CTA:   1.  Occlusion of right superior M2 branch middle cerebral artery.  2.  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    NECK CTA:  1.  Scattered plaque within the aortic arch and carotid bifurcations.  2.  No significant stenosis of either carotid or vertebral artery.    The results were communicated to Dr. Guerra at 10:40 AM on 03/07/2023.   CT Head w/o Contrast    Narrative    CT HEAD WITHOUT CONTRAST 3/7/2023 1:34 PM    INDICATION: Right MCA syndrome.  TECHNIQUE: CT scan of the head without contrast. Dose reduction  techniques were used.  CONTRAST: None.  COMPARISON: 3/7/2023 head CT/head and neck CTA at 1034 hours.    FINDINGS:   Redemonstrated hyperdense intraluminal thrombus in the right MCA.  Developing hypoattenuation obscuring the gray-white junction at the  right insula extending slightly into the right frontal opercular  region relates to an evolving right MCA distribution infarct. No  associated intracranial hemorrhage. No extra-axial collection.  Background mild burden scattered chronic small vessel ischemic change.  Mild to moderate diffuse parenchymal volume loss. Ventricular size is  in keeping with this volume loss. Calvarium intact. Postoperative  changes to the bilateral lenses. Right-sided scleral band. Polyp  versus mucus retention cyst in the right maxillary sinus.      Impression    IMPRESSION:  1. Redemonstrated hyperdense intraluminal thrombus in the right MCA  distribution. This is accompanied by an interval development of a  hypoattenuation obscuring the gray-white junction  at the right insula  extending to the right frontal opercular region compatible with an  evolving component of right MCA distribution infarct. No intracranial  hemorrhage.    2.  Background age-related changes as above.    Findings and impression discussed with Pamela Lopez PA-C by phone  at 1340 hours on 3/7/2023.    LEON MARTE MD         SYSTEM ID:  [id number]   CTA Head Neck with Contrast    Narrative    CTA HEAD AND NECK WITH CONTRAST 3/7/2023 1:36 PM    INDICATION: Right MCA syndrome.  TECHNIQUE: Head and neck CT angiogram with IV contrast. CT images of  the head and neck vessels obtained during the arterial phase of  intravenous contrast administration. Axial helical 2D reconstructed  images and multiplanar 3D MIP reconstructed images of the head and  neck vessels were performed on a separate workstation. Dose reduction  techniques were used.  CONTRAST: 75mL Isovue-370  COMPARISON: 3/7/2023 head and neck CTA at 1039 hours.     FINDINGS:  HEAD CTA: Calcification of the distal internal carotid arteries  bilaterally without significant stenosis. Redemonstrated occlusion of  the proximal right M2 segment supplying the superior division. With  this, there is markedly diminished/absent opacification of the  anterior right MCA distal branch vessels. Additional moderate stenosis  of the proximal right M2 segment supplying the posterior right MCA  distribution. Unchanged 3 mm small aneurysm at the right MCA  bifurcation. Bilateral JOHN are patent. Right vertebral artery  dominance with multifocal mild narrowings in the nondominant left  vertebral artery. Mild narrowings in the basilar artery. Fetal origin  right PCA. Bilateral PCA are patent.    NECK CTA: Mild narrowings in the right common carotid artery. Less  than 50% stenosis at the proximal right internal carotid artery.  Approximately 40% stenosis in the mid left common carotid artery with  mild narrowing in the proximal left internal carotid artery.  Dominant  right vertebral artery with mild right vertebral artery ostial  stenosis. Nondominant left vertebral artery is patent. Cervical spine  degenerative change and partially visualized upper thoracic spine  degenerative change.      Impression    IMPRESSION:  HEAD CTA:  1.  Redemonstrated occlusion of the proximal right M2 segment  supplying the superior division with markedly diminished/absent  opacification of the distal right MCA branch vessels of the anterior  right MCA distribution. This is overall unchanged.    2.  Unchanged 3 mm aneurysm at the right MCA bifurcation.    3.  Moderate narrowing of the proximal right M2 segment supplying the  posterior right MCA distribution with multifocal mild narrowings in  the nondominant left vertebral artery.    NECK CTA:  1.  Less than 50% stenosis of the proximal right internal carotid  artery and approximately 40% stenosis in the mid left common carotid  artery are unchanged. Additional mild narrowings of the neck vessels  elsewhere.    Findings were discussed with TAO WOMACK PA-C via telephone at  1340 hours on 3/7/2023.    LEON MARTE MD         SYSTEM ID:  [id number]   CT Head Perfusion w Contrast    Narrative    CT BRAIN PERFUSION March 7, 2023 1:46 PM    HISTORY: Right MCA syndrome.    TECHNIQUE: Time sequential axial CT images of the head were acquired  during the administration of intravenous contrast (50mL Isovue-370).  CTA images of the [place] as well as color perfusion maps of  the brain were created from this time sequential axial source data.  Radiation dose for this scan was reduced using automated exposure  control, adjustment of the mA and/or kV according to patient size, or  iterative reconstruction technique.    COMPARISON: 3/7/2023 head and neck CTAs.    FINDINGS: Elevated mean transit time and decreased cerebral blood flow  with areas of decreased cerebral blood volume in the anterior right  MCA distribution. This is compatible  with an anterior right MCA  distribution infarct with marginal ischemia. Rapid analysis with 13 mL  cerebral blood flow less than 30% and 100 mL of T-Max greater than 6  seconds with a mismatch volume of 87 mL and a mismatch ratio of 7.7.  Please note that the area of mismatch is potentially slightly  overestimated, as areas of the posterior fossa and inferior occipital  lobes demonstrating elevated T-Max are presumably artifactual.      Impression    IMPRESSION: Elevated mean transit time and decreased cerebral blood  flow in the anterior right MCA distribution with areas of decreased  cerebral blood volume within this distribution. Overall appearance  suggests anterior right MCA distribution ischemia with a central  component of core infarct.    Findings and impression discussed with Chantelle Lopez PA-C by phone at  1355 hours on 3/7/2023.      LEON MARTE MD         SYSTEM ID:  [id number]   Echocardiogram Complete - For age > 60 yrs   Result Value    LVEF  60%    Narrative    [id number]  [id number]  [id number]  [id number]^RAIMUNDO^STUART^DALJIT     [facility]  Echocardiography Laboratory  [address] [place] [address]     Name: LEI SIDDIQUI  MRN: [id number]  : 1945  Study Date: 2023 04:11 PM  Age: 77 yrs  Gender: Male  Patient Location: [facility]  Reason For Study: Cerebrovascular Incident  Ordering Physician: STUART EUBANKS  Referring Physician: STUART EUBANKS  Performed By: BEL Reed     BSA: 2.2 m2  Height: 72 in  Weight: 219 lb  HR: 74  BP: 118/76 mmHg  ______________________________________________________________________________  Procedure  Complete Portable Echo Adult. Optison (NDC #[id number]) given intravenously.  ______________________________________________________________________________  Interpretation Summary     1. The left ventricle is normal in structure, function and size. The visual  ejection fraction is estimated at 60%.  2. The right ventricle  is normal in structure, function and size.  3. Mild to moderate valvular aortic stenosis. Mean 16mmHg, BRAYDEN 1.5cm2.  4. Mild aortic root dilatation. 4.4cm.     No previous echo for comparison.  ______________________________________________________________________________  Left Ventricle  The left ventricle is normal in structure, function and size. There is mild  concentric left ventricular hypertrophy. The visual ejection fraction is  estimated at 60%. Left ventricular diastolic function is indeterminate. Normal  left ventricular wall motion.     Right Ventricle  The right ventricle is normal in structure, function and size.     Atria  There is severe biatrial enlargement. There is no atrial shunt seen.     Mitral Valve  There is mild (1+) mitral regurgitation.     Tricuspid Valve  No tricuspid regurgitation.     Aortic Valve  Mild to moderate valvular aortic stenosis. Mean 16mmHg, BRAYDEN 1.5cm2.     Pulmonic Valve  The pulmonic valve is normal in structure and function.     Vessels  Mild aortic root dilatation. Normal ascending, transverse (arch), and  descending aorta. Dilation of the inferior vena cava is present with abnormal  respiratory variation in diameter.     Pericardium  There is no pericardial effusion.     Rhythm  The rhythm was undetermined.  ______________________________________________________________________________  MMode/2D Measurements & Calculations  IVSd: 1.3 cm     LVIDd: 5.1 cm  LVIDs: 3.1 cm  LVPWd: 1.4 cm  FS: 39.0 %  LV mass(C)d: 284.6 grams  LV mass(C)dI: 128.5 grams/m2  Ao root diam: 4.4 cm  LA dimension: 6.3 cm  asc Aorta Diam: 3.6 cm  LA/Ao: 1.5  LVOT diam: 2.4 cm  LVOT area: 4.6 cm2  LA Volume (BP): 240.0 ml  LA Volume Index (BP): 108.6 ml/m2  RWT: 0.53     Doppler Measurements & Calculations  MV E max tomer: 94.8 cm/sec  MV dec time: 0.16 sec  Ao V2 max: 284.7 cm/sec  Ao max P.0 mmHg  Ao V2 mean: 188.9 cm/sec  Ao mean P.3 mmHg  Ao V2 VTI: 55.7 cm  BRAYDEN(I,D): 1.5 cm2  BRAYDEN(V,D):  1.6 cm2  AI P1/2t: 610.6 msec  LV V1 max PG: 3.9 mmHg  LV V1 max: 98.1 cm/sec  LV V1 VTI: 17.8 cm  SV(LVOT): 82.7 ml  SI(LVOT): 37.4 ml/m2  PA acc time: 0.14 sec  AV Papa Ratio (DI): 0.34  BRAYDEN Index (cm2/m2): 0.67  E/E' av.1  Lateral E/e': 7.8  Medial E/e': 10.3     ______________________________________________________________________________  Report approved by: Sher Simms 2023 04:53 PM         MR Brain w/o & w Contrast    Narrative    EXAM: MR BRAIN W/O and W CONTRAST  LOCATION: [facility]  DATE/TIME: 3/8/2023 4:18 AM    INDICATION: R MCA stroke  COMPARISON: CTA head and neck 2023  CONTRAST: 10mL Gadavist  TECHNIQUE: Routine multiplanar multisequence head MRI without and with intravenous contrast.    FINDINGS:  INTRACRANIAL CONTENTS: There is a 6 x 3.5 (AP by TR) zone of restricted diffusion in the right frontal operculum and anterior right insular cortex. No mass, acute hemorrhage, or extra-axial fluid collections. Patchy and confluent nonspecific T2/FLAIR   hyperintensities within the cerebral white matter most consistent with moderate chronic microvascular ischemic change. Mild to moderate generalized cerebral atrophy. No hydrocephalus. Normal position of the cerebellar tonsils. No pathologic contrast   enhancement.    SELLA: No abnormality accounting for technique.    OSSEOUS STRUCTURES/SOFT TISSUES: Normal marrow signal. The major intracranial vascular flow voids are maintained.     ORBITS: No abnormality accounting for technique.     SINUSES/MASTOIDS: Mucosal thickening primarily involving the ethmoid air cells. No middle ear or mastoid effusion.       Impression    IMPRESSION:  1.  Moderate-sized early subacute infarct in the anterior right MCA territory. No hemorrhage.  2.  Age-related changes..     CT Head w/o Contrast    Narrative    EXAM: CT HEAD W/O CONTRAST  LOCATION: [facility]  DATE/TIME: 3/8/2023 6:51 AM    INDICATION:  worsening left sided weakness  COMPARISON: 3/8/2023. 3/7/2023.  TECHNIQUE: Routine CT Head without IV contrast. Multiplanar reformats. Dose reduction techniques were used.    FINDINGS:  INTRACRANIAL CONTENTS: The known right anterior MCA territory infarction has undergone expected interval evolution, with decreased attenuation and increased gray-white effacement. There is no evidence of hemorrhagic transformation. There is regional   sulcal effacement but no midline shift or herniation. There is mild-to-moderate scattered white matter hypoattenuation, which is nonspecific. This commonly reflects chronic small vessel ischemic change. Diffuse and proportionate prominence of the   ventricles, sulci and cisterns is compatible with mild generalized parenchymal atrophy. The sella is unremarkable for technique. The cerebellar tonsils are appropriately positioned.    VISUALIZED ORBITS/SINUSES/MASTOIDS: There has been bilateral cataract surgery and scleral buckling. No acute intraorbital abnormality is demonstrated. No paranasal sinus mucosal disease. No middle ear or mastoid effusion.    BONES/SOFT TISSUES: No acute abnormality.      Impression    IMPRESSION:  1.  Expected evolution of known anterior right MCA territory infarction. No evidence of hemorrhagic transformation or significant mass effect.  2.  Age-related change.   CTA Head with Contrast    Narrative    EXAM: CTA HEAD WITH CONTRAST  LOCATION: [facility]  DATE/TIME: 3/8/2023 6:53 AM    INDICATION: Worsening left-sided weakness.  COMPARISON: 3/7/2023.  CONTRAST: 75 mL Isovue 370.  TECHNIQUE: Head CT angiogram with IV contrast. Axial helical CT images of the intracranial vessels were obtained during the arterial phase of intravenous contrast administration. Axial helical 2D reconstructed images and multiplanar 3D MIP reconstructed   images of the intracranial vessels were performed by the technologist. Dose reduction techniques were used.      FINDINGS:   ANTERIOR CIRCULATION: The intracranial internal carotid arteries demonstrate no flow-limiting stenosis. The anterior cerebral arteries appear patent. Stable 3 mm saccular aneurysm at the right MCA bifurcation. Proximally occluded right M2 superior   division. Mild posterior division stenosis.    POSTERIOR CIRCULATION: The dominant right and diminutive left intradural vertebral arteries appear patent. The basilar and visualized proximal cerebellar arteries are unremarkable. The posterior cerebral arteries demonstrate no flow-limiting stenosis or   occlusion. There is fetal-type right PCA anatomy. No aneurysm or high flow vascular malformation is demonstrated.     DURAL VENOUS SINUSES: The major venous structures demonstrate grossly unremarkable enhancement, within the limitations of arterial phase technique.      Impression    IMPRESSION:   1.  Proximally occluded right M2 superior division.  2.  Redemonstrated right MCA bifurcation aneurysm measuring approximately 3 mm.   CT Head Perfusion w Contrast    [facility]  CT HEAD PERFUSION W CONTRAST  3/8/2023 7:38 AM   INDICATION: Left-sided weakness.  TECHNIQUE: CT cerebral perfusion was performed utilizing a second contrast bolus. Perfusion data were post processed with generation of standard perfusion maps and estimation of ischemic/infarcted volumes utilizing standard threshold values. Dose   reduction techniques were used.  CONTRAST: 125 mL Isovue-370.  COMPARISON: MRI 3/8/2023. CT perfusion 3/7/2023.    FINDINGS:  Please note that arterial input and venous output function curves are suboptimal, which may compromise data.    TOTAL HYPOPERFUSION: Using the threshold of Tmax>6 seconds, there is a hypoperfused region in the right MCA territory measuring 122 mL.    CORE INFARCTION: Using the threshold of CBF<30%, there is a core infarction in the right anterior MCA territory measuring 5 mL.    PENUMBRA: The  penumbra volume (mismatch volume) is 117 mL. The mismatch ratio is 24.4.      Impression    CONCLUSION:   1.  Total hypoperfused area in the anterior right MCA territory measuring 122 mL, with core infarction of 5 mL.  2.  Suboptimal arterial input and venous output function curves may limit the accuracy of data.         Assessment & Plan      Stefan Diallo is a 77 year old male with past medical history of hypertension, hyperlipidemia, type 2 diabetes, A-fib (warfarin on hold for procedure since 2/24) presenting with left sided weakness, facial droop. He is being admitted for acute ischemic stroke s/p TNK.       Acute ischemic stroke of R MCA territory due to cardioembolism status post tenecteplase on 3/7  Patient with history of atrial fibrillation and on chronic warfarin anticoagulation.  However, warfarin has been held since 2/25 for pain pump placement and INR ofAnd admission requested for further management.  CT head-Increased attenuation within the right superior M2 branch of the middle cerebral artery concerning for thromboembolus. No early ischemic changes.  HEAD CTA: 1.  Occlusion of right superior M2 branch middle cerebral artery. 2.  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 Possible subocclusive thrombus at the R MCA bifurcation  -Goal -180/105. Discussed with neuro, plan not to drop SBP <120 to avoid hypoperfusion  - MRI brain with and without contrast and repeat head CT done this morning.  Resulted as above.  -Transthoracic echocardiogram shows EF of 60% with normal right ventricular function.  Mild to moderate aortic stenosis.  - A1c 6.4% on 2/1/23, no need to repeat at this time  - lipid panel and troponin  -start IVF, had multiple scans with contrast today  -PT/OT/SLP  -Neurology consultation  -Neurochecks and vitals per postthrombolytic orders  -hold antiplatelets and anticoagulant meds for 24hrs post lytic  -Cleared for transfer to floor with neurochecks every 4 hours.  -Speech  therapy evaluated patient.  Diagnosed with moderate to severe oropharyngeal dysphagia.  Currently recommending n.p.o. status and plan for VFSS study on 3/9.  Holding all home medications for now.  -Started on aspirin 325 daily after CT showed no hemorrhagic conversion today     Atrial fibrillation  On chronic anticoagulation with warfarin. However, has been held since 2/25 for planned pain pump placement.   - did not start meds at this time to avoid hypotension/brain hypoperfusion  - consider resuming PTA meds when appropriate from neurology stand point and when cleared by speech.     Dyslipidemia  - lipid panel ordered  - resume PTA statin once passes swallow eval     Hypertension  Blood pressure is currently controlled, last documented 120/78.  - hold PTA PO meds at this time to minimized hypotension     Type II DM  - A1c6.4% on 2/1/23  - hold PTA Metformin  - sliding scale insulin      KISHA  Resume CPAP per home setting     Chronic back pain  Patient planned for pain pump placement 3/8/23.  States he has chronic back pain from nerve impingement and uses tylenol PTA for pain.   - Tylenol prn for pain        Diet:   regular diet once passes swallow eval  DVT Prophylaxis: Pneumatic Compression Devices  Scruggs Catheter: Not present  Lines: None     Cardiac Monitoring: None  Code Status:   Full code, discussed with patient        Clinically Significant Risk Factors Present on Admission               # Drug Induced Coagulation Defect: home medication list includes an anticoagulant medication    # Hypertension: home medication list includes antihypertensive(s)   # Acute Respiratory Failure: Documented O2 saturation < 91%.  Continue supplemental oxygen as needed     # Overweight: Estimated body mass index is 28.88 kg/m  as calculated from the following:    Height as of an earlier encounter on 3/7/23: 1.829 m (6').    Weight as of this encounter: 96.6 kg (212 lb 15.4 oz).              Mary Ellen Whitman MD,  MD  [phone](p)

## 2023-03-08 NOTE — PROGRESS NOTES
03/08/23 1019   Appointment Info   Signing Clinician's Name / Credentials (PT) Lety Conde, PT, DPT   Living Environment   People in Home alone   Current Living Arrangements house   Home Accessibility stairs to enter home;stairs within home   Number of Stairs, Main Entrance 1   Stair Railings, Main Entrance railings safe and in good condition   Number of Stairs, Within Home, Primary greater than 10 stairs  (13)   Stair Railings, Within Home, Primary railings safe and in good condition   Transportation Anticipated car, drives self   Living Environment Comments Pt reports he lives alone, has a flight of stairs inside home.   Self-Care   Usual Activity Tolerance good   Current Activity Tolerance poor   Regular Exercise No   Equipment Currently Used at Home none   Fall history within last six months yes   Number of times patient has fallen within last six months 1   Activity/Exercise/Self-Care Comment Pt reports he is independent, does not use assistive device, reports his fiancee helps with housekeeping and meals.   General Information   Onset of Illness/Injury or Date of Surgery 03/07/23   Referring Physician Raad Manning MD   Patient/Family Therapy Goals Statement (PT) To be ablet o sleep   Pertinent History of Current Problem (include personal factors and/or comorbidities that impact the POC) Pt is 77 year old male adm on 3/7/23 with stroke like symptoms, sudden onset of left sided weakness. Work up revealed right M2 segment thrombus, possibly embolic. Pt had not been taking anticoagulation x2 weeks in preparation for stimulator placement. Pt given TNK and transferred to [facility] ICU for further management. Pt had increased left sided weakness overnight, repeat Head CT showed expected evolution of R MCA infarct.   Existing Precautions/Restrictions fall   Cognition   Affect/Mental Status (Cognition) WFL   Orientation Status (Cognition) oriented x 3   Follows Commands (Cognition) WFL   Pain Assessment    Patient Currently in Pain   (c/o back pain with mobility)   Posture    Posture Forward head position;Protracted shoulders   Range of Motion (ROM)   ROM Comment B LE ROM WFL   Strength (Manual Muscle Testing)   Strength (Manual Muscle Testing) Deficits observed during functional mobility   Strength Comments Pt moves B LE against gravity, L LE is weaker and pt needs increased time   Bed Mobility   Comment, (Bed Mobility) Mod assist of 1   Transfers   Comment, (Transfers) Unable to assess at this time due to pt's fatigue   Balance   Balance Comments Min to mod assist for sitting at EOB   Clinical Impression   Criteria for Skilled Therapeutic Intervention Yes, treatment indicated   PT Diagnosis (PT) Impaired mobility   Influenced by the following impairments Decreased strength, decreased balance, decreased activity tolerance   Functional limitations due to impairments Decreased ability to participate in daily tasks   Clinical Presentation (PT Evaluation Complexity) Evolving/Changing   Clinical Presentation Rationale Current presentation, [facility]   Clinical Decision Making (Complexity) moderate complexity   Planned Therapy Interventions (PT) balance training;bed mobility training;gait training;home exercise program;patient/family education;stair training;strengthening;transfer training   Risk & Benefits of therapy have been explained evaluation/treatment results reviewed;care plan/treatment goals reviewed;risks/benefits reviewed;current/potential barriers reviewed;participants voiced agreement with care plan;participants included;patient   PT Total Evaluation Time   PT Eval, Moderate Complexity Minutes (08875) 10   Physical Therapy Goals   PT Frequency Daily   PT Predicted Duration/Target Date for Goal Attainment 03/18/23   PT Goals Bed Mobility;Transfers;Gait;Stairs   PT: Bed Mobility Supervision/stand-by assist;Supine to/from sit;Rolling   PT: Transfers Supervision/stand-by assist;Sit to/from stand;Bed to/from chair  "  PT: Gait Supervision/stand-by assist;50 feet;Assistive device   PT: Stairs Minimal assist;4 stairs;Rail on both sides   Interventions   Interventions Quick Adds Gait Training;Therapeutic Activity;Therapeutic Procedure;Neuromuscular Re-ed   Therapeutic Activity   Therapeutic Activities: dynamic activities to improve functional performance Minutes (02805) 30   Symptoms Noted During/After Treatment Fatigue;Increased pain   Treatment Detail/Skilled Intervention Pt supine in bed on arrival, sleeping but wake easily. Pt participated in supine assessment of strength and command following, began getting room ready for mobility pt states \"I thought you were going to let me sleep!\" Educated on role of PT in acute care and importance of mobility, pt states understanding and agreement to participate. Pt able to roll in bed with mod assist of 1, verbal cues for technique. Pt mod assist of 1 for sidelying to sit at EOB. Once sitting at EOB pt c/o increased back pain, minimal effort to maintain sitting balance, appearing to be falling back to sleep. Pt max assist of 1 to return to supine in bed and to reposition. At completion of session, pt supine in bed, needs within reach, bed alarm activated.   PT Discharge Planning   PT Plan Sitting balance challenges while sitting unsupported, sit to stands/bed to chair transfers, ambulation as able   PT Discharge Recommendation (DC Rec) [facility]hab [facility] patient will benefit from intensive, interdisciplinary therapy.  Anticipate will be able to tolerate 3 hours of therapy per day   PT Rationale for DC Rec At this time, pt appears to be below baseline level of function, is very fatigued today and will need further assessment of mobility and rehab potential. Anticipate pt could tolerate 3 hours of therapy a day when he is medically stable for dishcarge from hospital. Will continue to follow and update discharge recommendations as needed.   PT Brief overview of current status Mod " assist of 1   Total Session Time   Timed Code Treatment Minutes 30   Total Session Time (sum of timed and untimed services) 40

## 2023-03-08 NOTE — PROGRESS NOTES
"  SLP Bedside Swallow Evaluation  03/08/23 9610   Appointment Info   Signing Clinician's Name / Credentials (SLP) Jackie Bernard MA [facility] SLP   General Information   Onset of Illness/Injury or Date of Surgery 03/07/23   Referring Physician Dr. Manning   Patient/Family Therapy Goal Statement (SLP) \"Ice chips\"   Pertinent History of Current Problem Per notes: Pt is 77 year old male adm on 3/7/23 with stroke like symptoms, sudden onset of left sided weakness. Work up revealed right M2 segment thrombus, possibly embolic. Pt had not been taking anticoagulation x2 weeks in preparation for stimulator placement. Pt given TNK and transferred to [facility] ICU for further management. Pt had increased left sided weakness overnight, repeat Head CT showed expected evolution of R MCA infarct.   General Observations Pt was alert and sitting up in a chair for the session.  Pt kept his eyes closed for most of the session.  Delayed responses noted at times.   Type of Evaluation   Type of Evaluation Swallow Evaluation   Oral Motor   Oral Musculature   (Severe L labial deficits, mild-mod decreased lingual function greater on L)   Dentition (Oral Motor)   Dentition (Oral Motor) some missing teeth   Cough/Swallow/Gag Reflex (Oral Motor)   Comment, Cough/Swallow/Gag Reflex (Oral Motor) Able to produce on command   Vocal Quality/Secretion Management (Oral Motor)   Comment, Vocal Quality/Secretion Management (Oral Motor) Slight decreased intensity of voicing, min oral secretions noted   General Swallowing Observations   Respiratory Support (General Swallowing Observations) none   Current Diet/Method of Nutritional Intake (General Swallowing Observations, NIS) NPO   Swallowing Evaluation Clinical swallow evaluation   Clinical Swallow Evaluation   Feeding Assistance dependent   Clinical Swallow Evaluation Textures Trialed thin liquids;mildly thick liquids   Clinical Swallow Eval: Thin Liquid Texture Trial   Mode of Presentation, Thin Liquids " spoon   Volume of Liquid or Food Presented ice chips x 10   Oral Phase of Swallow premature pharyngeal entry;delayed AP movement   Pharyngeal Phase of Swallow reduction in laryngeal movement;repeated swallows  (delay)   Diagnostic Statement overt cough x 1, wet voicing after trials completed; cues needed to swallow at times   Clinical Swallow Eval: Mildly Thick Liquids   Mode of Presentation spoon   Volume Presented tsps x 5   Oral Phase premature pharyngeal entry;delayed AP movement   Pharyngeal Phase reduction in laryngeal movement   Diagnostic Statement wet voicing after trials completed;  cues needed to swallow at times   Swallowing Recommendations   Diet Consistency Recommendations NPO  (Except for 1-2 ice chips with RN supervision, cue pt to swallow hard cough hard each trial, hold if respiratory status declines)   Clinical Impression   Criteria for Skilled Therapeutic Interventions Met (SLP Eval) Yes, treatment indicated   SLP Diagnosis Moderate-severe oral-pharyngeal dysphagia   Risks & Benefits of therapy have been explained evaluation/treatment results reviewed;care plan/treatment goals reviewed;risks/benefits reviewed;current/potential barriers reviewed;participants voiced agreement with care plan;participants included;patient;spouse/significant other   Clinical Impression Comments Moderate-severe oral-pharyngeal dysphagia was observed at bedside this pm.  Deficits/risk factors include decreased oral awareness, severe L labial deficits, mild oral secretions, mild-moderate lingual deficits, delayed swallows, decreased laryngeal elevation, need for multiple swallows, wet voicing after minimal po trials, and overt cough after 1st ice chip trial.  Unable to rule out pharyngeal residue and/or silent aspiration at bedside.  Recommend NPO status except for minimal comfort po: 1-2 ice chips with RN supervision, cue pt to swallow hard-cough hard each trial, hold if respiratory status declines.  Plan to complete a  VFSS 3/9 as able to fully assess pt's pharyngeal phase function, silent aspiration risk, and safety for a po diet.   SLP Total Evaluation Time   Eval: oral/pharyngeal swallow function, clinical swallow Minutes (98555) 15   Interventions   Interventions Quick Adds Swallowing Dysfunction   Swallowing Dysfunction &/or Oral Function for Feeding   Treatment of Swallowing Dysfunction &/or Oral Function for Feeding Minutes (36210) 10   Symptoms Noted During/After Treatment None   Treatment Detail/Skilled Intervention Swallow: Educated pt, fiance, and RN regarding current deficits/risk factors, rationale for VFSS, minimal comfort ice chips with RN supervision and precautions.  All verbalized understanding.  Pt and family in agreement for VFSS.  Pt was given mod-max cues to use swallow-cough after ice chip trial in [place].  No additional coughing or wet voicing noted.   SLP Discharge Planning   SLP Plan SLP - VFSS   SLP Discharge Recommendation Acute Rehab Center-Motivated patient will benefit from intensive, interdisciplinary therapy.  Anticipate will be able to tolerate 3 hours of therapy per day   SLP Rationale for DC Rec Swallow function is well below baseline   SLP Brief overview of current status  Moderate-severe oral-pharyngeal dysphagia; Rec: NPO status except for minimal comfort po: 1-2 ice chips with RN supervision, cue pt to swallow hard-cough hard each trial, hold if respiratory status declines.   Total Session Time   Total Session Time (sum of timed and untimed services) 25

## 2023-03-09 NOTE — PROGRESS NOTES
Bed available on Station 73.  Report given to receiving RNRito Nye Md for Chest XR order as pt has low grade temp orally 99.8 with concerns for aspiration.  Having video swallow today.  Neuros remain stable, with left facial droop with drooling, facial numbness, garbled, slow speech, left hemiparesis, more notable in LUE 3-4/5 compared to LLE 4-5/5, ataxia, and intermittent forgetfulness.  PT was in room to see pt.  Pt able to stand with stand with min assist however, incoordinated with shuffling gait requiring walker.      09:05: LVM with Emergency Contact Kayce to notify transfer to station 73

## 2023-03-09 NOTE — CONSULTS
"CLINICAL NUTRITION SERVICES  -  ASSESSMENT NOTE      Recommendations Ordered by Registered Dietitian (RD):     Nutrition Support Enteral:  Type of Feeding Tube: to be placed  Enteral Frequency:  Continuous  Enteral Regimen: Jevity 1.5 @ 65 mL/hr (goal rate)  Total Enteral Provisions: 2340 cals (24 cals/kg), 100 gm pro (1 gm/kg), 33 gm fiber, 1186 mL free water  Free Water Flush: 100 mL every 4 hrs  TOTAL (TF + flushes) = 1786 mL/day    Plan to initiate TF once FT ready for use.  Will begin at 25 mL/hr.  Increase by 10 mL every 12 hrs to goal rate of 65 mL/hr.       Malnutrition:   % Weight Loss:  None noted  % Intake:  Decreased intake does not meet criteria for malnutrition  - has been NPO since admit  Subcutaneous Fat Loss:  None observed  Muscle Loss:  None observed  Fluid Retention:  None noted    Malnutrition Diagnosis: Patient does not meet two of the above criteria necessary for diagnosing malnutrition          REASON FOR ASSESSMENT  Stefan Diallo is a 77 year old male seen by Registered Dietitian for Provider Order - \"Tube feeds through PEG tube\"      Nutrition Admission Screen:  Have you recently lost weight without trying? \"NO\"  Have you been eating poorly because of a decreased appetite? \"NO\"      NUTRITION HISTORY  Chart reviewed  Visited with pt and sig other, Kayce  \"The doctor was here and said plan is to feel Stefan with a tube in his stomach\"  Pt tells me that he is hungry and really thirsty  Kayce notes that pt is a good eater and was eating well PTA  He follows a regular diet  Wt has been stable  Kayce is eager to have FT placed      CURRENT NUTRITION ORDERS  Diet Order:     NPO    3/8: SLP --> Moderate-severe oral-pharyngeal dysphagia; Rec: NPO status except for minimal comfort po   3/9: VSS ---> Patient was unable to follow instructions during the exam. The exam was not completed       NUTRITION FOCUSED PHYSICAL ASSESSMENT FOR DIAGNOSING MALNUTRITION)  Yes               Observed:    No " "nutrition-related physical findings observed    Obtained from Chart/Interdisciplinary Team:  CVA  Normal muscle tone      ANTHROPOMETRICS  Height: 6'0\"  Weight: (3/8) 96.6 kg / 212 lbs 15.43 oz  Body mass index is 28.88 kg/m .  Weight Status:  Overweight BMI 25-29.9  IBW: 80.9 kg  % IBW: 119%  Weight History: Sig other states wt has been stable - no unintentional wt loss  Wt Readings from Last 10 Encounters:   03/08/23 96.6 kg (212 lb 15.4 oz)   03/07/23 97.5 kg (215 lb)   02/01/23 98.9 kg (218 lb)   01/20/23 105.7 kg (233 lb)   08/19/22 95.9 kg (211 lb 8 oz)   08/10/22 93.9 kg (207 lb 1.6 oz)   05/21/22 100.7 kg (222 lb)   05/16/22 101 kg (222 lb 12 oz)   10/31/21 104.3 kg (230 lb)   10/30/21 104.3 kg (230 lb)         LABS  Labs reviewed    MEDICATIONS  No IVF running at this time      ASSESSED NUTRITION NEEDS PER APPROVED PRACTICE GUIDELINES:    Dosing Weight 96.6 kg  Estimated Energy Needs: 6295-1371 kcals (22-25 Kcal/Kg)  Justification: maintenance  Estimated Protein Needs:  grams protein (1-1.2 g pro/Kg)  Justification: maintenance  Estimated Fluid Needs: 9692-2875  mL (1 mL/Kcal)  Justification: maintenance    MALNUTRITION:  % Weight Loss:  None noted  % Intake:  Decreased intake does not meet criteria for malnutrition  - has been NPO since admit  Subcutaneous Fat Loss:  None observed  Muscle Loss:  None observed  Fluid Retention:  None noted    Malnutrition Diagnosis: Patient does not meet two of the above criteria necessary for diagnosing malnutrition      NUTRITION DIAGNOSIS:  Inadequate protein-energy intake related to NPO status with dysphagia as evidenced by pt not meeting nutrition needs      NUTRITION INTERVENTIONS  Recommendations / Nutrition Prescription  NPO (per SLP)    Nutrition Support Enteral:  Type of Feeding Tube: to be placed  Enteral Frequency:  Continuous  Enteral Regimen: Jevity 1.5 @ 65 mL/hr (goal rate)  Total Enteral Provisions: 2340 cals (24 cals/kg), 100 gm pro (1 gm/kg), 33 gm " fiber, 1186 mL free water  Free Water Flush: 100 mL every 4 hrs  TOTAL (TF + flushes) = 1786 mL/day    Plan to initiate TF once FT ready for use.  Will begin at 25 mL/hr.  Increase by 10 mL every 12 hrs to goal rate of 65 mL/hr.  .      Implementation  Nutrition education ---> Reviewed TF plans with pt and sig other  EN Composition and EN Schedule ---> orders placed in EPIC  .      Nutrition Goals  EN to meet % estimated needs while pt is NPO  .      MONITORING AND EVALUATION:  Progress towards goals will be monitored and evaluated per protocol and Practice Guidelines

## 2023-03-09 NOTE — PROGRESS NOTES
Neuro: Neuros remain the same: L-sided weakness, facial droop, slurred speech, diminished sensation. Somnolent but oriented x4.    CV: WDL. In Afib at baseline.     Resp: WDL     GI: Remains NPO d/t not passing bedside swallow w/ SLP.    : Frequent voiding in urinal w/ occasional incontinence.     Skin: WDL    Transfer orders to Neuroscience floor are in. Plan for video swallow tomorrow.

## 2023-03-09 NOTE — PROGRESS NOTES
[facility]    Stroke Progress Note    Interval EventsStable overnight. BP has been around 130-150. This morning he feels tired and had belly pain per his fiance    HPI Summary  77 M presented with R MCA syndrome and treated with tenecteplase around 11 am on 3/7/23.  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   Dysphagia secondary to stroke    Plan  Acute stroke management  - Neuro checks every 4 hours  - Continue SBP goal 120-180mmHg, no need to lower blood pressure acutely while hospitalized  - PT/OT/ST consults  - Maintain normoglycemia, normonatremia, normothermia    Diagnostic testing  - no further testing needed    Secondary stroke prevention  - Continue aspirin 325mg daily --> recommend restarting anticoagulation post stroke day 5, will place pharmacy liaison consult for DOAC prices instead of his home coumadin. If family ok with price, neurology preference would be for eliquis, to be restarted probably after PEG  - Statin for goal LDL 40-70, continue home lipitor  - Long term BP goal <135/80 to be achieved as outpatient within several weeks  - Education: [facility] stroke education consult  - Mediterranean diet and daily exercise        Patient Follow-up    - in 6-8 weeks with general neurology ([phone]) (ordered)    No further stroke evaluation is recommended,  "so we will sign off. Please contact us with any additional questions.    Chantelle Lopez PA-C  Vascular Neurology    To page me or covering stroke neurology team member, click here: AMCOM  Choose \"On Call\" tab at top, then select \"NEUROLOGY/ALL SITES\" from middle drop-down box, press Enter, then look for \"stroke\" or \"telestroke\" for your site.    ______________________________________________________    Clinically Significant Risk Factors                         # Overweight: Estimated body mass index is 28.88 kg/m  as calculated from the following:    Height as of an earlier encounter on 3/7/23: 1.829 m (6').    Weight as of this encounter: 96.6 kg (212 lb 15.4 oz)., PRESENT ON ADMISSION        Medications   Scheduled Meds    aspirin  325 mg Oral Daily    Or     aspirin  300 mg Rectal Daily     insulin aspart  1-4 Units Subcutaneous Q4H     sodium chloride (PF)  3 mL Intracatheter Q8H       Infusion Meds    - MEDICATION INSTRUCTIONS -       niCARdipine       sodium chloride 75 mL/hr at 03/09/23 0600       PRN Meds  acetaminophen **OR** acetaminophen, glucose **OR** dextrose **OR** glucagon, labetalol **OR** hydrALAZINE, lidocaine 4%, lidocaine (buffered or not buffered), - MEDICATION INSTRUCTIONS -, niCARdipine, sodium chloride (PF)       PHYSICAL EXAMINATION  Temp:  [98.6  F (37  C)-98.8  F (37.1  C)] 98.7  F (37.1  C)  Pulse:  [68-90] 89  Resp:  [11-18] 15  BP: (120-149)/(68-93) 146/84  SpO2:  [94 %-99 %] 98 %      Neurologic  Mental Status:  very drowsy, oriented to place, month, names and repeats without aphasia  Cranial Nerves:  PERRL, EOMI with normal smooth pursuit, facial sensation intact and symmetric, L facial droop, no VF cut, tongue midline, moderate dysarthria  Motor:  normal muscle tone and bulk, no abnormal movements, able to move all limbs spontaneously, L arm moderate drift, both legs drift but better with coaching  Reflexes:  deferred  Sensory:  intermittent numbness to light touch L leg >L arm " but significant extinction o nthe L to double simultaneous stimulation  Coordination:  not awake enough to particiapte  Station/Gait:  deferred        Imaging  I personally reviewed all imaging; relevant findings per HPI.     Lab Results Data   CBC  Recent Labs   Lab 03/08/23  0455 03/07/23  1312 03/07/23  1047   WBC 8.6 7.1 6.9   RBC 4.38* 4.38* 4.25*   HGB 13.6 13.7 13.1*   HCT 40.2 41.3 39.8*    248 239     Basic Metabolic Panel    Recent Labs   Lab 03/09/23  0715 03/09/23  0647 03/09/23  0307 03/08/23  0500 03/08/23  0455 03/07/23  1604 03/07/23  1312 03/07/23  1047   NA  --   --   --   --  141  --  139 135*   POTASSIUM  --   --   --   --  4.0  --  4.1 4.3   CHLORIDE  --   --   --   --  104  --  103 101   CO2  --   --   --   --  23  --  24 22   BUN  --   --   --   --  17.3  --  22.8 25.0*   CR  --   --   --   --  0.96  --  0.86 0.89   * 57* 70   < > 96   < > 132* 142*   LUTHER  --   --   --   --  9.6  --  9.8 9.3    < > = values in this interval not displayed.     Liver Panel  Recent Labs   Lab 03/07/23  1312   PROTTOTAL 7.3   ALBUMIN 4.0   BILITOTAL 0.4   ALKPHOS 116   AST 22   ALT 10     INR    Recent Labs   Lab Test 03/07/23  1312 03/07/23  1047 02/24/23  1411   INR 1.12 1.21* 2.7*      Lipid Profile    Recent Labs   Lab Test 03/08/23  0455 12/28/21  1304 11/06/20  1113   CHOL 120 152 151   HDL 44 41 40   LDL 64 91 83   TRIG 61 100 138     A1C    Recent Labs   Lab Test 02/01/23  1339 08/19/22  1621 05/16/22  1701   A1C 6.4* 6.2* 6.8*     Troponin    Recent Labs   Lab 03/07/23  2229 03/07/23  1759 03/07/23  1047   CTROPT 81* 75* 66*          Data   I have personally spent a total of 30 minutes providing care today, time spent in reviewing medical records and reviewing tests, examining the patient and obtaining history, coordination of care, and discussion with the patient and/or family regarding diagnostic results, prognosis, symptom management, risks and benefits of management options, and  development of plan of care. Greater than 50% was spent in counseling and coordination of care.

## 2023-03-09 NOTE — CONSULTS
"  Interventional Radiology - Pre-Procedure Note:  3/9/2023    Procedure Requested: Gastrostomy tube placement  Requested by: Dr Schafer    Brief HPI: Stefan Diallo is a 77 year old male with h/o HTN, KIHSA, Afib who presented with acute ischemic stroke s/p TPA 3/7. Patient has been evaluated by SLP who has recommended NPO status and IR consulted by hospitalist for G Tube placement for nutritional support. Patient remains on ASA. Currently patient denies N/V/F/C. SO is at bedside. Reports no history of stomach surgery.     IMAGING:  CT ABD/PEL w 8/10/22:  \"IMPRESSION:   1.  No etiology for patient's new symptoms. Nothing obstructive or inflammatory involving bowel. No concerning lesion.  2.  Stable benign renal cysts. There are 2 tiny nonobstructing left kidney stones.  3.  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\"    NPO: YES since MN  ANTICOAGULANTS: ASA daily  ANTIBIOTICS: Ancef 2g IV for periprocedural prophylaxis      ALLERGIES  Allergies   Allergen Reactions     Bees Swelling     Reports using an epi pen if stung         LABS:  INR   Date Value Ref Range Status   03/07/2023 1.12 0.85 - 1.15 Final   10/30/2020 1.20 (H) 0.86 - 1.14 Final      Hemoglobin   Date Value Ref Range Status   03/08/2023 13.6 13.3 - 17.7 g/dL Final   10/30/2020 11.1 (L) 13.3 - 17.7 g/dL Final   ]  Platelet Count   Date Value Ref Range Status   03/08/2023 241 150 - 450 10e3/uL Final   10/29/2020 290 150 - 450 10e9/L Final     Creatinine   Date Value Ref Range Status   03/08/2023 0.96 0.67 - 1.17 mg/dL Final   10/30/2020 0.87 0.66 - 1.25 mg/dL Final     Potassium   Date Value Ref Range Status   03/08/2023 4.0 3.4 - 5.3 mmol/L Final   08/10/2022 4.0 3.5 - 5.0 mmol/L Final   10/30/2020 4.5 3.4 - 5.3 mmol/L Final         EXAM:  BP (!) 150/75 (BP Location: Left arm)   Pulse 85   Temp 99.5  F (37.5  C) (Axillary)   Resp 18   Wt 96.6 kg (212 lb 15.4 oz)   SpO2 95%   BMI 28.88 kg/m    General:  Stable.  In no acute distress.    Neuro:  A&O x 3. " Moves all extremities equally.  Heart: RRR  Lungs: No increased work of breathing  Abd: NT/ND, no scarring    Pre-Sedation Code Status Assessment:  Code Status: Full Code intra procedure, per discussion with patient.         ASSESSMENT/PLAN:   Dysphagia  Acute ischemic stroke s/p TPA 3/7    -G Tube placement today with moderate sedation  -Discussed with Kofi Traylor and Hanh, both in agreement with plan    Procedure, risks/benefits, details, alternatives, and sedation reviewed with patient and SO and both verbalized understanding. All questions answered. OK to proceed with above radiology procedure.     Henrry Lino PA-C  Interventional Radiology  *[phone]      Total Time spent today on encounter: 35 minutes

## 2023-03-09 NOTE — IR NOTE
Interventional Radiology Intra-procedural Nursing Note    Patient Name: Stefan Diallo  Medical Record Number: [id number]  Today's Date: March 9, 2023    Procedure: G-Tube Placement  Start time: 1539  End time: 1556  Report provided to:  Neuro RN      Note: Patient entered Interventional Radiology Suite number 1 via cart. Patient awake, alert and orientated. Assisted onto procedural table in supine position. Prepped and draped.  Dr. Traylor in room. Time out and procedure started. Vital signs stable. Telemetry reading A-Fib.    Procedure well tolerated by patient without complications. Procedure end with debrief by Dr. Traylor.      Administered medication totals:  Lidocaine 1% 10 mL Intradermal  Versed 1 mg IVP  Fentanyl 50 mcg IVP    Last dose of sedation administered at 1548.

## 2023-03-09 NOTE — CONSULTS
Patient has Medicare Advantage through [facility].    Xarelto/Eliquis  --Upon receipt of RX, Discharge Pharmacy can provide 1 mo free.  --Subsequent fills will be $47/mo ($94 for 3 months)  --When total drug costs exceed $4,660, price will increase to a 25% coinsurance, equivalent to $151/mo.    Domitila Hilliard  Pharmacy Technician/Liaison, Discharge Pharmacy   [phone] (voice or text)  [email or web address]

## 2023-03-09 NOTE — PRE-PROCEDURE
GENERAL PRE-PROCEDURE:   Procedure:  Gastrostomy tube placement  Date/Time:  3/9/2023 3:08 PM    Written consent obtained?: Yes    Risks and benefits: Risks, benefits and alternatives were discussed    Consent given by:  Patient  Patient states understanding of procedure being performed: Yes    Patient's understanding of procedure matches consent: Yes    Procedure consent matches procedure scheduled: Yes    Expected level of sedation:  Moderate  Appropriately NPO:  Yes  ASA Class:  3  Mallampati  :  Grade 3- soft palate visible, posterior pharyngeal wall not visible  Lungs:  Lungs clear with good breath sounds bilaterally  Heart:  Normal heart sounds and rate  History & Physical reviewed:  History and physical reviewed and no updates needed  Statement of review:  I have reviewed the lab findings, diagnostic data, medications, and the plan for sedation

## 2023-03-09 NOTE — PROGRESS NOTES
"  SLP VFSS Report 03/09/23 1707   Appointment Info   Signing Clinician's Name / Credentials (SLP) Jackie Bernard MA [facility] SLP   General Information   Onset of Illness/Injury or Date of Surgery 03/07/23   Referring Physician Kerri   Patient/Family Therapy Goal Statement (SLP) \"Ice chips\"   Pertinent History of Current Problem Per notes: Pt is 77 year old male adm on 3/7/23 with stroke like symptoms, sudden onset of left sided weakness. Work up revealed right M2 segment thrombus, possibly embolic. Pt had not been taking anticoagulation x2 weeks in preparation for stimulator placement. Pt given TNK and transferred to [facility] ICU for further management. Pt had increased left sided weakness overnight, repeat Head CT showed expected evolution of R MCA infarct.   General Observations Pt was fatigued during the study.  Mod-max cues were given for pt to maintain alertness.  Very poor oral awareness and swallow initiation even with max cues.  Upper denture removed for pt as it was loose.  Lower denture twisted out of place during the study.  Pt likely pushed denture into his left lower cheek without awareness.   General Swallowing Observations   Swallowing Evaluation Videofluoroscopic swallow study (VFSS)   VFSS Evaluation   Radiologist Dr. Astudillo   Views Taken left lateral   Physical Location of Procedure FSH   VFSS Textures Trialed mildly thick liquids;pureed   VFSS Eval: Mildly Thick Liquids   Mode of Presentation spoon   Order of Presentation 1, 2, 3, 4   Preparatory Phase poor bolus control;holding;anterior loss of bolus   Oral Phase residue in oral cavity;impaired AP movement;premature pharyngeal entry   Bolus Location When Swallow Triggered pyriforms   Pharyngeal Phase impaired hyolaryngel excursion;impaired epiglottic movement;impaired tongue base retraction;impaired pharyngoesophageal segment opening   Rosenbek's Penetration Aspiration Scale 3 - contrast remains above the vocal cords, visible residue remains " (penetration)   Diagnostic Statement Repeated max cues and spoon presentations needed for pt to move bolus posterior, min po passed into pharynx at times, piecemeal swallows, moderate residue, eventual penetration   VFSS Evaluation: Puree Solid Texture Trial   Mode of Presentation, Puree spoon   Order of Presentation 5/6   Preparatory Phase poor bolus control;prolonged bolus preparation;holding   Oral Phase, Puree residue in oral cavity;impaired AP movement;premature pharyngeal entry   Bolus Location When Swallow Triggered pyriforms   Pharyngeal Phase, Puree impaired hyolaryngel excursion;impaired epiglottic movement;impaired tongue base retraction;impaired pharyngoesophageal segment opening   Rosenbek's Penetration Aspiration Scale: Puree Food Trial Results 3 - contrast remains above the vocal cords, visible residue remains (penetration)   Diagnostic Statement Repeated max cues and spoon presentations needed for pt to move bolus posterior, min po passed into pharynx view 5, piecemeal swallows, moderate reisdue, eventual penetration   Swallowing Recommendations   Diet Consistency Recommendations NPO   Clinical Impression   Criteria for Skilled Therapeutic Interventions Met (SLP Eval) Yes, treatment indicated   SLP Diagnosis Moderate-severe oral-pharyngeal dysphagia   Risks & Benefits of therapy have been explained evaluation/treatment results reviewed;care plan/treatment goals reviewed;risks/benefits reviewed;current/potential barriers reviewed;participants voiced agreement with care plan;participants included;patient   Clinical Impression Comments Moderate-severe oral-pharyngeal dysphagia was found during today's study.  Overall fatigue and decreased oral and swallow awareness impacted pt's ability to swallow during the study.  Pt also has mod-severe oral motor deficits L>R, delayed swallows, piecemeal swallows, and poor epiglottic inversion, hyoid elevation, and base of tongue function.  Deficits resulted in  penetration with residual of po attempted and moderate pharyngeal residue that did not clear with additional attempts to swallow.  Pt had no awareness of twisted lower denture or oral residue on the L after the study.  Recommend NPO status except for 1-2 ice chips for comfort with nursing supervision and cues to swallow-cough-swallow as able.  Hold ice chips if respiratory status declines.  Plan to continue Tx to complete exercises and trial comfort po as indicated.  A repeat VFSS or FEES is recommended prior to a po diet and may be appropriate in approx 1-2 weeks if progress noted in Tx.  Recommend consideration of alternative nutrition/hydration placement.   SLP Total Evaluation Time   Evaluation, videofluoroscopic eval of swallow function Minutes (83209) 15   Swallowing Dysfunction &/or Oral Function for Feeding   Treatment of Swallowing Dysfunction &/or Oral Function for Feeding Minutes (25971) 10   Symptoms Noted During/After Treatment Fatigue   Treatment Detail/Skilled Intervention Swallow: Educated pt and RN regarding current deficits and recommendation for NPO status.  Pt will need continued education due to decreased awareness.  Swabs and max assist were needed to remove twisted lower denture from left cheek as well as residual barium on the left.  Pt was able to produce a hard cough and effortful swallow in Tx given mod-max cues.  Voicing was clear after the study.   SLP Discharge Planning   SLP Plan SLP - exercises, ice chip trials, supraglottic   SLP Discharge Recommendation [facility]-Mot[facility] patient will benefit from intensive, interdisciplinary therapy.  Anticipate will be able to tolerate 3 hours of therapy per day   SLP Rationale for DC Rec Swallow function is well below baseline   SLP Brief overview of current status  Mod-severe dysphagia; Rec: NPO except for 1-2 ice chips for comfort with nursing supervision and cues to swallow-cough-swallow as able.  Hold ice chips if respiratory status  declines   Total Session Time   Total Session Time (sum of timed and untimed services) 25

## 2023-03-09 NOTE — PROVIDER NOTIFICATION
"MD Notification    Notified Person: MD    Notified Person Name: Rito Newman   Notification Date/Time:3/9/23 at 1357    Notification Interaction: [employer] messaging    Purpose of Notification: \"Hey, nutrition placed TF orders. Any news from IR if they're able to place PEG today? If not, could we try for NJ/NG tube? NeuroCVA mentioned to patient's significant other that they want him to get food sooner than waiting for PEG if its not today.\"    Orders Received: Both orders placed    Comments: If IR can not get to PEG, okay for bedside insertion of NG    Addendum: IR able to complete PEG today, no NG needed    "

## 2023-03-09 NOTE — PLAN OF CARE
[id number]: Pt here with R MCA CVA s/p TNK. A&Ox4. Expressive aphasia with mild WFD. Garbled speech. Able to write clearly when given pen and paper and most of communication completed this way during assessment. Left facial droop. Baseline neuropathy. Weakness noted with 3/5 in strength on all extremities. Limited ROM to BUE due to rotator cuff injury. NeuroCVA made aware of increased weakness since ICU transfer; no new orders. SBP remained between 120-180 per order. Other VSS. Tele A.Fib w/ CVR. Difficult to pivot transfer, shuffled gait. Activity changed to assist x2 w/ Bri steady. Video swallow completed; SLP reports to keep NPO with plan for PEG intervention. Chest XR completed; results still pending. Nutrition consult completed; orders in for TF orders once PEG placed. Plan for PEG placement today or tomorrow; if IR not able to place today, order for NG placement at bedside. Discharge plan pending; SLP recommending ARU.    Addendum: IR able to insert PEG tube today. Consent obtained and patient went via cart to IR at end of shift for PEG placement. Nutrition orders have been placed.

## 2023-03-09 NOTE — PLAN OF CARE
Goal Outcome Evaluation:        Neuro: Neuros remain the same: L-sided weakness, facial droop, slurred speech, diminished sensation. Somnolent but oriented x4.     CV: WDL. In Afib at baseline.      Resp: WDL on RA     GI: Remains NPO d/t not passing bedside swallow w/ SLP. Video swallow in AM     : External cath NOC for frequency and incontinence      Skin: WDL     Transfer orders to Neuroscience floor are in. Video swallow scheduled in AM

## 2023-03-09 NOTE — PROGRESS NOTES
03/09/23 0800   Appointment Info   Signing Clinician's Name / Credentials (OT) Elly Bruno, OTR/L   Rehab Comments (OT) Initial OT Eval   Living Environment   People in Home alone   Current Living Arrangements house   Home Accessibility stairs to enter home;stairs within home   Number of Stairs, Main Entrance 1   Stair Railings, Main Entrance railings safe and in good condition   Number of Stairs, Within Home, Primary greater than 10 stairs   Stair Railings, Within Home, Primary railings safe and in good condition   Transportation Anticipated car, drives self   Living Environment Comments lives alone n a house. unabe to state if tub shower combo, had famliy in the area. reports he lives in [place]   Self-Care   Usual Activity Tolerance good   Current Activity Tolerance poor   Regular Exercise No   Equipment Currently Used at Home none   Fall history within last six months yes   Number of times patient has fallen within last six months 1   Activity/Exercise/Self-Care Comment indp with self care and ADL with no use of device   Instrumental Activities of Daily Living (IADL)   IADL Comments reports fiance assist with IADL including housekeeping. reports he drives. reports he  works but not able to state   General Information   Onset of Illness/Injury or Date of Surgery 03/07/23   Referring Physician Mary Ellen Whitman MD   Patient/Family Therapy Goal Statement (OT) to get better   Additional Occupational Profile Info/Pertinent History of Current Problem Pt is 77 year old male adm on 3/7/23 with stroke like symptoms, sudden onset of left sided weakness. Work up revealed right M2 segment thrombus, possibly embolic. Pt had not been taking anticoagulation x2 weeks in preparation for stimulator placement. Pt given TNK and transferred to [facility] for further management. Pt had increased left sided weakness overnight, repeat Head CT showed expected evolution of R MCA infarct.   Existing Precautions/Restrictions fall    General Observations and Info agreeable to OT   Cognitive Status Examination   Orientation Status orientation to person, place and time   Cognitive Status Comments very lethargic, garbled speech and difficulty with more than 1 word at a time. cues to keep eyes open   Visual Perception   Visual Impairment/Limitations visual/perceptual impairments present   Visual Processing Deficit dante-inattention/neglect, left   Impact of Vision Impairment on Function (Vision) decreased tracking and scanning with L.   Sensory   Sensory Quick Adds sensation intact   Sensory Comments reports no numbness or tingling   Pain Assessment   Patient Currently in Pain Yes, see Vital Sign flowsheet   Posture   Posture forward head position;protracted shoulders   Range of Motion Comprehensive   General Range of Motion upper extremity range of motion deficits identified   Comment, General Range of Motion limited AROM to 25% on BSH. pt reports pain in L an R SH. appears joint related on R- pt confims but also very fatigued. PROM to 50%, stiff   Strength Comprehensive (MMT)   Comment, General Manual Muscle Testing (MMT) Assessment decreasd  strength bilaterally. L more weak than R. unable to test MMT formally during eval   Muscle Tone Assessment   Muscle Tone Quick Adds No deficits were identified   Coordination   Upper Extremity Coordination Left UE impaired;Right UE impaired   Gross Motor Coordination L and R   Fine Motor Coordination L and R   Coordination Comments increased difficult gripping walker on L hand. R slow but more intact   Bed Mobility   Bed Mobility supine-sit   Supine-Sit [place] (Bed Mobility) moderate assist (50% patient effort)   Transfers   Transfers sit-stand transfer   Sit-Stand Transfer   Sit-Stand [place] (Transfers) minimum assist (75% patient effort)   Sit/Stand Transfer Comments very slow   Activities of Daily Living   BADL Assessment/Intervention bathing;upper body dressing;lower body  dressing;toileting   Bathing Assessment/Intervention   [place] Level (Bathing) maximum assist (25% patient effort)   Upper Body Dressing Assessment/Training   [place] Level (Upper Body Dressing) maximum assist (25% patient effort)   Lower Body Dressing Assessment/Training   [place] Level (Lower Body Dressing) maximum assist (25% patient effort)   Toileting   [place] Level (Toileting) maximum assist (25% patient effort)   Clinical Impression   Criteria for Skilled Therapeutic Interventions Met (OT) Yes, treatment indicated   OT Diagnosis decreased function in ADL   OT Problem List-Impairments impacting ADL problems related to;activity tolerance impaired;cognition;communication;coordination;flexibility;mobility;strength;range of motion (ROM);vision   Assessment of Occupational Performance 5 or more Performance Deficits   Identified Performance Deficits all ADL and IADL   Planned Therapy Interventions (OT) ADL retraining;IADL retraining;cognition;neuromuscular re-education;strengthening;ROM;progressive activity/exercise   Clinical Decision Making Complexity (OT) high complexity   Risk & Benefits of therapy have been explained evaluation/treatment results reviewed;care plan/treatment goals reviewed;risks/benefits reviewed;current/potential barriers reviewed;participants voiced agreement with care plan;participants included;patient   Clinical Impression Comments decreasd function in ADL warrants skilled IP OT tx   OT Total Evaluation Time   OT Eval, High Complexity Minutes (28581) 25   OT Goals   Therapy Frequency (OT) Daily   OT Predicted Duration/Target Date for Goal Attainment 03/23/23   OT Goals Hygiene/Grooming;Upper Body Dressing;Lower Body Dressing;Toilet Transfer/Toileting;OT Goal 1;OT Goal 2;Cognition   OT: Hygiene/Grooming supervision/stand-by assist   OT: Upper Body Dressing Supervision/stand-by assist   OT: Lower Body Dressing Minimal assist   OT: Toilet Transfer/Toileting Minimal  assist;toilet transfer;cleaning and garment management   OT: Cognitive Patient/caregiver will verbalize understanding of cognitive assessment results/recommendations as needed for safe discharge planning   OT: Goal 1 Pt will demo neuro re ed HEP with SBA   OT: Goal 2 Pt will demo sit>Stand with FWW and SBA   Interventions   Interventions Quick Adds Therapeutic Activity   Therapeutic Activities   Therapeutic Activity Minutes (44986) 23   Symptoms noted during/after treatment fatigue   Treatment Detail/Skilled Intervention pt seen for OT tx session this date. agreeable to therapy going to tx to 73 neuro floor. RN ok for tx. seated EOB completed neuro re ed activity with L hand fine and gross motor repetition tasks with washcloth. uses washcloth on R to complete facial cares. sit>Stand from EOB 5 reps min A to stand and cues for weight shifting. tx to wheelchair with min A x2 , very slow to process and excecute shuffling small steps with VC for sequencing with FWW. with RN end of session   OT Discharge Planning   OT Plan neuro re ed. tx to commode   OT Discharge Recommendation (DC Rec) [facility]-Motivated patient will benefit from intensive, interdisciplinary therapy.  Anticipate will be able to tolerate 3 hours of therapy per day   OT Rationale for DC Rec pt with significant stroke impairments, now below baseline. good indp PLOF at home. recommend ARU at discharge to increase function for ADL and IADL. would benefit and anticipate will tolerate   OT Brief overview of current status A for ADL and mobility   Total Session Time   Timed Code Treatment Minutes 23   Total Session Time (sum of timed and untimed services) 48

## 2023-03-10 NOTE — PROGRESS NOTES
[facility]    Medicine Progress Note - Hospitalist Service    Date of Admission:  3/7/2023    Assessment & Plan   Stefan Diallo is a 77 year old male with past medical history of hypertension, hyperlipidemia, type 2 diabetes, A-fib (warfarin on hold for procedure since 2/24) presenting with left sided weakness, facial droop. He is being admitted for acute ischemic stroke s/p TNK.       Acute ischemic stroke of R MCA territory due to cardioembolism status post tenecteplase on 3/7  Patient with history of atrial fibrillation and on chronic warfarin anticoagulation.  However, warfarin has been held since 2/25 for pain pump placement and INR ofAnd admission requested for further management.  CT head-Increased attenuation within the right superior M2 branch of the middle cerebral artery concerning for thromboembolus. No early ischemic changes.  HEAD CTA: 1.  Occlusion of right superior M2 branch middle cerebral artery. 2.  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 Possible subocclusive thrombus at the R MCA bifurcation  -Goal -180/105. Discussed with neuro, plan not to drop SBP <120 to avoid hypoperfusion  - MRI brain with and without contrast and repeat head CT done this morning.  Resulted as above.  -Transthoracic echocardiogram shows EF of 60% with normal right ventricular function.  Mild to moderate aortic stenosis.  - A1c 6.4% on 2/1/23, no need to repeat at this time  - lipid panel and troponin  -Cleared for transfer to floor with neurochecks every 4 hours.  --Started on aspirin 325 daily after CT showed no hemorrhagic conversion on 3/8/22  -Neurology recommeding DOAC from 3/14 and stop aspirin  -Patient failed VFSS and risk for aspiration.PEG will be placed by IR and nutrition ordered tube feeds  -Follow-up with Gen Neuro clinic 6-8 weeks after discharge     Atrial fibrillation  On chronic anticoagulation with warfarin. However, has been held since 2/25 for planned pain pump  placement.   - did not start meds at this time to avoid hypotension/brain hypoperfusion  - consider resuming PTA meds when appropriate from neurology stand point and when cleared by speech.     Dyslipidemia  - lipid panel ordered  - resume PTA statin once passes swallow eval     Hypertension  Blood pressure is currently controlled, last documented 120/78.  - hold PTA PO meds at this time to minimized hypotension   Long term BP <130/80, inpatient meds can be slowly titrated to this goal as otherwise appropriate  Type II DM  - A1c6.4% on 2/1/23  - hold PTA Metformin  - sliding scale insulin      KISHA  Resume CPAP per home setting     Chronic back pain  Patient planned for pain pump placement 3/8/23.  States he has chronic back pain from nerve impingement and uses tylenol PTA for pain.   - Tylenol prn for pain        Diet:   regular diet once passes swallow eval  DVT Prophylaxis: Pneumatic Compression Devices  Scruggs Catheter: Not present  Lines: None     Cardiac Monitoring: None  Code Status:   Full code, discussed with patient         Diet: Adult Formula Drip Feeding: Continuous Jevity 1.5; Other - Specify in Comment; Goal Rate: 65; mL/hr; Begin TF @ 25 mL/hr.  Increase by 10 mL every 12 hrs to goal rate of 65 mL/hr.  NPO for Medical/Clinical Reasons Except for: Other; Specify: NPO For oral intake and gastric feedings for 6 hours post insertion Gastrostomy G/GJ tube. May feed through Jejunal or Distal PORT immediately for GJ tubes ONLY.    DVT Prophylaxis: Pneumatic Compression Devices  Scruggs Catheter: Not present  Lines: None     Cardiac Monitoring: ACTIVE order. Indication: ICU  Code Status: Full Code      Clinically Significant Risk Factors                        # Overweight: Estimated body mass index is 28.88 kg/m  as calculated from the following:    Height as of an earlier encounter on 3/7/23: 1.829 m (6').    Weight as of this encounter: 96.6 kg (212 lb 15.4 oz)., PRESENT ON ADMISSION         Disposition Plan       Expected Discharge Date: 03/11/2023                  Alfredito Schafer MD  Hospitalist Service  [facility]  Securely message with [employer] (more info)  Text page via [email or web address] Paging/Directory   ______________________________________________________________________    Interval History   Patient seen and examined at bedside  No new neurolical deficits  Patient sleepy   Daughter at bedside     Physical Exam   Vital Signs: Temp: 99.8  F (37.7  C) Temp src: Axillary BP: 139/74 Pulse: 84   Resp: 18 SpO2: 99 % O2 Device: None (Room air) Oxygen Delivery: 3 LPM  Weight: 212 lbs 15.43 oz  Physical Exam  Cardiovascular:      Heart sounds: Normal heart sounds.   Pulmonary:      Effort: Pulmonary effort is normal. No respiratory distress.   Abdominal:      Palpations: Abdomen is soft.      Tenderness: There is no abdominal tenderness.       Medical Decision Making       Data

## 2023-03-10 NOTE — CONSULTS
Stroke Education Note    The following information has been reviewed with the family (his fiance):    1. Warning signs of stroke    2. Calling 911 if having warning signs of stroke    3. All modifiable risk factors: hypertension, CAD, atrial fib, diabetes, hypercholesterolemia, smoking, substance abuse, diet, physical inactivity, obesity, sleep apnea.    4. Patient's risk factors for stroke which include: atrial fib, hypertension, hypercholesterolemia, diabetes, sleep apnea    5. Follow-up plan for after discharge    6. Discharge medications which include: Lipitor, metformin, lisinopril, anti-coagulation    In addition, the above information was given to the family in writing as a part of the [facility] Stroke Class Handout.    Learner's response to risk factors / lifestyle modification education: Taking steps     Kim Mascorro RN

## 2023-03-10 NOTE — PLAN OF CARE
Goal Outcome Evaluation:      Plan of Care Reviewed With: patient, significant other    Reason for Admission: Rt MCA stroke, s/p tnk on 3/7    Cognitive/Mentation: A/Ox4  Neuros/CMS: Intact ex L facial droop LUE hemiparesis, garbled/slurred speech  VS: Stable on RA. Mildly febrile this evening - tylenol given at 2200  Tele: Afib, controlled  GI: BS active, no BM. PEG placed this afternoon  : Incontinent  Pulmonary: LS clear/dim, frequent, loose, nonproductive cough. Chest xray from today stable  Pain: Denies    Drains/Lines: PIV, SL  Skin: Intact aside from blanchable erythema on sacrum/coccyx  Activity: Assist x2/GB/Bri Steady  Diet: NPO - TF started at 2200    Therapies recs: ARU  Discharge: Pending medical stability    Aggression Stoplight Tool: Green    End of shift summary: Pt had PEG placed this afternoon without complication, tube feedings initiated at 2200 at 25cc/hr. Sepsis screen fired, lactic normal. Chest xray ordered this morning for fevers, normal results.

## 2023-03-10 NOTE — PROGRESS NOTES
[facility]    Medicine Progress Note - Hospitalist Service    Date of Admission:  3/7/2023    Assessment & Plan   Stefan Diallo is a 77 year old male with past medical history of hypertension, hyperlipidemia, type 2 diabetes, A-fib (warfarin on hold for procedure since 2/24) presenting with left sided weakness, facial droop. He is being admitted for acute ischemic stroke s/p TNK.       Acute ischemic stroke of R MCA territory due to cardioembolism status post tenecteplase on 3/7  Patient with history of atrial fibrillation and on chronic warfarin anticoagulation.  However, warfarin has been held since 2/25 for pain pump placement and INR ofAnd admission requested for further management.  CT head-Increased attenuation within the right superior M2 branch of the middle cerebral artery concerning for thromboembolus. No early ischemic changes.  HEAD CTA: 1.  Occlusion of right superior M2 branch middle cerebral artery. 2.  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 Possible subocclusive thrombus at the R MCA bifurcation  -Goal -180/105. Discussed with neuro, plan not to drop SBP <120 to avoid hypoperfusion  - MRI brain with and without contrast and repeat head CT done this morning.  Resulted as above.  -Transthoracic echocardiogram shows EF of 60% with normal right ventricular function.  Mild to moderate aortic stenosis.  - A1c 6.4% on 2/1/23, no need to repeat at this time  - lipid panel and troponin  -Cleared for transfer to floor with neurochecks every 4 hours.  --Started on aspirin 325 daily after CT showed no hemorrhagic conversion on 3/8/22  -Neurology recommeding DOAC from 3/14 and stop aspirin  -Patient failed VFSS and risk for aspiration.PEG placed by IR on 3/9/23  and nutrition ordered tube feeds  3/9 Cxray no pnuemonia  -Continue Tube feeds  -Strict NPO  -Pulmonary toilet and suctioning   -Follow-up with Gen Neuro clinic 6-8 weeks after discharge     Atrial fibrillation  On  chronic anticoagulation with warfarin. However, has been held since 2/25 for planned pain pump placement.   - did not start meds at this time to avoid hypotension/brain hypoperfusion  - consider resuming PTA meds from tomorrow     Dyslipidemia  - lipid panel ordered  - resume PTA statin    Hypertension  Blood pressure is currently controlled, last documented 120/78.  - hold PTA PO meds at this time to minimized hypotension   Long term BP <130/80, inpatient meds can be slowly titrated to this goal as otherwise appropriate  -Will slowly start adding his home meds      Type II DM  - A1c6.4% on 2/1/23  - hold PTA Metformin  - sliding scale insulin      KISHA  Resume CPAP per home setting      # Hypokalemia  Potassium replacment protocol     Chronic back pain  Patient planned for pain pump placement 3/8/23.  States he has chronic back pain from nerve impingement and uses tylenol PTA for pain.   - Tylenol prn for pain        Diet:   regular diet once passes swallow eval  DVT Prophylaxis: Pneumatic Compression Devices  Scruggs Catheter: Not present  Lines: None     Cardiac Monitoring: None  Code Status:   Full code, discussed with patient           Diet: Adult Formula Drip Feeding: Continuous Jevity 1.5; Other - Specify in Comment; Goal Rate: 65; mL/hr; Begin TF @ 25 mL/hr.  Increase by 10 mL every 12 hrs to goal rate of 65 mL/hr.  NPO for Medical/Clinical Reasons Except for: Other; Specify: NPO For oral intake and gastric feedings for 6 hours post insertion Gastrostomy G/GJ tube. May feed through Jejunal or Distal PORT immediately for GJ tubes ONLY.    DVT Prophylaxis: Pneumatic Compression Devices  Scruggs Catheter: Not present  Lines: None     Cardiac Monitoring: ACTIVE order. Indication: ICU  Code Status: Full Code      Clinically Significant Risk Factors                        # Overweight: Estimated body mass index is 28.88 kg/m  as calculated from the following:    Height as of an earlier encounter on 3/7/23: 1.829 m  (6').    Weight as of this encounter: 96.6 kg (212 lb 15.4 oz)., PRESENT ON ADMISSION         Disposition Plan      Expected Discharge Date: 03/11/2023                  Alfredito Schafer MD  Hospitalist Service  [facility]  Securely message with [employer] (more info)  Text page via [employer] Paging/Directory   ______________________________________________________________________    Interval History   Patient sleeping  Fiance at son at bedside  Patient wants his home gabapentin restarted  Physical Exam   Vital Signs: Temp: 99.3  F (37.4  C) Temp src: Axillary BP: (!) 162/90 Pulse: 102   Resp: 14 SpO2: 94 % O2 Device: None (Room air) Oxygen Delivery: 3 LPM  Weight: 212 lbs 15.43 oz   Physical Exam  Cardiovascular:      Rate and Rhythm: Normal rate.      Pulses: Normal pulses.   Pulmonary:      Effort: Pulmonary effort is normal. No respiratory distress.         {Medical Decision Making          Data     I have personally reviewed the following data over the past 24 hrs:    10.7  \   14.0   / 233     139 104 12.0 /  202 (H)   3.4 21 (L) 0.71 \       Procal: N/A CRP: N/A Lactic Acid: 0.9

## 2023-03-11 NOTE — PROGRESS NOTES
Pt is A/O x4, slow to speak, some garbled words. VSS on RA. Up with A/2 GB, rahel steady.  Tele- controlled Afib. NPO. Tube feeding at 45 ml/hr to be increased 10 ml/hr at 11:45, 100 ml free water flush q4. Turn and repo q2. Incontinent B/B, external catheter in place. PIV SL. BG checks q4, insulin given per sliding scale

## 2023-03-11 NOTE — PHARMACY-VANCOMYCIN DOSING SERVICE
Pharmacy Vancomycin Initial Note  Date of Service 2023  Patient's  1945  77 year old, male    Indication: Sepsis    Current estimated CrCl = Estimated Creatinine Clearance: 83.6 mL/min (based on SCr of 0.87 mg/dL).    Creatinine for last 3 days  3/10/2023:  9:29 AM Creatinine 0.71 mg/dL  3/11/2023:  7:14 AM Creatinine 0.87 mg/dL      Nephrotoxins and other renal medications (From now, onward)    Start     Dose/Rate Route Frequency Ordered Stop    23 0600  vancomycin (VANCOCIN) 1000 mg in dextrose 5% 200 mL PREMIX         1,000 mg  200 mL/hr over 1 Hours Intravenous EVERY 12 HOURS 23 1553      23 1600  ampicillin-sulbactam (UNASYN) 3 g vial to attach to  mL bag         3 g  over 15-30 Minutes Intravenous EVERY 6 HOURS 23 1544      23 1600  vancomycin (VANCOCIN) 2,000 mg in 0.9% NaCl 500 mL intermittent infusion         2,000 mg  over 2 Hours Intravenous ONCE 23 1551            Contrast Orders - past 72 hours (72h ago, onward)    Start     Dose/Rate Route Frequency Stop    23 1530  iohexol (OMNIPAQUE) 240 mg/mL solution 50 mL         50 mL Per G Tube ONCE 23 1558    23 1030  barium sulfate (VARIBAR) 40 % pudding/paste 10 mL         10 mL Oral ONCE 23 1020    23 1030  barium sulfate 40% (VARIBAR NECTAR) oral suspension          Oral ONCE 23 1020          [employer] Prediction of Planned Initial Vancomycin Regimen  Regimen: 1000 mg IV every 12 hours.  Start time: 15:54 on 2023  Exposure target: AUC24 (range)400-600 mg/L.hr   AUC24,ss: 508 mg/L.hr  Probability of AUC24 > 400: 76 %  Ctrough,ss: 16.9 mg/L  Probability of Ctrough,ss > 20: 33 %  Probability of nephrotoxicity (Lodise WILMA ): 13 %    Plan:  1. Start vancomycin  2000mg x1, then 1000 mg IV q12h.   2. Vancomycin monitoring method: AUC  3. Vancomycin therapeutic monitoring goal: 400-600 mg*h/L  4. Pharmacy will check vancomycin levels as appropriate in 1-3 Days.     5. Serum creatinine levels will be ordered every 48 hours.      Pam Dominguez RPH

## 2023-03-11 NOTE — PROGRESS NOTES
[facility]    Medicine Progress Note - Hospitalist Service    Date of Admission:  3/7/2023    Assessment & Plan   Stefan Diallo is a 77 year old male with past medical history of hypertension, hyperlipidemia, type 2 diabetes, A-fib (warfarin on hold for procedure since 2/24) presenting with left sided weakness, facial droop. He is being admitted for acute ischemic stroke s/p TNK.       Acute ischemic stroke of R MCA territory due to cardioembolism status post tenecteplase on 3/7  Patient with history of atrial fibrillation and on chronic warfarin anticoagulation.  However, warfarin has been held since 2/25 for pain pump placement and INR ofAnd admission requested for further management.  CT head-Increased attenuation within the right superior M2 branch of the middle cerebral artery concerning for thromboembolus. No early ischemic changes.  HEAD CTA: 1.  Occlusion of right superior M2 branch middle cerebral artery. 2.  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 Possible subocclusive thrombus at the R MCA bifurcation  -Goal -180/105. Discussed with neuro, plan not to drop SBP <120 to avoid hypoperfusion  - MRI brain with and without contrast and repeat head CT done this morning.  Resulted as above.  -Transthoracic echocardiogram shows EF of 60% with normal right ventricular function.  Mild to moderate aortic stenosis.  - A1c 6.4% on 2/1/23, no need to repeat at this time  - lipid panel and troponin  -Cleared for transfer to floor with neurochecks every 4 hours.  --Started on aspirin 325 daily after CT showed no hemorrhagic conversion on 3/8/22  -Neurology recommeding DOAC from 3/14 and stop aspirin  -Patient failed VFSS and risk for aspiration.PEG placed by IR on 3/9/23  and nutrition ordered tube feeds  3/9 Cxray no pnuemonia  -Continue Tube feeds  -Strict NPO  -Pulmonary toilet and suctioning   -Follow-up with [facility] clinic 6-8 weeks after discharge  -Pulmoanry toilet and  frequent suctioning      # Sepsis   -CRP and Procal significantly elevated   -Likely aspiration   -Ordered chest xray and UA  -Blood cultures ordered  -PEG tube site no signs of infectio  -Lactic acid  -Will start vanc and unasyn     Atrial fibrillation  On chronic anticoagulation with warfarin. However, has been held since 2/25 for planned pain pump placement.   - did not start meds at this time to avoid hypotension/brain hypoperfusion  - consider resuming PTA meds from tomorrow     Dyslipidemia  - lipid panel ordered  - resume PTA statin    Hypertension  Blood pressure is currently controlled, last documented 120/78.  - hold PTA PO meds at this time to minimized hypotension   Long term BP <130/80, inpatient meds can be slowly titrated to this goal as otherwise appropriate  -Will slowly start adding his home meds      Type II DM  - A1c6.4% on 2/1/23  - hold PTA Metformin  - sliding scale insulin      KISHA  Resume CPAP per home setting      # Hypokalemia  Potassium replacment protocol     Chronic back pain  Patient planned for pain pump placement 3/8/23.  States he has chronic back pain from nerve impingement and uses tylenol PTA for pain.   - Tylenol prn for pain      # Family   Social- Updated  Fiancee at bedside   Diet:   regular diet once passes swallow eval  DVT Prophylaxis: Pneumatic Compression Devices  Scruggs Catheter: Not present  Lines: None     Cardiac Monitoring: None  Code Status:   Full code, discussed with patient           Diet: Adult Formula Drip Feeding: Continuous Jevity 1.5; Other - Specify in Comment; Goal Rate: 65; mL/hr; Begin TF @ 25 mL/hr.  Increase by 10 mL every 12 hrs to goal rate of 65 mL/hr.  NPO for Medical/Clinical Reasons Except for: Other; Specify: NPO For oral intake and gastric feedings for 6 hours post insertion Gastrostomy G/GJ tube. May feed through Jejunal or Distal PORT immediately for GJ tubes ONLY.    DVT Prophylaxis: Pneumatic Compression Devices  Scruggs Catheter: Not  present  Lines: None     Cardiac Monitoring: ACTIVE order. Indication: ICU  Code Status: Full Code      Clinically Significant Risk Factors                        # Overweight: Estimated body mass index is 29.3 kg/m  as calculated from the following:    Height as of an earlier encounter on 3/7/23: 1.829 m (6').    Weight as of this encounter: 98 kg (216 lb 0.8 oz).          Disposition Plan      Expected Discharge Date: 03/13/2023                Alfredito Schafer MD  Hospitalist Service  [facility]  Securely message with [employer] (more info)  Text page via Not iT Paging/Directory   ______________________________________________________________________    Interval History    Patient seen and examined at bedside  Mild confusion  Having fever  Expressive aphasia  Increased secretions          Physical Exam   Vital Signs: Temp: 100.1  F (37.8  C) Temp src: Oral BP: 138/74 Pulse: 86   Resp: 22 SpO2: 94 % O2 Device: None (Room air)    Weight: 216 lbs .81 oz   Physical Exam  Cardiovascular:      Rate and Rhythm: Normal rate.      Pulses: Normal pulses.   Pulmonary:      Effort: Pulmonary effort is normal. No respiratory distress.   Abdominal:      General: There is no distension.      Palpations: Abdomen is soft.      Tenderness: There is no abdominal tenderness.      Comments: PEG site no erythema    Neurological:      Motor: Weakness present.      Comments: Expressive aphasia         {Medical Decision Making          Data     I have personally reviewed the following data over the past 24 hrs:    12.7 (H)  \   13.3   / 224     141 104 14.8 /  197 (H)   3.4 23 0.87 \       Procal: 0.85 (H) CRP: 312.40 (H) Lactic Acid: 1.2

## 2023-03-11 NOTE — PLAN OF CARE
Goal Outcome Evaluation:      Plan of Care Reviewed With: child, patient, family    Overall Patient Progress: no changeOverall Patient Progress: no change    Patient is bedridden; NPO; tube feeding was increased - please see my previous note.    He was suctioned to remove phlegm in his mouth.  Fever was noted - sepsis BPA was followed: Lactic and vital signs X 2; Lactic 1.1, VSS; Tylenol was administered X 2.Constipated; IVF was discontinued.

## 2023-03-12 NOTE — PROGRESS NOTES
[facility]    Medicine Progress Note - Hospitalist Service    Date of Admission:  3/7/2023    Assessment & Plan   Stefan Diallo is a 77 year old male with past medical history of hypertension, hyperlipidemia, type 2 diabetes, A-fib (warfarin on hold for procedure since 2/24) presenting with left sided weakness, facial droop. He is being admitted for acute ischemic stroke s/p TNK.       Acute ischemic stroke of R MCA territory due to cardioembolism status post tenecteplase on 3/7  Patient with history of atrial fibrillation and on chronic warfarin anticoagulation.  However, warfarin has been held since 2/25 for pain pump placement and INR ofAnd admission requested for further management.  CT head-Increased attenuation within the right superior M2 branch of the middle cerebral artery concerning for thromboembolus. No early ischemic changes.  HEAD CTA: 1.  Occlusion of right superior M2 branch middle cerebral artery. 2.  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 Possible subocclusive thrombus at the R MCA bifurcation  -Goal -180/105. Discussed with neuro, plan not to drop SBP <120 to avoid hypoperfusion  - MRI brain with and without contrast and repeat head CT done this morning.  Resulted as above.  -Transthoracic echocardiogram shows EF of 60% with normal right ventricular function.  Mild to moderate aortic stenosis.  - A1c 6.4% on 2/1/23, no need to repeat at this time  - lipid panel and troponin  -Cleared for transfer to floor with neurochecks every 4 hours.  --Started on aspirin 325 daily after CT showed no hemorrhagic conversion on 3/8/22  -Neurology recommeding DOAC from 3/14 and stop aspirin  -Patient failed VFSS and risk for aspiration.PEG placed by IR on 3/9/23  and nutrition ordered tube feeds  3/9 Cxray no pnuemonia  -Continue Tube feeds  -Strict NPO  -Pulmonary toilet and suctioning   -Follow-up with [facility] clinic 6-8 weeks after discharge  -Pulmoanry toilet and  frequent suctioning  -Albuterol nebs  - Discussed with stroke neurology Dr Venegas and will start patient on DVT prophylaxis Lovenox which can be discontinued on March 14 will restart Eliquis  -Pharmacy liaison checked the price of Eliquis and patient and family okay to start Eliquis.  Was on Coumadin as per family due to insurance issues  -Discussed with Stroke  Neurology Dr Rubi acosta to start blood pressure medication    # Sepsis   Likely source aspiration pneumonia.   will repeat chest x-ray.  UA no infection.Wbc normalized  -  MRSA nares negative  -Continue Unasyn  - Continue vancomycin  -Can DC vancomycin if blood cultures are negative  -     Atrial fibrillation  On chronic anticoagulation with warfarin. However, has been held since 2/25 for planned pain pump placement.   - did not start meds at this time to avoid hypotension/brain hypoperfusion  We will restart Cardizem.  Long-acting extended release cannot be given through the PEG tube and will start him on 60 mg short acting twice daily   Dyslipidemia  - lipid panel ordered  - resume PTA statin    Hypertension  Blood pressure is currently controlled, last documented 120/78.  - hold PTA PO meds at this time to minimized hypotension   Long term BP <130/80, inpatient meds can be slowly titrated to this goal as otherwise appropriate  -Will slowly start adding his home meds,Cardizem restarted today   -Hold PTA lisnopril and atenolo      Type II DM  Has neuropathy  - A1c6.4% on 2/1/23  - hold PTA Metformin  - sliding scale insulin   -Continue home gabapentin     KISHA  Resume CPAP per home setting      # Hypokalemia  Potassium replacment protocol     Chronic back pain  Patient planned for pain pump placement 3/8/23.  States he has chronic back pain from nerve impingement and uses tylenol PTA for pain.   - Tylenol prn for pain      # Family   Social- Updated  Eli Kayce   Diet:   regular diet once passes swallow eval  DVT Prophylaxis: Pneumatic Compression  Devices  Scruggs Catheter: Not present  Lines: None     Cardiac Monitoring: None  Code Status:   Full code, discussed with patient           Diet: Adult Formula Drip Feeding: Continuous Jevity 1.5; Other - Specify in Comment; Goal Rate: 65; mL/hr; Begin TF @ 25 mL/hr.  Increase by 10 mL every 12 hrs to goal rate of 65 mL/hr.  NPO for Medical/Clinical Reasons Except for: Other; Specify: NPO For oral intake and gastric feedings for 6 hours post insertion Gastrostomy G/GJ tube. May feed through Jejunal or Distal PORT immediately for GJ tubes ONLY.    DVT Prophylaxis: Pneumatic Compression Devices  Scruggs Catheter: Not present  Lines: None     Cardiac Monitoring: ACTIVE order. Indication: ICU  Code Status: Full Code      Clinically Significant Risk Factors                        # Overweight: Estimated body mass index is 29.3 kg/m  as calculated from the following:    Height as of an earlier encounter on 3/7/23: 1.829 m (6').    Weight as of this encounter: 98 kg (216 lb 0.8 oz).          Disposition Plan      Expected Discharge Date: 03/13/2023                Alfredito Schafer MD  Hospitalist Service  [facility]  Securely message with [employer] (more info)  Text page via [employer] Paging/Directory   ______________________________________________________________________    Interval History    Patient seen and examined at bedside.  Has expressive apahsia.  Has pain similar to his neuropathic pain in lower extremeties   No respiratory distress        Physical Exam   Vital Signs: Temp: 99  F (37.2  C) Temp src: Axillary BP: (!) 140/90 Pulse: 89   Resp: 16 SpO2: 97 % O2 Device: None (Room air)    Weight: 216 lbs .81 oz   Physical Exam  Cardiovascular:      Rate and Rhythm: Normal rate.      Pulses: Normal pulses.   Pulmonary:      Effort: Pulmonary effort is normal. No respiratory distress.   Abdominal:      General: There is no distension.      Palpations: Abdomen is soft.      Tenderness: There  is no abdominal tenderness.      Comments: PEG site no tenderness   Neurological:      Motor: No weakness.      Comments: Expressive aphasia  Left sided weakness  Facial droop         {Medical Decision Making          Data     I have personally reviewed the following data over the past 24 hrs:    9.3  \   12.3 (L)   / 245     136 101 20.2 /  226 (H)   3.4 26 0.75 \       Procal: N/A CRP: N/A Lactic Acid: 1.2

## 2023-03-12 NOTE — PROGRESS NOTES
Pt is A/O x4. VSS on RA. Up with A/2 rahel steady, Turn and repo q2.. NPO. Incontinent B/B, external catheter in place Neuros mostly intact. PEG feeding at goal rate of 65 ml/hr, pt is tolerating well. On tele-controlled Afib. PRN miralax given for constipation. BG checks q4, sliding scale insulin given. On IV ABX vanco and unasyn. Suction done x2.

## 2023-03-12 NOTE — PROGRESS NOTES
Patient has been fatigued all day.   I asked Dr. Schafer to add bowel regimen meds since patient is constipated.  Patient has had fevers; UA, Covid, MRSA were collected; Procalcitotnin and CRP are elevated; IV ABX were added by the hospitalist.

## 2023-03-12 NOTE — PROVIDER NOTIFICATION
MD Notification    Notified Person: MD    Notified Person Name: Hanh    Notification Date/Time: 03/12/2023 1533    Notification Interaction: vocera    Purpose of Notification: Do you want pt to get deep suction from RT? if so can you put an order in for that? Thanks!    Orders Received:     Will order    Comments:

## 2023-03-13 NOTE — PROGRESS NOTES
Notified by RN FT placed recently with oozing discharge (bloody) overnight.  Not dramatic bleeding and hgb stable but persisting slow oozing.  TF's stopped this AM.  Will hold anticoag for moment.  IR being asked to reassess tube before further use.  Keep NPO for moment.

## 2023-03-13 NOTE — PHARMACY-VANCOMYCIN DOSING SERVICE
Pharmacy Vancomycin Note  Date of Service 2023  Patient's  1945   77 year old, male    Indication: Sepsis  Day of Therapy: 3  Current vancomycin regimen:  1000 mg IV q12h  Current vancomycin monitoring method: AUC  Current vancomycin therapeutic monitoring goal: 400-600 mg*h/L    Insight[employer] Prediction of Current Vancomycin Regimen  Loading dose: N/A  Regimen: 1000 mg IV every 12 hours.  Start time: 17:56 on 2023  Exposure target: AUC24 (range)400-600 mg/L.hr   AUC24,ss: 417 mg/L.hr  Probability of AUC24 > 400: 58 %  Ctrough,ss: 13 mg/L  Probability of Ctrough,ss > 20: 7 %  Probability of nephrotoxicity (Lodise WILMA ): 8 %      Current estimated CrCl = Estimated Creatinine Clearance: 96.9 mL/min (based on SCr of 0.75 mg/dL).    Creatinine for last 3 days  3/10/2023:  9:29 AM Creatinine 0.71 mg/dL  3/11/2023:  7:14 AM Creatinine 0.87 mg/dL  3/12/2023:  6:54 AM Creatinine 0.75 mg/dL  3/13/2023:  5:43 AM Creatinine 0.75 mg/dL    Recent Vancomycin Levels (past 3 days)  3/13/2023:  5:43 AM Vancomycin 11.0 ug/mL    Vancomycin IV Administrations (past 72 hours)                   vancomycin (VANCOCIN) 1000 mg in dextrose 5% 200 mL PREMIX (mg) 1,000 mg New Bag 23 0556     1,000 mg New Bag 23 1855     1,000 mg New Bag  0546    vancomycin (VANCOCIN) 2,000 mg in 0.9% NaCl 500 mL intermittent infusion (mg) 2,000 mg New Bag 23 1658                Nephrotoxins and other renal medications (From now, onward)    Start     Dose/Rate Route Frequency Ordered Stop    23 0600  vancomycin (VANCOCIN) 1000 mg in dextrose 5% 200 mL PREMIX         1,000 mg  200 mL/hr over 1 Hours Intravenous EVERY 12 HOURS 23 1553      23 1600  ampicillin-sulbactam (UNASYN) 3 g vial to attach to  mL bag         3 g  over 15-30 Minutes Intravenous EVERY 6 HOURS 23 1544               Contrast Orders - past 72 hours (72h ago, onward)    None        Interpretation of levels and current  regimen:  Vancomycin level is reflective of -600    Has serum creatinine changed greater than 50% in last 72 hours: No    Urine output:  good urine output    Renal Function: Stable    Plan:  1. Continue Current Dose  2. Vancomycin monitoring method: AUC  3. Vancomycin therapeutic monitoring goal: 400-600 mg*h/L  4. Pharmacy will check vancomycin levels as appropriate in 1-3 Days.  5. Serum creatinine levels will be ordered daily for the first week of therapy and at least twice weekly for subsequent weeks.    Aisha Farr, RobeD, BCPS

## 2023-03-13 NOTE — PROGRESS NOTES
[facility]    Medicine Progress Note - Hospitalist Service    Date of Admission:  3/7/2023    Assessment & Plan      Stefan Diallo is a 77 year old male with past medical history of hypertension, hyperlipidemia, type 2 diabetes, A-fib (warfarin on hold for procedure since 2/24) presenting with left sided weakness, facial droop. He is being admitted for acute ischemic stroke s/p TNK.       Acute ischemic stroke of R MCA territory due to cardioembolism status post tenecteplase on 3/7:  Patient with history of atrial fibrillation and on chronic warfarin anticoagulation.  However, warfarin has been held since 2/25 for pain pump placement and INR ofAnd admission requested for further management.  CT head-Increased attenuation within the right superior M2 branch of the middle cerebral artery concerning for thromboembolus. No early ischemic changes.  HEAD CTA: 1.  Occlusion of right superior M2 branch middle cerebral artery. 2.  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 Possible subocclusive thrombus at the R MCA bifurcation  -Transthoracic echocardiogram shows EF of 60% with normal right ventricular function.  Mild to moderate aortic stenosis.  - Goal -180/105.  Neuro recommended avoiding drops of SBP <120 to avoid hypoperfusion.  --Started on aspirin 325 daily after CT showed no hemorrhagic conversion on 3/8/22  -Neurology recommeding DOAC from 3/14 and stop aspirin.  Today though given more bleeding from FT I held ASA in the AM and with bleeding not overtly worsening but also not stopping I will continue on just ASA and reassess tomorrow before DOAC start consideration (was on warfarin before this hospital stay).  -Patient failed VFSS and risk for aspiration.PEG placed by IR on 3/9/23  and nutrition ordered tube feeds     Possible early sepsis due to Asp pneumonia:  -  Likely source aspiration pneumonia.  MRSA nares negative.  BC's still negative 3/13.  Will continue unasyn but stop  vancomycin.  -Continue Unasyn     Atrial fibrillation:  On chronic anticoagulation with warfarin. However, has been held since 2/25 for planned pain pump placement.   Continue Cardizem.  Long-acting extended release cannot be given through the PEG tube and will start him on 60 mg short acting twice daily     Dyslipidemia:  - resume PTA statin      Hypertension:  Blood pressure is currently controlled within goals. Given timeline from event, likely will increase diltiazem dose further tomorrow.     Long term BP <130/80, inpatient meds can be slowly titrated to this goal as otherwise appropriate     Type II DM  Has neuropathy:  - A1c6.4% on 2/1/23  - hold PTA Metformin  - sliding scale insulin   -Continue home gabapentin     KISHA  -  CPAP per home setting     Hypokalemia  Potassium replacment protocol     Chronic back pain  Patient planned for pain pump placement 3/8/23.  States he has chronic back pain from nerve impingement and uses tylenol PTA for pain.   - Tylenol prn for pain    FT Placement  FT Bleeding  Contipation:  -  Site bleeding.  Asked IR to assess, appreciate their assistance.  Will continue ASA but hold on starting planned DOAC until tomorrow given bleeding.  Plan to also tx for constipation.     Family:  Updated family present in room today.        Medical Decision Making       50 MINUTES SPENT BY ME on the date of service doing chart review, history, exam, documentation & further activities per the note.         Diet: Adult Formula Drip Feeding: Continuous Jevity 1.5; Other - Specify in Comment; Goal Rate: 65; mL/hr; Begin TF @ 25 mL/hr.  Increase by 10 mL every 12 hrs to goal rate of 65 mL/hr.  NPO for Medical/Clinical Reasons Except for: Other; Specify: NPO For oral intake and gastric feedings for 6 hours post insertion Gastrostomy G/GJ tube. May feed through Jejunal or Distal PORT immediately for GJ tubes ONLY.    DVT Prophylaxis: Enoxaparin (Lovenox) subcutaneous - HOLDING TODAY WITH FT  BLEEDING  Scruggs Catheter: Not present  Lines: None     Cardiac Monitoring: ACTIVE order. Indication: Tachyarrhythmias, acute (48 hours)  Code Status: Full Code      Clinically Significant Risk Factors                           Disposition Plan      Expected Discharge Date: 03/14/2023                  Zaire Brown DO  Hospitalist Service  [facility]  Securely message with Zimory (more info)  Text page via Narr[employer] Paging/Directory   ______________________________________________________________________    Interval History   Assumed care, history reviewed.  No reported new pain, sob, nausea.  No recent BM when I saw him earlier today.   RN reported bleeding overnight and this AM at FT site.    Physical Exam   Vital Signs: Temp: 98.6  F (37  C) Temp src: Oral BP: (!) 155/89 Pulse: 82   Resp: 16 SpO2: 97 % O2 Device: None (Room air)    Weight: 216 lbs .81 oz    GEN:  Alert, appears comfortable, no overt distress.  HEENT:  Normocephalic/atraumatic, no scleral icterus, no nasal discharge, mouth moist.  CV:  Regular rate and rhythm, distant.  LUNGS:  Clear to auscultation bilaterally without rales/rhonchi/wheezing/retractions.  Breath sounds diminished bases.  Symmetric chest rise on inhalation noted.  ABD:  Active bowel sounds, soft, non-tender/non-distended.  No rebound/guarding/rigidity.  EXT:  Trace edema.  No cyanosis.  No acute joint synovitis noted.  SKIN:  Dry to touch, no exanthems noted in the visualized areas.    Medications     - MEDICATION INSTRUCTIONS -       niCARdipine         albuterol  2.5 mg Nebulization Q12H     ampicillin-sulbactam  3 g Intravenous Q6H     [Held by provider] aspirin  325 mg Oral or Feeding Tube Daily    Or     [Held by provider] aspirin  300 mg Rectal Daily     atorvastatin  20 mg Oral or Feeding Tube At Bedtime     diltiazem  60 mg Oral Q12H     [Held by provider] enoxaparin ANTICOAGULANT  40 mg Subcutaneous Q24H     gabapentin  600 mg NG or Feeding tube At  Bedtime     insulin aspart  1-4 Units Subcutaneous Q4H     pantoprazole  40 mg Per Feeding Tube QAM AC     senna-docusate  2 tablet Per Feeding Tube BID     sodium chloride (PF)  3 mL Intracatheter Q8H     vancomycin  1,000 mg Intravenous Q12H       Data     Labs and Imaging results below reviewed today.  Recent Labs   Lab 03/13/23  0543 03/12/23  0654 03/11/23  0714   WBC 8.1 9.3 12.7*   HGB 12.4* 12.3* 13.3   HCT 36.7* 36.7* 38.8*   MCV 93 93 91    245 224     Recent Labs   Lab 03/13/23  1748 03/13/23  1143 03/13/23  0734 03/13/23  0543 03/12/23  2041 03/12/23 2004 03/12/23  0836 03/12/23  0654 03/11/23  1146 03/11/23  0714 03/07/23  1604 03/07/23  1312   NA  --   --   --  138  --   --   --  136  --  141   < > 139   POTASSIUM  --   --   --  3.8  --  4.0  --  3.4   < > 3.4   < > 4.1   CHLORIDE  --   --   --  102  --   --   --  101  --  104   < > 103   CO2  --   --   --  26  --   --   --  26  --  23   < > 24   ANIONGAP  --   --   --  10  --   --   --  9  --  14   < > 12   * 162* 206* 252*   < >  --    < > 239*   < > 213*   < > 132*   BUN  --   --   --  20.8  --   --   --  20.2  --  14.8   < > 22.8   CR  --   --   --  0.75  --   --   --  0.75  --  0.87   < > 0.86   GFRESTIMATED  --   --   --  >90  --   --   --  >90  --  88   < > 88   LUTHER  --   --   --  9.4  --   --   --  9.4  --  9.9   < > 9.8   MAG  --   --   --  2.0  --   --   --   --   --   --   --   --    PHOS  --   --   --  3.7  --   --   --  3.2  --   --   --   --    PROTTOTAL  --   --   --   --   --   --   --   --   --   --   --  7.3   ALBUMIN  --   --   --   --   --   --   --   --   --   --   --  4.0   BILITOTAL  --   --   --   --   --   --   --   --   --   --   --  0.4   ALKPHOS  --   --   --   --   --   --   --   --   --   --   --  116   AST  --   --   --   --   --   --   --   --   --   --   --  22   ALT  --   --   --   --   --   --   --   --   --   --   --  10    < > = values in this interval not displayed.     7-Day Micro Results      Collected Updated Procedure Result Status      03/11/2023 1842 03/12/2023 2216 Blood Culture Hand, Left [[id number]]   Blood from Hand, Left    Preliminary result Component Value   Culture No growth after 1 day  [P]                03/11/2023 1837 03/12/2023 2201 Blood Culture Arm, Right [[id number]]   Blood from Arm, Right    Preliminary result Component Value   Culture No growth after 1 day  [P]                03/11/2023 1625 03/11/2023 1710 Asymptomatic COVID-19 Virus (Coronavirus) by PCR Nasopharyngeal [[id number]]    Swab from Nasopharyngeal    Final result Component Value   SARS CoV2 PCR Negative   NEGATIVE: SARS-CoV-2 (COVID-19) RNA not detected, presumed negative.            03/11/2023 1625 03/11/2023 2206 MRSA MSSA PCR, Nasal Swab [[id number]]    Swab from Nare, Left    Final result Component Value   MRSA Target DNA Negative   SA Target DNA Positive                  No results found for this or any previous visit (from the past 24 hour(s))..se

## 2023-03-13 NOTE — PROVIDER NOTIFICATION
MD Notification    Notified Person: MD    Notified Person Name: Stephanie     Notification Date/Time: 3/13/23 0922    Notification Interaction: Vocera Message    Purpose of Notification: Patient with decent amount of bloody drainage at PEG tube site. Night nurse had put 4 4x4 dressings over site and all were saturated. I paused TF for now. Please advise, thanks!    Orders Received:IR consult hold     Comments:

## 2023-03-13 NOTE — PROGRESS NOTES
CLINICAL NUTRITION SERVICES - REASSESSMENT NOTE      RECOMMENDATIONS FOR MD/PROVIDER TO ORDER:   Consider increase in insulin regimen with reaching nutrition support goal and now having increased carbohydrate intake   Recommendations Ordered by Registered Dietitian (RD):   Continue current EN regimen   Future/Additional Recommendations:   Monitor BG   Malnutrition: 3/9  % Weight Loss:  None noted  % Intake:  Decreased intake does not meet criteria for malnutrition  - has been NPO since admit  Subcutaneous Fat Loss:  None observed  Muscle Loss:  None observed  Fluid Retention:  None noted     Malnutrition Diagnosis: Patient does not meet two of the above criteria necessary for diagnosing malnutrition     EVALUATION OF PROGRESS TOWARD GOALS   Diet: NPO    Nutrition Support:    Type of Feeding Tube: PEG  Enteral Frequency:  Continuous  Enteral Regimen: Jevity 1.5 @ 65 mL/hr (goal rate)  Total Enteral Provisions: 2340 cals (24 cals/kg), 100 gm pro (1 gm/kg), 33 gm fiber, 1186 mL free water  Free Water Flush: 100 mL every 4 hrs  TOTAL (TF + flushes) = 1786 mL/day    Intake/Tolerance: TF to goal 3/12 am. However, TF was held briefly to address blood oozing persistently from PEG site. TF restarted at goal. Family member at bedside notes pt is tolerating well, only complaint is his tummy/being backed up with BM yet.      ASSESSED NUTRITION NEEDS:  Dosing Weight 96.6 kg  Estimated Energy Needs: 4845-6486 kcals (22-25 Kcal/Kg)  Justification: maintenance  Estimated Protein Needs:  grams protein (1-1.2 g pro/Kg)  Justification: maintenance  Estimated Fluid Needs: 6971-9458  mL (1 mL/Kcal)  Justification: maintenance      NEW FINDINGS:   General: IVF discontinued 3/10    Weight: Wt stable this admit  03/11/23 0700 98 kg (216 lb 0.8 oz) --   03/08/23 0200 96.6 kg (212 lb 15.4 oz) Bed scale   03/07/23 1500 97.2 kg (214 lb 4.6 oz) --   03/07/23 1308 99.7 kg (219 lb 12.8 oz) --     GI: No BM yet this admit, constipation  PEG  site bleeding    Medications:  Lipitor  Novolog - low sliding scale  Protonix  Senna  Vanco  Unasyn    Labs:  Na: 138 (WNL)  K+: 3.8 (WNL)  Mg++: 2.0 (WNL)  Phos: 3.7 (WNL)  B - 286 (H)    Previous Goals:   EN to meet % estimated needs while pt is NPO  Evaluation: Met    Previous Nutrition Diagnosis:   Inadequate protein-energy intake related to NPO status with dysphagia as evidenced by pt not meeting nutrition needs  Evaluation: Completed      CURRENT NUTRITION DIAGNOSIS  Inadequate oral intake related to dysphagia as evidenced by reliance on EN to meet 100% of nutrition needs and need for NPO status    INTERVENTIONS  Recommendations / Nutrition Prescription  Continue current EN regimen    Implementation  None at this time    Goals  EN to meet % estimated needs while pt is NPO      MONITORING AND EVALUATION:  Progress towards goals will be monitored and evaluated per protocol and Practice Guidelines    Mary Cormier  Dietetic Intern

## 2023-03-13 NOTE — CONSULTS
Interventional Radiology Progress Note:  Inpatient at [facility]  Date: March 13, 2023     History: Stefan Diallo is a 77 year old male with past medical history of hypertension, hyperlipidemia, type 2 diabetes, A-fib (warfarin on hold for procedure since 2/24) admitted with left sided weakness, facial droop. He is being admitted for acute ischemic stroke s/p TNK. Stefan did not pass a swallow study and had a g tube placed 3/9/23. He also has been treated with aspiration pneumonia with frequent coughing.    Received a call this morning and consult placed regarding bleeding noted at g tube site overnight with 4 4x4s placed between disc and skin site. RN also noted tube feedings forcibly oozing out of g tube when disconnected. No blood noted in stomach contents. I requested that the RN gently pull back on the g tube and snug the disc down to help with pressure at the site. The patient has been coughing frequently with pneumonia and is taking Lovenox and Aspirin.     Patient seen in his room today. Patient hasn't had a documented BM in awhile so he is being given Senna and another laxative. He is sleepy and when asked has some pain at the g tube site. The 4x4 has some deep red blood ~ 3 inches in diameter around the g tube. No overt bleeding noted when gauze removed. Buttons intact. No increased pressure noted when the g tube was opened/uncapped but no recent tube feedings given.     Physical Exam:   Vitals: Temp:  [98.2  F (36.8  C)-99  F (37.2  C)] 99  F (37.2  C)  Pulse:  [65-89] 85  Resp:  [16-19] 16  BP: (122-144)/(70-90) 122/79  SpO2:  [94 %-98 %] 94 %    General:  Stable, sleeping.  In no acute distress.    Neuro:  Did not attempt to evaluate orientation. He responded to questions with simple answers.   Resp:  Normal respirations on room air. Non labored breathing. Equal air entry B/L   Abdomen:  Soft, distended, obese. mildly tender with gentle palpation at g tube site.    G tube site: See  HPI    Labs:  Recent Labs   Lab 03/13/23  0543 03/12/23  0654 03/11/23  0714   HGB 12.4* 12.3* 13.3   WBC 8.1 9.3 12.7*     Recent Labs   Lab 03/13/23  0543 03/12/23  0654 03/11/23  0714   CR 0.75 0.75 0.87      Recent Labs   Lab 03/07/23  1312   PROTTOTAL 7.3   ALBUMIN 4.0   BILITOTAL 0.4   ALKPHOS 116   AST 22   ALT 10     Assessment: Stefan Diallo is a 77 year old male with past medical history of hypertension, hyperlipidemia, type 2 diabetes, A-fib (warfarin on hold for procedure since 2/24) admitted with left sided weakness, facial droop. He is being admitted for acute ischemic stroke s/p TNK. Stefan did not pass a swallow study and had a g tube placed 3/9/23. He also has been treated with aspiration pneumonia with frequent coughing.     New bleeding started last night could be exacerbated by coughing, use of blood thinners, fresh G tube tract. Reassured by stable hemoglobin and lack of blood in stomach contents.   -No blood noted in the stomach contents, so this sounds like tract irritation.   -Reviewed use of pressure dressing by snugging down the g tube disc    Plan:   -Monitor bleeding, hemoglobin   -Change dressings as needed and keep disc snugged down to skin for a pressure dressing  -Flush the tube prior to disconnecting tube feeding from g tube to decrease the risk of tube feeding escaping.   -Laxatives to assist with a BM     Will follow.     Total time spent on the date of the encounter is 25 minutes, including time spent counseling the patient, performing a medically appropriate evaluation, reviewing prior medical history, ordering medications and tests, documenting clinical information in the medical record, and communication of results.    Total time: 25 minutes     Thanks [facility] Interventional Radiology CNP ([phone]) (phone [phone])

## 2023-03-13 NOTE — PLAN OF CARE
Goal Outcome Evaluation:    Neuro- x4- L facial droop, L sided weakness, decreased sensation to L side Garbled speech and slow to repond   Most Recent Vitals- BP (!) 141/83 (BP Location: Right arm)   Pulse 79   Temp 98.4  F (36.9  C) (Oral)   Resp 19   Wt 98 kg (216 lb 0.8 oz)   SpO2 96%   BMI 29.30 kg/m    Tele/Cardiac- Afib CVR BBB  Resp- RA  Activity- 2A rahel steady, turn/reposition  Pain- denies  Skin- Scattered bruising and redness to coccyx  GI/- External catheter in place, Constipation- received senna   Blood sugar Q4 checks, received insulin per orders  Diet: Adult Formula Drip Feeding: Continuous Jevity 1.5; Other - Specify in Comment; Goal Rate: 65; mL/hr; Begin TF @ 25 mL/hr.  Increase by 10 mL every 12 hrs to goal rate of 65 mL/hr.  NPO for Medical/Clinical Reasons Except for: Other; Specify: NPO For oral intake and gastric feedings for 6 hours post insertion Gastrostomy G/GJ tube. May feed through Jejunal or Distal PORT immediately for GJ tubes ONLY.

## 2023-03-13 NOTE — PLAN OF CARE
Pt here with SHANA DENNY. A&O x3-4. Neuros L droop, L sided weakness, garbled speech, and word finding- difficulty. VSS. Tele Afib w/ CVR and PVC. NPO diet. Tube feed running at 65mL/hr. Takes pills via PEG. Pt given PRN senna and suppository, pt had BM this shift. Up with Ax2 rahel steady. Denies pain. Pt scoring green on the Aggression Stop Light Tool. Plan continue therapies. Discharge pending.

## 2023-03-13 NOTE — PLAN OF CARE
Goal Outcome Evaluation:      Plan of Care Reviewed With: patient, family    Overall Patient Progress: improvingOverall Patient Progress: improving    Outcome Evaluation: TF at goal, meeting 100% nutrition needs. Briefly held for PEG site bleed this am, back to goal now. Debo well. Still no BM, regimen given.    Mary Cormier  Dietetic Intern

## 2023-03-13 NOTE — PLAN OF CARE
Goal Outcome Evaluation:  [id number]  Neuro- x4- L facial droop, L sided weakness, decreased sensation to L side Garbled speech and slow to repond   Most Recent Vitals- BP (!) 144/76   Pulse 79   Temp 98.4  F (36.9  C) (Oral)   Resp 19   Wt 98 kg (216 lb 0.8 oz)   SpO2 96%   BMI 29.30 kg/m     Tele/Cardiac- Afib CVR BBB  Resp- RA  Activity- 2A rahel steady, turn/reposition  Pain- denies  Skin- Scattered bruising and redness to coccyx  GI/- External catheter in place, Constipation- received senna yesterday  Blood sugar Q4 checks, received insulin per orders  Diet: Adult Formula Drip Feeding: Continuous Jevity 1.5; Other - Specify in Comment; Goal Rate: 65; mL/hr; Begin TF @ 25 mL/hr.  Increase by 10 mL every 12 hrs to goal rate of 65 mL/hr.  NPO for Medical/Clinical Reasons Except for: Other; Specify: NPO For oral intake and gastric feedings for 6 hours post insertion Gastrostomy G/GJ tube. May feed through Jejunal or Distal PORT immediately for GJ tubes ONLY.     Meds crushed in G tube. SBP goal: 120-180. Incontinent, external catheter in place with adequate output. No BM this shift.

## 2023-03-14 NOTE — PLAN OF CARE
Pt here with SHANA DENNY. A&O x4. Neuros L facial droop, L sided weakness, and garbled speech. VSS. Tele Afib w/ CVR. NPO diet. Tube feed running at 65mL/hr. Takes pills crushed via PEG. Up with Ax2 rahel steady or GBW. Denies pain. Pt scoring green on the Aggression Stop Light Tool. Plan continue therapies. Discharge pending.

## 2023-03-14 NOTE — PROGRESS NOTES
SPIRITUAL HEALTH SERVICES Progress Note       73     Visited with Stefan (pt) and Kayce at the bedside today per length of stay check-in.    I introduced /SH support role and no specific needs were named during this visit.    We remain available, as needed, to support pt and family. Please consult if any immediate concerns arise.     ------  Ashu German M.Div.  Resident   Pager: [phone]

## 2023-03-14 NOTE — CONSULTS
Care Management Initial Consult    General Information  Assessment completed with: Spouse or significant other,    Type of CM/SW Visit: Initial Assessment    Primary Care Provider verified and updated as needed: Yes   Readmission within the last 30 days:        Reason for Consult: discharge planning  Advance Care Planning: Advance Care Planning Reviewed: no concerns identified          Communication Assessment  Patient's communication style: spoken language (English or Bilingual)    Hearing Difficulty or Deaf: no   Wear Glasses or Blind: no    Cognitive  Cognitive/Neuro/Behavioral: .WDL except  Level of Consciousness: alert, confused  Arousal Level: opens eyes spontaneously  Orientation: oriented x 4  Mood/Behavior: calm  Best Language: 1 - Mild to moderate  Speech: slow, garbled    Living Environment:   People in home: alone     Current living Arrangements: house      Able to return to prior arrangements:         Family/Social Support:  Care provided by: self  Provides care for: no one  Marital Status:   Significant Other, Children       Kayce  Description of Support System: Supportive, Involved    Support Assessment: Adequate family and caregiver support    Current Resources:   Patient receiving home care services:       Community Resources:    Equipment currently used at home: none  Supplies currently used at home:      Employment/Financial:  Employment Status: employed part-time        Financial Concerns:             Lifestyle & Psychosocial Needs:  Social Determinants of Health     Tobacco Use: Medium Risk     Smoking Tobacco Use: Former     Smokeless Tobacco Use: Never     Passive Exposure: Not on file   Alcohol Use: Not on file   Financial Resource Strain: Not on file   Food Insecurity: Not on file   Transportation Needs: Not on file   Physical Activity: Not on file   Stress: Not on file   Social Connections: Not on file   Intimate Partner Violence: Not on file   Depression: Not at risk     PHQ-2  Score: 0   Housing Stability: Not on file       Functional Status:  Prior to admission patient needed assistance: no    Mental Health Status:          Chemical Dependency Status:         Values/Beliefs:  Spiritual, Cultural Beliefs, Latter-day Practices, Values that affect care:                 Additional Information:  CM consult for discharge planning.  Patient admitted for acute ischemic stroke s/p TNK.  He has had a G-tube placed and anticoagulation has been held due to bleeding.  Hospitalist anticipates restarting Eliquis today and will be ready for discharge if no further issues with bleeding.    Met with patient's arslan Devries.  Patient was sleeping and did not want to be disturbed.  Per Kayce, patient lives alone in a house and is independent at baseline.  He works part-time in building maintenance.  She states he has 3 children, 2 who are involved with his care.  Discussed the recommendation for ARU and she feels Stefan would be in agreement.  She would be able to stay with him post rehab as she can work from home and states Stefan's son and her children would also be able to assist if needed.    Referral sent to [facility] via DOD.    Heather Hensley RN, BSN, PHN  Inpatient Care Coordination  [facility]  Phone: [phone]

## 2023-03-14 NOTE — PLAN OF CARE
Pt here with SHANA DENNY. A&O x4 this shift. Neuros L droop, L sided weakness, garbled speech, and WFD. VSS. Tele Afib w/ CVR. NPO diet. TF at 65mL/hr. Takes pills via PEG. PEG dressing CDI. One scant BM overnight. Up with Ax2 & rahel steady. Denies pain. Pt scoring green on the Aggression Stop Light Tool. Plan continue therapies. Discharge pending.

## 2023-03-14 NOTE — PROGRESS NOTES
[facility]    Medicine Progress Note - Hospitalist Service    Date of Admission:  3/7/2023    Assessment & Plan      Stefan Diallo is a 77 year old male with past medical history of hypertension, hyperlipidemia, type 2 diabetes, A-fib (warfarin on hold for procedure since 2/24) presenting with left sided weakness, facial droop. He is being admitted for acute ischemic stroke s/p TNK.       Acute ischemic stroke of R MCA territory due to cardioembolism status post tenecteplase on 3/7:  Patient with history of atrial fibrillation and on chronic warfarin anticoagulation.  However, warfarin had been held since 2/25 for pain pump placement when presenting with CVA.  CT head-Increased attenuation within the right superior M2 branch of the middle cerebral artery concerning for thromboembolus. No early ischemic changes.  HEAD CTA: 1.  Occlusion of right superior M2 branch middle cerebral artery. 2.  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 Possible subocclusive thrombus at the R MCA bifurcation  -Transthoracic echocardiogram shows EF of 60% with normal right ventricular function.  Mild to moderate aortic stenosis.  -Patient failed VFSS and risk for aspiration.PEG placed by IR on 3/9/23  and nutrition ordered tube feeds  - Goal -180/105.  Neuro initially recommended avoiding drops of SBP <120 to avoid hypoperfusion.  Now that is further out from acute event will gradually work to decrease toward normal range.    -  Started on aspirin 325 daily after CT showed no hemorrhagic conversion on 3/8/22.  Neurology felt ASA could be changed to warfarin or DOAC on 3/14.  As G tube bleed concerns better, will start eliquis 5 mg BID this evening and stop ASA.       Possible early sepsis due to Asp pneumonia:  -  Likely source aspiration pneumonia.  MRSA nares negative.  BC's still negative 3/13.  Stopped vancomycin 3/13.  Remains on unasyn today.  Will change to FT augmentin tomorrow with a plan for  7 total days abx.     Atrial fibrillation:  On chronic anticoagulation with warfarin. However, has been held since 2/25 for planned pain pump placement. Continue Cardizem.  Long-acting extended release cannot be given through the PEG tube.  Has been on BID diltiazem.  Will change to TID today.    Dyslipidemia:  - PTA statin      Hypertension:  Blood pressure is currently controlled within goals. Increase diltiazem to 60 mg TID 3/14/23. Long term BP <130/80, inpatient meds can be slowly titrated to this goal as otherwise appropriate     Type II DM  Has neuropathy:  - A1c6.4% on 2/1/23  - hold PTA Metformin  - Changed LOW sliding scale insulin q4 to MEDIUM ssi q4 on 3/14.  Consider some low dose lantus depending on trend.  -Continue home gabapentin     KISHA  -  CPAP per home setting     Hypokalemia  Potassium replacment protocol     Chronic back pain  Patient planned for pain pump placement 3/8/23.  States he has chronic back pain from nerve impingement and uses tylenol PTA for pain.   - Tylenol prn for pain    FT Placement  FT Bleeding  Contipation:  -  Site bleeding.  Asked IR to assess, appreciate their assistance.  Bleeding appears to have stopped, monitor site.  Also had a couple BM's.  Continue bowel regimen.     Family:  Updated family present in room today.        Medical Decision Making       50 MINUTES SPENT BY ME on the date of service doing chart review, history, exam, documentation & further activities per the note.         Diet: Adult Formula Drip Feeding: Continuous Jevity 1.5; Other - Specify in Comment; Goal Rate: 65; mL/hr; Begin TF @ 25 mL/hr.  Increase by 10 mL every 12 hrs to goal rate of 65 mL/hr.  NPO for Medical/Clinical Reasons Except for: Other; Specify: NPO For oral intake and gastric feedings for 6 hours post insertion Gastrostomy G/GJ tube. May feed through Jejunal or Distal PORT immediately for GJ tubes ONLY.    DVT Prophylaxis: DOAC  Scruggs Catheter: Not present  Lines: None     Cardiac  Monitoring: ACTIVE order. Indication: Tachyarrhythmias, acute (48 hours)  Code Status: Full Code      Clinically Significant Risk Factors                           Disposition Plan      Expected Discharge Date: 1-2 days      Destination: inpatient rehabilitation facility            Zaire Brown DO  Hospitalist Service  [facility]  Securely message with [employer] (more info)  Text page via [employer] Paging/Directory   ______________________________________________________________________    Interval History   No reported new pain, sob, nausea.  Had a couple BM's since I last saw him.   RN reports FT site bleeding appears to have stopped.    Physical Exam   Vital Signs: Temp: 98.6  F (37  C) Temp src: Oral BP: (!) 150/88 Pulse: 80   Resp: 18 SpO2: 97 % O2 Device: None (Room air)    Weight: 216 lbs .81 oz    GEN:  Alert but a little sleepy this AM.  Aroused though to name/stim.  Appears comfortable, no overt distress.  HEENT:  Normocephalic/atraumatic, no scleral icterus, no nasal discharge, mouth moist.  CV:  Regular rate and rhythm, distant.  LUNGS:  Clear to auscultation bilaterally without rales/rhonchi/wheezing/retractions.  Breath sounds diminished bases.  Symmetric chest rise on inhalation noted.  ABD:  Active bowel sounds, soft, non-tender, mildly distended.  No guarding/rigidity. FT site without bleeding noted today on exam  EXT:  Trace edema.  No cyanosis.  No acute joint synovitis noted.    Medications     - MEDICATION INSTRUCTIONS -       niCARdipine         albuterol  2.5 mg Nebulization Q12H     ampicillin-sulbactam  3 g Intravenous Q6H     [START ON 3/15/2023] apixaban ANTICOAGULANT  5 mg Oral BID     apixaban ANTICOAGULANT  5 mg Oral ONCE at 18:00     atorvastatin  20 mg Oral or Feeding Tube At Bedtime     diltiazem  60 mg Oral Q12H     [Held by provider] enoxaparin ANTICOAGULANT  40 mg Subcutaneous Q24H     gabapentin  600 mg NG or Feeding tube At Bedtime     insulin aspart  1-4  Units Subcutaneous Q4H     pantoprazole  40 mg Per Feeding Tube QAM AC     senna-docusate  2 tablet Per Feeding Tube BID     sodium chloride (PF)  3 mL Intracatheter Q8H       Data     Labs and Imaging results below reviewed today.  Recent Labs   Lab 03/14/23  0744 03/13/23  0543 03/12/23  0654   WBC 7.5 8.1 9.3   HGB 12.4* 12.4* 12.3*   HCT 37.8* 36.7* 36.7*   MCV 94 93 93    256 245     Recent Labs   Lab 03/14/23  1148 03/14/23  0754 03/14/23  0744 03/13/23  0734 03/13/23  0543 03/12/23  2041 03/12/23 2004 03/12/23  0836 03/12/23  0654 03/07/23  1604 03/07/23  1312   NA  --   --  141  --  138  --   --   --  136   < > 139   POTASSIUM  --   --  4.0  --  3.8  --  4.0  --  3.4   < > 4.1   CHLORIDE  --   --  104  --  102  --   --   --  101   < > 103   CO2  --   --  28  --  26  --   --   --  26   < > 24   ANIONGAP  --   --  9  --  10  --   --   --  9   < > 12   * 219* 240*   < > 252*   < >  --    < > 239*   < > 132*   BUN  --   --  19.3  --  20.8  --   --   --  20.2   < > 22.8   CR  --   --  0.77  --  0.75  --   --   --  0.75   < > 0.86   GFRESTIMATED  --   --  >90  --  >90  --   --   --  >90   < > 88   LUTHER  --   --  9.4  --  9.4  --   --   --  9.4   < > 9.8   MAG  --   --   --   --  2.0  --   --   --   --   --   --    PHOS  --   --   --   --  3.7  --   --   --  3.2  --   --    PROTTOTAL  --   --   --   --   --   --   --   --   --   --  7.3   ALBUMIN  --   --   --   --   --   --   --   --   --   --  4.0   BILITOTAL  --   --   --   --   --   --   --   --   --   --  0.4   ALKPHOS  --   --   --   --   --   --   --   --   --   --  116   AST  --   --   --   --   --   --   --   --   --   --  22   ALT  --   --   --   --   --   --   --   --   --   --  10    < > = values in this interval not displayed.     7-Day Micro Results     Collected Updated Procedure Result Status      03/11/2023 1842 03/13/2023 2216 Blood Culture Hand, Left [[id number]]   Blood from Hand, Left    Preliminary result Component Value    Culture No growth after 2 days  [P]                03/11/2023 1837 03/13/2023 2201 Blood Culture Arm, Right [[id number]]   Blood from Arm, Right    Preliminary result Component Value   Culture No growth after 2 days  [P]                03/11/2023 1625 03/11/2023 1710 Asymptomatic COVID-19 Virus (Coronavirus) by PCR Nasopharyngeal [[id number]]    Swab from Nasopharyngeal    Final result Component Value   SARS CoV2 PCR Negative   NEGATIVE: SARS-CoV-2 (COVID-19) RNA not detected, presumed negative.            03/11/2023 1625 03/11/2023 2206 MRSA MSSA PCR, Nasal Swab [[id number]]    Swab from Nare, Left    Final result Component Value   MRSA Target DNA Negative   SA Target DNA Positive                  No results found for this or any previous visit (from the past 24 hour(s))..se

## 2023-03-15 NOTE — PLAN OF CARE
Pt here with SHANA DENNY. A&O x4. Neuros L facial droop, L sided weakness, and garbled speech. VSS. Tele Afib w/ CVR. NPO diet. Tube feed running at 65mL/hr. Takes pills crushed via PEG. Up with Ax2 rahel steady or GBW. Denies pain. Pt scoring green on the Aggression Stop Light Tool. Plan continue therapies. Discharge pending.     35.8

## 2023-03-15 NOTE — PROGRESS NOTES
[facility]    Medicine Progress Note - Hospitalist Service    Date of Admission:  3/7/2023    Assessment & Plan      Stefan Diallo is a 77 year old male with past medical history of hypertension, hyperlipidemia, type 2 diabetes, A-fib (warfarin on hold for procedure since 2/24) presenting with left sided weakness, facial droop. He is being admitted for acute ischemic stroke s/p TNK.       Acute ischemic stroke of R MCA territory due to cardioembolism status post tenecteplase on 3/7:  Patient with history of atrial fibrillation and on chronic warfarin anticoagulation.  However, warfarin had been held since 2/25 for pain pump placement when presenting with CVA.  CT head-Increased attenuation within the right superior M2 branch of the middle cerebral artery concerning for thromboembolus. No early ischemic changes.  HEAD CTA: 1.  Occlusion of right superior M2 branch middle cerebral artery. 2.  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 Possible subocclusive thrombus at the R MCA bifurcation  -Transthoracic echocardiogram shows EF of 60% with normal right ventricular function.  Mild to moderate aortic stenosis.  -Patient failed VFSS and risk for aspiration.PEG placed by IR on 3/9/23  and nutrition ordered tube feeds  - Prior Goal -180/105.  Neuro initially recommended avoiding drops of SBP <120 to avoid hypoperfusion.  Now that is further out from acute event working gradually to decrease toward normal range.    -  Started on aspirin 325 daily after CT showed no hemorrhagic conversion on 3/8/22.  Neurology felt ASA could be changed to warfarin or DOAC on 3/14.  Now on Eliquis 5 mg BID.     Possible early sepsis due to Asp pneumonia:  -  Likely source aspiration pneumonia.  MRSA nares negative.  BC's still negative 3/13.  Stopped vancomycin 3/13.  Changed to FT augmentin 3/15 with a plan for 7 total days abx.     Atrial fibrillation:  On chronic anticoagulation with warfarin. However,  has been held since 2/25 for planned pain pump placement. Continue Cardizem.  Long-acting extended release cannot be given through the PEG tube.  Has been on BID diltiazem.  Continue short acting diltiazem TID, BP/HR reasonable on current dose.    Dyslipidemia:  - PTA statin      Hypertension:  Blood pressure is currently controlled within goals. Increase diltiazem to 60 mg TID 3/14/23. Long term BP <130/80, inpatient meds can be slowly titrated to this goal as otherwise appropriate     Type II DM  Has neuropathy:  - A1c6.4% on 2/1/23  - hold PTA Metformin  - Changed LOW sliding scale insulin q4 to MEDIUM ssi q4 on 3/14.  Added low dose lantus 3/15.    -Continue home gabapentin     KISHA  -  CPAP per home setting     Hypokalemia  Potassium replacment protocol     Chronic back pain  Patient planned for pain pump placement 3/8/23.  States he has chronic back pain from nerve impingement and uses tylenol PTA for pain.   - Tylenol prn for pain    FT Placement  FT Bleeding 3/13  Contipation:  -  Site bleeding 3/13.  Asked IR to assess, appreciate their assistance.  Bleeding appears to have stopped, no major bleeding even now on DOAC.  Constipation also improved.     Medical Decision Making       40 MINUTES SPENT BY ME on the date of service doing chart review, history, exam, documentation & further activities per the note.         Diet: Adult Formula Drip Feeding: Continuous Jevity 1.5; Other - Specify in Comment; Goal Rate: 65; mL/hr; Begin TF @ 25 mL/hr.  Increase by 10 mL every 12 hrs to goal rate of 65 mL/hr.  NPO for Medical/Clinical Reasons Except for: Other; Specify: NPO For oral intake and gastric feedings for 6 hours post insertion Gastrostomy G/GJ tube. May feed through Jejunal or Distal PORT immediately for GJ tubes ONLY.    DVT Prophylaxis: DOAC  Scruggs Catheter: Not present  Lines: None     Cardiac Monitoring: ACTIVE order. Indication: Tachyarrhythmias, acute (48 hours)  Code Status: Full Code      Clinically  Significant Risk Factors                           Disposition Plan      Expected Discharge Date: Accepted by facility but reportedly insurance has not yet approved.  Appears ready to discharge so likely d/c tomorrow if insurance auth and no new medical issues arise.    Destination: inpatient rehabilitation facility            Zaire Brown DO  Hospitalist Service  [facility]  Securely message with [employer] (more info)  Text page via [employer] Paging/Directory   ______________________________________________________________________    Interval History   No reported new pain, sob, nausea.  Denied any new complaints.  RN reports FT site has been dry, no further bleeding.    Physical Exam   Vital Signs: Temp: 98.7  F (37.1  C) Temp src: Oral BP: 125/70 Pulse: 81   Resp: 16 SpO2: 96 % O2 Device: None (Room air)    Weight: 216 lbs .81 oz    GEN:  Alert, more awake/energetic today.  Appears comfortable, no overt distress.  HEENT:  Normocephalic/atraumatic, no scleral icterus, no nasal discharge, mouth moist.  CV:  Regular rate and rhythm, distant.  LUNGS:  Clear to auscultation bilaterally without rales/rhonchi/wheezing/retractions.  Breath sounds diminished bases.  Symmetric chest rise on inhalation noted.  ABD:  Active bowel sounds, soft, non-tender, mildly distended.  No guarding/rigidity. FT site without bleeding noted today on exam  EXT:  Trace edema.  No cyanosis.  No acute joint synovitis noted.    Medications     - MEDICATION INSTRUCTIONS -       niCARdipine         albuterol  2.5 mg Nebulization Q12H     amoxicillin-clavulanate  875 mg Per Feeding Tube BID     apixaban ANTICOAGULANT  5 mg Per Feeding Tube BID     atorvastatin  20 mg Oral or Feeding Tube At Bedtime     diltiazem  60 mg Per Feeding Tube Q8H LALO     gabapentin  600 mg NG or Feeding tube At Bedtime     insulin aspart  1-6 Units Subcutaneous Q4H     pantoprazole  40 mg Per Feeding Tube QAM AC     senna-docusate  2 tablet Per  Feeding Tube BID     sodium chloride (PF)  3 mL Intracatheter Q8H       Data     Labs and Imaging results below reviewed today.  Recent Labs   Lab 03/15/23  0845 03/14/23  0744 03/13/23  0543   WBC 8.2 7.5 8.1   HGB 12.7* 12.4* 12.4*   HCT 38.9* 37.8* 36.7*   MCV 95 94 93    294 256     Recent Labs   Lab 03/15/23  1127 03/15/23  0845 03/15/23  0725 03/14/23  0754 03/14/23  0744 03/13/23  0734 03/13/23  0543 03/12/23  0836 03/12/23  0654   NA  --  142  --   --  141  --  138  --  136   POTASSIUM  --  4.1  --   --  4.0  --  3.8   < > 3.4   CHLORIDE  --  105  --   --  104  --  102  --  101   CO2  --  28  --   --  28  --  26  --  26   ANIONGAP  --  9  --   --  9  --  10  --  9   * 265* 199*   < > 240*   < > 252*   < > 239*   BUN  --  24.5*  --   --  19.3  --  20.8  --  20.2   CR  --  0.84  --   --  0.77  --  0.75  --  0.75   GFRESTIMATED  --  89  --   --  >90  --  >90  --  >90   LUTHER  --  9.5  --   --  9.4  --  9.4  --  9.4   MAG  --   --   --   --   --   --  2.0  --   --    PHOS  --   --   --   --   --   --  3.7  --  3.2    < > = values in this interval not displayed.     7-Day Micro Results     Collected Updated Procedure Result Status      03/11/2023 1842 03/14/2023 2216 Blood Culture Hand, Left [[id number]]   Blood from Hand, Left    Preliminary result Component Value   Culture No growth after 3 days  [P]                03/11/2023 1837 03/14/2023 2201 Blood Culture Arm, Right [[id number]]   Blood from Arm, Right    Preliminary result Component Value   Culture No growth after 3 days  [P]                03/11/2023 1625 03/11/2023 1710 Asymptomatic COVID-19 Virus (Coronavirus) by PCR Nasopharyngeal [[id number]]    Swab from Nasopharyngeal    Final result Component Value   SARS CoV2 PCR Negative   NEGATIVE: SARS-CoV-2 (COVID-19) RNA not detected, presumed negative.            03/11/2023 1625 03/11/2023 2206 MRSA MSSA PCR, Nasal Swab [[id number]]    Swab from Nare, Left    Final result Component Value    MRSA Target DNA Negative   SA Target DNA Positive                  No results found for this or any previous visit (from the past 24 hour(s))..se

## 2023-03-15 NOTE — PROGRESS NOTES
Patient doing well, no bleeding from FT site.  Continue current meds.  Appears could discharge to rehab if able from a bed availability and insurance approval perspective.  SW is working on those issues.  Full note to follow.

## 2023-03-15 NOTE — PLAN OF CARE
Pt here with R MCA. Pt is A&Ox4, VSS on RA. NPO with TF at 65ml/hr with 100cc q4 flushes, can have ice chips with RN supervision, has been noticed cough after at times.  Ax2, sarasteady. No bleeding noted at PEG site. Denies pain. Neuros with L facial droop, L sided weakness and garbled speech. Pt scoring green on Aggression Stop Light Tool. Plan for pt to discharge to ARU.   Quality 226: Preventive Care And Screening: Tobacco Use: Screening And Cessation Intervention: Patient screened for tobacco use and is an ex/non-smoker Quality 130: Documentation Of Current Medications In The Medical Record: Current Medications Documented Detail Level: Detailed Quality 431: Preventive Care And Screening: Unhealthy Alcohol Use - Screening: Patient not identified as an unhealthy alcohol user when screened for unhealthy alcohol use using a systematic screening method Quality 110: Preventive Care And Screening: Influenza Immunization: Influenza immunization was not ordered or administered, reason not given

## 2023-03-15 NOTE — INTERIM SUMMARY
[facility] Pre-Admission Screen    Referral Source:  [facility] NEUROSCIENCE UNIT UU PATIENT LEARNING  Admit date to referring facility: 3/7/2023    Physical Medicine and Rehab Consult Completed: No    Rehab Diagnosis:    Stroke Ischemic 01.1 (L) Body Involvement (R) Brain; s/p acute ischemic stroke of R MCA territory due to cardioembolism status post tenecteplase    Justification for Acute Inpatient Rehabilitation  Stefan Diallo is a 77 year old male with past medical history of A-fib on warfarin therapy who presented to [facility] emergency room on 3/7/2023 for left-sided facial droop, arm and leg weakness. Patient's warfarin had been held since 2/25 for anticipated pain pump placement on 3/8/2023. Patient was at work when he went to sit in a chair and missed the chair falling onto his left side. His son, who is his boss, went to help and noted left sided facial droop, left sided weakness and activated EMS. CT showed increased attenuation within the R superior M2 branch of the MCA concerning for thromboembolus. His course was complicated by possible early sepsis due to aspiration PNA, afib, DMII management needs, and dysphagia requiring PEG tube placement.     The patient is currently medically stable to transfer to Acute Inpatient Rehab. Patient requires an intensive inpatient rehab program to address the following acute impairments:aphasia, dysphagia, impaired activity tolerance, impaired balance, impaired coordination, impaired judgement and safety awareness, impaired strength, impaired tone, impaired weight shifting, impulsiveness and pain    Current Active Medical Management Needs/Risks for Clinical Complications  The patient requires the high level of rehabilitation physician supervision that accompanies the provision of intensive rehabilitation therapy.  The patient needs the services of the rehabilitation physician to assess the patient medically and  functionally and to modify the course of treatment as needed to maximize the patient's capacity to benefit from the rehabilitation process. The patient requires physician involvement at least 3 days per week to manage:   Neurologic: in the setting of R MCA stroke, assess neuros regularly with intervention as indicated in pt at risk for further strokes.  Blood pressure: will need ongoing assessment with adjustment of medications as needed in the setting of HTN with BP up to 180/98 as recent as 3/14/23;   Atrial fibrillation: On chronic anticoagulation with warfarin. However, has been held since 2/25 for planned pain pump placement. Continue Cardizem. Long-acting extended release cannot be given through the PEG tube. Has been on BID diltiazem. Continue short acting diltiazem TID, BP/HR reasonable on current dose.   Nutrition: patient will need ongoing assessment of nutrition status in the setting of dysphagia and NPO status; patient is currently on continuous tube feeds via PEG and will need ongoing involvement of dietician and SLP with advancement of diet and repeat video swallow as indicated.   ID: in the setting of likely early sepsis due to aspiration PNA, assess for signs and symptoms of infection; was on IV unasyn and transitioned to PO on 3/15/2023-- currently on Augmentin;    Diabetes Mellitus type II: patient will need ongoing assessment of blood sugars; A1c 6.4% on 2/1/2023; patient with high readings of 220-249 in the past 48 hours-- currently on sliding scale insulin   Pain: in the setting of chronic back pain scheduled for a pain pump placement on 3/8 which was not completed due to stroke, assess and manage to ensure optimal participation in all therapies-- currently on Neurontin, Tylenol    The patient is at risk for repeat strokes, falls with injury in the setting of L sided hemiplegia, mood/sleep disturbance post stroke, and dehydration, aspiration, and pneumonia.     Past Medical/Surgical  History   Surgery in the past 100 days: No   Additional relevant past medical history: hypertension, hyperlipidemia, type 2 diabetes, A-fib on warfarin therapy    Level of Functioning Prior to Admission:    LIVING ENVIRONMENT   People in Home: alone   Current Living Arrangements: house   Home Accessibility: stairs to enter home, stairs within home    Number of Stairs, Main Entrance: 1    Stair Railings, Main Entrance: railings safe and in good condition    Number of Stairs, Within Home, Primary: greater than 10 stairs    Stair Railings, Within Home, Primary: railings safe and in good condition   Transportation Anticipated: car, drives self   Living Environment Comments: lives alone in a house. unabe to state if tub shower combo, had famliy in the area. reports he lives in [place]    SELF-CARE   Usual Activity Tolerance: good   Regular Exercise: No     Equipment Currently Used at Home: none   Activity/Exercise/Self-Care Comment: independent with self cares and ADLs with no use of device    Additional Comments: Patient has a significant other that is able to provide 24/7 supervision.     Level of Function: GG Scale (Section GG Functional Ability and Goals; CMS's FLOYD Version 3.0 Manual effective 10.1.2019):  PT Current Function Goals for Rehab   Bed Rolling 1 Dependent (A x 2) 4 Supervision or touching assitance   Supine to Sit 1 Dependent (A x 2) 4 Supervision or touching assitance   Sit to Stand 1 Dependent (A x 2) 3 Partial/moderate assistance   Transfer 1 Dependent (A x 2) 3 Partial/moderate assistance   Ambulation 1 Dependent (A x 2) 3 Partial/moderate assistance   Stairs 88 Not attempted due to safety 3 Partial/moderate assistance     OT Current Function Goals for Rehab   Feeding 1 Dependent 3 Partial/moderate assistance   Grooming 2 Substantial/maximal assistance 4 Supervision or touching assitance   Bathing 1 Dependent (A x 2) 3 Partial/moderate assistance   Upper Body Dressing Not completed 3  Partial/moderate assistance   Lower Body Dressing 1 Dependent (A x 2) 3 Partial/moderate assistance   Toileting 1 Dependent (A x 2) 3 Partial/moderate assistance   Toilet Transfer 1 Dependent (A x 2) 3 Partial/moderate assistance   Tub/Shower Transfer 88 Not attempted due to safety 3 Partial/moderate assistance   Cognition Not Assessed Supervision     SLP Current Function Goals for Rehab   Swallow Impaired Tolerate least restrictive diet without signs & symptoms of aspiration and adhere to safe swallow strategies   Communication Impaired Communicate basic needs effectively       Current Diet:  NPO, Continuous tube feeds and Ice chips wth 1:1 RN or SLP supervision    Summary Statement:  In PT the performs supine to sit transfer with mod-max assist x2, and sit to supine with max assist x2. Sitting at EOB for period of time with SBA. Patient performing sit<>stands throughout session with min-mod assist x2 and FWW. Completed two bouts of gait in [place] with ww. Mod-max A of PT on left to decrease L lean. A to advance left LE and step by step cues for pushing walker and step by step cues for sequence and to take a bigger step. Needs mod A of therapist to weight shift right. Total assist for frederick-cares, upon standing for frederick-cares patient with LLE positioned at midline and demosntrating excessive L lean requiring max assist x2 for standing upright.   In OT the pt performs bathing with total A. G/H including applied deodorant with BUEs with set up and [place] assist, mod A for sitting balance, pt. leaning to L. Pt. combed hair at EOB with set up and max A, pt. unable to maintain postural control. Pt. washed face at EOB with BUEs and set up and min A for balance.   In SLP the patient is noted to have significant dysphagia limiting safety with swallow. He currently is recommended to be NPO for safety, has PEG tube for nutrition; ice chips with RN vs SLP supervision. He will require a repeat Video Swallow Study while on ARC and  will benefit from a full cognitive/linguistic assessment by SLP upon admit to [facility].    Expected Therapies and Services Required During Inpatient Rehab Admission  Intensity of Therapy: Patient requires intensive therapies not available in a lesser level of care. Patient is motivated, making gains, and can tolerate 3 hours of therapy a day.  Physical Therapy: 60 minutes per day, 7 days a week for 24 days  Occupational Therapy: 60 minutes per day, 7 days a week for 24 days  Speech and Language Therapy: 60 minutes per day, 7 days a week for 24 days  Rehabilitation Nursing Needs: Patient requires 24 hour Rehab Nursing to manage vitals, medication education, positioning, carryover of new rehab techniques, care coordination, blood sugar management, assess neurologic status, pain management, stroke education, provide safe environment for patient at falls risk and monitor nutritional intake.    Precautions/restrictions/special needs:   Precautions: fall precautions   Restrictions: SLP currently recommend continue NPO, 5-10 single ice chips with 1:1 RN or SLP supervision for comfort and functional swallow practice. Encourage frequent oral cares.   Special Needs: lift, feeding tube via PEG    Expected Level of Improvement: Anticipate with intensive therapies, close medical management, and rehabilitative nursing care the patient will improve strength, balance, tolerance to activity, safety, swallow/nutrition management, communication to ensure return home with family A. Anticipate the patient will require ongoing support from family who can provide 24/7 support and physical assist if needed.   Expected Length of time to achieve: 24 days    Anticipated Discharge Needs:  Anticipated Discharge Destination: Home  Anticipated Discharge Support: Family member  24/7 support available : Yes  Identified caregiver(s):  Significant other, Kayce; adult son  Anticipated Discharge Needs: Home with homecare    Identified  challenges/barriers:  Communication deficits, lives alone but significant other works remotely, plans to work from home to take care of him if needed.     Liaison signature/date/time:    Physician statement of review and agreement:  I have reviewed and am in agreement of the need for IRF stay to address above functional and medical needs. In addition to above statements address, Patient requires intensive active and ongoing therapeutic intervention and multiple therapies; Patient requires medical supervision; Expected to actively participate in the intensive rehab program; Sufficiently stable to actively participate; Expectation for measurable improvement in functional capacity or adaption to impairments.    MD signature/date/time:

## 2023-03-15 NOTE — PROGRESS NOTES
Care Management Follow Up    Length of Stay (days): 8    Expected Discharge Date: 03/16/2023     Concerns to be Addressed:       Patient plan of care discussed at interdisciplinary rounds: Yes    Anticipated Discharge Disposition: Acute Rehab     Anticipated Discharge Services:    Anticipated Discharge DME:      Patient/family educated on Medicare website which has current facility and service quality ratings:    Education Provided on the Discharge Plan:    Patient/Family in Agreement with the Plan: yes    Referrals Placed by CM/SW: Post Acute Facilities  Private pay costs discussed: transportation costs    Additional Information:  Updated patient and his arslan Devries [facility] pending [facility] authorization and bed availability.  Also discussed transport and they would like WC transport set up.  Did inform them of cost.    Addendum @ 1543:  Tentative WC transport scheduled for 3/16 @ 1515 to go to ARU pending insurance auth received by 1400.  Updated Kayce.    Heather Hensley RN, BSN, PHN  Inpatient Care Coordination  [facility]  Phone: [phone]

## 2023-03-16 NOTE — DISCHARGE SUMMARY
[facility]  Hospitalist Discharge Summary      Date of Admission:  3/7/2023  Date of Discharge:  3/16/2023  Discharging Provider: Ezio Donald MD  Discharge Service: Hospitalist Service    Discharge Diagnoses   Acute ischemic stroke of R MCA territory due to cardioembolism status post tenecteplase on 3/7  Possible early sepsis due to Asp pneumonia, resolved  Aspiration pneumonia  Atrial fibrillation  HLD  DMT2  Diabetic neuropathy   KISHA  Hypokalemia, treated  Chronic pain  FT Placement  FT Bleeding 3/13  Contipation  Severe dysphagia due to CVA    Follow-ups Needed After Discharge   Follow-up Appointments     Follow Up and recommended labs and tests      Follow up with Nursing home physician.  No follow up labs or test are   needed.             Unresulted Labs Ordered in the Past 30 Days of this Admission     Date and Time Order Name Status Description    3/11/2023  3:30 PM Blood Culture Hand, Left Preliminary     3/11/2023  3:30 PM Blood Culture Arm, Right Preliminary       These results will be followed up by hospitalist. Patient discharging to TCU on augmentin for 2 more days (stop on 3/18). If cultures positive, likely will need repeat draw.     Discharge Disposition   Discharged to rehabilitation facility  Condition at discharge: Stable      Hospital Course   Stefan Diallo is a 77 year old male with past medical history of hypertension, hyperlipidemia, type 2 diabetes, A-fib (warfarin on hold for procedure since 2/24) presenting with left sided weakness, facial droop. He was admitted for acute ischemic stroke s/p TNK.       Acute ischemic stroke of R MCA territory due to cardioembolism status post tenecteplase on 3/7:  * Patient with history of atrial fibrillation and on chronic warfarin anticoagulation.  However, warfarin had been held since 2/25 for pain pump placement when presenting with CVA.  CT head-Increased attenuation within the right superior M2 branch of the middle  cerebral artery concerning for thromboembolus. No early ischemic changes.  * HEAD CTA: 1.  Occlusion of right superior M2 branch middle cerebral artery. 2.  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 Possible subocclusive thrombus at the R MCA bifurcation  * Transthoracic echocardiogram shows EF of 60% with normal right ventricular function.  Mild to moderate aortic stenosis.  * Patient failed VFSS and risk for aspiration.PEG placed by IR on 3/9/23  and nutrition ordered tube feeds    - Discharge on Eliquis 5 mg BID and stop warfarin.   - Continue PTA statin     Possible early sepsis due to Asp pneumonia  - Likely source aspiration pneumonia.  MRSA nares negative.  BC's still negative at discharge.  Stopped vancomycin 3/13.  Changed to FT augmentin 3/15 with a plan for 7 total days abx, plan stop on 3/18/23.     Atrial fibrillation:  On chronic anticoagulation with warfarin. However, has been held since 2/25 for planned pain pump placement. Continue Cardizem.  Long-acting extended release cannot be given through the PEG tube.   Continue short acting diltiazem TID, BP/HR reasonable on current dose.     Dyslipidemia:  - PTA statin       Hypertension:  Blood pressure is currently controlled within goals. Increase diltiazem to 60 mg TID 3/14/23. Long term BP <130/80, inpatient meds can be slowly titrated to this goal as otherwise appropriate     Type II DM  Has neuropathy:  - A1c6.4% on 2/1/23  - hold PTA Metformin  - Discharge on lantus 3u BID  -Continue home gabapentin     KISHA  -  CPAP per home setting     Hypokalemia, resolved     Chronic back pain  Patient planned for pain pump placement 3/8/23.  States he has chronic back pain from nerve impingement and uses tylenol PTA for pain.   - Tylenol prn for pain     FT Placement  FT Bleeding 3/13  Contipation:  -  Site bleeding 3/13.  Asked IR to assess, appreciate their assistance.  Bleeding appears to have stopped, no major bleeding even now on DOAC.  Constipation  also improved.  - continue tube feeds    Consultations This Hospital Stay   PATIENT LEARNING CENTER IP CONSULT  NEUROLOGY IP STROKE CONSULT  SPEECH LANGUAGE PATH ADULT IP CONSULT  PHARMACY IP CONSULT  PHARMACY IP CONSULT  PHARMACY IP CONSULT  PHYSICAL THERAPY ADULT IP CONSULT  OCCUPATIONAL THERAPY ADULT IP CONSULT  REHAB ADMISSIONS LIAISON IP CONSULT  CARE MANAGEMENT / SOCIAL WORK IP CONSULT  PHARMACY LIAISON FOR MEDICATION COVERAGE CONSULT  NUTRITION SERVICES ADULT IP CONSULT  PHARMACY IP CONSULT  INTERVENTIONAL RADIOLOGY ADULT/PEDS IP CONSULT  PHARMACY TO DOSE VANCO  RESPIRATORY CARE IP CONSULT  INTERVENTIONAL RADIOLOGY ADULT/PEDS IP CONSULT  PHYSICAL THERAPY ADULT IP CONSULT  OCCUPATIONAL THERAPY ADULT IP CONSULT  SPEECH LANGUAGE PATH ADULT IP CONSULT  SMOKING CESSATION PROGRAM IP CONSULT    Code Status   Full Code    Time Spent on this Encounter   I, Ezio Donald MD, personally saw the patient today and spent greater than 30 minutes discharging this patient.       Ezio Donald MD  [facility] NEUROSCIENCE UNIT  [address] [place] [address]  Phone: [phone]  ______________________________________________________________________    Physical Exam   Vital Signs: Temp: 97.2  F (36.2  C) Temp src: Axillary BP: (!) 140/76 Pulse: 79   Resp: 16 SpO2: 97 % O2 Device: None (Room air)    Weight: 216 lbs .81 oz  GEN:  Alert, more awake/energetic today.  Appears comfortable, no overt distress.  HEENT:  Normocephalic/atraumatic, no scleral icterus, no nasal discharge, mouth moist.  CV:  Regular rate and rhythm, distant.  LUNGS:  Clear to auscultation bilaterally without rales/rhonchi/wheezing/retractions.  Breath sounds diminished bases.  Symmetric chest rise on inhalation noted.  ABD:  Active bowel sounds, soft, non-tender, mildly distended.  No guarding/rigidity. FT site without bleeding noted today on exam  EXT:  Trace edema.  No cyanosis.  No acute joint synovitis noted.        Primary Care Physician   Collin Singh MD    Discharge Orders      Primary Care - Care Coordination Referral      Discharge Instructions    If questions or problems arise regarding tube function (e.g. leaking, dislodges, etc.) Contact Interventional Radiology department 24 hours a day.    For procedures that were done at [facility]:    8 AM - 4 PM Monday through Friday Contact the Nurse Line [phone]  For afterhours and weekends [phone] ([facility])  Ask for the Interventional Radiologist-on call.     For procedures that were done at [facility]:  8 AM - 4 PM Monday through Friday Contact   [phone] ([facility])  For afterhours and weekends: [phone]   Ask for the Interventional Radiologist on call.       IF DIRECTED by the RADIOLOGIST, related to specific problems with the tube functioning,  go to the Emergency Department.     General info for SNF    Length of Stay Estimate: Short Term Care: Estimated # of Days <30  Condition at Discharge: Stable  Level of care:skilled   Rehabilitation Potential: Fair  Admission H&P remains valid and up-to-date: Yes  Recent Chemotherapy: N/A  Use Nursing Home Standing Orders: Yes     Mantoux instructions    Give two-step Mantoux (PPD) Per Facility Policy Yes     Follow Up and recommended labs and tests    Follow up with Nursing home physician.  No follow up labs or test are needed.     Reason for your hospital stay    Acute ischemic stroke of right MCA     Glucose monitor nursing POCT    Before meals and at bedtime     Activity - Up with assistive device     Activity - Up with nursing assistance     Physical Therapy Adult Consult    Evaluate and treat as clinically indicated.    Reason:  cva     Occupational Therapy Adult Consult    Evaluate and treat as clinically indicated.    Reason:  cva     Speech Language Path Adult Consult    Evaluate and treat as clinically indicated.    Reason:  cva     Fall precautions     Diet    Follow this diet upon discharge:  Orders Placed This Encounter      Adult Formula Drip Feeding: Continuous Jevity 1.5; Other - Specify in Comment; Goal Rate: 65; mL/hr; Begin TF @ 25 mL/hr.  Increase by 10 mL every 12 hrs to goal rate of 65 mL/hr.      NPO for Medical/Clinical Reasons Except for: Other; Specify: NPO For oral intake and gastric feedings for 6 hours post insertion Gastrostomy G/GJ tube. May feed through Jejunal or Distal [facility] Follow Up    [facility] will call you to coordinate care as prescribed by your provider. If you don t hear from a representative within 2 business days, please call [phone].         Significant Results and Procedures   Most Recent 3 CBC's:Recent Labs   Lab Test 03/15/23  0845 03/14/23  0744 03/13/23  0543   WBC 8.2 7.5 8.1   HGB 12.7* 12.4* 12.4*   MCV 95 94 93    294 256     Most Recent 3 BMP's:Recent Labs   Lab Test 03/16/23  0846 03/16/23  0354 03/16/23  0007 03/15/23  1127 03/15/23  0845 03/14/23  0754 03/14/23  0744 03/13/23  0734 03/13/23  0543   NA  --   --   --   --  142  --  141  --  138   POTASSIUM  --   --   --   --  4.1  --  4.0  --  3.8   CHLORIDE  --   --   --   --  105  --  104  --  102   CO2  --   --   --   --  28  --  28  --  26   BUN  --   --   --   --  24.5*  --  19.3  --  20.8   CR  --   --   --   --  0.84  --  0.77  --  0.75   ANIONGAP  --   --   --   --  9  --  9  --  10   LUTHER  --   --   --   --  9.5  --  9.4  --  9.4   * 228* 215*   < > 265*   < > 240*   < > 252*    < > = values in this interval not displayed.     Most Recent 2 LFT's:Recent Labs   Lab Test 03/07/23  1312 08/10/22  1017   AST 22 15   ALT 10 14   ALKPHOS 116 98   BILITOTAL 0.4 0.5     7-Day Micro Results     Collected Updated Procedure Result Status      03/11/2023 1842 03/15/2023 2216 Blood Culture Hand, Left [[id number]]   Blood from Hand, Left    Preliminary result Component Value   Culture No growth after 4 days  [P]                03/11/2023 1837 03/15/2023 2201  Blood Culture Arm, Right [[id number]]   Blood from Arm, Right    Preliminary result Component Value   Culture No growth after 4 days  [P]                03/11/2023 1625 03/11/2023 1710 Asymptomatic COVID-19 Virus (Coronavirus) by PCR Nasopharyngeal [[id number]]    Swab from Nasopharyngeal    Final result Component Value   SARS CoV2 PCR Negative   NEGATIVE: SARS-CoV-2 (COVID-19) RNA not detected, presumed negative.            03/11/2023 1625 03/11/2023 2206 MRSA MSSA PCR, Nasal Swab [[id number]]    Swab from Nare, Left    Final result Component Value   MRSA Target DNA Negative   SA Target DNA Positive              ,   Results for orders placed or performed during the hospital encounter of 03/07/23   CT Head w/o Contrast    Narrative    CT HEAD WITHOUT CONTRAST 3/7/2023 1:34 PM    INDICATION: Right MCA syndrome.  TECHNIQUE: CT scan of the head without contrast. Dose reduction  techniques were used.  CONTRAST: None.  COMPARISON: 3/7/2023 head CT/head and neck CTA at 1034 hours.    FINDINGS:   Redemonstrated hyperdense intraluminal thrombus in the right MCA.  Developing hypoattenuation obscuring the gray-white junction at the  right insula extending slightly into the right frontal opercular  region relates to an evolving right MCA distribution infarct. No  associated intracranial hemorrhage. No extra-axial collection.  Background mild burden scattered chronic small vessel ischemic change.  Mild to moderate diffuse parenchymal volume loss. Ventricular size is  in keeping with this volume loss. Calvarium intact. Postoperative  changes to the bilateral lenses. Right-sided scleral band. Polyp  versus mucus retention cyst in the right maxillary sinus.      Impression    IMPRESSION:  1. Redemonstrated hyperdense intraluminal thrombus in the right MCA  distribution. This is accompanied by an interval development of a  hypoattenuation obscuring the gray-white junction at the right insula  extending to the right frontal  opercular region compatible with an  evolving component of right MCA distribution infarct. No intracranial  hemorrhage.    2.  Background age-related changes as above.    Findings and impression discussed with Pamela Lopez PA-C by phone  at 1340 hours on 3/7/2023.    LEON MARTE MD         SYSTEM ID:  [id number]   CTA Head Neck with Contrast    Narrative    CTA HEAD AND NECK WITH CONTRAST 3/7/2023 1:36 PM    INDICATION: Right MCA syndrome.  TECHNIQUE: Head and neck CT angiogram with IV contrast. CT images of  the head and neck vessels obtained during the arterial phase of  intravenous contrast administration. Axial helical 2D reconstructed  images and multiplanar 3D MIP reconstructed images of the head and  neck vessels were performed on a separate workstation. Dose reduction  techniques were used.  CONTRAST: 75mL Isovue-370  COMPARISON: 3/7/2023 head and neck CTA at 1039 hours.     FINDINGS:  HEAD CTA: Calcification of the distal internal carotid arteries  bilaterally without significant stenosis. Redemonstrated occlusion of  the proximal right M2 segment supplying the superior division. With  this, there is markedly diminished/absent opacification of the  anterior right MCA distal branch vessels. Additional moderate stenosis  of the proximal right M2 segment supplying the posterior right MCA  distribution. Unchanged 3 mm small aneurysm at the right MCA  bifurcation. Bilateral JOHN are patent. Right vertebral artery  dominance with multifocal mild narrowings in the nondominant left  vertebral artery. Mild narrowings in the basilar artery. Fetal origin  right PCA. Bilateral PCA are patent.    NECK CTA: Mild narrowings in the right common carotid artery. Less  than 50% stenosis at the proximal right internal carotid artery.  Approximately 40% stenosis in the mid left common carotid artery with  mild narrowing in the proximal left internal carotid artery. Dominant  right vertebral artery with mild right vertebral  artery ostial  stenosis. Nondominant left vertebral artery is patent. Cervical spine  degenerative change and partially visualized upper thoracic spine  degenerative change.      Impression    IMPRESSION:  HEAD CTA:  1.  Redemonstrated occlusion of the proximal right M2 segment  supplying the superior division with markedly diminished/absent  opacification of the distal right MCA branch vessels of the anterior  right MCA distribution. This is overall unchanged.    2.  Unchanged 3 mm aneurysm at the right MCA bifurcation.    3.  Moderate narrowing of the proximal right M2 segment supplying the  posterior right MCA distribution with multifocal mild narrowings in  the nondominant left vertebral artery.    NECK CTA:  1.  Less than 50% stenosis of the proximal right internal carotid  artery and approximately 40% stenosis in the mid left common carotid  artery are unchanged. Additional mild narrowings of the neck vessels  elsewhere.    Findings were discussed with TAO WOMACK PA-C via telephone at  1340 hours on 3/7/2023.    LEON MARTE MD         SYSTEM ID:  [id number]   CT Head Perfusion w Contrast    Narrative    CT BRAIN PERFUSION March 7, 2023 1:46 PM    HISTORY: Right MCA syndrome.    TECHNIQUE: Time sequential axial CT images of the head were acquired  during the administration of intravenous contrast (50mL Isovue-370).  CTA images of the [place] as well as color perfusion maps of  the brain were created from this time sequential axial source data.  Radiation dose for this scan was reduced using automated exposure  control, adjustment of the mA and/or kV according to patient size, or  iterative reconstruction technique.    COMPARISON: 3/7/2023 head and neck CTAs.    FINDINGS: Elevated mean transit time and decreased cerebral blood flow  with areas of decreased cerebral blood volume in the anterior right  MCA distribution. This is compatible with an anterior right MCA  distribution infarct with  marginal ischemia. Rapid analysis with 13 mL  cerebral blood flow less than 30% and 100 mL of T-Max greater than 6  seconds with a mismatch volume of 87 mL and a mismatch ratio of 7.7.  Please note that the area of mismatch is potentially slightly  overestimated, as areas of the posterior fossa and inferior occipital  lobes demonstrating elevated T-Max are presumably artifactual.      Impression    IMPRESSION: Elevated mean transit time and decreased cerebral blood  flow in the anterior right MCA distribution with areas of decreased  cerebral blood volume within this distribution. Overall appearance  suggests anterior right MCA distribution ischemia with a central  component of core infarct.    Findings and impression discussed with Chantelle Lopez PA-C by phone at  1355 hours on 3/7/2023.      LEON MARTE MD         SYSTEM ID:  [id number]   MR Brain w/o & w Contrast    Narrative    EXAM: MR BRAIN W/O and W CONTRAST  LOCATION: [facility]  DATE/TIME: 3/8/2023 4:18 AM    INDICATION: R MCA stroke  COMPARISON: CTA head and neck 03/07/2023  CONTRAST: 10mL Gadavist  TECHNIQUE: Routine multiplanar multisequence head MRI without and with intravenous contrast.    FINDINGS:  INTRACRANIAL CONTENTS: There is a 6 x 3.5 (AP by TR) zone of restricted diffusion in the right frontal operculum and anterior right insular cortex. No mass, acute hemorrhage, or extra-axial fluid collections. Patchy and confluent nonspecific T2/FLAIR   hyperintensities within the cerebral white matter most consistent with moderate chronic microvascular ischemic change. Mild to moderate generalized cerebral atrophy. No hydrocephalus. Normal position of the cerebellar tonsils. No pathologic contrast   enhancement.    SELLA: No abnormality accounting for technique.    OSSEOUS STRUCTURES/SOFT TISSUES: Normal marrow signal. The major intracranial vascular flow voids are maintained.     ORBITS: No abnormality accounting for technique.      SINUSES/MASTOIDS: Mucosal thickening primarily involving the ethmoid air cells. No middle ear or mastoid effusion.       Impression    IMPRESSION:  1.  Moderate-sized early subacute infarct in the anterior right MCA territory. No hemorrhage.  2.  Age-related changes..     CT Head w/o Contrast    Narrative    EXAM: CT HEAD W/O CONTRAST  LOCATION: [facility]  DATE/TIME: 3/8/2023 6:51 AM    INDICATION: worsening left sided weakness  COMPARISON: 3/8/2023. 3/7/2023.  TECHNIQUE: Routine CT Head without IV contrast. Multiplanar reformats. Dose reduction techniques were used.    FINDINGS:  INTRACRANIAL CONTENTS: The known right anterior MCA territory infarction has undergone expected interval evolution, with decreased attenuation and increased gray-white effacement. There is no evidence of hemorrhagic transformation. There is regional   sulcal effacement but no midline shift or herniation. There is mild-to-moderate scattered white matter hypoattenuation, which is nonspecific. This commonly reflects chronic small vessel ischemic change. Diffuse and proportionate prominence of the   ventricles, sulci and cisterns is compatible with mild generalized parenchymal atrophy. The sella is unremarkable for technique. The cerebellar tonsils are appropriately positioned.    VISUALIZED ORBITS/SINUSES/MASTOIDS: There has been bilateral cataract surgery and scleral buckling. No acute intraorbital abnormality is demonstrated. No paranasal sinus mucosal disease. No middle ear or mastoid effusion.    BONES/SOFT TISSUES: No acute abnormality.      Impression    IMPRESSION:  1.  Expected evolution of known anterior right MCA territory infarction. No evidence of hemorrhagic transformation or significant mass effect.  2.  Age-related change.   CTA Head with Contrast    Narrative    EXAM: CTA HEAD WITH CONTRAST  LOCATION: [facility]  DATE/TIME: 3/8/2023 6:53 AM    INDICATION: Worsening  left-sided weakness.  COMPARISON: 3/7/2023.  CONTRAST: 75 mL Isovue 370.  TECHNIQUE: Head CT angiogram with IV contrast. Axial helical CT images of the intracranial vessels were obtained during the arterial phase of intravenous contrast administration. Axial helical 2D reconstructed images and multiplanar 3D MIP reconstructed   images of the intracranial vessels were performed by the technologist. Dose reduction techniques were used.     FINDINGS:   ANTERIOR CIRCULATION: The intracranial internal carotid arteries demonstrate no flow-limiting stenosis. The anterior cerebral arteries appear patent. Stable 3 mm saccular aneurysm at the right MCA bifurcation. Proximally occluded right M2 superior   division. Mild posterior division stenosis.    POSTERIOR CIRCULATION: The dominant right and diminutive left intradural vertebral arteries appear patent. The basilar and visualized proximal cerebellar arteries are unremarkable. The posterior cerebral arteries demonstrate no flow-limiting stenosis or   occlusion. There is fetal-type right PCA anatomy. No aneurysm or high flow vascular malformation is demonstrated.     DURAL VENOUS SINUSES: The major venous structures demonstrate grossly unremarkable enhancement, within the limitations of arterial phase technique.      Impression    IMPRESSION:   1.  Proximally occluded right M2 superior division.  2.  Redemonstrated right MCA bifurcation aneurysm measuring approximately 3 mm.   CT Head Perfusion w Contrast    [facility]  CT HEAD PERFUSION W CONTRAST  3/8/2023 7:38 AM   INDICATION: Left-sided weakness.  TECHNIQUE: CT cerebral perfusion was performed utilizing a second contrast bolus. Perfusion data were post processed with generation of standard perfusion maps and estimation of ischemic/infarcted volumes utilizing standard threshold values. Dose   reduction techniques were used.  CONTRAST: 125 mL Isovue-370.  COMPARISON: MRI  3/8/2023. CT perfusion 3/7/2023.    FINDINGS:  Please note that arterial input and venous output function curves are suboptimal, which may compromise data.    TOTAL HYPOPERFUSION: Using the threshold of Tmax>6 seconds, there is a hypoperfused region in the right MCA territory measuring 122 mL.    CORE INFARCTION: Using the threshold of CBF<30%, there is a core infarction in the right anterior MCA territory measuring 5 mL.    PENUMBRA: The penumbra volume (mismatch volume) is 117 mL. The mismatch ratio is 24.4.      Impression    CONCLUSION:   1.  Total hypoperfused area in the anterior right MCA territory measuring 122 mL, with core infarction of 5 mL.  2.  Suboptimal arterial input and venous output function curves may limit the accuracy of data.   CT Head w/o Contrast    Narrative    CT SCAN OF THE HEAD WITHOUT CONTRAST   3/8/2023 12:54 PM     HISTORY: Follow up stroke.    TECHNIQUE:  Axial images of the head and coronal reformations without  IV contrast material. Radiation dose for this scan was reduced using  automated exposure control, adjustment of the mA and/or kV according  to patient size, or iterative reconstruction technique.    COMPARISON: Multiple prior comparisons, most recent head CT from  earlier the same day at 6:52 AM.    FINDINGS: Subacute infarct in the right MCA territory involving the  frontal lobe and anterior insular region appears fairly similar to  head CT from earlier the same day. There is loss of gray-white matter  differentiation in the area of infarct with associated gyral swelling.  No significant midline shift or herniation. No acute intracranial  hemorrhage appreciated. Patchy periventricular white matter  hypodensities are nonspecific, but most likely related to chronic  microvascular ischemic disease. Ventricular size is within normal  limits without evidence of hydrocephalus.    Mucosal thickening in the right maxillary sinus. The bony calvarium  and bones of the skull base  appear intact.       Impression    IMPRESSION:     1. Subacute infarct in the right MCA territory appears similar to  prior head CT from earlier today. No evidence of hemorrhagic  transformation of infarct. Mild regional edema in the area of infarct  without midline shift or herniation. No hydrocephalus.  2. Nonspecific white matter changes most likely due to chronic  microvascular ischemic disease.     YODIT TREVINO MD         SYSTEM ID:  [id number]   XR Video Swallow with SLP or OT    Narrative    VIDEO SWALLOWING EVALUATION   3/9/2023 10:22 AM     HISTORY: dysphagia    COMPARISON: None.    FLUOROSCOPY TIME: 1.6 minutes     Number of cine runs: 5    FINDINGS:  Patient was unable to follow instructions during the exam.  The exam was not completed.    Thin: Not administered.    Mildly thick: There was some penetration. Delayed swallow and severe  pooling.    This study only includes the cervical esophagus.    Please see the speech pathologist's notes for further details.    DOM NIEVES MD         SYSTEM ID:  [id number]   XR Chest 1 View    Narrative    CHEST ONE VIEW  3/9/2023 10:05 AM     HISTORY: Aspiration pneumonia.    COMPARISON: None.      Impression    IMPRESSION: No acute disease.    LEI YANEZ MD         SYSTEM ID:  [id number]   IR Gastrostomy Tube Percutaneous Plcmnt    Narrative    PROCEDURE(S):  1. Nasogastric tube placement  2. Percutaneous gastrostomy tube placement      DATE OF PROCEDURE:  3/9/2023 3:57 PM    MODERATE SEDATION: Versed 1 mg IV; Fentanyl 50 mcg IV. During the time  out, immediately prior to the administration of medications, the  patient was reassessed for adequacy to receive conscious sedation.   Under physician supervision, Versed and fentanyl were administered for  moderate sedation. Pulse oximetry, heart rate and blood pressure were  continuously monitored by an independent trained observer. The  physician spent 30 minutes of face-to-face sedation time with  "the  patient.    CONTRAST: 15 mL Omnipaque into the gastric lumen    REFERENCED AIR KERMA: 0.9 mGy  FLUOROSCOPY TIME: 2.6 minutes.     ESTIMATED BLOOD LOSS:  Minimal    COMPLICATIONS:  None    CLINICAL HISTORY/INDICATION: Dysphasia status post stroke.    PROCEDURES AND FINDINGS:  Following a discussion of the risks, benefits, indications, and  alternatives to treatment, informed consent was obtained. The patient  was brought to the interventional radiology suite and placed supine on  the table. A limited ultrasound of the abdomen was performed to  localize the liver, which was marked on the skin. A nasogastric  feeding tube was inserted with the tip in the stomach. The abdomen was  then prepped and draped in a routine sterile fashion. A timeout was  performed per hospital universal protocol policy to ensure correct  patient, site, and procedure to be performed.    The stomach was then insufflated with air via the indwelling  nasogastric tube. A suitable site for percutaneous access into the  stomach was marked, and local anesthesia was obtained with 1%  Lidocaine. Needle access into the stomach was obtained. Position was  confirmed by aspiration of gas and dripping of contrast into the  gastric lumen. A T fastener was deployed through the needle, and the  anterior stomach wall was tacked to the anterior abdominal wall. This  procedure was repeated with a second T fastener. A 1 cm incision was  made between the T fasteners, and access into the stomach was obtained  using an 18 G needle. Position was confirmed by aspirating gas and  dripping contrast into the gastric lumen.     A 0.035\" Amplatz wire was then advanced across the needle into the  gastric lumen. The needle was removed and the tract was serially  dilated. Finally, a peel-away sheath was placed and through the sheath  and over the wire, an 18 Bengali gastrostomy tube was placed into the  stomach. Position was confirmed by dripping contrast through the " tube.  The retention balloon was insufflated with ____ mL of dilute contrast.  The retention disk was cinched to the skin and the T fasteners were  secured to the skin. A sterile dressing was applied, and the tube was  capped. The nasogastric tube was removed.     Throughout the procedure, the patient was monitored by a radiology  nurse for cardiac rhythm and oxygen saturation which remained stable.  Total The patient tolerated the procedure well and left interventional  radiology in stable condition.      Impression    IMPRESSION: Placement of a 18 Singaporean gastrostomy tube, as detailed  above.    NIKOLE RAMIREZ DO         SYSTEM ID:  [id number]   XR Chest Port 1 View    Narrative    EXAM: XR CHEST PORT 1 VIEW  LOCATION: [facility]  DATE/TIME: 3/11/2023 4:36 PM    INDICATION: pnuemonia  COMPARISON: 2023 at 1014 hours      Impression    IMPRESSION: Heart size is enlarged. Increased retrocardiac opacity is present on the left, incompletely assessed due to positioning. There is increasing prominence of central pulmonary vasculature. Right upper lobe is clear. Repeat imaging suggested.   XR Chest Port 1 View    Narrative    EXAM: XR CHEST PORT 1 VIEW  LOCATION: [facility]  DATE/TIME: 3/12/2023 4:24 PM    INDICATION: Pneumonia  COMPARISON: 2023      Impression    IMPRESSION: Heart size is normal. Mildly diminished lung volumes. No lobar consolidation, CHF, or effusion. Improved left lower lobe aeration Mediastinum and bony structures are stable in appearance.   Echocardiogram Complete - For age > 60 yrs     Value    LVEF  60%    Narrative    [id number]  [id number]  [id number]  [id number]^BAMBIO^KUWE^EDOSSA     [facility]  Echocardiography Laboratory  [address] [place] [address]     Name: LEI SIDDIQUI  MRN: [id number]  : 1945  Study Date: 2023 04:11 PM  Age: 77 yrs  Gender: Male  Patient Location: [facility]  Reason For  Study: Cerebrovascular Incident  Ordering Physician: STUART EUBANKS  Referring Physician: STUART EUBANKS  Performed By: BEL Reed     BSA: 2.2 m2  Height: 72 in  Weight: 219 lb  HR: 74  BP: 118/76 mmHg  ______________________________________________________________________________  Procedure  Complete Portable Echo Adult. Optison (NDC #[id number]) given intravenously.  ______________________________________________________________________________  Interpretation Summary     1. The left ventricle is normal in structure, function and size. The visual  ejection fraction is estimated at 60%.  2. The right ventricle is normal in structure, function and size.  3. Mild to moderate valvular aortic stenosis. Mean 16mmHg, BRAYDEN 1.5cm2.  4. Mild aortic root dilatation. 4.4cm.     No previous echo for comparison.  ______________________________________________________________________________  Left Ventricle  The left ventricle is normal in structure, function and size. There is mild  concentric left ventricular hypertrophy. The visual ejection fraction is  estimated at 60%. Left ventricular diastolic function is indeterminate. Normal  left ventricular wall motion.     Right Ventricle  The right ventricle is normal in structure, function and size.     Atria  There is severe biatrial enlargement. There is no atrial shunt seen.     Mitral Valve  There is mild (1+) mitral regurgitation.     Tricuspid Valve  No tricuspid regurgitation.     Aortic Valve  Mild to moderate valvular aortic stenosis. Mean 16mmHg, BRAYDEN 1.5cm2.     Pulmonic Valve  The pulmonic valve is normal in structure and function.     Vessels  Mild aortic root dilatation. Normal ascending, transverse (arch), and  descending aorta. Dilation of the inferior vena cava is present with abnormal  respiratory variation in diameter.     Pericardium  There is no pericardial effusion.     Rhythm  The rhythm was  undetermined.  ______________________________________________________________________________  MMode/2D Measurements & Calculations  IVSd: 1.3 cm     LVIDd: 5.1 cm  LVIDs: 3.1 cm  LVPWd: 1.4 cm  FS: 39.0 %  LV mass(C)d: 284.6 grams  LV mass(C)dI: 128.5 grams/m2  Ao root diam: 4.4 cm  LA dimension: 6.3 cm  asc Aorta Diam: 3.6 cm  LA/Ao: 1.5  LVOT diam: 2.4 cm  LVOT area: 4.6 cm2  LA Volume (BP): 240.0 ml  LA Volume Index (BP): 108.6 ml/m2  RWT: 0.53     Doppler Measurements & Calculations  MV E max papa: 94.8 cm/sec  MV dec time: 0.16 sec  Ao V2 max: 284.7 cm/sec  Ao max P.0 mmHg  Ao V2 mean: 188.9 cm/sec  Ao mean P.3 mmHg  Ao V2 VTI: 55.7 cm  BRAYDEN(I,D): 1.5 cm2  BRAYDEN(V,D): 1.6 cm2  AI P1/2t: 610.6 msec  LV V1 max PG: 3.9 mmHg  LV V1 max: 98.1 cm/sec  LV V1 VTI: 17.8 cm  SV(LVOT): 82.7 ml  SI(LVOT): 37.4 ml/m2  PA acc time: 0.14 sec  AV Papa Ratio (DI): 0.34  BRAYDEN Index (cm2/m2): 0.67  E/E' av.1  Lateral E/e': 7.8  Medial E/e': 10.3     ______________________________________________________________________________  Report approved by: Sher Simms 2023 04:53 PM               Discharge Medications   Current Discharge Medication List      START taking these medications    Details   acetaminophen (TYLENOL) 325 MG/10.15ML solution 20.3 mLs (650 mg) by Per NG tube route every 4 hours as needed for mild pain or fever (for temperature greater than 100.4 F (38 C) - may also be used for moderate pain)    Associated Diagnoses: Cerebrovascular accident (CVA) due to occlusion of left middle cerebral artery (H)      amoxicillin-clavulanate (AUGMENTIN) 400-57 MG/5ML suspension 10.94 mLs (875 mg) by Per Feeding Tube route 2 times daily for 2 days    Associated Diagnoses: Cerebrovascular accident (CVA) due to occlusion of left middle cerebral artery (H)      apixaban ANTICOAGULANT (ELIQUIS) 5 MG tablet 1 tablet (5 mg) by Per Feeding Tube route 2 times daily    Associated Diagnoses: Cerebrovascular accident  (CVA) due to occlusion of left middle cerebral artery (H)      diltiazem (CARDIZEM) 60 MG tablet 1 tablet (60 mg) by Per Feeding Tube route every 8 hours    Associated Diagnoses: Cerebrovascular accident (CVA) due to occlusion of left middle cerebral artery (H)      gabapentin (NEURONTIN) 250 MG/5ML solution 12 mLs (600 mg) by NG or Feeding tube route At Bedtime    Associated Diagnoses: Cerebrovascular accident (CVA) due to occlusion of left middle cerebral artery (H)      insulin glargine (LANTUS PEN) 100 UNIT/ML pen Inject 3 Units Subcutaneous 2 times daily  Qty: 15 mL    Comments: If Lantus is not covered by insurance, may substitute Basaglar or Semglee or other insulin glargine product per insurance preference at same dose and frequency.    Associated Diagnoses: Cerebrovascular accident (CVA) due to occlusion of left middle cerebral artery (H)      pantoprazole (PROTONIX) 2 mg/mL SUSP suspension 20 mLs (40 mg) by Per Feeding Tube route every morning (before breakfast)    Associated Diagnoses: Cerebrovascular accident (CVA) due to occlusion of left middle cerebral artery (H)      petrolatum-zinc oxide (SENSI-CARE) 49-15 % OINT ointment Apply topically daily as needed for skin protection (for tube site irritation/redness.)    Associated Diagnoses: Cerebrovascular accident (CVA) due to occlusion of left middle cerebral artery (H)      polyethylene glycol (MIRALAX) 17 GM/Dose powder Take 17 g by mouth daily  Qty: 510 g    Associated Diagnoses: Cerebrovascular accident (CVA) due to occlusion of left middle cerebral artery (H)      senna-docusate (SENOKOT-S/PERICOLACE) 8.6-50 MG tablet 2 tablets by Per Feeding Tube route 2 times daily    Associated Diagnoses: Cerebrovascular accident (CVA) due to occlusion of left middle cerebral artery (H)         CONTINUE these medications which have CHANGED    Details   atorvastatin (LIPITOR) 20 MG tablet 1 tablet (20 mg) by Oral or Feeding Tube route At Bedtime    Associated  Diagnoses: Cerebrovascular accident (CVA) due to occlusion of left middle cerebral artery (H)         CONTINUE these medications which have NOT CHANGED    Details   CONTOUR NEXT TEST test strip USE 1 EACH AS DIRECTED DAILY.  Qty: 100 strip, Refills: 5    Associated Diagnoses: Type 2 diabetes mellitus with diabetic peripheral angiopathy without gangrene (H); Encounter for medication refill         STOP taking these medications       acetaminophen (TYLENOL) 325 MG tablet Comments:   Reason for Stopping:         atenolol (TENORMIN) 50 MG tablet Comments:   Reason for Stopping:         diltiazem ER COATED BEADS (CARDIZEM CD/CARTIA XT) 120 MG 24 hr capsule Comments:   Reason for Stopping:         EPINEPHrine (ANY BX GENERIC EQUIV) 0.3 MG/0.3ML injection 2-pack Comments:   Reason for Stopping:         fluticasone (FLONASE) 50 MCG/ACT nasal spray Comments:   Reason for Stopping:         gabapentin (NEURONTIN) 300 MG capsule Comments:   Reason for Stopping:         gemfibrozil (LOPID) 600 MG tablet Comments:   Reason for Stopping:         lisinopril (ZESTRIL) 5 MG tablet Comments:   Reason for Stopping:         metFORMIN (GLUCOPHAGE) 500 MG tablet Comments:   Reason for Stopping:         multivitamin (OCUVITE) TABS tablet Comments:   Reason for Stopping:         omeprazole (PRILOSEC) 20 MG DR capsule Comments:   Reason for Stopping:         Vitamin D, Cholecalciferol, 25 MCG (1000 UT) TABS Comments:   Reason for Stopping:         warfarin ANTICOAGULANT (COUMADIN) 5 MG tablet Comments:   Reason for Stopping:             Allergies   Allergies   Allergen Reactions     Bees Swelling     Reports using an epi pen if stung

## 2023-03-16 NOTE — PLAN OF CARE
Physical Therapy Discharge Summary    Reason for therapy discharge:    Discharged to acute rehabilitation facility.    Progress towards therapy goal(s). See goals on Care Plan in [facility] electronic health record for goal details.  Goals not met.  Barriers to achieving goals:   discharge from facility.    Therapy recommendation(s):    Continued therapy is recommended.  Rationale/Recommendations:  Patient would benefit from continued skilled PT to progress strength, independence with all functional mobilty and activity tolerance.    Goal Outcome Evaluation:

## 2023-03-16 NOTE — H&P
[facility]   Acute Rehabilitation Unit  Admission History and Physical    CHIEF COMPLAINT   Stroke Ischemic 01.1 (L) Body Involvement (R) Brain; s/p acute ischemic stroke of R MCA territory due to cardioembolism status post tenecteplase     HISTORY OF PRESENT ILLNESS  Stefan Diallo is a 77 year old male with past medical history of atrial fibrillation on coumadin though held PTA for upcoming procedure (pain pump placement), chronic bilateral low back pain s/p lumbar fusion, type 2 diabetes mellitus c/b polyneuropathy, renal artery aneurysm, hypertension, hyperlipidemia, KISHA, GERD, BPH, and chronic bilateral shoulder pain who presented to [facility] on 3/7/23 with left-sided weakness, left facial droop, and dysarthria with CT head revealing increased attenuation within right superior M2 brach and CTA head with right superior M2 branch oclusion, right MCA bifurcation aneurysm, and possible subocclusive thrombus at the R MCA bifurcation.  Patient was administered TNK.  Patient was transferred to [facility] for consideration of endovascular treatment, but upon arrival was noted to have significant improvement in symptoms and shared decision not to pursue.  Repeat imaging including NCCT, CTA, & CTP were performed; CTP showed small amount of core:penumbra mismatch (overcalled volume as regions of L hemisphere & posterior circulation showed as hypoperfused and did not correlate with R M2 occlusion), persistent M2 clot.       Additional stroke evaluation with EKG with afib, echo with EF of 60% with normal right ventricular function, mild to moderate aortic stenosis; LDL 64.     Patient developed worsening left-sided weakness overnight 3/7-3/8 despite adequate blood pressures.  MRI showed completed L M2 divisions stroke consistent with his known occlusion.  Repeat CT head, CTA, CTP with no new occlusion and no new region of perfusion deficit.    On 3/11, patient developed  sepsis felt to be due to aspiration pneumonia.  He was treated with vancomycin (3/11-3/13), unasyn (3/11-3/15), then Augmentin to complete 7 day course.  Remains stable on room air and afebrile at discharge to ARU.    Hospital course was further complicated by moderate to severe dysphagia now s/p PEG placement on 3/9/23 with IR c/b bleeding from PEG site, hypokalemia, hyperglycemia, constipation, and loose stools.    During acute hospitalization, patient was seen and evaluated by PT and OT, who collectively recommended that patient would benefit from ongoing therapies in the acute inpatient rehabilitation setting.      In review of the therapy notes, patient is currently needing mod to max Ax2 for supine>sit, max Ax2 sit>supine, was able to sit at edge of bed with SBA.  He performs sit<>stand with min to mod Ax2.  He completed 2 bouts of gait with mod to max A.  He performs bathing with total A, grooming/hygiene with max A and set-up.    Upon arrival to the rehab unit, patient is quite sleepy.  He is accompanied by his fiance, who assists in providing some historical information.  Patient reports chronic back and bilateral (R>L) shoulder pain are manageable.  He notes baseline neuropathy in bilateral feet to about level of his knees.  Fiance reports that PTA gait was affected by this.  States to be sleeping well and mood is good. He is asking about when he can eat/drink.    PAST MEDICAL HISTORY   Reviewed and updated in Epic.  Past Medical History:   Diagnosis Date     Arthritis      Atrial fibrillation (H)      Bacteremia      Bell's palsy 2015     BPH (benign prostatic hyperplasia)      Diabetes (H)      Gastroesophageal reflux disease      GERD (gastroesophageal reflux disease)      GI hemorrhage      High cholesterol      Hyperlipemia      Hyperlipidemia      Hypertension      Hypertension      Idiopathic peripheral neuropathy      Irregular heart beat     chronic at fib     Renal artery aneurysm (H)      Renal  artery aneurysm (H)      Sleep apnea     Bipap     Sleep apnea 10/31/2021     Sleep apnea, obstructive     USES BIPAP     Type 2 diabetes mellitus (H)      UTI (lower urinary tract infection)        SURGICAL HISTORY  Reviewed and updated in Epic.  Past Surgical History:   Procedure Laterality Date     AMPUTATE FOOT Right 5/6/2019    Procedure: AMPUTATION, 3rd TOE RIGHT FOOT;  Surgeon: Ravi Abbasi DPM;  Location: [facility] - [place], [place];  Service: Podiatry     FUSION SPINE POSTERIOR MINIMALLY INVASIVE THREE + LEVELS N/A 10/29/2020    Procedure: L3/4/5S1 oblique lateral lumbar interbody fusion with discectomy L3/4/5S1 Posterior minimally invasive pedicle screw placement and posterolateral instrumentation and fusion  L3/4/5S1 Posterior minimally invasive pedicle screw placement and posterolateral instrumentation and fusion;  Surgeon: Vernon Bynum MD;  Location: [facility] OR     HC REPAIR COMPL ROTATOR CUFF AVULSN,CHR      Description: Chronic Rotator Cuff Avulsion;  Recorded: 04/11/2011;     IR GASTROSTOMY TUBE PERCUTANEOUS PLCMNT  3/9/2023     ORTHOPEDIC SURGERY Right     knee surgery     ORTHOPEDIC SURGERY Right     amputation 3rd toe on right foot     TENOTOMY ACHILLES TENDON       TRANSRECTAL ULTRASONIC, TRANSURETHRAL RESECTION (TUR) OF PROSTATE CYST         SOCIAL HISTORY  Reviewed and updated in Epic.  Marital Status: significant other/fiance  Living situation: lives in house with 1 flight of stairs to enter through tuck-under garage and then can stay on 1 level within.  Family support: has a significant other that is able to provide 24/7 supervision.  She works remotely, plans to work from home to take care of him if needed.  Adult son also supportive.  Vocational History: retired  Tobacco use: former per chart quit 1990  Alcohol use: not addressed at admission  Illicit drug use: not addressed at admission  Social History     Socioeconomic History     Marital status:      Spouse name: Not on file      Number of children: 3     Years of education: Not on file     Highest education level: Not on file   Occupational History     Not on file   Tobacco Use     Smoking status: Former     Packs/day: 2.00     Years: 27.00     Pack years: 54.00     Types: Cigarettes     Quit date: 1990     Years since quittin.1     Smokeless tobacco: Never   Substance and Sexual Activity     Alcohol use: No     Drug use: No     Sexual activity: Not Currently   Other Topics Concern     Not on file   Social History Narrative     Not on file     Social Determinants of Health     Financial Resource Strain: Not on file   Food Insecurity: Not on file   Transportation Needs: Not on file   Physical Activity: Not on file   Stress: Not on file   Social Connections: Not on file   Intimate Partner Violence: Not on file   Housing Stability: Not on file       FAMILY HISTORY  Reviewed and updated in Epic.  Family History   Problem Relation Age of Onset     Coronary Artery Disease Mother      Coronary Artery Disease Father      Atrial fibrillation Father          PRIOR FUNCTIONAL HISTORY   Pt was independent with all ADLs/IADLs, transfers, mobility and gait.      MEDICATIONS  Scheduled meds  Medications Prior to Admission   Medication Sig Dispense Refill Last Dose     amoxicillin-clavulanate (AUGMENTIN) 400-57 MG/5ML suspension 10.94 mLs (875 mg) by Per Feeding Tube route 2 times daily for 2 days   3/16/2023 at 0859     apixaban ANTICOAGULANT (ELIQUIS) 5 MG tablet 1 tablet (5 mg) by Per Feeding Tube route 2 times daily   3/16/2023 at 0846     atorvastatin (LIPITOR) 20 MG tablet 1 tablet (20 mg) by Oral or Feeding Tube route At Bedtime   3/15/2023 at 2141     CONTOUR NEXT TEST test strip USE 1 EACH AS DIRECTED DAILY. 100 strip 5 Unknown     diltiazem (CARDIZEM) 60 MG tablet 1 tablet (60 mg) by Per Feeding Tube route every 8 hours   3/16/2023 at 0633     gabapentin (NEURONTIN) 250 MG/5ML solution 12 mLs (600 mg) by NG or Feeding tube route At  Bedtime   3/15/2023 at 2142     insulin glargine (LANTUS PEN) 100 UNIT/ML pen Inject 3 Units Subcutaneous 2 times daily 15 mL  3/16/2023 at 0859     [START ON 3/17/2023] pantoprazole (PROTONIX) 2 mg/mL SUSP suspension 20 mLs (40 mg) by Per Feeding Tube route every morning (before breakfast)   3/16/2023 at 0633     petrolatum-zinc oxide (SENSI-CARE) 49-15 % OINT ointment Apply topically daily as needed for skin protection (for tube site irritation/redness.)   Unknown     senna-docusate (SENOKOT-S/PERICOLACE) 8.6-50 MG tablet 2 tablets by Per Feeding Tube route 2 times daily   3/16/2023 at 0846     acetaminophen (TYLENOL) 325 MG/10.15ML solution 20.3 mLs (650 mg) by Per NG tube route every 4 hours as needed for mild pain or fever (for temperature greater than 100.4 F (38 C) - may also be used for moderate pain)   3/14/2023 at 2149     polyethylene glycol (MIRALAX) 17 GM/Dose powder Take 17 g by mouth daily 510 g  3/12/2023 at 0408       ALLERGIES     Allergies   Allergen Reactions     Bees Swelling     Reports using an epi pen if stung         REVIEW OF SYSTEMS  Constitutional: Negative for fever/chills.  Eyes: Negative for visual disturbance, double vision, blurry vision.  Ears, Nose, Throat: Negative for nasal congestion, sore throat.  Cardiovascular: Negative for chest pain, palpitations.  Respiratory: Negative for shortness of breath, cough.  Gastrointestinal: Positive for incontinence, both constipation and loose stools this admission.  Negative for abdominal pain, nausea, vomiting.  Genitourinary: Positive for incontinence.  Negative for dysuria.  Musculoskeletal: Positive for back and bilateral shoulder pain.  Neurologic: Positive for left-sided weakness, numbness/tingling in bilateral feet to knees, impaired swallow, slurred speech, impaired cognition.  Negative for headache, dizziness.  Psychiatric: Negative for sleep or mood disturbance.      PHYSICAL EXAM  VITAL SIGNS:  /78 (BP Location: Right arm,  Patient Position: Semi-Romano's, Cuff Size: Adult Regular)   Pulse 88   Temp (!) 96.4  F (35.8  C) (Oral)   Resp 16   SpO2 93%   BMI:  Estimated body mass index is 29.3 kg/m  as calculated from the following:    Height as of 3/7/23: 1.829 m (6').    Weight as of 3/11/23: 98 kg (216 lb 0.8 oz).     General: NAD, lying in bed, somnolent  HEENT: NC/AT, dry mucous membranes  Pulmonary: non-labored on room air, lungs CTA bilaterally  Cardiovascular: irregularly irregular, no murmurs  Abdominal: soft, non-tender, non-distended, bowel sounds present, PEG in place with mild dried sanguinous drainage, no erythema or active drainage, T tacs remain in place  Extremities: warm, well perfused, no edema in bilateral lower extremities, no tenderness in calves   MSK/neuro:   Mental Status:  Sleepy but provided brief verbal responses   Cranial Nerves:  1. 2nd CN: Pupils equal, round, reactive to light   2. 3rd,4th,6th CN:  EOMI  3. 5th CN: facial sensation intact   4. 7th CN: left facial droop  5. 8th CN: functional hearing bilaterally  6. 9th, 10th CN: palate elevates symmetrically   7. 11th CN: bilateral shoulder shrug weakness   8. 12th CN: tongue midline   Sensory: decreased sensation to light touch on left side, worse in leg, also with sensory neglect   Strength: bilateral shoulder ROM limited by pain/baseline rotator cuff tendinopathy, left hemiparesis with SF 1/5, EF 4/5, EE 4-/5, FF 3/5   Abnormal movements: None    Coordination: too sleepy to participate   Speech: +dysarthria   Cognition: follows basic commands but full evaluation limited by somnolence   Gait: not tested      LABS  CBC RESULTS: Recent Labs   Lab Test 03/15/23  0845 03/14/23  0744 03/13/23  0543   WBC 8.2 7.5 8.1   RBC 4.11* 4.03* 3.96*   HGB 12.7* 12.4* 12.4*   HCT 38.9* 37.8* 36.7*   MCV 95 94 93   MCH 30.9 30.8 31.3   MCHC 32.6 32.8 33.8   RDW 13.3 13.2 13.3    294 256     Last Basic Metabolic Panel:  Recent Labs   Lab Test 03/16/23  1729  03/16/23  1331 03/16/23  0846 03/15/23  1127 03/15/23  0845 03/14/23  0754 03/14/23  0744 03/13/23  0734 03/13/23  0543   NA  --   --   --   --  142  --  141  --  138   POTASSIUM  --   --   --   --  4.1  --  4.0  --  3.8   CHLORIDE  --   --   --   --  105  --  104  --  102   CO2  --   --   --   --  28  --  28  --  26   ANIONGAP  --   --   --   --  9  --  9  --  10   * 286* 227*   < > 265*   < > 240*   < > 252*   BUN  --   --   --   --  24.5*  --  19.3  --  20.8   CR  --   --   --   --  0.84  --  0.77  --  0.75   GFRESTIMATED  --   --   --   --  89  --  >90  --  >90   LUTHER  --   --   --   --  9.5  --  9.4  --  9.4    < > = values in this interval not displayed.     Hemoglobin A1C   Date Value Ref Range Status   02/01/2023 6.4 (H) 0.0 - 5.6 % Final     Comment:     Normal <5.7%   Prediabetes 5.7-6.4%    Diabetes 6.5% or higher     Note: Adopted from ADA consensus guidelines.     Recent Labs   Lab Test 03/08/23  0455 12/28/21  1304   CHOL 120 152   HDL 44 41   LDL 64 91   TRIG 61 100     EXAM: CTA HEAD NECK W CONTRAST  LOCATION: [facility]  DATE/TIME: 3/7/2023 10:42 AM  IMPRESSION:   HEAD CT:  1.  No acute hemorrhage.  2.  Increased attenuation within the right superior M2 branch of the middle cerebral artery concerning for thromboembolus. No early ischemic changes.  HEAD CTA:   1.  Occlusion of right superior M2 branch middle cerebral artery.  2.  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  NECK CTA:  1.  Scattered plaque within the aortic arch and carotid bifurcations.  2.  No significant stenosis of either carotid or vertebral artery.    CT HEAD WITHOUT CONTRAST 3/7/2023 1:34 PM  IMPRESSION:  1. Redemonstrated hyperdense intraluminal thrombus in the right MCA  distribution. This is accompanied by an interval development of a  hypoattenuation obscuring the gray-white junction at the right insula  extending to the right frontal opercular region compatible with an  evolving component  of right MCA distribution infarct. No intracranial  hemorrhage.  2.  Background age-related changes as above.    CTA HEAD AND NECK WITH CONTRAST 3/7/2023 1:36 PM  IMPRESSION:  HEAD CTA:  1.  Redemonstrated occlusion of the proximal right M2 segment  supplying the superior division with markedly diminished/absent  opacification of the distal right MCA branch vessels of the anterior  right MCA distribution. This is overall unchanged.  2.  Unchanged 3 mm aneurysm at the right MCA bifurcation.  3.  Moderate narrowing of the proximal right M2 segment supplying the  posterior right MCA distribution with multifocal mild narrowings in  the nondominant left vertebral artery.  NECK CTA:  1.  Less than 50% stenosis of the proximal right internal carotid  artery and approximately 40% stenosis in the mid left common carotid  artery are unchanged. Additional mild narrowings of the neck vessels  Elsewhere.    CT BRAIN PERFUSION March 7, 2023 1:46 PM  IMPRESSION: Elevated mean transit time and decreased cerebral blood  flow in the anterior right MCA distribution with areas of decreased  cerebral blood volume within this distribution. Overall appearance  suggests anterior right MCA distribution ischemia with a central  component of core infarct.    EXAM: MR BRAIN W/O and W CONTRAST  LOCATION: [facility]  DATE/TIME: 3/8/2023 4:18 AM  IMPRESSION:  1.  Moderate-sized early subacute infarct in the anterior right MCA territory. No hemorrhage.  2.  Age-related changes.    EXAM: CT HEAD W/O CONTRAST  LOCATION: [facility]  DATE/TIME: 3/8/2023 6:51 AM  IMPRESSION:  1.  Expected evolution of known anterior right MCA territory infarction. No evidence of hemorrhagic transformation or significant mass effect.  2.  Age-related change.    EXAM: CTA HEAD WITH CONTRAST  LOCATION: [facility]  DATE/TIME: 3/8/2023 6:53 AM  IMPRESSION:   1.  Proximally occluded right M2  superior division.  2.  Redemonstrated right MCA bifurcation aneurysm measuring approximately 3 mm.    CT HEAD PERFUSION W CONTRAST  3/8/2023 7:38 AM   CONCLUSION:   1.  Total hypoperfused area in the anterior right MCA territory measuring 122 mL, with core infarction of 5 mL.  2.  Suboptimal arterial input and venous output function curves may limit the accuracy of data.    CT SCAN OF THE HEAD WITHOUT CONTRAST   3/8/2023 12:54 PM  IMPRESSION:     1. Subacute infarct in the right MCA territory appears similar to  prior head CT from earlier today. No evidence of hemorrhagic  transformation of infarct. Mild regional edema in the area of infarct  without midline shift or herniation. No hydrocephalus.  2. Nonspecific white matter changes most likely due to chronic  microvascular ischemic disease.      CHEST ONE VIEW  3/9/2023 10:05 AM   IMPRESSION: No acute disease.    EXAM: XR CHEST PORT 1 VIEW  LOCATION: [facility]  DATE/TIME: 3/11/2023 4:36 PM  IMPRESSION: Heart size is enlarged. Increased retrocardiac opacity is present on the left, incompletely assessed due to positioning. There is increasing prominence of central pulmonary vasculature. Right upper lobe is clear. Repeat imaging suggested.    EXAM: XR CHEST PORT 1 VIEW  LOCATION: [facility]  DATE/TIME: 3/12/2023 4:24 PM  IMPRESSION: Heart size is normal. Mildly diminished lung volumes. No lobar consolidation, CHF, or effusion. Improved left lower lobe aeration Mediastinum and bony structures are stable in appearance.      IMPRESSION/PLAN:  Stefan Diallo is a 77 year old male with a past medical history of atrial fibrillation on coumadin though held PTA for planned procedure (pain pump placement), chronic bilateral low back pain s/p lumbar fusion, type 2 diabetes mellitus c/b polyneuropathy, renal artery aneurysm, hypertension, hyperlipidemia, KISHA, GERD, BPH, and chronic bilateral shoulder pain who was admitted on  3/7/23 with acute right MCA stroke s/p tenecteplase with hospital course complicated by dysphagia s/p PEG placement on 3/9 complicated by PEG-site bleeding, suspected aspiration pneumonia, hyperglycemia, constipation, and hypokalemia.  He is now admitted to ARU on 3/16/23 for multidisciplinary rehabilitation and ongoing medical management.      Admission to acute inpatient rehab 03/16/23.    Impairment group code: Stroke Ischemic 01.1 (L) Body Involvement (R) Brain; s/p acute ischemic stroke of R MCA territory due to cardioembolism status post tenecteplase      1. PT, OT and SLP 60 minutes of each on a daily basis, in addition to rehab nursing and close management of physiatrist.      2. Impairment of ADL's: Noted to have impaired activity tolerance, impaired balance, impaired coordination, impaired judgement and safety awareness, impaired strength, impaired tone, impaired weight shifting, impulsiveness and pain, all affecting his ability to safely and independently perform basic ADLs.  Goal for assist of 1 with basic ADLs.    3. Impairment of mobility:  Noted to have impaired activity tolerance, impaired balance, impaired coordination, impaired judgement and safety awareness, impaired strength, impaired tone, impaired weight shifting, impulsiveness and pain, all affecting his ability to safely and independently perform basic mobility.  Goal for assist of 1 with basic mobility.    4. Impairment of cognition/language/swallow:  Noted to have dysarthria, aphasia, and impaired cognition with goals for improved cognitive-linguistic skills to meet basic needs.    5. Medical Conditions    Acute R MCA stroke s/p tenecteplase 3/7, likely secondary to cardioembolism from atrial fibrillation off anticoagulation for an anticipated procedure  Initially on aspirin, later restarted on anticoagulation but with Eliquis in place of PTA warfarin.  - Continue apixaban 5 mg BID  - Continue PTA statin.  Goal LDL 40-70.  - Long term BP  goal <135/80 to be achieved as outpatient within several weeks.  Management as below.  - Continue PT/OT/SLP  - Follow up with general neurology in 6-8 weeks     Possible early sepsis due to aspiration pneumonia  Started on unasyn + vanco 3/11.  MRSA nares negative.  BC's still negative at discharge.  Stopped vancomycin 3/13.  Changed to FT augmentin 3/15 with a plan for 7 total days abx, plan stop on 3/18/23.  - Monitor bcx 3/11, NGTD  - Continue Augmentin to complete 7-day antibiotic course (stop 3/18)     Atrial fibrillation  [PTA on warfarin, diltiazem]  On chronic anticoagulation with warfarin.  However, had been held since 2/25 for planned pain pump placement. PTA long-acting extended release diltiazem cannot be given through the PEG tube, substituted short-acting.  Started on DOAC in place of PTA warfarin on 3/14.  - Continue Eliquis 5 mg BID  - Continue short acting diltiazem 60 mg TID while NPO  - Continue to monitor     Dyslipidemia  [PTA meds: atorvastatin 20 mg daily, gemfibrozil 600 mg BID]  - Continue PTA statin       Hypertension  [PTA meds: atenolol 25 mg daily, diltiazem  mg daily, lisinopril 5 mg daily]  Resuming PTA meds gradually. PTA diltiazem increased to 60 mg TID on 3/14/23.   - Long term BP <130/80, inpatient meds can be slowly titrated to this goal as otherwise appropriate  - Resume PTA lisinopril, atenolol as indicated  - Continue diltiazem 60 mg q8h,      Type II DM  Polyneuropathy  [PTA meds: metformin 500 mg qAM + 1000 mg qPM, gabapentin 600 mg at HS]  A1c6.4% on 2/1/23.  PTA metformin held this admission given NPO on tube feeds.  Managed on small doses of Lantus and sliding scale insulin.  - Continue to hold PTA Metformin for now  - Continue home gabapentin  - Continue Lantus 3 units BID  - Continue Novolog medium intensity sliding scale insulin  - Monitor BG TID AC + HS + 0200  - Hypoglycemia protocol     KISHA  -  Continue CPAP per home setting     Chronic back pain  Patient  planned for pain pump placement 3/8/23 (Javi).  States he has chronic back pain from nerve impingement and uses tylenol PTA for pain.   - Continue Tylenol prn for pain  - Monitor    Moderate-severe oral-pharyngeal dysphagia  S/p PEG placement 3/9  - Continuous tube feeds per PEG  - RD following, assistance appreciated  - NPO with ice chips per SLP recs with supervision  - Continue SLP  - T tacs still in place at ARU admission, plan for remove 10-14 days following PEG placement (~3/19-3/23)    Constipation, improved  - Continue senokot-S 2 tabs BID  - Monitor, suspect more recently with some loose stools in setting of tube feeds and may need to stop bowel meds    GERD  - Continue pantoprazole as auto-sub for PTA on omeprazole 20 mg daily    6. Adjustment to disability:  Clinical psychology to eval and treat if indicated  7. FEN: NPO on continuous tube feeds via PEG  8. Bowel: incontinent, monitor, on scheduled senokot-S  9. Bladder: incontinent, monitor PVRs at admission  10. DVT Prophylaxis: apixaban  11. GI Prophylaxis: PPI  12. Code: full  13. Disposition: goal for home  14. ELOS:  24 days  15. Rehab prognosis:  good  16. Follow up Appointments on Discharge: PCP in 1-2 weeks, general neurology in 6-8 weeks        Kenna Ruiz PA-C  Physical Medicine & Rehabilitation

## 2023-03-16 NOTE — PLAN OF CARE
Goal Outcome Evaluation:      Plan of Care Reviewed With: other (see comments)    Overall Patient Progress: no changeOverall Patient Progress: no change    Outcome Evaluation: NPO. TF at goal meeting 100% nutrition needs - Jevity 1.5 @ 65 mL/hr.    Mary Cormier  Dietetic Intern

## 2023-03-16 NOTE — PROGRESS NOTES
CLINICAL NUTRITION SERVICES - REASSESSMENT NOTE      Recommendations Ordered by Registered Dietitian (RD):   Continue current EN regimen  Diet advancement per SLP   Future/Additional Recommendations:   Monitor BG   Malnutrition: 3/  % Weight Loss:  None noted  % Intake:  Decreased intake does not meet criteria for malnutrition  - has been NPO since admit  Subcutaneous Fat Loss:  None observed  Muscle Loss:  None observed  Fluid Retention:  None noted     Malnutrition Diagnosis: Patient does not meet two of the above criteria necessary for diagnosing malnutrition     EVALUATION OF PROGRESS TOWARD GOALS   Diet: NPO    Nutrition Support:    Type of Feeding Tube: PEG  Enteral Frequency:  Continuous  Enteral Regimen: Jevity 1.5 @ 65 mL/hr (goal rate)  Total Enteral Provisions: 2340 cals (24 cals/kg), 100 gm pro (1 gm/kg), 33 gm fiber, 1186 mL free water  Free Water Flush: 100 mL every 4 hrs  TOTAL (TF + flushes) = 1786 mL/day    Intake/Tolerance:  TF at goal, tolerating well.      ASSESSED NUTRITION NEEDS:  Dosing Weight 96.6 kg  Estimated Energy Needs: 4932-5397 kcals (22-25 Kcal/Kg)  Justification: maintenance  Estimated Protein Needs:  grams protein (1-1.2 g pro/Kg)  Justification: maintenance  Estimated Fluid Needs: 4636-9506  mL (1 mL/Kcal)  Justification: maintenance      NEW FINDINGS:   Weight: No updated wt since 3/11, appears stable    GI: Previous constipation; BM x2 on 3/13, x1 on 3/15    Medications:  Lipitor  Novolog - MSSI q 4 hrs (increased from low 3/14)  Lantus - 3 units BID (added 3/15)  Protonix  Senna    Labs:  Na: 142 (WNL)  K+: 4.1 (WNL)  Mg++: 2.0 (WNL)  Phos: 3.7 (WNL)  B - 228 (H)    Previous Goals:   EN to meet % estimated needs while pt is NPO  Evaluation: Met    Previous Nutrition Diagnosis:   Inadequate oral intake related to dysphagia as evidenced by reliance on EN to meet 100% of nutrition needs and need for NPO status  Evaluation: No change      CURRENT NUTRITION  DIAGNOSIS  Inadequate oral intake related to dysphagia as evidenced by reliance on EN to meet 100% of nutrition needs and need for NPO status    INTERVENTIONS  Recommendations / Nutrition Prescription  Continue current TF regimen  Diet advancement per SLP    Implementation  None at this time    Goals  EN to meet % estimated needs while pt is NPO      MONITORING AND EVALUATION:  Progress towards goals will be monitored and evaluated per protocol and Practice Guidelines    Mary Cormier  Dietetic Intern

## 2023-03-16 NOTE — PLAN OF CARE
Reason for Admission: R CVA    Cognitive/Mentation: A/Ox 3  Neuros/CMS: Intact ex L weak, L droop, L drift, slow and garbled speech,   VS: stable. BP under 180   Tele: A-fib CVR.  GI: BS active, passing flatus, no BM this shift. Incontinent.  : Incontinent. External cath in place  Pulmonary: LS clear.  Pain: denies.   Skin: scattered bruising   Activity: Assist x 2 with rahel steady.  Diet: NPO with PEG running at 65mLhr with G4 100mL flushes. Takes pills crushed in PEG.     Therapies recs: ARU  Discharge: possibly today at 1515 pending auth    Aggression Stoplight Tool: green    End of shift summary: oral cares done.

## 2023-03-16 NOTE — PROGRESS NOTES
ANTICOAGULATION  MANAGEMENT    Stefan Diallo is being discharged from the [facility] ([facility]).    Reason for discharge: Patient discharged to TCU on Eliquis    Anticoagulation episode resolved, ACC referral closed and Standing order discontinued    If patient needs warfarin management in the future, please send a new referral    Otilia Flores RN

## 2023-03-16 NOTE — PHARMACY-CONSULT NOTE
Pharmacy Tube Feeding Consult    Medication reviewed for administration by feeding tube and for potential food/drug interactions.    Recommendation: No changes are needed at this time.     Pharmacy will continue to follow as new medications are ordered.    Kathryn Turner PharmD

## 2023-03-16 NOTE — PROGRESS NOTES
Care Management Discharge Note    Discharge Date: 03/16/2023       Discharge Disposition: Acute Rehab pending insurance authorization    Discharge Services:      Discharge DME:      Discharge Transportation: car, drives self    Private pay costs discussed: transportation costs    PAS Confirmation Code: n/a   Patient/family educated on Medicare website which has current facility and service quality ratings:      Education Provided on the Discharge Plan:  yes  Persons Notified of Discharge Plans:Hospitalist, DEAN.RN,CHRISTY,BB  Patient/Family in Agreement with the Plan: yes    Handoff Referral Completed: Yes    Additional Information:  FELISA spoke with ARU liaison regarding pt who has a bed at  ARU this afternoon and a ride scheduled for 15:15 today.   BC/BS authorization has not been started so FELISA contacting [facility] and giving clinical information per phone to expedite authorization process. Start of care will hopefully be today. Pt is ready for discharge today.  FELISA contacted [facility] and opened case # [id number] for review for [facility] placement. Clinicals provided over the phone and awaiting determination.    STEVE Celis  [facility]  Care Transitions  [phone]

## 2023-03-17 NOTE — PROGRESS NOTES
03/17/23 0804   Appointment Info   Signing Clinician's Name / Credentials (OT) TWIN Pringle   Living Environment   People in Home alone   Current Living Arrangements house   Home Accessibility stairs to enter home   Number of Stairs, Main Entrance 1   Stair Railings, Main Entrance railings safe and in good condition   Number of Stairs, Within Home, Primary greater than 10 stairs   Stair Railings, Within Home, Primary railings safe and in good condition   Transportation Anticipated family or friend will provide   Living Environment Comments Pt lives alone in split-level house (is engaged, live separate), no stairs to enter lower level from garage, no stairs to enter from garage, however flight of stairs ~12 to get to main level of house. Pt can enter from behind the hosue without stairs to main level but unable with snow/ice right now. Lower level walk-in shower with gbs and toilet with gbs, upstairs kitchen. Has a son, daughter, and fiance who live in the [facility] area.   Self-Care   Usual Activity Tolerance good   Current Activity Tolerance poor   Equipment Currently Used at Home none   Fall history within last six months yes   Number of times patient has fallen within last six months 1   Activity/Exercise/Self-Care Comment Previousy IND with all self cares, no AE aside from gbs in bathroomm, has 4WW from previous back pain but was not using at baseline   Instrumental Activities of Daily Living (IADL)   Previous Responsibilities meal prep;housekeeping;laundry;shopping;yardwork;medication management;finances;driving   IADL Comments Pt reports being previously IND with all IADLs   General Information   Onset of Illness/Injury or Date of Surgery 03/07/23   Referring Physician Kenna Ruiz PA-C   Patient/Family Therapy Goal Statement (OT) To eat again   Additional Occupational Profile Info/Pertinent History of Current Problem From H&P: 77 year old male with a past medical history of atrial fibrillation on  coumadin though held PTA for planned procedure (pain pump placement), chronic bilateral low back pain s/p lumbar fusion, type 2 diabetes mellitus c/b polyneuropathy, renal artery aneurysm, hypertension, hyperlipidemia, KISHA, GERD, BPH, and chronic bilateral shoulder pain who was admitted on 3/7/23 with acute right MCA stroke s/p tenecteplase with hospital course complicated by dysphagia s/p PEG placement on 3/9 complicated by PEG-site bleeding, suspected aspiration pneumonia, hyperglycemia, constipation, and hypokalemia.   Existing Precautions/Restrictions fall   Cognitive Status Examination   Orientation Status   (Able to orient x3 with mod verbal cues)   Affect/Mental Status (Cognitive) low arousal/lethargic   Follows Commands increased processing time needed;physical/tactile prompts required;verbal cues/prompting required   Executive Function Deficit unable/difficult to assess   Cognitive Status Comments Pt very sleepy and lethargic throughout initial evaluation   Visual Perception   Visual Impairment/Limitations visual/perceptual impairments present   Visual Processing Deficit dante-inattention/neglect, left   Sensory   Sensory Quick Adds sensation intact   Pain Assessment   Patient Currently in Pain No   Posture   Posture forward head position;protracted shoulders   Range of Motion Comprehensive   General Range of Motion upper extremity range of motion deficits identified   Comment, General Range of Motion OT: BUE ROM limited above 90 degrees, pt reports pre-existing bilateral rotator cuff injury   Coordination   Coordination Comments OT: Mild active tremor in RUE noted throughout evaluation, bradykinesia in LUE   Bed Mobility   Bed Mobility rolling left;rolling right   Rolling Left [place] (Bed Mobility) maximum assist (25% patient effort)   Assistive Device (Bed Mobility) bed rails   Activities of Daily Living   BADL Assessment/Intervention bathing;upper body dressing;lower body dressing;toileting;grooming    Clinical Impression   Criteria for Skilled Therapeutic Interventions Met (OT) Yes, treatment indicated   OT Diagnosis Decreased IND with functional mobility and ADLs.   Influenced by the following impairments Noted to have impaired activity tolerance, impaired balance, impaired coordination, impaired judgement and safety awareness, impaired strength, impaired tone, impaired weight shifting, impulsiveness and pain, all affecting his ability to safely and independently perform basic ADLs.   OT Problem List-Impairments impacting ADL problems related to;activity tolerance impaired;cognition;communication;coordination;flexibility;mobility;strength;range of motion (ROM);vision;balance;eating/swallowing;postural control   Assessment of Occupational Performance 5 or more Performance Deficits   Identified Performance Deficits Decreased IND with functional mobility, total body dressing, bathing, grooming, hygiene, and toileting   Planned Therapy Interventions (OT) ADL retraining;IADL retraining;cognition;neuromuscular re-education;strengthening;ROM;progressive activity/exercise   Clinical Decision Making Complexity (OT) high complexity   Anticipated Equipment Needs Upon Discharge (OT) reacher;shower chair;raised toilet seat   Risk & Benefits of therapy have been explained evaluation/treatment results reviewed;care plan/treatment goals reviewed;risks/benefits reviewed;current/potential barriers reviewed;participants voiced agreement with care plan;participants included;patient   Clinical Impression Comments Initial evaluation significantly limited by pt lethargy and communication deficits   OT Total Evaluation Time   OT Eval, High Complexity Minutes (62501) 15   OT Goals   Therapy Frequency (OT) Daily   OT Predicted Duration/Target Date for Goal Attainment 04/07/23   OT Goals Hygiene/Grooming;Upper Body Dressing;Lower Body Dressing;Upper Body Bathing;Lower Body Bathing;Bed Mobility;Transfers;Toilet Transfer/Toileting   OT:  Hygiene/Grooming supervision/stand-by assist   OT: Upper Body Dressing Supervision/stand-by assist   OT: Lower Body Dressing Supervision/stand-by assist   OT: Upper Body Bathing Supervision/stand-by assist;using adaptive equipment   OT: Lower Body Bathing Minimal assist;using adaptive equipment   OT: Bed Mobility Modified independent   OT: Transfer Minimal assist   OT: Toilet Transfer/Toileting Minimal assist   Interventions   Interventions Quick Adds Self-Care/Home Management;Therapeutic Activity;Neuromuscular Re-education;Therapeutic Procedures/Exercise;Cognitive Retraining   Self-Care/Home Management   Self-Care/Home Mgmt/ADL, Compensatory, Meal Prep Minutes (72816) 45   Treatment Detail/Skilled Intervention OT: Facilitated LB dressing and gh tasks to assess IND w/ADLs. Mod-max verbal and physical cues for bed mobility and supine dressing. See ggs for more detail.   OT Discharge Planning   OT Plan OT: Dependent shower, ggs, toileting equipment/commode?, cog assessment, coordination testing   Total Session Time   Timed Code Treatment Minutes 45   Total Session Time (sum of timed and untimed services) 60   Post Acute Settings Only   What unit is patient on? Acute Rehab   OT - Acute Rehab Center Time   Individual Time (minutes) - enter zero if not applicable - OT 60  (3 ADL, 1 eval)   Group Time (minutes) - enter zero if not applicable  - OT 0   Concurrent Time (minutes) - enter zero if not applicable  - OT 0   Co-Treatment Time (minutes) - enter zero if not applicable  - OT 0   ARC Total Session Time (minutes) - OT 60   Grooming (except oral cares)   Grooming Task Performed Washing face   Grooming Comment OT: Min assist wiping face with wash cloth   Lower Body Dressing (Pants/Undergarments)   Describe performance OT: Max A with supine LB dressing, cues for bridging technique   Clothing Utilized Pants

## 2023-03-17 NOTE — PLAN OF CARE
Goal Outcome Evaluation:    Pt slept through the night. On continuous tube feeding. Primofit on NOC. BG was 232. C/o pain; given PRN Tylenol with some relief. Denied SOB, CP, and n/t. Call light within reach and bed alarms on. Will continue with POC.

## 2023-03-17 NOTE — PROGRESS NOTES
Discharge Planner Post-Acute Rehab PT:     Discharge Plan: HCPT    Precautions: NPO, falls    Current Status:  Bed Mobility: Deacon of 2  Transfer: ModA for STS with pt holding onto PT  Gait: SPT performed with modA  Stairs: NT safety concerns  Balance: Able to sit unsupported briefly, tendency to lean back. Pt unable to stand unsupported.    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 Pt shows signs of decreased sensation, activity tolerance and endurance, sttrength, and some cognitive deficits. Pt was very lethargic during this session and was in and out of sleep. Pt able to perform stand pivot transfer with modA from bed to w/c. Pt would benefit from skilled PT services to improve overall functional mobility and endurance. ELOS 21 days.    Other Barriers to Discharge (DME, Family Training, etc):  - Pt lives alone but has support from fiance, son and daughter  - Full flight of stairs to get to main level from basement entry in garage  - May need w/c  - Family training needs to be set up, fiance present today  - Cog deficits

## 2023-03-17 NOTE — PROGRESS NOTES
"   03/17/23 3590   Appointment Info   Signing Clinician's Name / Credentials (SLP) Barbara Perez MS, CCC-SLP   General Information   Onset of Illness/Injury or Date of Surgery 03/07/23   Referring Physician Kenna Ruiz PA-C   Pertinent History of Current Problem Per H&P: Patient is \"77 year old male with past medical history of atrial fibrillation on coumadin though held PTA for upcoming procedure (pain pump placement), chronic bilateral low back pain s/p lumbar fusion, type 2 diabetes mellitus c/b polyneuropathy, renal artery aneurysm, hypertension, hyperlipidemia, KISHA, GERD, BPH, and chronic bilateral shoulder pain who presented to [facility] on 3/7/23 with left-sided weakness, left facial droop, and dysarthria with CT head revealing increased attenuation within right superior M2 brach and CTA head with right superior M2 branch oclusion, right MCA bifurcation aneurysm, and possible subocclusive thrombus at the R MCA bifurcation.  Patient was administered TNK.  Patient was transferred to [facility] for consideration of endovascular treatment, but upon arrival was noted to have significant improvement in symptoms and shared decision not to pursue.  Repeat imaging including NCCT, CTA, & CTP were performed; CTP showed small amount of core:penumbra mismatch (overcalled volume as regions of L hemisphere & posterior circulation showed as hypoperfused and did not correlate with R M2 occlusion), persistent M2 clot.        Additional stroke evaluation with EKG with afib, echo with EF of 60% with normal right ventricular function, mild to moderate aortic stenosis; LDL 64.      Patient developed worsening left-sided weakness overnight 3/7-3/8 despite adequate blood pressures.  MRI showed completed L M2 divisions stroke consistent with his known occlusion.  Repeat CT head, CTA, CTP with no new occlusion and no new region of perfusion deficit.     On 3/11, patient developed sepsis felt to be due to aspiration " "pneumonia.  He was treated with vancomycin (3/11-3/13), unasyn (3/11-3/15), then Augmentin to complete 7 day course.  Remains stable on room air and afebrile at discharge to ARU.     Hospital course was further complicated by moderate to severe dysphagia now s/p PEG placement on 3/9/23 with IR c/b bleeding from PEG site, hypokalemia, hyperglycemia, constipation, and loose stools.\" SLP consult received for dysphagia evaluation and treatment, additional evaluations such as speech/language as indicated.   General Observations Pt arrived to ARU with orders for NPO, all nutrition/hydration/medication via PEG. Pt followed by acute SLP for dysphagia during hospitalization, please see dysphagia history for details.   Type of Evaluation   Type of Evaluation Swallow Evaluation   Oral Motor   Oral Musculature generally intact   Mucosal Quality sticky   Dentition (Oral Motor)   Dentition (Oral Motor) dental appliance/dentures   Dental Appliance/Denture (Oral Motor) upper and lower   Facial Symmetry (Oral Motor)   Facial Symmetry (Oral Motor) WNL   Lip Function (Oral Motor)   Lip Range of Motion (Oral Motor) WNL   Tongue Function (Oral Motor)   Tongue Coordination/Speed (Oral Motor) reduced rate   Jaw Function (Oral Motor)   Jaw Function (Oral Motor) WNL   Cough/Swallow/Gag Reflex (Oral Motor)   Volitional Throat Clear/Cough (Oral Motor) reduced strength   General Swallowing Observations   Past History of Dysphagia No history of dysphagia before CVA. VFSS completed 03/09, findings- \"Moderate-severe oral-pharyngeal dysphagia was found during today's study.  Overall fatigue and decreased oral and swallow awareness impacted pt's ability to swallow during the study.  Pt also has mod-severe oral motor deficits L>R, delayed swallows, piecemeal swallows, and poor epiglottic inversion, hyoid elevation, and base of tongue function.  Deficits resulted in penetration with residual of po attempted and moderate pharyngeal residue that did " "not clear with additional attempts to swallow.  Pt had no awareness of twisted lower denture or oral residue on the L after the study.  Recommend NPO status except for 1-2 ice chips for comfort with nursing supervision and cues to swallow-cough-swallow as able.  Hold ice chips if respiratory status declines.  Plan to continue Tx to complete exercises and trial comfort po as indicated.  A repeat VFSS or FEES is recommended prior to a po diet and may be appropriate in approx 1-2 weeks if progress noted in Tx.  Recommend consideration of alternative nutrition/hydration placement.\"  At time of acute discharge, SLP recommendations for- NPO, 5-10 single ice chips with 1:1 RN or SLP supervision for comfort and functional swallow practice. Encourage frequent oral cares.   Current Diet/Method of Nutritional Intake (General Swallowing Observations, NIS) NPO  (5-10 ice chips with RN or SLP only)   Swallowing Evaluation Clinical swallow evaluation   Clinical Swallow Evaluation   Clinical Swallow Evaluation Textures Trialed thin liquids   Clinical Swallow Eval: Thin Liquid Texture Trial   Mode of Presentation, Thin Liquids spoon;fed by clinician  (small, pea-sized ice chips only. No liquid thin given during evaluation.)   Oral Phase of Swallow WFL   Pharyngeal Phase of Swallow wet vocal quality after swallow   Diagnostic Statement Pt with increase s/s of possible airway penetration/aspiration as ice chip intake continued even with increased time between bolus administrations. Pt required max cues to perform volitional throat clear/cough to resolve wet vocal quality.   Swallowing Recommendations   Diet Consistency Recommendations NPO   Medication Administration Recommendations, Swallowing (SLP) via PEG   Instrumental Assessment Recommendations VFSS (videofluoroscopic swallowing study)   Comment, Swallowing Recommendations Repeat VFSS in 1-2 weeks pending progress at bedside   General Therapy Interventions   Planned Therapy " Interventions Dysphagia Treatment   Dysphagia treatment Oropharyngeal exercise training;Modified diet education;Instruction of safe swallow strategies;Compensatory strategies for swallowing   Clinical Impression   Criteria for Skilled Therapeutic Interventions Met (SLP Eval) Yes, treatment indicated   SLP Diagnosis Moderate oral, severe oropharyngeal dysphagia   Problem List (SLP) Lethargy/alertness impact safety/participation   Functional Limitations Related to Problem List (SLP) Alternative hydration/nutrition   Risks & Benefits of therapy have been explained evaluation/treatment results reviewed;care plan/treatment goals reviewed;risks/benefits reviewed;current/potential barriers reviewed;participants voiced agreement with care plan;participants included;patient;spouse/significant other   Clinical Impression Comments SLP: Clinical beside swallow evaluation completed with minimal PO trials. Dried scale-like secretions on tongue, thick tacky secretion stuck to uvula when oral mech exam performed. Dentures removed and soaked in , extensive oral care completed by SLP to remove dried secretions, ensure oral cavity clean prior to ice chip trials for evaluation. Dentures placed prior to ice chips. SLP modified rate and bolus size, wet vocal quality perceived after ice chips 8 and 9, max cues for pt to perform throat clear/cough and resolve. Next ice chip given fter ~3 minute break, wet vocal quality again perceived requiring cues to clear throat and cough. Pt and S.O. edu in importance of limited ice chip intake, decrease swallow activity endurance suspected. Pt goading SLP for addional ice chips during edu and remainder of time. Pt and W.O. edu in need for repat swallow study before advancing PO inake, fatigue is barrier to safety and participation, poor insight into swallow deficits impact safety. NPO, 5-10 ice chips with RN or SLP after oral care only at this time. Skilled SLP services indicated to assess and  determine safest level of oral intake, train in strategies and exercise to promote swallow safety and function.   SLP Total Evaluation Time   Eval: oral/pharyngeal swallow function, clinical swallow Minutes (77183) 60   SLP Goals   Therapy Frequency (SLP Eval) daily   SLP Predicted Duration/Target Date for Goal Attainment 04/07/23   SLP Goals Swallow   SLP Discharge Planning   SLP Plan Train additional oropharyngeal swallow exercises, f/u CTAR with pink foam block. Small ice chip trials, supraglottic swallow; train sig other in FWP with ice chips very motivated to be able to giv ept when she is present. Speech/lang/cog eval, pull to note when pt fully alert and participative.   Total Session Time   Total Session Time (sum of timed and untimed services) 60   SLP - Acute Rehab Center Time   Individual Time (minutes) - enter zero if not applicable - SLP 60   Group Time (minutes) - enter zero if not applicable  - SLP 0   Concurrent Time (minutes) - enter zero if not applicable  - SLP 0   Co-Treatment Time (minutes) - enter zero if not applicable  - SLP 0   ARC Total Session Time (minutes) - SLP 60   Eating   Describe performance SLP: NPO, limited small (pea-sized) ice chips with SLP or RN at this time

## 2023-03-17 NOTE — PLAN OF CARE
Goal Outcome Evaluation:       Pt started on continuous tube feeding at 1730H with Jevity 1.5 at 65ml/hr with water flushes of 100ml every 4 hours. Tolerated feeding well. Denies pain throughout the shift.

## 2023-03-17 NOTE — PLAN OF CARE
Goal Outcome Evaluation:      Orientation; Patient is alert and oriented to self and family with some confusion. Was admitted from [facility] r/t side weakness d/t Acute ischemic MCA DM2, and HTN.  Ambulation/Transfer; Assist of x2 with rahel lawrence.  Bladder; Incontinent,primo fit was provided.  Bowel; Incontinent   Pain;  Diet ; NPO, has a continues feeding tube    Meds; Via feeding tube  Tubes/lines and Drains; None   Skin; See admission note package  Others;

## 2023-03-17 NOTE — PROGRESS NOTES
03/17/23 1100   Appointment Info   Signing Clinician's Name / Credentials (PT) Gigi Booth, SPT   Rehab Comments (PT) -30, pt very lethargic throughout session and difficult to maintain focus due to being tired and sleeping throughout, NPO with tube feeding   Living Environment   People in Home alone   Current Living Arrangements house   Home Accessibility stairs to enter home;stairs within home   Number of Stairs, Main Entrance 1   Stair Railings, Main Entrance railing on right side (ascending)   Number of Stairs, Within Home, Primary greater than 10 stairs   Stair Railings, Within Home, Primary railing on right side (ascending)   Transportation Anticipated family or friend will provide   Living Environment Comments PT: Split level home, no stairs to enter from garage, however flight of stairs ~12 to get to main level of house. Pt can enter from behind the hosue without stairs to main level but unable with snow/ice right now. Does not live with fiance but her and daughter/son live in [facility] area   Self-Care   Usual Activity Tolerance good   Current Activity Tolerance poor   Equipment Currently Used at Home none   Fall history within last six months yes   Number of times patient has fallen within last six months 1   Activity/Exercise/Self-Care Comment Pt has 4WW from previous back pain, does not use at baseline   Post-Acute Assessment Only   Post-Acute Functional Assessment See below   General Information   Onset of Illness/Injury or Date of Surgery 03/07/23   Referring Physician Liu Stinson MD   Patient/Family Therapy Goals Statement (PT) Improve independence with functional mobility and return home   Pertinent History of Current Problem (include personal factors and/or comorbidities that impact the POC) R superior M2 branch occlusion, right MCA bifurcation anerusym, hx of chronic bilateral low back pain/shoulder pain, DMII   Existing Precautions/Restrictions NPO   Cognition   Follows Commands  (Cognition) follows one-step commands   Cognitive Status Comments Pt showed signs of cognitive deficits throughout eval, difficult to determine if this was due to lethargic state or pt inability to follow along with commands or questions   Pain Assessment   Patient Currently in Pain No   Integumentary/Edema   Integumentary/Edema other (describe)   Integumentary/Edema Comments Pt showed darkening of bilateral lower legs that appeared dark purple/bruising consistently throughout entire lower leg. 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   Balance   Balance other (describe)   Balance Comments PT: Pt able to sit unsupported for short period of time, dynamic balance not assessed due to lethargy. Pt unable to stand unsupported at this time.   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)   Coordination   Coordination Comments No observed coordination deficits   Muscle Tone   Muscle Tone other (see comments)   Muscle Tone Comments LLE tone with knee flexion and extension 1+   Clinical Impression   Criteria for Skilled Therapeutic Intervention Yes, treatment indicated   PT Diagnosis (PT) Sensory detection deficit,  cognitive deficit   Influenced by the following impairments Decreased sensation, activity tolerance, endurance, strength, cog deficits   Functional limitations due to impairments Bed mobility, transfers, gait, stairs, car transfer   Clinical Presentation (PT Evaluation Complexity) Unstable/Unpredictable   Clinical Presentation Rationale Pt tube feeding, very lethargic, multiple comorbidities   Clinical Decision Making (Complexity) high complexity   Planned Therapy Interventions (PT) balance training;bed mobility training;gait training;home exercise program;motor coordination training;neuromuscular re-education;patient/family education;ROM (range of motion);stair training;strengthening;stretching;transfer training;progressive activity/exercise;risk factor education;home program guidelines   Anticipated Equipment Needs at Discharge (PT) wheelchair  (Owns 4WW)   Risk & Benefits of therapy have been explained evaluation/treatment results reviewed;care plan/treatment goals reviewed;risks/benefits reviewed;current/potential barriers reviewed;participants voiced agreement with care plan;participants included;patient;spouse/significant other   Clinical Impression Comments Pt experienced a 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 Pt shows signs of decreased sensation, activity tolerance and endurance, strength, and some cognitive deficits. Pt was very lethargic during this session and was in and out of sleep. Pt able to perform stand pivot transfer with modA from bed to w/c. Pt would benefit from skilled PT services to improve overall functional mobility and endurance. ELOS 21 days.   PT Total Evaluation Time   PT Eval, High Complexity Minutes (85206) 30   Physical Therapy Goals   PT Frequency 2x/day   PT Predicted Duration/Target Date for Goal Attainment 04/07/23   PT Goals Bed Mobility;Transfers;Gait;Stairs;PT Goal 1;PT Goal 2   PT: Bed Mobility Modified independent;Supine to/from sit    PT: Transfers Supervision/stand-by assist;Sit to/from stand;Assistive device   PT: Gait Supervision/stand-by assist;100 feet;Rolling walker   PT: Stairs Minimal assist;Greater than 10 stairs;Rail on right   PT: Goal 1 SBA with car transfer   PT: Goal 2 Pt will verbalize 3 strategies to decresae risk of falls   Therapeutic Activity   Therapeutic Activities: dynamic activities to improve functional performance Minutes (88798) 15   PT Discharge Planning   PT Plan PT: Sitting EOB unsupported progressing to standing with Bri Stedy and transfers as possible. If awake and alert, trial GG items and perform SIDHU   Total Session Time   Timed Code Treatment Minutes 15   Total Session Time (sum of timed and untimed services) 45   PT - Acute Rehab Center Time   Individual Time (minutes) - enter zero if not applicable - PT 45  (30 Eval, 15 TA)   Group Time (minutes) - enter zero if not applicable  - PT 0   Concurrent Time (minutes) - enter zero if not applicable  - PT 0   Co-Treatment Time (minutes) - enter zero if not applicable  - PT 0   ARC Total Session Time (minutes) - PT 45   Eating   Completely independent with self-feeding no   Physical assistance to feed self-complete meal Completely dependent for self-feeding, patient does none of effort, or assist of 2 helpers   Describe performance Enteral feeding.   Sit to Lying   Physical Assistance Level Total assistance   Comment Deacon of 2   Sit to Lying CARE Score 1   Lying to Sitting on Side of Bed   Physical Assistance Level Total assistance   Comment Deacon of 2   Lying to Sitting CARE Score 1   Sit to Stand   Physical Assistance Level Total assistance   Comment ModA of 1 with another person guarding and helping with trunk   Sitting to Standing CARE Score 1   Locomotion   Locomotion Comment PT: unable to walk today d/t lethargy. PT provided TOTAL A for w/c management today.   Walk 10 Feet   Reason if not Attempted Safety concerns   Walk 10 Ft. CARE Score 88   Walk 50 Feet  with Two Turns   Reason if not Attempted Safety concerns   Walk 50 Ft. CARE Score 88   Walk 150 Feet   Reason if not Attempted Safety concerns   Walk 150 Ft. CARE Score 88   Walking 10 Feet on Uneven Surfaces   Reason if not Attempted Safety concerns   Walking 10 Feet on Uneven Surfaces CARE Score 88   Wheel 50 Feet with Two Turns   Physical Assistance Level Total assistance   Wheel 50 Feet with Two Turns CARE Score 1   Wheel 150 Feet   Physical Assistance Level Total assistance   Wheel 150 Feet CARE Score 1   Stairs   Stairs Comment Safety concerns   1 Step (Curb)   Reason if not Attempted Safety concerns   1 Step CARE Score 88   4 Steps   Reason if not Attempted Safety concerns   4 Steps CARE Score 88   12 Steps   Reason if not Attempted Safety concerns   12 Steps CARE Score 88   Picking Up Object   Reason if not Attempted Safety concerns    CARE Score 88   Car Transfer   Reason if not Attempted Safety concerns   Car Transfer CARE Score 88

## 2023-03-17 NOTE — DISCHARGE INSTRUCTIONS
[place] [facility] [place] [facility]  Additional resources and contact information online   Www.strokemn.org & www.braininjurymn.org  Types of services that Stroke/Brain Injury Resource Facilitation offers include:  Emotional support in coping with a  new normal   Finding mental health support and counselors who understand brain injury and stroke  Finding a support group  Finding or re-connecting to rehabilitation services  Finding where and how to get a brain injury assessed  Finding or re-connecting with a primary care physician  Supporting caregivers by connecting them with resources  Education about diagnosis and symptoms -  Is this normal   Helping educate family and loved ones of the survivor s  invisible  symptoms  Figuring out the logistics of returning to work, vocational rehab and understanding ADA rights  If not going back to work, support in finding meaningful ways to structure your day and exploring volunteer options  Coaching on navigation the federal, state and [facility] health and disability service system with additional support and conference calls as needed  Help finding appropriate legal support for appealing and navigating Social Security Disability, providing advocacy on the individual s behalf as needed and connecting with cost effective alternatives to  as needed  Supporting parents/students with how to discuss return to school and sports with educators and connecting to a TBI specialist or parent advocate group if needed  Connecting to addiction (alcohol/drug/gambling/smoking) support and treatment services  Support with creative problem solving to life barriers.  *These are just a few examples of common things that Resource Facilitation can help with. They are not limited to what is listed here so if you are curious if they can help, just ask.   Call us at [phone] or [phone].

## 2023-03-17 NOTE — PLAN OF CARE
Discharge Planner Post-Acute Rehab SLP:     Discharge Plan: home with sig other 24/7, ongoing SP    Precautions: PEG, aspiration risk    Current Status:  Hearing: WFL  Vision: WFL  Communication: Decreased vocal intensity  Cognition: To be evaluated pending improved alertness/participation  Swallow: NPO, 5-10 ice chips with RN or SLP after oral care only at this time    Assessment: Clinical beside swallow evaluation completed with minimal PO trials. Dried scale-like secretions on tongue, thick tacky secretion stuck to uvula when oral mech exam performed. Dentures removed and soaked in , extensive oral care completed by SLP to remove dried secretions, ensure oral cavity clean prior to ice chip trials for evaluation. Dentures placed prior to ice chips. SLP modified rate and bolus size, wet vocal quality perceived after ice chips 8 and 9, max cues for pt to perform throat clear/cough and resolve. Next ice chip given fter ~3 minute break, wet vocal quality again perceived requiring cues to clear throat and cough. Pt and S.O. edu in importance of limited ice chip intake, decrease swallow activity endurance suspected. Pt goading SLP for addional ice chips during edu and remainder of time. Pt and W.O. edu in need for repat swallow study before advancing PO inake, fatigue is barrier to safety and participation, poor insight into swallow deficits impact safety. NPO, 5-10 ice chips with RN or SLP after oral care only at this time. Skilled SLP services indicated to assess and determine safest level of oral intake, train in strategies and exercise to promote swallow safety and function.    Other Barriers to Discharge (Family Training, etc): diet texture training pending progress

## 2023-03-17 NOTE — PHARMACY-MEDICATION REGIMEN REVIEW
Pharmacy Medication Regimen Review  Stefan Diallo is a 77 year old male who is currently in the Acute Rehab Unit.    Assessment: All medications have an appropriate indications, durations and no unnecessary use was found.  Last LDL=64 on 3/8/23    Plan:   Continue current treatment.  Please consider restarting home metformin, lisinopril and atenolol if appropriate.   Attending provider will be sent this note for review.  If there are any emergent issues noted above, pharmacist will contact provider directly by phone.      Pharmacy will periodically review the resident's medication regimen for any PRN medications not administered in > 72 hours and discontinue them. The pharmacist will discuss gradual dose reductions of psychopharmacologic medications with interdisciplinary team on a regular basis.    Please contact pharmacy if the above does not answer specific medication questions/concerns.    Background:  A pharmacist has reviewed all medications and pertinent medical history today.  Medications were reviewed for appropriate use and any irregularities found are listed with recommendations.      Current Facility-Administered Medications:      acetaminophen (TYLENOL) solution 650 mg, 650 mg, Per NG tube, Q4H PRN, Kenna Ruiz PA-C, 650 mg at 03/17/23 0131     amoxicillin-clavulanate (AUGMENTIN) 400-57 MG/5ML suspension 875 mg, 875 mg, Per Feeding Tube, BID, Kenna Ruiz PA-C, 875 mg at 03/17/23 0844     apixaban ANTICOAGULANT (ELIQUIS) tablet 5 mg, 5 mg, Per Feeding Tube, BID, Kenna Ruiz PA-C, 5 mg at 03/17/23 0842     atorvastatin (LIPITOR) tablet 20 mg, 20 mg, Per Feeding Tube, At Bedtime, Kenna Ruiz PA-C, 20 mg at 03/16/23 2220     dextrose 10% infusion, , Intravenous, Continuous PRN, Kenna Ruiz PA-C     glucose gel 15-30 g, 15-30 g, Oral, Q15 Min PRN **OR** dextrose 50 % injection 25-50 mL, 25-50 mL, Intravenous, Q15 Min PRN **OR** glucagon injection 1 mg, 1 mg,  Subcutaneous, Q15 Min PRN, Kenna Ruiz PA-C     diltiazem (CARDIZEM) tablet 60 mg, 60 mg, Per Feeding Tube, Q8H, Kenna Ruiz PA-C, 60 mg at 03/17/23 0846     gabapentin (NEURONTIN) solution 600 mg, 600 mg, Per Feeding Tube, At Bedtime, Kenna Ruiz PA-C, 600 mg at 03/16/23 2220     insulin aspart (NovoLOG) injection (RAPID ACTING), 1-7 Units, Subcutaneous, TID AC, Kenna Ruiz PA-C, 2 Units at 03/17/23 0845     insulin aspart (NovoLOG) injection (RAPID ACTING), 1-5 Units, Subcutaneous, At Bedtime, Kenna Ruiz PA-C     insulin glargine (LANTUS PEN) injection 4 Units, 4 Units, Subcutaneous, QAM AC, Kenna Ruiz PA-C, 4 Units at 03/17/23 0845     insulin glargine (LANTUS PEN) injection 4 Units, 4 Units, Subcutaneous, At Bedtime, Kenna Ruiz PA-C     pantoprazole (PROTONIX) 2 mg/mL suspension 40 mg, 40 mg, Per Feeding Tube, QAM AC, Kenna Ruiz PA-C, 40 mg at 03/17/23 0602     Patient is already receiving anticoagulation with heparin, enoxaparin (LOVENOX), warfarin (COUMADIN)  or other anticoagulant medication, , Does not apply, Continuous PRN, Kenna Ruiz PA-C     polyethylene glycol (MIRALAX) Packet 17 g, 17 g, Per Feeding Tube, Daily PRN, Kenna Ruiz PA-C     senna-docusate (SENOKOT-S/PERICOLACE) 8.6-50 MG per tablet 2 tablet, 2 tablet, Per Feeding Tube, BID PRN, Kenna Ruiz PA-C  No current outpatient prescriptions on file.   PMH: Acute R MCA stroke s/p tenecteplase 3/7, likely secondary to cardioembolism from atrial fibrillation off anticoagulation for an anticipated procedure, Moderate-severe oral-pharyngeal dysphagia s/p PEG placement 3/9, Possible early sepsis due to aspiration pneumonia, Atrial fibrillation chronic bilateral low back pain s/p lumbar fusion, type 2 diabetes mellitus c/b polyneuropathy, renal artery aneurysm, hypertension, hyperlipidemia, KISHA, GERD, BPH, constipation and chronic bilateral shoulder pain

## 2023-03-17 NOTE — PROGRESS NOTES
"  [facility]   Acute Rehabilitation Unit  Daily progress note    INTERVAL HISTORY  Stefan Diallo was seen and examined at bedside this morning working with OT.  No acute events reported overnight.  Patient complains of itching on his bed.  States he did not have a very good night of sleep.  He denies pain, dizziness, nausea, shortness of breath, cough.  Discussed plans for adjustments to tube feeding.    Functionally, therapy evals underway today.    MEDICATIONS    amoxicillin-clavulanate  875 mg Per Feeding Tube BID     apixaban ANTICOAGULANT  5 mg Per Feeding Tube BID     atorvastatin  20 mg Per Feeding Tube At Bedtime     diltiazem  60 mg Per Feeding Tube Q8H     gabapentin  600 mg Per Feeding Tube At Bedtime     insulin aspart  1-7 Units Subcutaneous TID AC     insulin aspart  1-5 Units Subcutaneous At Bedtime     insulin glargine  4 Units Subcutaneous QAM AC     insulin glargine  4 Units Subcutaneous At Bedtime     pantoprazole  40 mg Per Feeding Tube QAM AC        acetaminophen, dextrose, glucose **OR** dextrose **OR** glucagon, - MEDICATION INSTRUCTIONS -, polyethylene glycol, senna-docusate     PHYSICAL EXAM  BP (!) 156/88 (BP Location: Right arm, Patient Position: Semi-Romano's, Cuff Size: Adult Regular)   Pulse 80   Temp (!) 96.3  F (35.7  C) (Oral)   Resp 16   Ht 1.854 m (6' 1\")   Wt 93.8 kg (206 lb 12.7 oz)   SpO2 93%   BMI 27.28 kg/m    Gen: NAD, lying in bed  HEENT: NC/AT, MMM  Cardio: irregularly irregular, no murmurs  Pulm: non-labored on room air, lungs CTA bilaterally  Abd: soft, non-tender, non-distended, bowel sounds present  Ext: no edema in bilateral lower extremities  Neuro/MSK: sleepy but provides brief verbal responses, left facial droop, +dysarthria and aphasia, decreased sensation on LLE>LUE, left hemiparesis  Skin: no rash on back    LABS  CBC RESULTS: Recent Labs   Lab Test 03/15/23  0845 03/14/23  0744 03/13/23  0543   WBC 8.2 7.5 8.1   RBC " 4.11* 4.03* 3.96*   HGB 12.7* 12.4* 12.4*   HCT 38.9* 37.8* 36.7*   MCV 95 94 93   MCH 30.9 30.8 31.3   MCHC 32.6 32.8 33.8   RDW 13.3 13.2 13.3    294 256     Last Basic Metabolic Panel:  Recent Labs   Lab Test 03/17/23 0220 03/16/23 2204 03/16/23  1852 03/15/23  1127 03/15/23  0845 03/14/23  0754 03/14/23  0744 03/13/23  0734 03/13/23  0543   NA  --   --   --   --  142  --  141  --  138   POTASSIUM  --   --   --   --  4.1  --  4.0  --  3.8   CHLORIDE  --   --   --   --  105  --  104  --  102   CO2  --   --   --   --  28  --  28  --  26   ANIONGAP  --   --   --   --  9  --  9  --  10   * 197* 174*   < > 265*   < > 240*   < > 252*   BUN  --   --   --   --  24.5*  --  19.3  --  20.8   CR  --   --   --   --  0.84  --  0.77  --  0.75   GFRESTIMATED  --   --   --   --  89  --  >90  --  >90   LUTHER  --   --   --   --  9.5  --  9.4  --  9.4    < > = values in this interval not displayed.     Recent Labs   Lab 03/17/23 0220 03/16/23 2204 03/16/23  1852 03/16/23  1726 03/16/23  1331 03/16/23  0846   * 197* 174* 156* 286* 227*       Rehabilitation - continue comprehensive acute inpatient rehabilitation program with multidisciplinary approach including therapies, rehab nursing, and physiatry following. See interval history for updates.      ASSESSMENT AND PLAN  Stefan Diallo is a 77 year old male with a past medical history of atrial fibrillation on coumadin though held PTA for planned procedure (pain pump placement), chronic bilateral low back pain s/p lumbar fusion, type 2 diabetes mellitus c/b polyneuropathy, renal artery aneurysm, hypertension, hyperlipidemia, KISHA, GERD, BPH, and chronic bilateral shoulder pain who was admitted on 3/7/23 with acute right MCA stroke s/p tenecteplase with hospital course complicated by dysphagia s/p PEG placement on 3/9 complicated by PEG-site bleeding, suspected aspiration pneumonia, hyperglycemia, constipation, and hypokalemia.  He is now admitted to ARU on 3/16/23  for multidisciplinary rehabilitation and ongoing medical management.        Admission to acute inpatient rehab 03/16/23.    Impairment group code: Stroke Ischemic 01.1 (L) Body Involvement (R) Brain; s/p acute ischemic stroke of R MCA territory due to cardioembolism status post tenecteplase        1. PT, OT and SLP 60 minutes of each on a daily basis, in addition to rehab nursing and close management of physiatrist.       2. Impairment of ADL's: Noted to have impaired activity tolerance, impaired balance, impaired coordination, impaired judgement and safety awareness, impaired strength, impaired tone, impaired weight shifting, impulsiveness and pain, all affecting his ability to safely and independently perform basic ADLs.  Goal for assist of 1 with basic ADLs.     3. Impairment of mobility:  Noted to have impaired activity tolerance, impaired balance, impaired coordination, impaired judgement and safety awareness, impaired strength, impaired tone, impaired weight shifting, impulsiveness and pain, all affecting his ability to safely and independently perform basic mobility.  Goal for assist of 1 with basic mobility.     4. Impairment of cognition/language/swallow:  Noted to have dysarthria, aphasia, and impaired cognition with goals for improved cognitive-linguistic skills to meet basic needs.     5. Medical Conditions  New actions/orders/updates for today are in blue.       Acute R MCA stroke s/p tenecteplase 3/7, likely secondary to cardioembolism from atrial fibrillation off anticoagulation for an anticipated procedure  Initially on aspirin, later restarted on anticoagulation but with Eliquis in place of PTA warfarin.  - Continue apixaban 5 mg BID  - Continue PTA statin.  Goal LDL 40-70.  - Long term BP goal <135/80 to be achieved as outpatient within several weeks.  Management as below.  - Continue PT/OT/SLP  - Follow up with general neurology in 6-8 weeks     Possible early sepsis due to aspiration  pneumonia  Started on unasyn + vanco 3/11.  MRSA nares negative.  BC's still negative at discharge.  Stopped vancomycin 3/13.  Changed to FT augmentin 3/15 with a plan for 7 total days abx, plan stop on 3/18/23.  - Continue Augmentin to complete 7-day antibiotic course (stop 3/18)     Atrial fibrillation  [PTA on warfarin, diltiazem]  On chronic anticoagulation with warfarin.  However, had been held since 2/25 for planned pain pump placement. PTA long-acting extended release diltiazem cannot be given through the PEG tube, substituted short-acting.  Started on DOAC in place of PTA warfarin on 3/14.  - Continue Eliquis 5 mg BID  - Continue short acting diltiazem 60 mg TID while NPO  - Continue to monitor     Dyslipidemia  [PTA meds: atorvastatin 20 mg daily, gemfibrozil 600 mg BID]  - Continue PTA statin       Hypertension  [PTA meds: atenolol 25 mg daily, diltiazem  mg daily, lisinopril 5 mg daily]  Resuming PTA meds gradually. PTA diltiazem increased to 60 mg TID on 3/14/23.   - Long term BP <130/80, inpatient meds can be slowly titrated to this goal as otherwise appropriate  - Continue diltiazem 60 mg q8h  - BP slightly elevated this morning but improved following meds, continue to monitor.  - Resume PTA lisinopril, atenolol as indicated     Type II DM  Polyneuropathy  [PTA meds: metformin 500 mg qAM + 1000 mg qPM, gabapentin 600 mg at HS]  A1c 6.4% on 2/1/23.  PTA metformin held this admission.  Managed on small doses of Lantus and sliding scale insulin.  Recent Labs   Lab 03/17/23  0220 03/16/23  2204 03/16/23  1852 03/16/23  1726 03/16/23  1331 03/16/23  0846   * 197* 174* 156* 286* 227*     - Continue home gabapentin  - Resume metformin 500 mg BID and hold Lantus.  If changing to bolus tube feeding, will need to monitor for BG spikes, can add short-acting insulin to cover if needed.  - Continue Novolog medium intensity sliding scale insulin  - Monitor BG TID AC + HS + 0200  - Hypoglycemia  protocol     KISHA  -  Continue CPAP per home setting     Chronic back pain  Patient planned for pain pump placement 3/8/23 (Javi).  States he has chronic back pain from nerve impingement and uses tylenol PTA for pain.   - Continue Tylenol prn for pain  - Monitor     Moderate-severe oral-pharyngeal dysphagia  S/p PEG placement 3/9  - Continuous tube feeds per PEG  - RD following, assistance appreciated  - NPO with ice chips per SLP recs with supervision  - Discussed with RD, given uptrending BUN and appears slightly dry on exam, will increase free water to 120 mL q4h (from 100 mL q4h).  Would like to consider potential changes to cycled vs bolus regimen to allow time off pump for therapies.  Agreed to await SLP evaluation to assess likelihood for any PO diet in near future.  As above, will need to adjust insulin regimen with any tube feed changes and potentially involve endo if needed.  - Continue SLP  - T tacs still in place at ARU admission, plan for remove 10-14 days following PEG placement (~3/19-3/23)     Constipation, improved  - Continue senokot-S 2 tabs BID  - Monitor, suspect more recently with some loose stools in setting of tube feeds and may need to stop bowel meds     GERD  - Continue pantoprazole as auto-sub for PTA on omeprazole 20 mg daily       6. Adjustment to disability:  Clinical psychology to eval and treat if indicated  7. FEN: NPO on continuous tube feeds via PEG  8. Bowel: incontinent, monitor, on scheduled senokot-S  9. Bladder: incontinent, PVRs at admission not yet completed  10. DVT Prophylaxis: apixaban  11. GI Prophylaxis: PPI  12. Code: full  13. Disposition: goal for home  14. ELOS:  24 days pending therapy evals today  15. Follow up Appointments on Discharge: PCP in 1-2 weeks, general neurology in 6-8 weeks        Patient was discussed with Dr. Liu Stinson, PM&R Staff Physician    Kenna Ruiz PA-C  Physical Medicine & Rehabilitation

## 2023-03-17 NOTE — PLAN OF CARE
"Occupational Therapy Discharge Summary    Reason for therapy discharge:    Discharged to acute rehabilitation facility.    Progress towards therapy goal(s). See goals on Care Plan in [facility] electronic health record for goal details.  Goals met    Therapy recommendation(s):    Continued therapy is recommended.  Rationale/Recommendations:  \"Pt with significant stroke impairments, now below baseline. good indp PLOF at home. recommend ARU at discharge to increase function for ADL and IADL. Pt. walked 5 ft to chair with FWW and max A x 2.\".               "

## 2023-03-17 NOTE — PHARMACY-ADMISSION MEDICATION HISTORY
Medication history was completed on 3/7/23 while patient was at [facility]. Please see pharmacy note in a different encounter for details.

## 2023-03-17 NOTE — PROGRESS NOTES
CLINICAL NUTRITION SERVICES - ASSESSMENT NOTE     Nutrition Prescription    RECOMMENDATIONS FOR MDs/PROVIDERS TO ORDER:  None at this time    Malnutrition Status:    Patient does not meet two of the established criteria necessary for diagnosing malnutrition but is at risk for malnutrition    Recommendations already ordered by Registered Dietitian (RD):  Jevity 1.5 Kip @ goal of 85 ml/hr x 18 hours from 2 pm - 8 am (1530 ml/day) will provide: 2295 kcals (27 kcal/kg), 98 g PRO (1.1 g/kg), 1163 ml free H20, 330 g CHO, and 32 g fiber daily.    FWF: 180 mL QID at 0800, 1100, 1400, 1800    Total fluid (TF + flushes) = 1883 mL/day    Future/Additional Recommendations:  Monitor TF tolerance, diet advancement per SLP, weight trends, and labs     REASON FOR ASSESSMENT  Stefan Diallo is a/an 77 year old male assessed by the dietitian for Provider Order - Registered Dietitian to Assess and Order TF per Medical Nutrition Therapy Protocol    PMH  Past medical history of atrial fibrillation on coumadin though held PTA for planned procedure (pain pump placement), chronic bilateral low back pain s/p lumbar fusion, type 2 diabetes mellitus c/b polyneuropathy, renal artery aneurysm, hypertension, hyperlipidemia, KISHA, GERD, BPH, and chronic bilateral shoulder pain who was admitted on 3/7/23 with acute right MCA stroke s/p tenecteplase with hospital course complicated by dysphagia s/p PEG placement on 3/9 complicated by PEG-site bleeding, suspected aspiration pneumonia, hyperglycemia, constipation, and hypokalemia.     NUTRITION HISTORY  Met with pt and significant other in room. Pt has been tolerating TF well. He wanted ice chips during visit. He has been feeling thirsty. RD discussed increasing free water flushes to better meet hydration needs. Pt and significant other had no questions or concerns at this time.     CURRENT NUTRITION ORDERS  Diet: NPO    TF: Jevity 1.5 Kip @ goal of  65ml/hr (1560ml/day) will provide: 2340 kcals (25  "kcal/kg), 99 g PRO (1.1 g/kg), 1185 ml free H20, 336 g CHO, and 32 g fiber daily.    FWF: 100 mL Q4 hours    Total fluid (TF + flushes) = 1785 mL    LABS  Labs reviewed  BUN: 24.5 (H)  Cr: 0.84 (WNL)  Hemoglobin A1C: 6.4 (/)  B-286 over 24 hours    MEDICATIONS  Medications reviewed  Novolog, medium intensity sliding scale  Metformin, 500 mg BID  Protonix    ANTHROPOMETRICS  Height: 185.4 cm (6' 1\")  Most Recent Weight: 93.8 kg (206 lb 12.7 oz)    IBW: 83.6 kg  BMI: Overweight BMI 25-29.9  Weight History:   Wt Readings from Last 10 Encounters:   23 93.8 kg (206 lb 12.7 oz)   23 98 kg (216 lb 0.8 oz)   23 97.5 kg (215 lb)   23 98.9 kg (218 lb)   23 105.7 kg (233 lb)   22 95.9 kg (211 lb 8 oz)   08/10/22 93.9 kg (207 lb 1.6 oz)   22 100.7 kg (222 lb)   22 101 kg (222 lb 12 oz)   10/31/21 104.3 kg (230 lb)   4.3% wt loss in less than 1 week, 7% wt loss in 10 months    Dosing Weight: 93.8 kg (admit wt)    ASSESSED NUTRITION NEEDS  Estimated Energy Needs: 1974-5173 kcals/day (20 - 25 kcals/kg)  Justification: Maintenance and Overweight  Estimated Protein Needs:  grams protein/day (1 - 1.2 grams of pro/kg)  Justification: Maintenance, preservation of lean body mass  Estimated Fluid Needs: 1 mL/kcal  Justification: Maintenance    PHYSICAL FINDINGS  See malnutrition section below.    MALNUTRITION  % Intake: No decreased intake noted - needs have been met with TF  % Weight Loss: > 2% in 1 week (severe)  Subcutaneous Fat Loss: None observed  Muscle Loss: None observed  Fluid Accumulation/Edema: None noted  Malnutrition Diagnosis: Patient does not meet two of the established criteria necessary for diagnosing malnutrition but is at risk for malnutrition    NUTRITION DIAGNOSIS  Inadequate oral intake related to chewing/swallowing difficulties as evidenced by NPO status and need for TF to meet nutrition needs.       INTERVENTIONS  Implementation  Nutrition Education: " Provided education on role of RD in care. Discussed free water flushes and cycling TF to help with scheduling therapies.    Collaboration with other providers - discussed with PA, notified therapy  of time off TF for therapies  Enteral Nutrition - Modify rate and schedule     Goals  Total avg nutritional intake to meet a minimum of 20 kcal/kg and 1 g PRO/kg daily (per dosing wt 93.8 kg).     Monitoring/Evaluation  Progress toward goals will be monitored and evaluated per protocol.    Lety Mead RD, TOM  ARU RD pager: [phone]  Weekend/Holiday RD pager: [phone]

## 2023-03-17 NOTE — PLAN OF CARE
Goal Outcome Evaluation:      Plan of Care Reviewed With: patient, significant other    Overall Patient Progress:  (Initial)Overall Patient Progress:  (Initial)    Outcome Evaluation: Pt to tolerate TF at 85 mL/hr over 18 hours. Total avg nutritional intake to meet a minimum of 20 kcal/kg and 1 g PRO/kg daily (per dosing wt 93.8 kg).

## 2023-03-17 NOTE — CONSULTS
"Social Work: Initial Assessment with Discharge Plan    Patient Name: Stefan Diallo  : 1945  Age: 77 year old  MRN: [id number]  Completed assessment with: Chart review and interview with patient   Admitted to ARU: 2023    Presenting Information   Date of SW assessment: 2023  Health Care Directive: Provided education and Health Care Directive Agent (if patient not able to make decisions)--Discussed with pt and finance. Pt does not have ppwk. Unsure if pt able to fill out and sign HCD ppwk at this time. Discussed later with SLP and asked them to evaluate throuhgout stay. NOK is patient's adult children. Son Stefan and dtr Barbie added to emergency contact list. One other adult child, not in pt's life. Pt fiance appeared worried when informed by this writer. Ensured pt fiance that SW would continue to check in with team on if pt can fill out or not.   Primary Health Care Agent: Patient/self   Secondary Health Care Agent: See above   Living Situation: Lives in [place], [place]. House alone. No pets. Pt has a \"tuck under garage\" with about 13 LUCRECIA the main level. Once upstairs and on main level, all needs met.   Previous Functional Status: Indep with ADLs and IADLs PTA. Works part-time in maintenance.  Drives. Was managing his own medications and finances. No A.D. PTA. No falls reported. Was driving. No hired or community services.   DME available: See therapy evaluation for more information   Patient and family understanding of hospitalization: Soft spoke and but participated well.   Cultural/Language/Spiritual Considerations: 78 y/o male, english-speaking, and . No Taoist listed and pt denied Spiritism affiliation.     Physical Health  Reason for admission: Stroke Ischemic 01.1 (L) Body Involvement (R) Brain; s/p acute ischemic stroke of R MCA territory due to cardioembolism status post tenecteplase     Justification for Acute Inpatient Rehabilitation  Stefan Diallo is a 77 year old " male with past medical history of A-fib on warfarin therapy who presented to [facility] emergency room on 3/7/2023 for left-sided facial droop, arm and leg weakness. Patient's warfarin had been held since 2/25 for anticipated pain pump placement on 3/8/2023. Patient was at work when he went to sit in a chair and missed the chair falling onto his left side. His son, who is his boss, went to help and noted left sided facial droop, left sided weakness and activated EMS. CT showed increased attenuation within the R superior M2 branch of the MCA concerning for thromboembolus. His course was complicated by possible early sepsis due to aspiration PNA, afib, DMII management needs, and dysphagia requiring PEG tube placement.     Provider Information   Primary Care Physician:Collin Singh (confirmed)   ARU HUC will schedule PCP apt at discharge.   : None reported     Mental Health/Chemical Dependency:   Diagnosis: None reported/pt denied   Alcohol/Tobacco/Narcotis: None reported/pt denied   Support/Services in Place: None reported   Services Needed/Recommended: [place] and Health Psychology support while on ARU available.   Sexuality/Intimacy: Not discussed     Support System  Marital Status: In relationship with Kayce swain. Kayce lives in a different home. Works full-time for an insurance company from home. Per IP SW note--She (Kayce) would be able to stay with him post rehab as she can work from home and states Stefan's son and her children would also be able to assist if needed.  Family support: 3 children, 2 who are involved with his care. Stefan and Barbie both live in [place], [place].   Other support available: None reported     Community Resources  Current in home services: None reported   Previous services: None reported     Financial/Employment/Education  Employment Status: Works part-time in maintenance.   Income Source: Wages and SSI  Education: Not discussed   Financial Concerns:  None reported/pt denied   "  Insurance: BCBS/BCBS MEDICARE ADVANTAGE    Discharge Plan   Patient and family discharge goal: TBD, pending progress  Provided Education on discharge plan: Evaluations and discharge recommendations pending.   Patient agreeable to discharge plan:  Pending further discussion. Evaluations and discharge recommendations pending.   Provided education and attained signature for Medicare IM and IRF Patient Rights and Privacy Information provided to patient : YES  Provided patient with [facility] Resources: YES. Will do referral, permission granted by shirley swain.   Barriers to discharge: Stairs and progress     Discharge Recommendations   Disposition: See above   Transportation Needs: Patient, family/friends, paid transport, insurance transport (if applicable)     Additional comments   Discharge TBD, ELOS 3 weeks.     SW will remain available and continue to follow as needs arise.       -------------------------------------------------------------------------------------------------------------  LISA Pain Assessment    Pain Effect on Sleep  Over the past 5 days, how much of the time has pain made it hard for you to sleep at night?\"    2. Occasionally    Pain Interference with Therapy Activities  \"Over the past 5 days, how often have you limited your participation in rehabilitation therapy sessions due to pain?\"  2. Occasionally    Pain Interference with Day-to-Day Activities  \"Over the past 5 days, how often have you limited your day-to-day activities (excluding rehabilitation therapy sessions) because of pain?\"  2. Occasionally  -------------------------------------------------------------------------------------------------------------    Sharonda Ramires [facility], Acute Inpatient Rehab Unit   [address] [place] [address], [address] [place] [address]  Phone: [phone], Fax: [phone], Pager: [phone]               "

## 2023-03-17 NOTE — PLAN OF CARE
FOCUS/GOAL  Medical management    ASSESSMENT, INTERVENTIONS AND CONTINUING PLAN FOR GOAL:  Vital stable, participation in cares limited by lethargy. NPO with enteral feeding, tolerating feeding very well. PEG patent. T buttons in place. Provider informed. Flushes increased to 120 ml, given.  Bgs> 200, correction given. Denied pain. Will continue with POC.  Goal Outcome Evaluation:      Plan of Care Reviewed With: patient    Overall Patient Progress: no changeOverall Patient Progress: no change    Outcome Evaluation: Admitted yesterday, patient is very lethargic, not engaging in cares. Up to the chair, blinds opened  but  was still sleeping. Did not tolerated iceships this shift, coughing a lot. Speech therapy informed.

## 2023-03-17 NOTE — PROGRESS NOTES
Discharge Planner Post-Acute Rehab OT:     Discharge Plan: Home with HCOT and 24/7 support available, TBD     Precautions: Falls, left side hemiparesis, PEG tube, NPO, slurred speech     Current Status:  ADLs:    Mobility: A x2 Bri Chrystal, wc based     Grooming: In wc with setup and cues     Dressing: TBD, max A with LB dressing from supine     Bathing: TBD, expecting to use purple dependent shower chair     Toileting: TBD, assess need for commode overlay or dependent commode chair   IADLs: Previously IND with all I/ADLs, lives separately from brynn swain and 2 kids live in [facility]   Vision/Cognition: L side neglect, slurred speech, decreased safety awareness     Assessment: Evaluation significantly limited by pt lethargy. Pt was seen on 3/7 for acute right MCA stroke s/p tenecteplace with hospital course complicated by dysphagia s/p PEG placement. Noted to have impaired activity tolerance, balance, coordination, judgement, safety awareness, strength, tone, all affecting his ability to safely and independently perform basic ADLs. ELOS 3 weeks.   Other Barriers to Discharge (DME, Family Training, etc):   Family training TBD (potential support from brynn, son, daughter)   Split-level home (full flight of stairs to get from main level from basement entry in garage)   Cognitive deficits

## 2023-03-17 NOTE — PLAN OF CARE
"Speech Language Therapy Discharge Summary    Reason for therapy discharge:    Discharged to acute rehabilitation facility.    Progress towards therapy goal(s). See goals on Care Plan in Epic electronic health record for goal details.  Goals not met.  Barriers to achieving goals:   discharge from facility.    Therapy recommendation(s):    Continued therapy is recommended.  Rationale/Recommendations:  Swallow function is well below baseline; will also need speech/language/cognitive evaluation when appropriate.      At time of discharge \"Recommend continue NPO, 5-10 single ice chips with 1:1 RN or SLP supervision for comfort and functional swallow practice. Encourage frequent oral cares.\"    *Pt not seen by discharging therapist on this date, note written based on previous treating therapist's notes and recommendations         "

## 2023-03-18 NOTE — PLAN OF CARE
Goal Outcome Evaluation:      Plan of Care Reviewed With: patient    Overall Patient Progress: no changeOverall Patient Progress: no change     Pt alert. Orientation variable throughout shift. A2 rahel. Incont of bowel on shift. Cont of bladder x1. Primofit placed at HS. Sacral mepilex placed on coccyx for redness. PEG present, WDL. TF per orders with flushes. Ice chips provider per pt request with oral cares. Coughing present, chin tucking reminders given and were effective. Pt c/o shoulder pain, PRN tylenol given x1 with mild relief. Call light in reach, alarms on, calls appropriately.

## 2023-03-18 NOTE — PROGRESS NOTES
"  [facility]   Acute Rehabilitation Unit  Daily progress note    INTERVAL HISTORY  Stefan Diallo was seen and examined at bedside this morning.  No acute events reported overnight.  States he did not have a very good night of sleep.  He denies pain, dizziness, nausea, shortness of breath, cough.  Discussed plans for adjustments to tube feeding. On cycled feeding from 2p-8a. On Primofit at night. LBM 3/17    Functionally, therapy evals underway.    MEDICATIONS    apixaban ANTICOAGULANT  5 mg Per Feeding Tube BID     atorvastatin  20 mg Per Feeding Tube At Bedtime     diltiazem  60 mg Per Feeding Tube Q8H     docosanol   Topical 5x Daily     gabapentin  600 mg Per Feeding Tube At Bedtime     insulin aspart  1-7 Units Subcutaneous TID AC     insulin aspart  1-5 Units Subcutaneous At Bedtime     metFORMIN  500 mg Per Feeding Tube BID w/meals     pantoprazole  40 mg Per Feeding Tube QAM AC        acetaminophen, dextrose, glucose **OR** dextrose **OR** glucagon, melatonin, - MEDICATION INSTRUCTIONS -, polyethylene glycol, senna-docusate     PHYSICAL EXAM  /75 (BP Location: Right arm)   Pulse 80   Temp 97.1  F (36.2  C) (Axillary)   Resp 20   Ht 1.854 m (6' 1\")   Wt 92 kg (202 lb 13.2 oz)   SpO2 95%   BMI 26.76 kg/m    Gen: NAD, lying in bed  HEENT: NC/AT, MMM  Cardio: irregularly irregular, no murmurs  Pulm: non-labored on room air, lungs CTA bilaterally  Abd: soft, non-tender, non-distended, bowel sounds present  Ext: no edema in bilateral lower extremities  Neuro/MSK: sleepy but provides brief verbal responses, left facial droop, +dysarthria and aphasia, decreased sensation on LLE>LUE, left hemiparesis  Skin: no rash on back    LABS  CBC RESULTS:   Recent Labs   Lab Test 03/17/23  0655 03/15/23  0845 03/14/23  0744   WBC 10.5 8.2 7.5   RBC 4.41 4.11* 4.03*   HGB 13.8 12.7* 12.4*   HCT 41.9 38.9* 37.8*   MCV 95 95 94   MCH 31.3 30.9 30.8   MCHC 32.9 32.6 32.8   RDW 13.3 " 13.3 13.2    308 294     Last Basic Metabolic Panel:  Recent Labs   Lab Test 03/18/23  0752 03/18/23  0214 03/17/23  2207 03/17/23  0840 03/17/23  0655 03/15/23  1127 03/15/23  0845 03/14/23  0754 03/14/23  0744   NA  --   --   --   --  141  --  142  --  141   POTASSIUM  --   --   --   --  4.0  --  4.1  --  4.0   CHLORIDE  --   --   --   --  102  --  105  --  104   CO2  --   --   --   --  26  --  28  --  28   ANIONGAP  --   --   --   --  13  --  9  --  9   * 176* 252*   < > 250*   < > 265*   < > 240*   BUN  --   --   --   --  31.7*  --  24.5*  --  19.3   CR  --   --   --   --  0.88  --  0.84  --  0.77   GFRESTIMATED  --   --   --   --  87  --  89  --  >90   LUTHER  --   --   --   --  10.0  --  9.5  --  9.4    < > = values in this interval not displayed.     Recent Labs   Lab 03/18/23  0752 03/18/23  0214 03/17/23 2207 03/17/23  1818 03/17/23  1201 03/17/23  0840   * 176* 252* 259* 239* 228*       Rehabilitation - continue comprehensive acute inpatient rehabilitation program with multidisciplinary approach including therapies, rehab nursing, and physiatry following. See interval history for updates.      ASSESSMENT AND PLAN  Stefan Diallo is a 77 year old male with a past medical history of atrial fibrillation on coumadin though held PTA for planned procedure (pain pump placement), chronic bilateral low back pain s/p lumbar fusion, type 2 diabetes mellitus c/b polyneuropathy, renal artery aneurysm, hypertension, hyperlipidemia, KISHA, GERD, BPH, and chronic bilateral shoulder pain who was admitted on 3/7/23 with acute right MCA stroke s/p tenecteplase with hospital course complicated by dysphagia s/p PEG placement on 3/9 complicated by PEG-site bleeding, suspected aspiration pneumonia, hyperglycemia, constipation, and hypokalemia.  He is now admitted to ARU on 3/16/23 for multidisciplinary rehabilitation and ongoing medical management.        Admission to acute inpatient rehab 03/16/23.     Impairment group code: Stroke Ischemic 01.1 (L) Body Involvement (R) Brain; s/p acute ischemic stroke of R MCA territory due to cardioembolism status post tenecteplase        1. PT, OT and SLP 60 minutes of each on a daily basis, in addition to rehab nursing and close management of physiatrist.       2. Impairment of ADL's: Noted to have impaired activity tolerance, impaired balance, impaired coordination, impaired judgement and safety awareness, impaired strength, impaired tone, impaired weight shifting, impulsiveness and pain, all affecting his ability to safely and independently perform basic ADLs.  Goal for assist of 1 with basic ADLs.     3. Impairment of mobility:  Noted to have impaired activity tolerance, impaired balance, impaired coordination, impaired judgement and safety awareness, impaired strength, impaired tone, impaired weight shifting, impulsiveness and pain, all affecting his ability to safely and independently perform basic mobility.  Goal for assist of 1 with basic mobility.     4. Impairment of cognition/language/swallow:  Noted to have dysarthria, aphasia, and impaired cognition with goals for improved cognitive-linguistic skills to meet basic needs.     5. Medical Conditions  New actions/orders/updates for today are in blue.       Acute R MCA stroke s/p tenecteplase 3/7, likely secondary to cardioembolism from atrial fibrillation off anticoagulation for an anticipated procedure  Initially on aspirin, later restarted on anticoagulation but with Eliquis in place of PTA warfarin.  - Continue apixaban 5 mg BID  - Continue PTA statin.  Goal LDL 40-70.  - Long term BP goal <135/80 to be achieved as outpatient within several weeks.  Management as below.  - Continue PT/OT/SLP  - Follow up with general neurology in 6-8 weeks     Possible early sepsis due to aspiration pneumonia  Started on unasyn + vanco 3/11.  MRSA nares negative.  BC's still negative at discharge.  Stopped vancomycin 3/13.   Changed to FT augmentin 3/15 with a plan for 7 total days abx, plan stop on 3/18/23.  - Continue Augmentin to complete 7-day antibiotic course (stop 3/18)     Atrial fibrillation  [PTA on warfarin, diltiazem]  On chronic anticoagulation with warfarin.  However, had been held since 2/25 for planned pain pump placement. PTA long-acting extended release diltiazem cannot be given through the PEG tube, substituted short-acting.  Started on DOAC in place of PTA warfarin on 3/14.  - Continue Eliquis 5 mg BID  - Continue short acting diltiazem 60 mg TID while NPO  - Continue to monitor     Dyslipidemia  [PTA meds: atorvastatin 20 mg daily, gemfibrozil 600 mg BID]  - Continue PTA statin       Hypertension  [PTA meds: atenolol 25 mg daily, diltiazem  mg daily, lisinopril 5 mg daily]  Resuming PTA meds gradually. PTA diltiazem increased to 60 mg TID on 3/14/23.   - Long term BP <130/80, inpatient meds can be slowly titrated to this goal as otherwise appropriate  - Continue diltiazem 60 mg q8h  - BP slightly elevated this morning but improved following meds, continue to monitor.  - Resume PTA lisinopril, atenolol as indicated     Type II DM  Polyneuropathy  [PTA meds: metformin 500 mg qAM + 1000 mg qPM, gabapentin 600 mg at HS]  A1c 6.4% on 2/1/23.  PTA metformin held this admission.  Managed on small doses of Lantus and sliding scale insulin.  Recent Labs   Lab 03/18/23  0752 03/18/23  0214 03/17/23  2207 03/17/23  1818 03/17/23  1201 03/17/23  0840   * 176* 252* 259* 239* 228*     - Continue home gabapentin  - Resumed metformin 500 mg BID and hold Lantus.  If changing to bolus tube feeding, will need to monitor for BG spikes, can add short-acting insulin to cover if needed.  - Continue Novolog medium intensity sliding scale insulin  - Monitor BG TID AC + HS + 0200  - Hypoglycemia protocol     KISHA  - Continue CPAP per home setting     Chronic back pain  Patient planned for pain pump placement 3/8/23 (Javi).   States he has chronic back pain from nerve impingement and uses tylenol PTA for pain.   - Continue Tylenol prn for pain  - Monitor     Moderate-severe oral-pharyngeal dysphagia  S/p PEG placement 3/9  - Continuous tube feeds per PEG  - RD following, assistance appreciated  - NPO with ice chips per SLP recs with supervision  - Discussed with RD, given uptrending BUN and appears slightly dry on exam, increased free water to 120 mL q4h (from 100 mL q4h).  Agreed to await SLP evaluation to assess likelihood for any PO diet in near future.  As above,  adjust insulin regimen with any tube feed changes and potentially involve endo if needed.  - Continue SLP  - T tacs still in place at ARU admission, plan for remove 10-14 days following PEG placement (~3/19-3/23)     Constipation, improved  - Continue senokot-S 2 tabs BID  - Monitor, suspect more recently with some loose stools in setting of tube feeds and may need to stop bowel meds     GERD  - Continue pantoprazole as auto-sub for PTA on omeprazole 20 mg daily    Cold Sore   -Left upper lip: Add Abreva for 7 days.     6. Adjustment to disability:  Clinical psychology to eval and treat if indicated  7. FEN: NPO on tube feeds via PEG  8. Bowel: incontinent, monitor, on scheduled senokot-S  9. Bladder: incontinent, PVRs at admission not yet completed  10. DVT Prophylaxis: apixaban  11. GI Prophylaxis: PPI  12. Code: full  13. Disposition: goal for home  14. ELOS:  24 days pending therapy evals today  15. Follow up Appointments on Discharge: PCP in 1-2 weeks, general neurology in 6-8 weeks      Doing well. Discussed with team. Continue cares and plans outlined.     Liu Stinson MD

## 2023-03-18 NOTE — PLAN OF CARE
Goal Outcome Evaluation:    Overall Patient Progress: no change    Outcome Evaluation: No change in Pt progress this shift.    Pt is alert and oriented. Meds given via feeding tube. On cycled feeding from 2p-8a. On Primofit at night. LBM 3/17. Ax2 rahel lawrence. C/o pain; given cold pack and PRN Tylenol with some relief. BG was 176. Call light within reach and bed alarms on. Oral cares completed at the end of the shift per Pt request. Will continue with POC.

## 2023-03-18 NOTE — PLAN OF CARE
Discharge Planner Post-Acute Rehab SLP:     Discharge Plan: home with sig other 24/7, ongoing SP    Precautions: PEG, aspiration risk    Current Status:  Hearing: WFL  Vision: WFL  Communication: Decreased vocal intensity  Cognition: To be evaluated pending improved alertness/participation  Swallow: NPO, 5-10 ice chips with RN or SLP after oral care only at this time    Assessment:  Pt with eyes closed upon SLP arrival but pt awakened easily to verbal cues.  1/4 cup of small ice chips were found at bedside.  SLP explained that, currently, ice chips are to be consumed only after oral care and with RN or SLP.  Pt verbalized comprehension but frustration. SLP continued training in CTAR.  Pt was able to Pt was noted to have bottom dentures in backwards.  Dentures were removed and cleaned with brush/paste.  Oral cavity then cleaned with brush/paste.  After thorough oral care, pt consumed small ice chips x 5 reps. Pt became sleepy, so ice chip trials were held.  SLP provided further training with CTAR technique for improved hyolaryngeal elvation/excursion to increase airway protection.  Pt performed 5 reps with max verbal cues, but pt demonstrated minimal force with technique.  Session ended 5 minutes early due to pt unable to maintain alertness necessary to actively participate in tx tasks.    Other Barriers to Discharge (Family Training, etc): diet texture training pending progress

## 2023-03-18 NOTE — PROGRESS NOTES
Discharge Planner Post-Acute Rehab OT:     Discharge Plan: Home with HCOT and 24/7 support available, TBD     Precautions: Falls, left side hemiparesis, PEG tube, NPO, slurred speech     Current Status:  ADLs:    Mobility: A x2 Raheloj Jarrell, wc based     Grooming: In wc with setup and cues     Dressing: TBD, max A with LB dressing from supine     Bathing: TBD, expecting to use purple dependent shower chair     Toileting: TBD, assess need for commode overlay or dependent commode chair   IADLs: Previously IND with all I/ADLs, lives separately from dora swain and 2 kids live in [facility]   Vision/Cognition: L side neglect, slurred speech, decreased safety awareness     Assessment: Planned for shower assessment. Upon arrival, pt lethargic with time spent promoting alertness, successful. Pt able to sit EOB with SBA-Mod A with max cues for posture/trunk control using corinna bed rails. Pt follows cues appropriately. Able to use LUE within functional mobility including bed mob and sit <> stand with rahel jarrell. Unable to transfer pt to [facility] rolling shower chair d/t width of paddles limiting proper positioning. Returned to supine to use liko lift. Once back in supine pt reports urgent need for bed pan. D/t limited time left in session, rescheduled shower assessment for tomorrow. Dora present during session, supportive and willing to participate in light physical assist (I.e. bed mob) PRN.     Other Barriers to Discharge (DME, Family Training, etc):   Family training TBD (potential support from dora, son, daughter)   Split-level home (full flight of stairs to get from main level from basement entry in garage)   Cognitive deficits

## 2023-03-18 NOTE — PROGRESS NOTES
Discharge Planner Post-Acute Rehab OT:     Discharge Plan: Home with HCOT and 24/7 support available, TBD     Precautions: Falls, left side hemiparesis, PEG tube, NPO, slurred speech     Current Status:  ADLs:    Mobility: A x2 Raheloj Jarrell, wc based     Grooming: In wc with setup and cues     Dressing: TBD, max A with LB dressing from supine     Bathing: TBD, expecting to use purple dependent shower chair     Toileting: TBD, assess need for commode overlay or dependent commode chair   IADLs: Previously IND with all I/ADLs, lives separately from dora swain and 2 kids live in [facility]   Vision/Cognition: L side neglect, slurred speech, decreased safety awareness     Assessment: Planned for shower assessment. Upon arrival, pt lethargic with time spent promoting alertness, successful. Pt able to sit EOB with SBA-Mod A with max cues for posture/trunk control using corinna bed rails. Pt follows cues appropriately. Able to use LUE within functional mobility including bed mob and sit <> stand with rahel jarrell. Unable to transfer pt to [facility] rolling shower chair d/t width of paddles limiting proper positioning. Returned to supine to use liko lift. Once back in supine pt reports urgent need for bed pan. D/t limited time left in session, rescheduled shower assessment for tomorrow. Dora present during session, supportive and willing to participate in light physical assist (I.e. bed mob) PRN.     Other Barriers to Discharge (DME, Family Training, etc):   Family training TBD (potential support from dora, son, daughter)   Split-level home (full flight of stairs to get from main level from basement entry in garage)   Cognitive deficits

## 2023-03-19 NOTE — CARE PLAN
Individualized Overall Plan Of Care (IOPOC)      Rehab diagnosis/Impairment Group Code: Stroke ischemic 01.1 (l) body involvement (r) brain; s/p acute ischemic stroke of r mca territory due to cardioembolism status post tenecteplase  Acute ischemic right mca stroke (h)       Expected functional outcome: Anticipate with intensive therapies, close medical management, and rehabilitative nursing care the patient will improve strength, balance, tolerance to activity, safety, swallow/nutrition management, communication to ensure return home with family A. Anticipate the patient will require ongoing support from family who can provide 24/7 support and physical assist if needed.     Clinical Impression Comments: 77 year old male with past medical history of A-fib on warfarin therapy who presented to [facility] emergency room on 3/7/2023 for left-sided facial droop, arm and leg weakness. Patient's warfarin had been held since 2/25 for anticipated pain pump placement on 3/8/2023. Patient was at work when he went to sit in a chair and missed the chair falling onto his left side. His son, who is his boss, went to help and noted left sided facial droop, left sided weakness and activated EMS. CT showed increased attenuation within the R superior M2 branch of the MCA concerning for thromboembolus. His course was complicated by possible early sepsis due to aspiration PNA, afib, DMII management needs, and dysphagia requiring PEG tube placement.     Mobility: Pt experienced a 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 Pt shows signs of decreased sensation, activity tolerance and endurance, sttrength, and some cognitive deficits. Pt was very lethargic during this session and was in and out of sleep, will monitor progress closely in next few days, hopeful for improved performance for increased alertness. Pt able to perform stand pivot transfer with modA from bed to w/c. Pt would benefit from skilled PT  services to improve overall functional mobility and endurance. ELOS 21 days. (Simultaneous filing. User may not have seen previous data.)    ADL: Initial evaluation significantly limited by pt lethargy and communication deficits    Communication/Cognition/Swallow: SLP: Clinical beside swallow evaluation completed with minimal PO trials. Dried scale-like secretions on tongue, thick tacky secretion stuck to uvula when oral mech exam performed. Dentures removed and soaked in , extensive oral care completed by SLP to remove dried secretions, ensure oral cavity clean prior to ice chip trials for evaluation. Dentures placed prior to ice chips. SLP modified rate and bolus size, wet vocal quality perceived after ice chips 8 and 9, max cues for pt to perform throat clear/cough and resolve. Next ice chip given fter ~3 minute break, wet vocal quality again perceived requiring cues to clear throat and cough. Pt and S.O. edu in importance of limited ice chip intake, decrease swallow activity endurance suspected. Pt goading SLP for addional ice chips during edu and remainder of time. Pt and W.O. edu in need for repat swallow study before advancing PO inake, fatigue is barrier to safety and participation, poor insight into swallow deficits impact safety. NPO, 5-10 ice chips with RN or SLP after oral care only at this time. Skilled SLP services indicated to assess and determine safest level of oral intake, train in strategies and exercise to promote swallow safety and function.     Intensity of therapy:   PT 60 minutes, 2x/day, for 24 days  OT 60 minutes, Daily, for 24 days  SLP 60 minutes, daily, for 24 days    Orthotics TBD  Education medication education, positioning, carryover of new rehab techniques, care coordination, blood sugar management, assess neurologic status, pain management, stroke education, provide safe environment for patient at falls risk and monitor nutritional intake.  Neuropsychology Testing: No  Other:   None      Medical Prognosis: Fair      Physician summary statement: In addition to above statements address, Patient requires intensive active and ongoing therapeutic intervention and multiple therapies; Patient requires medical supervision; Expected to actively participate in the intensive rehab program; Sufficiently stable to actively participate; Expectation for measurable improvement in functional capacity or adaption to impairments.    Discharge destination: prior home  Discharge rehabilitation needs: home care      Estimated length of stay: 24 days      Rehabilitation Physician Liu Stinson MD

## 2023-03-19 NOTE — PLAN OF CARE
Discharge Planner Post-Acute Rehab SLP:     Discharge Plan: home with sig other 24/7, ongoing SP    Precautions: PEG, aspiration risk    Current Status:  Hearing: WFL  Vision: WFL  Communication: Decreased vocal intensity  Cognition: To be evaluated pending improved alertness/participation  Swallow: NPO, 5-10 ice chips with RN or SLP after oral care only at this time    Assessment:   Pt in bed with eyes opened  when SLP entered. Verbal/tactile cues required periodically throughout session to maintain alertness and keep eyes open. Pt's significant other present for most of session. SLP facilated thorough oral cares to oral cavity and dentures. Patient then completed x10 ice chip trials utilizing efforftul swallow. Pt performed CTAR x 5 sets of 5 reps.  Following these, pt consumed ice chip x 1. No overt s/sx of aspiration noted in session today.    Other Barriers to Discharge (Family Training, etc): diet texture training pending progress

## 2023-03-19 NOTE — PLAN OF CARE
Goal Outcome Evaluation:    Overall Patient Progress: no change    Outcome Evaluation: No change in Pt progress this shift.    Pt is alert and oriented x2. Disoriented to situation and time. On cycled tube feeding from 2p-8a. Primofit on NOC. Ax2 rahel lawrence. Denied pain, SOB, CP, and n/t. BG was 185. Call light within reach. Will continue with POC.

## 2023-03-19 NOTE — PLAN OF CARE
Goal Outcome Evaluation:  Pt is alert but unable  to assess his orientation due to expressive aphasia. He is slow to respond but follows verbal command appropriately. He had itchy skin on the back that got better after shower with OT. Tube feeding is running at 85 ml/ hr, dressing was changed to peg tube site. Pt has C pap brought from home, he needs RT to set it up for him for overnight use. He was incontinent of stool one time this shift  He needed a total assist of two staff for perineal care and clothing management.He needed assist of two staff using rahel steady for transfer between bed and wheel chair or commode. Family has been at bed side helping pt with activity of daily livings. We will continue with current plan of care.

## 2023-03-19 NOTE — PROGRESS NOTES
Discharge Planner Post-Acute Rehab PT:     Discharge Plan: HCPT    Precautions: NPO, falls    Current Status:  Bed Mobility: Deacon of 2  Transfer: ModA for STS with pt holding onto PT  Gait: SPT performed with modA  Stairs: NT safety concerns  Balance: Able to sit unsupported briefly, tendency to lean back. Pt unable to stand unsupported.    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 Pt shows signs of decreased sensation, activity tolerance and endurance, sttrength, and some cognitive deficits. Pt was very lethargic during this session and was in and out of sleep. Pt able to perform stand pivot transfer with modA from bed to w/c. Pt would benefit from skilled PT services to improve overall functional mobility and endurance. ELOS 21 days.    Today:  Lethargic initially, tolerated seated ROM and active seated weight shifts to promote alertness; then woke up and was able to stand ~5 times total with and without the walker, several minutes with FWW and cga to min A x 2 to use urinal;  Fatigued post;  Progressing with prompting due to fatigue/lethargy.      Other Barriers to Discharge (DME, Family Training, etc):  - Pt lives alone but has support from fiance, son and daughter  - Full flight of stairs to get to main level from basement entry in garage  - May need w/c  - Family training needs to be set up, fiance present today  - Cog deficits

## 2023-03-19 NOTE — PLAN OF CARE
Discharge Planner Post-Acute Rehab SLP:     Discharge Plan: home with sig other 24/7, ongoing SP    Precautions: PEG, aspiration risk    Current Status:  Hearing: WFL  Vision: WFL  Communication: Decreased vocal intensity  Cognition: To be evaluated pending improved alertness/participation  Swallow: NPO, 5-10 ice chips with RN or SLP after oral care only at this time    Assessment:  Pt seen sitting upright in wheelchair with eyes closed when SLP entered. With verbal and tactile stimlus pt able to rouse.  Pt's significant other prsent. SLP facilated oral cares to clear out secretions within oral cavity and placed pt's dentures. Patient then completed x10 ice chip trials utilizing efforftul swallow. No overt s/sx of aspiration noted in session today. Pt required verbal cues to remain alert with eyes open to continue with ice chips.    Other Barriers to Discharge (Family Training, etc): diet texture training pending progress

## 2023-03-19 NOTE — PLAN OF CARE
"/72 (BP Location: Right arm)   Pulse 80   Temp 97.5  F (36.4  C) (Axillary)   Resp 20   Ht 1.854 m (6' 1\")   Wt 92 kg (202 lb 13.2 oz)   SpO2 95%   BMI 26.76 kg/m       Pt is AOx2, disoriented to situation and place. Pt denies chest pain, SOB, N/V, numbness or tingling. Pt c/o pain in lower back/back itching. PRN medications given for pain control, pt resting comfortably upon reassessment.  Lotion applied to back and pt repositioned. Pt is on continuous tube feed 85ml/hr. No acute events during shift.    "

## 2023-03-19 NOTE — PLAN OF CARE
Discharge Planner Post-Acute Rehab OT:      Discharge Plan: Home with HCOT and 24/7 support available, TBD      Precautions: Falls, left side hemiparesis, PEG tube, NPO, slurred speech      Current Status:  ADLs:    Mobility: A x2 Bri Stedy, wc based     Grooming: In wc with setup and cues     Dressing: UB- max A seated in tilt in space w/c; LB-max A from supine; Footwear-TD to doff and don  socks    Bathing: Liko lift to purple dependent shower chair, max A for bathing    Toileting: TBD, assess need for commode overlay or dependent commode chair   IADLs: Previously IND with all I/ADLs, lives separately from brynn swain and 2 kids live in [facility]   Vision/Cognition: L side neglect, slurred speech, decreased safety awareness      Assessment: Shower assessment completed in dependent purple shower chair. Pt lethargic prior to and following shower but participated in bathing tasks as able while seated and with encouragement. Pt participated in UB dressing seated in tilt in space w/c with verbal/tactile cues. Functional levels updated above. Plan to continue to progress functional reach and independence with daily living tasks.      Other Barriers to Discharge (DME, Family Training, etc):   Family training TBD (potential support from brynn, son, daughter)   Split-level home (full flight of stairs to get from main level from basement entry in garage)   Cognitive deficits

## 2023-03-19 NOTE — PROGRESS NOTES
"  [facility]   Acute Rehabilitation Unit  Daily progress note    INTERVAL HISTORY  Stefan Diallo was seen and examined at bedside this morning.  No acute events reported overnight.  States he did not have a very good night of sleep. GLC still up at times.   He denies pain, dizziness, nausea, shortness of breath, cough. On cycled feeding from 2p-8a. On Primofit at night. LBM 3/17    Functionally,   ADLs:    Mobility: A x2 Bri Chrystal, wc based     Grooming: In wc with setup and cues     Dressing: TBD, max A with LB dressing from supine     Bathing: TBD, expecting to use purple dependent shower chair     Toileting: TBD, assess need for commode overlay or dependent commode chair   IADLs: Previously IND with all I/ADLs, lives separately from brynn swain and 2 kids live in [facility]   Vision/Cognition: L side neglect, slurred speech, decreased safety awareness     MEDICATIONS    apixaban ANTICOAGULANT  5 mg Per Feeding Tube BID     atorvastatin  20 mg Per Feeding Tube At Bedtime     diltiazem  60 mg Per Feeding Tube Q8H     docosanol   Topical 5x Daily     gabapentin  600 mg Per Feeding Tube At Bedtime     insulin aspart  1-7 Units Subcutaneous TID AC     insulin aspart  1-5 Units Subcutaneous At Bedtime     metFORMIN  500 mg Per Feeding Tube BID w/meals     pantoprazole  40 mg Per Feeding Tube QAM AC        acetaminophen, dextrose, glucose **OR** dextrose **OR** glucagon, melatonin, - MEDICATION INSTRUCTIONS -, polyethylene glycol, senna-docusate     PHYSICAL EXAM  BP (!) 155/95 (BP Location: Right leg)   Pulse 84   Temp 97.3  F (36.3  C) (Oral)   Resp 16   Ht 1.854 m (6' 1\")   Wt 92 kg (202 lb 13.2 oz)   SpO2 94%   BMI 26.76 kg/m    Gen: NAD, lying in bed  HEENT: NC/AT, MMM  Cardio: irregularly irregular, no murmurs  Pulm: non-labored on room air, lungs CTA bilaterally  Abd: soft, non-tender, non-distended, bowel sounds present  Ext: no edema in bilateral lower " extremities  Neuro/MSK: sleepy but provides brief verbal responses, left facial droop, +dysarthria and aphasia, decreased sensation on LLE>LUE, left hemiparesis  Skin: no rash on back    LABS  CBC RESULTS:   Recent Labs   Lab Test 03/17/23  0655 03/15/23  0845 03/14/23  0744   WBC 10.5 8.2 7.5   RBC 4.41 4.11* 4.03*   HGB 13.8 12.7* 12.4*   HCT 41.9 38.9* 37.8*   MCV 95 95 94   MCH 31.3 30.9 30.8   MCHC 32.9 32.6 32.8   RDW 13.3 13.3 13.2    308 294     Last Basic Metabolic Panel:  Recent Labs   Lab Test 03/19/23 0750 03/19/23 0218 03/18/23 2116 03/17/23  0840 03/17/23  0655 03/15/23  1127 03/15/23  0845 03/14/23  0754 03/14/23  0744   NA  --   --   --   --  141  --  142  --  141   POTASSIUM  --   --   --   --  4.0  --  4.1  --  4.0   CHLORIDE  --   --   --   --  102  --  105  --  104   CO2  --   --   --   --  26  --  28  --  28   ANIONGAP  --   --   --   --  13  --  9  --  9   * 185* 161*   < > 250*   < > 265*   < > 240*   BUN  --   --   --   --  31.7*  --  24.5*  --  19.3   CR  --   --   --   --  0.88  --  0.84  --  0.77   GFRESTIMATED  --   --   --   --  87  --  89  --  >90   LUTHER  --   --   --   --  10.0  --  9.5  --  9.4    < > = values in this interval not displayed.     Recent Labs   Lab 03/19/23 0750 03/19/23 0218 03/18/23 2116 03/18/23  1737 03/18/23  1131 03/18/23  0752   * 185* 161* 256* 167* 242*       Rehabilitation - continue comprehensive acute inpatient rehabilitation program with multidisciplinary approach including therapies, rehab nursing, and physiatry following. See interval history for updates.      ASSESSMENT AND PLAN  Stefan Diallo is a 77 year old male with a past medical history of atrial fibrillation on coumadin though held PTA for planned procedure (pain pump placement), chronic bilateral low back pain s/p lumbar fusion, type 2 diabetes mellitus c/b polyneuropathy, renal artery aneurysm, hypertension, hyperlipidemia, KISHA, GERD, BPH, and chronic bilateral  shoulder pain who was admitted on 3/7/23 with acute right MCA stroke s/p tenecteplase with hospital course complicated by dysphagia s/p PEG placement on 3/9 complicated by PEG-site bleeding, suspected aspiration pneumonia, hyperglycemia, constipation, and hypokalemia.  He is now admitted to ARU on 3/16/23 for multidisciplinary rehabilitation and ongoing medical management.        Admission to acute inpatient rehab 03/16/23.    Impairment group code: Stroke Ischemic 01.1 (L) Body Involvement (R) Brain; s/p acute ischemic stroke of R MCA territory due to cardioembolism status post tenecteplase        1. PT, OT and SLP 60 minutes of each on a daily basis, in addition to rehab nursing and close management of physiatrist.       2. Impairment of ADL's: Noted to have impaired activity tolerance, impaired balance, impaired coordination, impaired judgement and safety awareness, impaired strength, impaired tone, impaired weight shifting, impulsiveness and pain, all affecting his ability to safely and independently perform basic ADLs.  Goal for assist of 1 with basic ADLs.     3. Impairment of mobility:  Noted to have impaired activity tolerance, impaired balance, impaired coordination, impaired judgement and safety awareness, impaired strength, impaired tone, impaired weight shifting, impulsiveness and pain, all affecting his ability to safely and independently perform basic mobility.  Goal for assist of 1 with basic mobility.     4. Impairment of cognition/language/swallow:  Noted to have dysarthria, aphasia, and impaired cognition with goals for improved cognitive-linguistic skills to meet basic needs.     5. Medical Conditions  New actions/orders/updates for today are in blue.       Acute R MCA stroke s/p tenecteplase 3/7, likely secondary to cardioembolism from atrial fibrillation off anticoagulation for an anticipated procedure  Initially on aspirin, later restarted on anticoagulation but with Eliquis in place of PTA  warfarin.  - Continue apixaban 5 mg BID  - Continue PTA statin.  Goal LDL 40-70.  - Long term BP goal <135/80 to be achieved as outpatient within several weeks.  Management as below.  - Continue PT/OT/SLP  - Follow up with general neurology in 6-8 weeks     Possible early sepsis due to aspiration pneumonia  Started on unasyn + vanco 3/11.  MRSA nares negative.  BC's still negative at discharge.  Stopped vancomycin 3/13.  Changed to FT augmentin 3/15 with a plan for 7 total days abx, plan stop on 3/18/23.  - Continue Augmentin to complete 7-day antibiotic course (stop 3/18)     Atrial fibrillation  [PTA on warfarin, diltiazem]  On chronic anticoagulation with warfarin.  However, had been held since 2/25 for planned pain pump placement. PTA long-acting extended release diltiazem cannot be given through the PEG tube, substituted short-acting.  Started on DOAC in place of PTA warfarin on 3/14.  - Continue Eliquis 5 mg BID  - Continue short acting diltiazem 60 mg TID while NPO  - Continue to monitor     Dyslipidemia  [PTA meds: atorvastatin 20 mg daily, gemfibrozil 600 mg BID]  - Continue PTA statin       Hypertension  [PTA meds: atenolol 25 mg daily, diltiazem  mg daily, lisinopril 5 mg daily]  Resuming PTA meds gradually. PTA diltiazem increased to 60 mg TID on 3/14/23.   - Long term BP <130/80, inpatient meds can be slowly titrated to this goal as otherwise appropriate  - Continue diltiazem 60 mg q8h  - BP slightly elevated this morning but improved following meds, continue to monitor.  - Resume PTA lisinopril, atenolol as indicated     Type II DM  Polyneuropathy  [PTA meds: metformin 500 mg qAM + 1000 mg qPM, gabapentin 600 mg at HS]  A1c 6.4% on 2/1/23.  PTA metformin held this admission.  Managed on small doses of Lantus and sliding scale insulin.  Recent Labs   Lab 03/19/23  0750 03/19/23  0218 03/18/23  2116 03/18/23  1737 03/18/23  1131 03/18/23  0752   * 185* 161* 256* 167* 242*     - Continue home  gabapentin  - Resumed metformin, increased 3/19/2023 to 850 mg BID and hold Lantus.  If changing to bolus tube feeding, will need to monitor for BG spikes, can add short-acting insulin to cover if needed.  - Continue Novolog medium intensity sliding scale insulin  - Monitor BG TID AC + HS + 0200  - Hypoglycemia protocol     KISHA  - Continue CPAP per home setting     Chronic back pain  Patient planned for pain pump placement 3/8/23 (Javi).  States he has chronic back pain from nerve impingement and uses tylenol PTA for pain.   - Continue Tylenol prn for pain  - Monitor     Moderate-severe oral-pharyngeal dysphagia  S/p PEG placement 3/9  - Continuous tube feeds per PEG  - RD following, assistance appreciated  - NPO with ice chips per SLP recs with supervision  - Discussed with RD, given uptrending BUN and appears slightly dry on exam, increased free water to 120 mL q4h (from 100 mL q4h).  Agreed to await SLP evaluation to assess likelihood for any PO diet in near future.  As above,  adjust insulin regimen with any tube feed changes and potentially involve endo if needed.  - Continue SLP  - T tacs still in place at ARU admission, plan for remove 10-14 days following PEG placement (~3/19-3/23)     Constipation, improved  - Continue senokot-S 2 tabs BID  - Monitor, suspect more recently with some loose stools in setting of tube feeds and may need to stop bowel meds     GERD  - Continue pantoprazole as auto-sub for PTA on omeprazole 20 mg daily    Cold Sore   -Left upper lip: Add Abreva for 7 days.     6. Adjustment to disability:  Clinical psychology to eval and treat if indicated  7. FEN: NPO on tube feeds via PEG  8. Bowel: incontinent, monitor, on scheduled senokot-S  9. Bladder: incontinent, PVRs at admission not yet completed  10. DVT Prophylaxis: apixaban  11. GI Prophylaxis: PPI  12. Code: full  13. Disposition: goal for home  14. ELOS:  24 days pending therapy evals today  15. Follow up Appointments on Discharge:  PCP in 1-2 weeks, general neurology in 6-8 weeks      Doing well. Discussed with team. Continue cares and plans outlined.     Liu Stinson MD

## 2023-03-20 NOTE — PLAN OF CARE
Goal Outcome Evaluation:  Alert to self. Intermittent confusion. Forgetful. Call light in reach.  Denies pain. No SOB. NPO. PEG patent, tolerating tube feeding/flushes. Denies nausea . Not out of bed this shift. Turned and repositioned with assist of 2. Incontinent of bowel and bladder. External catheter in place, draining adequately. Last BM on 319. Resting off and on during night. BG was 204 at 0200. Bed alarm on for safety.  No new issues/concerns overnight. Continue with plan of care

## 2023-03-20 NOTE — PLAN OF CARE
Goal Outcome Evaluation:    Orientation; Alert and oriented x2 with some   Ambulation/Transfer; ssist of x 2 with Bri stedy   Bladder; Currently on primo fit   Bowel; Incontinent x1  on this shift   Pain; Denies any c/o pain  Diet; NPO  Meds; Via J-tube   Tubes/lines and Drains; No line or drains   Skin; Intact   Others; Patient refused to use CPAP machine

## 2023-03-20 NOTE — PLAN OF CARE
Goal Outcome Evaluation:      Plan of Care Reviewed With: patient    Overall Patient Progress: no changeOverall Patient Progress: no change    ORIENTATION: Alert, disoriented to time, situation. Orientation flucturates  TRANSFERS/AMBULATION: rahel A2  DIET: NPO. Tube feeding [id number]. Ice chips with supervision after oral cares. Declined to have dentures removed at HS. Mouth swabbed, chapstick and mouth moisturizer applied  BOWEL/BLADDER: mixed continence. LBM 3/20 primofit at HS  PAIN: denies  LDA: PEG  SKIN: mepilex to sacrum c/d/i. Calamine lotion to back x1

## 2023-03-20 NOTE — PLAN OF CARE
"  VS: Blood pressure 121/72, pulse 77, temperature (!) 96.7  F (35.9  C), temperature source Oral, resp. rate 16, height 1.854 m (6' 1\"), weight 88.4 kg (194 lb 14.2 oz), SpO2 96 %.     O2: 96% on RA.    Output: Pt is incontinent of B&B. Pt uses external male catheter NOC.    Last BM: 03/19/23.    Activity: A2 for changing and turning in bed. A1 with rahel lawrence.    Up for meals? Pt is NPO X ice chips.    Skin: Protective mepilex on coccyx.    Pain: Denied.   CMS: AOX4. Can be disorientated.    Dressing: Mepilex on coccyx.    Diet: Pt is NPO X ice chips. Pt is on tube feeding @ 85 mL/hr from [id number].    LDA: PEG tube.    Equipment: IV pole for tube feeding. Wheelchair, personal belongings.    Plan: Continue POC.                 "

## 2023-03-20 NOTE — PLAN OF CARE
Discharge Planner Post-Acute Rehab SLP:     Discharge Plan: home with SO 24/7, ongoing SP    Precautions: PEG, aspiration risk    Current Status:  Hearing: WFL  Vision: WFL  Communication: Decreased vocal intensity  Cognition: To be evaluated pending improved alertness/participation  Swallow: NPO, 5-10 ice chips with RN or SLP after oral care only at this time    Assessment:  SO present for session. Difficulty remaining alert in session this AM. Pt with mild recall of oropharyngeal exercises. Pt completed reps of CTAR + effortful swallow x20 with max cues. Pt accepted ice chips (x4) with single instance delayed cough otherwise no s/sx aspiration    Other Barriers to Discharge (Family Training, etc): diet texture training pending progress

## 2023-03-20 NOTE — PROGRESS NOTES
"Discharge Planner Post-Acute Rehab OT:      Discharge Plan: Home with HCOT and 24/7 support available     Precautions: Falls, left side hemiparesis, PEG tube, NPO, slurred speech      Current Status:  ADLs:    Mobility: A x2 Rahel Stedy, wc based     Grooming: Min A seated in TIS W/C    Dressing: UB- max A seated in tilt in space w/c; LB-Ax2 with rahel steady; Footwear-TD to doff and don  socks    Bathing: Liko lift to purple dependent shower chair, max A for bathing    Toileting: TBD, assess need for commode overlay or dependent commode chair   IADLs: Previously IND with all I/ADLs, lives separately from brynn swain and 2 kids live in [facility]   Vision/Cognition: L side neglect, slurred speech, decreased safety awareness      Assessment: Attempted morning ADL routine but limited by lethargy. Pt c/o of back itching significantly throughout session stating \"I can't sleep when my back itches.\" Pt requires Ax2 with rahel steady for LBD tasks and EOB>TIS W/C.     -15 d/t lethargy     Other Barriers to Discharge (DME, Family Training, etc):   Family training TBD (potential support from brynn, son, daughter)   Split-level home (full flight of stairs to get from main level from basement entry in garage)   Cognitive deficits   "

## 2023-03-20 NOTE — PROGRESS NOTES
"  [facility]   Acute Rehabilitation Unit  Daily progress note    INTERVAL HISTORY  Weekend and therapy notes reviewed, no acute events reported.    Stefan Diallo was seen and examined at bedside this morning and again this afternoon with significant other present.  Patient reports to be feeling quite fatigued.  States sleeping on and off at night.  Expresses that he has not been using CPAP as he sleeps better without and had not been using for some time prior to admission.  Reviewed risks of untreated sleep apnea.  Denies any chest pain, palpitations, dizziness, shortness of breath, cough, nausea, urinary symptoms.    Functionally, he is needing min Ax2 for bed mobility, mod Ax1 to sit to stand.  He needs min A for seated grooming, max A for upper body dressing, Ax2 for lower body dressing with rahel stedy.    MEDICATIONS    apixaban ANTICOAGULANT  5 mg Per Feeding Tube BID     atorvastatin  20 mg Per Feeding Tube At Bedtime     diltiazem  60 mg Per Feeding Tube Q8H     docosanol   Topical 5x Daily     gabapentin  600 mg Per Feeding Tube At Bedtime     insulin aspart  1-7 Units Subcutaneous TID AC     insulin aspart  1-5 Units Subcutaneous At Bedtime     metFORMIN  850 mg Per Feeding Tube BID w/meals     pantoprazole  40 mg Per Feeding Tube QAM AC        acetaminophen, calamine, dextrose, glucose **OR** dextrose **OR** glucagon, melatonin, - MEDICATION INSTRUCTIONS -, polyethylene glycol, senna-docusate     PHYSICAL EXAM  BP (!) 144/84 (BP Location: Right arm, Patient Position: Supine)   Pulse 88   Temp 97.2  F (36.2  C) (Oral)   Resp 18   Ht 1.854 m (6' 1\")   Wt 88.4 kg (194 lb 14.2 oz)   SpO2 95%   BMI 25.71 kg/m     Gen: NAD, lying in bed  HEENT: NC/AT, MMM, swelling at left upper lip with overlying lesion - erythematous and appears previously vesicular with serous drainage  Cardio: irregularly irregular, no murmurs  Pulm: non-labored on room air, lungs CTA " bilaterally  Abd: soft, non-tender, non-distended, bowel sounds present, PEG site with no erythema or drainage  Ext: no edema in bilateral lower extremities  Neuro/MSK: sleepy but provides brief verbal responses, left facial droop, +dysarthria and aphasia, decreased sensation on LLE>LUE, left hemiparesis    LABS  CBC RESULTS:   Recent Labs   Lab Test 03/17/23  0655 03/15/23  0845 03/14/23  0744   WBC 10.5 8.2 7.5   RBC 4.41 4.11* 4.03*   HGB 13.8 12.7* 12.4*   HCT 41.9 38.9* 37.8*   MCV 95 95 94   MCH 31.3 30.9 30.8   MCHC 32.9 32.6 32.8   RDW 13.3 13.3 13.2    308 294     Last Basic Metabolic Panel:  Recent Labs   Lab Test 03/20/23 0200 03/19/23 2128 03/19/23 1726 03/17/23  0840 03/17/23  0655 03/15/23  1127 03/15/23  0845 03/14/23  0754 03/14/23  0744   NA  --   --   --   --  141  --  142  --  141   POTASSIUM  --   --   --   --  4.0  --  4.1  --  4.0   CHLORIDE  --   --   --   --  102  --  105  --  104   CO2  --   --   --   --  26  --  28  --  28   ANIONGAP  --   --   --   --  13  --  9  --  9   * 148* 201*   < > 250*   < > 265*   < > 240*   BUN  --   --   --   --  31.7*  --  24.5*  --  19.3   CR  --   --   --   --  0.88  --  0.84  --  0.77   GFRESTIMATED  --   --   --   --  87  --  89  --  >90   LUTHER  --   --   --   --  10.0  --  9.5  --  9.4    < > = values in this interval not displayed.     Recent Labs   Lab 03/20/23 0200 03/19/23 2128 03/19/23 1726 03/19/23  1217 03/19/23  0750 03/19/23  0218   * 148* 201* 164* 246* 185*       Rehabilitation - continue comprehensive acute inpatient rehabilitation program with multidisciplinary approach including therapies, rehab nursing, and physiatry following. See interval history for updates.      ASSESSMENT AND PLAN  Stefan Diallo is a 77 year old male with a past medical history of atrial fibrillation on coumadin though held PTA for planned procedure (pain pump placement), chronic bilateral low back pain s/p lumbar fusion, type 2 diabetes  mellitus c/b polyneuropathy, renal artery aneurysm, hypertension, hyperlipidemia, KISHA, GERD, BPH, and chronic bilateral shoulder pain who was admitted on 3/7/23 with acute right MCA stroke s/p tenecteplase with hospital course complicated by dysphagia s/p PEG placement on 3/9 complicated by PEG-site bleeding, suspected aspiration pneumonia, hyperglycemia, constipation, and hypokalemia.  He is now admitted to ARU on 3/16/23 for multidisciplinary rehabilitation and ongoing medical management.        Admission to acute inpatient rehab 03/16/23.    Impairment group code: Stroke Ischemic 01.1 (L) Body Involvement (R) Brain; s/p acute ischemic stroke of R MCA territory due to cardioembolism status post tenecteplase        1. PT, OT and SLP 60 minutes of each on a daily basis, in addition to rehab nursing and close management of physiatrist.       2. Impairment of ADL's: Noted to have impaired activity tolerance, impaired balance, impaired coordination, impaired judgement and safety awareness, impaired strength, impaired tone, impaired weight shifting, impulsiveness and pain, all affecting his ability to safely and independently perform basic ADLs.  Goal for assist of 1 with basic ADLs.     3. Impairment of mobility:  Noted to have impaired activity tolerance, impaired balance, impaired coordination, impaired judgement and safety awareness, impaired strength, impaired tone, impaired weight shifting, impulsiveness and pain, all affecting his ability to safely and independently perform basic mobility.  Goal for assist of 1 with basic mobility.     4. Impairment of cognition/language/swallow:  Noted to have dysarthria, aphasia, and impaired cognition with goals for improved cognitive-linguistic skills to meet basic needs.     5. Medical Conditions  New actions/orders/updates for today are in blue.       Acute R MCA stroke s/p tenecteplase 3/7, likely secondary to cardioembolism from atrial fibrillation off anticoagulation for  an anticipated procedure  Initially on aspirin, later restarted on anticoagulation but with Eliquis in place of PTA warfarin.  - Continue apixaban 5 mg BID  - Continue PTA statin.  Goal LDL 40-70.  - Long term BP goal <135/80 to be achieved as outpatient within several weeks.  Management as below.  - Continue PT/OT/SLP  - Follow up with general neurology in 6-8 weeks     Possible early sepsis due to aspiration pneumonia  Started on unasyn + vanco 3/11.  MRSA nares negative.  BC's still negative at discharge.  Stopped vancomycin 3/13.  Changed to FT augmentin 3/15 with a plan for 7 total days abx, completed as of 3/18.  - Monitor respiratory status, SLP for dysphagia treatment as below     Atrial fibrillation  [PTA on warfarin, diltiazem]  On chronic anticoagulation with warfarin.  However, had been held since 2/25 for planned pain pump placement. PTA long-acting extended release diltiazem cannot be given through the PEG tube, substituted short-acting. Started on DOAC in place of PTA warfarin on 3/14.  - Continue Eliquis 5 mg BID  - Continue short acting diltiazem 60 mg TID while NPO  - Continue to monitor     Dyslipidemia  [PTA meds: atorvastatin 20 mg daily, gemfibrozil 600 mg BID]  - Continue PTA statin       Hypertension  [PTA meds: atenolol 25 mg daily, diltiazem  mg daily, lisinopril 5 mg daily]  Resuming PTA meds gradually. PTA diltiazem increased to 60 mg TID on 3/14/23.   - Long term BP <130/80, inpatient meds can be slowly titrated to this goal as otherwise appropriate  - Continue diltiazem 60 mg q8h  - BP overall at goal despite at lower than PTA meds, continue to monitor.  - Resume PTA lisinopril, atenolol as indicated     Type II DM  Polyneuropathy  [PTA meds: metformin 500 mg qAM + 1000 mg qPM, gabapentin 600 mg at HS]  A1c 6.4% on 2/1/23.  PTA metformin held this admission.  Managed on small doses of Lantus and sliding scale insulin.  Stopped Lantus on 3/17, resumed home metformin initially at  500 mg BID, increased to PTA dose as of 3/20.  Recent Labs   Lab 03/20/23  0200 03/19/23  2128 03/19/23  1726 03/19/23  1217 03/19/23  0750 03/19/23  0218   * 148* 201* 164* 246* 185*   - Continue home gabapentin  - Continue PTA metformin 500 mg qAM + 1000 mg qPM  - Continue Novolog medium intensity sliding scale insulin  - Monitor BG TID AC + HS + 0200  - Hypoglycemia protocol     KISHA  - Has been refusing home CPAP, may be contributor to daytime somnolence. Discussed with patient who reports that he has not been using because he does not sleep as well with CPAP.  Also reports that he did not use just PTA for the same reason, unclear if/when used regularly.  Reviewed risks of untreated sleep apnea but may require ongoing discussion given impaired cognition.     Chronic back pain  Patient planned for pain pump placement 3/8/23 (Javi).  States he has chronic back pain from nerve impingement and uses tylenol PTA for pain.   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  CRP elevated at 44 (though prevoiusly 312 on 3/11).  Previously had mild leukocytosis (WBC 12.7 on 3/11) in setting of suspected aspiration pneumonia, but normalized the following day.  Afebrile, no localizing signs/symptoms of infection.  - Repeat CBC, CRP on Wed to trend.  Consider further evaluation if  uptrending.  Monitor for signs/symptoms of infection.       6. Adjustment to disability:  Clinical psychology to eval and treat if indicated  7. FEN: NPO on cycled tube feeds via PEG  8. Bowel: incontinent, monitor, on scheduled senokot-S  9. Bladder: incontinent, using Primofit at night, will need to wean  10. DVT Prophylaxis: apixaban  11. GI Prophylaxis: PPI  12. Code: full  13. Disposition: goal for home  14. ELOS:  24 days pending therapy evals today  15. Follow up Appointments on Discharge: PCP in 1-2 weeks, general neurology in 6-8 weeks      Patient was discussed with Dr. Liu Stinson, PM&R staff physician     BRITTANI Newman-C  Physical Medicine & Rehabilitation

## 2023-03-20 NOTE — PROGRESS NOTES
Discharge Planner Post-Acute Rehab PT:     Discharge Plan: HCPT    Precautions: NPO, falls    Current Status:  Bed Mobility: Deacon of 2  Transfer: modA of 1 for STS, rahel steady for nursing  Gait: NT safety concerns  Stairs: NT safety concerns  Balance: Able to sit unsupported briefly, tendency to lean back. Pt able to stand with BUE on railing for up to 2'    Assessment:  Pt seemed less lethargic today, still drowsy intermittently throughout session. Pt able to stand with CGA in hallway, will continue to work on this. Pt standing tolerance is decreased and will improve with improved endurance.    Other Barriers to Discharge (DME, Family Training, etc):  - Pt lives alone but has support from fiance, son and daughter  - Full flight of stairs to get to main level from basement entry in garage  - May need w/c  - Family training needs to be set up, fiance present today  - Cog deficits

## 2023-03-21 NOTE — PROGRESS NOTES
Discharge Planner Post-Acute Rehab PT:     Discharge Plan: HCPT    Precautions: NPO, falls    Current Status:  Bed Mobility: Deacon of 1 to help with LE  Transfer: modA of 1 for STS, rahel steady for nursing  Gait: 5' in // BUE support, CGA, chair follow  Stairs: NT safety concerns  Balance: Able to sit unsupported briefly, tendency to lean back. Pt able to stand with BUE on railing for up to 2'    Assessment: Pt was tired throughout session which has been consistent during time here. Pt needed extra motivating to perform tasks, but was able to perform small steps in parallel bars with CGA and bilateral upper extremity support with a chair follow. Will continue to work on transfers and ambulation.    Other Barriers to Discharge (DME, Family Training, etc):  - Pt lives alone but has support from fiance, son and daughter  - Full flight of stairs to get to main level from basement entry in garage  - May need w/c  - Family training needs to be set up, fiance present today  - Cog deficits

## 2023-03-21 NOTE — PLAN OF CARE
FOCUS/GOAL  Medical management    ASSESSMENT, INTERVENTIONS AND CONTINUING PLAN FOR GOAL:       Patient here with Stroke, left side affected, alert with slow and slurred speech  Slept most of the night, awaken in between cares and repositioned  CPAP at night d/t KISHA  No complained of pain, headache, chest pain, N&V, no SOB  Cycled feeding at night, tolerating the feeding well, HOB elevated, no abdominal discomfort  Primo fit in placed at night, had smear BM this shift  Safety rounding checked completed, 3 side rails UP, bed alarm ON, call light/bedside table within reach  Continue with POC.  Goal Outcome Evaluation:

## 2023-03-21 NOTE — PROGRESS NOTES
"  [facility]   Acute Rehabilitation Unit  Daily progress note    INTERVAL HISTORY  Stefan Diallo was seen and examined at bedside this morning with significant other present.  No acute events reported overnight.  Patient is up in wheelchair but asking to return to bed, noting fatigue.  Sleep overnight still not great.  SO does note that patient has seemed more alert this morning and seems to benefit from having PT and OT earlier in the day when his energy level is better and SLP later (though not too late) in the day.  He denies any injury or pain at left upper lip, lesion is improving.  Therapy noted significant output of loose incontinent stools during this morning's session.  Patient denies any abdominal pain, nausea, vomiting.  He also denies headache, dizziness, shortness of breath, cough, urinary symptoms.  No other concerns or questions at this time.    Functionally, he is currently needing min A for bed mobility, mod A for sit to stand with therapy and using rahel stedy with nursing.  He is able to ambulate 5' in parallel bars with CGA and wheelchair follow.    MEDICATIONS    apixaban ANTICOAGULANT  5 mg Per Feeding Tube BID     atorvastatin  20 mg Per Feeding Tube At Bedtime     diltiazem  60 mg Per Feeding Tube Q8H     docosanol   Topical 5x Daily     gabapentin  600 mg Per Feeding Tube At Bedtime     insulin aspart  1-7 Units Subcutaneous TID AC     insulin aspart  1-5 Units Subcutaneous At Bedtime     metFORMIN  1,000 mg Oral or Feeding Tube BID w/meals     pantoprazole  40 mg Per Feeding Tube QAM AC        acetaminophen, calamine, dextrose, glucose **OR** dextrose **OR** glucagon, melatonin, - MEDICATION INSTRUCTIONS -, polyethylene glycol, senna-docusate     PHYSICAL EXAM  BP (!) 140/81 (BP Location: Right arm)   Pulse 78   Temp 97.1  F (36.2  C) (Axillary)   Resp 16   Ht 1.854 m (6' 1\")   Wt 88.4 kg (194 lb 14.2 oz)   SpO2 98%   BMI 25.71 kg/m     Gen: NAD, " sitting up in chair  HEENT: NC/AT, MMM, swelling at left upper lip with overlying erythematous/scabbed lesion (improved appearance from prior day)  Cardio: irregularly irregular, no murmurs  Pulm: non-labored on room air, lungs CTA bilaterally  Abd: soft, non-tender, non-distended, bowel sounds present, PEG site with no erythema or drainage  Ext: no edema in bilateral lower extremities  Neuro/MSK: awake, alert, left facial droop, +dysarthria and aphasia, decreased sensation on LLE>LUE, left hemiparesis    LABS  CBC RESULTS:   Recent Labs   Lab Test 03/20/23  0655 03/17/23  0655 03/15/23  0845   WBC 11.7* 10.5 8.2   RBC 4.38* 4.41 4.11*   HGB 13.6 13.8 12.7*   HCT 41.5 41.9 38.9*   MCV 95 95 95   MCH 31.1 31.3 30.9   MCHC 32.8 32.9 32.6   RDW 13.2 13.3 13.3    430 308     Last Basic Metabolic Panel:  Recent Labs   Lab Test 03/21/23  0640 03/21/23  0213 03/20/23  2103 03/20/23  0728 03/20/23  0655 03/17/23  0840 03/17/23  0655 03/15/23  1127 03/15/23  0845   NA  --   --   --   --  140  --  141  --  142   POTASSIUM  --   --   --   --  4.0  --  4.0  --  4.1   CHLORIDE  --   --   --   --  102  --  102  --  105   CO2  --   --   --   --  25  --  26  --  28   ANIONGAP  --   --   --   --  13  --  13  --  9   * 174* 170*   < > 239*   < > 250*   < > 265*   BUN  --   --   --   --  33.2*  --  31.7*  --  24.5*   CR  --   --   --   --  0.78  --  0.88  --  0.84   GFRESTIMATED  --   --   --   --  >90  --  87  --  89   LUTHER  --   --   --   --  9.9  --  10.0  --  9.5    < > = values in this interval not displayed.     Recent Labs   Lab 03/21/23  0640 03/21/23  0213 03/20/23  2103 03/20/23  1711 03/20/23  1120 03/20/23  0728   * 174* 170* 202* 182* 218*       Rehabilitation - continue comprehensive acute inpatient rehabilitation program with multidisciplinary approach including therapies, rehab nursing, and physiatry following. See interval history for updates.      ASSESSMENT AND PLAN  Stefan Diallo is a 77 year  old male with a past medical history of atrial fibrillation on coumadin though held PTA for planned procedure (pain pump placement), chronic bilateral low back pain s/p lumbar fusion, type 2 diabetes mellitus c/b polyneuropathy, renal artery aneurysm, hypertension, hyperlipidemia, KISHA, GERD, BPH, and chronic bilateral shoulder pain who was admitted on 3/7/23 with acute right MCA stroke s/p tenecteplase with hospital course complicated by dysphagia s/p PEG placement on 3/9 complicated by PEG-site bleeding, suspected aspiration pneumonia, hyperglycemia, constipation, and hypokalemia.  He is now admitted to ARU on 3/16/23 for multidisciplinary rehabilitation and ongoing medical management.        Admission to acute inpatient rehab 03/16/23.    Impairment group code: Stroke Ischemic 01.1 (L) Body Involvement (R) Brain; s/p acute ischemic stroke of R MCA territory due to cardioembolism status post tenecteplase        1. PT, OT and SLP 60 minutes of each on a daily basis, in addition to rehab nursing and close management of physiatrist.       2. Impairment of ADL's: Noted to have impaired activity tolerance, impaired balance, impaired coordination, impaired judgement and safety awareness, impaired strength, impaired tone, impaired weight shifting, impulsiveness and pain, all affecting his ability to safely and independently perform basic ADLs.  Goal for assist of 1 with basic ADLs.     3. Impairment of mobility:  Noted to have impaired activity tolerance, impaired balance, impaired coordination, impaired judgement and safety awareness, impaired strength, impaired tone, impaired weight shifting, impulsiveness and pain, all affecting his ability to safely and independently perform basic mobility.  Goal for assist of 1 with basic mobility.     4. Impairment of cognition/language/swallow:  Noted to have dysarthria, aphasia, and impaired cognition with goals for improved cognitive-linguistic skills to meet basic  needs.     5. Medical Conditions  New actions/orders/updates for today are in blue.       Acute R MCA stroke s/p tenecteplase 3/7, likely secondary to cardioembolism from atrial fibrillation off anticoagulation for an anticipated procedure  Initially on aspirin, later restarted on anticoagulation but with Eliquis in place of PTA warfarin.  - Continue apixaban 5 mg BID  - Continue PTA statin.  Goal LDL 40-70.  - Long term BP goal <135/80 to be achieved as outpatient within several weeks.  Management as below.  - Continue PT/OT/SLP  - Follow up with general neurology in 6-8 weeks     Possible early sepsis due to aspiration pneumonia  Started on unasyn + vanco 3/11.  MRSA nares negative.  BC's still negative at discharge.  Stopped vancomycin 3/13.  Changed to FT augmentin 3/15 with a plan for 7 total days abx, completed as of 3/18.  - Monitor respiratory status, SLP for dysphagia treatment as below     Atrial fibrillation  [PTA on warfarin, diltiazem]  On chronic anticoagulation with warfarin.  However, had been held since 2/25 for planned pain pump placement. PTA long-acting extended release diltiazem cannot be given through the PEG tube, substituted short-acting. Started on DOAC in place of PTA warfarin on 3/14.  - Continue Eliquis 5 mg BID  - Continue short acting diltiazem 60 mg TID while NPO  - Continue to monitor     Dyslipidemia  [PTA meds: atorvastatin 20 mg daily, gemfibrozil 600 mg BID]  - Continue PTA statin       Hypertension  [PTA meds: atenolol 25 mg daily, diltiazem  mg daily, lisinopril 5 mg daily]  Resuming PTA meds gradually. PTA diltiazem increased to 60 mg TID on 3/14/23.   - Long term BP <130/80, inpatient meds can be slowly titrated to this goal as otherwise appropriate  - Continue diltiazem 60 mg q8h  - BP has been slightly above long-term goal.  If trend continues, will resume PTA lisinopril vs atenolol pending renal function tomorrow.  - Resume PTA lisinopril, atenolol as  indicated     Type II DM  Tube feeding induced hyperglycemia  Polyneuropathy  [PTA meds: metformin 500 mg qAM + 1000 mg qPM, gabapentin 600 mg at HS]  A1c 6.4% on 2/1/23.  PTA metformin held this admission.  Managed on small doses of Lantus and sliding scale insulin.  Stopped Lantus on 3/17, resumed home metformin initially at 500 mg BID, increased to PTA dose as of 3/20.  Recent Labs   Lab 03/21/23  0640 03/21/23  0213 03/20/23  2103 03/20/23  1711 03/20/23  1120 03/20/23  0728   * 174* 170* 202* 182* 218*   - Continue home gabapentin  - BG remains elevated in setting of tube feeds.  Increase metformin to 1000 mg BID.   - Continue Novolog medium intensity sliding scale insulin  - Monitor BG TID AC + HS + 0200  - Hypoglycemia protocol     KISHA  - Has been refusing home CPAP, may be contributor to daytime somnolence. Discussed with patient who reports that he has not been using because he does not sleep as well with CPAP.  Also reports that he did not use just PTA for the same reason, unclear if/when used regularly.  Reviewed risks of untreated sleep apnea but may require ongoing discussion given impaired cognition.     Chronic back pain  Patient planned for pain pump placement 3/8/23 (Javi).  States he has chronic back pain from nerve impingement and uses tylenol PTA for pain.   - Continue Tylenol prn for pain  - Monitor     Moderate oral, severe oropharyngeal dysphagia  S/p PEG placement 3/9  - Cycled tube feeds per PEG (switched from continuous on 3/18)  - Continue water flushes 180 mL QID  - RD following, assistance appreciated  - NPO with ice chips per SLP recs with supervision  - Continue SLP  - T tacs removed 3/20   - Plan for repeat VFSS per SLP, ordered     Loose stools  Constipation, resolved  - Continue senokot-S 2 tabs BID PRN, Miralax PRN  - Has been having some loose stools, incontinence this morning during therapies.  Could be related to tube feeds vs resumption and subsequent increase in  metformin.  No abdominal pain, nausea/vomiting.  Afebrile, abdominal exam benign.  Discussed with RD, feel fiber already high in current formula so did not advise to increase.  If related to metformin, anticipate improvement in coming days.  If loose/incontinent stools ongoing, will need to consider further evaluation, possible implementation of bowel program with regular suppository, vs anti-diarrheal. Continue to monitor.     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  CRP elevated at 44 (though prevoiusly 312 on 3/11).  Previously had mild leukocytosis (WBC 12.7 on 3/11) in setting of suspected aspiration pneumonia, but normalized the following day.  Afebrile, no localizing signs/symptoms of infection.  - Repeat CBC, CRP tomorrow to trend.  Consider further evaluation if uptrending.  Monitor for signs/symptoms of infection.       6. Adjustment to disability:  Clinical psychology to eval and treat if indicated  7. FEN: NPO on cycled tube feeds via PEG  8. Bowel: incontinent, monitor, PRN bowel meds available  9. Bladder: incontinent, using Primofit at night, will need to wean  10. DVT Prophylaxis: apixaban  11. GI Prophylaxis: PPI  12. Code: full  13. Disposition: goal for home  14. ELOS:  target 4/7/23  15. Follow up Appointments on Discharge: PCP in 1-2 weeks, general neurology in 6-8 weeks      Patient was discussed with Dr. Liu Stinson, PM&R staff physician     Kenna Ruiz PA-C  Physical Medicine & Rehabilitation

## 2023-03-21 NOTE — PROGRESS NOTES
"   03/21/23 2227   Appointment Info   Signing Clinician's Name / Credentials (SLP) Yamini Dominguez, SLP Student   Student Supervision Direct supervision provided   General Information   Onset of Illness/Injury or Date of Surgery 03/07/23   Referring Physician BRITTANI Ruiz   Pertinent History of Current Problem \"77 year old male with past medical history of atrial fibrillation on coumadin though held PTA for upcoming procedure (pain pump placement), chronic bilateral low back pain s/p lumbar fusion, type 2 diabetes mellitus c/b polyneuropathy, renal artery aneurysm, hypertension, hyperlipidemia, KISHA, GERD, BPH, and chronic bilateral shoulder pain who presented to [facility] on 3/7/23 with left-sided weakness, left facial droop, and dysarthria with CT head revealing increased attenuation within right superior M2 brach and CTA head with right superior M2 branch oclusion, right MCA bifurcation aneurysm, and possible subocclusive thrombus at the R MCA bifurcation.  Patient was administered TNK.  Patient was transferred to [facility] for consideration of endovascular treatment, but upon arrival was noted to have significant improvement in symptoms and shared decision not to pursue.  Repeat imaging including NCCT, CTA, & CTP were performed; CTP showed small amount of core:penumbra mismatch (overcalled volume as regions of L hemisphere & posterior circulation showed as hypoperfused and did not correlate with R M2 occlusion), persistent M2 clot.        Additional stroke evaluation with EKG with afib, echo with EF of 60% with normal right ventricular function, mild to moderate aortic stenosis; LDL 64.      Patient developed worsening left-sided weakness overnight 3/7-3/8 despite adequate blood pressures.  MRI showed completed L M2 divisions stroke consistent with his known occlusion.  Repeat CT head, CTA, CTP with no new occlusion and no new region of perfusion deficit.     On 3/11, patient developed sepsis felt " "to be due to aspiration pneumonia.  He was treated with vancomycin (3/11-3/13), unasyn (3/11-3/15), then Augmentin to complete 7 day course.  Remains stable on room air and afebrile at discharge to ARU.     Hospital course was further complicated by moderate to severe dysphagia now s/p PEG placement on 3/9/23 with IR c/b bleeding from PEG site, hypokalemia, hyperglycemia, constipation, and loose stools.\"   General Observations Patient agreeable to cognitive evaluation today. Pt followed by acute care hospital and ARU for dysphagia related goals. Cognitive assessment deferred to ARU d/t patient's level of alertness.   Type of Evaluation   Type of Evaluation Speech, Language, Cognition   Motor Speech   Comment, Motor Speech Assessment Patient with soft, breathy vocal quality. Requires occasional repetitions in order to achieve 100% intelligibilty.   Auditory Comprehension   Follows Commands (Auditory Comprehension) multi-step commands   Picture Identification (Auditory Comprehension) WNL   Comment, Assessment (Auditory Comprehension) Patient able to follow simple commands but does demonstrate some impairment with need for repetition and cues with more complex level directions.   Object Identification (Auditory Comprehension) WNL   Yes/No Questions (Auditory Comprehension) WNL   Multi-Step, Follows Commands (Auditory Comprehension) impaired   Verbal Expression   Comment, Assesment (Verbal Expression) Able to communicate wants and needs.   Reading Comprehension   Comment, Assessment (Reading Comprehension) Not assessed today.   Written Language   Comment, Assessment (Written Language) Limited opportunity to fully assess. Pt able to hold writing utensil for simple line drawing/circling tasks.   Cognition   Cognitive Function executive function deficit;attention deficit;memory deficit   Cognitive Status Patient with variable levels of alertness but oriented to situation   Additional cognitive-linguistic evaluation " indicated  Completed;Please see separate report for results   Cognitive Status Exam Comments CLQT completed. See report for further details.   Orientation Status (Cognition) oriented x 4   Affect/Mental Status (Cognition) low arousal/lethargic   Follows Commands (Cognition) repetition of directions required;verbal cues/prompting required;initiation impaired;increased processing time needed   Executive Function Deficit (Cognition) moderate deficit   Attention Deficit (Cognition) moderate deficit   Memory Deficit (Cognition) moderate deficit   Clinical Impression   Criteria for Skilled Therapeutic Interventions Met (SLP Eval) Yes, treatment indicated   SLP Diagnosis moderate cognivie linguistic impairment   Problem List (SLP) Level of alertness, cognition   Functional Limitations Related to Problem List (SLP) Level of alertness may impact ability to perform cognitive tasks in therapy   Risks & Benefits of therapy have been explained care plan/treatment goals reviewed;current/potential barriers reviewed;participants voiced agreement with care plan;participants included;patient;spouse/significant other   Clinical Impression Comments CLQT completed. Patient presenting with a composite severity rating of 2.2, indicating a moderate cognitive linguistic impairment. Patient with continous difficulties in level of alertness within ARU stay and throughout cognitive evaluation today. Presenting with notable impairments in executive functioning, memory, visuospatial tasks, and attention. Mild language impairments also prevalent in language-based tasks within assessment. Some evidence of left visual neglect evident in visuospatial tasks. Patient responsive to verbal cues and redirection for more complex commands and tasks. Pt will benefit from skilled intervention to target cognitive deficits in the areas of executive functioning, memory, attention, and left visual neglect.   SLP Total Evaluation Time   Additional SLP Eval  Charges Cognitive Performance Testing   Cognitive  Performance Testing Minutes, per hour - includes time for administering test, interp results & prep report (49162) 00   Swallowing Dysfunction &/or Oral Function for Feeding   Treatment of Swallowing Dysfunction &/or Oral Function for Feeding Minutes (80064) 10   SLP Discharge Planning   SLP Plan Review CLQT results and POC with pt and spouse.  oropharyngeal exercises.  A few ice chips only with speech/RN after thorough oral cares. Tx: easy-mod level attention/problem solving tasks if level of alertness appropriate.   Post Acute Settings Only   What unit is patient on? Acute Rehab   SLP - Acute Rehab Center Time   Individual Time (minutes) - enter zero if not applicable - SLP 45   Group Time (minutes) - enter zero if not applicable  - SLP 0   Concurrent Time (minutes) - enter zero if not applicable  - SLP 0   Co-Treatment Time (minutes) - enter zero if not applicable  - SLP 0   ARC Total Session Time (minutes) - SLP 45   Expression   Expression Comment Able to communicate wants and needs. Vocal quality breathy and quiet and requires occasional repetition in order to get 100% intelligibilty.   Problem Solving   Problem Solving Comment Moderate cognitive linguistic impairment   Memory   Memory Comment mild-moderate impairment   Eating   Describe performance Tolerated ice chips with cough x1. Otherwised tolerated well. Reviewed plan for VFSS on 3/22.

## 2023-03-21 NOTE — PROGRESS NOTES
Discharge Planner Post-Acute Rehab OT:      Discharge Plan: Home with HCOT and 24/7 support available     Precautions: Falls, left side hemiparesis, PEG tube, NPO, slurred speech      Current Status:  ADLs:    Mobility: A x2 Rahel Stedy, wc based     Grooming: Min A seated in TIS W/C    Dressing: UB- max A seated in tilt in space w/c; LB-Total A with rahel steady; Footwear-dependent     Bathing: Liko lift to purple dependent shower chair, max A for bathing    Toileting: Rahel steady Ax2 to/from grey dependent commode, Total A with toilet hygiene.   IADLs: Previously IND with all I/ADLs, lives separately from brynn swain and 2 kids live in [facility]   Vision/Cognition: L side neglect, slurred speech, decreased safety awareness      Assessment: Focused on morning ADL routine. Pt continues to require encouragement to participate in cares. Limited during session by bowel urgency. Provided pt with grey dependent commode for toileting with Ax2 with rahel steady.     -10 d/t toileting    Other Barriers to Discharge (DME, Family Training, etc):   Family training TBD (potential support from brynn, son, daughter)   Split-level home (full flight of stairs to get from main level from basement entry in garage)   Cognitive deficits

## 2023-03-21 NOTE — CARE PLAN
SUMMARY OF TEST:    The CLQT assesses visual attention and perception, working memory and language output skills, as well as auditory memory and comprehension.  Non-linguistic tasks can help assess planning, and self-monitoring, visual discrimination and analysis, as well as creativity and mental flexibility.   Together, these subtests assess the cognitive domains of attention, memory, executive function, language, and visuospatial skills using a severity rating of either WNL (within normal limits), Mild, Moderate or Severe.        Cognitive Domain                   Severity Rating/Score  Attention                                   81: Moderate Impairment  Memory                                    134- Mild Impairment  Executive Functions   7 -Severe Impairment  Language                                 26- Mild Impairment  Visuospatial Skills                    34 - Moderate Impairment  Composite Severity Rating        2.2 -Moderate Impairment  Clock Drawing Severity Rating    11- WNL    INTERPRETATION OF TEST RESULTS:  CLQT completed. Patient presenting with a composite severity rating of 2.2, indicating a moderate cognitive linguistic impairment. Patient with continous difficulties in level of alertness within ARU stay and throughout cognitive evaluation today. Presenting with notable impairments in executive functioning, memory, visuospatial tasks, and attention. Mild language impairments also prevalent in language-based tasks within assessment. Some evidence of left visual neglect evident in visuospatial tasks. Patient responsive to verbal cues and redirection for more complex commands and tasks. Pt will benefit from skilled intervention to target cognitive deficits in the areas of executive functioning, memory, attention, and left visual neglect.    TIME FOR INTERPRETATION AND PREPARATION OF REPORT: 10  TOTAL TIME: 45

## 2023-03-21 NOTE — PLAN OF CARE
Goal Outcome Evaluation:  FOCUS/GOAL  Bowel management, Bladder management, Medication management, Medical management, Cognition/Memory/Judgment/Problem solving, and Reinforcement of self-care/ADL    ASSESSMENT, INTERVENTIONS AND CONTINUING PLAN FOR GOAL:  Patient is alert this morning with therapy but can be drowsy at times on this shift, is able to follow most commands with some repetition and extra time allowed. Patient is disoriented to time, has not been using the call light and needs should be anticipated.  Patient denies any pain, TF stopped in am and is tolerating well.  Patient's BG was 191 and 144 today, TF restarts at 1400.  Patient's VSS, had a large stool this morning that was continent on the BSC during therapy and interrupted that time.  Will discuss with provider trying patient on bowel program potentially going forward.  Patient is able to use the sera steady Ao2, no additional care concerns, continue with POC.

## 2023-03-22 NOTE — PLAN OF CARE
Acute Rehab Care Conference/Team Rounds      Type: Team Rounds    Present: Dr. Liu Stinson, Kenna Ruiz PA, Dr. Kimberly Paulson Neuropsychologist, Hiren Hudson PT, Gigi Booth PT Student, Magaly Charles OT, Herb Valdovinos SLP, Sharonda Ramires LICSW, Lety Mead RD, Tess Bowman RN, and Stefan Diallo Patient and Finance Kayce.     Discharge Barriers/Treatment/Education    Rehab Diagnosis: Stroke Ischemic 01.1 (L) Body Involvement (R) Brain; s/p acute ischemic stroke of R MCA territory due to cardioembolism status post tenecteplase    Active Medical Co-morbidities/Prognosis:   Patient Active Problem List   Diagnosis     Fusion of spine, lumbosacral region     Chronic atrial fibrillation (H)     Acquired absence of other right toe(s) (H)     Amputated toe of right foot (H)     Bell's palsy     Benign prostatic hyperplasia     Cellulitis     Cellulitis of foot     Chest pain     Closed nondisplaced fracture of phalanx of toe of right foot, unspecified toe, initial encounter     Contact dermatitis and other eczema, due to unspecified cause     Cough     Dysphagia     Dysuria     Essential hypertension, benign     Eye pain     Gastroesophageal reflux disease with esophagitis     Idiopathic peripheral neuropathy     Lower back pain     Muscle weakness     Myalgia and myositis, unspecified     Obesity     Osteomyelitis of toe (H)     Pure hypercholesterolemia     Renal artery aneurysm (H)     Shortness of breath     Sleep apnea     Staph aureus infection     Status post lumbar surgery     Tinea corporis     Urinary tract infection     Urticaria     Venous stasis ulcer of left calf without varicose veins, unspecified ulcer stage (H)     Vertigo     Type II diabetes mellitus with peripheral circulatory disorder (H)     Mixed hyperlipidemia     Abrasion of right foot, initial encounter     Onychomycosis     Hammer toes of both feet     Cerebrovascular accident (CVA) due to occlusion of left middle cerebral  artery (H)        Safety: A2 rahel lawrence, alert, disoriented to time at times, used call light appropriately    Pain: denies pain    Medications, Skin, Tubes/Lines: NPO, all meds via tube, redness to groin, PEG tube in placed, no drains/lines    Swallowing/Nutrition: Patient currently n.p.o. with exception of supervised ice chip trials after thorough oral cares.  Have noted ongoing improvement in patient's oral care status with less secretions and needing to be cleaned out during therapy sessions.  Plan for repeat VFSS later this date on 3/22 to assess ability to advance diet per bedside observations, showing significant improvement in swallowing function as compared to the images observed during last swallow study.  We will continue to work towards diet advancement as appropriate.  Likely to benefit from ongoing dysphagia interventions upon facility discharge pending degree of progress.    Bowel/Bladder: Incontinent of Bowel and Bladder, utilized primo fit at night, LBM 3/22/2023    Psychosocial: Engaged. Lives alone in a home. Dora lives close and is very supportive. Pt retired. Dora works full-time from home. 3 adult children, only 2 have a relationship, one not involved. No mental health, substance abuse, or financial concerns. Indep PTA.     ADLs/IADLs: Pt early in ARU stay and limited by lethargy, cognitive deficits, and left side tone. Pt is currently requiring TIS W/C for mobility and rahel steady Ax2 for transfers. Pt requires min A with G/H tasks seated in W/C. Pt requires max A with UBD tasks, total A with LBD tasks with rahel steady. Pt requires Ax2 with rahel steady to/from yoder dependent commode for toileting and total A with toilet hygiene. Barriers to home are stairs. Anticipate assistance from significant other with ADLs and IADLs upon discharge. HC OT services.     Mobility: Pt is early in rehab stay. Currently making slow progress, limited by lethargy, cognitive deficits and LLE tone. Currently he  is unable to stand consistently long enough to sequence supported gait. Min to mod with bed mobility. Mod-A to pivot, nursing using rahel wesabimbola. Has a full flight to enter living areas. Goals for mixed mobility will ultimately depend on alertness. HH PT follow-up, anticipate significant other would need to move in.    Cognition/Language: Patient's fatigue has been a significantly limiting factor to success and ability to engage in cognitive linguistic tasks and assessment.  Formal cognitive assessment was completed with patient showing left-sided vision impairment as well as overall moderate cognitive impairment.  Patient would require assistance with IADL tasks though patient does seem to be his own decision maker at this time.  Ongoing SLP interventions upon facility discharge.    Community Re-Entry: Unable given mobility status    Transportation: At this time requires w/c transport    Decision maker: self and family    Plan of Care and goals reviewed and updated.    Discharge Plan/Recommendations    Fall Precautions: continue    Patient/Family input to goals: Yes    Anticipated rehab needs following discharge: TBD. Home with family vs TCU    Anticipated care giver support after discharge: Family vs TCU    Estimated length of stay: 24 days    Overall plan for the patient: Continue IP Rehabilitation.       Utilization Review and Continued Stay Justification    Medical Necessity Criteria:    For any criteria that is not met, please document reason and plan for discharge, transfer, or modification of plan of care to address.    Requires intensive rehabilitation program to treat functional deficits?: Yes    Requires 3x per week or greater involvement of rehabilitation physician to oversee rehabilitation program?: Yes    Requires rehabilitation nursing interventions?: Yes    Patient is making functional progress?: Yes    There is a potential for additional functional progress? Yes    Patient is participating in therapy  3 hours per day a minimum of 5 days per week or 15 hours per week in 7 day period?:Yes    Has discharge needs that require coordinated discharge planning approach?:Yes          Final Physician Sign off    Statement of Approval: I approve the plan of care.     Patient Goals  Social Work Goals: Confirm discharge recommendations with therapy, coordinate safe discharge plan and remain available to support and assist as needed.    OT Predicted Duration/Target Date for Goal Attainment: 04/07/23  Therapy Frequency (OT): Daily  OT: Hygiene/Grooming: supervision/stand-by assist  OT: Upper Body Dressing: Supervision/stand-by assist  OT: Lower Body Dressing: Supervision/stand-by assist  OT: Upper Body Bathing: Supervision/stand-by assist, using adaptive equipment  OT: Lower Body Bathing: Minimal assist, using adaptive equipment  OT: Bed Mobility: Modified independent  OT: Transfer: Minimal assist  OT: Toilet Transfer/Toileting: Minimal assist    PT Predicted Duration/Target Date for Goal Attainment: 04/07/23  PT Frequency: 2x/day  PT: Bed Mobility: Modified independent, Supine to/from sit  PT: Transfers: Supervision/stand-by assist, Sit to/from stand, Assistive device  PT: Gait: Supervision/stand-by assist, 100 feet, Rolling walker  PT: Stairs: Minimal assist, Greater than 10 stairs, Rail on right  PT: Goal 1: SBA with car transfer  PT: Goal 2: Pt will verbalize 3 strategies to decresae risk of falls     SLP Predicted Duration/Target Date for Goal Attainment: 04/07/23  Therapy Frequency (SLP Eval): daily  SLP: Safely tolerate diet without signs/symptoms of aspiration: Soft & bite sized diet, Mildly thick liquids                                                            Patient/Family Goal: Bladder: Pt will be become continent of bladder through timed toileting during the day and wean off of the primofit.              Goal: Skin Integrity: Pt will be free of further skin complications by repositioning as needed q2hrs.                     Goal: Safety Management: Pt will be free of falls by calling for staff assistance as needed.                          Goal Outcome Evaluation:      Plan of Care Reviewed With: patient    Overall Patient Progress: no changeOverall Patient Progress: no change

## 2023-03-22 NOTE — PROGRESS NOTES
"   03/22/23 1303   Appointment Info   Signing Clinician's Name / Credentials (SLP) ANIL Maurer Student   Student Supervision Therapy services provided with the co-signing licensed therapist guiding and directing the services, and providing the skilled judgement and assessment throughout the session   General Information   Referring Physician BRITTANI Ruiz   Pertinent History of Current Problem \"77 year old male with past medical history of atrial fibrillation on coumadin though held PTA for upcoming procedure (pain pump placement), chronic bilateral low back pain s/p lumbar fusion, type 2 diabetes mellitus c/b polyneuropathy, renal artery aneurysm, hypertension, hyperlipidemia, KISHA, GERD, BPH, and chronic bilateral shoulder pain who presented to [facility] on 3/7/23 with left-sided weakness, left facial droop, and dysarthria with CT head revealing increased attenuation within right superior M2 brach and CTA head with right superior M2 branch oclusion, right MCA bifurcation aneurysm, and possible subocclusive thrombus at the R MCA bifurcation.  Patient was administered TNK.  Patient was transferred to [facility] for consideration of endovascular treatment, but upon arrival was noted to have significant improvement in symptoms and shared decision not to pursue.  Repeat imaging including NCCT, CTA, & CTP were performed; CTP showed small amount of core:penumbra mismatch (overcalled volume as regions of L hemisphere & posterior circulation showed as hypoperfused and did not correlate with R M2 occlusion), persistent M2 clot.        Additional stroke evaluation with EKG with afib, echo with EF of 60% with normal right ventricular function, mild to moderate aortic stenosis; LDL 64.      Patient developed worsening left-sided weakness overnight 3/7-3/8 despite adequate blood pressures.  MRI showed completed L M2 divisions stroke consistent with his known occlusion.  Repeat CT head, CTA, CTP with no new " "occlusion and no new region of perfusion deficit.     On 3/11, patient developed sepsis felt to be due to aspiration pneumonia.  He was treated with vancomycin (3/11-3/13), unasyn (3/11-3/15), then Augmentin to complete 7 day course.  Remains stable on room air and afebrile at discharge to ARU.     Hospital course was further complicated by moderate to severe dysphagia now s/p PEG placement on 3/9/23 with IR c/b bleeding from PEG site, hypokalemia, hyperglycemia, constipation, and loose stools.\"   General Observations Pt arrived to ARU with orders for NPO, all nutrtition/hydration/medication via PEG. Pt has been followed by ARU for training of oropharyngeal exercises with ice chips. Prior VFSS completed 3/9 which showed significant oral bolus holding, lack of initiation of swallow, and aspiration.   Type of Evaluation   Type of Evaluation Swallow Evaluation   General Swallowing Observations   Past History of Dysphagia No history of dysphagia prior to CVA. Prior VFSS 3/9/2023 revealed bolus holding, delayed initiation, resulting in penetration and aspiration.   Comment, General Swallowing Observations Patient currently NPO with a PEG tube for complete nutrition intake. SLP following to train oropharyngeal exercises in limited trials of ice chips after oral cares.   Respiratory Support (General Swallowing Observations) none   Current Diet/Method of Nutritional Intake (General Swallowing Observations, NIS) total parenteral nutrition (TPN)   Swallowing Evaluation Videofluoroscopic swallow study (VFSS)   VFSS Evaluation   Radiologist [facility]   Views Taken left lateral   Physical Location of Procedure [facility]   VFSS Textures Trialed thin liquids;mildly thick liquids;moderately thick liquids/liquidized;pureed;soft & bite-sized   VFSS Eval: Thin Liquid Texture Trial   Mode of Presentation, Thin Liquid spoon;fed by clinician   Order of Presentation 1,2   Preparatory Phase WFL   Oral Phase, Thin Liquid " premature pharyngeal entry   Bolus Location When Swallow Triggered valleculae   Pharyngeal Phase, Thin Liquid residue in vallecula;impaired hyolaryngeal excursion   Rosenbek's Penetration Aspiration Scale: Thin Liquid Trial Results 7 - contrast passes glottis, visible subglottic residue remains despite patient's response (aspiration)   Response to Aspiration unproductive reflexive cough   Strategies and Compensations hard swallow;reduce bolus size   Diagnostic Statement During trials of thin liquids, SLP presented with 1/2 teaspoon size bolus. Patient had consistent premature spillage to the level of the vallecula and consistent delayed initiation of swallow. Resulted in 1x occurence of aspiration with a delayed, unproductive cough response. SLP cued for stronger cough, which improved some clearance of residuals from airway, but not completely.   VFSS Eval: Mildly Thick Liquids   Mode of Presentation spoon;fed by clinician   Order of Presentation 3-7, 11   Preparatory Phase WFL   Oral Phase premature pharyngeal entry   Bolus Location When Swallow Triggered valleculae   Pharyngeal Phase impaired hyolaryngel excursion;residue in vallecula   Rosenbek's Penetration Aspiration Scale 7 - contrast passes glottis, visible subglottic residue remains despite patient's response (aspiration)   Response to Aspiration unproductive reflexive cough   Strategies and Compensations reduce bolus size;hard swallow   Diagnostic Statement During trials of mildly thick liquids with controlled bolus size of 1/2 teaspoon, pt with consistent premature spillage to the level of vallecula, delayed initiation of swallow, and mild vallecula residue. Several instances of shallow penetration observed with 1/2 teaspoon amount of mildly thick liquid. With full teaspoon amounts of mildly thick liquids, patient had prematrue spillage to the vallecula, resulting in aspiration with unproductive and slightly delayed cough response. With cued cough, pt  still unable to completely clear aspirated material from the airway.   VFSS Eval: Moderately Thick Liquids   Mode of Presentation spoon;fed by clinician   Order of Presentation 12, 13   Preparatory Phase WFL   Oral Phase premature pharyngeal entry   Bolus Location When Swallow Triggered valleculae   Pharyngeal Phase impaired hyolaryngel excursion;residue in vallecula   Rosenbek's Penetration Aspiration Scale 1 - no aspiration, contrast does not enter airway   Strategies and Compensations hard swallow   Diagnostic Statement During trials of moderately thick liquid by full teaspoon, patient had premature spillage to the level of vallecula, delayed initiation of swallow. Moderate vallecula residue observed. Demonstrated adequate airway protection with no penetration or aspiration observed.   VFSS Evaluation: Puree Solid Texture Trial   Mode of Presentation, Puree spoon;fed by clinician   Order of Presentation 8, 9   Preparatory Phase WFL   Oral Phase, Puree premature pharyngeal entry;impaired AP movement   Bolus Location When Swallow Triggered valleculae   Pharyngeal Phase, Puree residue in vallecula;impaired tongue base retraction;impaired hyolaryngel excursion   Rosenbek's Penetration Aspiration Scale: Puree Food Trial Results 1 - no aspiration, contrast does not enter airway   Strategies and Compensations hard swallow   Diagnostic Statement With trials of puree texture by spoon, patient had some difficulty with AP transit of the bolus to the pharyngeal phase. Pt had delayed initiation of swallow to level of the valleculae. Mild-moderate vallecula residuals noted throughout both trials. Overall, good airway protection with no aspiration or penetration observed.   VFSS Eval: Soft & Bite Sized   Mode of Presentation spoon;fed by clinician   Order of presentation 10   Preparatory Phase WFL   Oral Phase premature phrayngeal entry   Bolus Location When Swallow Triggered valleculae   Pharyngeal Phase impaired hyolaryngel  excursion;residue in vallecula;impaired tongue base retraction   Rosenbek's Penetration Aspiration Scale 1 - no aspiration, contrast does not enter airway   Strategies and Compensations hard swallow   Diagnostic Statement Patient demonstrated adequate mastication of solid food texture with some impairment in AP bolus transit into pharyngeal phase. Delayed initiation of swallow observed. Pt had mild residuals in vallecula following the swallow. Overall, demonstrated good airway protection with no aspiration or penetration observed.   Esophageal Phase of Swallow   Patient reports or presents with symptoms of esophageal dysphagia No   Esophageal sweep performed during today s vidofluoroscopic exam  No   Swallowing Recommendations   Diet Consistency Recommendations minced & moist (level 5);moderately thick liquids/liquidized (level 3)  (Pending success with meal observation)   Supervision Level for Intake 1:1 supervision needed   Mode of Delivery Recommendations bolus size, small   Monitoring/Assistance Required (Eating/Swallowing) monitor for cough or change in vocal quality with intake;optimize oral intake to minimize need for tube feeding   Recommended Feeding/Eating Techniques (Swallow Eval) provide assist with feeding   Medication Administration Recommendations, Swallowing (SLP) via PEG   Instrumental Assessment Recommendations VFSS (videofluoroscopic swallowing study)  (repeat in approximately 2 weeks)   Comment, Swallowing Recommendations Significant improvement in function noted as compared to prior study.   Clinical Impression   Criteria for Skilled Therapeutic Interventions Met (SLP Eval) Yes, treatment indicated   SLP Diagnosis Moderate oropharyngeal dysphagia   Problem List (SLP) Lethargy and level of alertness vary   Functional Limitations Related to Problem List (SLP) Potential need for ongoing supplemental nutrition and hydration   Risks & Benefits of therapy have been explained evaluation/treatment  results reviewed;care plan/treatment goals reviewed;participants voiced agreement with care plan;participants included;patient;spouse/significant other   Clinical Impression Comments VFSS completed.  Patient showing significant improvement in swallowing function as compared to most recent swallow study completed on 3/9.  Patient did show initially silent aspiration with half teaspoon size bolus of thin liquid though did result in a delayed cough which she was unable to fully clear aspirated material.  With trials of mildly thick liquid, when limited to approximately half a teaspoon, no aspiration noted but occasional shallow penetration was observed.  With full spoons of mildly thick, aspiration noted with immediate cough response though again unable to fully clear the aspirated material.  Trials of moderately thick liquid tolerated without any evidence of penetration or aspiration.  Patient showing consistent delayed swallow response at the level of the vallecula.  Pending success at bedside observation, anticipate that patient will advance to more solid food textures such as minced and moist level 5 and moderately thick liquids.  Would recommend repeat VFSS before advancing liquid textures further.   SLP Discharge Planning   SLP Plan Review VFSS images with patient/family and reinforce rationale for recommended diet.  Observe patient in a meal setting with minced and moist food textures and moderately thick liquids.   SLP - Acute Rehab Center Time   Individual Time (minutes) - enter zero if not applicable - SLP 30   Group Time (minutes) - enter zero if not applicable  - SLP 0   Concurrent Time (minutes) - enter zero if not applicable  - SLP 0   Co-Treatment Time (minutes) - enter zero if not applicable  - SLP 0   ARC Total Session Time (minutes) - SLP 30

## 2023-03-22 NOTE — PROGRESS NOTES
"  [facility]   Acute Rehabilitation Unit  Daily progress note    INTERVAL HISTORY  Stefan Diallo was seen and examined at bedside this morning during team rounds with significant other present.  No acute events reported overnight.  Nursing notes 1 loose, incontinent BM overnight.  Patient still complaining of itchiness on his back, but has not complained of back pain.    Functionally, somnolence has been most limiting for therapy participation and functional performance.  SLP noting moderate cognitive impairments, will plan for full neuropsych testing next week to further evaluate.  Plan for repeat video swallow today.  Is needing Ax2 for ADLs due to lethargy.  Also noting left neglect.  Left sided tone also observed.  For full functional updates, see team rounds note from today.    MEDICATIONS    apixaban ANTICOAGULANT  5 mg Per Feeding Tube BID     atorvastatin  20 mg Per Feeding Tube At Bedtime     diltiazem  60 mg Per Feeding Tube Q8H     docosanol   Topical 5x Daily     gabapentin  600 mg Per Feeding Tube At Bedtime     insulin aspart  1-7 Units Subcutaneous TID AC     insulin aspart  1-5 Units Subcutaneous At Bedtime     metFORMIN  1,000 mg Oral or Feeding Tube BID w/meals     pantoprazole  40 mg Per Feeding Tube QAM AC        acetaminophen, calamine, dextrose, glucose **OR** dextrose **OR** glucagon, melatonin, - MEDICATION INSTRUCTIONS -, polyethylene glycol, senna-docusate     PHYSICAL EXAM  BP (!) 149/71 (BP Location: Right arm, Patient Position: Supine, Cuff Size: Adult Regular)   Pulse 85   Temp 97.1  F (36.2  C) (Oral)   Resp 18   Ht 1.854 m (6' 1\")   Wt 88.4 kg (194 lb 14.2 oz)   SpO2 95%   BMI 25.71 kg/m     Gen: NAD, sitting up in chair  HEENT: NC/AT, MMM, swelling at left upper lip and overlying lesion improving  Cardio: appears well-perfused  Pulm: non-labored on room air  Abd: soft, non-tender, non-distended, bowel sounds present  Ext: no edema in " bilateral lower extremities  Neuro/MSK: awake, alert though increasing lethargy by end of visit, left facial droop, +dysarthria and aphasia, left neglect, left hemiparesis  *Full exam deferred today for conversation    LABS  CBC RESULTS:   Recent Labs   Lab Test 03/22/23  0541 03/20/23  0655 03/17/23  0655   WBC 12.6* 11.7* 10.5   RBC 4.34* 4.38* 4.41   HGB 13.2* 13.6 13.8   HCT 40.4 41.5 41.9   MCV 93 95 95   MCH 30.4 31.1 31.3   MCHC 32.7 32.8 32.9   RDW 13.1 13.2 13.3   * 445 430     Last Basic Metabolic Panel:  Recent Labs   Lab Test 03/22/23  0650 03/22/23  0233 03/21/23 2133 03/20/23  0728 03/20/23  0655 03/17/23  0840 03/17/23  0655 03/15/23  1127 03/15/23  0845   NA  --   --   --   --  140  --  141  --  142   POTASSIUM  --   --   --   --  4.0  --  4.0  --  4.1   CHLORIDE  --   --   --   --  102  --  102  --  105   CO2  --   --   --   --  25  --  26  --  28   ANIONGAP  --   --   --   --  13  --  13  --  9   * 192* 142*   < > 239*   < > 250*   < > 265*   BUN  --   --   --   --  33.2*  --  31.7*  --  24.5*   CR  --   --   --   --  0.78  --  0.88  --  0.84   GFRESTIMATED  --   --   --   --  >90  --  87  --  89   LUTHER  --   --   --   --  9.9  --  10.0  --  9.5    < > = values in this interval not displayed.     Recent Labs   Lab 03/22/23  0650 03/22/23  0233 03/21/23 2133 03/21/23  1715 03/21/23  1212 03/21/23  0916   * 192* 142* 189* 144* 191*       Rehabilitation - continue comprehensive acute inpatient rehabilitation program with multidisciplinary approach including therapies, rehab nursing, and physiatry following. See interval history for updates.      ASSESSMENT AND PLAN  Stefan Diallo is a 77 year old male with a past medical history of atrial fibrillation on coumadin though held PTA for planned procedure (pain pump placement), chronic bilateral low back pain s/p lumbar fusion, type 2 diabetes mellitus c/b polyneuropathy, renal artery aneurysm, hypertension, hyperlipidemia, KISHA,  GERD, BPH, and chronic bilateral shoulder pain who was admitted on 3/7/23 with acute right MCA stroke s/p tenecteplase with hospital course complicated by dysphagia s/p PEG placement on 3/9 complicated by PEG-site bleeding, suspected aspiration pneumonia, hyperglycemia, constipation, and hypokalemia.  He is now admitted to ARU on 3/16/23 for multidisciplinary rehabilitation and ongoing medical management.        Admission to acute inpatient rehab 03/16/23.    Impairment group code: Stroke Ischemic 01.1 (L) Body Involvement (R) Brain; s/p acute ischemic stroke of R MCA territory due to cardioembolism status post tenecteplase        1. PT, OT and SLP 60 minutes of each on a daily basis, in addition to rehab nursing and close management of physiatrist.       2. Impairment of ADL's: Noted to have impaired activity tolerance, impaired balance, impaired coordination, impaired judgement and safety awareness, impaired strength, impaired tone, impaired weight shifting, impulsiveness and pain, all affecting his ability to safely and independently perform basic ADLs.  Goal for assist of 1 with basic ADLs.     3. Impairment of mobility:  Noted to have impaired activity tolerance, impaired balance, impaired coordination, impaired judgement and safety awareness, impaired strength, impaired tone, impaired weight shifting, impulsiveness and pain, all affecting his ability to safely and independently perform basic mobility.  Goal for assist of 1 with basic mobility.     4. Impairment of cognition/language/swallow:  Noted to have dysarthria, aphasia, and impaired cognition with goals for improved cognitive-linguistic skills to meet basic needs.     5. Medical Conditions  New actions/orders/updates for today are in blue.     Acute R MCA stroke s/p tenecteplase 3/7, likely secondary to cardioembolism from atrial fibrillation off anticoagulation for an anticipated procedure  Initially on aspirin, later restarted on anticoagulation but  with Eliquis in place of PTA warfarin.  - Continue apixaban 5 mg BID  - Continue PTA statin.  Goal LDL 40-70.  - Decreased alertness has been barrier to therapy participation and function.   Will trial amantadine 100 mg daily initially and if tolerating, increase to BID in coming days.  - Consult for neuropsych eval  - Long term BP goal <135/80 to be achieved as outpatient within several weeks.  Management as below.  - Continue PT/OT/SLP  - Follow up with general neurology in 6-8 weeks     Possible early sepsis due to aspiration pneumonia  Started on unasyn + vanco 3/11.  MRSA nares negative.  BC's still negative at discharge.  Stopped vancomycin 3/13.  Changed to FT augmentin 3/15 with a plan for 7 total days abx, completed as of 3/18.  - Monitor respiratory status, SLP for dysphagia treatment as below     Atrial fibrillation  [PTA on warfarin, diltiazem]  On chronic anticoagulation with warfarin.  However, had been held since 2/25 for planned pain pump placement. PTA long-acting extended release diltiazem cannot be given through the PEG tube, substituted short-acting. Started on DOAC in place of PTA warfarin on 3/14.  - Continue Eliquis 5 mg BID  - Continue short acting diltiazem 60 mg TID while NPO  - Continue to monitor     Dyslipidemia  [PTA meds: atorvastatin 20 mg daily, gemfibrozil 600 mg BID]  - Continue PTA statin       Hypertension  [PTA meds: atenolol 25 mg daily, diltiazem  mg daily, lisinopril 5 mg daily]  Resuming PTA meds gradually. PTA diltiazem increased to 60 mg TID on 3/14/23.   - Long term BP <130/80, inpatient meds can be slowly titrated to this goal as otherwise appropriate  - Continue diltiazem 60 mg q8h  - BP has been slightly above long-term goal.  If trend continues, will resume PTA lisinopril vs atenolol pending renal function tomorrow.  - Resume PTA lisinopril, atenolol as indicated     Type II DM  Tube feeding induced hyperglycemia  Polyneuropathy  [PTA meds: metformin 500 mg qAM  + 1000 mg qPM, gabapentin 600 mg at HS]  A1c 6.4% on 2/1/23.  PTA metformin held this admission.  Managed on small doses of Lantus and sliding scale insulin.  Stopped Lantus on 3/17, resumed home metformin initially at 500 mg BID, increased to PTA dose as of 3/20.  Recent Labs   Lab 03/22/23  0650 03/22/23  0233 03/21/23  2133 03/21/23  1715 03/21/23  1212 03/21/23  0916   * 192* 142* 189* 144* 191*   - Continue home gabapentin  - Continue metformin 1000 mg BID (increased on 3/21)  - -209, metformin just increased yesterday, monitor  - Continue Novolog medium intensity sliding scale insulin  - Monitor BG TID AC + HS + 0200  - Hypoglycemia protocol     KISHA  - Has been refusing home CPAP, may be contributor to daytime somnolence. Discussed with patient who reports that he has not been using because he does not sleep as well with CPAP.  Also reports that he did not use just PTA for the same reason, unclear if/when used regularly.  Reviewed risks of untreated sleep apnea but may require ongoing discussion given impaired cognition.     Chronic back pain  Patient planned for pain pump placement 3/8/23 (Javi).  States he has chronic back pain from nerve impingement and uses tylenol PTA for pain.   - Continue Tylenol prn for pain  - Monitor     Moderate oral, severe oropharyngeal dysphagia  S/p PEG placement 3/9  - Cycled tube feeds per PEG (switched from continuous on 3/18)  - Continue water flushes 180 mL QID  - RD following, assistance appreciated  - NPO with ice chips per SLP recs with supervision  - Continue SLP  - T tacs removed 3/20   - Plan for repeat VFSS today     Loose stools  Constipation, resolved  - Continue senokot-S 2 tabs BID PRN, Miralax PRN  - Large loose, urgent/incontinent BM on 3/21 AM.  Could be related to tube feeds vs resumption and subsequent increase in metformin.  No abdominal pain, nausea/vomiting.  Afebrile, abdominal exam benign.  Discussed with RD, feel fiber already high in  current formula so did not advise to increase.  If related to metformin, anticipate improvement in coming days.  If loose/incontinent stools ongoing, will need to consider further evaluation, possible implementation of bowel program with regular suppository, vs anti-diarrheal.   Had another large, loose, incontinent BM overnight.  Continue to monitor.     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  CRP elevated at 44 (though prevoiusly 312 on 3/11).  Previously had mild leukocytosis (WBC 12.7 on 3/11) in setting of suspected aspiration pneumonia, but normalized the following day.  Afebrile, no localizing signs/symptoms of infection.  - WBC slightly up today at 12.6, CRP also just slightly uptrended to 12.6.  Remains afebrile, VSS WNL other than mildly elevated BP.  No localizing signs/symptoms of infection other than loose stools though suspect related to tube feeds and meds as above.  Given lethargy, will obtain COVID PCR. CMP and procal pending.  Continue to trend labs and monitor for evidence of infection.    6. Adjustment to disability:  Clinical psychology to eval and treat if indicated  7. FEN: NPO on cycled tube feeds via PEG  8. Bowel: incontinent, monitor, PRN bowel meds available  9. Bladder: incontinent, using Primofit at night, will need to wean  10. DVT Prophylaxis: apixaban  11. GI Prophylaxis: PPI  12. Code: full  13. Disposition: goal for home  14. ELOS:  target 4/7/23  15. Follow up Appointments on Discharge: PCP in 1-2 weeks, general neurology in 6-8 weeks      Patient was seen and discussed with Dr. Liu Stinson, PM&R staff physician     Kenna Ruiz PA-C  Physical Medicine & Rehabilitation

## 2023-03-22 NOTE — PROGRESS NOTES
Discharge Planner Post-Acute Rehab OT:      Discharge Plan: Home with HCOT and 24/7 support available     Precautions: Falls, left side hemiparesis, PEG tube, NPO, slurred speech      Current Status:  ADLs:    Mobility: Ax2 rahel melinda, wc based. Mod Ax1 STS     Grooming: Min A seated in TIS W/C    Dressing: UB- mod A seated in tilt in space w/c; LB-max A Footwear-dependent     Bathing: Liko lift to purple dependent shower chair, max A for bathing    Toileting: Rahel steady Ax2 to/from grey dependent commode, Total A with toilet hygiene.   IADLs: Previously IND with all I/ADLs, lives separately from brynn swain and 2 kids live in [facility]   Vision/Cognition: L side neglect, slurred speech, decreased safety awareness      Assessment:  Pt demonstrated progress with increased IND with UB and LB dressing. Pt requires mod-max verbal cues for sequencing. Facilitated attention to L side during ADL with cues. Continue with POC to maximize IND with ADL and IADL.      Other Barriers to Discharge (DME, Family Training, etc):   Family training TBD (potential support from brynn, son, daughter)   Split-level home (full flight of stairs to get from main level from basement entry in garage)   Cognitive deficits

## 2023-03-22 NOTE — PROGRESS NOTES
Discharge Planner Post-Acute Rehab PT:     Discharge Plan: HCPT    Precautions: NPO, falls    Current Status:  Bed Mobility: Deacon of 1 to help with LE  Transfer: modA of 1 for STS, rahel steady for nursing  Gait: 5' in // BUE support, CGA, chair follow  Stairs: NT safety concerns  Balance: Able to sit unsupported briefly, tendency to lean back. Pt able to stand with BUE on railing for up to 2'    Assessment: Pt had increased fatigue today and fell asleep multiple times during session. Pt needed extra motivating and prompting to perform tasks. Performed prolonged standing in standing frame today, also was on Nustep to promote reciprocal patterning and movement for tight LEs.    Other Barriers to Discharge (DME, Family Training, etc):  - Pt lives alone but has support from fiance, son and daughter  - Full flight of stairs to get to main level from basement entry in garage  - May need w/c  - Family training needs to be set up, fiance present today  - Cog deficits

## 2023-03-22 NOTE — PLAN OF CARE
Goal Outcome Evaluation:  FOCUS/GOAL  Bowel management, Bladder management, Medical management, and Reinforcement of self-care/ADL    ASSESSMENT, INTERVENTIONS AND CONTINUING PLAN FOR GOAL:  Patient is alert to self/place but can be disoriented to time/situation and has slow response times and some slurred speech.  Patient is able to follow most commands with extra time allowed and some repetition.  Patient has slight weakness to the left side, some left neglect as well.  Patient denies any pain, still has itchiness to back, applying calamine lotion PRN.  Rounds today for patient and discussed adding Amantadine to help patient to be more alert, first dose was added for 11am but med was not available before patient went for swallow study and then pharmacy unable to send up after that so discussed with PA and held this dose, will start in am tomorrow.  Patient is continent and incontinent with urine, TF hooked up at 1400 and flushes given per orders.  No additional care concerns, continue with POC.

## 2023-03-22 NOTE — PLAN OF CARE
Goal Outcome Evaluation:    Orientation; Alert and oriented x2 with some confusion   Ambulation/Transfer; ssist of x 2 with Bri stedy   Bladder; Currently on primo fit emptied x 1 on this shift   Bowel; Incontinent x1  on this shift   Pain; Denies any pain  Diet; NPO  Meds; Via J-tube   Tubes/lines and Drains; No line or drains   Skin; Intact   Others;

## 2023-03-22 NOTE — PLAN OF CARE
Discharge Planner Post-Acute Rehab SLP:     Discharge Plan: home with SO 24/7, ongoing SP    Precautions: PEG, aspiration risk    Current Status:  Hearing: WFL  Vision: WFL  Communication: Decreased vocal intensity  Cognition: To be evaluated pending improved alertness/participation  Swallow: NPO, 5-10 ice chips with RN or SLP after oral care only at this time    Assessment:SLP facilitated oral cares and cleaned pt's dentures prior to beginning trials of ice chips. Pt performed x15 trials of ice chips targeting efforftul swallow with max verbal cues.Pt did have 1x instance of reflexive coughing. During this instance, pt's alertness was waning and further trials of ice chips were deferred until pt's alertness increased.    Other Barriers to Discharge (Family Training, etc): diet texture training pending progress

## 2023-03-22 NOTE — PROGRESS NOTES
Team rounds this morning. Discussed decision-making, NOK, and HCD/POA. Team notified and reminded that without HCD and if pt unable to make his own decisions, adult children are NOK. Fiance aware of that. Team is continuing to evaluate for decision-making and capacity. Dr. Paulson, Neuropsychologist is planning to do formal neuropsychology testing next week to weigh in further.     Discussed LOS, barriers to discharge (pt home and fiance home), participating/progress, and overall care needs at discharge.     Pt has BCBS Medicare Advantage, which often times will not authorize a TCU from ARU. Team notified of that during rounds. Final discharge needs and plan TBD. RN CC following as well. Will continue to assess week-by-week and continue to follow.     CARIN Carpio   [facility]   Direct Phone: [phone]  I   Pager: [phone]  I  Fax: [phone]

## 2023-03-23 NOTE — PLAN OF CARE
Discharge Planner Post-Acute Rehab SLP:     Discharge Plan: home with SO 24/7, ongoing SP    Precautions: PEG, aspiration risk    Current Status:  Hearing: WFL  Vision: WFL  Communication: Decreased vocal intensity  Cognition: To be evaluated pending improved alertness/participation  Swallow: NPO, 5-10 ice chips with RN or SLP after oral care only at this time    Assessment:     AM: Pt laying in bed when SLP arrived, but was roused easily to begin oral cares. SLP facilitated oral cares with the patient and assisted in cleaning and placing pt's dentures. SLP brought in  minced and moist (5) and moderately thick (3) breakfast tray to trial. SLP provided 1:1 feeding assist with bites of food, but pt independent in taking sips of liquid. Pt tolerated minced and moist (5) food textures with no difficulty or overt s/sx of aspiration. Limited appetite for meal d/t 100% nutrition intake from PEG. SLP will trial another meal later this afternoon.Pt tolerated small, single sips of moderately thick liquid with no overt s/sx of aspiration. Pt does however, have a consistent wet, gurgly quality to his voice. SLP will continue to observe this in PM session to determine diet advancement.    PM: Pt seen in bed but was alert when SLP arrived. SLP faciliatated oral cares prior to trials of food textures. Pt's dentures already in place. Pt with notable lethargy and varying alertness throughout but did trial several bites of minced and moist (5) mac'n'cheese and mashed potato. Pt seemed to tolerate food textures well with min oral residue. Pt had a bite of puree followed by sip of moderately thick liquid (3), patient did have 1x delayed throat clear and significant wet, gurgly vocal quality. Pt required verbal cue for hard cough and pt was able to produce stronger cough. Did not advance diet at this time, will remain NPO and SLP to observe meal again tomorrow to assess potential diet advancement.    Other Barriers to Discharge (Family  Training, etc): diet texture training pending progress

## 2023-03-23 NOTE — PLAN OF CARE
Sleepy this morning but responds to verbal commands. Towards the morning patient became more awake and have his eyes open and became more interactive with staff, participated in therapies. Ha d smear inc , also had a continent BM on the commode, assisted with frederick care. , sliding scale insulin given, TF started at 1400, fiance at bedside and hands on, will continue poc      Goal Outcome Evaluation:

## 2023-03-23 NOTE — PROGRESS NOTES
Discharge Planner Post-Acute Rehab PT:     Discharge Plan: HCPT and 24/7 assist    Precautions: NPO, falls    Current Status:  Bed Mobility: Deacon of 1 to help with LE  Transfer: modA of 1 for STS, rahel steady for nursing  Gait: 5' in // BUE support, CGA, chair follow  Stairs: NT safety concerns  Balance: Able to sit unsupported briefly, tendency to lean back. Pt able to stand with BUE on railing for up to 2'    Assessment: Pt participated in standing frame in AM session, achieved pivot transfer modAx2. In PM session pt with variable performance, lethargic at times requiring maxA at times for seated balance edge of mat (L lateral lean and trunk flexion). Pt also demonstrating lateral trunk lean in serasteady requiring Ax2, difficulty lifting arms to chest height requiring active assist in PM- limited by lethargy and weakness.     Other Barriers to Discharge (DME, Family Training, etc):  - Pt lives alone but has support from fiance, son and daughter  - Full flight of stairs to get to main level from basement entry in garage  - May need w/c  - Family training needs to be set up, fiance present today  - Cog deficits   Isotretinoin Counseling: Patient should get monthly blood tests, not donate blood, not drive at night if vision affected, not share medication, and not undergo elective surgery for 6 months after tx completed. Side effects reviewed, pt to contact office should one occur. Use Enhanced Medication Counseling?: No Isotretinoin Pregnancy And Lactation Text: This medication is Pregnancy Category X and is considered extremely dangerous during pregnancy. It is unknown if it is excreted in breast milk. Birth Control Pills Pregnancy And Lactation Text: This medication should be avoided if pregnant and for the first 30 days post-partum. Topical Retinoid Pregnancy And Lactation Text: This medication is Pregnancy Category C. It is unknown if this medication is excreted in breast milk. Topical Clindamycin Pregnancy And Lactation Text: This medication is Pregnancy Category B and is considered safe during pregnancy. It is unknown if it is excreted in breast milk. Erythromycin Pregnancy And Lactation Text: This medication is Pregnancy Category B and is considered safe during pregnancy. It is also excreted in breast milk. High Dose Vitamin A Pregnancy And Lactation Text: High dose vitamin A therapy is contraindicated during pregnancy and breast feeding. Minocycline Counseling: Patient advised regarding possible photosensitivity and discoloration of the teeth, skin, lips, tongue and gums.  Patient instructed to avoid sunlight, if possible.  When exposed to sunlight, patients should wear protective clothing, sunglasses, and sunscreen.  The patient was instructed to call the office immediately if the following severe adverse effects occur:  hearing changes, easy bruising/bleeding, severe headache, or vision changes.  The patient verbalized understanding of the proper use and possible adverse effects of minocycline.  All of the patient's questions and concerns were addressed. Benzoyl Peroxide Counseling: Patient counseled that medicine may cause skin irritation and bleach clothing.  In the event of skin irritation, the patient was advised to reduce the amount of the drug applied or use it less frequently.   The patient verbalized understanding of the proper use and possible adverse effects of benzoyl peroxide.  All of the patient's questions and concerns were addressed. Dapsone Counseling: I discussed with the patient the risks of dapsone including but not limited to hemolytic anemia, agranulocytosis, rashes, methemoglobinemia, kidney failure, peripheral neuropathy, headaches, GI upset, and liver toxicity.  Patients who start dapsone require monitoring including baseline LFTs and weekly CBCs for the first month, then every month thereafter.  The patient verbalized understanding of the proper use and possible adverse effects of dapsone.  All of the patient's questions and concerns were addressed. Azithromycin Pregnancy And Lactation Text: This medication is considered safe during pregnancy and is also secreted in breast milk. Erythromycin Counseling:  I discussed with the patient the risks of erythromycin including but not limited to GI upset, allergic reaction, drug rash, diarrhea, increase in liver enzymes, and yeast infections. Topical Clindamycin Counseling: Patient counseled that this medication may cause skin irritation or allergic reactions.  In the event of skin irritation, the patient was advised to reduce the amount of the drug applied or use it less frequently.   The patient verbalized understanding of the proper use and possible adverse effects of clindamycin.  All of the patient's questions and concerns were addressed. Topical Retinoid counseling:  Patient advised to apply a pea-sized amount only at bedtime and wait 30 minutes after washing their face before applying.  If too drying, patient may add a non-comedogenic moisturizer. The patient verbalized understanding of the proper use and possible adverse effects of retinoids.  All of the patient's questions and concerns were addressed. Tetracycline Counseling: Patient counseled regarding possible photosensitivity and increased risk for sunburn.  Patient instructed to avoid sunlight, if possible.  When exposed to sunlight, patients should wear protective clothing, sunglasses, and sunscreen.  The patient was instructed to call the office immediately if the following severe adverse effects occur:  hearing changes, easy bruising/bleeding, severe headache, or vision changes.  The patient verbalized understanding of the proper use and possible adverse effects of tetracycline.  All of the patient's questions and concerns were addressed. Patient understands to avoid pregnancy while on therapy due to potential birth defects. Tetracycline Pregnancy And Lactation Text: This medication is Pregnancy Category D and not consider safe during pregnancy. It is also excreted in breast milk. Bactrim Pregnancy And Lactation Text: This medication is Pregnancy Category D and is known to cause fetal risk.  It is also excreted in breast milk. Doxycycline Counseling:  Patient counseled regarding possible photosensitivity and increased risk for sunburn.  Patient instructed to avoid sunlight, if possible.  When exposed to sunlight, patients should wear protective clothing, sunglasses, and sunscreen.  The patient was instructed to call the office immediately if the following severe adverse effects occur:  hearing changes, easy bruising/bleeding, severe headache, or vision changes.  The patient verbalized understanding of the proper use and possible adverse effects of doxycycline.  All of the patient's questions and concerns were addressed. High Dose Vitamin A Counseling: Side effects reviewed, pt to contact office should one occur. Patient Specific Counseling (Will Not Stick From Patient To Patient): Continue bactrim 1 more month Dapsone Pregnancy And Lactation Text: This medication is Pregnancy Category C and is not considered safe during pregnancy or breast feeding. Topical Sulfur Applications Pregnancy And Lactation Text: This medication is Pregnancy Category C and has an unknown safety profile during pregnancy. It is unknown if this topical medication is excreted in breast milk. Benzoyl Peroxide Pregnancy And Lactation Text: This medication is Pregnancy Category C. It is unknown if benzoyl peroxide is excreted in breast milk. Tazorac Pregnancy And Lactation Text: This medication is not safe during pregnancy. It is unknown if this medication is excreted in breast milk. Bactrim Counseling:  I discussed with the patient the risks of sulfa antibiotics including but not limited to GI upset, allergic reaction, drug rash, diarrhea, dizziness, photosensitivity, and yeast infections.  Rarely, more serious reactions can occur including but not limited to aplastic anemia, agranulocytosis, methemoglobinemia, blood dyscrasias, liver or kidney failure, lung infiltrates or desquamative/blistering drug rashes. Topical Sulfur Applications Counseling: Topical Sulfur Counseling: Patient counseled that this medication may cause skin irritation or allergic reactions.  In the event of skin irritation, the patient was advised to reduce the amount of the drug applied or use it less frequently.   The patient verbalized understanding of the proper use and possible adverse effects of topical sulfur application.  All of the patient's questions and concerns were addressed. Spironolactone Counseling: Patient advised regarding risks of diarrhea, abdominal pain, hyperkalemia, birth defects (for female patients), liver toxicity and renal toxicity. The patient may need blood work to monitor liver and kidney function and potassium levels while on therapy. The patient verbalized understanding of the proper use and possible adverse effects of spironolactone.  All of the patient's questions and concerns were addressed. Azithromycin Counseling:  I discussed with the patient the risks of azithromycin including but not limited to GI upset, allergic reaction, drug rash, diarrhea, and yeast infections. Detail Level: Zone Doxycycline Pregnancy And Lactation Text: This medication is Pregnancy Category D and not consider safe during pregnancy. It is also excreted in breast milk but is considered safe for shorter treatment courses. Birth Control Pills Counseling: Birth Control Pill Counseling: I discussed with the patient the potential side effects of OCPs including but not limited to increased risk of stroke, heart attack, thrombophlebitis, deep venous thrombosis, hepatic adenomas, breast changes, GI upset, headaches, and depression.  The patient verbalized understanding of the proper use and possible adverse effects of OCPs. All of the patient's questions and concerns were addressed. Spironolactone Pregnancy And Lactation Text: This medication can cause feminization of the male fetus and should be avoided during pregnancy. The active metabolite is also found in breast milk. Tazorac Counseling:  Patient advised that medication is irritating and drying.  Patient may need to apply sparingly and wash off after an hour before eventually leaving it on overnight.  The patient verbalized understanding of the proper use and possible adverse effects of tazorac.  All of the patient's questions and concerns were addressed.

## 2023-03-23 NOTE — PLAN OF CARE
FOCUS/GOAL  Medical management    ASSESSMENT, INTERVENTIONS AND CONTINUING PLAN FOR GOAL:  Pt is seen sleeping w/ TF running through the peg tube. Pt is on primo fit. Had small incontinent of bowel twice tonight. A2 w/ frederick cares and clothing management. Placed his cpap on but he took off by 3am. BG checked at 2am and this morning. Denies pain. Complain of itch on his back, prn cream applied. Cont w/ POC.  Goal Outcome Evaluation:      Plan of Care Reviewed With: patient    Overall Patient Progress: no changeOverall Patient Progress: no change    Outcome Evaluation: Continue w/ current cares and treatments

## 2023-03-23 NOTE — PLAN OF CARE
Goal Outcome Evaluation:      Plan of Care Reviewed With: patient    Overall Patient Progress: no changeOverall Patient Progress: no change    Outcome Evaluation: Patient received in bed, alert and able to communicate needs. No new skin issues noted, uses the Primofit at bedtime for bladder incontinence. Incontinent bowel episodes addressed in a timely manner. Tube feeding patent, meds given without difficulties. Able to use the call light for assistance.

## 2023-03-23 NOTE — PLAN OF CARE
Goal Outcome Evaluation:      Plan of Care Reviewed With: patient, family          Outcome Evaluation: Pt continues to tolerate TF. No plans for diet advancement today. Will monitor diet advancement per SLP

## 2023-03-23 NOTE — PROGRESS NOTES
Discharge Planner Post-Acute Rehab OT:      Discharge Plan: Home with HCOT and 24/7 support available     Precautions: Falls, left side hemiparesis, PEG tube, NPO, slurred speech      Current Status:  ADLs:    Mobility: Ax2 rahel stedy, wc based. Min Ax1 STS with rahel steady    Grooming: Min A seated in TIS W/C    Dressing: UB- mod-max A seated in tilt in space w/c; LB-total A with rahel steady, Footwear-dependent     Bathing: Liko lift to purple dependent shower chair, max A for bathing    Toileting: Rahel steady Ax2 to/from grey dependent commode, Total A with toilet hygiene.   IADLs: Previously IND with all I/ADLs, lives separately from brynn swain and 2 kids live in [facility]   Vision/Cognition: L side neglect, slurred speech, decreased safety awareness      Assessment:  Pt limited by lethargy during OT session and needing max encouragement to participate. Pt continues to require max-total A with FB dressing tasks and Ax2 with rahel steady for toileting and transfers. Confirmed with S.O. that both his home and her home are split level homes.      Other Barriers to Discharge (DME, Family Training, etc):   Family training TBD (potential support from brynn, son, daughter)   Split-level home (full flight of stairs to get from main level from basement entry in garage)   Cognitive deficits

## 2023-03-23 NOTE — PROGRESS NOTES
"  [facility]   Acute Rehabilitation Unit  Daily progress note    INTERVAL HISTORY  Stefan Diallo was seen and examined at bedside this morning with significant other present.  No acute events reported overnight.  Reports that he is feeling \"hungry and thirsty\".  He is quite sleepy still this morning though notes he slept ok last night.  Noted to have some bleeding of lower lip, denies injury or pain.  He denies chest pain, shortness of breath, dizziness, abdominal pain, nausea.    Functionally, he is currently needing min A for bed mobility, mod A for sit to stand with therapies, using rahel Secucloudabimbola with nursing.  He was able to performed prolonged standing in standing frame.    MEDICATIONS    amantadine  100 mg Per Feeding Tube Daily     apixaban ANTICOAGULANT  5 mg Per Feeding Tube BID     atorvastatin  20 mg Per Feeding Tube At Bedtime     diltiazem  60 mg Per Feeding Tube Q8H     docosanol   Topical 5x Daily     gabapentin  600 mg Per Feeding Tube At Bedtime     hydrocortisone   Topical TID     insulin aspart  1-7 Units Subcutaneous TID AC     insulin aspart  1-5 Units Subcutaneous At Bedtime     metFORMIN  1,000 mg Oral or Feeding Tube BID w/meals     pantoprazole  40 mg Per Feeding Tube QAM AC        acetaminophen, calamine, dextrose, glucose **OR** dextrose **OR** glucagon, melatonin, - MEDICATION INSTRUCTIONS -, polyethylene glycol, senna-docusate     PHYSICAL EXAM  /77 (BP Location: Right arm)   Pulse 85   Temp 97  F (36.1  C) (Axillary)   Resp 18   Ht 1.854 m (6' 1\")   Wt 89.3 kg (196 lb 13.9 oz)   SpO2 95%   BMI 25.97 kg/m     Gen: NAD, lying in bed  HEENT: NC/AT, MMM, swelling at left upper lip and overlying lesion improving but new abrasion with mild bleeding at lower lip  Cardio: irregularly irregular, no murmurs  Pulm: non-labored on room air, lungs CTA bilaterally  Abd: soft, non-tender, non-distended, bowel sounds present  Ext: no edema in bilateral " lower extremities  Neuro/MSK: awake, lethargic, left facial droop, +dysarthria and aphasia, left neglect, left hemiparesis    LABS  CBC RESULTS:   Recent Labs   Lab Test 03/23/23  0557 03/22/23  0541 03/20/23  0655   WBC 11.7* 12.6* 11.7*   RBC 4.26* 4.34* 4.38*   HGB 13.1* 13.2* 13.6   HCT 39.7* 40.4 41.5   MCV 93 93 95   MCH 30.8 30.4 31.1   MCHC 33.0 32.7 32.8   RDW 13.2 13.1 13.2    464* 445     Last Basic Metabolic Panel:  Recent Labs   Lab Test 03/23/23  0620 03/23/23  0557 03/23/23  0204 03/22/23  0650 03/22/23  0541 03/20/23  0728 03/20/23  0655   NA  --  138  --   --  140  --  140   POTASSIUM  --  3.9  --   --  4.2  --  4.0   CHLORIDE  --  101  --   --  103  --  102   CO2  --  24  --   --  23  --  25   ANIONGAP  --  13  --   --  14  --  13   * 217* 195*   < > 210*   < > 239*   BUN  --  31.6*  --   --  32.1*  --  33.2*   CR  --  0.75  --   --  0.83  --  0.78   GFRESTIMATED  --  >90  --   --  89  --  >90   LUTHER  --  9.7  --   --  9.9  --  9.9    < > = values in this interval not displayed.     Recent Labs   Lab 03/23/23  0620 03/23/23  0557 03/23/23  0204 03/22/23  2135 03/22/23  1739 03/22/23  1352   * 217* 195* 153* 163* 178*       Rehabilitation - continue comprehensive acute inpatient rehabilitation program with multidisciplinary approach including therapies, rehab nursing, and physiatry following. See interval history for updates.      ASSESSMENT AND PLAN  Stefan Diallo is a 77 year old male with a past medical history of atrial fibrillation on coumadin though held PTA for planned procedure (pain pump placement), chronic bilateral low back pain s/p lumbar fusion, type 2 diabetes mellitus c/b polyneuropathy, renal artery aneurysm, hypertension, hyperlipidemia, KISHA, GERD, BPH, and chronic bilateral shoulder pain who was admitted on 3/7/23 with acute right MCA stroke s/p tenecteplase with hospital course complicated by dysphagia s/p PEG placement on 3/9 complicated by PEG-site bleeding,  suspected aspiration pneumonia, hyperglycemia, constipation, and hypokalemia.  He is now admitted to ARU on 3/16/23 for multidisciplinary rehabilitation and ongoing medical management.        Admission to acute inpatient rehab 03/16/23.    Impairment group code: Stroke Ischemic 01.1 (L) Body Involvement (R) Brain; s/p acute ischemic stroke of R MCA territory due to cardioembolism status post tenecteplase        1. PT, OT and SLP 60 minutes of each on a daily basis, in addition to rehab nursing and close management of physiatrist.       2. Impairment of ADL's: Noted to have impaired activity tolerance, impaired balance, impaired coordination, impaired judgement and safety awareness, impaired strength, impaired tone, impaired weight shifting, impulsiveness and pain, all affecting his ability to safely and independently perform basic ADLs.  Goal for assist of 1 with basic ADLs.     3. Impairment of mobility:  Noted to have impaired activity tolerance, impaired balance, impaired coordination, impaired judgement and safety awareness, impaired strength, impaired tone, impaired weight shifting, impulsiveness and pain, all affecting his ability to safely and independently perform basic mobility.  Goal for assist of 1 with basic mobility.     4. Impairment of cognition/language/swallow:  Noted to have dysarthria, aphasia, and impaired cognition with goals for improved cognitive-linguistic skills to meet basic needs.     5. Medical Conditions  New actions/orders/updates for today are in blue.     Acute R MCA stroke s/p tenecteplase 3/7, likely secondary to cardioembolism from atrial fibrillation off anticoagulation for an anticipated procedure  Initially on aspirin, later restarted on anticoagulation but with Eliquis in place of PTA warfarin.  - Continue apixaban 5 mg BID  - Continue PTA statin.  Goal LDL 40-70.  - Decreased alertness has been barrier to therapy participation and function.   Due to pharmacy delay, will get  1st dose of amantadine 100 mg daily today.  If tolerating, plan increase to BID in coming days.  - Consult for neuropsych eval  - Long term BP goal <135/80 to be achieved as outpatient within several weeks.  Management as below.  - Continue PT/OT/SLP  - Follow up with general neurology in 6-8 weeks     Possible early sepsis due to aspiration pneumonia  Started on unasyn + vanco 3/11.  MRSA nares negative.  BC's still negative at discharge.  Stopped vancomycin 3/13.  Changed to FT augmentin 3/15 with a plan for 7 total days abx, completed as of 3/18.  - Monitor respiratory status, SLP for dysphagia treatment as below     Atrial fibrillation  [PTA on warfarin, diltiazem]  On chronic anticoagulation with warfarin.  However, had been held since 2/25 for planned pain pump placement. PTA long-acting extended release diltiazem cannot be given through the PEG tube, substituted short-acting. Started on DOAC in place of PTA warfarin on 3/14.  - Continue Eliquis 5 mg BID  - Continue short acting diltiazem 60 mg TID while NPO  - Continue to monitor     Dyslipidemia  [PTA meds: atorvastatin 20 mg daily, gemfibrozil 600 mg BID]  - Continue PTA statin       Hypertension  [PTA meds: atenolol 25 mg daily, diltiazem  mg daily, lisinopril 5 mg daily]  Resuming PTA meds gradually. PTA diltiazem increased to 60 mg TID on 3/14/23.   - Long term BP <130/80, inpatient meds can be slowly titrated to this goal as otherwise appropriate  - Continue diltiazem 60 mg q8h  - BP slightly labile though most readings at goal, defer additional med changes for now  - Resume PTA lisinopril, atenolol as indicated     Type II DM  Tube feeding induced hyperglycemia  Polyneuropathy  [PTA meds: metformin 500 mg qAM + 1000 mg qPM, gabapentin 600 mg at HS]  A1c 6.4% on 2/1/23.  PTA metformin held this admission.  Managed on small doses of Lantus and sliding scale insulin.  Stopped Lantus on 3/17, resumed home metformin initially at 500 mg BID, increased  to PTA dose as of 3/20.  Recent Labs   Lab 03/23/23  0620 03/23/23  0557 03/23/23  0204 03/22/23  2135 03/22/23  1739 03/22/23  1352   * 217* 195* 153* 163* 178*   - Continue home gabapentin  - Continue metformin 1000 mg BID (increased on 3/21)  - -207, given results of swallow study and may be able to decrease reliance on tube feeds in near future, will hold off on additional changes and monitor  - Continue Novolog medium intensity sliding scale insulin  - Monitor BG TID AC + HS + 0200  - Hypoglycemia protocol     KISHA  - Has been refusing home CPAP, may be contributor to daytime somnolence. Discussed with patient who reports that he has not been using because he does not sleep as well with CPAP.  Also reports that he did not use just PTA for the same reason, unclear if/when used regularly.  Reviewed risks of untreated sleep apnea but may require ongoing discussion given impaired cognition.     Chronic back pain  Patient planned for pain pump placement 3/8/23 (Javi).  States he has chronic back pain from nerve impingement and uses tylenol PTA for pain.   - Continue Tylenol prn for pain  - Monitor     Moderate oral, severe oropharyngeal dysphagia  S/p PEG placement 3/9  - Cycled tube feeds per PEG (switched from continuous on 3/18)  - Continue water flushes 180 mL QID  - RD following, assistance appreciated  - NPO with ice chips per SLP recs with supervision  - Continue SLP  - T tacs removed 3/20   - Repeat VFSS on 3/22.  Per SLP, patient demonstrated significant improvement compared to prior VFSS on 3/9 though still aspiration with thin and mildly thick liquids.  Pending success at bedside observation, anticipate starting PO diet such as minced and moist and moderately thick liquids.  Would repeat VFSS before advancing liquids further.     Loose stools  Constipation, resolved  - Continue senokot-S 2 tabs BID PRN, Miralax PRN  - Large loose, urgent/incontinent BM on 3/21 AM.  Could be related to tube  feeds vs resumption and subsequent increase in metformin.  No abdominal pain, nausea/vomiting.  Afebrile, abdominal exam benign.  Discussed with RD, feel fiber already high in current formula so did not advise to increase.  If related to metformin, anticipate improvement in coming days.  If loose/incontinent stools ongoing, will need to consider further evaluation, possible implementation of bowel program with regular suppository, vs anti-diarrheal.   Had another large, loose, incontinent BM last evening.  Small incontinent stool this morning.  Continue to monitor.     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  CRP elevated at 44 (though prevoiusly 312 on 3/11).  Previously had mild leukocytosis (WBC 12.7 on 3/11) in setting of suspected aspiration pneumonia, but normalized the following day.  Afebrile, no localizing signs/symptoms of infection.  Procal very mildly elevated at 0.07.  - WBC improved though still mildly elevated at 11.7. Still no localizing signs/symptoms of infection, afebrile.  Will monitor and complete further work-up if clinical changes.  Repeat CBC on Monday or sooner if changes.      6. Adjustment to disability:  Clinical psychology to eval and treat if indicated  7. FEN: NPO on cycled tube feeds via PEG  8. Bowel: incontinent, monitor, PRN bowel meds available  9. Bladder: incontinent, using Primofit at night, will need to wean  10. DVT Prophylaxis: apixaban  11. GI Prophylaxis: PPI  12. Code: full  13. Disposition: goal for home  14. ELOS:  target 4/7/23  15. Follow up Appointments on Discharge: PCP in 1-2 weeks, general neurology in 6-8 weeks      Patient was discussed with Dr. Liu Stinson, PM&R staff physician     Kenna Ruiz PA-C  Physical Medicine & Rehabilitation

## 2023-03-23 NOTE — PROGRESS NOTES
CLINICAL NUTRITION SERVICES - REASSESSMENT NOTE     Nutrition Prescription    RECOMMENDATIONS FOR MDs/PROVIDERS TO ORDER:  None at this time    Malnutrition Status:    Patient does not meet two of the established criteria necessary for diagnosing malnutrition but is at risk for malnutrition    Recommendations already ordered by Registered Dietitian (RD):  Continue TF at goal rate     Future/Additional Recommendations:  Follow SLP for progression of diet-modified diet  Monitor future PO intake, wt trends, monitor TF tolerance     EVALUATION OF THE PROGRESS TOWARD GOALS   Diet: NPO  Nutrition Support:   Jevity 1.5 Kip @ goal of 85 ml/hr x 18 hours from 2 pm - 8 am (1530 ml/day) will provide: 2295 kcals (27 kcal/kg), 98 g PRO (1.1 g/kg), 1163 ml free H20, 330 g CHO, and 32 g fiber daily.     FWF: 180 mL QID at 0800, 1100, 1400, 1800     Total fluid (TF + flushes) = 1883 mL/day      NEW FINDINGS      Met with pt and family this am. Pt was asleep/dozing in his chair, and not responding to questions during visit. Pt's family reports Stefan gets hungry when his TF is off from 8a-2pm. Pt's family says pt is looking forward to consuming food orally once diet is liberalized. Following SLP, who are likely going to liberalize pt's diet today or tomorrow to a texture-modified diet. Anticipating weaning pt off TF once PO intake is initiated.     Weights:  5% wt loss from 3/16-3/23 (1 week)  7.5% wt loss from 3/8-3/23 (2 weeks)  Wt Readings from Last 20 Encounters:   03/23/23 89.3 kg (196 lb 13.9 oz)   03/20/23 88.4 kg    3/18/23 92.0 kg   3/16/23 93.8 kg   03/11/23 98 kg (216 lb 0.8 oz)   03/08/23 96.6 kg (212 lb 15.4 oz)   03/07/23 97.5 kg (215 lb)   02/01/23 98.9 kg (218 lb)   01/20/23 105.7 kg (233 lb)   08/19/22 95.9 kg (211 lb 8 oz)   08/10/22 93.9 kg (207 lb 1.6 oz)   05/21/22 100.7 kg (222 lb)   05/16/22 101 kg (222 lb 12 oz)   10/31/21 104.3 kg (230 lb)      Labs reviewed:  BUN: 31.6 (H)  Glucose: 153-207 (H) past 24hrs (by  meter)  Hemoglobin: 13.1 (L)    Meds reviewed:  Amantadine  lipitor  Novolog  metformin  protonix    GI:  Last BM: 3/23, 1-2x stools per day on average    MALNUTRITION  % Intake: No decreased intake noted - needs met with TF  % Weight Loss: > 2% in 1 week (severe)  Subcutaneous Fat Loss: None observed  Muscle Loss: None observed   Fluid Accumulation/Edema: None noted  Malnutrition Diagnosis: Patient does not meet two of the established criteria necessary for diagnosing malnutrition but is at risk for malnutrition    Previous Goals   Total avg nutritional intake to meet a minimum of 20 kcal/kg and 1 g PRO/kg daily (per dosing wt 93.8 kg).  Evaluation: Met    Previous Nutrition Diagnosis  Inadequate oral intake related to chewing/swallowing difficulties as evidenced by NPO status and need for TF to meet nutrition needs.    Evaluation: No change    CURRENT NUTRITION DIAGNOSIS  Inadequate oral intake related to chewing/swallowing difficulties as evidenced by NPO status and need for TF to meet nutrition needs.      INTERVENTIONS  Implementation  Collaborate with other providers  Continue TF at goal rate    Goals  Total avg nutritional intake to meet a minimum of 20 kcal/kg and 1 g PRO/kg daily (per dosing wt 93.8 kg).    Monitoring/Evaluation  Progress toward goals will be monitored and evaluated per protocol.    Orion Navarrete - Dietetic Intern

## 2023-03-24 NOTE — PLAN OF CARE
Goal Outcome Evaluation:      Plan of Care Reviewed With: patient    Overall Patient Progress: improvingOverall Patient Progress: improving    Outcome Evaluation: Pt remains with mixed continence of bladder, using both the urinal and primofit. No new skin issues noted at this time. Does not frequently use call light, but is able to make needs known - frequent rounding done. Drowsy most of the shift, but able to be aroused easily.

## 2023-03-24 NOTE — PLAN OF CARE
Discharge Planner Post-Acute Rehab SLP:     Discharge Plan: home with SO 24/7, ongoing SP    Precautions: PEG, aspiration risk    Current Status:  Hearing: WFL  Vision: WFL  Communication: Decreased vocal intensity  Cognition: To be evaluated pending improved alertness/participation  Swallow: minced & moist solids (5) and moderately thick liquids (3) by TSP only; alternate food/drink and 1:1 assist with feeding. Hold PO if overt s/s of aspiration or oral pocketing. Ok for 5-10 ice chips with RN or SLP after oral care only between meals.    Assessment:     AM: Oral cares completed with mild-mod return of lingual secretions. The pt trialed meal of puree solids, minced solids, and mod thick liquids by spoon. He consumed ~3 oz of each with only throat clear x1 with applesauce. Of note, wet vocal quality was present prior to meal, however he was able to clear with cued throat clear. No change in vocal quality with PO. Oral phase was prolonged and reduced in coordiation, but overall WFL for textures trialed. No pocketing today. Mild left anterior loss of muffin with reduced awareness. He denied globus sensation or difficulty with swallowing.     PM:  Pt seen for dysphagia tx targeting strategies with snack of minced solids and moderately thick liquids. Pt did not recall goals for swallow tx or strategies. With mod-max cues, discussed plan to alternate food/drink, use spoon, monitor for oral residue, and use effortful swallow. He tolerated snack of 3 oz mod thick liquids and 4 oz minced peaches with no s/s of aspiration or oral pocketing. [place] and bolus propulsion was more timely than this AM and he had single swallows per bolus. Clear vocal quality throughout session, increased in volume with mod cues. Given improvement in swallow function across 2 sessions, recommend advance diet. Do not anticipate that pt will have much interest in PO, however benefit outweighs risk at this time to have him continue swallowing.  Discussed diet upgrade with pt's fiance and pt, as well as importance of oral cares before/during meals given ice chips between meals. Updated board and diet orders.    Other Barriers to Discharge (Family Training, etc): diet texture training pending progress

## 2023-03-24 NOTE — PROGRESS NOTES
XCite stim unit for Activity Based Therapy. Skilled set up; left sided tibialis anterior, gastrocnemius, quadriceps, hamstrings. determined appropriate FES parameters for each muscle group based off of strong tetanic response of muscle test.  Used the following activities from the software library: Sit to stand  Intervention and patient response: STS 1 x 5, 1 x 8 with breaks between due to fatigue. Pt reported sensitivity with R gastroc, intensity was decreased in this area. Pt performed sit to stand well with XCite set up, seemed to wake up more while using it. However, pt began to say he needed to use restroom and wanted to stop, similar to previous sessions.

## 2023-03-24 NOTE — PLAN OF CARE
Alert to self only, incontinent of bladder x 1, assisted with frederick care, primo fit applied to prevent inc at night. NPO TF feeding started at 1400. BG,s 179 1n 159, sliding scale insulin completed. Patients declined CPAP, O2 applied at 2L via nc. Patients fiance also requested to not apply the CPAP at night due to interfering with patients sleep. Will cotinue poc    Goal Outcome Evaluation:

## 2023-03-24 NOTE — PLAN OF CARE
FOCUS/GOAL  Nutrition/Feeding/Swallowing precautions and Medical management    ASSESSMENT, INTERVENTIONS AND CONTINUING PLAN FOR GOAL:  Slept well without CPAP per family request, per report. 02 at 2L with satisfying SpO2. Enteral feeding running w/o complication. Wter flushes programmed through pump. Primofit on during the NOC with good output. PROTONIX rescheduled for later time today, patient on cycled tube feeding to be completed at 0800. AM Nurse informed. Denied pain. Will continue with POC.   Goal Outcome Evaluation:      Plan of Care Reviewed With: other (see comments)          Outcome Evaluation: No change this shift.

## 2023-03-24 NOTE — PROGRESS NOTES
Discharge Planner Post-Acute Rehab PT:     Discharge Plan: HCPT and 24/7 assist    Precautions: NPO, falls    Current Status:  Bed Mobility: Deacon of 1 to help with LE  Transfer: modA of 1 for STS, rahel steady for nursing  Gait: 5' in // BUE support, CGA, chair follow  Stairs: NT safety concerns  Balance: Able to sit unsupported briefly, tendency to lean back. Pt able to stand with BUE on railing for up to 2'    Assessment: Pt had increased difficulty with transfers today, needing modA to sit up to edge of bed and maxA to perform stand pivot transfer to wheelchair from bed. Effort and lethargy played a role in this increase in difficulty. Pt performed STS training with Xcite, pt performed well but did not want to perform many reps.    Other Barriers to Discharge (DME, Family Training, etc):  - Pt lives alone but has support from fiance, son and daughter  - Full flight of stairs to get to main level from basement entry in garage  - May need w/c  - Family training needs to be set up, fiance present today  - Cog deficits

## 2023-03-25 NOTE — PLAN OF CARE
"Goal Outcome Evaluation:      Plan of Care Reviewed With: patient    Overall Patient Progress: no changeOverall Patient Progress: no change    Outcome Evaluation: Pt incontinent this shift; primofit applied after dinner time hour. no new skin issues noted. pt prefers laying on back; compliant with turning from L>R then returns to back. Diet upgrade this shift; declined dinner this shift. TF continues cycled.    ORIENTATION: A/O to [facility]  TRANSFERS/AMBULATION: rahel lawrence A2  DIET: minced moist/mod thick by teaspoon/meds via PEG. See lab results for blood glu. TF running at 85 mL/hour cycled [id number] with 180 mL free water flush at 1800  BOWEL/BLADDER: continent on BSC during day shift up to BSC. Incont of bladder this shift; primofit applied  PAIN: denies  LDA: PEG, CPAP at HS  SKIN: mepilex to sacrum c/d/i  Other: declined CPAP at HS; stating that \"I dont get any sleep with that on.\" pt consented for 02 at 2L via NC. Sticky note for providers.    BP (!) 146/78 (BP Location: Right arm)   Pulse 93   Temp 97  F (36.1  C) (Oral)   Resp 16   Ht 1.854 m (6' 1\")   Wt 89.3 kg (196 lb 13.9 oz)   SpO2 94%   BMI 25.97 kg/m      "

## 2023-03-25 NOTE — PROGRESS NOTES
Discharge Planner Post-Acute Rehab OT:      Discharge Plan: Home with HCOT and 24/7 support available     Precautions: Falls, left side hemiparesis, PEG tube,modifed textures and thickend liquids, slurred speech      Current Status:  ADLs:    Mobility: Ax2 rahel stedy, wc based. Min Ax1 STS with rahel steady    Grooming: Min A seated in TIS W/C    Dressing: UB- mod-max A seated in tilt in space w/c; LB-total A with rahel steady, Footwear-dependent     Bathing: Liko lift to purple dependent shower chair, max A for bathing    Toileting: Rahel steady Ax2 to/from grey dependent commode, Total A with toilet hygiene.   IADLs: Previously IND with all I/ADLs, lives separately from brynn swain and 2 kids live in [facility]   Vision/Cognition: cues to initiate and sustain action, does best w/ items presented 1 at a time or 1 step at a time and decreased stimulation in environment     Assessment:  OT: focused on self feeding including cuing for attn to tasks and sustained action, used Rue , pt answered personal questions w/ 100% accur verified by AMELIA Devries. pt fed self w/ set up and sba to min A  and encouragement to eat more, no pocketing but coughed , reviewed chin tuck and small bites w/ modified textures, oral cares w/ set up following eating , washed face/hands w/ set up and cues to initate, pt awake entire session but closed R eye intermittently but denied double vision. Pt engaged entire session w/ cues to stay on task and demo sense of humor. . Improving w/ partic      Other Barriers to Discharge (DME, Family Training, etc):   Family training TBD (potential support from brynn, son, daughter)   Split-level home (full flight of stairs to get from main level from basement entry in garage)   Cognitive deficits

## 2023-03-25 NOTE — PROGRESS NOTES
Discharge Planner Post-Acute Rehab PT:     Discharge Plan: HCPT and 24/7 assist    Precautions: NPO, falls, PEG/TF, L hemiparesis,     Current Status:  Bed Mobility: Deacon of 1 to help with LE  Transfer: modA of 1 for STS, rahel steady for nursing  Gait: 5' in // BUE support, CGA, chair follow  Stairs: NT safety concerns  Balance: Able to sit unsupported briefly, tendency to lean back. Pt able to stand with BUE on railing for up to 2'    Assessment: Lethargic start to the session but able to open his eyes and participate for the entire session with rest breaks taken at EOB.  Session focused on bed mobility (due to needing to use the urinal in middle of session), multiple sit<>stand transfers with mod A for balance and hand placement, and sidestepping at EOB to both R and L side with rolling walker and mod A for balance and walker management.  Noted increased L sided lean with increasing fatigue in both sitting and standing, able to initiate correction but unable to fully return to midline posture independently.  Supine in bed at end of session with alarm on and wife in room.     Other Barriers to Discharge (DME, Family Training, etc):  - Pt lives alone but has support from fiance, son and daughter  - Full flight of stairs to get to main level from basement entry in garage  - May need w/c  - Family training needs to be set up, fiance present today  - Cog deficits

## 2023-03-25 NOTE — PLAN OF CARE
Discharge Planner Post-Acute Rehab SLP:     Discharge Plan: home with SO 24/7, ongoing SP    Precautions: PEG, aspiration risk    Current Status:  Hearing: WFL  Vision: WFL  Communication: Decreased vocal intensity  Cognition: To be evaluated pending improved alertness/participation  Swallow: minced & moist solids (5) and moderately thick liquids (3) by TSP only; alternate food/drink and 1:1 assist with feeding. Hold PO if overt s/s of aspiration or oral pocketing. Ok for 5-10 ice chips with RN or SLP after oral care only between meals.    Assessment:  Pt seen during noon meal.  Pt's fiance reports that pt just finished eating breakfast approximately 1 hour prior to session.  Pt consumed 2 bite of lunch and took 2 sips.  Pt performed CTAR x 10 sets of 5 reps with moderate encouragement for maxmizing effort.  SLP provided thorough oral care at end of session.    PM: Pt performed verbal problem solving related to various home scenarios x 80% accuracy with min cues.  Pt performed a selective attention task in a minimally distracting environment with moderate verbal/tactile cues due to waning alertness.    Other Barriers to Discharge (Family Training, etc): diet texture training pending progress

## 2023-03-25 NOTE — PLAN OF CARE
Goal Outcome Evaluation:      Plan of Care Reviewed With: patient    Overall Patient Progress: no changeOverall Patient Progress: no change    Outcome Evaluation: Pt denies pain this shift. Mixed continence of bowel and bladder, using commode and urinal. No new skin issues noted. Using call light when needed. Remains drowsy but easy to arouse.

## 2023-03-25 NOTE — PLAN OF CARE
Goal Outcome Evaluation:      Plan of Care Reviewed With: patient    Overall Patient Progress: no changeOverall Patient Progress: no change     Patient here with Stroke, left side affected, alert  with soft/whispers voice, able to make needs known  Slept most of the night  Cycled feeding at 85ml/hr, tolerating the feeding well, no abdominal discomfort, N&V, maintained HOB elevated  No complained of pain, headache, chest pain, no SOB  Primo fit in placed at night d/t incontinence of urine, no BM this shift  Safety rounding checked completed, 3 side rails UP, bed alarm ON, call light/bedside table within reach  Continue with POC.

## 2023-03-26 NOTE — PROGRESS NOTES
[facility]   Acute Rehabilitation Unit  Daily progress note    INTERVAL HISTORY  Stefan Diallo was seen and examined at bedside.  No acute events reported overnight. Doing well with physical therapy this morning. Appetite better today. Has some chronic lower extremity neuropathy that is unchanged. Otherwise he reports feeling well. Denies fever, chills, CP, SOB, abd pain.     Current Status with PT on 3/25  Bed Mobility: Deacon of 1 to help with LE  Transfer: modA of 1 for STS, rahel steady for nursing  Gait: 5' in // BUE support, CGA, chair follow  Stairs: NT safety concerns  Balance: Able to sit unsupported briefly, tendency to lean back. Pt able to stand with BUE on railing for up to 2'     Assessment: Lethargic start to the session but able to open his eyes and participate for the entire session with rest breaks taken at EOB.  Session focused on bed mobility (due to needing to use the urinal in middle of session), multiple sit<>stand transfers with mod A for balance and hand placement, and sidestepping at EOB to both R and L side with rolling walker and mod A for balance and walker management.  Noted increased L sided lean with increasing fatigue in both sitting and standing, able to initiate correction but unable to fully return to midline posture independently.  Supine in bed at end of session with alarm on and wife in room.     ROS: 10 point ROS negative other than the symptoms noted above in the HPI.    MEDICATIONS    amantadine  100 mg Per Feeding Tube BID     apixaban ANTICOAGULANT  5 mg Per Feeding Tube BID     atorvastatin  20 mg Per Feeding Tube At Bedtime     diltiazem  60 mg Per Feeding Tube Q8H     gabapentin  600 mg Per Feeding Tube At Bedtime     hydrocortisone   Topical TID     insulin aspart  1-7 Units Subcutaneous TID AC     insulin aspart  1-5 Units Subcutaneous At Bedtime     metFORMIN  1,000 mg Oral or Feeding Tube BID w/meals     pantoprazole  40 mg Per  "Feeding Tube QAM AC        acetaminophen, calamine, dextrose, glucose **OR** dextrose **OR** glucagon, melatonin, - MEDICATION INSTRUCTIONS -, polyethylene glycol, senna-docusate     PHYSICAL EXAM  /76 (BP Location: Right arm, Patient Position: Semi-Romano's, Cuff Size: Adult Large)   Pulse 79   Temp (!) 96.1  F (35.6  C) (Oral)   Resp 20   Ht 1.854 m (6' 1\")   Wt 91.2 kg (201 lb 1 oz)   SpO2 96%   BMI 26.53 kg/m    General: No acute distress, comfortable in bed  HEENT: NC/NT, Moist mucous membranes  Pulmonary: Breathing comfortably on room air, clear to auscultation bilaterally  Cardiovascular: Regular rate and rhythm  Abdominal: Non-tender, non-distended, bowel sounds present in all four quadrants   Extremities: warm, well perfused, no edema in bilateral lower extremities, no tenderness in calves   Neuro/MSK: Alert and oriented, face symmetric, speech soft, left hemiparesis    LABS  Recent Results (from the past 24 hour(s))   Glucose by meter    Collection Time: 03/25/23 11:34 AM   Result Value Ref Range    GLUCOSE BY METER POCT 172 (H) 70 - 99 mg/dL   Glucose by meter    Collection Time: 03/25/23  4:59 PM   Result Value Ref Range    GLUCOSE BY METER POCT 176 (H) 70 - 99 mg/dL   Glucose by meter    Collection Time: 03/25/23  9:10 PM   Result Value Ref Range    GLUCOSE BY METER POCT 132 (H) 70 - 99 mg/dL   Glucose by meter    Collection Time: 03/26/23  2:19 AM   Result Value Ref Range    GLUCOSE BY METER POCT 167 (H) 70 - 99 mg/dL   Glucose by meter    Collection Time: 03/26/23  6:18 AM   Result Value Ref Range    GLUCOSE BY METER POCT 223 (H) 70 - 99 mg/dL       Rehabilitation - continue comprehensive acute inpatient rehabilitation program with multidisciplinary approach including therapies, rehab nursing, and physiatry following. See interval history for updates.      ASSESSMENT AND PLAN  Stefan Diallo is a 77 year old male with a past medical history of atrial fibrillation on coumadin though held " PTA for planned procedure (pain pump placement), chronic bilateral low back pain s/p lumbar fusion, type 2 diabetes mellitus c/b polyneuropathy, renal artery aneurysm, hypertension, hyperlipidemia, KISHA, GERD, BPH, and chronic bilateral shoulder pain who was admitted on 3/7/23 with acute right MCA stroke s/p tenecteplase with hospital course complicated by dysphagia s/p PEG placement on 3/9 complicated by PEG-site bleeding, suspected aspiration pneumonia, hyperglycemia, constipation, and hypokalemia.  He is now admitted to ARU on 3/16/23 for multidisciplinary rehabilitation and ongoing medical management.    - Vitals stable. BG stable  - Continue ongoing medical management.  - Continue therapies and plan of care.  - Refer to last progress note from primary team for full problems list.    Patient seen and discussed with Dr. Beach, PM&R Staff Physician    Shashi Garcia DO  PGY3 PM&R Resident

## 2023-03-26 NOTE — PLAN OF CARE
Discharge Planner Post-Acute Rehab SLP:     Discharge Plan: home with SO 24/7, ongoing SP    Precautions: PEG, aspiration risk    Current Status:  Hearing: WFL  Vision: WFL  Communication: Decreased vocal intensity  Cognition: To be evaluated pending improved alertness/participation  Swallow: minced & moist solids (5) and moderately thick liquids (3) by TSP only; alternate food/drink and 1:1 assist with feeding. Hold PO if overt s/s of aspiration or oral pocketing. Ok for 5-10 ice chips with RN or SLP after oral care only between meals.    Assessment:   Pt consumed noon 5/3 diet.  Pt exhibiting no s/s of aspiration with current diet.  Pt requesting thinner milk.  SLP explained purpose of moderately thickened liquids. Pt verablizes comprehension.SLP will facilitate trial of 6/3 diet tomorrow.    PM:  Pt's alertness significantly improved from previous afternoon session.  Pt performed selective attention tasks in a minimally distracting environment with occasional redirection to task.  Pt performed 4 step sequencing of common tasks x 80% accuracy independently.  Accuracy improved to 100% with occasional cues for attention to detail to reduce impulsive answer.    Other Barriers to Discharge (Family Training, etc): diet texture training pending progress

## 2023-03-26 NOTE — PLAN OF CARE
Goal Outcome Evaluation:      Plan of Care Reviewed With: patient    Overall Patient Progress: no changeOverall Patient Progress: no change    Patient is alert and oriented x2 with some confusion. Patient is able to se a call light and make his needs known. Patient was in continent for B/B. Had large BM this afternoon. Patient uses both urinal and primo fit especially at night and it is in place. No skin issues noted at this time. Denies pain. Patient is on feeding tube 2pm-8am running at 85ml/hr, and flushes at 180 ml/hr. Nursing staff  will continue with poc.

## 2023-03-26 NOTE — PLAN OF CARE
FOCUS/GOAL  Medical management    ASSESSMENT, INTERVENTIONS AND CONTINUING PLAN FOR GOAL:  Pt is alert, dis-oriented to time. Complain of itchiness on his back, prn calamine lotion applied. Use primo fit for voiding. Had smear of BM. Needs A1 w/ frederick cares and clothing. On cycled TF at 85cc/ht till 8am. Refuse to use cpap. On at 2L/nc for sleep apnea. Slept intermittently.  Goal Outcome Evaluation:      Plan of Care Reviewed With: patient    Overall Patient Progress: no changeOverall Patient Progress: no change    Outcome Evaluation: Continue w/ current cares and treatments.

## 2023-03-26 NOTE — PLAN OF CARE
Discharge Planner Post-Acute Rehab OT:      Discharge Plan: Home with HCOT and 24/7 support available     Precautions: Falls, left side hemiparesis, PEG tube,modifed textures and thickend liquids, slurred speech      Current Status:  ADLs:  Mobility: Ax2 rahel stedy, wc based. Min Ax1 STS with rahel steady  Grooming: Min A seated in TIS W/C  Dressing: UB- mod-max A seated in tilt in space w/c; LB-total A with rahel steady, Footwear-dependent   Bathing: Liko lift to purple dependent shower chair, max A for bathing  Toileting: Rahel steady Ax2 to/from grey dependent commode, Total A with toilet hygiene.   IADLs: Previously IND with all I/ADLs, lives separately from brynn swain and 2 kids live in [facility]   Vision/Cognition: cues to initiate and sustain action, does best w/ items presented 1 at a time or 1 step at a time and decreased stimulation in environment     Assessment: focus on core str., am ADLs. See flowsheet for details.      Other Barriers to Discharge (DME, Family Training, etc):   Family training TBD (potential support from brynn, son, daughter)   Split-level home (full flight of stairs to get from main level from basement entry in

## 2023-03-26 NOTE — PROGRESS NOTES
Discharge Planner Post-Acute Rehab PT:     Discharge Plan: HCPT and 24/7 assist    Precautions: NPO, falls, PEG/TF, L hemiparesis, alarms    Current Status:  Bed Mobility: Deacon of 1 to help with LE  Transfer: modA of 1 for STS, rahel steady for nursing  Gait: 12' in // bars with min A   Stairs: NT safety concerns  Balance: Can sit at EOB with close SBA for ~2 minutes.     Assessment: Pt up in chair at beginning of session, eyes open and willing to participate despite c/o fatigue.  Completed stand pivot transfer from wheelchair<>toilet with mod A, total assistance for lower body dressing and hygiene. Remainder of visit focused on ambulation in // bars.  12'x3 including turns in both directions to walk back to the chair.  Min A for balance, and cues for L foot placement.  Seated rest breaks taken between bout of activity, motivated by his back scratcher. Good awareness of L UE placement.     Other Barriers to Discharge (DME, Family Training, etc):  - Pt lives alone but has support from fiance, son and daughter  - Full flight of stairs to get to main level from basement entry in garage  - May need w/c  - Family training needs to be set up, fiance present today  - Cog deficits

## 2023-03-26 NOTE — PLAN OF CARE
Goal Outcome Evaluation:      Plan of Care Reviewed With: patient    Overall Patient Progress: no changeOverall Patient Progress: no change    Outcome Evaluation: Pt denies pain this shift. Using urinal, mostly continent this shift but was incontinent x2. Using call light to make needs known. Family at bedside. Eating independently with good setup.

## 2023-03-27 NOTE — PROGRESS NOTES
Discharge Planner Post-Acute Rehab OT:      Discharge Plan: Home with HCOT and 24/7 support available     Precautions: Falls, left side hemiparesis, PEG tube,modifed textures and thickend liquids, slurred speech      Current Status:  ADLs:    Mobility: Ax2 rahel stedy, wc based. Min Ax1 STS with rahel steady    Grooming: SBA seated in TIS W/C    Dressing: UB- mod-max A seated in tilt in space w/c; LB-total A with rahel steady, Footwear-dependent     Bathing: Liko lift to purple dependent shower chair, max A for bathing    Toileting: Rahel steady Ax2 to/from grey dependent commode, Total A with toilet hygiene.   IADLs: Previously IND with all I/ADLs, lives separately from brynn swain and 2 kids live in [facility]   Vision/Cognition: cues to initiate and sustain action, does best w/ items presented 1 at a time or 1 step at a time and decreased stimulation in environment     Assessment: Focused on partial ADL routine with encouragement to participate. Pt tolerated standing longer with rahel steady for LBD tasks. Will trial partial standing at sink tomorrow for G/H tasks.      Other Barriers to Discharge (DME, Family Training, etc):   Family training TBD (potential support from brynn, son, daughter)   Split-level home (full flight of stairs to get from main level from basement entry in garage)   Cognitive deficits

## 2023-03-27 NOTE — PROGRESS NOTES
Pt A&Ox3 this blayne. Known to be confused/forgetful at times. Bed alarm on. Tray set up to eat meals. Level 5 diet with level 3 liquid. TF 85 mL/hr. Prefers to wear external male cath at night due to incontinence. Mepilex on coccyx. Drowsy always, will awaken to voice. A2 rahel lawrence. LBM 3/27. Left sided weakness.       Mary Uriostegui RN on 3/27/2023 at 6:34 PM

## 2023-03-27 NOTE — PLAN OF CARE
Discharge Planner Post-Acute Rehab SLP:     Discharge Plan: home with SO 24/7, ongoing SP    Precautions: PEG, aspiration risk    Current Status:  Hearing: WFL  Vision: WFL  Communication: Decreased vocal intensity  Cognition: To be evaluated pending improved alertness/participation  Swallow: minced & moist solids (5) and moderately thick liquids (3) by TSP only; alternate food/drink and 1:1 assist with feeding. Hold PO if overt s/s of aspiration or oral pocketing. Ok for 5-10 ice chips with RN or SLP after oral care only between meals.    Assessment:  Pt participated in attention task and basic level problem solving tasks. He required min cues for basic attention to achieve 80% accuracy. Mod-max cues needed to complete basic level problem solving/sequencing.       Other Barriers to Discharge (Family Training, etc): diet texture training pending progress

## 2023-03-27 NOTE — PLAN OF CARE
FOCUS/GOAL  Medical management    ASSESSMENT, INTERVENTIONS AND CONTINUING PLAN FOR GOAL:  Pt is alert, left sided weakness. On cycled TF on at 85cc/hr.tolerated well. Use oxygen for sleep apnea but it did not stay on. Denies pain. Incontinent of bowel. Needs A2 w/ frederick cares and clothing. Use primo fit for voiding. Seen sleeping during safety round checks. Cont w/ POC.  Goal Outcome Evaluation:      Plan of Care Reviewed With: patient    Overall Patient Progress: no changeOverall Patient Progress: no change    Outcome Evaluation: Continue w/ current cares and treatments.

## 2023-03-27 NOTE — PROGRESS NOTES
Discharge Planner Post-Acute Rehab PT:     Discharge Plan: HCPT and 24/7 assist    Precautions: NPO, falls, PEG/TF, L hemiparesis, alarms    Current Status:  Bed Mobility: Linda of 1 to help with LE  Transfer: Linda of 1 for STS, rahel steady for nursing  Gait: 12' in // bars with min A to CGA and chair follow  Stairs: NT safety concerns  Balance: Can sit at EOB with close SBA for ~2 minutes.     Assessment: Pt was more alert for today's session and was able to perform better. Pt able to ambulate 12' x 8 for total of 96' in //. Pt still fatigues fairly quickly when standing, however this is improving. Will continue working on ambulation, LE strength, and standing endurance.    Other Barriers to Discharge (DME, Family Training, etc):  - Pt lives alone but has support from fiance, son and daughter  - Full flight of stairs to get to main level from basement entry in garage  - May need w/c  - Family training needs to be set up, fiance present today  - Cog deficits

## 2023-03-27 NOTE — PLAN OF CARE
Goal Outcome Evaluation:      Plan of Care Reviewed With: patient    Overall Patient Progress: no changeOverall Patient Progress: no change     Alert and oriented x2 with some confusion. Is assist of x 2 with Bri lawrence. Patient uses a primo fit at night. Patient is on feeding tube from 2pm to 8am. Was incontinent of BM x 1 on this shift. Denies pain. Takes medication via feeding tube. No line or drains.

## 2023-03-27 NOTE — PLAN OF CARE
Discharge Planner Post-Acute Rehab SLP:     Discharge Plan: home with SO 24/7, ongoing SP    Precautions: PEG, aspiration risk    Current Status:  Hearing: WFL  Vision: WFL  Communication: Decreased vocal intensity  Cognition: To be evaluated pending improved alertness/participation  Swallow: minced & moist solids (5) and moderately thick liquids (3) by TSP only; alternate food/drink and 1:1 assist with feeding. Hold PO if overt s/s of aspiration or oral pocketing. Ok for 5-10 ice chips with RN or SLP after oral care only between meals.    Assessment:  SLP facilitated trial of soft/bite-sized (6) solids with analysis of pt's ability to safely consume the advanced diet.  Pt demonstrates large bites and impulsivity during the meal. Pt will benefit from further training in small bites/sips and slow rate to increase safety of the diet.      Other Barriers to Discharge (Family Training, etc): diet texture training pending progress

## 2023-03-27 NOTE — PLAN OF CARE
Goal Outcome Evaluation:      Plan of Care Reviewed With: patient    Overall Patient Progress: no changeOverall Patient Progress: no change    Outcome Evaluation: Pt remains with mixed continence of bladder, using urinal and primofit at [facility]. No new skin issues noted. Not using call light frequently, needs anticipated. Pt denies pain this shift.

## 2023-03-27 NOTE — PROGRESS NOTES
"  [facility]   Acute Rehabilitation Unit  Daily progress note    INTERVAL HISTORY  Weekend and therapy notes reviewed, no acute events reported.    Stefan Diallo was seen and examined at bedside this morning with significant other present.  Patient is quite sleepy at the time of my visit, though significant other and therapy team report some improved level of alertness generally over the past several days.  Patient was also able to start on an oral diet, though he has not eaten much breakfast this morning.  He denies any pain or other complaints at this time.    Functionally, OT completed partial ADL routine this morning and noted patient was able to tolerate standing longer with rahel lawrence for lower body dressing tasks.  Plan to trial partial standing at sink tomorrow for grooming/hygiene.  With SLP, patient participated in attention task and basic level problem solving tasks. He required min cues for basic attention to achieve 80% accuracy. Mod-max cues needed to complete basic level problem solving/sequencing.     MEDICATIONS    amantadine  100 mg Per Feeding Tube BID     apixaban ANTICOAGULANT  5 mg Per Feeding Tube BID     atorvastatin  20 mg Per Feeding Tube At Bedtime     diltiazem  60 mg Per Feeding Tube Q8H     gabapentin  600 mg Per Feeding Tube At Bedtime     hydrocortisone   Topical TID     insulin aspart  1-7 Units Subcutaneous TID AC     insulin aspart  1-5 Units Subcutaneous At Bedtime     metFORMIN  1,000 mg Oral or Feeding Tube BID w/meals     pantoprazole  40 mg Per Feeding Tube QAM AC        acetaminophen, calamine, dextrose, glucose **OR** dextrose **OR** glucagon, guaiFENesin, melatonin, - MEDICATION INSTRUCTIONS -, polyethylene glycol, senna-docusate     PHYSICAL EXAM  /67   Pulse 80   Temp 97.6  F (36.4  C) (Oral)   Resp 18   Ht 1.854 m (6' 1\")   Wt (P) 90.6 kg (199 lb 11.8 oz)   SpO2 95%   BMI (P) 26.35 kg/m     Gen: NAD, sitting up in " chair  HEENT: NC/AT  Cardio: irregularly irregular, no murmurs  Pulm: non-labored on room air, lungs CTA bilaterally  Abd: soft, non-tender, non-distended, bowel sounds present, +PEG  Ext: no edema in bilateral lower extremities  Neuro/MSK: somnolent but opens eyes briefly, left facial droop, +dysarthria and aphasia, left neglect, left hemiparesis    LABS  CBC RESULTS:   Recent Labs   Lab Test 03/27/23  0617 03/23/23  0557 03/22/23  0541   WBC 9.2 11.7* 12.6*   RBC 3.91* 4.26* 4.34*   HGB 11.9* 13.1* 13.2*   HCT 37.0* 39.7* 40.4   MCV 95 93 93   MCH 30.4 30.8 30.4   MCHC 32.2 33.0 32.7   RDW 12.8 13.2 13.1    445 464*     Last Basic Metabolic Panel:  Recent Labs   Lab Test 03/27/23 0158 03/26/23 2059 03/26/23  1657 03/23/23  0620 03/23/23  0557 03/22/23  0650 03/22/23  0541 03/20/23  0728 03/20/23  0655   NA  --   --   --   --  138  --  140  --  140   POTASSIUM  --   --   --   --  3.9  --  4.2  --  4.0   CHLORIDE  --   --   --   --  101  --  103  --  102   CO2  --   --   --   --  24  --  23  --  25   ANIONGAP  --   --   --   --  13  --  14  --  13   * 124* 171*   < > 217*   < > 210*   < > 239*   BUN  --   --   --   --  31.6*  --  32.1*  --  33.2*   CR  --   --   --   --  0.75  --  0.83  --  0.78   GFRESTIMATED  --   --   --   --  >90  --  89  --  >90   LUTHER  --   --   --   --  9.7  --  9.9  --  9.9    < > = values in this interval not displayed.     Recent Labs   Lab 03/27/23 0158 03/26/23 2059 03/26/23  1657 03/26/23  0618 03/26/23  0219 03/25/23  2110   * 124* 171* 223* 167* 132*       Rehabilitation - continue comprehensive acute inpatient rehabilitation program with multidisciplinary approach including therapies, rehab nursing, and physiatry following. See interval history for updates.      ASSESSMENT AND PLAN  Stefan Diallo is a 77 year old male with a past medical history of atrial fibrillation on coumadin though held PTA for planned procedure (pain pump placement), chronic bilateral  low back pain s/p lumbar fusion, type 2 diabetes mellitus c/b polyneuropathy, renal artery aneurysm, hypertension, hyperlipidemia, KISHA, GERD, BPH, and chronic bilateral shoulder pain who was admitted on 3/7/23 with acute right MCA stroke s/p tenecteplase with hospital course complicated by dysphagia s/p PEG placement on 3/9 complicated by PEG-site bleeding, suspected aspiration pneumonia, hyperglycemia, constipation, and hypokalemia.  He is now admitted to ARU on 3/16/23 for multidisciplinary rehabilitation and ongoing medical management.        Admission to acute inpatient rehab 03/16/23.    Impairment group code: Stroke Ischemic 01.1 (L) Body Involvement (R) Brain; s/p acute ischemic stroke of R MCA territory due to cardioembolism status post tenecteplase        1. PT, OT and SLP 60 minutes of each on a daily basis, in addition to rehab nursing and close management of physiatrist.       2. Impairment of ADL's: Noted to have impaired activity tolerance, impaired balance, impaired coordination, impaired judgement and safety awareness, impaired strength, impaired tone, impaired weight shifting, impulsiveness and pain, all affecting his ability to safely and independently perform basic ADLs.  Goal for assist of 1 with basic ADLs.     3. Impairment of mobility:  Noted to have impaired activity tolerance, impaired balance, impaired coordination, impaired judgement and safety awareness, impaired strength, impaired tone, impaired weight shifting, impulsiveness and pain, all affecting his ability to safely and independently perform basic mobility.  Goal for assist of 1 with basic mobility.     4. Impairment of cognition/language/swallow:  Noted to have dysarthria, aphasia, and impaired cognition with goals for improved cognitive-linguistic skills to meet basic needs.     5. Medical Conditions  New actions/orders/updates for today are in blue.     Acute R MCA stroke s/p tenecteplase 3/7, likely secondary to cardioembolism  from atrial fibrillation off anticoagulation for an anticipated procedure  Initially on aspirin, later restarted on anticoagulation but with Eliquis in place of PTA warfarin.  - Continue apixaban 5 mg BID  - Continue PTA statin.  Goal LDL 40-70.  - Started on amantadine 100 mg daily 3/24, increased to BID (0700, 1300) on 3/24.  Therapies noting some improved PM alertness yesterday.  Significant other also feels he has been more alert.  - Consult for neuropsych eval  - Long term BP goal <135/80 to be achieved as outpatient within several weeks.  Management as below.  - Continue PT/OT/SLP  - Follow up with general neurology in 6-8 weeks     Possible aspiration pneumonia  Met criteria for early sepsis.  Started on unasyn + vanco 3/11.  MRSA nares negative.  BC's still negative at discharge.  Stopped vancomycin 3/13.  Changed to FT augmentin 3/15 with a plan for 7 total days abx, completed as of 3/18.  - Monitor respiratory status, SLP for dysphagia treatment as below  - Noting increased cough at night, using nocturnal O2 as refusing CPAP.  Otherwise satting well, afebrile.  Lungs clear on exam.  PRN robitussin per patient request.  Continue to monitor.     Atrial fibrillation  [PTA on warfarin, diltiazem]  On chronic anticoagulation with warfarin.  However, had been held since 2/25 for planned pain pump placement. PTA long-acting extended release diltiazem cannot be given through the PEG tube, substituted short-acting. Started on DOAC in place of PTA warfarin on 3/14.  - Continue Eliquis 5 mg BID  - Continue short acting diltiazem 60 mg TID while NPO  - Continue to monitor     Dyslipidemia  [PTA meds: atorvastatin 20 mg daily, gemfibrozil 600 mg BID]  - Continue PTA statin       Hypertension  [PTA meds: atenolol 25 mg daily, diltiazem  mg daily, lisinopril 5 mg daily]  Resuming PTA meds gradually. PTA diltiazem increased to 60 mg TID on 3/14/23.   - Long term BP <130/80, inpatient meds can be slowly titrated to  this goal as otherwise appropriate  - Continue diltiazem 60 mg q8h  - BP generally at goal  - Resume PTA lisinopril, atenolol as indicated     Type II DM  Tube feeding induced hyperglycemia  Polyneuropathy  [PTA meds: metformin 500 mg qAM + 1000 mg qPM, gabapentin 600 mg at HS]  A1c 6.4% on 2/1/23.  PTA metformin held this admission.  Managed on small doses of Lantus and sliding scale insulin.  Stopped Lantus on 3/17, resumed home metformin initially at 500 mg BID, increased to PTA dose as of 3/20.  Recent Labs   Lab 03/27/23  0158 03/26/23  2059 03/26/23  1657 03/26/23  0618 03/26/23  0219 03/25/23  2110   * 124* 171* 223* 167* 132*   - Continue home gabapentin  - Continue metformin 1000 mg BID (increased on 3/21)  - -223.  Loose stools x1 yesterday and x2 the day prior.  Continue to monitor as decreased dependence on tube feeding.  - Continue Novolog medium intensity sliding scale insulin  - Monitor BG TID AC + HS + 0200  - Hypoglycemia protocol     KISHA  - Has been refusing home CPAP, may be contributor to daytime somnolence. Discussed with patient who reports that he has not been using because he does not sleep as well with CPAP.  Also reports that he did not use just PTA for the same reason, unclear if/when used regularly.  Reviewed risks of untreated sleep apnea but may require ongoing discussion given impaired cognition.  - Preferring to use oxygen by NC with sleep over CPAP     Chronic back pain  Patient planned for pain pump placement 3/8/23 (Javi).  States he has chronic back pain from nerve impingement and uses tylenol PTA for pain.   - Continue Tylenol prn for pain  - Monitor     Moderate oral, severe oropharyngeal dysphagia  S/p PEG placement 3/9  - Cycled tube feeds per PEG (switched from continuous on 3/18)  - Continue water flushes 180 mL QID  - RD following, assistance appreciated  - Continue SLP  - T tacs removed 3/20   - Repeat VFSS on 3/22.  Per SLP, patient demonstrated significant  improvement compared to prior VFSS on 3/9 though still aspiration with thin and mildly thick liquids.  As of 3/24, started on minced & moist solids (5) and moderately thick liquids (3) by TSP only; alternate food/drink and 1:1 assist with feeding. Hold PO if overt s/s of aspiration or oral pocketing. Ok for 5-10 ice chips with RN or SLP after oral care only between meals.     Loose stools  Constipation, resolved  - Continue senokot-S 2 tabs BID PRN, Miralax PRN  - Loose stools appear to be slowing, continue to monitor.     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  CRP elevated at 44 (though prevoiusly 312 on 3/11).  Previously had mild leukocytosis (WBC 12.7 on 3/11) in setting of suspected aspiration pneumonia, but normalized the following day.  Afebrile, no localizing signs/symptoms of infection.  Procal very mildly elevated at 0.07.  - 3/27: WBC normalized at 9.2.   - Continue to trend CBC every M/Th    Anemia, mild, normocytic  Has been trending 12s-13s.  Dropped slightly on 3/27 at 11.9.  No evidence of active bleeding.  - Continue to trend CBC every M/Th    Hypermagnesemia  Mag elevated at 3.8 on AM labs 3/27 despite normal/stable renal function, BMP otherwise stable.  No clear med culprits.  Asymptomatic.  - Given significant discrepancy from recent mag levels, obtain repeat to verify  - Will consider further evaluation/management pending repeat level        6. Adjustment to disability:  Clinical psychology to eval and treat if indicated  7. FEN: miced and moist (level 5) diet, moderately thick (level 3) liquids by tsp only, cycled nocturnal tube feeds via PEG  8. Bowel: incontinent, monitor, PRN bowel meds available  9. Bladder: continent/incontinent, using Primofit at night, will need to wean  10. DVT Prophylaxis: apixaban  11. GI Prophylaxis:  PPI  12. Code: full  13. Disposition: goal for home  14. ELOS:  target 4/7/23  15. Follow up Appointments on Discharge: PCP in 1-2 weeks, general neurology in 6-8 weeks      Patient was discussed with Dr. Liu Sitnson, PM&R staff physician     Kenna Ruiz PA-C  Physical Medicine & Rehabilitation

## 2023-03-28 NOTE — PLAN OF CARE
Goal Outcome Evaluation:  Prn Robitussin given per pt request for non-productive, infrequent cough. Symptoms improved. Pt was changed x4 and used urinal x1 with assist.    Orientation: Aox1, disoriented, forgetful   Bowel: Smear bm x2, incontinent  Bladder: Incontinent x3, cont x1  Ambulation/Transfers: Not oob  Diet/ Liquids: Lvl 5 diet, lvl 3 liq  Tubes/ Lines/ Drains: PEG tube  Tube Feedin ml/hr  Skin: Mepilex on sacrum

## 2023-03-28 NOTE — PLAN OF CARE
Discharge Planner Post-Acute Rehab OT:      Discharge Plan: Home with HCOT and 24/7 support available     Precautions: Falls, left side hemiparesis, PEG tube,modifed textures and thickend liquids, slurred speech      Current Status:  ADLs:  Mobility: min/modA with SPT with therapy bed-w/c; with nsg. Rec.Ax1-2 rahel steabimbola, wc based. Min Ax1 STS with rahel steady  Grooming: SBA seated in TIS W/C  Dressing: UB- min A sitting EOB to joel t-shirt; LB-modA with initiating LEs into shorts, Linda with pulling over hips from standing, min/modA to maintain standing bal. rahel steady if pt. fatigued, Footwear-maxA   Bathing: Liko lift to purple dependent shower chair, max A for bathing  Toileting: Rahel steady Ax2 to/from grey dependent commode, Total A with toilet hygiene.   IADLs: Previously IND with all I/ADLs, lives separately from brynn swain and 2 kids live in [facility]   Vision/Cognition: cues to initiate and sustain action, does best w/ items presented 1 at a time or 1 step at a time and decreased stimulation in environment     Assessment: focus on am ADLs, core str., transf.s. See flowsheet for details.     Other Barriers to Discharge (DME, Family Training, etc):   Family training TBD (potential support from brynn, son, daughter)   Split-level home (full flight of stairs to get from main level from basement entry in garage)   Cognitive deficits

## 2023-03-28 NOTE — PROGRESS NOTES
"  [facility]   Acute Rehabilitation Unit  Daily progress note    INTERVAL HISTORY  Stefan Diallo was seen and examined at bedside this morning with significant other present.  No acute events reported overnight.  He is seen at lunch working with SLP on trials of level 6 diet.  However he becomes increasingly sleepy during our visit and speech therapist elects to wait on further trials until improved alertness.  Patient reports that he has been \"working hard\".  Significant other does note improved alertness this morning but had an early session so is now quite fatigued at midday.  Patient denies pain, dizziness, nausea.  Is still noting some coughing.    Functionally, patient is currently needing min A for bed mobility, min to mod A for pivot transfers.  He was able to ambulate 12' in parallel bars with min A to CGA and wheelchair follow.    MEDICATIONS    amantadine  100 mg Per Feeding Tube BID     apixaban ANTICOAGULANT  5 mg Per Feeding Tube BID     atorvastatin  20 mg Per Feeding Tube At Bedtime     diltiazem  60 mg Per Feeding Tube Q8H     gabapentin  600 mg Per Feeding Tube At Bedtime     insulin aspart  1-7 Units Subcutaneous TID AC     insulin aspart  1-5 Units Subcutaneous At Bedtime     metFORMIN  1,000 mg Oral or Feeding Tube BID w/meals     pantoprazole  40 mg Per Feeding Tube QAM AC        acetaminophen, calamine, dextrose, glucose **OR** dextrose **OR** glucagon, guaiFENesin, melatonin, - MEDICATION INSTRUCTIONS -, polyethylene glycol, senna-docusate     PHYSICAL EXAM  /69   Pulse 85   Temp (!) 96.5  F (35.8  C) (Oral)   Resp 16   Ht 1.854 m (6' 1\")   Wt (P) 90.6 kg (199 lb 11.8 oz)   SpO2 95%   BMI (P) 26.35 kg/m     Gen: NAD, sitting up in chair  HEENT: NC/AT  Cardio: irregularly irregular, no murmurs  Pulm: non-labored on room air, lungs CTA bilaterally  Abd: soft, non-tender, non-distended, bowel sounds present, +PEG  Ext: no edema in bilateral " lower extremities  Neuro/MSK: somnolent but opens eyes briefly, left facial droop, +dysarthria and aphasia, left neglect, left hemiparesis    LABS  CBC RESULTS:   Recent Labs   Lab Test 03/27/23 0617 03/23/23  0557 03/22/23  0541   WBC 9.2 11.7* 12.6*   RBC 3.91* 4.26* 4.34*   HGB 11.9* 13.1* 13.2*   HCT 37.0* 39.7* 40.4   MCV 95 93 93   MCH 30.4 30.8 30.4   MCHC 32.2 33.0 32.7   RDW 12.8 13.2 13.1    445 464*     Last Basic Metabolic Panel:  Recent Labs   Lab Test 03/28/23 0214 03/27/23 2119 03/27/23 1624 03/27/23  0812 03/27/23  0617 03/23/23  0620 03/23/23  0557 03/22/23  0650 03/22/23  0541   NA  --   --   --   --  137  --  138  --  140   POTASSIUM  --   --   --   --  4.0  --  3.9  --  4.2   CHLORIDE  --   --   --   --  99  --  101  --  103   CO2  --   --   --   --  26  --  24  --  23   ANIONGAP  --   --   --   --  12  --  13  --  14   * 110* 161*   < > 166*   < > 217*   < > 210*   BUN  --   --   --   --  23.8*  --  31.6*  --  32.1*   CR  --   --   --   --  0.76  --  0.75  --  0.83   GFRESTIMATED  --   --   --   --  >90  --  >90  --  89   LUTHER  --   --   --   --  9.7  --  9.7  --  9.9    < > = values in this interval not displayed.     Recent Labs   Lab 03/28/23 0214 03/27/23 2119 03/27/23 1624 03/27/23  1136 03/27/23  0812 03/27/23  0617   * 110* 161* 136* 194* 166*       Rehabilitation - continue comprehensive acute inpatient rehabilitation program with multidisciplinary approach including therapies, rehab nursing, and physiatry following. See interval history for updates.      ASSESSMENT AND PLAN  Stefan Diallo is a 77 year old male with a past medical history of atrial fibrillation on coumadin though held PTA for planned procedure (pain pump placement), chronic bilateral low back pain s/p lumbar fusion, type 2 diabetes mellitus c/b polyneuropathy, renal artery aneurysm, hypertension, hyperlipidemia, KISHA, GERD, BPH, and chronic bilateral shoulder pain who was admitted on 3/7/23  with acute right MCA stroke s/p tenecteplase with hospital course complicated by dysphagia s/p PEG placement on 3/9 complicated by PEG-site bleeding, suspected aspiration pneumonia, hyperglycemia, constipation, and hypokalemia.  He is now admitted to ARU on 3/16/23 for multidisciplinary rehabilitation and ongoing medical management.        Admission to acute inpatient rehab 03/16/23.    Impairment group code: Stroke Ischemic 01.1 (L) Body Involvement (R) Brain; s/p acute ischemic stroke of R MCA territory due to cardioembolism status post tenecteplase        1. PT, OT and SLP 60 minutes of each on a daily basis, in addition to rehab nursing and close management of physiatrist.       2. Impairment of ADL's: Noted to have impaired activity tolerance, impaired balance, impaired coordination, impaired judgement and safety awareness, impaired strength, impaired tone, impaired weight shifting, impulsiveness and pain, all affecting his ability to safely and independently perform basic ADLs.  Goal for assist of 1 with basic ADLs.     3. Impairment of mobility:  Noted to have impaired activity tolerance, impaired balance, impaired coordination, impaired judgement and safety awareness, impaired strength, impaired tone, impaired weight shifting, impulsiveness and pain, all affecting his ability to safely and independently perform basic mobility.  Goal for assist of 1 with basic mobility.     4. Impairment of cognition/language/swallow:  Noted to have dysarthria, aphasia, and impaired cognition with goals for improved cognitive-linguistic skills to meet basic needs.     5. Medical Conditions  New actions/orders/updates for today are in blue.     Acute R MCA stroke s/p tenecteplase 3/7, likely secondary to cardioembolism from atrial fibrillation off anticoagulation for an anticipated procedure  Initially on aspirin, later restarted on anticoagulation but with Eliquis in place of PTA warfarin.  - Continue apixaban 5 mg BID  -  Continue PTA statin.  Goal LDL 40-70.  - Started on amantadine 100 mg daily 3/24, increased to BID (0700, 1300) on 3/24.  - Consult for neuropsych eval  - Long term BP goal <135/80 to be achieved as outpatient within several weeks.  Management as below.  - Continue PT/OT/SLP  - Follow up with general neurology in 6-8 weeks     Possible aspiration pneumonia  Met criteria for early sepsis.  Started on unasyn + vanco 3/11.  MRSA nares negative.  BC's still negative at discharge.  Stopped vancomycin 3/13.  Changed to FT augmentin 3/15 with a plan for 7 total days abx, completed as of 3/18.  - Monitor respiratory status, SLP for dysphagia treatment as below     Atrial fibrillation  [PTA on warfarin, diltiazem]  On chronic anticoagulation with warfarin.  However, had been held since 2/25 for planned pain pump placement. PTA long-acting extended release diltiazem cannot be given through the PEG tube, substituted short-acting. Started on DOAC in place of PTA warfarin on 3/14.  - Continue Eliquis 5 mg BID  - Continue short acting diltiazem 60 mg TID while NPO  - Continue to monitor     Dyslipidemia  [PTA meds: atorvastatin 20 mg daily, gemfibrozil 600 mg BID]  - Continue PTA statin       Hypertension  [PTA meds: atenolol 25 mg daily, diltiazem  mg daily, lisinopril 5 mg daily]  Resuming PTA meds gradually. PTA diltiazem increased to 60 mg TID on 3/14/23.   - Long term BP <130/80, inpatient meds can be slowly titrated to this goal as otherwise appropriate  - Continue diltiazem 60 mg q8h  - BP generally at goal on current regimen  - Resume PTA lisinopril, atenolol as indicated     Type II DM  Tube feeding induced hyperglycemia  Polyneuropathy  [PTA meds: metformin 500 mg qAM + 1000 mg qPM, gabapentin 600 mg at HS]  A1c 6.4% on 2/1/23.  PTA metformin held this admission.  Managed on small doses of Lantus and sliding scale insulin.  Stopped Lantus on 3/17, resumed home metformin initially at 500 mg BID, increased to PTA  dose as of 3/20.  Recent Labs   Lab 03/28/23  0214 03/27/23  2119 03/27/23  1624 03/27/23  1136 03/27/23  0812 03/27/23  0617   * 110* 161* 136* 194* 166*   - Continue home gabapentin  - Continue metformin 1000 mg BID (increased on 3/21)  - -161  - Continue Novolog medium intensity sliding scale insulin  - Monitor BG TID AC + HS + 0200  - Hypoglycemia protocol     KISHA  - Has been refusing home CPAP, may be contributor to daytime somnolence. Discussed with patient who reports that he has not been using because he does not sleep as well with CPAP.  Also reports that he did not use just PTA for the same reason, unclear if/when used regularly.  Reviewed risks of untreated sleep apnea but may require ongoing discussion given impaired cognition.  - Preferring to use oxygen by NC with sleep over CPAP     Chronic back pain  Patient planned for pain pump placement 3/8/23 (Javi).  States he has chronic back pain from nerve impingement and uses tylenol PTA for pain.   - Continue Tylenol prn for pain  - Monitor     Moderate oral, severe oropharyngeal dysphagia  S/p PEG placement 3/9  - Cycled tube feeds per PEG (switched from continuous on 3/18)  - Continue water flushes 180 mL QID  - RD following, assistance appreciated  - Continue SLP  - T tacs removed 3/20   - Repeat VFSS on 3/22.  Per SLP, patient demonstrated significant improvement compared to prior VFSS on 3/9 though still aspiration with thin and mildly thick liquids.  As of 3/24, started on minced & moist solids (5) and moderately thick liquids (3) by TSP only; alternate food/drink and 1:1 assist with feeding. Hold PO if overt s/s of aspiration or oral pocketing. Ok for 5-10 ice chips with RN or SLP after oral care only between meals.     Loose stools  Constipation, resolved  - Continue senokot-S 2 tabs BID PRN, Miralax PRN  - Loose stools appear to be slowing, continue to monitor.      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  CRP elevated at 44 (though prevoiusly 312 on 3/11).  Previously had mild leukocytosis (WBC 12.7 on 3/11) in setting of suspected aspiration pneumonia, but normalized the following day.  Afebrile, no localizing signs/symptoms of infection.  Procal very mildly elevated at 0.07.  - Continue to trend CBC every M/Th.  3/27: WBC normalized at 9.2.     Anemia, mild, normocytic  Has been trending 12s-13s.  Dropped slightly on 3/27 at 11.9.  No evidence of active bleeding.  - Continue to trend CBC every M/Th      6. Adjustment to disability:  Clinical psychology to eval and treat if indicated  7. FEN: minced and moist (level 5) diet, moderately thick (level 3) liquids by tsp only, cycled nocturnal tube feeds via PEG  8. Bowel: incontinent, monitor, PRN bowel meds available  9. Bladder: continent/incontinent, using Primofit at night, will need to wean  10. DVT Prophylaxis: apixaban  11. GI Prophylaxis: PPI  12. Code: full  13. Disposition: goal for home  14. ELOS:  target 4/7/23  15. Follow up Appointments on Discharge: PCP in 1-2 weeks, general neurology in 6-8 weeks      Patient was discussed with Dr. Liu Stinson, PM&R staff physician     Kenna Ruiz, PA-C  Physical Medicine & Rehabilitation

## 2023-03-28 NOTE — PLAN OF CARE
Disoriented to time, re-oriented with good effect. Poor appetite at breakfast, ate good at lunch. Continent of bladder on the urinal, participated on therapy, however got tired at about lunch time and was assisted in bed to rest. SO at bedside, will continue Poc    Goal Outcome Evaluation:

## 2023-03-28 NOTE — PLAN OF CARE
Discharge Planner Post-Acute Rehab SLP:      Discharge Plan: home with SO 24/7, ongoing SP     Precautions: PEG, aspiration risk     Current Status:  Hearing: WFL  Vision: WFL  Communication: Decreased vocal intensity  Cognition: To be evaluated pending improved alertness/participation  Swallow: minced & moist solids (5) and moderately thick liquids (3) by TSP only; alternate food/drink and 1:1 assist with feeding. Hold PO if overt s/s of aspiration or oral pocketing. Ok for 5-10 ice chips with RN or SLP after oral care only between meals.     Assessment:  Patient seen for trial of IDDSI 6 solids during noon meal. Patient required min vcs to use spoon with moderately thick liquids, for pacing and to take smaller bites. Mild-mod amounts of residue noted in L lateral sulcus- pt needed cues for lingual sweep+liquid wash. Coughing noted at times during meal, suspect related to pooling secretions, but pt feels it was related to spicy tartar sauce. Pt becoming more fatigued as session progressed at times appearing to fall asleep. Session ended as pt not safe for po.        Other Barriers to Discharge (Family Training, etc): diet texture training pending progress

## 2023-03-28 NOTE — PROGRESS NOTES
Pt A&Ox3 this blayne. Known to be confused/forgetful at times, knows hes in the hospital. Bed alarm on. Tray set up to eat meals. Level 5 diet with level 3 liquid. TF 85 mL/hr. Prefers to wear external male cath at night due to incontinence. Mepilex on coccyx. Drowsy always, will awaken to voice. A2 rahel lawrence to chiar. Gave insulin coverage for dinner. Incontinent x1.     Mary Uriostegui RN on 3/28/2023 at 6:37 PM

## 2023-03-28 NOTE — PROGRESS NOTES
Discharge Planner Post-Acute Rehab PT:     Discharge Plan: HCPT and 24/7 assist    Precautions: NPO, falls, PEG/TF, L hemiparesis, alarms    Current Status:  Bed Mobility: Deacon of 1 to help with LE  Transfer: min-modA for SPT, pt holds onto PTs arms  Gait: 12' in // bars with min A to CGA and chair follow  Stairs: NT safety concerns  Balance: Can sit at EOB with close SBA for ~2 minutes.     Assessment: Today's focus was to increase anterior weight shift in sitting and standing to decrease posterior lean when standing or ambulating. Pt showed improvements in ability to perform STS and SPT with improved anterior lean, will continue to work on this and see if improvements carry over to ambulation.    Other Barriers to Discharge (DME, Family Training, etc):  - Pt lives alone but has support from fiance, son and daughter  - Full flight of stairs to get to main level from basement entry in garage  - May need w/c  - Cog deficits

## 2023-03-29 NOTE — PLAN OF CARE
Goal Outcome Evaluation:  External catheter applied and pt was changed x2.    Orientation: Aox3, disoriented, forgetful   Bowel: Smear bm x1, incontinent  Bladder: Incontinent  Ambulation/Transfers: Not oob  Diet/ Liquids: Lvl 5 diet, lvl 3 liq  Tubes/ Lines/ Drains: PEG tube  Tube Feedin ml/hr  Skin: Mepilex on sacrum

## 2023-03-29 NOTE — PROGRESS NOTES
Team rounds this morning. Pt in the middle of neuropsychology testing, team spoke with pt brynn outside of pt room. Discussed pt needs at discharge, discharge options, and discharge timeframe. Discussed that pt insurance could be a barrier to discharge to TCU and uncertainty of how long pt would be covered in TCU. Pt finance expressed understanding and stated that she is willing and able to provide cares for pt at home. She plans to sell her home, move into pt's home, and pt and brynn can stay in the walk-out basement where there is a handicap accessible bathroom. Pt brynn works from home. Briefly discussed stair lift but not planning to pursue that at this time.     Blank HCD and POA given, as well as a list of mobile notaries. Pt finance stated that pending result of neuropsychology testing, her son-in-law who is an  has drafted the documents and pt's finance already coordinated for a notary to come on Friday 03/31/23. Will need to get copy of those documents and scan to [employer] for validation and to be added to EMR.     Home measurement form given on behalf of therapy. Therapy discussing DME needs and will coordinate family caregiver training. SWer discussed metro mobility and application given. Will need to complete other half, send completed caroline in the mail, and add information to AVS for reference. Discussed transportation options at discharge and other resources, if needed. Will f/u and give resources closer to discharge. Discussed f/u apts scheduled by [facility] at discharge and 30-days worth of medications at discharge as well. Flyer for [facility] given to pt brynn, if ever needing to look into alternative living options (such as ILF, FPC, LTC, or memory care). Provided pt finance with options for hiring in help, if wanted. Also provided pt wife with this writer business card.     Discussed HC services, SOC, insurance coverage, frequency, and length of HC services. Due to pt being on  tube feeding and needing that at home, RN CC will set up HI and HC. RN CC not present today but updated and will continue to follow. Denied preference on HC agency. Needs RN, PT, OT, SLP, and HA.     Pt finance expressed understanding, agreement, and denied additional needs or concerns. Will extended the discharge until Tues 04/11/23 in order to allow time to get home set up, DME, family training, TF education, and HI/HC set up.     This writer (and/or covering SWer) will f/u on HCD/POA, metro mobility caroline, and complete the referral to [facility] (pt gave permission upon admission to ARU). Will remain available and continue to follow.     Sharonda Ramires [facility] Acute Rehab   Direct Phone: [phone]  I   Pager: [phone]  I  Fax: [phone]

## 2023-03-29 NOTE — PROGRESS NOTES
Postural Assessment for Stroke Scale (PASS)    Maintaining posture -   1) Sitting without support - 3  2) Standing with support (feet position free) - 2  3) Standing without support (feet position free) - 0  4) Standing on nonparetic leg - 0  5) Standing on paretic leg - 0    Changing posture -  6) Rolling supine -> affected side - 2  7) Rolling supine -> unaffected side - 2  8) Sup->sitting edge of bed - 1  9) Sitting edge of bed -> sup - 1  10) Sit->stand without support - 1  11) Stand->sit without support - 1  12) Standing,  pencil from floor without support - 0    Total Score - 13/36    Assessment: Pt will likely have mixed mobility and is unlikely to be independently walking at 3 months post injury    The PASS assesses a patient's ability to change and maintain posture during different balance activities. It is not associated with falls risk, but is used to track progress with the patient's ability to control their posture and balance throughout the course of the patient's admission.    A score of <22 points at admission to Acute Rehab is predictive that pt is not likely to be walking independently at 3 months.   (Maximilian et al. 2021. Postural Maintenance Is Associated with Walking Ability in People Received Acute Rehabilitation after Stroke)

## 2023-03-29 NOTE — PLAN OF CARE
Goal Outcome Evaluation:    Pt is alert to himself, disoriented to time. Pt denied cough or SOB, CP, Pt is incontinent of bowel and bladder, uses profit at hs.  AT 1700 hr. Family present at bedside during the supper. TF started at 1400 hr until 0600 hr. Safety round completed.      Patient's most recent vital signs are:     Vital signs:  BP: 121/72  Temp: 96.7  HR: 86  RR: 16  SpO2: 96 %     Patient does not have new respiratory symptoms.  Patient does not have new sore throat.  Patient does not have a fever greater than 99.5.

## 2023-03-29 NOTE — PLAN OF CARE
Alert to himself and place disoriented to time. Denies cough or sob, denies,chest pain. Had a large bladder incontinence soiling garments and linen, assisted with frederick care. Poor appetite this morning, BG's 169 and 121. TF stared at 1400 hrs, had team rounds ot day in preparation for discharge. TF abd stroke class on Monday at 0900, will continue Poc    Goal Outcome Evaluation:

## 2023-03-29 NOTE — PROGRESS NOTES
Discharge Planner Post-Acute Rehab PT:     Discharge Plan: HCPT and 24/7 assist    Precautions: NPO, falls, PEG/TF, L hemiparesis, alarms    Current Status:  Bed Mobility: Deacon of 1 to help with LE  Transfer: min-modA for SPT, pt holds onto PTs arms  Gait: 12' in // bars with min A to CGA and chair follow  Stairs: NT safety concerns  Balance: Can sit at EOB       PASS:  3/29: 13/36      Assessment: Performed PASS today, pt's score indicates he will likely need mixed mobility and likely not walking independently 3 months post injury. Will continue to work on improving transfers, gait, and trialing stairs when ready.    Other Barriers to Discharge (DME, Family Training, etc):  - Fiance will move in with pt  - Full flight of stairs to get to main level from basement entry in garage, planning on staying on basement level with sarasteady  - May need w/c  - Cog deficits

## 2023-03-29 NOTE — CONSULTS
CONFIDENTIAL NEUROPSYCHOLOGICAL EVALUATION  **This is a medical document intended to be read within the context of the larger medical chart and for communication with the referring provider.  It is writing in medical language and may contain abbreviations that are unfamiliar.  Please consult the provider for further clarification.**    Name:  Stefan Diallo  (Stefan)  YOB: 1945  Date of Assessment: 3/28/2023 & 3/29/2023  Referring Provider: Kenna Ruiz PA-C  Occupation: Retired  Education: 12 yrs  Handedness: right handed    Reason for Referral: Stefan Diallo is a 77 year old male  who was referred for a neuropsychological evaluation for to determine if he can assign POA.      SUMMARY AND CLINICAL IMPRESSIONS: See below for relevant background information and detailed test results.  See separate abstract for a list of neuropsychological measures and test scores.     Stefan Diallo is a 77 year old male who was diagnosed with a right-sided MCA stroke.  He was referred for a neuropsychological evaluation to determine his ability to assign POA, ultimately to help with discharge planning.     Results of the neuropsychological evaluation revealed impairments in most cognitive domains including visuospatial perception/reasoning/construction, processing speed, working memory, new learning and independent memory retrieval, and all cognitive aspects of executive functioning (e.g., response inhibition, rapid alternating attention, and novel problem-solving).  His responses to health and safety questions were extremely low, and while they would keep him safe, his responses did not indicate much in terms of reasoning and problem-solving. Stefan performed best on measures of language and simple attention.      Overall, Stefan meets diagnostic criteria for a major neurocognitive disorder (dementia) due to his stroke history (vascular etiology).  Regarding prognosis, some degree of cognitive improvement is expected  with time and with the implementation of routine and structure as he transitions home. However, given the significant impairments demonstrated on neuropsychological testing, it is unlikely that Stefan will return to his premorbid level of cognitive functioning.      With regard to decisional capacity, I have been asked to weight in on whether Stefan can assign an alternative decision maker.  Several factors must be considered including Stefan's ability to understand information, reason, communicate a choice, and appreciate the consequences, as well as demonstrate a consistent response across times asked.     At this time, it is felt that Stefan can clearly demonstrated a preference with regards to assigning a POA.  He consistently chose his fiance and appeared to appreciate the consequences of what it would mean if this role fell to others.  While his understanding of the full scope of the problem is relatively concrete, Stefan can demonstrate enough understanding to make a reasoned decision for assigning Mikey as a POA.   However, it is recommended that POA should be enacted and Stefan receive assistance in complex and reasoned decisions going forward.  He will likely have difficulty learning, retaining, and problem-solving with new information as it pertains to health and rehabilitation.     Additionally recommendations will be provided to the team as necessary.     DIAGNOSES  Vascular Dementia (F01)    Thank you for allowing me to participate in Stefan Diallo's care.   I hope this report is helpful to all those involved.  I would be happy to discuss these results in detail or answer any other questions.      Kimberly Paulson PsyD, JOVANNI  Clinical Neuropsychologist    RELEVANT BACKGROUND INFORMATION  Informed consent and assent was obtained after discussing the purpose and procedures of the evaluation, as well as aspects of confidentiality and effort/engagement.  Background information was obtained from a clinical interview  with Stefan umberto Mikey (his fiance) as well as review of available medical records.     HISTORY OF PRESENTING ILLNESS:  Stefan Diallo is a 77 year old male with a past medical history of atrial fibrillation on coumadin though held PTA for upcoming procedure (pain pump placement), chronic bilateral low back pain s/p lumbar fusion, type 2 diabetes mellitus c/b polyneuropathy, renal artery aneurysm, hypertension, hyperlipidemia, KISHA, GERD, BPH, and chronic bilateral shoulder pain who presented to [facility] on 3/7/23 with left-sided weakness, left facial droop, and dysarthria with CT head revealing increased attenuation within right superior M2 brach and CTA head with right superior M2 branch oclusion, right MCA bifurcation aneurysm, and possible subocclusive thrombus at the R MCA bifurcation.  Patient was administered TNK.  Patient was transferred to [facility] for consideration of endovascular treatment, but upon arrival was noted to have significant improvement in symptoms and shared decision not to pursue.  Repeat imaging including NCCT, CTA, & CTP were performed; CTP showed small amount of core:penumbra mismatch (overcalled volume as regions of L hemisphere & posterior circulation showed as hypoperfused and did not correlate with R M2 occlusion), persistent M2 clot.        Additional stroke evaluation with EKG with afib, echo with EF of 60% with normal right ventricular function, mild to moderate aortic stenosis; LDL 64.      Patient developed worsening left-sided weakness overnight 3/7-3/8 despite adequate blood pressures.  MRI showed completed L M2 divisions stroke consistent with his known occlusion.  Repeat CT head, CTA, CTP with no new occlusion and no new region of perfusion deficit.     On 3/11, patient developed sepsis felt to be due to aspiration pneumonia.  He was treated with vancomycin (3/11-3/13), unasyn (3/11-3/15), then Augmentin to complete 7 day course.  Remains stable on room air and  "afebrile at discharge to ARU.     Hospital course was further complicated by moderate to severe dysphagia now s/p PEG placement on 3/9/23 with IR c/b bleeding from PEG site, hypokalemia, hyperglycemia, constipation, and loose stools.     During acute hospitalization, patient was seen and evaluated by PT and OT, who collectively recommended that patient would benefit from ongoing therapies in the acute inpatient rehabilitation setting. He is now admitted to ARU on 3/16/23 for multidisciplinary rehabilitation and ongoing medical management.      There is a question whether Stefan can participate in his own reasoned decision-making - prompting this evaluation.  There are concerns about pre-injury cognitive challenges.      Speech evaluation completed 3/21/2023 (CLQT) revealed severe impairment in executive functioning, moderate impairment in attention, and visuospatial skills, with mild impairment in memory and language skills.  However, most disciplines report that his physical/cognitive stamina, and alertness continue to be his biggest barrier in participating in therapies.     Current Clinical Interview:    During the clinical interview, Stefan defined a POA as \"a family member or friend who handles personal property after someone is gone.\"  When asked, he stated that they can make decisions with and for a person while they are alive as well.  Stefan defined a legal guardian as a decision-maker \"appointed by the court.\"  Stefan independently stated that he has worked with an  to assign transfer of home upon death as well as assigning his fiance (Mikey) as the beneficiary to many of his accounts.  Mikey was able to jump in and ask Stefan questions to help pull out meaning from his responses.  Stefan consistently identified that he would want Mikey to make decisions for him.  Although he is \"somewhat\" close to his children, he feels more comfortable with Mikey making decisions for him.     With regards to his current " "level of functioning, Stefan was able to recall some of the details surrounding his injury. He was able to state he had a stroke.  When asked open-ended questions, he denied any significant changes in his cognitive or physical abilities.  When asked more targeted or closed-ended questions he was able to identify he needs a walker.  His fiance (Mikey), reported he is more confused, such as asking her to \"pay the pizza cipriano.\"  She noted he is having more vivid dreams.      HEALTH HABITS, BEHAVIORS AND MOOD:   Description of current mood:  Depressed mood.  Started Remeron for sleep and appetite as well.     Appetite: No Concerns.     Sleep/Energy: Vivid dreams.  Will nap during the day.  Significant concern regarding level of awakefulness     Exercise: Currently participating in therapies on the unit.     Chronic Pain: Chronic back and bilateral shoulder pain for which he was going to receive a pain pump.     Tobacco use:  Denied    Alcohol use:  Denied    Other Drugs: Denied    Hallucinations: Not observed    Suicidal ideation: Not observed.     MEDICAL/PSYCHIATRIC/SURGICAL HISTORY:   Patient Active Problem List   Diagnosis     Fusion of spine, lumbosacral region     Chronic atrial fibrillation (H)     Acquired absence of other right toe(s) (H)     Amputated toe of right foot (H)     Bell's palsy     Benign prostatic hyperplasia     Cellulitis     Cellulitis of foot     Chest pain     Closed nondisplaced fracture of phalanx of toe of right foot, unspecified toe, initial encounter     Contact dermatitis and other eczema, due to unspecified cause     Cough     Dysphagia     Dysuria     Essential hypertension, benign     Eye pain     Gastroesophageal reflux disease with esophagitis     Idiopathic peripheral neuropathy     Lower back pain     Muscle weakness     Myalgia and myositis, unspecified     Obesity     Osteomyelitis of toe (H)     Pure hypercholesterolemia     Renal artery aneurysm (H)     Shortness of breath     " Sleep apnea     Staph aureus infection     Status post lumbar surgery     Tinea corporis     Urinary tract infection     Urticaria     Venous stasis ulcer of left calf without varicose veins, unspecified ulcer stage (H)     Vertigo     Type II diabetes mellitus with peripheral circulatory disorder (H)     Mixed hyperlipidemia     Abrasion of right foot, initial encounter     Onychomycosis     Hammer toes of both feet     Cerebrovascular accident (CVA) due to occlusion of left middle cerebral artery (H)       Past Medical History:   Diagnosis Date     Arthritis      Atrial fibrillation (H)      Bacteremia      Bell's palsy 2015     BPH (benign prostatic hyperplasia)      Diabetes (H)      Gastroesophageal reflux disease      GERD (gastroesophageal reflux disease)      GI hemorrhage      High cholesterol      Hyperlipemia      Hyperlipidemia      Hypertension      Hypertension      Idiopathic peripheral neuropathy      Irregular heart beat     chronic at fib     Renal artery aneurysm (H)      Renal artery aneurysm (H)      Sleep apnea     Bipap     Sleep apnea 10/31/2021     Sleep apnea, obstructive     USES BIPAP     Type 2 diabetes mellitus (H)      UTI (lower urinary tract infection)        Past Surgical History:   Procedure Laterality Date     AMPUTATE FOOT Right 5/6/2019    Procedure: AMPUTATION, 3rd TOE RIGHT FOOT;  Surgeon: Ravi Abbasi DPM;  Location: [facility] [place];  Service: Podiatry     FUSION SPINE POSTERIOR MINIMALLY INVASIVE THREE + LEVELS N/A 10/29/2020    Procedure: L3/4/5S1 oblique lateral lumbar interbody fusion with discectomy L3/4/5S1 Posterior minimally invasive pedicle screw placement and posterolateral instrumentation and fusion  L3/4/5S1 Posterior minimally invasive pedicle screw placement and posterolateral instrumentation and fusion;  Surgeon: Vernon Bynum MD;  Location:  OR      REPAIR COMPL ROTATOR CUFF AVULSN,CHR      Description: Chronic Rotator Cuff Avulsion;  Recorded:  "04/11/2011;     IR GASTROSTOMY TUBE PERCUTANEOUS PLCMNT  3/9/2023     ORTHOPEDIC SURGERY Right     knee surgery     ORTHOPEDIC SURGERY Right     amputation 3rd toe on right foot     TENOTOMY ACHILLES TENDON       TRANSRECTAL ULTRASONIC, TRANSURETHRAL RESECTION (TUR) OF PROSTATE CYST         Family History:    Family History   Problem Relation Age of Onset     Coronary Artery Disease Mother      Coronary Artery Disease Father      Atrial fibrillation Father        IMAGING:  Brain CT completed 3/8/2023:   \"IMPRESSION:     1. Subacute infarct in the right MCA territory appears similar to  prior head CT from earlier today. No evidence of hemorrhagic  transformation of infarct. Mild regional edema in the area of infarct  without midline shift or herniation. No hydrocephalus.  2. Nonspecific white matter changes most likely due to chronic  microvascular ischemic disease. \"    MEDICATIONS:   Current Facility-Administered Medications   Medication     acetaminophen (TYLENOL) solution 650 mg     amantadine (SYMMETREL) capsule 100 mg     apixaban ANTICOAGULANT (ELIQUIS) tablet 5 mg     atorvastatin (LIPITOR) tablet 20 mg     calamine 8-8 % lotion     dextrose 10% infusion     glucose gel 15-30 g    Or     dextrose 50 % injection 25-50 mL    Or     glucagon injection 1 mg     diltiazem (CARDIZEM) tablet 60 mg     gabapentin (NEURONTIN) solution 600 mg     guaiFENesin (ROBITUSSIN) 20 mg/mL solution 10 mL     insulin aspart (NovoLOG) injection (RAPID ACTING)     insulin aspart (NovoLOG) injection (RAPID ACTING)     melatonin tablet 5 mg     metFORMIN (GLUCOPHAGE) tablet 1,000 mg     pantoprazole (PROTONIX) 2 mg/mL suspension 40 mg     Patient is already receiving anticoagulation with heparin, enoxaparin (LOVENOX), warfarin (COUMADIN)  or other anticoagulant medication     polyethylene glycol (MIRALAX) Packet 17 g     senna-docusate (SENOKOT-S/PERICOLACE) 8.6-50 MG per tablet 2 tablet     SOCIAL/DEVELOPMENTAL/OCCUPATIONAL HISTORY: " " Stefan completed high school.  He works part-time in maintenance. He is in a relationship with his fiance, but they live separately.      BEHAVIORAL OBSERVATIONS:  Stefan was seen as an inpatient on the rehabilitation unit.  During the clinical interview (3/28/2029) it was observed that he had significant difficulty staying awake, even during direct conversation. Level of alertness varied greatly during our sessions.  Ambulation was not assessed, and please see PT notes for additional information. Stefan's speech was dysarthric, and spontaneous conversation was minimal. There were paraphasic errors noted, such as saying \"salute\" for \"subtle.\"   At times, he didn't answer the question asked, and rather he responded best to closed-ended questions.  He was not consistently oriented to time and situation.  He believe he fell causing the stroke. His responses often required several prompts or follow-up questions to fully glean all information necessary.  Thoughts were tangential at time, usually in service of humor.  He did not spontaneously use both hands when manipulating test materials and had to be prompted to do so, and difficulties with coordination were noted. Stefan discontinued several tests due to frustration in grasping the task demands.  Additionally, his left visual neglect impacted his performance on some tasks, such as difficulties fully scanning choices or finding targets within his visual field.     TEST RESULTS: Please use the following table for reference.   Percentile Ranks Descriptor   <2nd Percentile Extremely Low Range   3rd - 9th Percentile Borderline Range   10th - 16th Percentile Below Average Range   17th - 24th Percentile Low Average   25th - 75th Percentile Average   76th - 90th Percentile Above Average   91st - 97th Percentile Superior   >98th Percentile Very Superior      Performance Validity:  Stefan put forth as much effort as he was able to on testing measures.       Pre-Morbid Functioning: Stefan " performed in the average range on a task of single-word reading.  Taken together with vocational and educational attainment, it is estimated that Stefan's pre-morbid functioning is in the average range.     Language Skills: Verbal abstraction was average. Confrontation naming was average.  Semantic verbal fluency was extremely low, likely undermined by his slowed processing speed noted on other measures.     Visuospatial: His performance is likely undermined by left visual-field neglect. Block construction was extremely low. His copy of a complex figure was extremely low, and his perception of line orientation was extremely low.     Attention/Working Memory/Processing Speed: Simple attention span was estimated to be in the below average range, and working memory was estimated to be in the extremely low range.     Learning/Memory: Learning for a list of words was extremely low.  He recalled 0 words after a delay (extremely low).  Recognition hints and cues helped somewhat with memory retrieval, however, he did endorse some intrusive information (overall in the extremely low range). Narrative learning and memory was extremely low.  Recognition memory for story details was below average.  He was able to recall 3 details of a figure copied earlier, and he was able to correctly identify the target figure from a field of 6.     Executive Functioning: All cognitive tasks of executive functioning were discontinued by the patient due extremely impaired performance (e.g., meeting the time limit) and also due to Stefan's frustration and difficulties in understanding task directions.  His responses for health and safety questions were in the extremely low range across multiple measures.  Looking at his responses qualitatively, his responses would keep him safe, but were generally concrete in nature and did not demonstrate strong problem-solving responses. His understanding of POA and legal guardian were concrete, and he did not  appear to benefit from a teach-back method.  However, he consistently stated he would prefer his fiance to be his decision maker across multiple sessions that were temporally distant.     Procedures Administered by Psychologist: Clinical Interview with Stfean and his fiance (dewey), review of available medical records, test selection, interpretation, preparation of written report, and feedback. Please see nursing note for procedures administered by psychometrist.      Tests Administered by psychometrist:  Performance Validity Measures, NAB Orientation & Judgment, RBANS, ACS TOPF, WAIS-IV (Selected Subtests), Trails A&B, WCST-64, ILS Health and Safety.       Activities included in this evaluation CPT Code Time Units   Psychological Diagnostic Interview 85208 - 1   Neuropsychology Professional Time 05388 68 1   [place] on [address]     Neuropsychologist Admin/Scoring Tests 42022     Add On 49192     Psychometrician Time - Test 22822 170 1   Admin/Scoring [id number]  6   DX:

## 2023-03-29 NOTE — PROGRESS NOTES
CLINICAL NUTRITION SERVICES - REASSESSMENT NOTE     Nutrition Prescription    RECOMMENDATIONS FOR MDs/PROVIDERS TO ORDER:  Appreciate encouragement surrounding PO intake    Malnutrition Status:    Patient does not meet two of the established criteria necessary for diagnosing malnutrition but is at risk for malnutrition    Recommendations already ordered by Registered Dietitian (RD):  Jevity 1.5 Kip @ goal of 85 ml/hr x 16 hours from 2 pm - 6 am (1360 ml/day) will provide: 2040 kcals (22 kcal/kg), 87 g PRO (0.9 g/kg), 1034 ml free H20, 294 g CHO, and 29 g fiber daily.    - Magic Cup (berry) with breakfast and dinner  - Gelatein with lunch    Future/Additional Recommendations:  - Monitor diet advancement per SLP  - Monitor PO intake, supplement acceptance/use, and weight trends  - Monitor ability to further cycle TF     EVALUATION OF THE PROGRESS TOWARD GOALS   Diet: Level 5: Minced & Moist Dysphagia Diet  and Moderately Thick  Thickened Liquids   - Room service with assist  PO Intake: 25-50% per flowsheets    Nutrition Support: Jevity 1.5 Kip @ goal of 85 ml/hr x 18 hours from 2 pm - 8 am (1530 ml/day) will provide: 2295 kcals (24 kcal/kg), 98 g PRO (1 g/kg), 1163 ml free H20, 330 g CHO, and 32 g fiber daily.    FWF: 180 mL QID at 0800, 1100, 1400, 1800     Total fluid (TF + flushes) = 1883 mL/day     NEW FINDINGS   - Diet advanced from NPO to minced and moist diet (level ), moderately thick (level 3) on 3/24    Met with pt and Kayce in room. Pt was sleeping at time of visit. Discussed stopping feeds at 6 am to help with pt's appetite at breakfast. Pt is eating well at lunch and dinner times. Kayce feels this will improve his oral intake in the morning. Discussed decreasing time of feeds in rounds as well.     Discussed oral nutrition supplements to improve intake. Kayce thinks pt will like Magic Cup (berry) and Gelatein. Briefly discussed going home on TF. Discussed option of nocturnal cycle vs bolus feeds.      Weight:   Wt Readings from Last 10 Encounters:   23 88.9 kg (195 lb 15.8 oz)   23 98 kg (216 lb 0.8 oz)   23 97.5 kg (215 lb)   23 98.9 kg (218 lb)   23 105.7 kg (233 lb)   22 95.9 kg (211 lb 8 oz)   08/10/22 93.9 kg (207 lb 1.6 oz)   22 100.7 kg (222 lb)   22 101 kg (222 lb 12 oz)   10/31/21 104.3 kg (230 lb)     23 0632 88.9 kg (195 lb 15.8 oz) Bed scale   23 0635 91.2 kg (201 lb 1 oz) Bed scale   23 0703 90.3 kg (199 lb 1.2 oz) --   23 0605 89.3 kg (196 lb 13.9 oz) Bed scale   23 0609 88.4 kg (194 lb 14.2 oz) Bed scale   23 0647 92 kg (202 lb 13.2 oz) Bed scale   23 1610 93.8 kg (206 lb 12.7 oz) Bed scale   9% wt loss in 2.5 weeks    Labs:   BUN: 23.8 (H)  Cr: 0.76 (WNL)  B-169 over 24 hours    Meds:  Novolog  Metformin, 1,000 mg BID  Protonix    GI: last BM x1 on 3/28 per I/Os    MALNUTRITION  % Intake: No decreased intake noted - needs met with TF  % Weight Loss: > 5% in 1 month (severe)  Subcutaneous Fat Loss: None observed - visual only as pt sleeping  Muscle Loss: None observed - visual only as pt sleeping  Fluid Accumulation/Edema: None noted  Malnutrition Diagnosis: Patient does not meet two of the established criteria necessary for diagnosing malnutrition but is at risk for malnutrition    Previous Goals   Total avg nutritional intake to meet a minimum of 20 kcal/kg and 1 g PRO/kg daily (per dosing wt 93.8 kg).  Evaluation: Met    Previous Nutrition Diagnosis  Inadequate oral intake related to chewing/swallowing difficulties as evidenced by NPO status and need for TF to meet nutrition needs.    Evaluation: Improving    CURRENT NUTRITION DIAGNOSIS  Inadequate oral intake related to chewing/swallowing difficulties as evidenced by need for modified diet and TF to meet estimated nutrition needs.      INTERVENTIONS  Implementation  Collaboration with other providers - discussed in rounds  Enteral Nutrition -  Modify schedule  Medical food supplement therapy - Magic Cup and Gelatein    Goals  Total avg nutritional intake to meet a minimum of 20 kcal/kg and 1 g PRO/kg daily (per dosing wt 93.8 kg).    Monitoring/Evaluation  Progress toward goals will be monitored and evaluated per protocol.    Lety Mead RD, TOM  ARU RD pager: [phone]  Weekend/Holiday RD pager: [phone]

## 2023-03-29 NOTE — PROGRESS NOTES
"  [facility]   Acute Rehabilitation Unit  Daily progress note    INTERVAL HISTORY  Stefan Diallo was seen and examined at bedside this morning following team rounds, also had lengthy discussion with significant other regarding discharge planning.  No acute events reported overnight.  Patient reports that he is still not sleeping very well at night.  He notes that he is still \"coughing quite a bit\" though team notes only mildly and infrequently.  Patient denies any shortness of breath.  He denies pain, dizziness, nausea, sore throat, nasal congestion.  He reports feeling \"frustrated\" by the testing done this morning (neuropsych).  Nursing noting ongoing intermittent incontinence of both bowel and bladder.  No complaints of pain though.    Functionally, therapy team is noting some progress over the past 2 days and has had improved level of alertness.  Participation/motivation can still be variable.  He needs SBA for grooming/hygiene, min A for upper body, max to total A for lower body.  Transfers can be min to CGA.  Was able to ambulate 12' in parallel bars.  Stairs remain most significant barrier to discharge home.  Significant other notes ability to make some home modifications to allow for patient to live in walk-in basement.  Anticipate to be wheelchair based at discharge and still needing physical assist for all mobility and ADLs.  Plan for additional family training and will work on equipment.  For full functional updates, see team rounds note from today.    MEDICATIONS    amantadine  100 mg Per Feeding Tube BID     apixaban ANTICOAGULANT  5 mg Per Feeding Tube BID     atorvastatin  20 mg Per Feeding Tube At Bedtime     diltiazem  60 mg Per Feeding Tube Q8H     gabapentin  600 mg Per Feeding Tube At Bedtime     insulin aspart  1-7 Units Subcutaneous TID AC     insulin aspart  1-5 Units Subcutaneous At Bedtime     metFORMIN  1,000 mg Oral or Feeding Tube BID w/meals     " "pantoprazole  40 mg Per Feeding Tube QAM AC        acetaminophen, calamine, dextrose, glucose **OR** dextrose **OR** glucagon, guaiFENesin, melatonin, - MEDICATION INSTRUCTIONS -, polyethylene glycol, senna-docusate     PHYSICAL EXAM  /68   Pulse 81   Temp (!) 96.3  F (35.7  C) (Oral)   Resp 16   Ht 1.854 m (6' 1\")   Wt 88.9 kg (195 lb 15.8 oz)   SpO2 93%   BMI 25.86 kg/m     Gen: NAD, sitting up in chair  HEENT: NC/AT  Cardio: irregularly irregular, no murmurs  Pulm: non-labored on room air, lungs CTA bilaterally  Abd: soft, non-tender, non-distended, bowel sounds present, +PEG  Ext: no edema in bilateral lower extremities  Neuro/MSK: awake, alert, left facial droop, +dysarthria and aphasia, left neglect, left hemiparesis  *Full exam deferred today for conversation    LABS  CBC RESULTS:   Recent Labs   Lab Test 03/27/23  0617 03/23/23  0557 03/22/23  0541   WBC 9.2 11.7* 12.6*   RBC 3.91* 4.26* 4.34*   HGB 11.9* 13.1* 13.2*   HCT 37.0* 39.7* 40.4   MCV 95 93 93   MCH 30.4 30.8 30.4   MCHC 32.2 33.0 32.7   RDW 12.8 13.2 13.1    445 464*     Last Basic Metabolic Panel:  Recent Labs   Lab Test 03/29/23  0156 03/28/23  2137 03/28/23  1749 03/27/23  0812 03/27/23  0617 03/23/23  0620 03/23/23  0557 03/22/23  0650 03/22/23  0541   NA  --   --   --   --  137  --  138  --  140   POTASSIUM  --   --   --   --  4.0  --  3.9  --  4.2   CHLORIDE  --   --   --   --  99  --  101  --  103   CO2  --   --   --   --  26  --  24  --  23   ANIONGAP  --   --   --   --  12  --  13  --  14   * 107* 147*   < > 166*   < > 217*   < > 210*   BUN  --   --   --   --  23.8*  --  31.6*  --  32.1*   CR  --   --   --   --  0.76  --  0.75  --  0.83   GFRESTIMATED  --   --   --   --  >90  --  >90  --  89   LUTHER  --   --   --   --  9.7  --  9.7  --  9.9    < > = values in this interval not displayed.     Recent Labs   Lab 03/29/23  0156 03/28/23  2137 03/28/23  1749 03/28/23  1123 03/28/23  0808 03/28/23  0214   * 107* " 147* 151* 164* 150*       Rehabilitation - continue comprehensive acute inpatient rehabilitation program with multidisciplinary approach including therapies, rehab nursing, and physiatry following. See interval history for updates.      ASSESSMENT AND PLAN  Stefan Diallo is a 77 year old male with a past medical history of atrial fibrillation on coumadin though held PTA for planned procedure (pain pump placement), chronic bilateral low back pain s/p lumbar fusion, type 2 diabetes mellitus c/b polyneuropathy, renal artery aneurysm, hypertension, hyperlipidemia, KISHA, GERD, BPH, and chronic bilateral shoulder pain who was admitted on 3/7/23 with acute right MCA stroke s/p tenecteplase with hospital course complicated by dysphagia s/p PEG placement on 3/9 complicated by PEG-site bleeding, suspected aspiration pneumonia, hyperglycemia, constipation, and hypokalemia.  He is now admitted to ARU on 3/16/23 for multidisciplinary rehabilitation and ongoing medical management.        Admission to acute inpatient rehab 03/16/23.    Impairment group code: Stroke Ischemic 01.1 (L) Body Involvement (R) Brain; s/p acute ischemic stroke of R MCA territory due to cardioembolism status post tenecteplase        1. PT, OT and SLP 60 minutes of each on a daily basis, in addition to rehab nursing and close management of physiatrist.       2. Impairment of ADL's: Noted to have impaired activity tolerance, impaired balance, impaired coordination, impaired judgement and safety awareness, impaired strength, impaired tone, impaired weight shifting, impulsiveness and pain, all affecting his ability to safely and independently perform basic ADLs.  Goal for assist of 1 with basic ADLs.     3. Impairment of mobility:  Noted to have impaired activity tolerance, impaired balance, impaired coordination, impaired judgement and safety awareness, impaired strength, impaired tone, impaired weight shifting, impulsiveness and pain, all affecting his  ability to safely and independently perform basic mobility.  Goal for assist of 1 with basic mobility.     4. Impairment of cognition/language/swallow:  Noted to have dysarthria, aphasia, and impaired cognition with goals for improved cognitive-linguistic skills to meet basic needs.     5. Medical Conditions  New actions/orders/updates for today are in blue.     Acute R MCA stroke s/p tenecteplase 3/7, likely secondary to cardioembolism from atrial fibrillation off anticoagulation for an anticipated procedure  Initially on aspirin, later restarted on anticoagulation but with Eliquis in place of PTA warfarin.  - Continue apixaban 5 mg BID  - Continue PTA statin.  Goal LDL 40-70.  - Started on amantadine 100 mg daily 3/24, increased to BID (0700, 1300) on 3/24.  - Neuropsych eval completed today, await full report for results  - Long term BP goal <135/80 to be achieved as outpatient within several weeks.  Management as below.  - Continue PT/OT/SLP  - Follow up with general neurology in 6-8 weeks    Atrial fibrillation  [PTA on warfarin, diltiazem]  On chronic anticoagulation with warfarin.  However, had been held since 2/25 for planned pain pump placement. PTA long-acting extended release diltiazem cannot be given through the PEG tube, substituted short-acting. Started on DOAC in place of PTA warfarin on 3/14.  - Continue Eliquis 5 mg BID  - Continue short acting diltiazem 60 mg TID while NPO  - Continue to monitor     Dyslipidemia  [PTA meds: atorvastatin 20 mg daily, gemfibrozil 600 mg BID]  - Continue PTA statin       Hypertension  [PTA meds: atenolol 25 mg daily, diltiazem  mg daily, lisinopril 5 mg daily]  Resuming PTA meds gradually. PTA diltiazem increased to 60 mg TID on 3/14/23.   - Long term BP <130/80, inpatient meds can be slowly titrated to this goal as otherwise appropriate  - Continue diltiazem 60 mg q8h  - BP generally at goal on current regimen  - Resume PTA lisinopril, atenolol as  indicated    Possible aspiration pneumonia  Met criteria for early sepsis.  Started on unasyn + vanco 3/11.  MRSA nares negative.  BC's still negative at discharge.  Stopped vancomycin 3/13.  Changed to FT augmentin 3/15 with a plan for 7 total days abx, completed as of 3/18.  - Monitor respiratory status, SLP for dysphagia treatment as below    Cough  Patient has been complaining of persistent dry cough.  Afebrile, WBC WNL, satting well on room air.  Observed to be intermittent and mild.  Lungs clear on exam.  Patient complains this is disruptive for sleep.  - Robitussin PRN  - Add flonase at night for possible post-nasal drip    KISHA  - Has been refusing home CPAP, may be contributor to daytime somnolence. Discussed with patient who reports that he has not been using because he does not sleep as well with CPAP.  Also reports that he did not use just PTA for the same reason, unclear if/when used regularly.  Reviewed risks of untreated sleep apnea but may require ongoing discussion given impaired cognition.  - Preferring to use oxygen by NC with sleep over CPAP     Type II DM  Tube feeding induced hyperglycemia  Polyneuropathy  [PTA meds: metformin 500 mg qAM + 1000 mg qPM, gabapentin 600 mg at HS]  A1c 6.4% on 2/1/23.  PTA metformin held this admission.  Managed on small doses of Lantus and sliding scale insulin.  Stopped Lantus on 3/17, resumed home metformin initially at 500 mg BID, increased to PTA dose as of 3/20.  Recent Labs   Lab 03/29/23  0156 03/28/23  2137 03/28/23  1749 03/28/23  1123 03/28/23  0808 03/28/23  0214   * 107* 147* 151* 164* 150*   - Continue home gabapentin  - Continue metformin 1000 mg BID (increased on 3/21)  - -142  - Continue Novolog medium intensity sliding scale insulin.  Do not anticipate discharging home on this.  - Monitor BG TID AC + HS  - Hypoglycemia protocol     Chronic back pain  Patient planned for pain pump placement 3/8/23 (Javi).  States he has chronic back  pain from nerve impingement and uses tylenol PTA for pain.   No complaints of pain at ARU.  - Continue Tylenol prn for pain  - Monitor     Moderate oral, severe oropharyngeal dysphagia  S/p PEG placement 3/9  - Cycled tube feeds per PEG (switched from continuous on 3/18).  Per discussion with team, will try adjusting timing to 2pm-6am in hopes to encourage better oral intake at breakfast.  Also starting remeron as below.  - Continue water flushes 180 mL QID  - RD following, assistance appreciated  - Continue SLP  - T tacs removed 3/20   - Repeat VFSS on 3/22.  Per SLP, patient demonstrated significant improvement compared to prior VFSS on 3/9 though still aspiration with thin and mildly thick liquids.  As of 3/24, started on minced & moist solids (5) and moderately thick liquids (3) by TSP only; alternate food/drink and 1:1 assist with feeding. Hold PO if overt s/s of aspiration or oral pocketing. Ok for 5-10 ice chips with RN or SLP after oral care only between meals.     Loose stools, improving  Constipation, resolved  - Continue senokot-S 2 tabs BID PRN, Miralax PRN     GERD  - Continue pantoprazole as auto-sub for PTA on omeprazole 20 mg daily    Suspected HSV lesion, left upper lip, resolved  - Kailyn completed x7 days    Leukocytosis, resolved  WBC mildly elevated at 11.7 on 3/20.  CRP elevated at 44 (though prevoiusly 312 on 3/11).  Previously had mild leukocytosis (WBC 12.7 on 3/11) in setting of suspected aspiration pneumonia, but normalized the following day.  Afebrile, no localizing signs/symptoms of infection.  Procal very mildly elevated at 0.07.  - Continue to trend CBC every M/Th.  3/27: WBC normalized at 9.2.     Anemia, mild, normocytic  Has been trending 12s-13s.  Dropped slightly on 3/27 at 11.9.  No evidence of active bleeding.  - Continue to trend CBC every M/Th    Depressed mood  Despite amantadine, still noting fatigue, decreased motivation/engagement.  Also continues to complain of poor  sleep.  - Per Dr. Stinson, trial remeron 7.5 mg at bedtime for mood, sleep, appetite.  If tolerating, consider increase to 15 mg.      6. Adjustment to disability:  Clinical psychology to eval and treat if indicated  7. FEN: minced and moist (level 5) diet, moderately thick (level 3) liquids by tsp only, cycled nocturnal tube feeds via PEG  8. Bowel: incontinent, monitor, PRN bowel meds available  9. Bladder: continent/incontinent, using Primofit at night, will need to wean  10. DVT Prophylaxis: apixaban  11. GI Prophylaxis: PPI  12. Code: full  13. Disposition: goal for home  14. ELOS:  target 4/7/23  15. Follow up Appointments on Discharge: PCP in 1-2 weeks, general neurology in 6-8 weeks      Patient was seen and discussed with Dr. Liu Stinson, PM&R staff physician     Kenna Ruiz PA-C  Physical Medicine & Rehabilitation

## 2023-03-29 NOTE — PLAN OF CARE
Discharge Planner Post-Acute Rehab SLP:      Discharge Plan: home with S.O. 24/7 pt to reside in lower level of home, ongoing SLP     Precautions: PEG, aspiration risk     Current Status:  Hearing: WFL  Vision: WFL  Communication: Decreased vocal intensity  Cognition: Moderate cognitive linguistic impairment- reasoning, memory, attention  Swallow: minced & moist solids (5) and moderately thick liquids (3) by TSP only; alternate food/drink and 1:1 assist with feeding. Hold PO if overt s/s of aspiration or oral pocketing. Ok for 5-10 ice chips with RN or SLP after oral care only between meals.     Assessment:  Pt asleep in be with head of bed fully upright, minced and moist (moderately thick liquid meal tray on bedside table partially consumed. Max verbal and tactile stim to minimally roused, SLP utilized oral swab moistened with ice water as stim and to moisten oral cavity; dried scale-like secretions on tongue. Sig other Kayce arrived towards end of oral cares, pt declined additional a.m. meal intake, ice chips with SLP. Followed up with S.O. regarding pt asking/receiving ice chips, S.O. reported pt has not requested them recently, no issues receiving them when he does and she requests from nursing. Dentures cleaned and put in place, pt and S.O. edu oral cares have been completed post-meal if he requests ice chips, but if he eats some more with S.O. oral cares need to be completed again. S.O. endorsed comprehension.    P.M.: Pt up in wheelchair, fully awake and alert watching TV when SLP arrived. Facilitated soft and bite sized (6) lunch trial with moderaetly thick (3) liquids via teaspoon. Pt required max verbal and tactile cues to consistently take liquid sips via teaspoon, would try to take cup sips requring slp to remove cup from hand as no response to verbal instruction to stop, use tsp. Pt consumed approx 25% of soft and bite sized lunch (fish, green beans), demo'd adequate mastication and min to mild diffuse  oral residue post-swallow. When pt reported he was completed, focus shifted to effortful swallow exercise; pt compelted 20x total reps.        Other Barriers to Discharge (Family Training, etc): diet texture training pending progress

## 2023-03-29 NOTE — PLAN OF CARE
Discharge Planner Post-Acute Rehab OT:      Discharge Plan: Home with HCOT and 24/7 support available     Precautions: Falls, left side hemiparesis, PEG tube,modifed textures and thickend liquids, slurred speech      Current Status:  ADLs:    Mobility: min/modA with SPT with therapy bed-w/c; with nsg. Rec.Ax1-2 rahel lawrence, wc based. Min Ax1 STS with rahel steady    Grooming: SBA seated in TIS W/C    Dressing: UB- min A sitting EOB to joel t-shirt; LB-modA with initiating LEs into shorts, Linda with pulling over hips from standing, min/modA to maintain standing bal. rahel steady if pt. fatigued, Footwear-maxA     Bathing: Liko lift to purple dependent shower chair, max A for bathing    Toileting: Rahel steady Ax1 to padded commode overlay, Total A with toilet hygiene.   IADLs: Previously IND with all I/ADLs, lives separately from brynn swain and 2 kids live in [facility]   Vision/Cognition: cues to initiate and sustain action, does best w/ items presented 1 at a time or 1 step at a time and decreased stimulation in environment     Assessment: pt now progressed to toilet transfer rahel lawrence Ax1 using padded commode overlay.  CGA and cues with rahel lawrence for lowering shorts, assist for hygeine d/t cognition.     Other Barriers to Discharge (DME, Family Training, etc):   Family training TBD (potential support from brynn, son, daughter)   Split-level home (full flight of stairs to get from main level from basement entry in garage)   Cognitive deficits

## 2023-03-29 NOTE — CARE CONFERENCE
Acute Rehab Care Conference/Team Rounds    Type: Team Rounds    Present: Dr. Liu Stinson, Kenna Ruiz PA, Dr. Kimberly Paulson Neuropsychologist, Hiren Hudson PT, Magaly Charles OT, Barbara Perez SLP, Sharonda Ramires [facility], Lety Mead RD, Saul Jones RN, and Stefan Diallo Patient and brynn Devries at bedside.     Discharge Barriers/Treatment/Education    Rehab Diagnosis: Stroke Ischemic 01.1 (L) Body Involvement (R) Brain; s/p acute ischemic stroke of R MCA territory due to cardioembolism status post tenecteplase    Active Medical Co-morbidities/Prognosis:   Patient Active Problem List   Diagnosis     Fusion of spine, lumbosacral region     Chronic atrial fibrillation (H)     Acquired absence of other right toe(s) (H)     Amputated toe of right foot (H)     Bell's palsy     Benign prostatic hyperplasia     Cellulitis     Cellulitis of foot     Chest pain     Closed nondisplaced fracture of phalanx of toe of right foot, unspecified toe, initial encounter     Contact dermatitis and other eczema, due to unspecified cause     Cough     Dysphagia     Dysuria     Essential hypertension, benign     Eye pain     Gastroesophageal reflux disease with esophagitis     Idiopathic peripheral neuropathy     Lower back pain     Muscle weakness     Myalgia and myositis, unspecified     Obesity     Osteomyelitis of toe (H)     Pure hypercholesterolemia     Renal artery aneurysm (H)     Shortness of breath     Sleep apnea     Staph aureus infection     Status post lumbar surgery     Tinea corporis     Urinary tract infection     Urticaria     Venous stasis ulcer of left calf without varicose veins, unspecified ulcer stage (H)     Vertigo     Type II diabetes mellitus with peripheral circulatory disorder (H)     Mixed hyperlipidemia     Abrasion of right foot, initial encounter     Onychomycosis     Hammer toes of both feet     Cerebrovascular accident (CVA) due to occlusion of left middle cerebral artery (H)         Safety: Can use call light occasionally, bed alarm on, Aox3    Pain: Denies    Medications, Skin, Tubes/Lines: Medication through PEG tube or one at a time with level 3 liquids. Skin intact, mepilex on sacrum for preventative measures.    Swallowing/Nutrition: Minced & moist solids (5)/moderately thick liquids (3) by TSP only. Pt should alternate food/drink and 1:1 assist with feeding. Hold PO if overt s/s of aspiration or oral pocketing. Free water protocol for 5-10 ice chips with RN,SLP, or significant other Kayce after oral care only between meals. VFSS completed 03/22, demo'd aspiration with mildly thick liquids via cup and teaspoon, impaired AP transit of both puree and solid trials. Modified solid and liquids 5/3 initiated 03/23, advanced to levels 5 and 3 on 03/24. SLP initiated trials of soft and bite sized (6).    Bowel/Bladder: Rarely continent of bladder during the night, mostly incontinent. Incontinent of bowel.    Psychosocial: Engaged. Lives alone in a home. Dora lives close and is very supportive. Pt retired. Dora works full-time from home. 3 adult children, only 2 have a relationship, one not involved. No mental health, substance abuse, or financial concerns. Indep PTA.     ADLs/IADLs: Pt is making slow progress with ADLs d/t impaired cognition, fatigue, and left side weakness. Pt requires mod A with stand pivot transfer EOB<>W/C with therapy, and Ax1-2 with nsg. Pt requires SBA with G/H tasks seated. Pt requires min A with UBD and max A with LBD tasks. Pt requires rahel steady Ax1-2 to/from grey dependent shower chair/commode, and total A with toilet hygiene. Anticipate pt will require longer course of rehab d/t slow progress and extensive assistance with ADLs.     Mobility: Pt continues to make slow/steady progress. He been more awake the last two days but still requires assistance with any transfers. Depending on engagement, this assistance ranges from mid-modA. Barriers include needing to  complete flight of stairs to get to main level from basement. Pt lives alone but fiance available to provide 24/7 assistance. Possible that pt can stay on basement level and not need to perform stairs. Pt also may have access to wheelchair and walker, will verify. HHPT expected.  Bed Mobility: Deacon of 1 to help with LE  Transfer: min-modA for SPT, pt holds onto PTs arms  Gait: 12' in // bars with min A to CGA and chair follow  Stairs: NT safety concerns  Balance: Can sit at EOB with close SBA for ~2 minutes.     Cognition/Language: Fatigue/alertness greatly fluctuates, basic expressive/receptive language functional for communication of wants, needs. CLQT administered 03/21- Patient presenting with a composite severity rating of 2.2, indicating a moderate cognitive linguistic impairment. Patient with continous difficulties in level of alertness within ARU stay and throughout cognitive evaluation today. Presenting with notable impairments in executive functioning, memory, visuospatial tasks, and attention. Mild language impairments also prevalent in language-based tasks within assessment. Some evidence of left visual neglect evident in visuospatial tasks. Patient responsive to verbal cues and redirection for more complex commands and tasks.    Community Re-Entry: w/c based and assist due to mobility status    Transportation: Family to provide    Decision maker: self and family    Plan of Care and goals reviewed and updated.    Discharge Plan/Recommendations    Fall Precautions: continue    Patient/Family input to goals: Yes    Anticipated rehab needs following discharge: Home with home care    Anticipated care giver support after discharge: Family    Estimated length of stay: 4/11/23    Overall plan for the patient: Continue IP Rehabilitation.       Utilization Review and Continued Stay Justification    Medical Necessity Criteria:    For any criteria that is not met, please document reason and plan for discharge,  transfer, or modification of plan of care to address.    Requires intensive rehabilitation program to treat functional deficits?: Yes    Requires 3x per week or greater involvement of rehabilitation physician to oversee rehabilitation program?: Yes    Requires rehabilitation nursing interventions?: Yes    Patient is making functional progress?: Yes    There is a potential for additional functional progress? Yes    Patient is participating in therapy 3 hours per day a minimum of 5 days per week or 15 hours per week in 7 day period?:Yes    Has discharge needs that require coordinated discharge planning approach?:Yes          Final Physician Sign off    Statement of Approval: I approve the plan of care.     Patient Goals  Social Work Goals: Confirm discharge recommendations with therapy, coordinate safe discharge plan and remain available to support and assist as needed.    OT Predicted Duration/Target Date for Goal Attainment: 04/07/23  Therapy Frequency (OT): Daily  OT: Hygiene/Grooming: supervision/stand-by assist  OT: Upper Body Dressing: Supervision/stand-by assist  OT: Lower Body Dressing: Supervision/stand-by assist  OT: Upper Body Bathing: Supervision/stand-by assist, using adaptive equipment  OT: Lower Body Bathing: Minimal assist, using adaptive equipment  OT: Bed Mobility: Modified independent  OT: Transfer: Minimal assist  OT: Toilet Transfer/Toileting: Minimal assist     PT Predicted Duration/Target Date for Goal Attainment: 04/07/23  PT Frequency: 2x/day  PT: Bed Mobility: Modified independent, Supine to/from sit  PT: Transfers: Supervision/stand-by assist, Sit to/from stand, Assistive device  PT: Gait: Supervision/stand-by assist, 100 feet, Rolling walker  PT: Stairs: Minimal assist, Greater than 10 stairs, Rail on right  PT: Goal 1: SBA with car transfer  PT: Goal 2: Pt will verbalize 3 strategies to decresae risk of falls                          SLP Predicted Duration/Target Date for Goal Attainment:  04/07/23  Therapy Frequency (SLP Eval): daily  SLP: Safely tolerate diet without signs/symptoms of aspiration: Soft & bite sized diet, Mildly thick liquids  SLP: Goal 1: Patient will complete moderate level reasoning/problem solving tasks with 80% accuracy with minimal need for redirection  SLP: Goal 2: Patient will attend to therapeutic tasks for full session with less than 2 redirections and half hour session                              Patient/Family Goal: Bladder: Pt will be become continent of bladder through timed toileting during the day and wean off of the primofit.              Goal: Skin Integrity: Pt will be free of further skin complications by repositioning as needed q2hrs.                    Goal: Safety Management: Pt will be free of falls by calling for staff assistance as needed.

## 2023-03-29 NOTE — PROGRESS NOTES
Pt was seen for neuropsychological evaluation at the request of Kenna Ruiz PA-C,  for the purposes of diagnostic clarification and treatment planning. 170 minutes of test administration and scoring were provided by this writer. Please see Dr. Kimberly Paulson's report for a full interpretation of the findings.    Zaire Bernal MA, CSP

## 2023-03-30 NOTE — PLAN OF CARE
"Goal Outcome Evaluation:      Plan of Care Reviewed With: patient    Overall Patient Progress: no changeOverall Patient Progress: no change    ORIENTATION: Alert to self  TRANSFERS/AMBULATION: rahel A2  DIET: L5/L3/meds via PEG  BOWEL/BLADDER: primofit. Continent LBM 3/30  PAIN: denies  LDA: PEG  SKIN: blanchable red to sacrum; mepilex changed. Blanchable red to heels; elevated on pillows. rash to groin; pt reports not liking powder. Left sticky note requesting antifungal cream. Bed bath provided.  Other: attempted to apply CPAP. Pt insisted to not have applied, education provided and pt continued to decline. 02 at 2L via 2L NC.     /68 (BP Location: Right arm)   Pulse 80   Temp 97.2  F (36.2  C) (Oral)   Resp 16   Ht 1.854 m (6' 1\")   Wt 88.9 kg (195 lb 15.8 oz)   SpO2 97%   BMI 25.86 kg/m          "

## 2023-03-30 NOTE — PLAN OF CARE
Care Coordination:     Writer met with patient's fiance. Writer explained role and discussed coordination for ongoing therapies, home care, and TF. Writer explained that referral was sent to [facility] for TF needs, and they care provide supplies for tube feeding, and enteral education/ support. Patient and SO will need to go through TF education here. This is scheduled for Monday 3/4/23 in morning.     Writer available for ongoing care coordination needs.     Kamille Dunbar   Patient Care Management Coordinator  Acute Rehabilitation Unit/ Transitional Care Unit.   Ph: [phone]

## 2023-03-30 NOTE — PROGRESS NOTES
Discharge Planner Post-Acute Rehab OT:      Discharge Plan: Home with HCOT and 24/7 support available     Precautions: Falls, left side hemiparesis, PEG tube,modifed textures and thickend liquids, slurred speech      Current Status:  ADLs:    Mobility: min/modA with SPT with therapy bed-w/c; with nsg. Ax1 with rahel lawrence, wc based.     Grooming: SBA seated in TIS W/C    Dressing: UB- min A sitting EOB to joel t-shirt; LB-mod-max A with rahel steady     Bathing: Liko lift to purple dependent shower chair, max A for bathing    Toileting: Rahel steady Ax1 to padded commode overlay, Total A with toilet hygiene.   IADLs: Previously IND with all I/ADLs, lives separately from brynn swain and 2 kids live in [facility]   Vision/Cognition: cues to initiate and sustain action, does best w/ items presented 1 at a time or 1 step at a time and decreased stimulation in environment     Assessment: Time spent reviewing AE/DME recommendations for bathroom with S.O..S.O to order extended tub bench, bedside commode, grab bars, hand held shower head, and remove door on shower and install a curtain. S.O. ordered rahel steady that will be able to fit through bathroom doorway for toileting and bathing.      Other Barriers to Discharge (DME, Family Training, etc):   Family training prior to discharge with S.O. and son (potential support from fidanny, son, daughter)   Split-level home (full flight of stairs to get from main level from basement entry in garage)   Cognitive deficits   AE/DME: usama pads, pull-ups, bed rail, rahel steady, grab bars, tub bench, bedside commode, and bidet attachment

## 2023-03-30 NOTE — PROGRESS NOTES
Discharge Planner Post-Acute Rehab PT:     Discharge Plan: HCPT and 24/7 assist    Precautions: NPO, falls, PEG/TF, L hemiparesis, alarms    Current Status:  Bed Mobility: Deacon of 1 to help with LE  Transfer: min-modA for SPT, pt holds onto PTs arms  Gait: 12' in // bars with min A to CGA and chair follow  Stairs: NT safety concerns  Balance: Can sit at EOB       PASS:  3/29: 13/36      Assessment: Pt initially more alert during session, but then dozing off at times later, needing cuing and changing activities to stay alert.  Pt's standing was limited by R hip pain, but able to perform a few reps with A.  Pt also performed pedaling, w/c propulsion with LEs for strengthening, but challenging to keep pt alert.          Other Barriers to Discharge (DME, Family Training, etc):  - Fiance will move in with pt  - Full flight of stairs to get to main level from basement entry in garage, planning on staying on basement level with sarasteady  - May need w/c  - Cog deficits

## 2023-03-30 NOTE — PLAN OF CARE
Pt slept through morning cares & med admin. Meds through peg tube.  Peg patent & flushed without difficulty.  Poor appetite. Ate 25% of breakfast.  Does like magic cups.  Ate 100% of his magic cup & one bite of each food on his plate.  Did not touch yogurt.  Requested another magic cup at 1230p.  PRN Tylenol 650mg given at 1230 for R hip pain, effective at eliminating pain.  Wife at bedside.  Son visited for awhile. TF started at 1430hrs.  Daughter currently at bedside visiting.  Bowel movement on the toilet with therapy.  Primofit catheter in place after therapy & pt resting comfortably in bed.

## 2023-03-30 NOTE — PROGRESS NOTES
[facility]   Acute Rehabilitation Unit  Daily progress note    INTERVAL HISTORY  Stefan Diallo was seen and examined at bedside this afternoon with significant other present.  No acute events reported overnight.  Patient is quite sleepy at the time of my visit, but spouse notes that he had a good day and participated well in therapies, including a lot of cognitive games.  Notes that he still seems to be sleeping poorly at night.  Reports ongoing cough, though patient did note that the nasal spray was quite helpful in reducing.  No shortness of breath.  Seems to be more interested in eating, and spouse felt that stopping tube feeds earlier in the day was helpful to allow better appetite at breakfast.  Had BM this morning.  Has not been using CPAP still, though he did regularly at home.  Spouse feels this is due to difficulty repositioning the mask on his own, but now would be more able to do that if needed, so should re-attempt.  Denies other concerns or questions at this time.    Functionally, he is currently needing min A for bed mobility, min to mod A for pivot transfers, min to CGA for ambulation 12' in parallel bars.  She needs SBA for seated grooming, min A for upper body dressing, mod to max A for lower body dressing with rahel lawrenec.    MEDICATIONS    amantadine  100 mg Per Feeding Tube BID     apixaban ANTICOAGULANT  5 mg Per Feeding Tube BID     atorvastatin  20 mg Per Feeding Tube At Bedtime     diltiazem  60 mg Per Feeding Tube Q8H     fluticasone  1 spray Both Nostrils Daily     gabapentin  600 mg Per Feeding Tube At Bedtime     insulin aspart  1-7 Units Subcutaneous TID AC     insulin aspart  1-5 Units Subcutaneous At Bedtime     metFORMIN  1,000 mg Oral or Feeding Tube BID w/meals     mirtazapine  7.5 mg Oral At Bedtime     pantoprazole  40 mg Per Feeding Tube QAM AC        acetaminophen, calamine, dextrose, glucose **OR** dextrose **OR** glucagon, guaiFENesin,  "melatonin, - MEDICATION INSTRUCTIONS -, polyethylene glycol, pseudoePHEDrine, senna-docusate     PHYSICAL EXAM  /83   Pulse 86   Temp (!) 96.7  F (35.9  C) (Oral)   Resp 16   Ht 1.854 m (6' 1\")   Wt 88.9 kg (195 lb 15.8 oz)   SpO2 96%   BMI 25.86 kg/m     Gen: NAD, lying in bed  HEENT: NC/AT  Cardio: irregularly irregular, no murmurs  Pulm: non-labored on room air, lungs CTA bilaterally  Abd: soft, non-tender, non-distended, bowel sounds present, +PEG  Ext: no edema in bilateral lower extremities  Neuro/MSK: sleepy, left facial droop, +dysarthria and aphasia, left neglect, left hemiparesis    LABS  CBC RESULTS:   Recent Labs   Lab Test 03/30/23  0558 03/27/23  0617 03/23/23  0557   WBC 9.9 9.2 11.7*   RBC 3.98* 3.91* 4.26*   HGB 12.2* 11.9* 13.1*   HCT 36.5* 37.0* 39.7*   MCV 92 95 93   MCH 30.7 30.4 30.8   MCHC 33.4 32.2 33.0   RDW 13.0 12.8 13.2    379 445     Last Basic Metabolic Panel:  Recent Labs   Lab Test 03/30/23  0558 03/30/23  0211 03/29/23  2210 03/27/23  0812 03/27/23  0617 03/23/23  0620 03/23/23  0557     --   --   --  137  --  138   POTASSIUM 4.2  --   --   --  4.0  --  3.9   CHLORIDE 99  --   --   --  99  --  101   CO2 27  --   --   --  26  --  24   ANIONGAP 12  --   --   --  12  --  13   * 148* 129*   < > 166*   < > 217*   BUN 24.1*  --   --   --  23.8*  --  31.6*   CR 0.79  --   --   --  0.76  --  0.75   GFRESTIMATED 90  --   --   --  >90  --  >90   LUTHER 9.8  --   --   --  9.7  --  9.7    < > = values in this interval not displayed.     Recent Labs   Lab 03/30/23  0558 03/30/23  0211 03/29/23  2210 03/29/23  1734 03/29/23  1234 03/29/23  0728   * 148* 129* 130* 121* 169*       Rehabilitation - continue comprehensive acute inpatient rehabilitation program with multidisciplinary approach including therapies, rehab nursing, and physiatry following. See interval history for updates.      ASSESSMENT AND PLAN  Stefan Diallo is a 77 year old male with a past " medical history of atrial fibrillation on coumadin though held PTA for planned procedure (pain pump placement), chronic bilateral low back pain s/p lumbar fusion, type 2 diabetes mellitus c/b polyneuropathy, renal artery aneurysm, hypertension, hyperlipidemia, KISHA, GERD, BPH, and chronic bilateral shoulder pain who was admitted on 3/7/23 with acute right MCA stroke s/p tenecteplase with hospital course complicated by dysphagia s/p PEG placement on 3/9 complicated by PEG-site bleeding, suspected aspiration pneumonia, hyperglycemia, constipation, and hypokalemia.  He is now admitted to ARU on 3/16/23 for multidisciplinary rehabilitation and ongoing medical management.        Admission to acute inpatient rehab 03/16/23.    Impairment group code: Stroke Ischemic 01.1 (L) Body Involvement (R) Brain; s/p acute ischemic stroke of R MCA territory due to cardioembolism status post tenecteplase        1. PT, OT and SLP 60 minutes of each on a daily basis, in addition to rehab nursing and close management of physiatrist.       2. Impairment of ADL's: Noted to have impaired activity tolerance, impaired balance, impaired coordination, impaired judgement and safety awareness, impaired strength, impaired tone, impaired weight shifting, impulsiveness and pain, all affecting his ability to safely and independently perform basic ADLs.  Goal for assist of 1 with basic ADLs.     3. Impairment of mobility:  Noted to have impaired activity tolerance, impaired balance, impaired coordination, impaired judgement and safety awareness, impaired strength, impaired tone, impaired weight shifting, impulsiveness and pain, all affecting his ability to safely and independently perform basic mobility.  Goal for assist of 1 with basic mobility.     4. Impairment of cognition/language/swallow:  Noted to have dysarthria, aphasia, and impaired cognition with goals for improved cognitive-linguistic skills to meet basic needs.     5. Medical  Conditions  New actions/orders/updates for today are in blue.     Acute R MCA stroke s/p tenecteplase 3/7, likely secondary to cardioembolism from atrial fibrillation off anticoagulation for an anticipated procedure  Initially on aspirin, later restarted on anticoagulation but with Eliquis in place of PTA warfarin.  - Continue apixaban 5 mg BID  - Continue PTA statin.  Goal LDL 40-70.  - Started on amantadine 100 mg daily 3/24, increased to BID (0700, 1300) on 3/24.  - Neuropsych eval completed 3/30, await full report for results  - Long term BP goal <135/80 to be achieved as outpatient within several weeks.  Management as below.  - Continue PT/OT/SLP  - Follow up with general neurology in 6-8 weeks    Atrial fibrillation  [PTA on warfarin, diltiazem]  On chronic anticoagulation with warfarin.  However, had been held since 2/25 for planned pain pump placement. PTA long-acting extended release diltiazem cannot be given through the PEG tube, substituted short-acting. Started on DOAC in place of PTA warfarin on 3/14.  - Continue Eliquis 5 mg BID  - Continue short acting diltiazem 60 mg TID while NPO  - Continue to monitor     Dyslipidemia  [PTA meds: atorvastatin 20 mg daily, gemfibrozil 600 mg BID]  - Continue PTA statin       Hypertension  [PTA meds: atenolol 25 mg daily, diltiazem  mg daily, lisinopril 5 mg daily]  Resuming PTA meds gradually. PTA diltiazem increased to 60 mg TID on 3/14/23.   - Long term BP <130/80, inpatient meds can be slowly titrated to this goal as otherwise appropriate  - Continue diltiazem 60 mg q8h  - BP generally at goal on current regimen  - Resume PTA lisinopril, atenolol as indicated    Possible aspiration pneumonia  Met criteria for early sepsis.  Started on unasyn + vanco 3/11.  MRSA nares negative.  BC's still negative at discharge.  Stopped vancomycin 3/13.  Changed to FT augmentin 3/15 with a plan for 7 total days abx, completed as of 3/18.  - Monitor respiratory status, SLP  for dysphagia treatment as below    Cough  Patient has been complaining of persistent dry cough.  Afebrile, WBC WNL, satting well on room air.  Observed to be intermittent and mild.  Lungs clear on exam.  Patient complains this is disruptive for sleep.  - Robitussin PRN  - Added flonase at night for possible post-nasal drip on 3/29  - Patient reports cough was much better with addition of flonase    KISHA  - Has been refusing home CPAP, may be contributor to daytime somnolence. Discussed with patient who reports that he has not been using because he does not sleep as well with CPAP.  Also reports that he did not use just PTA for the same reason, unclear if/when used regularly.  Reviewed risks of untreated sleep apnea but may require ongoing discussion given impaired cognition.  - Preferring to use oxygen by NC with sleep over CPAP  - Significant other notes patient was consistently using CPAP PTA, but was not tolerating well earlier this admission due to inability to adjust the mask himself if displaced.  Now that he has improving function, she feels it would be good to re-attempt.     Type II DM  Tube feeding induced hyperglycemia  Polyneuropathy  [PTA meds: metformin 500 mg qAM + 1000 mg qPM, gabapentin 600 mg at HS]  A1c 6.4% on 2/1/23.  PTA metformin held this admission.  Managed on small doses of Lantus and sliding scale insulin.  Stopped Lantus on 3/17, resumed home metformin initially at 500 mg BID, increased to PTA dose as of 3/20.  Recent Labs   Lab 03/30/23  0558 03/30/23  0211 03/29/23  2210 03/29/23  1734 03/29/23  1234 03/29/23  0728   * 148* 129* 130* 121* 169*   - Continue home gabapentin  - Continue metformin 1000 mg BID (increased on 3/21)  - -148  - Continue Novolog medium intensity sliding scale insulin.  Do not anticipate discharging home on this.  - Monitor BG TID AC + HS  - Hypoglycemia protocol     Chronic back pain  Patient planned for pain pump placement 3/8/23 (Javi).  States he  has chronic back pain from nerve impingement and uses tylenol PTA for pain.   No complaints of pain at ARU.  - Continue Tylenol prn for pain  - Monitor     Moderate oral, severe oropharyngeal dysphagia  S/p PEG placement 3/9  - Cycled tube feeds per PEG (switched from continuous on 3/18).  Per discussion with team, adjusted timing to 2pm-6am (starting 3/29) in hopes to encourage better oral intake at breakfast.  Also started remeron 3/29 as below.  - Continue water flushes 180 mL QID  - RD following, assistance appreciated  - Continue SLP  - T tacs removed 3/20   - Repeat VFSS on 3/22.  Per SLP, patient demonstrated significant improvement compared to prior VFSS on 3/9 though still aspiration with thin and mildly thick liquids.  As of 3/24, started on minced & moist solids (5) and moderately thick liquids (3) by TSP only; alternate food/drink and 1:1 assist with feeding. Hold PO if overt s/s of aspiration or oral pocketing. Ok for 5-10 ice chips with RN or SLP after oral care only between meals.  - Supplements added per RD  - Tube feeding PLC planned on 4/3     Loose stools, improving  Constipation, resolved  - Continue senokot-S 2 tabs BID PRN, Miralax PRN     GERD  - Continue pantoprazole as auto-sub for PTA on omeprazole 20 mg daily    Suspected HSV lesion, left upper lip, resolved  - Abreva completed x7 days    Leukocytosis, resolved  WBC mildly elevated at 11.7 on 3/20.  CRP elevated at 44 (though prevoiusly 312 on 3/11).  Previously had mild leukocytosis (WBC 12.7 on 3/11) in setting of suspected aspiration pneumonia, but normalized the following day.  Afebrile, no localizing signs/symptoms of infection.  Procal very mildly elevated at 0.07.  WBC normalized on 3/27.  - Continue to trend CBC every M/Th.  3/30: WBC remain WNL    Anemia, mild, normocytic  Has been trending 12s-13s.  Dropped slightly on 3/27 at 11.9.  No evidence of active bleeding.  - Continue to trend CBC every M/Th.  3/30: Hgb stable at  12.2    Depressed mood  Despite amantadine, still noting fatigue, decreased motivation/engagement.  Also continues to complain of poor sleep.  - Per Dr. Stinson, trial remeron 7.5 mg at bedtime started 3/29 for mood, sleep, appetite.  If tolerating, consider increase to 15 mg tomorrow.      6. Adjustment to disability:  Clinical psychology to eval and treat if indicated  7. FEN: minced and moist (level 5) diet, moderately thick (level 3) liquids by tsp only, cycled nocturnal tube feeds via PEG  8. Bowel: incontinent, monitor, PRN bowel meds available  9. Bladder: continent/incontinent, using Primofit at night, will need to wean  10. DVT Prophylaxis: apixaban  11. GI Prophylaxis: PPI  12. Code: full  13. Disposition: goal for home  14. ELOS:  target 4/7/23  15. Follow up Appointments on Discharge: PCP in 1-2 weeks, general neurology in 6-8 weeks      Patient was discussed with Dr. Liu Sitnson, PM&R staff physician     Kenna Ruiz PA-C  Physical Medicine & Rehabilitation

## 2023-03-30 NOTE — PLAN OF CARE
"/83   Pulse 86   Temp (!) 96.7  F (35.9  C) (Oral)   Resp 16   Ht 1.854 m (6' 1\")   Wt 88.9 kg (195 lb 15.8 oz)   SpO2 96%   BMI 25.86 kg/m      Pt is Alert to self only. Pt denies Chest pain, SOB, N/V, Numbness or tingling. Pt denies pain. Pt rouses to voice, otherwise slept well throughout shift. Tube feeding rand @ 85 ml/hr until 0600. BG - 148 @ 0200    . Pt is VSS and afebrile. No acute events during shift.  "

## 2023-03-30 NOTE — PLAN OF CARE
Discharge Planner Post-Acute Rehab SLP:      Discharge Plan: home with S.O. 24/7 pt to reside in lower level of home, ongoing SLP     Precautions: PEG, aspiration risk     Current Status:  Hearing: WFL  Vision: WFL  Communication: Decreased vocal intensity  Cognition: Moderate cognitive linguistic impairment- reasoning, memory, attention  Swallow: minced & moist solids (5) and moderately thick liquids (3) by TSP only; alternate food/drink and 1:1 assist with feeding. Hold PO if overt s/s of aspiration or oral pocketing. Ok for 5-10 ice chips with RN or SLP after oral care only between meals.     Assessment: Dentures present, spouse assisting in positioning. Pt seen with meal minced moist (5), limited trials of soft bite size (6) texture, moderately thick (3) liquid by tsp. Variable alertness but improving with cueing and repositioning. Pt with overt instance coughing following solid bite 5 texture and 3 liqud wash. No IND initiation to take liquid by tsp. Suspect impact of alertness and mentation. Limited mastication, delayed AP transit, no oral residuals with limited trials of 6 texture. Very limited intake overall. Will need ongoing 1:1        Other Barriers to Discharge (Family Training, etc): diet texture training pending progress

## 2023-03-31 NOTE — PLAN OF CARE
FOCUS/GOAL  Pain management, Mobility, and Skin integrity    ASSESSMENT, INTERVENTIONS AND CONTINUING PLAN FOR GOAL:    Goal Outcome Evaluation:      Plan of Care Reviewed With: patient    Overall Patient Progress: no changeOverall Patient Progress: no change    Outcome Evaluation: no new skin issues. Uses call light appropriately    Pleasant and cooperative. Anticipate needs. Needs in reach and safety measures in place. Cont POC  Mobility: A1 sera stedy  Bowel/Bladder: incont of both. Primofit at [facility]  Skin: redness in groin, PEG site  O2: RA during day. O2 via NC at [facility].  Diet: lvl 5/ lvl3/ via PEG. cycled tube feeding scheduled [id number]. TF interrupted at 1815 and resumed at 2330.  Psychosocial: appropriate to situation  Pain: denies  Tubes/Lines/Drains: PEG, primofit, O2 NC  Misc:

## 2023-03-31 NOTE — PLAN OF CARE
Patient slept well this shift. Denied any pain or discomfort. Alert with intermittent confusions. X2 assist with Bri lawrence but stayed in bed through this shift. Utilize Primo fit this shift. Peg tube patent and running well.

## 2023-03-31 NOTE — PROGRESS NOTES
[facility]   Acute Rehabilitation Unit  Daily progress note    INTERVAL HISTORY  Stefan Diallo was seen and examined at bedside this morning with significant other present.  No acute events reported overnight.  Patient reports that cough has been quite improved since addition of Flonase, still there but better.  Notes that he slept much better last night.  States he does not want to use CPAP as the mask does not stay on and then makes too much noise, is uncomfortable, interferes with sleep.  Attempted to re-educate and problem-solve, but he continues to decline.  He denies any pain.  Also denies shortness of breath, dizziness, nausea. Had BM this morning, which significant other thought was still loose.  Notes that he did check BG 1-2 times daily at home prior to admission.  Significant other is hoping for documentation for POA, will follow up with neuropsychologist.  Denies other questions or concerns at this time.    Functionally, patient needs min A for bed mobility, min to mod A for pivot transfers. Has been able to participate in therapeutic gait with Ax2, not functional.  He needs min A for seated upper body dressing, mod to max A for lower body dressing, max A for bathing.    MEDICATIONS    amantadine  100 mg Per Feeding Tube BID     apixaban ANTICOAGULANT  5 mg Per Feeding Tube BID     atorvastatin  20 mg Per Feeding Tube At Bedtime     diltiazem  60 mg Per Feeding Tube Q8H     fluticasone  1 spray Both Nostrils Daily     gabapentin  600 mg Per Feeding Tube At Bedtime     insulin aspart  1-7 Units Subcutaneous TID AC     insulin aspart  1-5 Units Subcutaneous At Bedtime     metFORMIN  1,000 mg Oral or Feeding Tube BID w/meals     miconazole   Topical BID     mirtazapine  7.5 mg Oral At Bedtime     pantoprazole  40 mg Per Feeding Tube QAM AC        acetaminophen, calamine, dextrose, glucose **OR** dextrose **OR** glucagon, guaiFENesin, melatonin, - MEDICATION  "INSTRUCTIONS -, polyethylene glycol, pseudoePHEDrine, senna-docusate     PHYSICAL EXAM  /63   Pulse 82   Temp 97.2  F (36.2  C) (Oral)   Resp 16   Ht 1.854 m (6' 1\")   Wt 91.2 kg (201 lb 1 oz)   SpO2 93%   BMI 26.53 kg/m     Gen: NAD, sitting up in chair  HEENT: NC/AT  Cardio: irregularly irregular, no murmurs  Pulm: non-labored on room air, lungs CTA bilaterally  Abd: soft, non-tender, non-distended, bowel sounds present, +PEG  Ext: no edema in bilateral lower extremities  Neuro/MSK: lethargic but more alert than prior visit, left facial droop, +dysarthria and aphasia, left neglect, left hemiparesis, moving all extremities in chair    LABS  CBC RESULTS:   Recent Labs   Lab Test 03/30/23  0558 03/27/23  0617 03/23/23  0557   WBC 9.9 9.2 11.7*   RBC 3.98* 3.91* 4.26*   HGB 12.2* 11.9* 13.1*   HCT 36.5* 37.0* 39.7*   MCV 92 95 93   MCH 30.7 30.4 30.8   MCHC 33.4 32.2 33.0   RDW 13.0 12.8 13.2    379 445     Last Basic Metabolic Panel:  Recent Labs   Lab Test 03/31/23  0217 03/30/23  2144 03/30/23  1649 03/30/23  0746 03/30/23  0558 03/27/23  0812 03/27/23  0617 03/23/23  0620 03/23/23  0557   NA  --   --   --   --  138  --  137  --  138   POTASSIUM  --   --   --   --  4.2  --  4.0  --  3.9   CHLORIDE  --   --   --   --  99  --  99  --  101   CO2  --   --   --   --  27  --  26  --  24   ANIONGAP  --   --   --   --  12  --  12  --  13   * 111* 118*   < > 173*   < > 166*   < > 217*   BUN  --   --   --   --  24.1*  --  23.8*  --  31.6*   CR  --   --   --   --  0.79  --  0.76  --  0.75   GFRESTIMATED  --   --   --   --  90  --  >90  --  >90   LUTHER  --   --   --   --  9.8  --  9.7  --  9.7    < > = values in this interval not displayed.     Recent Labs   Lab 03/31/23  0217 03/30/23  2144 03/30/23  1649 03/30/23  1326 03/30/23  1114 03/30/23  0746   * 111* 118* 132* 122* 132*       Rehabilitation - continue comprehensive acute inpatient rehabilitation program with multidisciplinary approach " including therapies, rehab nursing, and physiatry following. See interval history for updates.      ASSESSMENT AND PLAN  Stefan Diallo is a 77 year old male with a past medical history of atrial fibrillation on coumadin though held PTA for planned procedure (pain pump placement), chronic bilateral low back pain s/p lumbar fusion, type 2 diabetes mellitus c/b polyneuropathy, renal artery aneurysm, hypertension, hyperlipidemia, KISHA, GERD, BPH, and chronic bilateral shoulder pain who was admitted on 3/7/23 with acute right MCA stroke s/p tenecteplase with hospital course complicated by dysphagia s/p PEG placement on 3/9 complicated by PEG-site bleeding, suspected aspiration pneumonia, hyperglycemia, constipation, and hypokalemia.  He is now admitted to ARU on 3/16/23 for multidisciplinary rehabilitation and ongoing medical management.        Admission to acute inpatient rehab 03/16/23.    Impairment group code: Stroke Ischemic 01.1 (L) Body Involvement (R) Brain; s/p acute ischemic stroke of R MCA territory due to cardioembolism status post tenecteplase        1. PT, OT and SLP 60 minutes of each on a daily basis, in addition to rehab nursing and close management of physiatrist.       2. Impairment of ADL's: Noted to have impaired activity tolerance, impaired balance, impaired coordination, impaired judgement and safety awareness, impaired strength, impaired tone, impaired weight shifting, impulsiveness and pain, all affecting his ability to safely and independently perform basic ADLs.  Goal for assist of 1 with basic ADLs.     3. Impairment of mobility:  Noted to have impaired activity tolerance, impaired balance, impaired coordination, impaired judgement and safety awareness, impaired strength, impaired tone, impaired weight shifting, impulsiveness and pain, all affecting his ability to safely and independently perform basic mobility.  Goal for assist of 1 with basic mobility.     4. Impairment of  cognition/language/swallow:  Noted to have dysarthria, aphasia, and impaired cognition with goals for improved cognitive-linguistic skills to meet basic needs.     5. Medical Conditions  New actions/orders/updates for today are in blue.     Acute R MCA stroke s/p tenecteplase 3/7, likely secondary to cardioembolism from atrial fibrillation off anticoagulation for an anticipated procedure  Initially on aspirin, later restarted on anticoagulation but with Eliquis in place of PTA warfarin.  - Continue apixaban 5 mg BID  - Continue PTA statin.  Goal LDL 40-70.  - Started on amantadine 100 mg daily 3/24, increased to BID (0700, 1300) on 3/24.  - Neuropsych eval completed 3/30, await full report for results  - Long term BP goal <135/80 to be achieved as outpatient within several weeks.  Management as below.  - Continue PT/OT/SLP  - Follow up with general neurology in 6-8 weeks    Atrial fibrillation  [PTA on warfarin, diltiazem]  On chronic anticoagulation with warfarin.  However, had been held since 2/25 for planned pain pump placement. PTA long-acting extended release diltiazem cannot be given through the PEG tube, substituted short-acting. Started on DOAC in place of PTA warfarin on 3/14.  - Continue Eliquis 5 mg BID  - Continue short acting diltiazem 60 mg TID while NPO  - Continue to monitor     Dyslipidemia  [PTA meds: atorvastatin 20 mg daily, gemfibrozil 600 mg BID]  - Continue PTA statin       Hypertension  [PTA meds: atenolol 25 mg daily, diltiazem  mg daily, lisinopril 5 mg daily]  Resuming PTA meds gradually. PTA diltiazem increased to 60 mg TID on 3/14/23.   - Long term BP <130/80, inpatient meds can be slowly titrated to this goal as otherwise appropriate  - Continue diltiazem 60 mg q8h  - BP at goal on current regimen  - Resume PTA lisinopril, atenolol as indicated    Possible aspiration pneumonia  Met criteria for early sepsis.  Started on unasyn + vanco 3/11.  MRSA nares negative.  BC's still  negative at discharge.  Stopped vancomycin 3/13.  Changed to FT augmentin 3/15 with a plan for 7 total days abx, completed as of 3/18.  - Monitor respiratory status, SLP for dysphagia treatment as below    Cough  Patient has been complaining of persistent dry cough.  Afebrile, WBC WNL, satting well on room air.  Observed to be intermittent and mild.  Lungs clear on exam.  Patient complains this is disruptive for sleep.  - Robitussin PRN  - Added flonase at night for possible post-nasal drip on 3/29  - Patient reports cough was much better with addition of flonase, lungs remains clear.     KISHA  - Has been refusing home CPAP, may be contributor to daytime somnolence. Discussed with patient who reports that he has not been using because he does not sleep as well with CPAP.  Also reports that he did not use just PTA for the same reason, unclear if/when used regularly.  Reviewed risks of untreated sleep apnea but may require ongoing discussion given impaired cognition.  - Preferring to use oxygen by NC with sleep over CPAP  - Significant other notes patient was consistently using CPAP PTA, but was not tolerating well earlier this admission due to inability to adjust the mask himself if displaced.  Now that he has improving function, she feels it would be good to re-attempt.  However, patient continued to decline.  Ongoing education, use oxygen if not agreeable to CPAP with sleep.     Type II DM  Tube feeding induced hyperglycemia  Polyneuropathy  [PTA meds: metformin 500 mg qAM + 1000 mg qPM, gabapentin 600 mg at HS]  A1c 6.4% on 2/1/23.  PTA metformin held this admission.  Managed on small doses of Lantus and sliding scale insulin.  Stopped Lantus on 3/17, resumed home metformin initially at 500 mg BID, increased to PTA dose as of 3/20.  Recent Labs   Lab 03/31/23  0217 03/30/23  2144 03/30/23  1649 03/30/23  1326 03/30/23  1114 03/30/23  0746   * 111* 118* 132* 122* 132*   - Continue home gabapentin  - Continue  metformin 1000 mg BID (increased on 3/21)  - -163  - Continue Novolog medium intensity sliding scale insulin.  Do not anticipate discharging home on this.  - Monitor BG TID AC + HS  - Hypoglycemia protocol     Chronic back pain  Patient planned for pain pump placement 3/8/23 (Javi).  States he has chronic back pain from nerve impingement and uses tylenol PTA for pain.   No complaints of pain at ARU.  - Continue Tylenol prn for pain  - Continue PTA gabapentin 600 mg at HS  - Has not been complaining of any pain much of ARU stay.  However, therapies noting over past 2 days, patient has been complaining of more right hip pain and is protecting weightbearing on that side.  Add lidocaine patch.  Continue to monitor.  - Monitor     Moderate oral, severe oropharyngeal dysphagia  S/p PEG placement 3/9  - Cycled tube feeds per PEG (switched from continuous on 3/18).  Per discussion with team, adjusted timing to 2pm-6am (starting 3/29) in hopes to encourage better oral intake at breakfast.  Also started remeron 3/29 as below.  - Continue water flushes 180 mL QID  - RD following, assistance appreciated  - Continue SLP  - T tacs removed 3/20   - Repeat VFSS on 3/22.  Per SLP, patient demonstrated significant improvement compared to prior VFSS on 3/9 though still aspiration with thin and mildly thick liquids.  As of 3/24, started on minced & moist solids (5) and moderately thick liquids (3) by TSP only; alternate food/drink and 1:1 assist with feeding. Hold PO if overt s/s of aspiration or oral pocketing. Ok for 5-10 ice chips with RN or SLP after oral care only between meals.  - Supplements added per RD  - Tube feeding PLC planned on 4/3     Loose stools, improving  Constipation, resolved  - Continue senokot-S 2 tabs BID PRN, Miralax PRN     GERD  - Continue pantoprazole as auto-sub for PTA on omeprazole 20 mg daily    Suspected HSV lesion, left upper lip, resolved  - Abreva completed x7 days    Leukocytosis,  resolved  WBC mildly elevated at 11.7 on 3/20.  CRP elevated at 44 (though prevoiusly 312 on 3/11).  Previously had mild leukocytosis (WBC 12.7 on 3/11) in setting of suspected aspiration pneumonia, but normalized the following day.  Afebrile, no localizing signs/symptoms of infection.  Procal very mildly elevated at 0.07.  WBC normalized on 3/27.  - Continue to trend CBC every M/Th.  3/30: WBC remain WNL    Anemia, mild, normocytic  Has been trending 12s-13s.  Dropped slightly on 3/27 at 11.9.  No evidence of active bleeding.  - Continue to trend CBC every M/Th.  3/30: Hgb stable at 12.2    Depressed mood  Despite amantadine, still noting fatigue, decreased motivation/engagement.  Also continues to complain of poor sleep.  - Per Dr. Stinson, trial remeron 7.5 mg at bedtime started 3/29 for mood, sleep, appetite.  Tolerating well, still c/o poor sleep, increase to 15 mg.       6. Adjustment to disability:  Clinical psychology to eval and treat if indicated  7. FEN: minced and moist (level 5) diet, moderately thick (level 3) liquids by tsp only, cycled nocturnal tube feeds via PEG  8. Bowel: incontinent, monitor, PRN bowel meds available  9. Bladder: continent/incontinent, using Primofit at night, will need to wean  10. DVT Prophylaxis: apixaban  11. GI Prophylaxis: PPI  12. Code: full  13. Disposition: goal for home  14. ELOS:  target 4/7/23  15. Follow up Appointments on Discharge: PCP in 1-2 weeks, general neurology in 6-8 weeks      Patient was discussed with Dr. Liu Stinson, PM&R staff physician     Kenna Ruiz, PA-C  Physical Medicine & Rehabilitation

## 2023-03-31 NOTE — PROGRESS NOTES
Discharge Planner Post-Acute Rehab PT:     Discharge Plan: HCPT and 24/7 assist from fiance    Precautions: Tubefeed, L hemiparesis, alarms    Current Status:  Bed Mobility: Deacon of 1 to help with LE  Transfer: min-modA for SPT, pt holds onto PTs arms  Gait: Not functional - therapeutic gait Ax2   Stairs: Min-A x4 B rail - sequencing cueing throughout  Balance: Can sit at EOB - requires support in standing    PASS:  3/29: 13/36 - unable prior due to cognition    Assessment: Able to initiate stairs today, requires heavy sequencing cueing. Skilled assist for gait, R hip pain seems to limit WB on this side and affects gait.    Other Barriers to Discharge (DME, Family Training, etc):  - Full flight of stairs to get to main level from basement entry in garage -  planning on staying on basement level with sarasteady initially  - w/c

## 2023-03-31 NOTE — PROGRESS NOTES
Therapy: Enteral  Insurance: [employer] Advantage, Enteral  Ded: Does not apply    Co-Insurance: 80/20  Max Out of Pocket: $300  Met: $725    Misc: His plan requires his Enteral to be sole source and done via tube, they also require 90 days length of need documentation before therapy begins. If pt is homebound we cannot do nursing. If not homebound, we can supply nursing if pt is willing to self pay ($90 a visit) nh    In reference to referral from  TCU on pt admitted 03/16/23 for Enteral coverage    Please contact Intake with any questions, [phone] or In Basket pool,  Home Infusion ([phone]).

## 2023-03-31 NOTE — PROGRESS NOTES
Discharge Planner Post-Acute Rehab OT:      Discharge Plan: Home with HCOT and 24/7 support available     Precautions: Falls, left side hemiparesis, PEG tube,modifed textures and thickend liquids, slurred speech      Current Status:  ADLs:    Mobility: min/modA with SPT with therapy bed-w/c; with nsg. Ax1 with rahel lawrence, wc based.     Grooming: SBA seated in TIS W/C    Dressing: UB- min A sitting EOB to joel t-shirt; LB-mod-max A with rahel steady     Bathing: Liko lift to purple dependent shower chair, max A for bathing    Toileting: Rahel steady Ax1 to regular toilet, Total A with toilet hygiene.   IADLs: Previously IND with all I/ADLs, lives separately from brynn swain and 2 kids live in [facility]   Vision/Cognition: cues to initiate and sustain action, does best w/ items presented 1 at a time or 1 step at a time and decreased stimulation in environment     Assessment: focus to progress standing with ADLs, pt stood for g/h task with rahel lawrence support RPN at sink, pt able to generally stand 30-45 seconds prior to seated recovery periods. Also demo'd CGA sit to stand from w/c with bedrail in front of pt for standing support. Pt able to maintain standing holding bedrail CGA and no LOB. Does fatigue.  Now transferring with rahel lawrence to regular toilet without commode overlay per pts s.o. successfully.      Other Barriers to Discharge (DME, Family Training, etc):   Family training prior to discharge with S.O. and son (potential support from fiance, son, daughter)   Split-level home (full flight of stairs to get from main level from basement entry in garage)   Cognitive deficits   AE/DME: usama pads, pull-ups, bed rail, rahel steady, grab bars, tub bench, bedside commode, and bidet attachment

## 2023-03-31 NOTE — PLAN OF CARE
Alert and oriented to self and place disoriented to time. Incontinent of bowel and bladder assisted with frederick care. Assisted with breakfast by SLP, ate good at lunch. BG.s 177 and 116. SO at bedside, will continue poc    Goal Outcome Evaluation:

## 2023-03-31 NOTE — PLAN OF CARE
Discharge Planner Post-Acute Rehab SLP:      Discharge Plan: home with S.O. 24/7 pt to reside in lower level of home, ongoing SLP     Precautions: PEG, aspiration risk     Current Status:  Hearing: WFL  Vision: WFL  Communication: Decreased vocal intensity  Cognition: Moderate cognitive linguistic impairment- reasoning, memory, attention  Swallow: minced & moist solids (5) and moderately thick liquids (3) by TSP only; alternate food/drink and 1:1 assist with feeding. Hold PO if overt s/s of aspiration or oral pocketing. Ok for 5-10 ice chips with RN or SLP after oral care only between meals.     Assessment: Pt seen in bed with head of bed raised fully upright for soft and bite sized (6) a.m. meal trial (scrambled eggs) with moderately thick liquids (3). Pt required max cues/intervention from SP to not administer cup edge sip of liquids, SLP administered all (5x total boluses) liquids via teaspoon. Difficult to adminsiter teaspoon sips at times when poorly fitting upper denture fell from gums. Queried pt about adhesive use prior, pt denied used and denied being willing to try with SLP. Pt self administered bites of scrambled egg, mild to moderate oral residue post-swallow; SLP facilitated alternating liquids/solids to improve oral clearance with tsp sips of level 3 liquids. No coughing during meal intake, 1x dry cough approx 3 minutes after last bolus consumed by pt.    P.M.: Pt in bed awake when SLP arrived, sig other Kayce at bedside. Pt agreeable to ice chip intake to promote swallow exercise reps, SLP cleaned dentures and oral cavity prior to ice chip intake. Pt consumed 4x ice chips/completed 4x effortful swallows before requesting head of bed be reclined, back was uncomfortable upright. Additional 6x effortful swallows completed with saliva only, pt required verbal antactile stim to maintain alertness and participate. When pt stopped responding/opening eyes to tactile stim, focus of session shifted to sig other  training in thickener for iquids (asked at start of session). Sig other edu in types, pros/cons of powder vs gel, how to buy gel and powder version, prethickened drinks similar to ones served on ARU also available for purchase.        Other Barriers to Discharge (Family Training, etc): diet texture training pending progress

## 2023-04-01 NOTE — PLAN OF CARE
Alert to self, very sleepy this morning and requires several attempts to wake him up. Poor appetite at breakfast and lunch, SLP limited by sleepiness at lunch time. Continent of bowel and bladder on the toilet, assisted with frederick care. TF irrigated, now infusing , fiance at bedside, will continue Poc    Goal Outcome Evaluation:

## 2023-04-01 NOTE — PLAN OF CARE
Goal Outcome Evaluation:    Patient alert and oriented, slept through this shift. No pain or discomfort voiced. X1 incontinent. Patient ripped his primo fit during the night. Bedding's changed. Tube feed patent and running well. No change in condition at this time.

## 2023-04-01 NOTE — PLAN OF CARE
Discharge Planner Post-Acute Rehab SLP:      Discharge Plan: home with S.O. 24/7 pt to reside in lower level of home, ongoing SLP     Precautions: PEG, aspiration risk     Current Status:  Hearing: WFL  Vision: WFL  Communication: Decreased vocal intensity  Cognition: Moderate cognitive linguistic impairment- reasoning, memory, attention  Swallow: minced & moist solids (5) and moderately thick liquids (3) by TSP only; alternate food/drink and 1:1 assist with feeding. Hold PO if overt s/s of aspiration or oral pocketing. Ok for 5-10 ice chips with RN or SLP after oral care only between meals.     Assessment: Pt up in wheelchair when SLP arrived. Facilitated soft and bite sized (6) lunch trial with green beans, chicken with gravy, and lblueberry muffi; mashed potatoes with gravy (pureed) also on tray. Pt consumed 4x bites mashed potatoes, 4x bites green beans, 1x bite blueberry muffin; no chicken. Pt with min to mild diffuse oral residue with both pureed and soft and bite sized solid intake, SLP facilitated liquid wash every 1-2 bites with moderately thick liquids (3) via teaspoon. Last half of session focus on effortful swallow exercise, pt complete 10x reps before requesting to stop, wanting to lay in bed to take a nap. Nursing notified.    P.M.: SLP: Instructed pt in Odd One Out abstract reasoning task, pt completed with 50% independent accuracy, increased to 75% with cues from SLP.       Other Barriers to Discharge (Family Training, etc): diet texture training pending progress

## 2023-04-01 NOTE — PROGRESS NOTES
Discharge Planner Post-Acute Rehab OT:      Discharge Plan: Home with HCOT and 24/7 support available     Precautions: Falls, left side hemiparesis, PEG tube,modifed textures and thickend liquids, slurred speech      Current Status:  ADLs:    Mobility: min/modA with SPT with therapy bed-w/c; with nsg. Ax1 with rahel melinda, wc based.     Grooming: SBA seated in TIS W/C. Mod A standing at sink     Dressing: UB- max A, LB-max A with rahel steady     Bathing: Liko lift to purple dependent shower chair, max A for bathing    Toileting: Rahel steady Ax1 to regular toilet, Max A with toilet hygiene.   IADLs: Previously IND with all I/ADLs, lives separately from brynn swain and 2 kids live in [facility]   Vision/Cognition: cues to initiate and sustain action, does best w/ items presented 1 at a time or 1 step at a time and decreased stimulation in environment     Assessment: Focused on morning ADL routine W/C based and use of rahel steady for transfers. Pt req'd max cuing to complete FB dressing tasks. Pt continues to fluctuate with level of A with FB dressing tasks d/t lethargy and cognitive deficits.      Other Barriers to Discharge (DME, Family Training, etc):   Family training prior to discharge with S.O. and son (potential support from brynn, son, daughter)   Split-level home (full flight of stairs to get from main level from basement entry in garage)   Cognitive deficits   AE/DME: usama pads, pull-ups, bed rail, rahel steady, grab bars, tub bench, bedside commode, and bidet attachment

## 2023-04-01 NOTE — PLAN OF CARE
FOCUS/GOAL  Mobility, Skin integrity, Psychosocial needs, Safety management, and Prevention of secondary complications    ASSESSMENT, INTERVENTIONS AND CONTINUING PLAN FOR GOAL:    Goal Outcome Evaluation:      Plan of Care Reviewed With: patient    Overall Patient Progress: no changeOverall Patient Progress: no change    Outcome Evaluation: no new skin issues. Uses call light appropriately      Plan of Care Reviewed With: patient     Overall Patient Progress: no changeOverall Patient Progress: no change     Outcome Evaluation: no new skin issues. Uses call light appropriately     Pleasant and cooperative. Anticipate needs. Needs in reach and safety measures in place. Cont POC  Mobility: A1 sera stedy  Bowel/Bladder: incont of both. Primofit at [facility]  Skin: redness in groin, PEG site. Nonblanchable redness/wound on sacrum, Initiated interventions. Notified on-call provider and requested WOCN consult. Received verbal order for WOCN consult.   O2: RA during day. O2 2 li via NC at [facility].  Diet: lvl 5/ lvl3/ via PEG. cycled tube feeding scheduled [id number].   Psychosocial: appropriate to situation  Pain: denies  Tubes/Lines/Drains: PEG, primofit, O2 NC  Misc:

## 2023-04-01 NOTE — PROGRESS NOTES
Discharge Planner Post-Acute Rehab PT:     Discharge Plan: HCPT and 24/7 assist from fiance    Precautions: Tubefeed, L hemiparesis, alarms    Current Status:  Bed Mobility: Deacon of 1 to help with LE  Transfer: min-modA for SPT, pt holds onto PTs arms  Gait: Not functional - therapeutic gait Ax2   Stairs: Min-A x4 B rail - sequencing cueing throughout  Balance: Can sit at EOB - requires support in standing    PASS:  3/29: 13/36 - unable prior due to cognition    Assessment:PT: pt ambulated 20+50+110+113 feet with a FWW and min A x 1 plus another for w/c follow for safety; skilled cues for motor pattern increased step length, height, sumit, and wider SHIREEN;  finding recirpocal pattern intermittently with cues. R hip pain was managed;  Coordinated care with nursing post who provided meds.      Other Barriers to Discharge (DME, Family Training, etc):  - Full flight of stairs to get to main level from basement entry in garage -  planning on staying on basement level with sarasteady initially  - w/c

## 2023-04-01 NOTE — PROGRESS NOTES
Acknowledged Order for Pt to be refitted for a home CPAP mask . Due to the limited styles available in the hospital I  notifed the R.N. to  contact the Pt.'s home CPAP clinic to get a different mask

## 2023-04-01 NOTE — PROGRESS NOTES
"  [facility]   Acute Rehabilitation Unit  Daily progress note    INTERVAL HISTORY  Nursing notes reviewed. No acute events overnight.    This morning, Mr. Diallo was more fatigued and sleepy as he had just finished therapies. His significant other was present in the room as well. Discussed placing a respiratory therapy order to get him fitted for a new CPAP mask, as the one he previously trialed was not fitting well and was uncomfortable. She also reports that he was complaining of some back and hip pain. Encouraged him to continue to use the lidocaine patch. Can consider topicals if that does not work.        MEDICATIONS    amantadine  100 mg Per Feeding Tube BID     apixaban ANTICOAGULANT  5 mg Per Feeding Tube BID     atorvastatin  20 mg Per Feeding Tube At Bedtime     diltiazem  60 mg Per Feeding Tube Q8H     fluticasone  1 spray Both Nostrils Daily     gabapentin  600 mg Per Feeding Tube At Bedtime     insulin aspart  1-7 Units Subcutaneous TID AC     insulin aspart  1-5 Units Subcutaneous At Bedtime     lidocaine  2 patch Transdermal Q24H     lidocaine   Transdermal Q8H LALO     metFORMIN  1,000 mg Oral or Feeding Tube BID w/meals     miconazole   Topical BID     mirtazapine  15 mg Oral At Bedtime     pantoprazole  40 mg Per Feeding Tube QAM AC        acetaminophen, calamine, dextrose, glucose **OR** dextrose **OR** glucagon, guaiFENesin, melatonin, - MEDICATION INSTRUCTIONS -, polyethylene glycol, pseudoePHEDrine, senna-docusate     PHYSICAL EXAM  /76   Pulse 74   Temp 96.9  F (36.1  C) (Axillary)   Resp 16   Ht 1.854 m (6' 1\")   Wt 91.2 kg (201 lb 1 oz)   SpO2 96%   BMI 26.53 kg/m     Gen: NAD, sleeping  HEENT: NC/AT  Cardio: irregularly irregular, no murmurs  Pulm: non-labored on room air, lungs CTA bilaterally  Abd: soft, non-tender, non-distended, bowel sounds present, +PEG  Ext: no edema in bilateral lower extremities  Neuro/MSK: Sleepy, difficult to " arouse    LABS  CBC RESULTS:   Recent Labs   Lab Test 03/30/23  0558 03/27/23  0617 03/23/23  0557   WBC 9.9 9.2 11.7*   RBC 3.98* 3.91* 4.26*   HGB 12.2* 11.9* 13.1*   HCT 36.5* 37.0* 39.7*   MCV 92 95 93   MCH 30.7 30.4 30.8   MCHC 33.4 32.2 33.0   RDW 13.0 12.8 13.2    379 445     Last Basic Metabolic Panel:  Recent Labs   Lab Test 04/01/23  0206 03/31/23 2211 03/31/23  2142 03/30/23  0746 03/30/23  0558 03/27/23  0812 03/27/23  0617 03/23/23  0620 03/23/23  0557   NA  --   --   --   --  138  --  137  --  138   POTASSIUM  --   --   --   --  4.2  --  4.0  --  3.9   CHLORIDE  --   --   --   --  99  --  99  --  101   CO2  --   --   --   --  27  --  26  --  24   ANIONGAP  --   --   --   --  12  --  12  --  13   * 92 158*   < > 173*   < > 166*   < > 217*   BUN  --   --   --   --  24.1*  --  23.8*  --  31.6*   CR  --   --   --   --  0.79  --  0.76  --  0.75   GFRESTIMATED  --   --   --   --  90  --  >90  --  >90   LUTHER  --   --   --   --  9.8  --  9.7  --  9.7    < > = values in this interval not displayed.     Recent Labs   Lab 04/01/23  0206 03/31/23  2211 03/31/23  2142 03/31/23  1751 03/31/23  1153 03/31/23  0752   * 92 158* 139* 116* 138*       Rehabilitation - continue comprehensive acute inpatient rehabilitation program with multidisciplinary approach including therapies, rehab nursing, and physiatry following. See interval history for updates.      ASSESSMENT AND PLAN  Stefan Diallo is a 77 year old male with a past medical history of atrial fibrillation on coumadin though held PTA for planned procedure (pain pump placement), chronic bilateral low back pain s/p lumbar fusion, type 2 diabetes mellitus c/b polyneuropathy, renal artery aneurysm, hypertension, hyperlipidemia, KISHA, GERD, BPH, and chronic bilateral shoulder pain who was admitted on 3/7/23 with acute right MCA stroke s/p tenecteplase with hospital course complicated by dysphagia s/p PEG placement on 3/9 complicated by PEG-site  bleeding, suspected aspiration pneumonia, hyperglycemia, constipation, and hypokalemia.  He is now admitted to ARU on 3/16/23 for multidisciplinary rehabilitation and ongoing medical management.        Admission to acute inpatient rehab 03/16/23.    Impairment group code: Stroke Ischemic 01.1 (L) Body Involvement (R) Brain; s/p acute ischemic stroke of R MCA territory due to cardioembolism status post tenecteplase        1. PT, OT and SLP 60 minutes of each on a daily basis, in addition to rehab nursing and close management of physiatrist.       2. Impairment of ADL's: Noted to have impaired activity tolerance, impaired balance, impaired coordination, impaired judgement and safety awareness, impaired strength, impaired tone, impaired weight shifting, impulsiveness and pain, all affecting his ability to safely and independently perform basic ADLs.  Goal for assist of 1 with basic ADLs.     3. Impairment of mobility:  Noted to have impaired activity tolerance, impaired balance, impaired coordination, impaired judgement and safety awareness, impaired strength, impaired tone, impaired weight shifting, impulsiveness and pain, all affecting his ability to safely and independently perform basic mobility.  Goal for assist of 1 with basic mobility.     4. Impairment of cognition/language/swallow:  Noted to have dysarthria, aphasia, and impaired cognition with goals for improved cognitive-linguistic skills to meet basic needs.     5. Medical Conditions  New actions/orders/updates for today are in blue.     Acute R MCA stroke s/p tenecteplase 3/7, likely secondary to cardioembolism from atrial fibrillation off anticoagulation for an anticipated procedure  Initially on aspirin, later restarted on anticoagulation but with Eliquis in place of PTA warfarin.  - Continue apixaban 5 mg BID  - Continue PTA statin.  Goal LDL 40-70.  - Started on amantadine 100 mg daily 3/24, increased to BID (0700, 1300) on 3/24.  - Neuropsych eval  completed 3/30, await full report for results  - Long term BP goal <135/80 to be achieved as outpatient within several weeks.  Management as below.  - Continue PT/OT/SLP  - Follow up with general neurology in 6-8 weeks    Atrial fibrillation  [PTA on warfarin, diltiazem]  On chronic anticoagulation with warfarin.  However, had been held since 2/25 for planned pain pump placement. PTA long-acting extended release diltiazem cannot be given through the PEG tube, substituted short-acting. Started on DOAC in place of PTA warfarin on 3/14.  - Continue Eliquis 5 mg BID  - Continue short acting diltiazem 60 mg TID while NPO  - Continue to monitor     Dyslipidemia  [PTA meds: atorvastatin 20 mg daily, gemfibrozil 600 mg BID]  - Continue PTA statin       Hypertension  [PTA meds: atenolol 25 mg daily, diltiazem  mg daily, lisinopril 5 mg daily]  Resuming PTA meds gradually. PTA diltiazem increased to 60 mg TID on 3/14/23.   - Long term BP <130/80, inpatient meds can be slowly titrated to this goal as otherwise appropriate  - Continue diltiazem 60 mg q8h  - BP at goal on current regimen  - Resume PTA lisinopril, atenolol as indicated    Possible aspiration pneumonia  Met criteria for early sepsis.  Started on unasyn + vanco 3/11.  MRSA nares negative.  BC's still negative at discharge.  Stopped vancomycin 3/13.  Changed to FT augmentin 3/15 with a plan for 7 total days abx, completed as of 3/18.  - Monitor respiratory status, SLP for dysphagia treatment as below    Cough  Patient has been complaining of persistent dry cough.  Afebrile, WBC WNL, satting well on room air.  Observed to be intermittent and mild.  Lungs clear on exam.  Patient complains this is disruptive for sleep.  - Robitussin PRN  - Added flonase at night for possible post-nasal drip on 3/29    KISHA  - Has been refusing home CPAP, may be contributor to daytime somnolence. Discussed with patient who reports that he has not been using because he does not  sleep as well with CPAP.  Also reports that he did not use just PTA for the same reason, unclear if/when used regularly.  Reviewed risks of untreated sleep apnea but may require ongoing discussion given impaired cognition.  - Preferring to use oxygen by NC with sleep over CPAP  - Significant other notes patient was consistently using CPAP PTA, but was not tolerating well earlier this admission due to inability to adjust the mask himself if displaced.  Now that he has improving function, she feels it would be good to re-attempt.    - Placed respiratory therapy order 4/1 to fit him for a new CPAP mask     Type II DM  Tube feeding induced hyperglycemia  Polyneuropathy  [PTA meds: metformin 500 mg qAM + 1000 mg qPM, gabapentin 600 mg at HS]  A1c 6.4% on 2/1/23.  PTA metformin held this admission.  Managed on small doses of Lantus and sliding scale insulin.  Stopped Lantus on 3/17, resumed home metformin initially at 500 mg BID, increased to PTA dose as of 3/20.  Recent Labs   Lab 04/01/23  1122 04/01/23  0823 04/01/23  0206 03/31/23  2211 03/31/23  2142 03/31/23  1751   * 113* 158* 92 158* 139*   - Continue home gabapentin  - Continue metformin 1000 mg BID (increased on 3/21)  - BG   - Continue Novolog medium intensity sliding scale insulin.  Do not anticipate discharging home on this.  - Monitor BG TID AC + HS  - Hypoglycemia protocol     Chronic back pain  Patient planned for pain pump placement 3/8/23 (Javi).  States he has chronic back pain from nerve impingement and uses tylenol PTA for pain.   No complaints of pain at ARU.  - Continue Tylenol prn for pain  - Continue PTA gabapentin 600 mg at HS  - Lidocaine patches for right hip pain  - Monitor     Moderate oral, severe oropharyngeal dysphagia  S/p PEG placement 3/9  - Cycled tube feeds per PEG (switched from continuous on 3/18).  Per discussion with team, adjusted timing to 2pm-6am (starting 3/29) in hopes to encourage better oral intake at  breakfast.  Also started remeron 3/29 as below.  - Continue water flushes 180 mL QID  - RD following, assistance appreciated  - Continue SLP  - T tacs removed 3/20   - Repeat VFSS on 3/22.  Per SLP, patient demonstrated significant improvement compared to prior VFSS on 3/9 though still aspiration with thin and mildly thick liquids.  As of 3/24, started on minced & moist solids (5) and moderately thick liquids (3) by TSP only; alternate food/drink and 1:1 assist with feeding. Hold PO if overt s/s of aspiration or oral pocketing. Ok for 5-10 ice chips with RN or SLP after oral care only between meals.  - Supplements added per RD  - Tube feeding PLC planned on 4/3     Loose stools, improving  Constipation, resolved  - Continue senokot-S 2 tabs BID PRN, Miralax PRN     GERD  - Continue pantoprazole as auto-sub for PTA on omeprazole 20 mg daily    Suspected HSV lesion, left upper lip, resolved  - Abreva completed x7 days    Leukocytosis, resolved  WBC mildly elevated at 11.7 on 3/20.  CRP elevated at 44 (though prevoiusly 312 on 3/11).  Previously had mild leukocytosis (WBC 12.7 on 3/11) in setting of suspected aspiration pneumonia, but normalized the following day.  Afebrile, no localizing signs/symptoms of infection.  Procal very mildly elevated at 0.07.  WBC normalized on 3/27.  - Continue to trend CBC every M/Th.  3/30: WBC remain WNL    Anemia, mild, normocytic  Has been trending 12s-13s.  Dropped slightly on 3/27 at 11.9.  No evidence of active bleeding.  - Continue to trend CBC every M/Th.  3/30: Hgb stable at 12.2    Depressed mood  Despite amantadine, still noting fatigue, decreased motivation/engagement.  Also continues to complain of poor sleep.  - Per Dr. Stinson, trial remeron 7.5 mg at bedtime started 3/29 for mood, sleep, appetite.  Tolerating well, still c/o poor sleep, increase to 15 mg.       6. Adjustment to disability:  Clinical psychology to eval and treat if indicated  7. FEN: minced and moist  (level 5) diet, moderately thick (level 3) liquids by tsp only, cycled nocturnal tube feeds via PEG  8. Bowel: incontinent, monitor, PRN bowel meds available  9. Bladder: continent/incontinent, using Primofit at night, will need to wean  10. DVT Prophylaxis: apixaban  11. GI Prophylaxis: PPI  12. Code: full  13. Disposition: goal for home  14. ELOS:  target 4/7/23  15. Follow up Appointments on Discharge: PCP in 1-2 weeks, general neurology in 6-8 weeks      Penny Boykin MD  Resident Physician, PGY-3  Physical Medicine & Rehabilitation  [facility]    Patient was seen and discussed with staff attending, Dr. Kimberly Kerr.

## 2023-04-02 NOTE — PLAN OF CARE
PT: per team and chart, pt is having a status change and being medically worked up, will continue to follow and proceed per team recommendations.  Pt's fiance is agreeable.    César Fair, PT

## 2023-04-02 NOTE — PLAN OF CARE
Goal Outcome Evaluation:        Plan of Care reviewed with patient: Patient and Fiance    Overall Patient Progress: Change in functional status    Patient progress: Change in functional progress:  Patient appear very sleepy this morning, response to verbal commands but will hlve his eyes closed again. Unable to maintain his dentures in his mouth which was later taken out since he appeared sleepy/lethargic, has a low grade temp. . Per PT, patient functional status had  changed as evidenced by leaning to the left with increased tone noted. Was able to transfer using the Bri steady as usual but required  numerous verbal cues. Provider is aware which resulted in orders for stat labs, CT W/O contrast and Chest xray. Patient triggered the sepsis, however prior to sepsis a lactate was ordered which was resulted at 2.1, code sepsis activated but they did not respond due to the patient being worked up by the hospitalist. Urine specimen collected which came back positive for UTI, , currently Zosyn is being infused and bolus lactated ringer will resume. Had poor appetite today, just took some bites at breakfast, nothing at lunch, therefore noon sliding scale coverage was held. Fiance at bedside, will continue Poc

## 2023-04-02 NOTE — CONSULTS
[facility]  Consult Note - Hospitalist Service  Date of Admission:  3/16/2023  Consult Requested by: Dr. Boykin  Reason for Consult: Leukocytosis     Assessment & Plan   Stefan Diallo is a 57-year-old male with a past medical history of hypertension, hyperlipidemia, non-insulin-dependent diabetes, A-fib (usually on Coumadin but held since 2/24 prior to presentation to [facility] on 3/07), and recent acute ischemic stroke of right MCA territory (likely due to cardioembolic event) s/p tenecteplase on 3/7 at [facility]. His presenting symptoms included left-sided weakness and left-sided facial droop.   Following administration of tenecteplase, patient's neurological exam was noted to have improved with NIHSS score of 5, previously noted to be 9-11.   Hospital course was complicated by dysphagia s/p G-tube placement on 3/9/2023, possible sepsis likely due to aspiration pneumonia (s/p completion of 7-day of antibiotics).  With regards to his A-fib, patient was started on Eliquis since 3/14/2023.   Patient was discharged to acute rehab on 3/16/2023 for rehabilitation needs.   His discharge neurological exam noted left facial droop, moderate dysarthria, left arm moderate drift, intermittent numbness to light touch in left leg >left arm.     Patient's course in the ARU has been largely unremarkable, however, on 4/2, patient was noted to be more lethargic which is new compared to prior days in the ARU.  Initial work-up obtained including CBC, CRP, CMP, lactic acid, troponin notable for WBC 26.7, elevated inflammatory markers, elevated lactic acid, and mildly elevated high sensitive troponin.  Stat CT head Noncon obtained showed no acute intracranial pathology and chest x-ray obtained shows possible left lower lobe airspace disease.     New problem list:   #Acute on subacute encephalopathy   #Elevated lactic acid   #Mild troponin anemia - likely demand   #Elevated  "inflammatory markers   #Leukocytosis   #Urinary incontinence    Examination today notable for increased lethargy, however the rest of his neurological exam is similar to prior.  He was also noted to have abdominal tenderness in suprapubic region.  Pulmonary examination notable for crackles loudest in left lower lung base.  At this time, an infectious process seems most likely UTI Vs PNA, hence, increased lethargy.   -give  ml bolus  -start Zosyn (pseudomonal for coverage)  -repeat MRSA nares again (of note, MRSA nares from 3 weeks ago negative  -f/u urine studies   -obtain sputum cx  -trend lactic acid, troponin, blood cultures  -add-on differential to CBC  -obtain EKG  -initiate bladder management protocol to r/o urinary retention  -pending the above work-up, we'll have a low threshold to obtain CT abdomen/pelvis with IV contrast to look for other intra-abdominal source of possible infection.   -okay to hold metformin for now    Rest of care per primary team     Clinically Significant Risk Factors              # Hypoalbuminemia: Lowest albumin = 3.4 g/dL at 4/2/2023 11:13 AM, will monitor as appropriate           # Overweight: Estimated body mass index is 26.63 kg/m  as calculated from the following:    Height as of this encounter: 1.854 m (6' 1\").    Weight as of this encounter: 91.5 kg (201 lb 13 oz).          LYNDA MARINELLI MD  Hospitalist Service  Securely message with [employer] (more info)  Text page via [facility] Paging/Directory   ______________________________________________________________________    Chief Complaint   Increased lethargy      History of Present Illness   Stefan Diallo is a 57-year-old male with a past medical history of hypertension, hyperlipidemia, non-insulin-dependent diabetes, A-fib (usually on Coumadin but held since 2/24 prior to presentation to [facility] on 3/07), and recent acute ischemic stroke of right MCA territory (likely due to cardioembolic event) s/p tenecteplase on 3/7 at [facility]. His presenting symptoms included left-sided weakness and left-sided facial droop.   Following administration of tenecteplase, patient's neurological exam was noted to have improved with NIHSS score of 5, previously noted to be 9-11.   Hospital course was complicated by dysphagia s/p G-tube placement on 3/9/2023, possible sepsis likely due to aspiration pneumonia (s/p completion of 7-day of antibiotics).  With regards to his A-fib, patient was started on Eliquis since 3/14/2023.   Patient was discharged to acute rehab on 3/16/2023 for rehabilitation needs.   His discharge neurological exam noted left facial droop, moderate dysarthria, left arm moderate drift, intermittent numbness to light touch in left leg >left arm.     Patient's course in the ARU has been largely unremarkable, however, on 4/2, patient was noted to be more lethargic which is new compared to prior days in the ARU.  Initial work-up obtained including CBC, CRP, CMP, lactic acid, troponin notable for WBC 26.7, elevated inflammatory markers, elevated lactic acid, and mildly elevated high sensitive troponin.  Stat CT head Noncon obtained showed no acute intracranial pathology and chest x-ray obtained shows possible left lower lobe airspace disease.     On questioning, patient denies any acute complaints.  Alexandru at bedside states increased sleepiness is new for patient, though she did note patient was stronger today. Alexandru also reports history of UTI in patient.       Past Medical History    Past Medical History:   Diagnosis Date     Arthritis      Atrial fibrillation (H)      Bacteremia      Bell's palsy 2015     BPH (benign prostatic hyperplasia)      Diabetes (H)      Gastroesophageal reflux disease      GERD (gastroesophageal reflux disease)      GI hemorrhage      High cholesterol      Hyperlipemia      Hyperlipidemia      Hypertension      Hypertension      Idiopathic peripheral neuropathy      Irregular heart beat      chronic at fib     Renal artery aneurysm (H)      Renal artery aneurysm (H)      Sleep apnea     Bipap     Sleep apnea 10/31/2021     Sleep apnea, obstructive     USES BIPAP     Type 2 diabetes mellitus (H)      UTI (lower urinary tract infection)        Past Surgical History   Past Surgical History:   Procedure Laterality Date     AMPUTATE FOOT Right 2019    Procedure: AMPUTATION, 3rd TOE RIGHT FOOT;  Surgeon: Ravi Abbasi DPM;  Location: [facility] [place];  Service: Podiatry     FUSION SPINE POSTERIOR MINIMALLY INVASIVE THREE + LEVELS N/A 10/29/2020    Procedure: L3/4/5S1 oblique lateral lumbar interbody fusion with discectomy L3/4/5S1 Posterior minimally invasive pedicle screw placement and posterolateral instrumentation and fusion  L3/4/5S1 Posterior minimally invasive pedicle screw placement and posterolateral instrumentation and fusion;  Surgeon: Vernon Bynum MD;  Location:  OR      REPAIR COMPL ROTATOR CUFF AVULSN,CHR      Description: Chronic Rotator Cuff Avulsion;  Recorded: 2011;     IR GASTROSTOMY TUBE PERCUTANEOUS PLCMNT  3/9/2023     ORTHOPEDIC SURGERY Right     knee surgery     ORTHOPEDIC SURGERY Right     amputation 3rd toe on right foot     TENOTOMY ACHILLES TENDON       TRANSRECTAL ULTRASONIC, TRANSURETHRAL RESECTION (TUR) OF PROSTATE CYST         Medications   I have reviewed this patient's current medications  Medications Prior to Admission   Medication Sig Dispense Refill Last Dose     [] amoxicillin-clavulanate (AUGMENTIN) 400-57 MG/5ML suspension 10.94 mLs (875 mg) by Per Feeding Tube route 2 times daily for 2 days   3/16/2023 at 0859     apixaban ANTICOAGULANT (ELIQUIS) 5 MG tablet 1 tablet (5 mg) by Per Feeding Tube route 2 times daily   3/16/2023 at 0846     atorvastatin (LIPITOR) 20 MG tablet 1 tablet (20 mg) by Oral or Feeding Tube route At Bedtime   3/15/2023 at 2141     CONTOUR NEXT TEST test strip USE 1 EACH AS DIRECTED DAILY. 100 strip 5 Unknown      diltiazem (CARDIZEM) 60 MG tablet 1 tablet (60 mg) by Per Feeding Tube route every 8 hours   3/16/2023 at 0633     gabapentin (NEURONTIN) 250 MG/5ML solution 12 mLs (600 mg) by NG or Feeding tube route At Bedtime   3/15/2023 at 2142     insulin glargine (LANTUS PEN) 100 UNIT/ML pen Inject 3 Units Subcutaneous 2 times daily 15 mL  3/16/2023 at 0859     pantoprazole (PROTONIX) 2 mg/mL SUSP suspension 20 mLs (40 mg) by Per Feeding Tube route every morning (before breakfast)   3/16/2023 at 0633     petrolatum-zinc oxide (SENSI-CARE) 49-15 % OINT ointment Apply topically daily as needed for skin protection (for tube site irritation/redness.)   Unknown     senna-docusate (SENOKOT-S/PERICOLACE) 8.6-50 MG tablet 2 tablets by Per Feeding Tube route 2 times daily   3/16/2023 at 0846     acetaminophen (TYLENOL) 325 MG/10.15ML solution 20.3 mLs (650 mg) by Per NG tube route every 4 hours as needed for mild pain or fever (for temperature greater than 100.4 F (38 C) - may also be used for moderate pain)   3/14/2023 at 2149     polyethylene glycol (MIRALAX) 17 GM/Dose powder Take 17 g by mouth daily 510 g  3/12/2023 at 0408          Physical Exam   Vital Signs: Temp: 99.6  F (37.6  C) Temp src: Oral BP: 115/61 Pulse: 98   Resp: 16 SpO2: 93 % O2 Device: None (Room air) Oxygen Delivery: 2 LPM  Weight: 201 lbs 12.95 oz      General appearance: sleeping in bed, on supplemental O2  HEENT: left sided facial droop  Pulm: clear to ausculation bilaterally, no wheeze, rales or rhonchi   CVS: RRR, no m/r/g   GI: soft, tender to palpation in suprapubic region. Normoactive bowel sounds. PEG tube in place  Extremities: No cyanosis, clubbing or edema   Neurologic:   - Mental Status: Lethargic, Alert, and arousable to voice; oriented to person, place, time and situation. Speech is dysarthric   - Cranial Nerves: Il through Xll intact.   - Motor: Good muscle bulk and tone.   - Strength 5/5 in RUE, 4/5 LUE   - Sensory: normal to light touch  throughout    Medical Decision Making       60 MINUTES SPENT BY ME on the date of service doing chart review, history, exam, documentation & further activities per the note.      Data   ------------------------- PAST 24 HR DATA REVIEWED -----------------------------------------------    I have personally reviewed the following data over the past 24 hrs:    26.7 (H)  \   13.5   / 282     136 99 24.4 (H) /  170 (H)   4.4 24 0.89 \       ALT: 16 AST: 20 AP: 129 TBILI: 0.7   ALB: 3.4 (L) TOT PROTEIN: 7.6 LIPASE: N/A       Trop: 36 (H) BNP: N/A       Procal: 0.13 (H) CRP: 135.55 (H) Lactic Acid: 2.1 (H)         Imaging results reviewed over the past 24 hrs:   Recent Results (from the past 24 hour(s))   XR Chest 2 Views    Narrative    EXAM: XR CHEST 2 VIEWS  4/2/2023 12:00 PM     HISTORY:  Concerns for aspiration pna       COMPARISON:  Chest x-ray 3/12/2023    FINDINGS:   AP and lateral views of the chest. Enlarged cardiac silhouette. Mild  diffuse interstitial opacities. No airspace consolidation. No pleural  effusion or pneumothorax. Degenerative changes of the shoulders.      Impression    IMPRESSION:   Cardiomegaly with mild interstitial opacities, suggestive of early  interstitial edema. No consolidation to suggest aspiration pneumonia.    I have personally reviewed the examination and initial interpretation  and I agree with the findings.    SORAYA ZARCO MD         SYSTEM ID:  [id number]   CT Head w/o Contrast    Narrative    CT HEAD W/O CONTRAST 4/2/2023 12:06 PM    History: History of stroke, new neurological deficits   ICD-10:    Comparison: CT head 323    Technique: Using multidetector thin collimation helical acquisition  technique, axial, coronal and sagittal CT images from the skull base  to the vertex were obtained without intravenous contrast.   (topogram) image(s) also obtained and reviewed.    Findings: There is no intracranial hemorrhage, mass effect, or midline  shift. Evolving infarct in the  right MCA territory since 3/8/2023.  Patchy and confluent areas of hypoattenuation in the periventricular  white matter, likely sequela of chronic small vessel ischemic disease.  No evidence of acute loss of gray-white matter differentiation. The  basal cisterns are patent. Bilateral pseudophakia.    The bony calvaria and the bones of the skull base are normal. The  visualized portions of the paranasal sinuses and mastoid air cells are  clear.       Impression    Impression:  Continued evolution of the right MCA territory infarct. No evidence of  acute intracranial pathology.    I have personally reviewed the examination and initial interpretation  and I agree with the findings.    ESTHER MUÑOZ MD         SYSTEM ID:  [id number]

## 2023-04-02 NOTE — PROGRESS NOTES
[facility], [place]   Physical Medicine and Rehabilitation Daily Note           Assessment and Plan of Care:   Stefan Diallo is a 77 year old male with a past medical history of atrial fibrillation on coumadin though held PTA for planned procedure (pain pump placement), chronic bilateral low back pain s/p lumbar fusion, type 2 diabetes mellitus c/b polyneuropathy, renal artery aneurysm, hypertension, hyperlipidemia, KISHA, GERD, BPH, and chronic bilateral shoulder pain who was admitted on 3/7/23 with acute right MCA stroke s/p tenecteplase with hospital course complicated by dysphagia s/p PEG placement on 3/9 complicated by PEG-site bleeding, suspected aspiration pneumonia, hyperglycemia, constipation, and hypokalemia.  He is now admitted to ARU on 3/16/23 for multidisciplinary rehabilitation and ongoing medical management.    For neurological status change, stat head CT scan obtained.  Additional lab work obtained including CMP, CBC, CRP, calcitonin, troponin.     Workup thus far:  - WBC 26.7  - Lactic acid 2.1  - Procalcitonin 0.13  - .55  - Troponin 36  - Head CT showed evolution of the right MCA territory infarct, no evidence of acute intracranial pathology.   - CXR showed cardiomegaly with mild interstitial opacities, suggestive of early interstitial edema. No consolidation to suggest aspiration PNA.     Pending workup:  - Blood cultures x2  - UA/UC  - Differential add on to CBC    Consulted hospitalist team to assist with management, appreciate their support. Likely UTI vs aspiration PNA causing his symptoms. Hospitalist team will plan to start Zosyn today, and adjust coverage as cultures come back. Discussed plan with Mr. Diallo and his wife, and they are in agreement. As per his wife, Mr. Diallo has a history of UTIs that have required IV antibiotics in the hospital, and she reports that Mr. Diallo has been complaining of some abdominal pain today, as well as the urge  to urinate, which is different from yesterday.          Interval history:   Patient seen and examined at bedside with wife present. Per OT report, patient has had a neurological change since yesterday.  He is leaning to the left and is requiring much heavier assistance today than yesterday.  Additionally, his swallowing seems to be increasingly more impaired, and he is pocketing his food in his cheeks. Therapist also noted increased tone in the LLE compared to prior.    On exam, Mr. Diallo was somnolent, but able to open his eyes and tell us who his wife was in the room. Able to to follow simple directions and commands. Due to dysarthria, unable to figure out if he had any pain, but appeared comfortable on exam.             Physical Exam:   VS:   Vitals:    04/02/23 0027 04/02/23 0600 04/02/23 0825 04/02/23 1214   BP: 125/68  (!) 147/67 115/61   BP Location:   Left arm Right arm   Patient Position:    Semi-Romano's   Cuff Size:    Adult Large   Pulse:   82 98   Resp:   16 16   Temp:   99.1  F (37.3  C) 99.6  F (37.6  C)   TempSrc:   Oral Oral   SpO2:   95% 93%   Weight:  91.5 kg (201 lb 13 oz)     Height:         Gen: Somnolent, sitting in wheelchair, arousable to sound  HEENT: Pupils equal and reactive to light  Heart: RRR, no murmurs  Lungs: breathing unlabored on room air, lungs clear to auscultation bilaterally  Abd: soft and non-tender, PEG in place  Ext: no edema in BLE, no calf tenderness  MSK/neuro: Fatigued, slumped over to the right however he is exhibiting good truncal control. MAS 2/4 at left knee extension. Some tone noted in LUE as well.           Data:   Scheduled meds    amantadine  100 mg Per Feeding Tube BID     apixaban ANTICOAGULANT  5 mg Per Feeding Tube BID     atorvastatin  20 mg Per Feeding Tube At Bedtime     diltiazem  60 mg Per Feeding Tube Q8H     fluticasone  1 spray Both Nostrils Daily     gabapentin  600 mg Per Feeding Tube At Bedtime     insulin aspart  1-7 Units Subcutaneous TID AC      insulin aspart  1-5 Units Subcutaneous At Bedtime     lidocaine  2 patch Transdermal Q24H     lidocaine   Transdermal Q8H LALO     metFORMIN  1,000 mg Oral or Feeding Tube BID w/meals     miconazole   Topical BID     mirtazapine  15 mg Oral At Bedtime     pantoprazole  40 mg Per Feeding Tube QAM AC     piperacillin-tazobactam  4.5 g Intravenous Q6H       CT Head w/o Contrast 3/1/2023  Impression:  Continued evolution of the right MCA territory infarct. No evidence of  acute intracranial pathology.    CXR 3/16/2023  IMPRESSION:   Cardiomegaly with mild interstitial opacities, suggestive of early  interstitial edema. No consolidation to suggest aspiration pneumonia.    PRN meds:  acetaminophen, calamine, dextrose, glucose **OR** dextrose **OR** glucagon, guaiFENesin, melatonin, - MEDICATION INSTRUCTIONS -, polyethylene glycol, pseudoePHEDrine, senna-docusate      Kimberly Kerr MD  Physical Medicine and Rehabilitation     I spent a total of 35 minutes face-to-face and managing the care of Stefan Diallo. Over 50% of my time on the unit was spent counseling the patient and coordinating care. Please see note for details.

## 2023-04-02 NOTE — PLAN OF CARE
Discharge Planner Post-Acute Rehab SLP:      Discharge Plan: home with S.O. 24/7 pt to reside in lower level of home, ongoing SLP     Precautions: PEG, aspiration risk     Current Status:  Hearing: WFL  Vision: WFL  Communication: Decreased vocal intensity  Cognition: Moderate cognitive linguistic impairment- reasoning, memory, attention  Swallow: NPO, no ice chips 04/02     Assessment: Pt asleep in bed when SLP arrived, plan for session discussion with pt and significant other regarding concerns for swallow safety, increased s/sx of dysphagi with breakfast per IDT (significant pocketing minced and moist food at breakfast) r/t new medical concern for infection, MD arrived to follow up with S.O. regarding starting pt on IV antibiotics. Per discussion with significant other and MD, SLP to change pt to strict NPO (including no ice chips), SLP session in a.m. 04/03 to assess with minced and moist (5) with moderately thick liquids (3) via teaspoon to determine if pt can resume PO intake.       Other Barriers to Discharge (Family Training, etc): diet texture training pending progress

## 2023-04-02 NOTE — CODE/RAPID RESPONSE
Code Sepsis Alert    Patient being evaluated by primary hospitalist team, Dr. Bah, for leukocytosis. Lab work ordered today included urine studies, sputum culture, lactates, troponin, CBC with differential, and blood cultures. Patient  given fluid bolus, started on broad spectrum antibiotics due to concern for HAP vs UTI.     Called to discuss sepsis trigger with primary team, Dr. Bah. Primary team is actively managing patient at this time. Discussed case with Dr. Bah who will continue to manage patient in ARU.     ICU team is available if patient condition changes.     LEO Hernandez CNP on 4/2/2023 at 1:23 PM    Brief Hospital Course  Stefan Diallo is a 57-year-old male with a past medical history of hypertension, hyperlipidemia, non-insulin-dependent diabetes, A-fib (usually on Coumadin but held since 2/24 prior to presentation to [facility] on 3/07), and recent acute ischemic stroke of right MCA territory (likely due to cardioembolic event) s/p tenecteplase on 3/7 at [facility]. His presenting symptoms included left-sided weakness and left-sided facial droop.     Following administration of tenecteplase, patient's neurological exam was noted to have improved with NIHSS score of 5, previously noted to be 9-11.     Hospital course was complicated by dysphagia s/p G-tube placement on 3/9/2023, possible sepsis likely due to aspiration pneumonia (s/p completion of 7-day of antibiotics).  With regards to his A-fib, patient was started on Eliquis since 3/14/2023. Patient was discharged to acute rehab on 3/16/2023 for rehabilitation needs.     His discharge neurological exam noted left facial droop, moderate dysarthria, left arm moderate drift, intermittent numbness to light touch in left leg >left arm. Course in ARU has been uncomplicated; however was noted to by more lethargic today. Hospital medicine team consulted and is managing workup. Head CT with no acute pathology; CXR  with possible LLL airspace disease.     Vital signs reviewed:  Temp: 99.6  F (37.6  C) Temp  Min: 97.8  F (36.6  C)  Max: 99.6  F (37.6  C)  Resp: 16 Resp  Min: 16  Max: 16  SpO2: 93 % SpO2  Min: 93 %  Max: 95 %  Pulse: 98 Pulse  Min: 82  Max: 98    No data recorded  BP: 115/61 Systolic (24hrs), Av , Min:115 , Max:147   Diastolic (24hrs), Av, Min:55, Max:68      Lab work reviewed:    CBC RESULTS: Recent Labs   Lab Test 23  1113   WBC 26.7*   RBC 4.42   HGB 13.5   HCT 41.6   MCV 94   MCH 30.5   MCHC 32.5   RDW 13.3        Lab Results   Component Value Date     2023    POTASSIUM 4.4 2023    CHLORIDE 99 2023    CO2 24 2023    ANIONGAP 13 2023     (H) 2023    BUN 24.4 (H) 2023    CR 0.89 2023    GFRESTIMATED 87 2023    LUTHER 10.1 2023     No results for input(s): CULTURE in the last 168 hours.  - Pending Blood culture, UA with reflex to cx, MRSA swab, and sputum (ordered per primary team)

## 2023-04-02 NOTE — PLAN OF CARE
FOCUS/GOAL  Bowel management, Bladder management, Medical management, Skin integrity, Safety management, and Prevention of secondary complications    ASSESSMENT, INTERVENTIONS AND CONTINUING PLAN FOR GOAL:    Goal Outcome Evaluation:          Plan of Care Reviewed With: patient     Overall Patient Progress: decline     Outcome Evaluation:  Code sepsis called at 1700. Provider and hospitalist notified       Plan of Care Reviewed With: patient        Pleasant and cooperative. Increased lethargy today. Anticipate needs. Needs in reach and safety measures in place. Cont POC  Mobility: A2 sera stedy  Bowel/Bladder: incont of both. Primofit on during shift per fiancee request  Skin: redness in groin and buttocks. PEG site. Frequent repositioning and heels offloaded  O2: RA during day. 2 Li via NC at [facility].  Diet: NPO. PEG. cycled tube feeding; scheduled [id number].   Psychosocial: confused. lethargic  Pain: denies  Tubes/Lines/Drains: PEG, primofit, O2 via NC. L PIV for Abx and LR bolus.  Misc:  Pt lactate redraw came back 3.9 (increase from today's earlier result). On-call provider and hospitalist notified. Code sepsis called at 1700. New order for 500ml LR. Lactate redraw scheduled for 2200

## 2023-04-02 NOTE — PROGRESS NOTES
Discharge Planner Post-Acute Rehab OT:      Discharge Plan: Home with HCOT and 24/7 support available     Precautions: Falls, left side hemiparesis, PEG tube,modifed textures and thickend liquids, slurred speech      Current Status:  ADLs:    Mobility: min/modA with SPT with therapy bed-w/c; with nsg. Ax1 with rahel stedy, wc based. 4/2/23: Ax2 with rahel steady for transfers    Grooming: SBA seated in TIS W/C. Mod A standing at sink     Dressing: UB- max A, LB-max A with rahel steady     Bathing: Liko lift to purple dependent shower chair, max A for bathing    Toileting: Rahel steady Ax1 to regular toilet, Max A with toilet hygiene. 4/2/23: Ax2 with rahel steady to/from dependent shower chair/commode  IADLs: Previously IND with all I/ADLs, lives separately from brynn swain and 2 kids live in [facility]   Vision/Cognition: cues to initiate and sustain action, does best w/ items presented 1 at a time or 1 step at a time and decreased stimulation in environment     Assessment: Pt presenting with change in status with increased lethargy, coughing, pocketing food, and increased left side tone in UE/LE and trunk. Pt now requiring max Ax2 to stand with rahel steady and leaning heavily to the left. Notified nsg and medical team to complete medical work-up.     -15 d/t change in medical status     Other Barriers to Discharge (DME, Family Training, etc):   Family training prior to discharge with S.O. and son (potential support from fiance, son, daughter)   Split-level home (full flight of stairs to get from main level from basement entry in garage)   Cognitive deficits   AE/DME: usama pads, pull-ups, bed rail, rahel steady, grab bars, tub bench, bedside commode, and bidet attachment

## 2023-04-02 NOTE — CODE DOCUMENTATION
"Code Sepsis Eval    Patient triggered sepsis alert this morning under going work up for leukocytosis per hospital medicine team. Discussed with Dr. Bah at that time. Repeat lactic acid level at 1600 demonstrates elevated lactate at 3.9. Called by nursing to re-evaluate patient for sepsis. Patient's vital signs reviewed, current treatment plan (just finishing second fluid bolus for total of 1L today at time of assessment ~1800). Patient denies respiratory complaints, abdominal pain, nausea, dysuria (has external catheter device in place with cloudy urine, UC pending).     Given vital sign stability, on room air, will give additional fluid bolus of 500 mL LR and repeat lactate. Discussed with cross cover hospitalist, Dr. Alfred this plan.     Vital signs:  Temp: 98.6  F (37  C) Temp src: Oral BP: (!) 144/74 Pulse: 73   Resp: 16 SpO2: 96 % O2 Device: None (Room air) Oxygen Delivery: 2 LPM Height: 185.4 cm (6' 1\") Weight: 91.5 kg (201 lb 13 oz)  Estimated body mass index is 26.63 kg/m  as calculated from the following:    Height as of this encounter: 1.854 m (6' 1\").    Weight as of this encounter: 91.5 kg (201 lb 13 oz).    LEO Hernandez CNP on 4/2/2023 at 6:43 PM    "

## 2023-04-02 NOTE — PLAN OF CARE
Goal Outcome Evaluation:           Overall Patient Progress: no change       Pt. Slept through the shift. On 2L of Oxygen via nasal cannulas. Tube feeding patent and running . Primofit in place. Pt. Denies pain. BG was 189. Bed alarm on, call light within reach. Will continue with POC.

## 2023-04-03 NOTE — CONSULTS
04/03/23 0952 Vivien Shearer RN Terri, S.O. seen at bedside for anti-coagulation and tube feeding education. Kayce RD correctly on a model with all cares including flushing, medication administration and use of Enteralite feeding pump. Discussed site care, demonstrated anchoring tube on pt and use of extension tube. Also discussed Bolus and Gravity feedings if patient can tolerate either method. Asked relevant questions, answered all teach back questions appropriately. States she understands all information presented. Literature given: Guide to the Newer Oral Anticoagulants: Medicines to Treat and Prevent Blood Clots,  Caring for Your Tube at Home, Tube Feeding at Home-Bolus Method Using Syringe and Plunger, Tube Feedings at Home-Cuba Method, andUsing the Enteralite Infinity Pump for Tube Feeding.

## 2023-04-03 NOTE — PROGRESS NOTES
Discharge Planner Post-Acute Rehab PT:     Discharge Plan: HCPT and 24/7 assist from fiance    Precautions: Tubefeed, L hemiparesis, alarms    Current Status:  Bed Mobility: Deacon of 1 to help with LE  Transfer: min-modA for SPT, pt holds onto PTs arms  Gait: up to 100' FWW Ax1, skilled assist only.   Stairs: Min-A x4 B rail - sequencing cueing throughout  Balance: Can sit at EOB - requires support in standing    PASS:  3/29: 13/36 - unable prior due to cognition    Assessment: Pt reporting increased urgency but unable to void once setup on commode. Soft BP 90s/50s sitting in w/c and asymptomatic, nsg aware. Unable to assess standing orthostatics d/t fatigue.     Other Barriers to Discharge (DME, Family Training, etc):  - Full flight of stairs to get to main level from basement entry in garage -  planning on staying on basement level with sarasteady initially  - w/c

## 2023-04-03 NOTE — PROGRESS NOTES
Discharge Planner Post-Acute Rehab OT:      Discharge Plan: Home with HCOT and 24/7 support available     Precautions: Falls, left side hemiparesis, PEG tube,modifed textures and thickend liquids, slurred speech      Current Status:  ADLs:    Mobility: WC based. Ax2 with rahel steady for transfers    Grooming: SBA seated in TIS W/C. Mod A standing at sink      Dressing: UB- max A, LB-max A with rahel steady     Bathing: Liko lift to purple dependent shower chair, max A for bathing    Toileting: Ax2 with rahel steady to/from dependent shower chair/commode, Ax2 with toilet hygiene with rahel steady  IADLs: Previously IND with all I/ADLs, lives separately from brynn swain and 2 kids live in [facility]   Vision/Cognition: cues to initiate and sustain action, does best w/ items presented 1 at a time or 1 step at a time and decreased stimulation in environment     Assessment: Pt continues to require encouragement to participate d/t lethargy and confusion. Pt continues to require Ax2 for toileting with rahel steady and use of grey dependent shower chair/commode.   -15 d/t lethargy     Other Barriers to Discharge (DME, Family Training, etc):   Family training prior to discharge with S.O. and son (potential support from brynn, son, daughter)   Split-level home (full flight of stairs to get from main level from basement entry in garage)   Cognitive deficits   AE/DME: usama pads, pull-ups, bed rail, rahel steady, grab bars, tub bench, bedside commode, and bidet attachment

## 2023-04-03 NOTE — PLAN OF CARE
Goal Outcome Evaluation:           Overall Patient Progress: no changeOverall Patient Progress: no change     Pt. Continues to sleep throughout shift with intermittent mumble in response to questions asked. I/V medication administered. I/V line is patent. Scheduled medication administered via Gtube. Patient assessed for pain. PRN tylenol administered for elevated Temp. Temp WDL at end of shift.

## 2023-04-03 NOTE — PROGRESS NOTES
[facility]    Medicine Progress Note - Hospitalist Service    Date of Admission:  3/16/2023    Assessment & Plan   Stefan Diallo is a 56 yo gentleman w/ h/o HTN, HLD, T2DM, A-fib (usually on Coumadin but held since 2/24 prior to presentation to [facility] on 3/07), and recent acute ischemic stroke of right MCA territory (likely due to cardioembolic event) s/p tenecteplase on 3/7 at [facility].  His presenting symptoms included left-sided weakness and left-sided facial droop.  Following administration of tenecteplase, pt's neurological exam was noted to have improved with NIHSS score of 5, previously noted to be 9-11.  Hospital course was complicated by dysphagia s/p G-tube placement on 3/9/2023, possible sepsis likely due to aspiration pneumonia (s/p completion of 7-day of antibiotics). With regards to his A-fib, patient was started on Eliquis since 3/14/2023.  Patient was d/c'd to acute rehab on 3/16/2023 for rehabilitation needs.  His discharge neurological exam noted left facial droop, moderate dysarthria, left arm moderate drift, intermittent numbness to light touch in left leg >left arm.      Patient's hospital course in the ARU has been largely unremarkable, however, on 4/2/23, patient was noted to be febrile and more lethargic which is new compared to prior days in the ARU.  Initial work-up obtained including CBC, CRP, CMP, lactic acid, troponin notable for WBC 26.7, elevated inflammatory markers, elevated lactic acid, and mildly elevated high sensitive troponin.  Stat CT head w/o contrast showed no acute intracranial pathology and CXR discovered cardiomegaly with mild interstitial opacities, suggestive of early interstitial edema but no consolidation to suggest aspiration pneumonia.  Hospitalist service was asked to provide opinion about pt's fever and altered MS status on 4/2/23.    Sepsis due to GN bacilli UTI, 4/2/23  ---   UA w/ large leuk esterase, negative  nitrite, trace blood, 9 RBC, > 182 wbc and bacteria present  ---   Tmax 101.3  F   ---   WBC 26.7 ---> 21.6 today  ---   Lactic acid 1.9  ---   Procalcitonin 0.13 suggesting systemic infection unlikely  ---   Sepsis criteria met w/ fever, leukocytosis, acute encephalopathy and source of infection is URI  ---   CXR 4/2 negative for pneumonia  ---   MRSA nasal swap by PCR negative on 4/3  ---   NGTD from Blood cx obtained on 4/2  ---   Continue IV Zosyn pending UCx sensitivity results    Acute metabolic encephalopathy  ---   Due to sepsis/GN UTI  ---   Mental status significantly improved following initiation of IV Zosyn    Acute ischemic stroke of R MCA territory due to cardioembolism status post tenecteplase on 3/7:  ---   Pt has h/o atrial fibrillation, on chronic warfarin anticoagulation.   ---   However, warfarin was hold since 2/25 for pain pump placement when he presented with CVA.    ---   CT head-Increased attenuation within the right superior M2 branch of the middle cerebral artery concerning for thromboembolus. No early ischemic changes.  ---   HEAD CTA: 1.  Occlusion of right superior M2 branch middle cerebral artery. 2.  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 ---   Possible subocclusive thrombus at the R MCA bifurcation  ---   Transthoracic echocardiogram shows EF of 60% with normal right ventricular function.  Mild to moderate aortic stenosis.  ---   Patient failed VFSS and risk for aspiration.  PEG placed by IR on 3/9/23 and nutrition ordered tube feeds  ---   D/c'd to ARU on Eliquis 5 mg BID.  Warfarin d/c'd.   ---   Continue PTA statin     Possible early sepsis due to Asp pneumonia at Replaced by [facility]  ---   Likely source aspiration pneumonia.    ---   MRSA nares negative on 3/11.    ---   No growth from blood cx.    ---   S/p IV vancomycin 3/11 - 3/13.  ---   S/p IV Unasyn 3/11 - 3/14/23    ---   S/p augmentin 3/15 - 3/18/23     Atrial fibrillation:  ---   Was on chronic anticoagulation with warfarin,  however, held since 2/25 for planned pain pump placement.  He then had a stroke  ---   Continue Cardizem ER cannot be given through the PEG tube.     ---   Continue short acting diltiazem TID   ---   BP/HR reasonable on current dose.  ---   Continue Liquids, started at [facility]     HLD  ---   Continue PTA atorvastatin 20 mg daily     HTN  ---   Long term BP < 130/80  ---   Continue cardizem 60 mg via FT q8 hr     T2DM complicated by peripheral neuropathy  ---   A1c6.4% on 2/1/23  ---   Hold PTA Metformin  ---   Continue Novolog SSI  ---   Continue PTA gabapentin     KISHA  ---   CPAP per home setting    Chronic back pain  ---   Patient planned for pain pump placement 3/8/23.    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   ---   Continue Tylenol prn for pain     FT Placement  FT Bleeding 3/13  Contipation  ---   Site bleeding 3/13.    ---   Asked IR to assess, appreciate their assistance.    ---   Bleeding have stopped in spite being on DOAC.    ---   Constipation also improved.  ---   Continue tube feeds            Diet: Room Service  Snacks/Supplements Adult: Magic Cup; With Meals  Snacks/Supplements Adult: Gelatein Plus; With Meals  Adult Formula Drip Feeding: Specified Time Jevity 1.5; Gastrostomy; Goal Rate: 85; mL/hr; From: 2:00 PM; To: 6:00 AM  Snacks/Supplements Adult: Other; Please allow pt/family to order additional supplements PRN; Between Meals  NPO for Medical/Clinical Reasons Except for: NPO but receiving Tube Feeding    DVT Prophylaxis: DOAC  Scruggs Catheter: Not present  Lines: None     Cardiac Monitoring: None  Code Status: Full Code    Disposition Plan   Per PM&R team           Krystla Patel MD  Hospitalist Service  [facility]  Securely message with [employer] (more info)  Text page via "[id number]" Paging/Directory   ______________________________________________________________________    Interval History   No  complaints  Uneventful night  AAO x3   Speech is dysarthric   On RA    Physical Exam   Vital Signs: Temp: 97.5  F (36.4  C) Temp src: Oral BP: 97/57 Pulse: 86   Resp: 16 SpO2: 97 % O2 Device: Nasal cannula Oxygen Delivery: 2 LPM  Weight: 201 lbs 12.95 oz  General: aao x 3, NAD.  HEENT:  NC/AT, neck supple,  CVS:  NL s 1 and s2, no m/r/g.  Lungs:  CTA B/L.   Abd:  Soft, + bs  Ext:  No c/c.  Lymph:  No edema.  Musculoskeletal: No calf tenderness to palpation.    Skin:  No rash.  Neuro:  - Cranial Nerves: Il through Xll intact.   - Motor: Good muscle bulk and tone.   - Strength 5/5 in RUE, 4/5 LUE              - Sensory: normal to light touch throughout      Data     I have personally reviewed the following data over the past 24 hrs:    21.6 (H)  \   11.9 (L)   / 233     141 101 24.6 (H) /  157 (H)   4.2 24 0.92 \       Procal: N/A CRP: 240.44 (H) Lactic Acid: 1.9         Imaging results reviewed over the past 24 hrs:   No results found for this or any previous visit (from the past 24 hour(s)).

## 2023-04-03 NOTE — CONSULTS
[facility] Nurse Inpatient Assessment     Consulted for: sacrum    Patient History (according to provider note(s):      Stefan Diallo is a 57-year-old male with a past medical history of hypertension, hyperlipidemia, non-insulin-dependent diabetes, A-fib (usually on Coumadin but held since 2/24 prior to presentation to [facility] on 3/07), and recent acute ischemic stroke of right MCA territory (likely due to cardioembolic event) s/p tenecteplase on 3/7 at [facility]. His presenting symptoms included left-sided weakness and left-sided facial droop.   Following administration of tenecteplase, patient's neurological exam was noted to have improved with NIHSS score of 5, previously noted to be 9-11.   Hospital course was complicated by dysphagia s/p G-tube placement on 3/9/2023, possible sepsis likely due to aspiration pneumonia (s/p completion of 7-day of antibiotics).  With regards to his A-fib, patient was started on Eliquis since 3/14/2023.   Patient was discharged to acute rehab on 3/16/2023 for rehabilitation needs.   His discharge neurological exam noted left facial droop, moderate dysarthria, left arm moderate drift, intermittent numbness to light touch in left leg >left arm.      Patient's course in the ARU has been largely unremarkable, however, on 4/2, patient was noted to be more lethargic which is new compared to prior days in the ARU.  Initial work-up obtained including CBC, CRP, CMP, lactic acid, troponin notable for WBC 26.7, elevated inflammatory markers, elevated lactic acid, and mildly elevated high sensitive troponin.  Stat CT head Noncon obtained showed no acute intracranial pathology and chest x-ray obtained shows possible left lower lobe airspace disease.     Areas Assessed:      Areas visualized during today's visit: Focused:    Pressure Injury Location: sacrum    4/3  Last photo: 4/3  Wound type: Pressure Injury     Pressure  Injury Stage: 2, hospital acquired   Wound history/plan of care:   Found on skin assessment by nursing.   Wound base: 100 % dermis     Palpation of the wound bed: normal      Drainage: none     Description of drainage: none     Measurements (length x width x depth, in cm) 1 x 0.3 x 0.1 cm  Periwound skin: Intact      Color: normal and consistent with surrounding tissue      Temperature: normal   Odor: none  Pain: denies , none  Pain intervention prior to dressing change: no significant pain present   Treatment goal: Heal  and Protection  STATUS: initial assessment  Supplies ordered: discussed with RN and discussed with patient         Treatment Plan:     Sacrum wound(s): Every 3 days and as needed  Cleanse the area with NS and pat dry.  Apply No sting film barrier to periwound skin.  Cover wound with Sacral Mepilex (#[id number])  Change dressing Q 3 days.  Turn and reposition Q 2hrs side to side only.  Ensure pt has Titi-cushion while sitting up in the chair.    FYI- If pt has constant incontinent loose stools needing dressing changes Q shift please discontinue the Mepilex dressing and apply criticaid barrier paste BID and PRN.    Air pump for bed, only ONE blue covidian pad under pt .        Orders: Written    RECOMMEND PRIMARY TEAM ORDER: None, at this time  Education provided: importance of repositioning, plan of care, wound progress and Infection prevention   Discussed plan of care with: Patient, Family and Nurse  WOC nurse follow-up plan: weekly  Notify WOC if wound(s) deteriorate.  Nursing to notify the Provider(s) and re-consult the WOC Nurse if new skin concern.    DATA:     Current support surface: Standard  Low air loss (KESHA pump, Isolibrium, Pulsate, skin guard, etc)  Containment of urine/stool: Primofit external catheter  BMI: Body mass index is 26.63 kg/m .   Active diet order: Orders Placed This Encounter      NPO for Medical/Clinical Reasons Except for: NPO but receiving Tube Feeding     Output: I/O last 3  completed shifts:  In: 300 [NG/GT:300]  Out: 1000 [Urine:1000]     Labs: Recent Labs   Lab 04/03/23  0657 04/02/23  1113   ALBUMIN  --  3.4*   HGB 11.9* 13.5   WBC 21.6* 26.7*  26.7*     Pressure injury risk assessment:   Sensory Perception: 3-->slightly limited  Moisture: 3-->occasionally moist  Activity: 2-->chairfast  Mobility: 2-->very limited  Nutrition: 3-->adequate  Friction and Shear: 2-->potential problem  Guero Score: 15    Mary Ann Lim RN CWOCN  Pager no longer is use, please contact through [employer] group: [facility] Nurse  Dept. Office Number: [phone]

## 2023-04-03 NOTE — PLAN OF CARE
Goal Outcome Evaluation:      Plan of Care Reviewed With: patient    Overall Patient Progress: no changeOverall Patient Progress: no change    Outcome Evaluation: Pt denies pain this shift. No new skin issues noted. Did not use call light this shift, frequent rounds completed and needs anticipated. Vitals stable today, no fever present. Remains sleepy, but easy to arouse.

## 2023-04-03 NOTE — PLAN OF CARE
Goal Outcome Evaluation:      Plan of Care Reviewed With: patient    Overall Patient Progress: no changeOverall Patient Progress: no change    Outcome Evaluation: pt incontinent of urine on this shift. no changes in skin integrity    ORIENTATION: alert and oriented to self, place  TRANSFERS/AMBULATION: rahel  A2  DIET: NPO cycled TF 85 mL/hour 2315-4533. meds via PEG  BOWEL/BLADDER: mixed bladder. LBM 4/2  PAIN: denies  LDA: R PIV; LR bolus admin/completed this shift, every 6 hours antibiotic, PEG.   SKIN: mepilex to sacrum changed, heels elevated.   SAFETY: pt needing multiple boosts up In bed to remain at 30 degrees.   Other: cpap on at HS. Triggered sepsis due to WBC; noted to be trending down. VS stable, Lactic acid 2.0; paged oncall provider as JOVANNY. dentures out at HS

## 2023-04-03 NOTE — CONSULTS
04/03/23 0851 Vivien Shearer RN     Stroke Education Note     The following information has been reviewed with the family:Patient slept through entire class.      1. Warning signs of stroke     2. Calling 911 if having warning signs of stroke     3. All modifiable risk factors: hypertension, CAD, atrial fib, diabetes, hypercholesterolemia, smoking, substance abuse, diet, physical inactivity, obesity, sleep apnea.     4. Patient's risk factors for stroke which include: HTN, HLD, DM2, AF, KISHA, diet, physical inactivity     5. Follow-up plan for after discharge     6. Discharge medications which include: HTN, HLD, Anti-coagulation     In addition, the above information was given to the family in writing as a part of the [facility] Stroke Class Handout.     Learner's response to risk factors / lifestyle modification education: Ability to change Reasons to change Committment to change      Vivien Shearer RN

## 2023-04-03 NOTE — CONSULTS
"Writer acknowleges consult for care management. Writer will follow patient for TF needs.     Referral has been sent to [facility] for TF needs.   They are able to accept- per intake \"Pt  has Enteral coverage through his [employer] plan. He is covered 80% until he meets his OOP of $725/$3000. Once met he will be covered 100%. His plan requires his Enteral to be sole source and done via tube, they also require 90 days length of need documentation before therapy begins. If pt is homebound we cannot do nursing. If not homebound, we can supply nursing if pt is willing to self pay ($90 a visit)\"     Dora has completed TF education on 4/3 with [facility]. (see note for details).     Writer in search of home care for RN/PT/OT/ SLP/HHA services. Referral sent to [facility] for review.     Writer will continue to be available for care coordination needs.     Kamille Dunbar   Patient Care Management Coordinator  Acute Rehabilitation Unit/ Transitional Care Unit.   Ph: [phone]      "

## 2023-04-03 NOTE — PROGRESS NOTES
"  [facility]   Acute Rehabilitation Unit  Daily progress note    INTERVAL HISTORY  Weekend and therapy notes reviewed.  Yesterday morning, patient noted to have functional change (increased tone, increase lethargy, increased need for assist).  CT head with evolution of known infarct, no new pathology.  WBC elevated to 27.  UA was concerning for UTI.  Patient was started on empiric Zosyn and hospitalist consulted to assist with evaluation and management.    Stefan Diallo was seen and examined at bedside this morning with significant other present.  Patient noted to have fever with tmax 101.3F overnight but afebrile this morning.  He reports that he was feeling \"drunk\" over the weekend, still endorses similar feeling though \"less\".  He notes ongoing cough and mild nasal congestion.  He denies chest pain, shortness of breath, dizziness.  He also denies any abdominal pain or nausea, as well as urinary symptoms.  He is somnolent at the time of my visit but responds verbally to questions.  SLP finishing session and noted patient did ok with mod thick liquids by tsp but struggled with solids, holding in mouth, especially if any distractions in environment.  Also noted patient seems more confused generally.    Functionally, patient needs min A for bed mobility, min to mod A for pivot transfers. Has been able to participate in therapeutic gait with Ax2, not functional.  He needs min A for seated upper body dressing, mod to max A for lower body dressing, max A for bathing.    MEDICATIONS    amantadine  100 mg Per Feeding Tube BID     apixaban ANTICOAGULANT  5 mg Per Feeding Tube BID     atorvastatin  20 mg Per Feeding Tube At Bedtime     diltiazem  60 mg Per Feeding Tube Q8H     fluticasone  1 spray Both Nostrils Daily     gabapentin  600 mg Per Feeding Tube At Bedtime     insulin aspart  1-7 Units Subcutaneous TID AC     insulin aspart  1-5 Units Subcutaneous At Bedtime     lidocaine  2 " "patch Transdermal Q24H     lidocaine   Transdermal Q8H LALO     [Held by provider] metFORMIN  1,000 mg Oral or Feeding Tube BID w/meals     miconazole   Topical BID     mirtazapine  15 mg Oral At Bedtime     pantoprazole  40 mg Per Feeding Tube QAM AC     piperacillin-tazobactam  4.5 g Intravenous Q6H        acetaminophen, calamine, dextrose, glucose **OR** dextrose **OR** glucagon, guaiFENesin, melatonin, - MEDICATION INSTRUCTIONS -, polyethylene glycol, pseudoePHEDrine, senna-docusate     PHYSICAL EXAM  /68 (BP Location: Right arm)   Pulse 98   Temp 98.1  F (36.7  C) (Axillary)   Resp 20   Ht 1.854 m (6' 1\")   Wt 91.5 kg (201 lb 13 oz)   SpO2 96%   BMI 26.63 kg/m     Gen: NAD, sitting up in chair  HEENT: NC/AT  Cardio: irregularly irregular, no murmurs  Pulm: non-labored on room air, lungs CTA bilaterally  Abd: soft, non-tender, non-distended, bowel sounds present, +PEG  Ext: no edema in bilateral lower extremities  Neuro/MSK: lethargic but more alert than prior visit, left facial droop, +dysarthria and aphasia, left neglect, left hemiparesis, moving all extremities in chair    LABS  CBC RESULTS:   Recent Labs   Lab Test 04/02/23 1113 03/30/23  0558 03/27/23  0617   WBC 26.7*  26.7* 9.9 9.2   RBC 4.42 3.98* 3.91*   HGB 13.5 12.2* 11.9*   HCT 41.6 36.5* 37.0*   MCV 94 92 95   MCH 30.5 30.7 30.4   MCHC 32.5 33.4 32.2   RDW 13.3 13.0 12.8    328 379     Last Basic Metabolic Panel:  Recent Labs   Lab Test 04/03/23  0202 04/02/23 2122 04/02/23  1735 04/02/23  1113 03/30/23  0746 03/30/23  0558 03/27/23  0812 03/27/23  0617   NA  --   --   --  136  --  138  --  137   POTASSIUM  --   --   --  4.4  --  4.2  --  4.0   CHLORIDE  --   --   --  99  --  99  --  99   CO2  --   --   --  24  --  27  --  26   ANIONGAP  --   --   --  13  --  12  --  12   * 220* 222* 170*   < > 173*   < > 166*   BUN  --   --   --  24.4*  --  24.1*  --  23.8*   CR  --   --   --  0.89  --  0.79  --  0.76   GFRESTIMATED  -- "   --   --  87  --  90  --  >90   LUTHER  --   --   --  10.1  --  9.8  --  9.7    < > = values in this interval not displayed.     Recent Labs   Lab 04/03/23  0202 04/02/23  2122 04/02/23  1735 04/02/23  1113 04/02/23  1058 04/02/23  0832   * 220* 222* 170* 153* 166*       Rehabilitation - continue comprehensive acute inpatient rehabilitation program with multidisciplinary approach including therapies, rehab nursing, and physiatry following. See interval history for updates.      ASSESSMENT AND PLAN  Stefan Diallo is a 77 year old male with a past medical history of atrial fibrillation on coumadin though held PTA for planned procedure (pain pump placement), chronic bilateral low back pain s/p lumbar fusion, type 2 diabetes mellitus c/b polyneuropathy, renal artery aneurysm, hypertension, hyperlipidemia, KISHA, GERD, BPH, and chronic bilateral shoulder pain who was admitted on 3/7/23 with acute right MCA stroke s/p tenecteplase with hospital course complicated by dysphagia s/p PEG placement on 3/9 complicated by PEG-site bleeding, suspected aspiration pneumonia, hyperglycemia, constipation, and hypokalemia.  He is now admitted to ARU on 3/16/23 for multidisciplinary rehabilitation and ongoing medical management.        Admission to acute inpatient rehab 03/16/23.    Impairment group code: Stroke Ischemic 01.1 (L) Body Involvement (R) Brain; s/p acute ischemic stroke of R MCA territory due to cardioembolism status post tenecteplase        1. PT, OT and SLP 60 minutes of each on a daily basis, in addition to rehab nursing and close management of physiatrist.       2. Impairment of ADL's: Noted to have impaired activity tolerance, impaired balance, impaired coordination, impaired judgement and safety awareness, impaired strength, impaired tone, impaired weight shifting, impulsiveness and pain, all affecting his ability to safely and independently perform basic ADLs.  Goal for assist of 1 with basic  ADLs.     3. Impairment of mobility:  Noted to have impaired activity tolerance, impaired balance, impaired coordination, impaired judgement and safety awareness, impaired strength, impaired tone, impaired weight shifting, impulsiveness and pain, all affecting his ability to safely and independently perform basic mobility.  Goal for assist of 1 with basic mobility.     4. Impairment of cognition/language/swallow:  Noted to have dysarthria, aphasia, and impaired cognition with goals for improved cognitive-linguistic skills to meet basic needs.     5. Medical Conditions  New actions/orders/updates for today are in blue.     Acute R MCA stroke s/p tenecteplase 3/7, likely secondary to cardioembolism from atrial fibrillation off anticoagulation for an anticipated procedure  Initially on aspirin, later restarted on anticoagulation but with Eliquis in place of PTA warfarin.  - Continue apixaban 5 mg BID  - Continue PTA statin.  Goal LDL 40-70.  - Started on amantadine 100 mg daily 3/24, increased to BID (0700, 1300) on 3/24.  - Neuropsych eval completed 3/30, await full report for results  - Long term BP goal <135/80 to be achieved as outpatient within several weeks.  Management as below.  - Continue PT/OT/SLP  - Follow up with general neurology in 6-8 weeks    UTI  Intermittent fever  Leukocytosis  Elevated lactic acid, resolved  Elevated inflammatory markers  Mild troponinemia, likely demand  Increased lethargy, tone, need for assist noted on 4/2 AM.  WBC elevated at 26.7.  Also with elevated inflammatory markers, elevated lactic acid, and mildly elevated high sensitive troponin (through improved from 3 wks ago).  Stat CT head showed evolution of known infarct but no acute intracranial pathology.  CXR shows possible left lower lobe airspace disease.  UA concerning for UTI.  Patient was started on empiric Zosyn and s/p 1L LR.  - Repeat lactic acid at 0041 improved to 1.9 s/p IVF bolus  - UC in process, prelim results  with >100k colonies gram negative bacilli  - Blood cultures with NGTD  - MRSA nares in process  - Repeat labs with WBC improved but still elevated at 21.6.  CRP further elevated at 240 (135).  BMP otherwise stable/WNL.  - COVID PCR negative  - Continue Zosyn 4.5 g IV q6h  - Repeat 1L LR IV bolus given BP remains low  - Per IM, suspect UTI as primary infection.  Procal only mildly elevated, suggesting against systemic infection.  Given clinical status, will continue Zosyn for now and await UC to narrow antibiotics.    Atrial fibrillation  [PTA on warfarin, diltiazem]  On chronic anticoagulation with warfarin.  However, had been held since 2/25 for planned pain pump placement. PTA long-acting extended release diltiazem cannot be given through the PEG tube, substituted short-acting. Started on DOAC in place of PTA warfarin on 3/14.  - Continue Eliquis 5 mg BID  - Continue short acting diltiazem 60 mg TID while NPO  - Continue to monitor     Dyslipidemia  [PTA meds: atorvastatin 20 mg daily, gemfibrozil 600 mg BID]  - Continue PTA statin       Hypertension  [PTA meds: atenolol 25 mg daily, diltiazem  mg daily, lisinopril 5 mg daily]  Resuming PTA meds gradually. PTA diltiazem increased to 60 mg TID on 3/14/23.   - Long term BP <130/80, inpatient meds can be slowly titrated to this goal as otherwise appropriate  - Continue diltiazem 60 mg q8h  - BP overall at goal on current regimen.  BP has been low in setting of infection as above, giving 1L LR.  - Resume PTA lisinopril, atenolol as indicated    Possible aspiration pneumonia  Met criteria for early sepsis.  Started on unasyn + vanco 3/11.  MRSA nares negative.  BC's still negative at discharge.  Stopped vancomycin 3/13.  Changed to FT augmentin 3/15 with a plan for 7 total days abx, completed as of 3/18.  - Monitor respiratory status, SLP for dysphagia treatment as below    Cough  Patient has been complaining of persistent dry cough.  Afebrile, WBC WNL, satting  "well on room air.  Observed to be intermittent and mild.  Lungs clear on exam.  Patient complains this is disruptive for sleep.  - Robitussin PRN  - Added flonase at night for possible post-nasal drip on 3/29  - Noted to have LLL crackles per hospitalist on 4/2.  CXR with \"cardiomegaly with mild interstitial opacities, suggestive of early interstitial edema.  No consolidation to suggest aspiration pneumonia.\"     KISHA  - Has been refusing home CPAP, may be contributor to daytime somnolence. Discussed with patient who reports that he has not been using because he does not sleep as well with CPAP.  Also reports that he did not use just PTA for the same reason, unclear if/when used regularly.  Reviewed risks of untreated sleep apnea but may require ongoing discussion given impaired cognition.  - Preferring to use oxygen by NC with sleep over CPAP  - Significant other notes patient was consistently using CPAP PTA, but was not tolerating well earlier this admission due to inability to adjust the mask himself if displaced.  Now that he has improving function, she feels it would be good to re-attempt.  However, patient continued to decline due to mask not fitting well.  RT consulted.  Limited mask options available inpatient, recommended to contact home CPAP clinic for alternative mask.     Type II DM  Tube feeding induced hyperglycemia  Polyneuropathy  [PTA meds: metformin 500 mg qAM + 1000 mg qPM, gabapentin 600 mg at HS]  A1c 6.4% on 2/1/23.  PTA metformin held this admission.  Managed on small doses of Lantus and sliding scale insulin.  Stopped Lantus on 3/17, resumed home metformin initially at 500 mg BID, increased to PTA dose as of 3/20, and to 1000 mg BID on 3/21.  Recent Labs   Lab 04/03/23  0202 04/02/23  2122 04/02/23  1735 04/02/23  1113 04/02/23  1058 04/02/23  0832   * 220* 222* 170* 153* 166*   - Continue home gabapentin  - -222.  Had been better controlled, elevated past 24 hours in setting of " holding metformin due to elevated lactic acid as above.  Lactic acidosis likely 2/2 infection, can likely resume as clinically improves.  - Continue Novolog medium intensity sliding scale insulin.  Do not anticipate discharging home on this.  - Monitor BG TID AC + HS  - Hypoglycemia protocol     Chronic back pain  Patient planned for pain pump placement 3/8/23 (Javi).  States he has chronic back pain from nerve impingement and uses tylenol PTA for pain.   No complaints of pain at ARU.  - Continue Tylenol prn for pain  - Continue PTA gabapentin 600 mg at HS  - Has not been complaining of any pain much of ARU stay.  However, therapies noting over beginning ~3/29, patient has been complaining of more right hip pain and is protecting weightbearing on that side.  Added lidocaine patch.  - Monitor     Moderate oral, severe oropharyngeal dysphagia  S/p PEG placement 3/9  - Cycled tube feeds per PEG (switched from continuous on 3/18).  Per discussion with team, adjusted timing to 2pm-6am (starting 3/29) in hopes to encourage better oral intake at breakfast.  Also started remeron 3/29 as below.  - Continue water flushes 180 mL QID  - RD following, assistance appreciated  - Continue SLP  - T tacs removed 3/20   - Repeat VFSS on 3/22.  Per SLP, patient demonstrated significant improvement compared to prior VFSS on 3/9 though still aspiration with thin and mildly thick liquids.  As of 3/24, started on minced & moist solids (5) and moderately thick liquids (3) by TSP only; alternate food/drink and 1:1 assist with feeding. Hold PO if overt s/s of aspiration or oral pocketing. Ok for 5-10 ice chips with RN or SLP after oral care only between meals. Per SLP/weekend team, given increased symptoms of dysphagia yesterday in setting of this clinical change, made NPO until further assessment.  Per SLP assessment today, mentation and clinical status remain barrier to safe resumption of PO intake.  Was only eating minimal amounts even  prior to this so should not impact tube feed needs.   - Supplements added per RD  - Tube feeding PLC planned on 4/3     Loose stools, improving  Constipation, resolved  - Continue senokot-S 2 tabs BID PRN, Miralax PRN     GERD  - Continue pantoprazole as auto-sub for PTA on omeprazole 20 mg daily    Suspected HSV lesion, left upper lip, resolved  - Abreva completed x7 days    Anemia, mild, normocytic  Has been trending 12s-13s.  Dropped slightly on 3/27 at 11.9.  No evidence of active bleeding.  - Continue to trend CBC every M/Th.  4/3: Hgb down slightly at 11.9, though has been in 12-13 range for quite some time.    Depressed mood  Despite amantadine, still noting fatigue, decreased motivation/engagement.  Also continues to complain of poor sleep.  Per Dr. Stinson, trial remeron 7.5 mg at bedtime started 3/29 for mood, sleep, appetite.  Tolerating well, still c/o poor sleep, increased to 15 mg on 3/31.   - Continue Remeron 15 mg at HS      6. Adjustment to disability:  Clinical psychology to eval and treat if indicated  7. FEN: NPO, cycled nocturnal tube feeds via PEG  8. Bowel: incontinent, monitor, PRN bowel meds available  9. Bladder: continent/incontinent, using Primofit at night, will need to wean  10. DVT Prophylaxis: apixaban  11. GI Prophylaxis: PPI  12. Code: full  13. Disposition: goal for home  14. ELOS:  target 4/11/23  15. Follow up Appointments on Discharge: PCP in 1-2 weeks, general neurology in 6-8 weeks      Patient was discussed with Dr. Liu Stinson, PM&R staff physician and Dr. Patel, IM physician    Kenna Ruiz, PA-C  Physical Medicine & Rehabilitation

## 2023-04-04 NOTE — PLAN OF CARE
Discharge Planner Post-Acute Rehab SLP:      Discharge Plan: home with S.O. 24/7 pt to reside in lower level of home, ongoing SLP     Precautions: PEG, aspiration risk     Current Status:  Hearing: WFL  Vision: WFL  Communication: Decreased vocal intensity  Cognition: Moderate cognitive linguistic impairment- reasoning, memory, attention  Swallow: NPO, no ice chips 04/02     Assessment:   AM Cognition: Patient with varying alertness throughout session but was able to attempt sequencing of household situations. Pt required max prompting in order to direct attention and alertness to task. However, when pt did sustain attention he was able to sequence steps in the correct order.    AMSwallowing: SLP faciliated oral cares with patient prior to completion of ice chips. Pt completed x8 trials of ice chips with consistent verbal prompting to utilize efforftul swallow strategy. Pt did have x2 instances of reflexive coughing. SLP cued patient for hard cough. Further trials deferred d/t patient's level of alertness. Will attempt PO trials in meal setting in PM session.       Other Barriers to Discharge (Family Training, etc): diet texture training pending progress

## 2023-04-04 NOTE — PLAN OF CARE
Discharge Planner Post-Acute Rehab SLP:      Discharge Plan: home with S.O. 24/7 pt to reside in lower level of home, ongoing SLP     Precautions: PEG, aspiration risk     Current Status:  Hearing: WFL  Vision: WFL  Communication: Decreased vocal intensity  Cognition: Moderate cognitive linguistic impairment- reasoning, memory, attention  Swallow: NPO, no ice chips 04/02     Assessment:   PM Swallowing: Pt faciliated oral cares prior to completion of 5/3 meal tray. Pt required set up assistance and minimal feeding assistance with liquids during meal. Pt tolerated minced and moist (5) food textures with no difficulty and min oral residue. With moderately thick liquids (3) by spoon, pt had no overt s/sx of aspiration and independently demonstrated use of efforftul swallow. However,limited observation of trials d/t pt fatigue and declining more food.  Will continue to f/u with meal trials and consult with medical team before beginning PO diet.     Other Barriers to Discharge (Family Training, etc): diet texture training pending progress

## 2023-04-04 NOTE — PROGRESS NOTES
"  [facility]   Acute Rehabilitation Unit  Daily progress note    INTERVAL HISTORY  Stefan Diallo was seen and examined at bedside this morning with significant other present.  No acute events reported overnight.  Patient still quite somnolent.  He does not open eyes during my visit and just responds briefly to a few questions.  He indicates that he is still feeling \"tired\" and \"drunk\".  He denies pain or shortness of breath.  Significant other does note a history of becoming quite ill with UTIs, requiring hospitalization in the past.  She notes that he has still been quite sleepy and not interacting much.  Patient still having urgency of bladder and intermittent incontinence, as well as loose stools.    Functionally, therapies have noted a significant functional decline in the setting of his acute illness.  PASS score decreased 13/36 -> 8/36.  Somnolence and clinical status has limited therapy participation over the past several days.  When he can participate, he requires constant cueing and more assist.  Pending recovery from this infection, will need to evaluate whether still feasible to safely discharge home.  Anticipate further discussion at team rounds tomorrow.    MEDICATIONS    amantadine  100 mg Per Feeding Tube BID     apixaban ANTICOAGULANT  5 mg Per Feeding Tube BID     atorvastatin  20 mg Per Feeding Tube At Bedtime     diltiazem  60 mg Per Feeding Tube Q8H     fluticasone  1 spray Both Nostrils Daily     gabapentin  600 mg Per Feeding Tube At Bedtime     insulin aspart  1-7 Units Subcutaneous TID AC     insulin aspart  1-5 Units Subcutaneous At Bedtime     lidocaine  2 patch Transdermal Q24H     lidocaine   Transdermal Q8H LALO     [Held by provider] metFORMIN  1,000 mg Oral or Feeding Tube BID w/meals     miconazole   Topical BID     mirtazapine  15 mg Oral At Bedtime     pantoprazole  40 mg Per Feeding Tube QAM AC     piperacillin-tazobactam  4.5 g Intravenous Q6H " "       acetaminophen, calamine, dextrose, glucose **OR** dextrose **OR** glucagon, guaiFENesin, melatonin, - MEDICATION INSTRUCTIONS -, polyethylene glycol, pseudoePHEDrine, senna-docusate     PHYSICAL EXAM  /74 (BP Location: Right arm, Patient Position: Supine, Cuff Size: Adult Regular)   Pulse 86   Temp 98.5  F (36.9  C) (Axillary)   Resp 20   Ht 1.854 m (6' 1\")   Wt 97.7 kg (215 lb 6.2 oz)   SpO2 95%   BMI 28.42 kg/m     Gen: NAD, lying in bed  HEENT: NC/AT  Cardio: irregularly irregular, no murmurs  Pulm: non-labored on room air, lungs CTA bilaterally  Abd: soft, non-tender, non-distended, bowel sounds present, +PEG  Ext: no edema in bilateral lower extremities  Neuro/MSK: lethargic, does not open eyes, provides brief verbal responses, left facial droop, +dysarthria and aphasia, left neglect, left hemiparesis, moving all extremities in bed    LABS  CBC RESULTS:   Recent Labs   Lab Test 04/04/23  0626 04/03/23  0657 04/02/23  1113   WBC 9.8 21.6* 26.7*  26.7*   RBC 3.75* 3.92* 4.42   HGB 11.6* 11.9* 13.5   HCT 35.2* 36.8* 41.6   MCV 94 94 94   MCH 30.9 30.4 30.5   MCHC 33.0 32.3 32.5   RDW 13.4 13.6 13.3    233 282     Last Basic Metabolic Panel:  Recent Labs   Lab Test 04/03/23  2105 04/03/23  1549 04/03/23  1239 04/03/23  0830 04/03/23  0657 04/02/23  1735 04/02/23  1113 03/30/23  0746 03/30/23  0558   NA  --   --   --   --  141  --  136  --  138   POTASSIUM  --   --   --   --  4.2  --  4.4  --  4.2   CHLORIDE  --   --   --   --  101  --  99  --  99   CO2  --   --   --   --  24  --  24  --  27   ANIONGAP  --   --   --   --  16*  --  13  --  12   * 216* 157*   < > 172*   < > 170*   < > 173*   BUN  --   --   --   --  24.6*  --  24.4*  --  24.1*   CR  --   --   --   --  0.92  --  0.89  --  0.79   GFRESTIMATED  --   --   --   --  85  --  87  --  90   LUTHER  --   --   --   --  9.8  --  10.1  --  9.8    < > = values in this interval not displayed.     Recent Labs   Lab 04/03/23  0689 " 04/03/23  1549 04/03/23  1239 04/03/23  0830 04/03/23  0657 04/03/23  0202   * 216* 157* 157* 172* 220*       Rehabilitation - continue comprehensive acute inpatient rehabilitation program with multidisciplinary approach including therapies, rehab nursing, and physiatry following. See interval history for updates.      ASSESSMENT AND PLAN  Stefan Diallo is a 77 year old male with a past medical history of atrial fibrillation on coumadin though held PTA for planned procedure (pain pump placement), chronic bilateral low back pain s/p lumbar fusion, type 2 diabetes mellitus c/b polyneuropathy, renal artery aneurysm, hypertension, hyperlipidemia, KISHA, GERD, BPH, and chronic bilateral shoulder pain who was admitted on 3/7/23 with acute right MCA stroke s/p tenecteplase with hospital course complicated by dysphagia s/p PEG placement on 3/9 complicated by PEG-site bleeding, suspected aspiration pneumonia, hyperglycemia, constipation, and hypokalemia.  He is now admitted to ARU on 3/16/23 for multidisciplinary rehabilitation and ongoing medical management.        Admission to acute inpatient rehab 03/16/23.    Impairment group code: Stroke Ischemic 01.1 (L) Body Involvement (R) Brain; s/p acute ischemic stroke of R MCA territory due to cardioembolism status post tenecteplase        1. PT, OT and SLP 60 minutes of each on a daily basis, in addition to rehab nursing and close management of physiatrist.       2. Impairment of ADL's: Noted to have impaired activity tolerance, impaired balance, impaired coordination, impaired judgement and safety awareness, impaired strength, impaired tone, impaired weight shifting, impulsiveness and pain, all affecting his ability to safely and independently perform basic ADLs.  Goal for assist of 1 with basic ADLs.     3. Impairment of mobility:  Noted to have impaired activity tolerance, impaired balance, impaired coordination, impaired judgement and safety awareness, impaired  strength, impaired tone, impaired weight shifting, impulsiveness and pain, all affecting his ability to safely and independently perform basic mobility.  Goal for assist of 1 with basic mobility.     4. Impairment of cognition/language/swallow:  Noted to have dysarthria, aphasia, and impaired cognition with goals for improved cognitive-linguistic skills to meet basic needs.     5. Medical Conditions  New actions/orders/updates for today are in blue.     Acute R MCA stroke s/p tenecteplase 3/7, likely secondary to cardioembolism from atrial fibrillation off anticoagulation for an anticipated procedure  Initially on aspirin, later restarted on anticoagulation but with Eliquis in place of PTA warfarin.  - Continue apixaban 5 mg BID  - Continue PTA statin.  Goal LDL 40-70.  - Started on amantadine 100 mg daily 3/24, increased to BID (0700, 1300) on 3/24.  - Neuropsych eval completed 3/30, await full report for results  - Long term BP goal <135/80 to be achieved as outpatient within several weeks.  Management as below.  - Continue PT/OT/SLP  - Follow up with general neurology in 6-8 weeks    E. Coli UTI  Sepsis  Increased lethargy, tone, need for assist noted on 4/2 AM.  WBC elevated at 26.7.  Also with elevated inflammatory markers, elevated lactic acid, and mildly elevated high sensitive troponin (through improved from 3 wks ago).  Stat CT head showed evolution of known infarct but no acute intracranial pathology.  CXR shows possible left lower lobe airspace disease.  UA concerning for UTI.  Patient was started on empiric Zosyn and s/p 1L LR on 4/2 and repeated on 4/3.  MRSA nares, COVID PCR negative.  Per IM, suspect UTI as primary infection.  Procal only mildly elevated, suggesting against systemic infection.  - Blood cultures with NGTD  - Repeat labs with WBC normalized at 9.8.  Lactic stable at 2.0  - UC with E. Coli  - Addendum: Per IM, change Zosyn to ceftriaxone  - Low grade temp overnight (100.1 F), continue to  "monitor fever curve.   - Remains quite somnolent and needing increased assist.  Suspect worsened encephalopathy 2/2 infection.  Hopeful for improvement with ongoing treatment.    Atrial fibrillation  [PTA on warfarin, diltiazem]  On chronic anticoagulation with warfarin.  However, had been held since 2/25 for planned pain pump placement. PTA long-acting extended release diltiazem cannot be given through the PEG tube, substituted short-acting. Started on DOAC in place of PTA warfarin on 3/14.  - Continue Eliquis 5 mg BID  - Continue short acting diltiazem 60 mg TID while NPO  - Continue to monitor     Dyslipidemia  [PTA meds: atorvastatin 20 mg daily, gemfibrozil 600 mg BID]  - Continue PTA statin       Hypertension  [PTA meds: atenolol 25 mg daily, diltiazem  mg daily, lisinopril 5 mg daily]  Resuming PTA meds gradually. PTA diltiazem increased to 60 mg TID on 3/14/23.   - Long term BP <130/80, inpatient meds can be slowly titrated to this goal as otherwise appropriate  - Continue diltiazem 60 mg q8h  - Soft BP resolved with repeat IVF bolus on 4/3  - Resume PTA lisinopril, atenolol as indicated    Possible aspiration pneumonia  Met criteria for early sepsis.  Started on unasyn + vanco 3/11.  MRSA nares negative.  BC's still negative at discharge.  Stopped vancomycin 3/13.  Changed to FT augmentin 3/15 with a plan for 7 total days abx, completed as of 3/18.  - Monitor respiratory status, SLP for dysphagia treatment as below    Cough  Patient has been complaining of persistent dry cough.  Afebrile, WBC WNL, satting well on room air.  Observed to be intermittent and mild.  Lungs clear on exam.  Patient complains this is disruptive for sleep.  - Robitussin PRN  - Added flonase at night for possible post-nasal drip on 3/29  - Noted to have LLL crackles per hospitalist on 4/2.  CXR with \"cardiomegaly with mild interstitial opacities, suggestive of early interstitial edema.  No consolidation to suggest aspiration " "pneumonia.\"     KISHA  - Has been refusing home CPAP, may be contributor to daytime somnolence. Discussed with patient who reports that he has not been using because he does not sleep as well with CPAP.  Also reports that he did not use just PTA for the same reason, unclear if/when used regularly.  Reviewed risks of untreated sleep apnea but may require ongoing discussion given impaired cognition.  - Preferring to use oxygen by NC with sleep over CPAP  - Significant other notes patient was consistently using CPAP PTA, but was not tolerating well earlier this admission due to inability to adjust the mask himself if displaced.  Now that he has improving function, she feels it would be good to re-attempt.  However, patient continued to decline due to mask not fitting well.  RT consulted.  Limited mask options available inpatient, recommended to contact home CPAP clinic for alternative mask.     Type II DM  Tube feeding induced hyperglycemia  Polyneuropathy  [PTA meds: metformin 500 mg qAM + 1000 mg qPM, gabapentin 600 mg at HS]  A1c 6.4% on 2/1/23.  PTA metformin held this admission.  Managed on small doses of Lantus and sliding scale insulin.  Stopped Lantus on 3/17, resumed home metformin initially at 500 mg BID, increased to PTA dose as of 3/20, and to 1000 mg BID on 3/21.  Recent Labs   Lab 04/03/23  2105 04/03/23  1549 04/03/23  1239 04/03/23  0830 04/03/23  0657 04/03/23  0202   * 216* 157* 157* 172* 220*   - Continue home gabapentin  - Metformin had been on hold given elevated lactic acid.  Suspect that was due to urosepsis as above.  Resume metformin 1000 mg BID.    - Continue Novolog medium intensity sliding scale insulin.  Do not anticipate discharging home on this.  - Monitor BG TID AC + HS  - Hypoglycemia protocol     Chronic back pain  Patient planned for pain pump placement 3/8/23 (Javi).  States he has chronic back pain from nerve impingement and uses tylenol PTA for pain.   No complaints of pain at " ARU.  - Continue Tylenol prn for pain  - Continue PTA gabapentin 600 mg at HS  - Has not been complaining of any pain much of ARU stay.  However, therapies noting over beginning ~3/29, patient has been complaining of more right hip pain and is protecting weightbearing on that side.  Added lidocaine patch.  - Monitor     Moderate oral, severe oropharyngeal dysphagia  S/p PEG placement 3/9  - Cycled tube feeds per PEG (switched from continuous on 3/18).  Per discussion with team, adjusted timing to 2pm-6am (starting 3/29) in hopes to encourage better oral intake at breakfast.  Also started remeron 3/29 as below.  - Repeat VFSS on 3/22.  Per SLP, patient demonstrated significant improvement compared to prior VFSS on 3/9 though still aspiration with thin and mildly thick liquids.  As of 3/24, started on minced & moist solids (5) and moderately thick liquids (3) by TSP only; alternate food/drink and 1:1 assist with feeding.  - Per SLP/weekend team, given increased symptoms of dysphagia starting 4/2 in setting of this clinical change, made NPO until further assessment.  Per SLP assessment 4/3, mentation and clinical status remain barrier to safe resumption of PO intake.  Was only eating minimal amounts even prior to this so should not impact tube feed needs.  - Continue water flushes 180 mL QID  - RD following, assistance appreciated  - Continue SLP  - T tacs removed 3/20   - Currently NPO. SLP to continue to evaluate when/if safe to resume PO intake pending improved mental status.    - Supplements added per RD  - Tube feeding PLC planned on 4/3     Loose stools, improving  Constipation, resolved  - Continue senokot-S 2 tabs BID PRN, Miralax PRN     GERD  - Continue pantoprazole as auto-sub for PTA on omeprazole 20 mg daily    Suspected HSV lesion, left upper lip, resolved  - Abreva completed x7 days    Anemia, mild, normocytic  Has been trending 12s-13s.  Dropped slightly on 3/27 at 11.9.  No evidence of active  bleeding.  - Continue to trend CBC every M/Th.  4/4: Hgb stable at 11.6.    Depressed mood  Despite amantadine, still noting fatigue, decreased motivation/engagement.  Also continues to complain of poor sleep.  Per Dr. Stinson, trial remeron 7.5 mg at bedtime started 3/29 for mood, sleep, appetite.  Tolerating well, still c/o poor sleep, increased to 15 mg on 3/31.   - Continue Remeron 15 mg at HS      6. Adjustment to disability:  Clinical psychology to eval and treat if indicated  7. FEN: NPO, cycled nocturnal tube feeds via PEG  8. Bowel: incontinent, monitor, PRN bowel meds available  9. Bladder: continent/incontinent, using Primofit at night, will need to wean  10. DVT Prophylaxis: apixaban  11. GI Prophylaxis: PPI  12. Code: full  13. Disposition: goal for home  14. ELOS:  target 4/11/23  15. Follow up Appointments on Discharge: PCP in 1-2 weeks, general neurology in 6-8 weeks      Patient was discussed with Dr. Liu Stinson, PM&R staff physician    Kenna Ruiz PA-C  Physical Medicine & Rehabilitation

## 2023-04-04 NOTE — PLAN OF CARE
Goal Outcome Evaluation:      Plan of Care Reviewed With: patient    Overall Patient Progress: no changeOverall Patient Progress: no change     Patient asleep, no respiratory distress noted, denies pain, Primofit for urine incontinence, had incontinent loose BM. PEG site, dressing CDI, L PIV for IV abx, PRN Tylenol given for elevated temp. Cycled TF at 85 mL/hr, tolerating feeding well, no N/V noted, maintained HOB elevated.    Safety rounds check completed, bed alarm on, side rails up, call light within reach, continue POC

## 2023-04-04 NOTE — PLAN OF CARE
Goal Outcome Evaluation:      Plan of Care Reviewed With: patient, spouse    Overall Patient Progress: declining    Outcome Evaluation: Patient mobility status was downgraded.     Orientation: DANIEL fully d/t patient being lethargic and rarely alert throughout shift  Bowel: Incontinent of bowel  Bladder: Incontinent of bladder. Uses Primofit between therapies and at HS  Pain: Denies pain  Ambulation/Transfers: Ax2 Liko lift for transfers, WC based. Ax2 for bed mobility/cares  Blood sugars: See results tab for details  Diet/ Liquids: Remains NPO  Tubes/ Lines/ Drains: PEG patent, LUE PIV patent  Tube Feeding: Cycled TF 2p-6a per active orders  Oxygen: Stable on RA this shift per spot checks  Skin:  Mepilex to coccyx CDI  Bed alarm on for safety, call light within reach. Continue with POC.

## 2023-04-04 NOTE — PROGRESS NOTES
[facility]    Medicine Progress Note - Hospitalist Service    Date of Admission:  3/16/2023    Assessment & Plan   Stefan Diallo is a 56 yo gentleman w/ h/o HTN, HLD, T2DM, A-fib (usually on Coumadin but held since 2/24 prior to presentation to [facility] on 3/07), and recent acute ischemic stroke of right MCA territory (likely due to cardioembolic event) s/p tenecteplase on 3/7 at [facility].  His presenting symptoms included left-sided weakness and left-sided facial droop.  Following administration of tenecteplase, pt's neurological exam was noted to have improved with NIHSS score of 5, previously noted to be 9-11.  Hospital course was complicated by dysphagia s/p G-tube placement on 3/9/2023, possible sepsis likely due to aspiration pneumonia (s/p completion of 7-day of antibiotics). With regards to his A-fib, patient was started on Eliquis since 3/14/2023.  Patient was d/c'd to acute rehab on 3/16/2023 for rehabilitation needs.  His discharge neurological exam noted left facial droop, moderate dysarthria, left arm moderate drift, intermittent numbness to light touch in left leg >left arm.      Patient's hospital course in the ARU has been largely unremarkable, however, on 4/2/23, patient was noted to be febrile and more lethargic which is new compared to prior days in the ARU.  Initial work-up obtained including CBC, CRP, CMP, lactic acid, troponin notable for WBC 26.7, elevated inflammatory markers, elevated lactic acid, and mildly elevated high sensitive troponin.  Stat CT head w/o contrast showed no acute intracranial pathology and CXR discovered cardiomegaly with mild interstitial opacities, suggestive of early interstitial edema but no consolidation to suggest aspiration pneumonia.  Hospitalist service was asked to provide opinion about pt's fever and altered MS status on 4/2/23.    Sepsis due to GN bacilli UTI, 4/2/23  ---   UA w/ large leuk esterase, negative  nitrite, trace blood, 9 RBC, > 182 wbc and bacteria present  ---   Tmax 101.3  F   ---   WBC 26.7 ---> 21.6 today  ---   Lactic acid 1.9, but CRP up to 240 on 4/3  ---   Procalcitonin 0.13 suggesting systemic infection unlikely  ---   Sepsis criteria met w/ fever, leukocytosis, acute encephalopathy and source of infection is URI  ---   CXR 4/2 negative for pneumonia  ---   MRSA nasal swap by PCR negative on 4/3  ---   NGTD from Blood cx obtained on 4/2  ---   Urine culture going >100 K E Coli, resistant to penicillin and bactrim. Change  Zosyn  to Ceftriaxone ( day #3)    Acute metabolic encephalopathy  ---   Due to sepsis/GN UTI  ---   Mental status significantly improved following initiation of IV Zosyn, but still has intremittent drowsiness     Acute ischemic stroke of R MCA territory due to cardioembolism status post tenecteplase on 3/7:  ---   Pt has h/o atrial fibrillation, on chronic warfarin anticoagulation.   ---   However, warfarin was hold since 2/25 for pain pump placement when he presented with CVA.    ---   CT head-Increased attenuation within the right superior M2 branch of the middle cerebral artery concerning for thromboembolus. No early ischemic changes.  ---   HEAD CTA: 1.  Occlusion of right superior M2 branch middle cerebral artery. 2.  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 ---   Possible subocclusive thrombus at the R MCA bifurcation  ---   Transthoracic echocardiogram shows EF of 60% with normal right ventricular function.  Mild to moderate aortic stenosis.  ---   Patient failed VFSS and risk for aspiration.  PEG placed by IR on 3/9/23 and nutrition ordered tube feeds  ---   D/c'd to ARU on Eliquis 5 mg BID.  Warfarin d/c'd.   ---   Continue PTA statin     Possible early sepsis due to Asp pneumonia at [facility]  ---   Likely source aspiration pneumonia.    ---   MRSA nares negative on 3/11.    ---   No growth from blood cx.    ---   S/p IV vancomycin 3/11 - 3/13.  ---   S/p IV Unasyn 3/11 -  3/14/23    ---   S/p augmentin 3/15 - 3/18/23     Atrial fibrillation:  ---   Was on chronic anticoagulation with warfarin, however, held since 2/25 for planned pain pump placement.  He then had a stroke  ---   Continue Cardizem ER cannot be given through the PEG tube.     ---   Continue short acting diltiazem TID   ---   BP/HR reasonable on current dose.  ---   Continue Liquids, started at [facility]     HLD  ---   Continue PTA atorvastatin 20 mg daily     HTN  ---   Long term BP < 130/80  ---   Continue cardizem 60 mg via FT q8 hr     T2DM complicated by peripheral neuropathy  ---   A1c6.4% on 2/1/23  ---   Hold PTA Metformin  ---   Continue Novolog SSI  ---   Continue PTA gabapentin     KISHA  ---   CPAP per home setting    Chronic back pain  ---   Patient planned for pain pump placement 3/8/23.    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   ---   Continue Tylenol prn for pain     FT Placement  FT Bleeding 3/13  Contipation  ---   Site bleeding 3/13.    ---   Asked IR to assess, appreciate their assistance.    ---   Bleeding have stopped in spite being on DOAC.    ---   Constipation also improved.  ---   Continue tube feeds            Diet: Room Service  Snacks/Supplements Adult: Magic Cup; With Meals  Snacks/Supplements Adult: Gelatein Plus; With Meals  Adult Formula Drip Feeding: Specified Time Jevity 1.5; Gastrostomy; Goal Rate: 85; mL/hr; From: 2:00 PM; To: 6:00 AM  Snacks/Supplements Adult: Other; Please allow pt/family to order additional supplements PRN; Between Meals  NPO for Medical/Clinical Reasons Except for: NPO but receiving Tube Feeding    DVT Prophylaxis: DOAC  Scruggs Catheter: Not present  Lines: None     Cardiac Monitoring: None  Code Status: Full Code    Disposition Plan   Per PM&R team           Lencho Rodgers MD  Hospitalist Service  [facility]  Securely message with From[employer] (more info)  Text page via  [employer] Paging/Directory   ______________________________________________________________________    Interval History   Charts reviewed and patient examined. Sign out provided to me by Dr. Patel  Patient was up this morning, and working with SLP  Intermittently drowsy, but answering questions  No acute events overnight  Denies chest pain or SOB       Physical Exam   Vital Signs: Temp: 99.3  F (37.4  C) Temp src: Oral BP: 135/73 Pulse: 88   Resp: 18 SpO2: 97 % O2 Device: Nasal cannula Oxygen Delivery: 2 LPM  Weight: 215 lbs 6.23 oz  General: aao x 3, NAD.  HEENT:  NC/AT, neck supple,  CVS:  NL s 1 and s2, no m/r/g.  Lungs:  CTA B/L.   Abd:  Soft, + bs  Ext:  No c/c.  Lymph:  No edema.  Musculoskeletal: No calf tenderness to palpation.    Skin:  No rash.  Neuro:  - Cranial Nerves: Il through Xll intact.   - Motor: Good muscle bulk and tone.   - Strength 5/5 in RUE, 4/5 LUE              - Sensory: normal to light touch throughout      Data     I have personally reviewed the following data over the past 24 hrs:    9.8  \   11.6 (L)   / 199     N/A N/A N/A /  261 (H)   N/A N/A N/A \       Procal: N/A CRP: N/A Lactic Acid: 2.0         Imaging results reviewed over the past 24 hrs:   No results found for this or any previous visit (from the past 24 hour(s)).

## 2023-04-04 NOTE — PROGRESS NOTES
Discharge Planner Post-Acute Rehab OT:      Discharge Plan: Home with HCOT and 24/7 support available     Precautions: Falls, left side hemiparesis, PEG tube,modifed textures and thickend liquids, slurred speech      Current Status:  ADLs:    Mobility: WC based. Ax2 with rahel steady for transfers-progressing    Grooming: SBA seated in TIS W/C. Mod A standing at sink      Dressing: UB- max A, LB-max A with rahel steady     Bathing: Liko lift to purple dependent shower chair, max A for bathing    Toileting: Ax2 with rahel steady to/from dependent shower chair/commode, Ax2 with toilet hygiene with rahel steady- progressing  IADLs: Previously IND with all I/ADLs, lives separately from brynn swain and 2 kids live in [place]   Vision/Cognition: cues to initiate and sustain action, does best w/ items presented 1 at a time or 1 step at a time and decreased stimulation in environment     Assessment: Pt lethargic but able to participate. Pt Mod Ax1 supine to sit EOB w/extra time and cueing.  Pt able STS EOB with rahel stedy CGAx2 and assist to bring hands to rahel stedy bar. Tsf to gray commode chair and assist with clothing mngt. Pt incontinent and is dependent for changing brief, and shorts, is able to assist some  to pull up shorts standing with rahel stedy support.  It was required to complete this process twice due to initial incontinence and then incidental voiding that missed commode bucket. Pt ok positionally with foot rests on gray commode shower chair, adjusted foot rests for BLE positioning. Pt able to transfer rahel stedy gutierrez commode chair to regular high back chair Ax1 today with rahel stedy. Some noted confusion as pt thought this writer was a family member. Pts s.o. present and endorses recent confusion with UTI.     Other Barriers to Discharge (DME, Family Training, etc):   Family training prior to discharge with S.O. and son (potential support from fiance, son, daughter)   Split-level home (full flight  of stairs to get from main level from basement entry in garage)   Cognitive deficits   AE/DME: usama pads, pull-ups, bed rail, rahel steady, grab bars, tub bench, bedside commode, and bidet attachment

## 2023-04-04 NOTE — PROGRESS NOTES
Discharge Planner Post-Acute Rehab PT:     Discharge Plan: HCPT and 24/7 assist from fiance    Precautions: Tubefeed, L hemiparesis, alarms    Current Status:  Bed Mobility: ModA  Transfer: MaxA SPT  Gait: Unable to perform gait currently  Stairs: Unable to perform stairs currently  Balance: Unable to sit unsupported EOB    PASS:  3/29: 13/36 - unable prior due to cognition  4/4: 8/36    Assessment: Pt's PASS score decreased from 13/36 --> 8/36. This score indicates a decrease in function and pt most likely will not be walking independently within 3 months and will likely be wheelchair based. Pt very tired during session and needed constant cueing to refocus.    Other Barriers to Discharge (DME, Family Training, etc):  - Full flight of stairs to get to main level from basement entry in garage -  planning on staying on basement level with sarasteady initially  - w/c

## 2023-04-04 NOTE — PROGRESS NOTES
Postural Assessment for Stroke Scale (PASS)    Maintaining posture -   1) Sitting without support - 2  2) Standing with support (feet position free) - 1  3) Standing without support (feet position free) - 0  4) Standing on nonparetic leg - 0  5) Standing on paretic leg - 0    Changing posture -  6) Rolling supine -> affected side - 1  7) Rolling supine -> unaffected side - 1  8) Sup->sitting edge of bed - 1  9) Sitting edge of bed -> sup - 1  10) Sit->stand without support - 0  11) Stand->sit without support - 1  12) Standing,  pencil from floor without support - 0    Total Score - 8/36    Assessment: Pt's score has decreased 13/36 --> 8/36. Score indicates pt unlikely to be walking independent and most likely will be w/c based.    The PASS assesses a patient's ability to change and maintain posture during different balance activities. It is not associated with falls risk, but is used to track progress with the patient's ability to control their posture and balance throughout the course of the patient's admission.    A score of <22 points at admission to Acute Rehab is predictive that pt is not likely to be walking independently at 3 months.   (Maximilian, et al. 2021. Postural Maintenance Is Associated with Walking Ability in People Received Acute Rehabilitation after Stroke)

## 2023-04-05 NOTE — PLAN OF CARE
Goal Outcome Evaluation:    [id number]    Pt is alert to self, confused, trying to get out of bed. Pt remain NPO, incontinent of bowel and bladder, uses promofit, denies pain,A2 with liko lift for transfer  based. A2 for bed mobility. BGM done, see results tab for details. PEG Patent. MARJORIE PATTIE viktoria , cycled T/F 2 pm- 6 am. Pt is stable on room air . Meplex to the coccyx CDI, Bed alarm on for safety, call light within reach, Continue with poc.    [id number]  No acute concern, pt slept well throughout the shift.    Patient's most recent vital signs are:     Vital signs:  BP: 127/77  Temp: 99.9  HR: 96  RR: 16  SpO2: 94 %     Patient does not have new respiratory symptoms.  Patient does not have new sore throat.  Patient does not have a fever greater than 99.5.

## 2023-04-05 NOTE — PROGRESS NOTES
Discharge Planner Post-Acute Rehab PT:     Discharge Plan: HCPT and 24/7 assist from fiance    Precautions: Tubefeed, L hemiparesis, alarms    Current Status:  Bed Mobility: ModA  Transfer: MaxA SPT  Gait: Unable to perform gait currently  Stairs: Unable to perform stairs currently  Balance: Unable to sit unsupported EOB    PASS:  3/29: 13/36 - unable prior due to cognition  4/4: 8/36    Assessment: pt looking better today willing to work with PT. Pt demo mulit STS progress with longer time and less assist to try. Still confused on time and place. Up in tall back chair with alarm on at end of session. Family present at end of session.     Other Barriers to Discharge (DME, Family Training, etc):  - Full flight of stairs to get to main level from basement entry in garage -  planning on staying on basement level with sarasteady initially  - w/c

## 2023-04-05 NOTE — PLAN OF CARE
Goal Outcome Evaluation:      Plan of Care reviewed with: Patient      Overall patient progress: improving    Patient is alert to himself, knows that he is in the hospital but cannot say the name. Had episodes of sleepiness in the morning, responds to verbal commands appropriately but as soon as you stop talking to him he will close his eyes and start  sleeping.However toward mid day, he became more wake and sat in his chair and kept his eyes open. Patient had several requests asking his fiance to bring him some pizza, redirected with short term effect. Had several inc smear BM and one large continent loose stool on the commode, assisted with frederick care. [place] area appear red but blanchable, probably due to the moisture from his bladder and bowel incontinence, barrier cream applied with each incontinence episode, provider is aware. Had several intermittent non productive cough, guaifenesin given, IS encouraged. BG's today 156 and 182. Currently TF is running and his IV abx is infusing on his  R arm PIV, had team rounds today, see the note for more details. Dora is at bedside, will continue poc

## 2023-04-05 NOTE — PROGRESS NOTES
CLINICAL NUTRITION SERVICES - REASSESSMENT NOTE     Nutrition Prescription    Malnutrition Status:    Moderate malnutrition in the context of acute on chronic illness    Recommendations already ordered by Registered Dietitian (RD):  - Add 2 packets Prosource TF20 per day to current tube feeding regimen to meet increased protein needs for pressure injury.     Updated tube feeding regimen as follows: Jevity 1.5 Kip @ goal of 85 ml/hr x 16 hours from 2 pm - 6 am + 2 pkt Prosource TF 20 (1360 ml/day) to provide: 2200 kcals (23.5 kcal/kg), 127 g PRO (1.4 g/kg), 1034 ml free H20, 294 g CHO, and 29 g fiber daily.    Future/Additional Recommendations:  - Monitor diet advancement/PO intake and adjust tube feeding accordingly. Will follow direction of SLP and medical team.  - Monitor weight trends. If patient continues to trend up, consider decreasing calories provided by tube feeding.     EVALUATION OF THE PROGRESS TOWARD GOALS   Diet: NPO but receiving tube feeding  Supplements/Snacks: Magic cup and Gelatein plus with meals (currently held while patient is NPO)    Nutrition Support: Jevity 1.5 Kip @ goal of 85 ml/hr x 16 hours from 2 pm - 6 am (1360 ml/day) will provide: 2040 kcals (22 kcal/kg), 87 g PRO (0.9 g/kg), 1034 ml free H20, 294 g CHO, and 29 g fiber daily.     mL QID (TF + flushes = 1754 mL water; meeting 93% hydration needs)    Intake/Tolerance:   - Patient is currently NPO and receiving 100% nutrition via tube feeding  - RN notes frequently report that patient has a poor appetite. He likes the magic cups and will occasionally request more. Per PA note, pt's spouse felt that stopping TF earlier in the day was helpful to allow better appetite at breakfast.      NEW FINDINGS   - Patient found to have UTI on 4/2, causing sepsis and acute metabolic encephalopathy  - Placed on NPO diet order on 4/2 due to increased s/sx of dysphagia and pocketing of food as well as new medical concern for infection; continue to  provide nutrition via tube feedings  - Met with patient and wife to discuss tube feeding tolerance and changes. Patient has been tolerating cycled tube feedings well. Patient's wife did not have any additional questions at this time and was okay with the discussed changes.     Skin: [facility] nurse assessment (4/3): Stage 2 Pressure Injury on sacrum, hospital acquired    GI: Patient is stooling regularly (1x/day).     Weight: Patient is currently up 20 lbs from previous assessment, but most recent documented weight is consistent with weights prior to admission.    Wt Readings from Last 10 Encounters:   04/04/23 97.7 kg (215 lb 6.2 oz)   03/11/23 98 kg (216 lb 0.8 oz)   03/07/23 97.5 kg (215 lb)   02/01/23 98.9 kg (218 lb)   01/20/23 105.7 kg (233 lb)   08/19/22 95.9 kg (211 lb 8 oz)   08/10/22 93.9 kg (207 lb 1.6 oz)   05/21/22 100.7 kg (222 lb)   05/16/22 101 kg (222 lb 12 oz)   10/31/21 104.3 kg (230 lb)     04/04/23 0541 97.7 kg (215 lb 6.2 oz) Bed scale   04/02/23 0600 91.5 kg (201 lb 13 oz) Bed scale   03/31/23 0221 91.2 kg (201 lb 1 oz) Bed scale   03/29/23 0632 88.9 kg (195 lb 15.8 oz) Bed scale       Labs reviewed.  - BUN 24.6 (H)  - Cr 0.92 (WNL)  - BG consistently 160-175    Medications reviewed.  - Novolog insulin, sliding scale  - Metformin, 1000 mg BID  - Remeron  - Pantoprazole    Dosing Weight: 93.8 kg (admit wt)    ASSESSED NUTRITION NEEDS (Updated 4/5/23)  Estimated Energy Needs: 7691-5018 kcals/day (20 - 25 kcals/kg)  Justification: Maintenance and Overweight  Estimated Protein Needs: 122-140 grams protein/day (1.3 - 1.5 grams of pro/kg)  Justification: Increased needs due to pressure injury  Estimated Fluid Needs: 1 mL/kcal  Justification: Maintenance     MALNUTRITION  % Intake: Decreased intake does not meet criteria -- meeting needs via TF  % Weight Loss: None noted  Subcutaneous Fat Loss: Facial region: mild and Upper arm: mild-moderate  Muscle Loss: Temporal: mild, Thoracic region (clavicle,  acromium bone, deltoid, trapezius, pectoral): mild, Upper arm (bicep, tricep):  moderate and Dorsal hand: mild  Fluid Accumulation/Edema: None noted  Malnutrition Diagnosis: Moderate malnutrition in the context of acute on chronic illness    Previous Goals   Total avg nutritional intake to meet a minimum of 20 kcal/kg and 1 g PRO/kg daily (per dosing wt 93.8 kg).  Evaluation: Partially met - pt meeting kcal needs via TF but only 93% of protein needs while pt is NPO.    Previous Nutrition Diagnosis  Inadequate oral intake related to chewing/swallowing difficulties as evidenced by need for modified diet and TF to meet estimated nutrition needs.  Evaluation: Continued, updated below    CURRENT NUTRITION DIAGNOSIS  Inadequate oral intake related to chewing/swallowing difficulties as evidenced by currently NPO for worsening signs/symptoms of dysphagia (pocketing food) and requiring TF to meet 100% estimated nutrition needs.     INTERVENTIONS  Implementation  Collaboration with other providers - discussed patient plan of care with team during rounds  Enteral Nutrition - Add 2 packets Prosource TF20 per day to meet increased protein needs for pressure injury.    Goals  Total avg nutritional intake to meet a minimum of 20 kcal/kg and 1.3 g PRO/kg daily (per dosing wt 93.8 kg).    Monitoring/Evaluation  Progress toward goals will be monitored and evaluated per protocol.      Minerva Walton  Dietetic Intern    Lety Mead RD, TOM  [facility] RD pager: [phone]  Weekend/Holiday RD pager: [phone]

## 2023-04-05 NOTE — PROGRESS NOTES
[facility]    Medicine Progress Note - Hospitalist Service    Date of Admission:  3/16/2023    Assessment & Plan   Stefan Diallo is a 58 yo gentleman w/ h/o HTN, HLD, T2DM, A-fib (usually on Coumadin but held since 2/24 prior to presentation to [facility] on 3/07), and recent acute ischemic stroke of right MCA territory (likely due to cardioembolic event) s/p tenecteplase on 3/7 at [facility].  His presenting symptoms included left-sided weakness and left-sided facial droop.  Following administration of tenecteplase, pt's neurological exam was noted to have improved with NIHSS score of 5, previously noted to be 9-11.  Hospital course was complicated by dysphagia s/p G-tube placement on 3/9/2023, possible sepsis likely due to aspiration pneumonia (s/p completion of 7-day of antibiotics). With regards to his A-fib, patient was started on Eliquis since 3/14/2023.  Patient was d/c'd to acute rehab on 3/16/2023 for rehabilitation needs.  His discharge neurological exam noted left facial droop, moderate dysarthria, left arm moderate drift, intermittent numbness to light touch in left leg >left arm.      Patient's hospital course in the ARU has been largely unremarkable, however, on 4/2/23, patient was noted to be febrile and more lethargic which is new compared to prior days in the ARU.  Initial work-up obtained including CBC, CRP, CMP, lactic acid, troponin notable for WBC 26.7, elevated inflammatory markers, elevated lactic acid, and mildly elevated high sensitive troponin.  Stat CT head w/o contrast showed no acute intracranial pathology and CXR discovered cardiomegaly with mild interstitial opacities, suggestive of early interstitial edema but no consolidation to suggest aspiration pneumonia.  Hospitalist service was asked to provide opinion about pt's fever and altered MS status on 4/2/23.    Sepsis due to GN bacilli UTI, 4/2/23  ---   UA w/ large leuk esterase, negative  nitrite, trace blood, 9 RBC, > 182 wbc and bacteria present  ---   Tmax 101.3  F   ---   WBC 26.7 ---> 21.6 today  ---   Lactic acid 1.9, but CRP up to 240 on 4/3  ---   Procalcitonin 0.13 suggesting systemic infection unlikely  ---   Sepsis criteria met w/ fever, leukocytosis, acute encephalopathy and source of infection is URI  ---   CXR 4/2 negative for pneumonia  ---   MRSA nasal swap by PCR negative on 4/3  ---   NGTD from Blood cx obtained on 4/2  ---   Urine culture going >100 K E Coli, resistant to penicillin and bactrim. Change  Zosyn  to Ceftriaxone ( Abx  day #4). Change to oral abx on day #5 to complete a total 7 day course     Acute metabolic encephalopathy  ---   Due to sepsis/GN UTI  ---   Mental status significantly improved following initiation of IV Zosyn, but still has intremittent drowsiness     Acute ischemic stroke of R MCA territory due to cardioembolism status post tenecteplase on 3/7:  ---   Pt has h/o atrial fibrillation, on chronic warfarin anticoagulation.   ---   However, warfarin was hold since 2/25 for pain pump placement when he presented with CVA.    ---   CT head-Increased attenuation within the right superior M2 branch of the middle cerebral artery concerning for thromboembolus. No early ischemic changes.  ---   HEAD CTA: 1.  Occlusion of right superior M2 branch middle cerebral artery. 2.  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 ---   Possible subocclusive thrombus at the R MCA bifurcation  ---   Transthoracic echocardiogram shows EF of 60% with normal right ventricular function.  Mild to moderate aortic stenosis.  ---   Patient failed VFSS and risk for aspiration.  PEG placed by IR on 3/9/23 and nutrition ordered tube feeds  ---   D/c'd to ARU on Eliquis 5 mg BID.  Warfarin d/c'd.   ---   Continue PTA statin     Possible early sepsis due to Asp pneumonia at [facility]  ---   Likely source aspiration pneumonia.    ---   MRSA nares negative on 3/11.    ---   No growth from blood  cx.    ---   S/p IV vancomycin 3/11 - 3/13.  ---   S/p IV Unasyn 3/11 - 3/14/23    ---   S/p augmentin 3/15 - 3/18/23     Atrial fibrillation:  ---   Was on chronic anticoagulation with warfarin, however, held since 2/25 for planned pain pump placement.  He then had a stroke  ---   Continue Cardizem ER cannot be given through the PEG tube.     ---   Continue short acting diltiazem TID   ---   BP/HR reasonable on current dose.  ---   Continue Liquids, started at [facility]     HLD  ---   Continue PTA atorvastatin 20 mg daily     HTN  ---   Long term BP < 130/80  ---   Continue cardizem 60 mg via FT q8 hr     T2DM complicated by peripheral neuropathy  ---   A1c6.4% on 2/1/23  ---   Hold PTA Metformin  ---   Continue Novolog SSI  ---   Continue PTA gabapentin     KISHA  ---   CPAP per home setting    Chronic back pain  ---   Patient planned for pain pump placement 3/8/23.    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   ---   Continue Tylenol prn for pain     FT Placement  FT Bleeding 3/13  Contipation  ---   Site bleeding 3/13.    ---   Asked IR to assess, appreciate their assistance.    ---   Bleeding has stopped in spite being on DOAC.    ---   Constipation also improved.  ---   Continue tube feeds            Diet: Room Service  Snacks/Supplements Adult: Magic Cup; With Meals  Snacks/Supplements Adult: Gelatein Plus; With Meals  Adult Formula Drip Feeding: Specified Time Jevity 1.5; Gastrostomy; Goal Rate: 85; mL/hr; From: 2:00 PM; To: 6:00 AM  Snacks/Supplements Adult: Other; Please allow pt/family to order additional supplements PRN; Between Meals  NPO for Medical/Clinical Reasons Except for: NPO but receiving Tube Feeding    DVT Prophylaxis: DOAC  Scruggs Catheter: Not present  Lines: None     Cardiac Monitoring: None  Code Status: Full Code    Disposition Plan   Per PM&R team           Lencho Rodgers MD  [facility]  [facility]  Securely message with Mariana (more info)  Text page via [facility] Paging/Directory   ______________________________________________________________________    Interval History   Charts reviewed and patient examined.   No new complaints overnight. Patient has been tolerating tube feeds well. Still needs some cues with re-orientation  No fevers or chills  Pleasant mood         Physical Exam   Vital Signs: Temp: 97.4  F (36.3  C) Temp src: Oral BP: (!) 142/84 Pulse: 92   Resp: 18 SpO2: 94 % O2 Device: None (Room air)    Weight: 215 lbs 6.23 oz  General: aao x 2, NAD.  HEENT:  NC/AT, neck supple,  CVS:  NL s 1 and s2, no m/r/g.  Lungs:  CTA B/L.   Abd:  Soft, + bs  Ext:  No c/c.  Lymph:  No edema.  Musculoskeletal: No calf tenderness to palpation.    Skin:  No rash.  Neuro:  - Cranial Nerves: Il through Xll intact.   - Motor: Good muscle bulk and tone.   - Strength 5/5 in RUE, 4/5 LUE              - Sensory: normal to light touch throughout      Data         Imaging results reviewed over the past 24 hrs:   No results found for this or any previous visit (from the past 24 hour(s)).

## 2023-04-05 NOTE — PLAN OF CARE
Discharge Planner Post-Acute Rehab SLP:      Discharge Plan: home with S.O. 24/7 pt to reside in lower level of home, ongoing SLP     Precautions: PEG, aspiration risk     Current Status:  Hearing: WFL  Vision: WFL  Communication: Decreased vocal intensity  Cognition: Moderate cognitive linguistic impairment- reasoning, memory, attention  Swallow: NPO, no ice chips 04/02     Assessment: Patient participated in visual attention task (number cancellation). Patient completed task with 77% accuracy given additional time. Patient irregular in use of organized pattern of cancellation (at times moving left/right or up/down). Patient needed cues to check work. Facilitated oral cares with use of toothbrush/paste and mouthwash. Patient able to rinse/spit given set up assist. Patient spit out large thick mucous piece. Patient consumed 2 small sips moderatetly thick water by spoon. Note immediate coughing episodes this date. Patient instructed on effortful swallow exercise and completed 5 repetitions (dry swallows).    Other Barriers to Discharge (Family Training, etc): diet texture training pending progress

## 2023-04-05 NOTE — PROGRESS NOTES
[facility]   Acute Rehabilitation Unit  Daily progress note    INTERVAL HISTORY  Stefan Diallo was seen and examined at bedside this morning during team rounds with significant other present.  No acute events reported overnight.  Patient appears slightly more alert this morning than the last several and was able to provide some appropriate verbal responses during conversation, though still drifting to sleep towards the end.    Functionally, he has had a significant functional decline over the past several days in setting of urosepsis.  He is now needing Ax2 toilet, noting increased lethargy and decreased ability to follow commands.  He is needing max A for sitting, PASS score declined from 13 to 8/36.  Given this setback and need for significant level of assist, now anticipating need for alternative discharge location to home.  Discussed with significant other and will start planning for TCU vs [facility].  For full functional updates, see team rounds note from today.    MEDICATIONS    amantadine  100 mg Per Feeding Tube BID     apixaban ANTICOAGULANT  5 mg Per Feeding Tube BID     atorvastatin  20 mg Per Feeding Tube At Bedtime     cefTRIAXone  1 g Intravenous Q24H     diltiazem  60 mg Per Feeding Tube Q8H     fluticasone  1 spray Both Nostrils Daily     gabapentin  600 mg Per Feeding Tube At Bedtime     insulin aspart  1-7 Units Subcutaneous TID AC     insulin aspart  1-5 Units Subcutaneous At Bedtime     lidocaine  2 patch Transdermal Q24H     lidocaine   Transdermal Q8H LALO     metFORMIN  1,000 mg Oral or Feeding Tube BID w/meals     miconazole   Topical BID     mirtazapine  15 mg Oral At Bedtime     pantoprazole  40 mg Per Feeding Tube QAM AC        acetaminophen, calamine, dextrose, glucose **OR** dextrose **OR** glucagon, guaiFENesin, melatonin, - MEDICATION INSTRUCTIONS -, polyethylene glycol, pseudoePHEDrine, senna-docusate     PHYSICAL EXAM  BP (!) 152/95 (BP Location:  "Right arm, Patient Position: Supine, Cuff Size: Adult Regular)   Pulse 97   Temp 98.6  F (37  C) (Axillary)   Resp 18   Ht 1.854 m (6' 1\")   Wt 97.7 kg (215 lb 6.2 oz)   SpO2 95%   BMI 28.42 kg/m     Gen: NAD, sitting up in chair  HEENT: NC/AT  Cardio: appears well-perfused  Pulm: non-labored on room air, lungs CTA bilaterally  Abd: soft, non-tender, non-distended, bowel sounds present, +PEG  Ext: no edema in bilateral lower extremities  Neuro/MSK: awake initially, more lethargic as conversation continued, provides brief verbal responses, left facial droop, +dysarthria and aphasia, left neglect, left hemiparesis, moving all extremities in chair  *Full exam deferred today for conversation    LABS  CBC RESULTS:   Recent Labs   Lab Test 04/04/23  0626 04/03/23  0657 04/02/23  1113   WBC 9.8 21.6* 26.7*  26.7*   RBC 3.75* 3.92* 4.42   HGB 11.6* 11.9* 13.5   HCT 35.2* 36.8* 41.6   MCV 94 94 94   MCH 30.9 30.4 30.5   MCHC 33.0 32.3 32.5   RDW 13.4 13.6 13.3    233 282     Last Basic Metabolic Panel:  Recent Labs   Lab Test 04/05/23  0206 04/04/23  2217 04/04/23  1546 04/03/23  0830 04/03/23  0657 04/02/23  1735 04/02/23  1113 03/30/23  0746 03/30/23  0558   NA  --   --   --   --  141  --  136  --  138   POTASSIUM  --   --   --   --  4.2  --  4.4  --  4.2   CHLORIDE  --   --   --   --  101  --  99  --  99   CO2  --   --   --   --  24  --  24  --  27   ANIONGAP  --   --   --   --  16*  --  13  --  12   * 138* 150*   < > 172*   < > 170*   < > 173*   BUN  --   --   --   --  24.6*  --  24.4*  --  24.1*   CR  --   --   --   --  0.92  --  0.89  --  0.79   GFRESTIMATED  --   --   --   --  85  --  87  --  90   LUTHER  --   --   --   --  9.8  --  10.1  --  9.8    < > = values in this interval not displayed.     Recent Labs   Lab 04/05/23  0206 04/04/23  2217 04/04/23  1546 04/04/23  1218 04/04/23  0751 04/03/23  2105   * 138* 150* 144* 261* 208*       Rehabilitation - continue comprehensive acute inpatient " rehabilitation program with multidisciplinary approach including therapies, rehab nursing, and physiatry following. See interval history for updates.      ASSESSMENT AND PLAN  Stefan Diallo is a 77 year old male with a past medical history of atrial fibrillation on coumadin though held PTA for planned procedure (pain pump placement), chronic bilateral low back pain s/p lumbar fusion, type 2 diabetes mellitus c/b polyneuropathy, renal artery aneurysm, hypertension, hyperlipidemia, KISHA, GERD, BPH, and chronic bilateral shoulder pain who was admitted on 3/7/23 with acute right MCA stroke s/p tenecteplase with hospital course complicated by dysphagia s/p PEG placement on 3/9 complicated by PEG-site bleeding, suspected aspiration pneumonia, hyperglycemia, constipation, and hypokalemia.  He is now admitted to ARU on 3/16/23 for multidisciplinary rehabilitation and ongoing medical management.        Admission to acute inpatient rehab 03/16/23.    Impairment group code: Stroke Ischemic 01.1 (L) Body Involvement (R) Brain; s/p acute ischemic stroke of R MCA territory due to cardioembolism status post tenecteplase        1. PT, OT and SLP 60 minutes of each on a daily basis, in addition to rehab nursing and close management of physiatrist.       2. Impairment of ADL's: Noted to have impaired activity tolerance, impaired balance, impaired coordination, impaired judgement and safety awareness, impaired strength, impaired tone, impaired weight shifting, impulsiveness and pain, all affecting his ability to safely and independently perform basic ADLs.  Goal for assist of 1 with basic ADLs.     3. Impairment of mobility:  Noted to have impaired activity tolerance, impaired balance, impaired coordination, impaired judgement and safety awareness, impaired strength, impaired tone, impaired weight shifting, impulsiveness and pain, all affecting his ability to safely and independently perform basic mobility.  Goal for assist of 1 with  basic mobility.     4. Impairment of cognition/language/swallow:  Noted to have dysarthria, aphasia, and impaired cognition with goals for improved cognitive-linguistic skills to meet basic needs.     5. Medical Conditions  New actions/orders/updates for today are in blue.     Acute R MCA stroke s/p tenecteplase 3/7, likely secondary to cardioembolism from atrial fibrillation off anticoagulation for an anticipated procedure  Initially on aspirin, later restarted on anticoagulation but with Eliquis in place of PTA warfarin.  - Continue apixaban 5 mg BID  - Continue PTA statin.  Goal LDL 40-70.  - Started on amantadine 100 mg daily 3/24, increased to BID (0700, 1300) on 3/24.  - Neuropsych eval completed 3/30, await full report for results  - Long term BP goal <135/80 to be achieved as outpatient within several weeks.  Management as below.  - Continue PT/OT/SLP  - Follow up with general neurology in 6-8 weeks    E. Coli UTI  Sepsis  Increased lethargy, tone, need for assist noted on 4/2 AM.  WBC elevated at 26.7.  Also with elevated inflammatory markers, elevated lactic acid, and mildly elevated high sensitive troponin (through improved from 3 wks ago).  Stat CT head showed evolution of known infarct but no acute intracranial pathology.  CXR shows possible left lower lobe airspace disease.  UA concerning for UTI.  Patient was started on empiric Zosyn and s/p 1L LR on 4/2 and repeated on 4/3.  MRSA nares, COVID PCR negative.  Per IM, suspect UTI as primary infection.  Procal only mildly elevated, suggesting against systemic infection.  UC with E coli.  WBC normalized to 9.8 on 4/4.  - Blood cultures with NGTD  - Continue ceftriaxone per IM (swithced from Zosyn on 4/4).  Likely change to PO tomorrow to complete a total 7-day course.  - Significant functional decline in recent days, needing increased assist.  Suspect worsened encephalopathy 2/2 infection.  Today is slightly more alert.  Hopeful for ongoing  "improvement.    Atrial fibrillation  [PTA on warfarin, diltiazem]  On chronic anticoagulation with warfarin.  However, had been held since 2/25 for planned pain pump placement. PTA long-acting extended release diltiazem cannot be given through the PEG tube, substituted short-acting. Started on DOAC in place of PTA warfarin on 3/14.  - Continue Eliquis 5 mg BID  - Continue short acting diltiazem 60 mg TID while NPO  - Continue to monitor     Dyslipidemia  [PTA meds: atorvastatin 20 mg daily, gemfibrozil 600 mg BID]  - Continue PTA statin       Hypertension  [PTA meds: atenolol 25 mg daily, diltiazem  mg daily, lisinopril 5 mg daily]  Resuming PTA meds gradually. PTA diltiazem increased to 60 mg TID on 3/14/23.   - Long term BP <130/80, inpatient meds can be slowly titrated to this goal as otherwise appropriate  - Continue diltiazem 60 mg q8h  - BP has been more elevated over past 24 hours, continue to monitor given previously quite soft (improved s/p IVF)  - Resume PTA lisinopril, atenolol as indicated    Possible aspiration pneumonia  Met criteria for early sepsis.  Started on unasyn + vanco 3/11.  MRSA nares negative.  BC's still negative at discharge.  Stopped vancomycin 3/13.  Changed to FT augmentin 3/15 with a plan for 7 total days abx, completed as of 3/18.  - Monitor respiratory status, SLP for dysphagia treatment as below    Cough  Patient has been complaining of persistent dry cough.  Afebrile, WBC WNL, satting well on room air.  Observed to be intermittent and mild.  Lungs clear on exam.  Patient complains this is disruptive for sleep.  - Robitussin PRN  - Added flonase at night for possible post-nasal drip on 3/29  - Noted to have LLL crackles per hospitalist on 4/2.  CXR with \"cardiomegaly with mild interstitial opacities, suggestive of early interstitial edema.  No consolidation to suggest aspiration pneumonia.\"     KISHA  - Has been refusing home CPAP, may be contributor to daytime somnolence. " Discussed with patient who reports that he has not been using because he does not sleep as well with CPAP.  Also reports that he did not use just PTA for the same reason, unclear if/when used regularly.  Reviewed risks of untreated sleep apnea but may require ongoing discussion given impaired cognition.  - Preferring to use oxygen by NC with sleep over CPAP  - Significant other notes patient was consistently using CPAP PTA, but was not tolerating well earlier this admission due to inability to adjust the mask himself if displaced.  Now that he has improving function, she feels it would be good to re-attempt.  However, patient continued to decline due to mask not fitting well.  RT consulted.  Limited mask options available inpatient, recommended to contact home CPAP clinic for alternative mask.     Type II DM  Tube feeding induced hyperglycemia  Polyneuropathy  [PTA meds: metformin 500 mg qAM + 1000 mg qPM, gabapentin 600 mg at HS]  A1c 6.4% on 2/1/23.  PTA metformin held this admission.  Managed on small doses of Lantus and sliding scale insulin.  Stopped Lantus on 3/17, resumed home metformin initially at 500 mg BID, increased to PTA dose as of 3/20, and to 1000 mg BID on 3/21.  Recent Labs   Lab 04/05/23  0206 04/04/23  2217 04/04/23  1546 04/04/23  1218 04/04/23  0751 04/03/23  2105   * 138* 150* 144* 261* 208*   - Continue home gabapentin  - Continue metformin 1000 mg BID (resumed on 4/4 after brief hold due to lactic acidosis, ultimately suspected 2/2 urosepsis).    - BG improved with resumption of metformin.    - Continue Novolog medium intensity sliding scale insulin.  Do not anticipate discharging home on this.  - Monitor BG TID AC + HS  - Hypoglycemia protocol     Chronic back pain  Patient planned for pain pump placement 3/8/23 (Javi).  States he has chronic back pain from nerve impingement and uses tylenol PTA for pain.   No complaints of pain at ARU.  - Continue Tylenol prn for pain  - Continue  PTA gabapentin 600 mg at HS  - Has not been complaining of any pain much of ARU stay.  However, therapies noting over beginning ~3/29, patient has been complaining of more right hip pain and is protecting weightbearing on that side.  Added lidocaine patch.  - Monitor     Moderate oral, severe oropharyngeal dysphagia  S/p PEG placement 3/9  - Cycled tube feeds per PEG (switched from continuous on 3/18).  Per discussion with team, adjusted timing to 2pm-6am (starting 3/29) in hopes to encourage better oral intake at breakfast.  Also started remeron 3/29 as below.  - Repeat VFSS on 3/22.  Per SLP, patient demonstrated significant improvement compared to prior VFSS on 3/9 though still aspiration with thin and mildly thick liquids.  As of 3/24, started on minced & moist solids (5) and moderately thick liquids (3) by TSP only; alternate food/drink and 1:1 assist with feeding.  - Per SLP/weekend team, given increased symptoms of dysphagia starting 4/2 in setting of this clinical change, made NPO until further assessment.  Per SLP assessment 4/3, mentation and clinical status remain barrier to safe resumption of PO intake.  Was only eating minimal amounts even prior to this so should not impact tube feed needs.  - Continue water flushes 180 mL QID  - RD following, assistance appreciated  - Continue SLP  - T tacs removed 3/20   - Currently NPO. SLP to continue to evaluate when/if to resume PO intake pending improved mental status but not safe currently.  - Supplements added per RD  - Tube feeding PLC planned on 4/3     Loose stools, improving  Constipation, resolved  - Continue senokot-S 2 tabs BID PRN, Miralax PRN     GERD  - Continue pantoprazole as auto-sub for PTA on omeprazole 20 mg daily    Suspected HSV lesion, left upper lip, resolved  - Abreva completed x7 days    Anemia, mild, normocytic  Has been trending 12s-13s.  Dropped slightly on 3/27 at 11.9.  No evidence of active bleeding.  - Continue to trend CBC every  M/Th.  4/4: Hgb stable at 11.6.    Depressed mood  Despite amantadine, still noting fatigue, decreased motivation/engagement.  Also continues to complain of poor sleep.  Per Dr. Stinson, trial remeron 7.5 mg at bedtime started 3/29 for mood, sleep, appetite.  Tolerating well, still c/o poor sleep, increased to 15 mg on 3/31.   - Continue Remeron 15 mg at HS      6. Adjustment to disability:  Clinical psychology to eval and treat if indicated  7. FEN: NPO, cycled nocturnal tube feeds via PEG  8. Bowel: incontinent, monitor, PRN bowel meds available  9. Bladder: continent/incontinent, using Primofit at night, will need to wean  10. DVT Prophylaxis: apixaban  11. GI Prophylaxis: PPI  12. Code: full  13. Disposition: now anticipating need for TCU vs [facility] given setback with infection and needing higher level of assist than manageable in home environment  14. ELOS:  pending acceptance/discharge location  15. Follow up Appointments on Discharge: PCP in 1-2 weeks, general neurology in 6-8 weeks      Patient was seen and discussed with Dr. Liu Stinson, PM&R staff physician    Kenna Ruiz PA-C  Physical Medicine & Rehabilitation

## 2023-04-05 NOTE — PLAN OF CARE
Goal Outcome Evaluation:      Plan of Care Reviewed With: patient, spouse          Outcome Evaluation: Patient NPO for worsening signs of dysphagie (pocketing food). Tube feeding providing 100% of nutrition. Increased protein needs due to Stage 2 pressure injury. Add 2 pkt Prosource TF20 to current tube feeding regimen to meet increased protein needs. See RD note 4/5 for additional details.

## 2023-04-05 NOTE — PROGRESS NOTES
Notified by Jackie Lynne that per team rounds, plan has changed and will look at TCU placement vs [facility]. Not able to meet with pt and pt finance today to discuss further but will f/u in the next 1-2 days to assist and start the process. As most are aware, including pt finance, pt insurance will be a barrier to finding TCU as they often don't authorize for TCU from ARU, but will initiate anyway and have a back up option as well.     Will remain available and continue to follow.     Sharonda Ramires [facility] CenterFELISA   [facility]   Direct Phone: [phone]  I   Pager: [phone]  I  Fax: [phone]

## 2023-04-05 NOTE — PROGRESS NOTES
Discharge Planner Post-Acute Rehab OT:      Discharge Plan: Home with HCOT and 24/7 support available     Precautions: Falls, left side hemiparesis, PEG tube,modifed textures and thickend liquids, slurred speech      Current Status:  ADLs:    Mobility: WC based. Ax1 Rahel Jarrell tsfs- progressing    Grooming: SBA seated in TIS W/C. Mod A standing at sink      Dressing: UB- max A, LB-max A with rahel steady     Bathing: Liko lift to purple dependent shower chair, max A for bathing    Toileting: Ax1 rahel jarrell clothing mngt and hygeine  IADLs: Previously IND with all I/ADLs, lives separately from brynn swain and 2 kids live in [facility]   Vision/Cognition: cues to initiate and sustain action, does best w/ items presented 1 at a time or 1 step at a time and decreased stimulation in environment     Assessment:  Pt able to transfer consistently with rahel jarrell Ax1 today, ok rahel jarrell Ax1 with NSG.  Pt demo'ing able to stand from w/c for standing at tabletop tasks tolerating standing 3-4 minutes during focused light bimanual task, CGA for standing balance. Good attention span to tasks in clinic and improved level of alert this session vs yesterday at this time.  -20 minutes toileting     Other Barriers to Discharge (DME, Family Training, etc):   Family training prior to discharge with S.O. and son (potential support from fiance, son, daughter)   Split-level home (full flight of stairs to get from main level from basement entry in garage)   Cognitive deficits   AE/DME: usama pads, pull-ups, bed rail, rahel steady, grab bars, tub bench, bedside commode, and bidet attachment

## 2023-04-05 NOTE — CARE CONFERENCE
Acute Rehab Care Conference/Team Rounds      Type: Team Rounds    Present: Dr. Liu Stinson, Kenna Ruiz PA-C, Dr. Kimberly Paulson Neuropsychologist, Hiren Hudson PT, Magaly Charles OT, Barbara Preez SLP, Jackie Terrell , Lety Mead RD, Saul Jones RN, and Stefan Diallo Patient and brynn Devries at bedside.       Discharge Barriers/Treatment/Education    Rehab Diagnosis: Stroke Ischemic 01.1 (L) Body Involvement (R) Brain; s/p acute ischemic stroke of R MCA territory due to cardioembolism status post tenecteplase    Active Medical Co-morbidities/Prognosis:   Patient Active Problem List   Diagnosis     Fusion of spine, lumbosacral region     Chronic atrial fibrillation (H)     Acquired absence of other right toe(s) (H)     Amputated toe of right foot (H)     Bell's palsy     Benign prostatic hyperplasia     Cellulitis     Cellulitis of foot     Chest pain     Closed nondisplaced fracture of phalanx of toe of right foot, unspecified toe, initial encounter     Contact dermatitis and other eczema, due to unspecified cause     Cough     Dysphagia     Dysuria     Essential hypertension, benign     Eye pain     Gastroesophageal reflux disease with esophagitis     Idiopathic peripheral neuropathy     Lower back pain     Muscle weakness     Myalgia and myositis, unspecified     Obesity     Osteomyelitis of toe (H)     Pure hypercholesterolemia     Renal artery aneurysm (H)     Shortness of breath     Sleep apnea     Staph aureus infection     Status post lumbar surgery     Tinea corporis     Urinary tract infection     Urticaria     Venous stasis ulcer of left calf without varicose veins, unspecified ulcer stage (H)     Vertigo     Type II diabetes mellitus with peripheral circulatory disorder (H)     Mixed hyperlipidemia     Abrasion of right foot, initial encounter     Onychomycosis     Hammer toes of both feet     Cerebrovascular accident (CVA) due to occlusion of left middle cerebral artery  (H)        Safety:Pt is disoriented to time, place and place, time. NPO, Cycled T/F FROM 2PM -6AM via-Tube, Pt is alarm for safety.     Pain: No c/o pain, pt on prn tylenol prn, pt denies pain.    Medications, Skin, Tubes/Lines: Pt takes medication via G-bube    Swallowing/Nutrition:    Bowel/Bladder: Pt is incontinent of bowel and bladder, uses promofit at hs,     Psychosocial: Engaged. Lives alone in a home. Dora lives close and is very supportive. Pt retired. Dora works full-time from home. 3 adult children, only 2 have a relationship, one not involved. No mental health, substance abuse, or financial concerns. Indep PTA.     ADLs/IADLs: Pt is making slow progress with ADLs d/t impaired cognition, fluctuating alertness, and significant physical decline following recent UTI. Pt has regressed from Ax1 rahel steady to Ax2 with rahel steady for functional transfers. Pt requires max A with UBD tasks and total A-Ax2 with LBD tasks pending alertness. Pt requires SBA with G/H tasks seated. Pt requires Ax2 to/from grey dependent shower chair/commode with rahel steady and total A with toilet hygiene. Recommending extended rehab stay d/t change in medical status and decline in level of assistance with cares.     Mobility: Pt had been making steady progress with PT prior to recent UTI leading to significant decline. Prior was min-A with bed mobility and transfers, was ambulating with assist of 2. Currently max-A for all mobility and requiring assist to sit midline. Recommend extended rehab stay given progress made prior to setback. Long-term family hoping to make lower level living work with walk-out basement and 24/7 assist. Equipment: w/c, rahel stedy, possible stair lift pending progress.    Cognition/Language:    Community Re-Entry: Home bound based on mobility    Transportation: Will require w/c ride at this time    Decision maker: self and Family    Plan of Care and goals reviewed and updated.    Discharge  Plan/Recommendations    Fall Precautions: continue    Patient/Family input to goals: Yes    Anticipated rehab needs following discharge: TCU versus AL versus Home    Anticipated care giver support after discharge: TBD, 4/11/23    Estimated length of stay: TBD    Overall plan for the patient: Continue IP Rehabilitation.       Utilization Review and Continued Stay Justification    Medical Necessity Criteria:    For any criteria that is not met, please document reason and plan for discharge, transfer, or modification of plan of care to address.    Requires intensive rehabilitation program to treat functional deficits?: Yes    Requires 3x per week or greater involvement of rehabilitation physician to oversee rehabilitation program?: Yes    Requires rehabilitation nursing interventions?: Yes    Patient is making functional progress?: Yes    There is a potential for additional functional progress? Yes    Patient is participating in therapy 3 hours per day a minimum of 5 days per week or 15 hours per week in 7 day period?:Yes    Has discharge needs that require coordinated discharge planning approach?:Yes            Final Physician Sign off    Statement of Approval: I approve the plan of care.     Patient Goals  Social Work Goals: Confirm discharge recommendations with therapy, coordinate safe discharge plan and remain available to support and assist as needed.    OT Predicted Duration/Target Date for Goal Attainment: 04/11/23  Therapy Frequency (OT): Daily  OT: Hygiene/Grooming: supervision/stand-by assist  OT: Upper Body Dressing: Supervision/stand-by assist  OT: Lower Body Dressing: Supervision/stand-by assist  OT: Upper Body Bathing: Supervision/stand-by assist, using adaptive equipment  OT: Lower Body Bathing: Minimal assist, using adaptive equipment  OT: Bed Mobility: Modified independent  OT: Transfer: Minimal assist  OT: Toilet Transfer/Toileting: Minimal assist    PT Predicted Duration/Target Date for Goal  Attainment: 04/07/23  PT Frequency: 2x/day  PT: Bed Mobility: Modified independent, Supine to/from sit  PT: Transfers: Supervision/stand-by assist, Sit to/from stand, Assistive device  PT: Gait: Supervision/stand-by assist, 100 feet, Rolling walker  PT: Stairs: Minimal assist, Greater than 10 stairs, Rail on right  PT: Goal 1: SBA with car transfer  PT: Goal 2: Pt will verbalize 3 strategies to decresae risk of falls                          SLP Predicted Duration/Target Date for Goal Attainment: 04/07/23  Therapy Frequency (SLP Eval): daily  SLP: Safely tolerate diet without signs/symptoms of aspiration: Soft & bite sized diet, Mildly thick liquids                          SLP: Goal 1: Patient will complete moderate level reasoning/problem solving tasks with 80% accuracy with minimal need for redirection  SLP: Goal 2: Patient will attend to therapeutic tasks for full session with less than 2 redirections and half hour session                              Patient/Family Goal: Bladder: Pt will be become continent of bladder through timed toileting during the day and wean off of the primofit.              Goal: Skin Integrity: Pt will be free of further skin complications by repositioning as needed q2hrs.                    Goal: Safety Management: Pt will be free of falls by calling for staff assistance as needed.

## 2023-04-06 NOTE — PLAN OF CARE
"Discharge Planner Post-Acute Rehab SLP:      Discharge Plan: home with S.O. 24/7 pt to reside in lower level of home, ongoing SLP     Precautions: PEG, aspiration risk     Current Status:  Hearing: WFL  Vision: WFL  Communication: Decreased vocal intensity  Cognition: Moderate cognitive linguistic impairment- reasoning, memory, attention  Swallow: NPO, no ice chips 04/02     Assessment:   AM: Patient engaged in alternating attention (symbol trail) task. Patient required mod-max verbal cues and use of \"cheat sheet\" to cross out items in the pattern that had already been completed. Patient able to locate targets on page, but needed max cues for next steps in task. Then transitioned to alternating attention (coding) task. Patient asked to locate first letter in each word within paragraph to correspond to code below. Patient required max cues and SLP to point with pen in the paragraph to locate each new target letter. Patient sustained alertness and attention throughout entire session today, which is an improvement from previous.    PM: Pt seen sitting in WC. Pt awake and alert throughout the session today. SLP faciliated oral cares with moistened oral swabs. Significant amount of dried oral secretions observed on palate and faucial pillars. Once oral cares completed, pt trialed several bites of oatmeal. Tolerated with no difficulty and no overt s/sx of aspiration. Pt agreeble to x10 teaspoon size swallows of moderately thick liquids with completion of  efforftul swallow with no overt s/sx of aspiration. Pt declined any further trials of PO. Will f/u with meal.    Other Barriers to Discharge (Family Training, etc): diet texture training pending progress                          "

## 2023-04-06 NOTE — PROGRESS NOTES
[facility]   Acute Rehabilitation Unit  Daily progress note    INTERVAL HISTORY  Stefan Diallo was seen and examined at bedside this morning with significant other present.  No acute events reported overnight.  Patient is sleepy at the time of our visit, but significant other and nursing note significant improvement in past 24 hours.  Was more awake for much of day yesterday and participated in 3 back-to-back therapy sessions already this morning, demonstrating improved strength and function.  Also noting some improvement in mental status.  Patient agrees that he is feeling better.  Nursing did note recurrent loose stool this morning, but patient denies abdominal pain or nausea.    Functionally, he is needing mod A for bed mobility, max A for pivot transfers.  SLP noting sustained alertness and attention throughout entire session today, which is an improvement from previous.    MEDICATIONS    amantadine  100 mg Per Feeding Tube BID     apixaban ANTICOAGULANT  5 mg Per Feeding Tube BID     atorvastatin  20 mg Per Feeding Tube At Bedtime     diltiazem  60 mg Per Feeding Tube Q8H     fluticasone  1 spray Both Nostrils Daily     gabapentin  600 mg Per Feeding Tube At Bedtime     insulin aspart  1-7 Units Subcutaneous TID AC     insulin aspart  1-5 Units Subcutaneous At Bedtime     lactobacillus rhamnosus (GG)  1 capsule Per Feeding Tube BID     lidocaine  2 patch Transdermal Q24H     lidocaine   Transdermal Q8H LALO     metFORMIN  1,000 mg Oral or Feeding Tube BID w/meals     miconazole   Topical BID     mirtazapine  15 mg Oral At Bedtime     pantoprazole  40 mg Per Feeding Tube QAM AC     protein modular  1 packet Per Feeding Tube BID        acetaminophen, calamine, dextrose, glucose **OR** dextrose **OR** glucagon, guaiFENesin, melatonin, - MEDICATION INSTRUCTIONS -, polyethylene glycol, pseudoePHEDrine, senna-docusate     PHYSICAL EXAM  /84 (BP Location: Right arm)    "Pulse 89   Temp 97.4  F (36.3  C) (Oral)   Resp 18   Ht 1.854 m (6' 1\")   Wt 97.1 kg (214 lb 1.1 oz)   SpO2 93%   BMI 28.24 kg/m     Gen: NAD, lying in bed  HEENT: NC/AT  Cardio: appears well-perfused  Pulm: non-labored on room air, lungs CTA bilaterally  Abd: soft, non-tender, non-distended, bowel sounds present, +PEG  Ext: no edema in bilateral lower extremities  Neuro/MSK: sleepy but provides brief verbal responses, left facial droop, holding right eye closed, +dysarthria and aphasia, left neglect, left hemiparesis, moving all extremities in chair    LABS  CBC RESULTS:   Recent Labs   Lab Test 04/06/23  0530 04/04/23  0626 04/03/23  0657   WBC 7.4 9.8 21.6*   RBC 4.06* 3.75* 3.92*   HGB 12.4* 11.6* 11.9*   HCT 37.9* 35.2* 36.8*   MCV 93 94 94   MCH 30.5 30.9 30.4   MCHC 32.7 33.0 32.3   RDW 13.6 13.4 13.6    199 233     Last Basic Metabolic Panel:  Recent Labs   Lab Test 04/06/23  0530 04/05/23  2124 04/05/23  1858 04/03/23  0830 04/03/23  0657 04/02/23  1735 04/02/23  1113     --   --   --  141  --  136   POTASSIUM 3.9  --   --   --  4.2  --  4.4   CHLORIDE 103  --   --   --  101  --  99   CO2 24  --   --   --  24  --  24   ANIONGAP 15  --   --   --  16*  --  13   * 158* 178*   < > 172*   < > 170*   BUN 21.5  --   --   --  24.6*  --  24.4*   CR 0.68  --   --   --  0.92  --  0.89   GFRESTIMATED >90  --   --   --  85  --  87   LUTHER 9.7  --   --   --  9.8  --  10.1    < > = values in this interval not displayed.     Recent Labs   Lab 04/06/23  0530 04/05/23  2124 04/05/23  1858 04/05/23  1156 04/05/23  0746 04/05/23  0206   * 158* 178* 156* 182* 184*       Rehabilitation - continue comprehensive acute inpatient rehabilitation program with multidisciplinary approach including therapies, rehab nursing, and physiatry following. See interval history for updates.      ASSESSMENT AND PLAN  Stefan MIRANDA Yoel is a 77 year old male with a past medical history of atrial fibrillation on coumadin " though held PTA for planned procedure (pain pump placement), chronic bilateral low back pain s/p lumbar fusion, type 2 diabetes mellitus c/b polyneuropathy, renal artery aneurysm, hypertension, hyperlipidemia, KISHA, GERD, BPH, and chronic bilateral shoulder pain who was admitted on 3/7/23 with acute right MCA stroke s/p tenecteplase with hospital course complicated by dysphagia s/p PEG placement on 3/9 complicated by PEG-site bleeding, suspected aspiration pneumonia, hyperglycemia, constipation, and hypokalemia.  He is now admitted to ARU on 3/16/23 for multidisciplinary rehabilitation and ongoing medical management.        Admission to acute inpatient rehab 03/16/23.    Impairment group code: Stroke Ischemic 01.1 (L) Body Involvement (R) Brain; s/p acute ischemic stroke of R MCA territory due to cardioembolism status post tenecteplase        1. PT, OT and SLP 60 minutes of each on a daily basis, in addition to rehab nursing and close management of physiatrist.       2. Impairment of ADL's: Noted to have impaired activity tolerance, impaired balance, impaired coordination, impaired judgement and safety awareness, impaired strength, impaired tone, impaired weight shifting, impulsiveness and pain, all affecting his ability to safely and independently perform basic ADLs.  Goal for assist of 1 with basic ADLs.     3. Impairment of mobility:  Noted to have impaired activity tolerance, impaired balance, impaired coordination, impaired judgement and safety awareness, impaired strength, impaired tone, impaired weight shifting, impulsiveness and pain, all affecting his ability to safely and independently perform basic mobility.  Goal for assist of 1 with basic mobility.     4. Impairment of cognition/language/swallow:  Noted to have dysarthria, aphasia, and impaired cognition with goals for improved cognitive-linguistic skills to meet basic needs.     5. Medical Conditions  New actions/orders/updates for today are in  blue.     Acute R MCA stroke s/p tenecteplase 3/7, likely secondary to cardioembolism from atrial fibrillation off anticoagulation for an anticipated procedure  Initially on aspirin, later restarted on anticoagulation but with Eliquis in place of PTA warfarin.  - Continue apixaban 5 mg BID  - Continue PTA statin.  Goal LDL 40-70.  - Started on amantadine 100 mg daily 3/24, increased to BID (0700, 1300) on 3/24.  - Neuropsych eval completed 3/30, await full report for results  - Long term BP goal <135/80 to be achieved as outpatient within several weeks.  Management as below.  - Continue PT/OT/SLP  - Follow up with general neurology in 6-8 weeks    E. Coli UTI  Sepsis  Increased lethargy, tone, need for assist noted on 4/2 AM.  WBC elevated at 26.7.  Also with elevated inflammatory markers, elevated lactic acid, and mildly elevated high sensitive troponin (through improved from 3 wks ago).  Stat CT head showed evolution of known infarct but no acute intracranial pathology.  CXR shows possible left lower lobe airspace disease.  UA concerning for UTI.  Patient was started on empiric Zosyn and s/p 1L LR on 4/2 and repeated on 4/3.  MRSA nares, COVID PCR negative.  Per IM, suspect UTI as primary infection.  Procal only mildly elevated, suggesting against systemic infection.  UC with E coli.  WBC normalized to 9.8 on 4/4.  Plan for 7-day course of antibiotics (Zosyn 4/2 -> ceftriaxone 4/4 -> omnicef 4/6)  - Blood cultures with NGTD  - WBC remain WNL at 7.4  - Per IM, ceftriaxone switched to omnicef to complete remainder of 7-day course  - Significant functional decline in recent days, needing increased assist.  Suspect worsened encephalopathy 2/2 infection.  Improving alertness/mental status, function, and participation.  Hopeful for ongoing improvement.    Atrial fibrillation  [PTA on warfarin, diltiazem]  On chronic anticoagulation with warfarin.  However, had been held since 2/25 for planned pain pump placement. PTA  "long-acting extended release diltiazem cannot be given through the PEG tube, substituted short-acting. Started on DOAC in place of PTA warfarin on 3/14.  - Continue Eliquis 5 mg BID  - Continue short acting diltiazem 60 mg TID while NPO  - Continue to monitor     Dyslipidemia  [PTA meds: atorvastatin 20 mg daily, gemfibrozil 600 mg BID]  - Continue PTA statin       Hypertension  [PTA meds: atenolol 25 mg daily, diltiazem  mg daily, lisinopril 5 mg daily]  Resuming PTA meds gradually. PTA diltiazem increased to 60 mg TID on 3/14/23.   - Long term BP <130/80, inpatient meds can be slowly titrated to this goal as otherwise appropriate  - Continue diltiazem 60 mg q8h  - BP has been trending higher over past several days.  If trend continues, consider resumption of lisinopril or atenolol.  - Resume PTA lisinopril, atenolol as indicated    Possible aspiration pneumonia  Met criteria for early sepsis.  Started on unasyn + vanco 3/11.  MRSA nares negative.  BC's still negative at discharge.  Stopped vancomycin 3/13.  Changed to FT augmentin 3/15 with a plan for 7 total days abx, completed as of 3/18.  - Monitor respiratory status, SLP for dysphagia treatment as below    Cough  Patient has been complaining of persistent dry cough.  Afebrile, WBC WNL, satting well on room air.  Observed to be intermittent and mild.  Lungs clear on exam.  Patient complains this is disruptive for sleep.  - Robitussin PRN  - Added flonase at night for possible post-nasal drip on 3/29  - Noted to have LLL crackles per hospitalist on 4/2.  CXR with \"cardiomegaly with mild interstitial opacities, suggestive of early interstitial edema.  No consolidation to suggest aspiration pneumonia.\"     KISHA  - Has been refusing home CPAP, may be contributor to daytime somnolence. Discussed with patient who reports that he has not been using because he does not sleep as well with CPAP.  Also reports that he did not use just PTA for the same reason, unclear " if/when used regularly.  Reviewed risks of untreated sleep apnea but may require ongoing discussion given impaired cognition.  - Preferring to use oxygen by NC with sleep over CPAP  - Significant other notes patient was consistently using CPAP PTA, but was not tolerating well earlier this admission due to inability to adjust the mask himself if displaced.  Now that he has improving function, she feels it would be good to re-attempt.  However, patient continued to decline due to mask not fitting well.  RT consulted.  Limited mask options available inpatient, recommended to contact home CPAP clinic for alternative mask.     Type II DM  Tube feeding induced hyperglycemia  Polyneuropathy  [PTA meds: metformin 500 mg qAM + 1000 mg qPM, gabapentin 600 mg at HS]  A1c 6.4% on 2/1/23.  PTA metformin held this admission.  Managed on small doses of Lantus and sliding scale insulin.  Stopped Lantus on 3/17, resumed home metformin initially at 500 mg BID, increased to PTA dose as of 3/20, and to 1000 mg BID on 3/21.  Recent Labs   Lab 04/06/23  0530 04/05/23  2124 04/05/23  1858 04/05/23  1156 04/05/23  0746 04/05/23  0206   * 158* 178* 156* 182* 184*   - Continue home gabapentin  - Continue metformin 1000 mg BID (resumed on 4/4 after brief hold due to lactic acidosis, ultimately suspected 2/2 urosepsis).    - BG improved with resumption of metformin.    - Continue Novolog medium intensity sliding scale insulin.  Do not anticipate discharging home on this.  Likely can discontinue in coming days as not needing much.  - Monitor BG TID AC + HS  - Hypoglycemia protocol     Chronic back pain  Patient planned for pain pump placement 3/8/23 (Javi).  States he has chronic back pain from nerve impingement and uses tylenol PTA for pain.   No complaints of pain at ARU.  - Continue Tylenol prn for pain  - Continue PTA gabapentin 600 mg at HS  - Has not been complaining of any pain much of ARU stay.  However, therapies noting over  beginning ~3/29, patient has been complaining of more right hip pain and is protecting weightbearing on that side.  Added lidocaine patch.  - Monitor     Moderate oral, severe oropharyngeal dysphagia  S/p PEG placement 3/9  Moderate malnutrition in the context of acute on chronic illness  - Cycled tube feeds per PEG (switched from continuous on 3/18).  Per discussion with team, adjusted timing to 2pm-6am (starting 3/29) in hopes to encourage better oral intake at breakfast.  Also started remeron 3/29 as below.  - Repeat VFSS on 3/22.  Per SLP, patient demonstrated significant improvement compared to prior VFSS on 3/9 though still aspiration with thin and mildly thick liquids.  As of 3/24, started on minced & moist solids (5) and moderately thick liquids (3) by TSP only; alternate food/drink and 1:1 assist with feeding.  - Given increased symptoms of dysphagia starting 4/2 in setting of urosepsis, made NPO.  Per SLP assessment, mentation and clinical status remain barrier to safe resumption of PO intake.  Was only eating minimal amounts even prior to this so should not impact tube feed needs.  - Continue water flushes 180 mL QID  - RD following, assistance appreciated  - Continue SLP  - Supplements added per RD  - Tube feeding PLC completed on 4/3     Loose stools, recurrent  Constipation, resolved  - Continue senokot-S 2 tabs BID PRN, Miralax PRN  - Recurrent loose stools in setting of antibiotics for UTI, possible additional contributors include resumption of metformin after holding a few days and increased reliance on tube feeds now NPO.  Added probiotic.  1 loose stool this morning.  Continue to monitor.     GERD  - Continue pantoprazole as auto-sub for PTA on omeprazole 20 mg daily    Suspected HSV lesion, left upper lip, resolved  - Abreva completed x7 days    Anemia, mild, normocytic  Has been trending 12s-13s.  Dropped slightly on 3/27 at 11.9.  No evidence of active bleeding.  - Continue to trend CBC every  M/Th.  4/6: Hgb stable at 12.4    Depressed mood  Despite amantadine, still noting fatigue, decreased motivation/engagement.  Also continues to complain of poor sleep.  Per Dr. Stinson, trial remeron 7.5 mg at bedtime started 3/29 for mood, sleep, appetite.  Tolerating well, still c/o poor sleep, increased to 15 mg on 3/31.   - Continue Remeron 15 mg at HS      6. Adjustment to disability:  Clinical psychology to eval and treat if indicated  7. FEN: NPO, cycled nocturnal tube feeds via PEG  8. Bowel: incontinent, monitor, PRN bowel meds available  9. Bladder: continent/incontinent, using Primofit at night, will need to wean  10. DVT Prophylaxis: apixaban  11. GI Prophylaxis: PPI  12. Code: full  13. Disposition: now considering TCU given setback with infection and needing higher level of assist; however with some improvement today, per significant other still may pursue discharge home if unable to get TCU  14. ELOS:  pending acceptance/discharge location  15. Follow up Appointments on Discharge: PCP in 1-2 weeks, general neurology in 6-8 weeks      Patient was seen and discussed with Dr. Liu Stinson, PM&R staff physician    BRITTANI Newman-C  Physical Medicine & Rehabilitation

## 2023-04-06 NOTE — PROGRESS NOTES
Discharge Planner Post-Acute Rehab OT:      Discharge Plan: Home with HCOT, assistance from SO and hired in help vs TCU     Precautions: Falls, left side hemiparesis, PEG tube,modifed textures and thickend liquids, slurred speech      Current Status:  ADLs:    Mobility: WC based. Ax1 Rahel Jarrell tsfs    Grooming: SBA seated in TIS W/C. Mod A standing at sink      Dressing: UB- max A, LB-max A with rahel steady     Bathing: Liko lift to purple dependent shower chair, max A for bathing    Toileting: Ax1 rahel melinda with toilet transfer and hygiene.   IADLs: Previously IND with all I/ADLs, lives separately from brynn swain and 2 kids live in [facility]   Vision/Cognition: cues to initiate and sustain action, does best w/ items presented 1 at a time or 1 step at a time and decreased stimulation in environment     Assessment:  Pt demonstrating increased alertness during session and decreased level of assistance with rahel steady transfers. Pt req'd CGA with STS with rahel steady throughout session and tolerated standing with rahel steady for ~ 1 min 8x with CGA. Reviewed with S.O. on frequency of HC therapy and HHA and likelihood of needing additional hired in support to decrease burden of care for her.      Other Barriers to Discharge (DME, Family Training, etc):   Family training prior to discharge with S.O. and son (potential support from brynn, son, daughter)   Split-level home (full flight of stairs to get from main level from basement entry in garage)   Cognitive deficits   AE/DME: usama pads, pull-ups, bed rail, rahel steady, grab bars, tub bench, bedside commode, and bidet attachment

## 2023-04-06 NOTE — PLAN OF CARE
Goal Outcome Evaluation:    Care plan reviewed with:Patient and his Fiance    Overall Patient Progress: Improving    Alert to himself only, following directions appropriately compared to yesterday.Continent of soft BM on the toilet, TF irrigated and feeding started at 1400. Participated in all therapies. Patients fiance at bedside most of the shift,will continue Poc

## 2023-04-06 NOTE — PLAN OF CARE
FOCUS/GOAL  Medical management    ASSESSMENT, INTERVENTIONS AND CONTINUING PLAN FOR GOAL:  Pt is alert to self. Lethargic, falling asleep if not actively conversing. No complaints or signs of pain. Liko to transfer. Primofit in place. PEG WDL with TF running without incident. No CPAP in place. O2 93% on RA. L PIV in place. Appeared to be sleeping between cares.

## 2023-04-06 NOTE — PROGRESS NOTES
Met with pt finance in pt room while pt in the bathroom. Caught up on discharge plans and next steps. Pt brynn asked questions about insurance coverage for DINO. Informed her that insurance does not cover DINO. Pt brynn asked about HC at home vs nursing home, explained that it's the same level of care. Pt brynn shared that if pt is not approved for TCU, she prefers that pt discharge home with HC. Pt brynn shared that if going home and pt needs more assistance, she has contacted [facility] and has been looking into DINO/Care Suite options if needed. Pt finance asked to email this writer with 5-10 TCU options in the next day or two. SW will continue to follow and remain available.     Need to f/u on HCD ppwk and metro mobility application.     ADDENDUM: List received by pt brynn and the following referrals were sent:     [facility]: [place]--Later declined due to acuity   [facility]: [facility]: [facility]--Later declined due to acuity  [facility]--Later declined due to bed avail and recommending memory care    CARIN Carpio   [facility]   Direct Phone: [phone]  I   Pager: [phone]  I  Fax: [phone]

## 2023-04-06 NOTE — PLAN OF CARE
Goal Outcome Evaluation:    Orientation: A/O to self, rests with eyes closed throughout shift and through cares  Ambulation/Transfers: A2 using liko lift, w/c based  Bowel/Bladder: Incontinent of bowel and bladder  Pain:  Denies pain  Diet/Liquids/Pills: NPO, tube feeding 0851-0311 at 85mL/hour, 60mL flushes QID  Tubes/Lines/Drains/Skin: coccyx and groin red red, barrier cream applied with incontinent pad changes. PIV to MARJORIE.

## 2023-04-06 NOTE — PROGRESS NOTES
Discharge Planner Post-Acute Rehab PT:     Discharge Plan: HCPT and 24/7 assist from fiance    Precautions: Tubefeed, L hemiparesis, alarms    Current Status:  Bed Mobility: ModA  Transfer: MaxA SPT  Gait: Unable to perform gait currently  Stairs: Unable to perform stairs currently  Balance: Unable to sit unsupported EOB    PASS:  3/29: 13/36 - unable prior due to cognition  4/4: 8/36    Assessment: Pt with good participation though limited standing activity d/t c/o R hip pain. STS with MIN A from low wc as session progressed.     Other Barriers to Discharge (DME, Family Training, etc):  - Full flight of stairs to get to main level from basement entry in garage -  planning on staying on basement level with sarasteady initially  - w/c

## 2023-04-06 NOTE — PROGRESS NOTES
[facility]    Medicine Progress Note - Hospitalist Service    Date of Admission:  3/16/2023    Assessment & Plan   Stefan Diallo is a 56 yo gentleman w/ h/o HTN, HLD, T2DM, A-fib (usually on Coumadin but held since 2/24 prior to presentation to [facility] on 3/07), and recent acute ischemic stroke of right MCA territory (likely due to cardioembolic event) [facility]  His presenting symptoms included left-sided weakness and left-sided facial droop.  Following administration of tenecteplase, pt's neurological exam was noted to have improved with NIHSS score of 5, previously noted to be 9-11.  Hospital course was complicated by dysphagia s/p G-tube placement on 3/9/2023, possible sepsis likely due to aspiration pneumonia (s/p completion of 7-day of antibiotics). With regards to his A-fib, patient was started on Eliquis since 3/14/2023.  Patient was d/c'd to acute rehab on 3/16/2023 for rehabilitation needs.  His discharge neurological exam noted left facial droop, moderate dysarthria, left arm moderate drift, intermittent numbness to light touch in left leg >left arm.      Patient's hospital course in the ARU has been largely unremarkable, however, on 4/2/23, patient was noted to be febrile and more lethargic which is new compared to prior days in the ARU.  Initial work-up obtained including CBC, CRP, CMP, lactic acid, troponin notable for WBC 26.7, elevated inflammatory markers, elevated lactic acid, and mildly elevated high sensitive troponin.  Stat CT head w/o contrast showed no acute intracranial pathology and CXR discovered cardiomegaly with mild interstitial opacities, suggestive of early interstitial edema but no consolidation to suggest aspiration pneumonia.  Hospitalist service was asked to provide opinion about pt's fever and altered MS status on 4/2/23.    Sepsis due to GN bacilli UTI, 4/2/23  ---   UA w/ large leuk esterase, negative  nitrite, trace blood, 9 RBC, > 182 wbc and bacteria present  ---   Tmax 101.3  F   ---   WBC 26.7 ---> 21.6 today  ---   Lactic acid 1.9, but CRP up to 240 on 4/3  ---   Procalcitonin 0.13 suggesting systemic infection unlikely  ---   Sepsis criteria met w/ fever, leukocytosis, acute encephalopathy and source of infection is URI  ---   CXR 4/2 negative for pneumonia  ---   MRSA nasal swap by PCR negative on 4/3  ---   NGTD from Blood cx obtained on 4/2  ---   Urine culture going >100 K E Coli, resistant to penicillin and bactrim. Change  Zosyn  to Ceftriaxone ( Abx  day #4).  -- On day #5 ( 4/6) antibiotic changed  to cefdinir to complete 7 days     Acute metabolic encephalopathy  ---   Due to sepsis/GN UTI  ---   Mental status significantly improved following initiation of IV Zosyn, but still has intremittent drowsiness   - Per SLP evaluation today(4/6), patient is more alert and working on tasks     Acute ischemic stroke of R MCA territory due to cardioembolism status post tenecteplase on 3/7:  ---   Pt has h/o atrial fibrillation, on chronic warfarin anticoagulation.   ---   However, warfarin was hold since 2/25 for pain pump placement when he presented with CVA.    ---   CT head-Increased attenuation within the right superior M2 branch of the middle cerebral artery concerning for thromboembolus. No early ischemic changes.  ---   HEAD CTA: 1.  Occlusion of right superior M2 branch middle cerebral artery. 2.  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 ---   Possible subocclusive thrombus at the R MCA bifurcation  ---   Transthoracic echocardiogram shows EF of 60% with normal right ventricular function.  Mild to moderate aortic stenosis.  ---   Patient failed VFSS and risk for aspiration.  PEG placed by IR on 3/9/23 and nutrition ordered tube feeds  ---   D/c'd to ARU on Eliquis 5 mg BID.  Warfarin d/c'd.   ---   Continue PTA statin     Possible early sepsis due to Asp pneumonia at [facility]  ---   Likely source  aspiration pneumonia.    ---   MRSA nares negative on 3/11.    ---   No growth from blood cx.    ---   S/p IV vancomycin 3/11 - 3/13.  ---   S/p IV Unasyn 3/11 - 3/14/23    ---   S/p augmentin 3/15 - 3/18/23     Atrial fibrillation:  ---   Was on chronic anticoagulation with warfarin, however, held since 2/25 for planned pain pump placement.  He then had a stroke  ---   Continue Cardizem ER cannot be given through the PEG tube.     ---   Continue short acting diltiazem TID   ---   BP/HR reasonable on current dose.  ---   Continue Liquids, started at [facility]     HLD  ---   Continue PTA atorvastatin 20 mg daily     HTN  ---   Long term BP < 130/80  ---   Continue cardizem 60 mg via FT q8 hr     T2DM complicated by peripheral neuropathy  ---   A1c6.4% on 2/1/23  ---   Hold PTA Metformin  ---   Continue Novolog sliding scale insulin ( patient has required very minimal amount of short acting insulin, and therefore may consider stopping this in 1-2 days)   ---   Continue PTA gabapentin     KISHA  ---   CPAP per home setting    Chronic back pain  ---   Patient planned for pain pump placement 3/8/23.    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   ---   Continue Tylenol prn for pain     FT Placement  FT Bleeding 3/13  Contipation  ---   Site bleeding 3/13.    ---   Asked IR to assess, appreciate their assistance.    ---   Bleeding has stopped in spite being on DOAC.    ---   Constipation also improved.  ---   Continue tube feeds            Diet: Room Service  Snacks/Supplements Adult: Magic Cup; With Meals  Snacks/Supplements Adult: Gelatein Plus; With Meals  Snacks/Supplements Adult: Other; Please allow pt/family to order additional supplements PRN; Between Meals  NPO for Medical/Clinical Reasons Except for: NPO but receiving Tube Feeding  Adult Formula Drip Feeding: Specified Time Jevity 1.5; Gastrostomy; Goal Rate: 85; mL/hr; From: 2:00 PM; To: 6:00 AM    DVT Prophylaxis: DOAC  Scruggs  Catheter: Not present  Lines: None     Cardiac Monitoring: None  Code Status: Full Code    Disposition Plan   Per PM&R team           Lencho Rodgers MD  Hospitalist Service  [facility]  Securely message with [email or web address] (more info)  Text page via [employer] Paging/Directory   ______________________________________________________________________    Interval History   Charts reviewed and patient examined.   No new complaints overnight. He was working with SLP on  few tasks   Denies any pain. Pleasant mood       Physical Exam   Vital Signs: Temp: 97  F (36.1  C) Temp src: Oral BP: (!) 152/91 Pulse: 80   Resp: 16 SpO2: 95 % O2 Device: None (Room air)    Weight: 214 lbs 1.07 oz  General: aao x 2, NAD.  HEENT:  NC/AT, neck supple,  CVS:  NL s 1 and s2, no m/r/g.  Lungs:  CTA B/L.   Abd:  Soft, + bs  Ext:  No c/c.  Lymph:  No edema.  Musculoskeletal: No calf tenderness to palpation.    Skin:  No rash.  Neuro:  - Cranial Nerves: Il through Xll intact.   - Motor: Good muscle bulk and tone.   - Strength 5/5 in RUE, 4/5 LUE              - Sensory: normal to light touch throughout      Data     I have personally reviewed the following data over the past 24 hrs:    7.4  \   12.4 (L)   / 227     142 103 21.5 /  154 (H)   3.9 24 0.68 \       Imaging results reviewed over the past 24 hrs:   No results found for this or any previous visit (from the past 24 hour(s)).

## 2023-04-07 NOTE — PROGRESS NOTES
Will continue to follow. Pt fiance updated via email on the information below. Fiance asking about HC. CC'd Kat RN CC and let Fiance know that Kat RN CC is/will assist if discharge directly home.     PENDING:   [facility]  04/07: Not sent at this time as  TCU will request community referrals first     [facility]   04/06: Referral sent   04/07: Called and left vm for [facility] admissions liaison and waiting for a return call    [facility] (Fax: [phone])  04/06: Referral sent   04/07: Re-faxed the referral this morning. Called and left vm for AJ in TCU admissions and waiting for a return call.     [facility]   04/06: Referral sent   04/07: Called and left vm for admissions. Admissions out-of-office until Monday. Waiting on return call.     [facility]  04/06: Referral sent   04/07: Call and spoke with Kathryn in admissions. Referral not reviewed at this time, will call back with determination.     [facility]  04/06: Referral sent   04/07: Called and left vm for TCU admissions and waiting for a return call.     DECLINED:   [facility]--due to no bed avail and acuity   [facility]: [place]--due to acuity   [facility]--due to acuity    [facility]   Direct Phone: [phone]  I   Pager: [phone]  I  Fax: [phone]

## 2023-04-07 NOTE — PROGRESS NOTES
"Discharge Planner Post-Acute Rehab PT:     Discharge Plan: HCPT and 24/7 assist from fiance    Precautions: Tubefeed, L hemiparesis, alarms    Current Status:  Bed Mobility: CGA  Transfer: Linda to CGA  Gait: 100' FWW CGA  Stairs: 2 x 4 6\" steps bilat railings CGA to Linda  Balance: Able to sit unsupported, standing requires some UE support    PASS:  3/29: 13/36 - unable prior due to cognition  4/4: 8/36    Assessment: Pt showed exceptional improvements today with therapy, both with function and activity tolerance. Pt able to ambulate 100' with FWW and CGA with chair follow. Pt performed stairs 2 x 4 6\" steps bilateral railings, Linda to CGA. Today was first day showing vast improvements, will want to see consistency with performance. Will continue to work on improving functional mobility, continue ambulating with FWW, and practicing stairs.    Other Barriers to Discharge (DME, Family Training, etc):  - Full flight of stairs to get to main level from basement entry in garage -  planning on staying on basement level with mabelastangle initially  - w/c  "

## 2023-04-07 NOTE — PLAN OF CARE
Goal Outcome Evaluation:    Orientation: A/O to self, rests with eyes closed throughout shift and through cares  Ambulation/Transfers: A2 using liko lift, w/c based  Bowel/Bladder: Incontinent of bladder  Pain:  Denies pain  Diet/Liquids/Pills: NPO, tube feeding 4551-4622 at 85mL/hour, 180mL flushes QID  Tubes/Lines/Drains/Skin: coccyx and groin red red, barrier cream applied with incontinent pad changes. PIV to MARJORIE.       Left VM for WOC RN to add coccyx wound orders to pt's chart; call not returned  Received bed bath this evening   Weekly skin check performed by 2 RN's

## 2023-04-07 NOTE — PROGRESS NOTES
"  [facility]   Acute Rehabilitation Unit  Daily progress note    INTERVAL HISTORY  Stefan Diallo was seen and examined at bedside this morning working with PT.  No acute events reported overnight.  Therapies noting patient is doing great, ambulating even better than prior to his decline with UTI.  Patient also notes that he is feeling better.  He does complain of dry mouth, feeling \"thirsty\".  Also notes intermittent cough.  He denies any chest pain, palpitations, shortness of breath, dizziness, nausea, abdominal pain.  States that he slept well last night.    Functionally, as of yesterday, needing mod A for bed mobility, max A for pivot transfers, min A for sit to stand.  He needs SBA for seated grooming in TIS wheelchair, max A for dressing.    MEDICATIONS    amantadine  100 mg Per Feeding Tube BID     apixaban ANTICOAGULANT  5 mg Per Feeding Tube BID     atorvastatin  20 mg Per Feeding Tube At Bedtime     cefdinir  300 mg Per Feeding Tube Q12H LALO (08/20)     diltiazem  60 mg Per Feeding Tube Q8H     fluticasone  1 spray Both Nostrils Daily     gabapentin  600 mg Per Feeding Tube At Bedtime     insulin aspart  1-7 Units Subcutaneous TID AC     insulin aspart  1-5 Units Subcutaneous At Bedtime     lactobacillus rhamnosus (GG)  1 capsule Per Feeding Tube BID     lidocaine  2 patch Transdermal Q24H     lidocaine   Transdermal Q8H LALO     metFORMIN  1,000 mg Oral or Feeding Tube BID w/meals     miconazole   Topical BID     mirtazapine  15 mg Oral At Bedtime     pantoprazole  40 mg Per Feeding Tube QAM AC     protein modular  1 packet Per Feeding Tube BID        acetaminophen, calamine, dextrose, glucose **OR** dextrose **OR** glucagon, guaiFENesin, melatonin, - MEDICATION INSTRUCTIONS -, polyethylene glycol, pseudoePHEDrine, senna-docusate     PHYSICAL EXAM  /77 (BP Location: Right arm, Patient Position: Semi-Romano's, Cuff Size: Adult Regular)   Pulse 80   Temp 97  F " "(36.1  C) (Oral)   Resp 16   Ht 1.854 m (6' 1\")   Wt 96.1 kg (211 lb 13.8 oz)   SpO2 95%   BMI 27.95 kg/m     Gen: NAD, sitting up in chair  HEENT: NC/AT, dry mucous membranes, chapped lips  Cardio: irregularly irregular, no murmurs  Pulm: non-labored on room air, lungs CTA bilaterally  Abd: soft, non-tender, non-distended, bowel sounds present, +PEG  Ext: no edema in bilateral lower extremities  Neuro/MSK: awake, alert, left facial droop, +dysarthria and aphasia, left neglect, left hemiparesis, moving all extremities in chair    LABS  CBC RESULTS:   Recent Labs   Lab Test 04/06/23  0530 04/04/23  0626 04/03/23  0657   WBC 7.4 9.8 21.6*   RBC 4.06* 3.75* 3.92*   HGB 12.4* 11.6* 11.9*   HCT 37.9* 35.2* 36.8*   MCV 93 94 94   MCH 30.5 30.9 30.4   MCHC 32.7 33.0 32.3   RDW 13.6 13.4 13.6    199 233     Last Basic Metabolic Panel:  Recent Labs   Lab Test 04/07/23  0154 04/06/23  2109 04/06/23  1743 04/06/23  0735 04/06/23  0530 04/03/23  0830 04/03/23  0657 04/02/23  1735 04/02/23  1113   NA  --   --   --   --  142  --  141  --  136   POTASSIUM  --   --   --   --  3.9  --  4.2  --  4.4   CHLORIDE  --   --   --   --  103  --  101  --  99   CO2  --   --   --   --  24  --  24  --  24   ANIONGAP  --   --   --   --  15  --  16*  --  13   * 127* 150*   < > 229*   < > 172*   < > 170*   BUN  --   --   --   --  21.5  --  24.6*  --  24.4*   CR  --   --   --   --  0.68  --  0.92  --  0.89   GFRESTIMATED  --   --   --   --  >90  --  85  --  87   LUTHER  --   --   --   --  9.7  --  9.8  --  10.1    < > = values in this interval not displayed.     Recent Labs   Lab 04/07/23  0154 04/06/23  2109 04/06/23  1743 04/06/23  1128 04/06/23  0735 04/06/23  0530   * 127* 150* 161* 154* 229*       Rehabilitation - continue comprehensive acute inpatient rehabilitation program with multidisciplinary approach including therapies, rehab nursing, and physiatry following. See interval history for updates.      ASSESSMENT AND " PLAN  Stefan Diallo is a 77 year old male with a past medical history of atrial fibrillation on coumadin though held PTA for planned procedure (pain pump placement), chronic bilateral low back pain s/p lumbar fusion, type 2 diabetes mellitus c/b polyneuropathy, renal artery aneurysm, hypertension, hyperlipidemia, KISHA, GERD, BPH, and chronic bilateral shoulder pain who was admitted on 3/7/23 with acute right MCA stroke s/p tenecteplase with hospital course complicated by dysphagia s/p PEG placement on 3/9 complicated by PEG-site bleeding, suspected aspiration pneumonia, hyperglycemia, constipation, and hypokalemia.  He is now admitted to ARU on 3/16/23 for multidisciplinary rehabilitation and ongoing medical management.        Admission to acute inpatient rehab 03/16/23.    Impairment group code: Stroke Ischemic 01.1 (L) Body Involvement (R) Brain; s/p acute ischemic stroke of R MCA territory due to cardioembolism status post tenecteplase        1. PT, OT and SLP 60 minutes of each on a daily basis, in addition to rehab nursing and close management of physiatrist.       2. Impairment of ADL's: Noted to have impaired activity tolerance, impaired balance, impaired coordination, impaired judgement and safety awareness, impaired strength, impaired tone, impaired weight shifting, impulsiveness and pain, all affecting his ability to safely and independently perform basic ADLs.  Goal for assist of 1 with basic ADLs.     3. Impairment of mobility:  Noted to have impaired activity tolerance, impaired balance, impaired coordination, impaired judgement and safety awareness, impaired strength, impaired tone, impaired weight shifting, impulsiveness and pain, all affecting his ability to safely and independently perform basic mobility.  Goal for assist of 1 with basic mobility.     4. Impairment of cognition/language/swallow:  Noted to have dysarthria, aphasia, and impaired cognition with goals for improved cognitive-linguistic  skills to meet basic needs.     5. Medical Conditions  New actions/orders/updates for today are in blue.     Acute R MCA stroke s/p tenecteplase 3/7, likely secondary to cardioembolism from atrial fibrillation off anticoagulation for an anticipated procedure  Initially on aspirin, later restarted on anticoagulation but with Eliquis in place of PTA warfarin.  - Continue apixaban 5 mg BID  - Continue PTA statin.  Goal LDL 40-70.  - Started on amantadine 100 mg daily 3/24, increased to BID (0700, 1300) on 3/24.  - Long term BP goal <135/80 to be achieved as outpatient within several weeks.  Management as below.  - Continue PT/OT/SLP  - Follow up with general neurology in 6-8 weeks    Vascular dementia  Neuropsych eval completed 3/30, see full report by Dr. Paulson.  In summary, impairments noted in most cognitive domains.  Does meet criteria for major neurocognitive disorder.   - Recommended that POA should be enacted and patient should receive assistance in complex decision-making.    E. Coli UTI  Sepsis, resolved  Increased lethargy, tone, need for assist noted on 4/2 AM.  WBC elevated at 26.7.  Also with elevated inflammatory markers, elevated lactic acid, and mildly elevated high sensitive troponin (through improved from 3 wks ago).  Stat CT head showed evolution of known infarct but no acute intracranial pathology.  CXR shows possible left lower lobe airspace disease.  UA concerning for UTI.  Patient was started on empiric Zosyn and s/p 1L LR on 4/2 and repeated on 4/3.  MRSA nares, COVID PCR negative.  Per IM, suspect UTI as primary infection.  Procal only mildly elevated, suggesting against systemic infection.  UC with E coli.  WBC normalized to 9.8 on 4/4.  Plan for 7-day course of antibiotics (Zosyn 4/2 -> ceftriaxone 4/4 -> omnicef 4/6)  - Blood cultures with NGTD  - WBC remain WNL at 7.4 on 4/6  - Per IM, ceftriaxone switched to omnicef on 4/7 to complete remainder of 7-day course (thru 4/8)  - Improving  alertness/mental status, function, and participation over the past few days after significant functional change with increased lethargy.  Today demonstrating near return to functional level from ~1 week ago.  Per IM, continue ceftriaxone as planned and will sign off at this time.  Can be re-consulted if additional concerns arise.    Atrial fibrillation  [PTA on warfarin, diltiazem]  On chronic anticoagulation with warfarin.  However, had been held since 2/25 for planned pain pump placement. PTA long-acting extended release diltiazem cannot be given through the PEG tube, substituted short-acting. Started on DOAC in place of PTA warfarin on 3/14.  - Continue Eliquis 5 mg BID  - Continue short acting diltiazem 60 mg TID while NPO  - Continue to monitor     Dyslipidemia  [PTA meds: atorvastatin 20 mg daily, gemfibrozil 600 mg BID]  - Continue PTA statin       Hypertension  [PTA meds: atenolol 25 mg daily, diltiazem  mg daily, lisinopril 5 mg daily]  Resuming PTA meds gradually. PTA diltiazem increased to 60 mg TID on 3/14/23.   - Long term BP <130/80, inpatient meds can be slowly titrated to this goal as otherwise appropriate  - Continue diltiazem 60 mg q8h  - BP has been trending more elevated over past 48 hours (SBP 120s-160s).  Discussed with IM, recommended to resume atenolol at lower dose of 12.5 mg daily.  Monitor BP, increase to PTA dose as tolerated/indicated.  - Resume PTA lisinopril as indicated    Possible aspiration pneumonia  Met criteria for early sepsis.  Started on unasyn + vanco 3/11.  MRSA nares negative.  BC's still negative at discharge.  Stopped vancomycin 3/13.  Changed to FT augmentin 3/15 with a plan for 7 total days abx, completed as of 3/18.  - Monitor respiratory status, SLP for dysphagia treatment as below    Cough  Patient has been complaining of persistent dry cough.  Afebrile, WBC WNL, satting well on room air.  Observed to be intermittent and mild.  Lungs clear on exam.  Patient  "complains this is disruptive for sleep.  - Robitussin PRN  - Added flonase at night for possible post-nasal drip on 3/29  - Noted to have LLL crackles per hospitalist on 4/2.  CXR with \"cardiomegaly with mild interstitial opacities, suggestive of early interstitial edema.  No consolidation to suggest aspiration pneumonia.\"     KISHA  - Has been refusing home CPAP, may be contributor to daytime somnolence. Discussed with patient who reports that he has not been using because he does not sleep as well with CPAP.  Also reports that he did not use just PTA for the same reason, unclear if/when used regularly.  Reviewed risks of untreated sleep apnea but may require ongoing discussion given impaired cognition.  - Preferring to use oxygen by NC with sleep over CPAP  - Significant other notes patient was consistently using CPAP PTA, but was not tolerating well earlier this admission due to inability to adjust the mask himself if displaced.  Now that he has improving function, she feels it would be good to re-attempt.  However, patient continued to decline due to mask not fitting well.  RT consulted.  Limited mask options available inpatient, recommended to contact home CPAP clinic for alternative mask.     Type II DM  Tube feeding induced hyperglycemia  Polyneuropathy  [PTA meds: metformin 500 mg qAM + 1000 mg qPM, gabapentin 600 mg at HS]  A1c 6.4% on 2/1/23.  PTA metformin held this admission.  Managed on small doses of Lantus and sliding scale insulin.  Stopped Lantus on 3/17, resumed home metformin initially at 500 mg BID, increased to PTA dose as of 3/20, and to 1000 mg BID on 3/21.  Recent Labs   Lab 04/07/23  0154 04/06/23  2109 04/06/23  1743 04/06/23  1128 04/06/23  0735 04/06/23  0530   * 127* 150* 161* 154* 229*   - Continue home gabapentin  - Continue metformin 1000 mg BID (resumed on 4/4 after brief hold due to lactic acidosis, ultimately suspected 2/2 urosepsis).    - Discontinue sliding scale insulin as " BG well-controlled, using only small doses.  - Monitor BG TID AC + HS.  Likely can decrease to BID in coming days.  - Hypoglycemia protocol     Chronic back pain  Patient planned for pain pump placement 3/8/23 (Javi).  States he has chronic back pain from nerve impingement and uses tylenol PTA for pain.   No complaints of pain at ARU.  - Continue Tylenol prn for pain  - Continue PTA gabapentin 600 mg at HS  - Has not been complaining of any pain much of ARU stay.  However, therapies noting over beginning ~3/29, patient has been complaining of more right hip pain and is protecting weightbearing on that side.  Added lidocaine patch.  - Monitor     Moderate oral, severe oropharyngeal dysphagia  S/p PEG placement 3/9  Moderate malnutrition in the context of acute on chronic illness  - Cycled tube feeds per PEG (switched from continuous on 3/18).  Per discussion with team, adjusted timing to 2pm-6am (starting 3/29) in hopes to encourage better oral intake at breakfast.  Also started remeron 3/29 as below.  - Repeat VFSS on 3/22.  Per SLP, patient demonstrated significant improvement compared to prior VFSS on 3/9 though still aspiration with thin and mildly thick liquids.  As of 3/24, started on minced & moist solids (5) and moderately thick liquids (3) by TSP only; alternate food/drink and 1:1 assist with feeding.  - Given increased symptoms of dysphagia starting 4/2 in setting of urosepsis, made NPO.  Per SLP assessment, mentation and clinical status remain barrier to safe resumption of PO intake.  Was only eating minimal amounts even prior to this so should not impact tube feed needs.  - Appears to be a little dry on exam, increase water flushes 180 mL q4h  - RD following, assistance appreciated  - Continue SLP  - Supplements added per RD  - Tube feeding PLC completed on 4/3     Loose stools, recurrent  Constipation, resolved  - Continue senokot-S 2 tabs BID PRN, Miralax PRN  - Recent loose stools in setting of  antibiotics for UTI, possible additional contributors include resumption of metformin after holding a few days and increased reliance on tube feeds now NPO.  Added probiotic on 4/6.  Loose stools seem to be improving.  Continue to monitor.     GERD  - Continue pantoprazole as auto-sub for PTA on omeprazole 20 mg daily    Suspected HSV lesion, left upper lip, resolved  - Abreva completed x7 days    Anemia, mild, normocytic  Has been trending 12s-13s.  Dropped slightly on 3/27 at 11.9.  No evidence of active bleeding.  - Continue to trend CBC every M/Th.  4/6: Hgb stable at 12.4    Depressed mood  Despite amantadine, still noting fatigue, decreased motivation/engagement.  Also continues to complain of poor sleep.  Per Dr. Stinson, trial remeron 7.5 mg at bedtime started 3/29 for mood, sleep, appetite.  Tolerating well, still c/o poor sleep, increased to 15 mg on 3/31.   - Continue Remeron 15 mg at HS      6. Adjustment to disability:  Clinical psychology to eval and treat if indicated  7. FEN: NPO, cycled nocturnal tube feeds via PEG  8. Bowel: incontinent, monitor, PRN bowel meds available  9. Bladder: continent/incontinent, using Primofit at night, will need to wean  10. DVT Prophylaxis: apixaban  11. GI Prophylaxis: PPI  12. Code: full  13. Disposition: TCU vs home pending acceptance/authorization and needed level of assist  14. ELOS:  pending acceptance/discharge location  15. Follow up Appointments on Discharge: PCP in 1-2 weeks, general neurology in 6-8 weeks      Patient was discussed with Dr. Liu Stinson, PM&R staff physician    Kenna Ruiz PA-C  Physical Medicine & Rehabilitation

## 2023-04-07 NOTE — PROGRESS NOTES
[facility]    Medicine Progress Note - Hospitalist Service    Date of Admission:  3/16/2023    Assessment & Plan   Stefan Diallo is a 56 yo gentleman w/ h/o HTN, HLD, T2DM, A-fib (usually on Coumadin but held since 2/24 prior to presentation to [facility] on 3/07), and recent acute ischemic stroke of right MCA territory (likely due to cardioembolic event) [facility]  His presenting symptoms included left-sided weakness and left-sided facial droop.  Following administration of tenecteplase, pt's neurological exam was noted to have improved with NIHSS score of 5, previously noted to be 9-11.  Hospital course was complicated by dysphagia s/p G-tube placement on 3/9/2023, possible sepsis likely due to aspiration pneumonia (s/p completion of 7-day of antibiotics). With regards to his A-fib, patient was started on Eliquis since 3/14/2023.  Patient was d/c'd to acute rehab on 3/16/2023 for rehabilitation needs.  His discharge neurological exam noted left facial droop, moderate dysarthria, left arm moderate drift, intermittent numbness to light touch in left leg >left arm.      Patient's hospital course in the ARU has been largely unremarkable, however, on 4/2/23, patient was noted to be febrile and more lethargic which is new compared to prior days in the ARU.  Initial work-up obtained including CBC, CRP, CMP, lactic acid, troponin notable for WBC 26.7, elevated inflammatory markers, elevated lactic acid, and mildly elevated high sensitive troponin.  Stat CT head w/o contrast showed no acute intracranial pathology and CXR discovered cardiomegaly with mild interstitial opacities, suggestive of early interstitial edema but no consolidation to suggest aspiration pneumonia.  Hospitalist service was asked to provide opinion about pt's fever and altered MS status on 4/2/23.    Sepsis due to GN bacilli UTI, 4/2/23  ---   UA w/ large leuk esterase, negative  nitrite, trace blood, 9 RBC, > 182 wbc and bacteria present  ---   Tmax 101.3  F   ---   WBC 26.7 ---> 21.6 today  ---   Lactic acid 1.9, but CRP up to 240 on 4/3  ---   Procalcitonin 0.13 suggesting systemic infection unlikely  ---   Sepsis criteria met w/ fever, leukocytosis, acute encephalopathy and source of infection is URI  ---   CXR 4/2 negative for pneumonia  ---   MRSA nasal swap by PCR negative on 4/3  ---   NGTD from Blood cx obtained on 4/2  ---   Urine culture going >100 K E Coli, resistant to penicillin and bactrim. Change  Zosyn  to Ceftriaxone ( Abx  day #4).  -- On day #5 ( 4/6) antibiotic changed  to cefdinir to complete 7 days     Acute metabolic encephalopathy  ---   Due to sepsis/GN UTI  ---   Mental status significantly improved following initiation of IV Zosyn, but still has intremittent drowsiness   - Per SLP evaluation today(4/6), patient is more alert and working on tasks     Acute ischemic stroke of R MCA territory due to cardioembolism status post tenecteplase on 3/7:  ---   Pt has h/o atrial fibrillation, on chronic warfarin anticoagulation.   ---   However, warfarin was hold since 2/25 for pain pump placement when he presented with CVA.    ---   CT head-Increased attenuation within the right superior M2 branch of the middle cerebral artery concerning for thromboembolus. No early ischemic changes.  ---   HEAD CTA: 1.  Occlusion of right superior M2 branch middle cerebral artery. 2.  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 ---   Possible subocclusive thrombus at the R MCA bifurcation  ---   Transthoracic echocardiogram shows EF of 60% with normal right ventricular function.  Mild to moderate aortic stenosis.  ---   Patient failed VFSS and risk for aspiration.  PEG placed by IR on 3/9/23 and nutrition ordered tube feeds  ---   D/c'd to ARU on Eliquis 5 mg BID.  Warfarin d/c'd.   ---   Continue PTA statin     Possible early sepsis due to Asp pneumonia at [facility]  ---   Likely source  aspiration pneumonia.    ---   MRSA nares negative on 3/11.    ---   No growth from blood cx.    ---   S/p IV vancomycin 3/11 - 3/13.  ---   S/p IV Unasyn 3/11 - 3/14/23    ---   S/p augmentin 3/15 - 3/18/23     Atrial fibrillation:  ---   Was on chronic anticoagulation with warfarin, however, held since 2/25 for planned pain pump placement.  He then had a stroke  ---   Continue Cardizem ER cannot be given through the PEG tube.     ---   Continue short acting diltiazem TID   ---   BP/HR reasonable on current dose.  ---   Continue Liquids, started at [facility]     HLD  ---   Continue PTA atorvastatin 20 mg daily     HTN  ---   Long term BP < 130/80  ---   Continue cardizem 60 mg via FT q8 hr     T2DM complicated by peripheral neuropathy  ---   A1c6.4% on 2/1/23  ---   Hold PTA Metformin  ---   Stop sliding scale insulin as patient has required very minimal doses of insulin   ---   Continue PTA gabapentin     KISHA  ---   CPAP per home setting    Chronic back pain  ---   Patient planned for pain pump placement 3/8/23.    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   ---   Continue Tylenol prn for pain     FT Placement  FT Bleeding 3/13  Contipation  ---   Site bleeding 3/13.    ---   Asked IR to assess, appreciate their assistance.    ---   Bleeding has stopped in spite being on DOAC.    ---   Constipation also improved.  ---   Continue tube feeds            Diet: NPO for Medical/Clinical Reasons Except for: NPO but receiving Tube Feeding  Adult Formula Drip Feeding: Specified Time Jevity 1.5; Gastrostomy; Goal Rate: 85; mL/hr; From: 2:00 PM; To: 6:00 AM    DVT Prophylaxis: DOAC  Scruggs Catheter: Not present  Lines: None     Cardiac Monitoring: None  Code Status: Full Code    Disposition Plan   Per PM&R team       Discussed with primary team. Internal medicine will sign off. Please re-connect for questions/ Concerns     Lencho Rodgers MD  Hospitalist [facility]  [facility]  Securely message with [employer] (more info)  Text page via [employer] Paging/Directory   ______________________________________________________________________    Interval History   Charts reviewed and patient examined.   Patient says that he is doing well this morning. No fever or chills  Tolerating tube feeds  Participating in therapy       Physical Exam   Vital Signs:     BP: 138/82 Pulse: 89     SpO2: 93 % O2 Device: Nasal cannula Oxygen Delivery: 2 LPM  Weight: 211 lbs 13.79 oz  General: aao x 2, NAD.  HEENT:  NC/AT, neck supple,  CVS:  NL s 1 and s2, no m/r/g.  Lungs:  CTA B/L.   Abd:  Soft, + bs  Ext:  No c/c.  Lymph:  No edema.  Musculoskeletal: No calf tenderness to palpation.    Skin:  No rash.  Neuro:  - Cranial Nerves: Il through Xll intact.   - Motor: Good muscle bulk and tone.   - Strength 5/5 in RUE, 4/5 LUE              - Sensory: normal to light touch throughout      Data         Imaging results reviewed over the past 24 hrs:   No results found for this or any previous visit (from the past 24 hour(s)).

## 2023-04-07 NOTE — PROGRESS NOTES
Discharge Planner Post-Acute Rehab OT:      Discharge Plan: Home with HCOT, assistance from SO and hired in help vs TCU     Precautions: Falls, left side hemiparesis, PEG tube,modifed textures and thickend liquids, slurred speech      Current Status:  ADLs:    Mobility: WC based. Ax1 Rahel Jarrell tsfs. Min-mod A EOB, elevated height to walker.    Grooming: SBA seated in TIS W/C. Mod A standing at sink      Dressing: UB- max A, LB-max A with rahel steady     Bathing: Liko lift to purple dependent shower chair, max A for bathing    Toileting: Ax1 rahel jarrell with toilet transfer and hygiene.   IADLs: Previously IND with all I/ADLs, lives separately from brynn swain and 2 kids live in [facility]   Vision/Cognition: cues to initiate and sustain action, does best w/ items presented 1 at a time or 1 step at a time and decreased stimulation in environment     Assessment:  Pt completed stands in sandrine stedy and progressing to elevated height w/ FWW. Requiring mod A w/ tech and weight shifting. Pt fatigues easily and declining further therapy, requiring encouragement.    -10 mins refusal     Other Barriers to Discharge (DME, Family Training, etc):   Family training prior to discharge with S.O. and son (potential support from brynn, son, daughter)   Split-level home (full flight of stairs to get from main level from basement entry in garage)   Cognitive deficits   AE/DME: usama pads, pull-ups, bed rail, rahel steady, grab bars, tub bench, bedside commode, and bidet attachment

## 2023-04-07 NOTE — PLAN OF CARE
Goal Outcome Evaluation:      Plan of Care Reviewed With: patient    Overall Patient Progress: no changeOverall Patient Progress: no change     Pt oriented to self/place. Lethargic this AM, but more awake after hygiene cares. Up with assist of 2/Bri Jarrell. Diet progressed to Pureed/Level 4, Mod Thick liquids, needs supervision with meal. Meds given through PEG this shift. , and 152. Continue with POC.

## 2023-04-07 NOTE — PLAN OF CARE
Goal Outcome Evaluation:    Overall Patient Progress: no change    Outcome Evaluation: No change in Pt progress this shift.    Pt is alert and only oriented to self. On 2L of oxygen via nasal cannula during NOC. On cycled tube feeding from 2p-6a.  Incontinent of B&B. LBM 4/6. Ax2 rahel lawrence. Denied pain, CP, and n/t. Call light within reach. Pt slept through this shift. Will continue with POC.

## 2023-04-07 NOTE — PLAN OF CARE
Discharge Planner Post-Acute Rehab SLP:      Discharge Plan: home with S.O. 24/7 pt to reside in lower level of home, ongoing SLP     Precautions: PEG, aspiration risk     Current Status:  Hearing: WFL  Vision: WFL  Communication: Decreased vocal intensity  Cognition: Moderate cognitive linguistic impairment- reasoning, memory, attention  Swallow: Puree (4) texture, moderately thick (3) liquid BY TSP ONLY. 1:1 FEEDER AS PT WILL NOT IND INITIATE LIQUID BY TSP. Chair all meals if possible, fully upright, do NOT feed if not fully alert and agreeable.     Assessment: Completed extensive oral cares. Pt with copious thick, dry secretions coating various structures in oral cavity. Pt accepted x4 ice chips, no s/sx aspiration. Pt accepted PO trials of puree (4) texture, moderately thick (3) liquid by tsp given by SLP. Pt with no s/sx aspiration. Significant improvement in alertness and bolus orientation this session compared to last session (Monday) with this SLP post status change. Per chart review, pt has been clinically tolerating PO trials within meals this week consistently w/o s/sx aspiration. Recommend cautious diet inititation of puree (4) texture, moderately thick (3) liquid BY TSP ONLY. 1:1 FEEDER AS PT WILL NOT IND INITIATE LIQUID BY TSP. Chair all meals if possible, fully upright, do NOT feed if not fully alert and agreeable.     Other Barriers to Discharge (Family Training, etc): diet texture training pending progress

## 2023-04-08 NOTE — PLAN OF CARE
Discharge Planner Post-Acute Rehab OT:      Discharge Plan: Home with HCOT, assistance from SO and hired in help vs TCU     Precautions: Falls, left side hemiparesis, PEG tube,modifed textures and thickend liquids, slurred speech      Current Status:  ADLs:  Mobility: WC based. Ax1 Rahel Jarrell tsfs. Min-mod A EOB, elevated height to walker.  Grooming: SBA seated in TIS W/C. Mod A standing at sink    Dressing: UB- max A, LB-max A with rahel steady   Bathing: Liko lift to purple dependent shower chair, max A for bathing  Toileting: Ax1 rahel jarrell with toilet transfer and hygiene.   IADLs: Previously IND with all I/ADLs, lives separately from brynn swain and 2 kids live in [facility]   Vision/Cognition: cues to initiate and sustain action, does best w/ items presented 1 at a time or 1 step at a time and decreased stimulation in environment     Assessment:  Pt presenting with improved functional standing tolerance and balance, completed various functional stands in rahel jarrell and progressing to elevated height w/ FWW. Requiring CGA- min A w/ STS and dynamic standing.            Other Barriers to Discharge (DME, Family Training, etc):   Family training prior to discharge with S.O. and son (potential support from brynn, son, daughter)   Split-level home (full flight of stairs to get from main level from basement entry in garage)   Cognitive deficits   AE/DME: usama pads, pull-ups, bed rail, rahel steady, grab bars, tub bench, bedside commode, and bidet attachment

## 2023-04-08 NOTE — PLAN OF CARE
Goal Outcome Evaluation:  Pt is alert to self, confused, trying to get out of bed. Pt on Level 4, thickened liquids, incontinent of bowel and bladder, uses promofit, denies pain,A2 with liko lift for transfer WC based. A2 for bed mobility. BGM done, see results tab for details. PEG Patent. MARJORIE PATTIE viktoria , cycled T/F 2 pm- 6 am. Pt is stable on room air . Meplex to the coccyx CDI, Bed alarm on for safety, call light within reach, Continue with poc.      Patient's most recent vital signs are:     Vital signs:  BP: 167/96  Temp: 96.2  HR: 96  RR: 16  SpO2: 97 %     Patient does not have new respiratory symptoms.  Patient does not have new sore throat.  Patient does not have a fever greater than 99.5.

## 2023-04-08 NOTE — PLAN OF CARE
Discharge Planner Post-Acute Rehab SLP:      Discharge Plan: home with S.O. 24/7 pt to reside in lower level of home, ongoing SLP     Precautions: PEG, aspiration risk     Current Status:  Hearing: WFL  Vision: WFL  Communication: Decreased vocal intensity  Cognition: Moderate cognitive linguistic impairment- reasoning, memory, attention  Swallow: Puree (4) texture, moderately thick (3) liquid BY TSP ONLY. 1:1 FEEDER AS PT WILL NOT IND INITIATE LIQUID BY TSP. Chair all meals if possible, fully upright, do NOT feed if not fully alert and agreeable.     Assessment: Attempted meal/dysphagia session, however, pt had already eaten lunch and declined any additional. Intermittent wet vocal quality noted at the beginning and middle of session, appeared better when cued to cough by clinician. Reasoning task completed with 66% accuracy with min cues, pt required mod-max cues to achieve 100% accuracy.      -10 min in second session, d/t pt falling asleep t/o the session    Other Barriers to Discharge (Family Training, etc): diet texture training pending progress

## 2023-04-08 NOTE — PROGRESS NOTES
"Discharge Planner Post-Acute Rehab PT:     Discharge Plan: HCPT and 24/7 assist from fiance    Precautions: Tubefeed, L hemiparesis, alarms    Current Status:  Bed Mobility: CGA  Transfer: Linda to [facility]  Gait: 150' FWW [facility]  Stairs: 2 x 4 6\" steps bilat railings CGA to Linda  Balance: Able to sit unsupported, standing requires some UE support    PASS:  3/29: 13/36 - unable prior due to cognition  4/4: 8/36    Assessment: Worked on ambulating with upright posture and avoiding L lean and L hip shift. Pt able to perform with PT assistance pushing hips R, however goes back to leaning left when external force is taken away. Overall pt showing improvements and was able to tolerate standing/walking well today. Will continue to assess and challenge pt to improve upright endurance and functional mobility with gait, transfers, and stairs.    Other Barriers to Discharge (DME, Family Training, etc):  - Full flight of stairs to get to main level from basement entry in garage -  planning on staying on basement level with mabelastangle initially  - w/c  "

## 2023-04-08 NOTE — PROGRESS NOTES
"PM&R PROGRESS NOTE       HPI   Stefan Diallo is a 77 year old male admitted to the ARU on 3/16/2023 for a history of Acute right MCA stroke s/p tenectepace   Deficits include: dysphagia on PEG tube now, aphasia, cognitive slowing, right hemiparesis, and right facial weakness.     From the functional perspective, participating in therapies, accompanied by his fiancée, who is his primary care giver. He has been standing for a minute now with rahel steady. He will be wheel chair based for functional mobility. Showing progress, ambulated a few steps with significant assist. Continues to make progress.  Continue the intense rehabitation.    While his support system is unremarkable, he does live in a split-level home and will need significant strategies to return home.  Family planning is anticipated, and possible alternate discharge disposition should be discussed with the patient's family.       Medically,   Also followed by hospitalist group, appreciate input.  - Right MCA stroke secondary to cardio- embolism  Deficits include dysphagia, cognitive impairment likely exacerbated by the encephalopathy, left facial droop, aphasia, left neglect, and left hemiparesis.    -Sepsis;   Afebrile, white count trending downwards, last 1 on 4-6-2023 at 7.4.  Monitoring CRP levels.  On cefdinir     Orders Placed This Encounter      Pureed Diet (level 4) Liquidized/Moderately Thick (level 3) (BY TSP ONLY)          /80 (BP Location: Right arm)   Pulse 85   Temp (!) 96.5  F (35.8  C) (Tympanic)   Resp 16   Ht 1.854 m (6' 1\")   Wt 96.1 kg (211 lb 13.8 oz)   SpO2 94%   BMI 27.95 kg/m    Physical exam    Patient is sitting in wheelchair comfortable in no acute distress   HEENT NC AT PRTL EOM good   Neck supple  Lungs CTA  Abdomen PEG in place   Hypophonia   Averbal   Left hemiparesis        Current Facility-Administered Medications   Medication     acetaminophen (TYLENOL) solution 650 mg     amantadine (SYMMETREL) capsule 100 " mg     apixaban ANTICOAGULANT (ELIQUIS) tablet 5 mg     atenolol (TENORMIN) half-tab 12.5 mg     atorvastatin (LIPITOR) tablet 20 mg     cefdinir (OMNICEF) suspension 300 mg     glucose gel 15-30 g    Or     dextrose 50 % injection 25-50 mL    Or     glucagon injection 1 mg     diltiazem (CARDIZEM) tablet 60 mg     fluticasone (FLONASE) 50 MCG/ACT spray 1 spray     gabapentin (NEURONTIN) solution 600 mg     guaiFENesin (ROBITUSSIN) 20 mg/mL solution 10 mL     lactobacillus rhamnosus (GG) (CULTURELL) capsule 1 capsule     Lidocaine (LIDOCARE) 4 % Patch 2 patch     lidocaine patch in PLACE     melatonin tablet 5 mg     metFORMIN (GLUCOPHAGE) tablet 1,000 mg     miconazole (MICATIN) 2 % cream     mirtazapine (REMERON) tablet TABS 15 mg     pantoprazole (PROTONIX) 2 mg/mL suspension 40 mg     Patient is already receiving anticoagulation with heparin, enoxaparin (LOVENOX), warfarin (COUMADIN)  or other anticoagulant medication     polyethylene glycol (MIRALAX) Packet 17 g     protein modular (PROSOURCE TF20) packet 1 packet     senna-docusate (SENOKOT-S/PERICOLACE) 8.6-50 MG per tablet 2 tablet        Labs:  Lab Results   Component Value Date    WBC 7.4 04/06/2023    WBC 9.8 04/04/2023    WBC 21.6 (H) 04/03/2023    HGB 12.4 (L) 04/06/2023    HGB 11.6 (L) 04/04/2023    HGB 11.9 (L) 04/03/2023    HCT 37.9 (L) 04/06/2023    HCT 35.2 (L) 04/04/2023    HCT 36.8 (L) 04/03/2023     04/06/2023     04/04/2023     04/03/2023     04/06/2023     04/03/2023     04/02/2023    POTASSIUM 3.9 04/06/2023    POTASSIUM 4.2 04/03/2023    POTASSIUM 4.4 04/02/2023    CHLORIDE 103 04/06/2023    CHLORIDE 101 04/03/2023    CHLORIDE 99 04/02/2023    CO2 24 04/06/2023    CO2 24 04/03/2023    CO2 24 04/02/2023    BUN 21.5 04/06/2023    BUN 24.6 (H) 04/03/2023    BUN 24.4 (H) 04/02/2023    CR 0.68 04/06/2023    CR 0.92 04/03/2023    CR 0.89 04/02/2023     (H) 04/08/2023     (H) 04/07/2023    GLC  152 (H) 04/07/2023    SED 16 (H) 01/20/2023    SED 98 (H) 08/20/2020     (H) 08/19/2020    DD 0.28 09/20/2018    AST 20 04/02/2023    AST 25 03/22/2023    AST 22 03/07/2023    ALT 16 04/02/2023    ALT 21 03/22/2023    ALT 10 03/07/2023    ALKPHOS 129 04/02/2023    ALKPHOS 122 03/22/2023    ALKPHOS 116 03/07/2023    BILITOTAL 0.7 04/02/2023    BILITOTAL 0.4 03/22/2023    BILITOTAL 0.4 03/07/2023    INR 1.12 03/07/2023    INR 1.21 (H) 03/07/2023    INR 2.7 (H) 02/24/2023       Attestation:  This patient has been seen and evaluated by me, Ct Quintero MD.    Total time: 35 Minutes more than 50% in Care coordination on the floor/ counseling the patient face to face and coordinating care.   Ct Quintero MD, [facility]   Department of Rehabilitation

## 2023-04-08 NOTE — PLAN OF CARE
Goal Outcome Evaluation:      Plan of Care Reviewed With: patient    Overall Patient Progress: no changeOverall Patient Progress: no change         Patient here with Stroke, left side affected with slow and garbled speech, alert to self / family only  Slept most of the night, O2 via nasal canula @ 2L at night, pt breathing even non labored, sating in higher 90's  Denied pain, headache and chest pain  Cycled feeding at night, tolerating the feeding well, no abdominal discomfort reported no episode N&V, maintained HOB elevated  Primo fit placed d/t incontinence of urine, had incontinent BM this shift  Safety rounding checked completed, 3 side rails UP, bed alarm ON, call light/bedside table within reach  Continue with POC.

## 2023-04-08 NOTE — PLAN OF CARE
Goal Outcome Evaluation:    Shift Report: [id number]    Orientation: A/O to self, rests with eyes closed throughout shift and through cares  Ambulation/Transfers: A2 using rahel lawrence, w/c based  Bowel/Bladder: Incontinent of bladder  Pain:  Denies pain  Diet/Liquids/Pills: NPO, tube feeding [id number] at 85mL/hour, 180mL flushes QID  Tubes/Lines/Drains/Skin: coccyx and groin red red, barrier cream applied with incontinent pad changes. PIV to MARJORIE.

## 2023-04-09 NOTE — PLAN OF CARE
Goal Outcome Evaluation:    Shift Report: [id number]     Orientation: A/O to self, rests with eyes closed throughout shift and through cares  Ambulation/Transfers: A1 using rahel lawrence, w/c based  Bowel/Bladder: Incontinent of bladder  Pain:  Denies pain  Diet/Liquids/Pills: L4 diet/L3 liquids - no appetite, only bites at meals, tube feeding [id number] at 85mL/hour, 180mL flushes [id number]  Tubes/Lines/Drains/Skin: coccyx and groin red red, barrier cream applied with incontinent pad changes. PIV to LUE

## 2023-04-09 NOTE — PLAN OF CARE
Goal Outcome Evaluation:      Plan of Care Reviewed With: patient      Patient resting comfortably in bed with eyes closed, even and non-labored respirations, on O2-2L at night, no s/s of pain or discomfort. Cycled feeding through PEG at 85 mL/hr tolerating well, left arm PIV CDI, Primofit. Bed alarm on, safety check completed, assisted in changing positions while in bed, needs attended, monitored.    Continue POC.

## 2023-04-09 NOTE — PLAN OF CARE
"/72   Pulse 84   Temp 97.9  F (36.6  C) (Oral)   Resp 16   Ht 1.854 m (6' 1\")   Wt 96.1 kg (211 lb 13.8 oz)   SpO2 94%   BMI 27.95 kg/m       Pt is AOx4 during shift. Pt denies chest pain, SOB, new numbness or tingling, N/V. Pt denies pain. Pt has TF running through peg tube @ 85 ml/hr [id number]. BG was 100 @ 2120.  Meds through peg tube. Bed alarms on. No acute events during shift.    "

## 2023-04-10 NOTE — PROGRESS NOTES
"Discharge Planner Post-Acute Rehab PT:     Discharge Plan: HCPT and 24/7 assist from fidanny, checking for acceptance with TCU this week    Precautions: Tubefeed, L hemiparesis, alarms    Current Status:  Bed Mobility: CGA  Transfer: Linda to [facility]  Gait: 150' FWW CGA  Stairs: 2 x 4 6\" steps bilat railings CGA to Linda  Balance: Able to sit unsupported, standing requires some UE support    PASS:  3/29: 13/36 4/4: 8/36    Assessment: Pt was having some stomach discomfort during session today but ended up participating well. Worked on safety with furniture approach and increasing step length with ambulation. Will perform family training tomorrow and continue working on improving activity tolerance and LE strength.    Other Barriers to Discharge (DME, Family Training, etc):  - Full flight of stairs to get to main level from basement entry in garage -  planning on staying on basement level with sarasteady initially  - Family training scheduled 4/11 and 11:00 AM  - w/c - ordered K3 4/10  "

## 2023-04-10 NOTE — PROGRESS NOTES
[facility] Nurse Inpatient Assessment     Consulted for: sacrum    Patient History (according to provider note(s):      Stefan Diallo is a 57-year-old male with a past medical history of hypertension, hyperlipidemia, non-insulin-dependent diabetes, A-fib (usually on Coumadin but held since 2/24 prior to presentation to [facility] on 3/07), and recent acute ischemic stroke of right MCA territory (likely due to cardioembolic event) s/p tenecteplase on 3/7 at [facility]. His presenting symptoms included left-sided weakness and left-sided facial droop.   Following administration of tenecteplase, patient's neurological exam was noted to have improved with NIHSS score of 5, previously noted to be 9-11.   Hospital course was complicated by dysphagia s/p G-tube placement on 3/9/2023, possible sepsis likely due to aspiration pneumonia (s/p completion of 7-day of antibiotics).  With regards to his A-fib, patient was started on Eliquis since 3/14/2023.   Patient was discharged to acute rehab on 3/16/2023 for rehabilitation needs.   His discharge neurological exam noted left facial droop, moderate dysarthria, left arm moderate drift, intermittent numbness to light touch in left leg >left arm.      Patient's course in the ARU has been largely unremarkable, however, on 4/2, patient was noted to be more lethargic which is new compared to prior days in the ARU.  Initial work-up obtained including CBC, CRP, CMP, lactic acid, troponin notable for WBC 26.7, elevated inflammatory markers, elevated lactic acid, and mildly elevated high sensitive troponin.  Stat CT head Noncon obtained showed no acute intracranial pathology and chest x-ray obtained shows possible left lower lobe airspace disease.     Areas Assessed:      Areas visualized during today's visit: Focused:    Pressure Injury Location: sacrum     4/3    Last photo: 4/10  Wound type: Pressure Injury      Pressure Injury Stage: 2, hospital acquired   Wound history/plan of care:   Found on skin assessment by nursing.   Wound base: 100 % blanchable , erythema and epidermis     Palpation of the wound bed: normal      Drainage: none     Description of drainage: none     Measurements (length x width x depth, in cm) no open areas noted  Periwound skin: Intact      Color: normal and consistent with surrounding tissue      Temperature: normal   Odor: none  Pain: denies , none  Pain intervention prior to dressing change: no significant pain present   Treatment goal: Heal  and Protection  STATUS: resurfaced   Supplies ordered: discussed with RN and discussed with patient         Treatment Plan:       Sacrum wound(s): ok to leave OT- now healed        Orders: Updated    RECOMMEND PRIMARY TEAM ORDER: None, at this time  Education provided: importance of repositioning, plan of care, wound progress and Infection prevention   Discussed plan of care with: Patient, Family and Nurse  WOC nurse follow-up plan: signing off  Notify WOC if wound(s) deteriorate.  Nursing to notify the Provider(s) and re-consult the WOC Nurse if new skin concern.    DATA:     Current support surface: Standard  Low air loss (KESHA pump, Isolibrium, Pulsate, skin guard, etc)  Containment of urine/stool: Primofit external catheter  BMI: Body mass index is 27.95 kg/m .   Active diet order: Orders Placed This Encounter      Pureed Diet (level 4) Liquidized/Moderately Thick (level 3) (BY TSP ONLY)     Output: I/O last 3 completed shifts:  In: 1800 [NG/GT:1800]  Out: 700 [Urine:700]     Labs:   Recent Labs   Lab 04/10/23  0650   HGB 12.8*   WBC 11.9*     Pressure injury risk assessment:   Sensory Perception: 3-->slightly limited  Moisture: 3-->occasionally moist  Activity: 2-->chairfast  Mobility: 2-->very limited  Nutrition: 2-->probably inadequate  Friction and Shear: 1-->problem  Guero Score: 13    Carmen Cortes RN CWOCN  Pager no longer is use, please  contact through [facility] group: [facility] Nurse  Dept. Office Number: [phone]

## 2023-04-10 NOTE — PLAN OF CARE
Goal Outcome Evaluation:      Plan of Care Reviewed With: patient    Overall Patient Progress: no changeOverall Patient Progress: no change       Patient here with Stroke, left side affected, orientation fluctuates, garbled/slow speech  Slept most of the night, sleeping in between cares  No complained of pain, headache, chest pain, N&T  Cycled feeding at night, tolerating the feeding well, no episode of N&V, maintained HOB elevated  Infrequent coughing noted, PRN cough medication administered  Primo fit in placed at night d/t incontinence, no BM this shift  Safety rounding checked completed, 3 side rails UP, bed alarm ON, call light/bedside table within reach

## 2023-04-10 NOTE — PLAN OF CARE
Discharge Planner Post-Acute Rehab SLP:      Discharge Plan: home with S.O. 24/7 pt to reside in lower level of home, ongoing SLP     Precautions: PEG, aspiration risk     Current Status:  Hearing: WFL  Vision: WFL  Communication: Decreased vocal intensity  Cognition: Moderate cognitive linguistic impairment- reasoning, memory, attention  Swallow: Puree (4) texture, moderately thick (3) liquid BY TSP ONLY. 1:1 FEEDER AS PT WILL NOT IND INITIATE LIQUID BY TSP. Chair all meals if possible, fully upright, do NOT feed if not fully alert and agreeable. Recommend pt ok to resume ice chips for pleasure only at this time with the following precautions: thorough oral cares, limited # d/t aspiration risk, with SO only, must be fully alert and in chair (not in bed).      Assessment:  Pt alert in chair, SO present. SO and pt inquiring if ok to resume ice chips following extensive oral cares. SO reported to have completed oral cares prior to arrival, SLP also re completed. Pt accepted trials of ice chips (x5) w/o overt s/sx aspiration. Would recommend pt ok to resume ice chips for pleasure only at this time with the following precautions: thorough oral cares, limited # d/t aspiration risk, with SO only, must be fully alert and in chair (not in bed). SO and pt in agreement. Pt also accepted trials of puree (4), moderately thick (3) liquid by tps. No s/sx aspiration. Spouse providing good verbal cues to 'focus' on bolus and limit distractions with intake.    Other Barriers to Discharge (Family Training, etc): diet texture training pending progress

## 2023-04-10 NOTE — PLAN OF CARE
Goal Outcome Evaluation:  Pt is alert but sleepy, it is hard to assess his orientation due to expressive aphasia. His appetite is poor, he is on cycled tube feeding. PIV was removed from left upper arm, gauze dressing is applied to the site. Wife has been at bed side assisting pt with activity of daily livings. We will continue with current plan of care.

## 2023-04-10 NOTE — PROGRESS NOTES
"  [facility]   Acute Rehabilitation Unit  Daily progress note    INTERVAL HISTORY  Weekend and therapy notes reviewed, no acute events reported.    Stefan Diallo was seen and examined at bedside this afternoon while working with OT.  He reports stomach upset today.  Has been complaining of this throughout the day.  Notes intermittent nausea, no vomiting.  Nursing reports he had soft BM this morning.  He continues to request to return to bed as OT encouraging participation.  Seen practicing pivot transfer with walker, patient prefers to use rahel stedy as he feels walker is \"too wobbly\".  OT noting patient needed mod A for sit to stand, more assist than earlier.  Anticipate need to prepare for fluctuating functional levels at discharge.  Denies shortness of breath.    Functionally, he needs CGA for bed mobility, min A to CGA for transfers, CGA for ambulating 150' with FWW.    MEDICATIONS    amantadine  100 mg Oral or Feeding Tube BID     apixaban ANTICOAGULANT  5 mg Per Feeding Tube BID     atenolol  12.5 mg Oral Daily     atorvastatin  20 mg Per Feeding Tube At Bedtime     diltiazem  60 mg Per Feeding Tube Q8H     fluticasone  1 spray Both Nostrils Daily     gabapentin  600 mg Per Feeding Tube At Bedtime     lactobacillus rhamnosus (GG)  1 capsule Per Feeding Tube BID     lidocaine  2 patch Transdermal Q24H     lidocaine   Transdermal Q8H LALO     metFORMIN  1,000 mg Oral or Feeding Tube BID w/meals     miconazole   Topical BID     mirtazapine  15 mg Oral At Bedtime     pantoprazole  40 mg Per Feeding Tube QAM AC     protein modular  1 packet Per Feeding Tube BID        acetaminophen, glucose **OR** dextrose **OR** glucagon, guaiFENesin, melatonin, - MEDICATION INSTRUCTIONS -, polyethylene glycol, senna-docusate     PHYSICAL EXAM  /82 (BP Location: Right arm)   Pulse 84   Temp 97.9  F (36.6  C) (Oral)   Resp 18   Ht 1.854 m (6' 1\")   Wt 96.1 kg (211 lb 13.8 oz)   SpO2 " 98%   BMI 27.95 kg/m     Gen: NAD, sitting up in chair  HEENT: NC/AT, MMM  Cardio: appears well-perfused  Pulm: non-labored on room air  Abd: soft, non-tender, non-distended, +PEG  Ext: no edema in bilateral lower extremities  Neuro/MSK: awake, alert, left facial droop, +dysarthria and aphasia, left neglect, left hemiparesis, moving all extremities in chair    LABS  CBC RESULTS:   Recent Labs   Lab Test 04/06/23  0530 04/04/23  0626 04/03/23  0657   WBC 7.4 9.8 21.6*   RBC 4.06* 3.75* 3.92*   HGB 12.4* 11.6* 11.9*   HCT 37.9* 35.2* 36.8*   MCV 93 94 94   MCH 30.5 30.9 30.4   MCHC 32.7 33.0 32.3   RDW 13.6 13.4 13.6    199 233     Last Basic Metabolic Panel:  Recent Labs   Lab Test 04/10/23  0130 04/09/23  2255 04/09/23  2141 04/06/23  0735 04/06/23  0530 04/03/23  0830 04/03/23  0657 04/02/23  1735 04/02/23  1113   NA  --   --   --   --  142  --  141  --  136   POTASSIUM  --   --   --   --  3.9  --  4.2  --  4.4   CHLORIDE  --   --   --   --  103  --  101  --  99   CO2  --   --   --   --  24  --  24  --  24   ANIONGAP  --   --   --   --  15  --  16*  --  13   * 99 87   < > 229*   < > 172*   < > 170*   BUN  --   --   --   --  21.5  --  24.6*  --  24.4*   CR  --   --   --   --  0.68  --  0.92  --  0.89   GFRESTIMATED  --   --   --   --  >90  --  85  --  87   LUTHER  --   --   --   --  9.7  --  9.8  --  10.1    < > = values in this interval not displayed.     Recent Labs   Lab 04/10/23  0130 04/09/23  2255 04/09/23  2141 04/09/23  1725 04/09/23  0835 04/08/23  2120   * 99 87 161* 123* 100*       Rehabilitation - continue comprehensive acute inpatient rehabilitation program with multidisciplinary approach including therapies, rehab nursing, and physiatry following. See interval history for updates.      ASSESSMENT AND PLAN  Stefan Diallo is a 77 year old male with a past medical history of atrial fibrillation on coumadin though held PTA for planned procedure (pain pump placement), chronic bilateral  low back pain s/p lumbar fusion, type 2 diabetes mellitus c/b polyneuropathy, renal artery aneurysm, hypertension, hyperlipidemia, KISHA, GERD, BPH, and chronic bilateral shoulder pain who was admitted on 3/7/23 with acute right MCA stroke s/p tenecteplase with hospital course complicated by dysphagia s/p PEG placement on 3/9 complicated by PEG-site bleeding, suspected aspiration pneumonia, hyperglycemia, constipation, and hypokalemia.  He is now admitted to ARU on 3/16/23 for multidisciplinary rehabilitation and ongoing medical management.        Admission to acute inpatient rehab 03/16/23.    Impairment group code: Stroke Ischemic 01.1 (L) Body Involvement (R) Brain; s/p acute ischemic stroke of R MCA territory due to cardioembolism status post tenecteplase        1. PT, OT and SLP 60 minutes of each on a daily basis, in addition to rehab nursing and close management of physiatrist.       2. Impairment of ADL's: Noted to have impaired activity tolerance, impaired balance, impaired coordination, impaired judgement and safety awareness, impaired strength, impaired tone, impaired weight shifting, impulsiveness and pain, all affecting his ability to safely and independently perform basic ADLs.  Goal for assist of 1 with basic ADLs.     3. Impairment of mobility:  Noted to have impaired activity tolerance, impaired balance, impaired coordination, impaired judgement and safety awareness, impaired strength, impaired tone, impaired weight shifting, impulsiveness and pain, all affecting his ability to safely and independently perform basic mobility.  Goal for assist of 1 with basic mobility.     4. Impairment of cognition/language/swallow:  Noted to have dysarthria, aphasia, and impaired cognition with goals for improved cognitive-linguistic skills to meet basic needs.     5. Medical Conditions  New actions/orders/updates for today are in blue.     Acute R MCA stroke s/p tenecteplase 3/7, likely secondary to cardioembolism  from atrial fibrillation off anticoagulation for an anticipated procedure  Initially on aspirin, later restarted on anticoagulation but with Eliquis in place of PTA warfarin.  - Continue apixaban 5 mg BID  - Continue PTA statin.  Goal LDL 40-70.  - Started on amantadine 100 mg daily 3/24, increased to BID (0700, 1300) on 3/24.  - Long term BP goal <135/80 to be achieved as outpatient within several weeks.  Management as below.  - Continue PT/OT/SLP  - Follow up with general neurology in 6-8 weeks    Vascular dementia  Neuropsych eval completed 3/30, see full report by Dr. Paulson.  In summary, impairments noted in most cognitive domains.  Does meet criteria for major neurocognitive disorder.   - Recommended that POA should be enacted and patient should receive assistance in complex decision-making.    E. Coli UTI, treated  Sepsis, resolved  Increased lethargy, tone, need for assist noted on 4/2 AM.  WBC elevated at 26.7.  Also with elevated inflammatory markers, elevated lactic acid, and mildly elevated high sensitive troponin (through improved from 3 wks ago).  Stat CT head showed evolution of known infarct but no acute intracranial pathology.  CXR shows possible left lower lobe airspace disease.  UA concerning for UTI.  Patient was started on empiric Zosyn and s/p 1L LR on 4/2 and repeated on 4/3.  MRSA nares, COVID PCR negative.  Per IM, suspect UTI as primary infection.  Procal only mildly elevated, suggesting against systemic infection.  Blood cultures negative.  UC with E coli.  WBC normalized on 4/4.  Completed 7-day course of antibiotics (Zosyn 4/2 -> ceftriaxone 4/4 -> omnicef 4/6-4/8).    Atrial fibrillation  [PTA on warfarin, diltiazem]  On chronic anticoagulation with warfarin.  However, had been held since 2/25 for planned pain pump placement. PTA long-acting extended release diltiazem cannot be given through the PEG tube, substituted short-acting. Started on DOAC in place of PTA warfarin on 3/14.  -  "Continue Eliquis 5 mg BID  - Continue short acting diltiazem 60 mg TID while NPO  - Continue to monitor     Dyslipidemia  [PTA meds: atorvastatin 20 mg daily, gemfibrozil 600 mg BID]  - Continue PTA statin       Hypertension  [PTA meds: atenolol 25 mg daily, diltiazem  mg daily, lisinopril 5 mg daily]  Resuming PTA meds gradually. PTA diltiazem increased to 60 mg TID on 3/14/23.   - Long term BP <130/80, inpatient meds can be slowly titrated to this goal as otherwise appropriate  - Continue diltiazem 60 mg q8h  - Continue atenolol 12.5 mg daily (resumed on 4/7)  - BP somewhat labile 100s-150s, HR stable in 70-80s  - Resume PTA lisinopril as indicated    Possible aspiration pneumonia  Met criteria for early sepsis.  Started on unasyn + vanco 3/11.  MRSA nares negative.  BC's still negative at discharge.  Stopped vancomycin 3/13.  Changed to FT augmentin 3/15 with a plan for 7 total days abx, completed as of 3/18.  - Monitor respiratory status, SLP for dysphagia treatment as below    Cough  Patient has been complaining of persistent dry cough.  Afebrile, WBC WNL, satting well on room air.  Observed to be intermittent and mild.  Lungs clear on exam.  Patient complains this is disruptive for sleep.  - Robitussin PRN  - Added flonase at night for possible post-nasal drip on 3/29  - Noted to have LLL crackles per hospitalist on 4/2.  CXR with \"cardiomegaly with mild interstitial opacities, suggestive of early interstitial edema.  No consolidation to suggest aspiration pneumonia.\"     KISHA  - Has been refusing home CPAP, may be contributor to daytime somnolence. Discussed with patient who reports that he has not been using because he does not sleep as well with CPAP.  Also reports that he did not use just PTA for the same reason, unclear if/when used regularly.  Reviewed risks of untreated sleep apnea but may require ongoing discussion given impaired cognition.  - Preferring to use oxygen by NC with sleep over CPAP  - " Significant other notes patient was consistently using CPAP PTA, but was not tolerating well earlier this admission due to inability to adjust the mask himself if displaced.  Now that he has improving function, she feels it would be good to re-attempt.  However, patient continued to decline due to mask not fitting well.  RT consulted.  Limited mask options available inpatient, recommended to contact home CPAP clinic for alternative mask.     Type II DM  Tube feeding induced hyperglycemia  Polyneuropathy  [PTA meds: metformin 500 mg qAM + 1000 mg qPM, gabapentin 600 mg at HS]  A1c 6.4% on 2/1/23.  PTA metformin held this admission.  Managed on small doses of Lantus and sliding scale insulin.  Stopped Lantus on 3/17, resumed home metformin initially at 500 mg BID, increased to PTA dose as of 3/20, and to 1000 mg BID on 3/21.  Recent Labs   Lab 04/10/23  0130 04/09/23  2255 04/09/23  2141 04/09/23  1725 04/09/23  0835 04/08/23  2120   * 99 87 161* 123* 100*   - Continue home gabapentin  - Continue metformin 1000 mg BID (resumed on 4/4 after brief hold due to lactic acidosis, ultimately suspected 2/2 urosepsis).    - Monitor BG TID AC + HS.  Likely can decrease to BID in coming days given BG overall well-controlled, now on metformin only.  - Hypoglycemia protocol     Chronic back pain  Patient planned for pain pump placement 3/8/23 (Javi).  States he has chronic back pain from nerve impingement and uses tylenol PTA for pain.   No complaints of pain at ARU.  - Continue Tylenol prn for pain  - Continue PTA gabapentin 600 mg at HS  - Has not been complaining of any pain much of ARU stay.  However, therapies noting over beginning ~3/29, patient has been complaining of more right hip pain and is protecting weightbearing on that side.  Added lidocaine patch.  - Monitor     Moderate oral, severe oropharyngeal dysphagia  S/p PEG placement 3/9  Moderate malnutrition in the context of acute on chronic illness  - Cycled  tube feeds per PEG (switched from continuous on 3/18).  Per discussion with team, adjusted timing to 2pm-6am (starting 3/29) in hopes to encourage better oral intake at breakfast.  Also started remeron 3/29 as below.  - Repeat VFSS on 3/22.  Per SLP, patient demonstrated significant improvement compared to prior VFSS on 3/9 though still aspiration with thin and mildly thick liquids.  As of 3/24, started on minced & moist solids (5) and moderately thick liquids (3) by TSP only; alternate food/drink and 1:1 assist with feeding.  - Given increased symptoms of dysphagia starting 4/2 in setting of urosepsis, made NPO.  Per SLP assessment, mentation and clinical status remain barrier to safe resumption of PO intake.  Was only eating minimal amounts even prior to this so should not impact tube feed needs.  - Continue water flushes 180 mL q4h  - Per SLP, cautiously resumed on oral diet 4/7: puree (level 4) diet, moderately thick (level 3) liquids by tsp.  Up to chair for all meals, 1:1 assist.  - RD following, assistance appreciated  - Continue SLP  - Supplements added per RD  - Tube feeding PLC completed on 4/3     Loose stools, recurrent  Constipation, resolved  - Continue senokot-S 2 tabs BID PRN, Miralax PRN  - Recent loose stools in setting of antibiotics for UTI, possible additional contributors include resumption of metformin after holding a few days and increased reliance on tube feeds now NPO.  Added probiotic on 4/6.  Loose stools seem to be improving.  Continue to monitor.      GERD  - Continue pantoprazole as auto-sub for PTA on omeprazole 20 mg daily    Suspected HSV lesion, left upper lip, resolved  - Abreva completed x7 days    Anemia, mild, normocytic  Has been trending 12s-13s.  Dropped slightly on 3/27 at 11.9.  No evidence of active bleeding.  - Continue to trend CBC every M/Th.  4/10: Hgb stable at 12.8    Depressed mood  Despite amantadine, still noting fatigue, decreased motivation/engagement.  Also  continues to complain of poor sleep.  Per Dr. Stinson, trial remeron 7.5 mg at bedtime started 3/29 for mood, sleep, appetite.  Tolerating well, still c/o poor sleep, increased to 15 mg on 3/31.   - Continue Remeron 15 mg at HS    Leukocytosis  WBC mildly elevated on 4/10 at 11.9 after completion of antibiotic course for UTI as above.  Complaining of upset stomach, exam benign, had 1 soft BM today.  No other localizing signs/symptoms of infection.  - Recently resumed on PO diet, high aspiration risk as above.  If WBC uptrending or clinical change, would consider repeat CXR.  - Clinical presentation not consistent with C diff, though did have recent antibiotic exposure and complaining of vague abdominal discomfort.  Is on probiotic.  - BUN is elevated as compared to prior, all cell lines are up from prior.  ?hemoconcentration.  Repeat labs tomorrow to trend, monitor clinically      6. Adjustment to disability:  Clinical psychology to eval and treat if indicated  7. FEN: pureed (level 4) diet, moderately thick (level 3) liquids by tsp, cycled nocturnal tube feeds via PEG  8. Bowel: incontinent, monitor, PRN bowel meds available  9. Bladder: continent/incontinent, using Primofit at night, will need to wean  10. DVT Prophylaxis: apixaban  11. GI Prophylaxis: PPI  12. Code: full  13. Disposition: TCU vs home pending acceptance/authorization and needed level of assist  14. ELOS:  pending acceptance/discharge location  15. Follow up Appointments on Discharge: PCP in 1-2 weeks, general neurology in 6-8 weeks      Patient was discussed with Dr. Liu Stinson, PM&R staff physician    Kenna Ruiz PA-C  Physical Medicine & Rehabilitation

## 2023-04-10 NOTE — PROGRESS NOTES
Per pt chart, pt making good progress. Called pt Dora Devries this morning to confirm that the preference is TCU. Kayce stated that ideally, pt would get a little more time in TCU, since he is doing better, before D/Cing home. SWer in agreement and Kayce still aware of barriers to TCU with insurance but will continue to pursue. Follow-up with TCU referrals and updates below.     PENDING:   [facility]  04/07: Not sent at this time as  TCU will request community referrals first      [facility]   04/[facility]: Referral sent   04/07: Called and left vm for [facility] admissions liaison and waiting for a return call  04/10: Spoke with Rin, liaison. Will review further and f/u with determination. Updated clinicals faxed F: [phone].     [facility] (Fax: [phone])  04/06: Referral sent   04/07: Re-faxed the referral this morning. Called and left vm for DAMION in TCU admissions and waiting for a return call.   04/10: Spoke with Cecile in admissions. Will review further and f/u with determination. Updated clinicals faxed F: [phone].     [facility]  04/06: Referral sent   04/07: Call and spoke with Kathryn in admissions. Referral not reviewed at this time, will call back with determination.   04/10: Left vm for admissions and awaiting a return call.     [facility]  04/06: Referral sent   04/07: Called and left vm for TCU admissions and waiting for a return call.   04/10: Spoke with admissions. Will review and f/u later today with determination. Anticipate beds opening this week.    DECLINED:   --[facility]--due to no bed avail and acuity     --[facility]--due to acuity   04/10: Asked admissions to re-consider, will review tomorrow and f/u with determination.     [facility] ([facility]: 1 RN)    --[facility]--Only taking within [facility] system.       Sharonda Ramires [facility]   [facility]   Direct Phone: [phone]  I   Pager: [phone]  I  Fax: [phone]

## 2023-04-10 NOTE — PLAN OF CARE
Goal Outcome Evaluation:    Pt is alert to self, confused, trying to get out of bed. Pt on Level 4, thickened liquids, incontinent of bowel and bladder, uses promofit, denies pain,A2 with liko lift for transfer WC based. A2 for bed mobility. BGM done, see results tab for details. PEG Patent. MARJORIE PATTIE viktoria , cycled T/F 2 pm- 6 am. Pt is stable on room air . Meplex to the coccyx CDI, Bed alarm on for safety, call light within reach, Continue with poc        Patient's most recent vital signs are:     Vital signs:  BP: 151/97  Temp: 98.1  HR: 83  RR: 16  SpO2: 94 %     Patient does not have new respiratory symptoms.  Patient does not have new sore throat.  Patient does not have a fever greater than 99.5.

## 2023-04-10 NOTE — CARE PLAN
"Rehabilitation Medicine Wheelchair Face to Face     Diagnosis: R sided ischemic stroke.   Currently has limited mobility due to pain, weakness, decreased endurance, cognition, safety awareness  Current transfer status: Linda with FWW for STS  Distance patient currently able to walk: 50' with FWW and chair follow, Linda, slow pace  Therapy team has ruled out the following less costly options:   Cane due to not enough support or stability when amb   Walker due to only effective for short distances  Patient will use wheelchair to complete Mobility Related ADL's in the home including toileting, dressing, self cares.  Without a wheelchair patient will be unable to safely move about their home.  This equipment will allow them to be out of bed, participate in home activities with their family, and it will be part of a fall prevention plan for safe mobility.   Patient's home will accommodate use of wheelchair.  Patient has a family member willing and able to assist as necessary with wheelchair.     Arm and leg strength: Gross LE and UE strength 3+-4/5    Gait/balance/coordination: Impaired gait, balance, and coordination, latest PASS score was 8/36. Pt needs assistance with bed mobility, transfers, gait. Poor safety awareness with functional activities puts patient at increased falls risk.    Length of need: 99 (months, lifetime=99)    Current height: 6'1\"  Current weight: 211  : 1945    Kenna Ruiz PA-C, NPI: [id number]      Signature:  Date:    "

## 2023-04-10 NOTE — PROGRESS NOTES
Discharge Planner Post-Acute Rehab OT:      Discharge Plan: Home with HCOT, assistance from SO and hired in help vs TCU     Precautions: Falls, left side hemiparesis, PEG tube,modifed textures and thickend liquids, slurred speech      Current Status:  ADLs:    Mobility: WC based. Ax1 Rahel Jarrell tsfs. Min-mod A with STS with walker in therapy-fluctuates pending alertness and fatigue    Grooming: SBA seated in TIS W/C. Mod A standing at sink      Dressing: UB- max A, LB-max A with rahel steady     Bathing: Liko lift to purple dependent shower chair, max A for bathing    Toileting: Ax1 rahel jarrell with toilet transfer and hygiene.   IADLs: Previously IND with all I/ADLs, lives separately from brynn swain and 2 kids live in [facility]   Vision/Cognition: cues to initiate and sustain action, does best w/ items presented 1 at a time or 1 step at a time and decreased stimulation in environment     Assessment:  Pt requiring mod A with stand pivot transfers with walker during PM session d/t fatigue. Will continue to trial safety with walker with transfers to ensure consistency.      Other Barriers to Discharge (DME, Family Training, etc):   Family training prior to discharge with S.O. and son (potential support from brynn, son, daughter)   Split-level home (full flight of stairs to get from main level from basement entry in garage)   Cognitive deficits   AE/DME: usama pads, pull-ups, bed rail, rahel steady, grab bars, tub bench, bedside commode, and bidet attachment

## 2023-04-11 PROBLEM — E87.20 LACTIC ACIDOSIS: Status: ACTIVE | Noted: 2023-01-01

## 2023-04-11 NOTE — PLAN OF CARE
FOCUS/GOAL  Bladder management, Psychosocial needs, Safety management, and Prevention of secondary complications    ASSESSMENT, INTERVENTIONS AND CONTINUING PLAN FOR GOAL:    Goal Outcome Evaluation:      Plan of Care Reviewed With: patient    Overall Patient Progress: no changeOverall Patient Progress: no change    Outcome Evaluation: no change this shift    No change this shift. Cycled TF @ 85ml/hr. 5620-2735  Bed alarm not working, has long Posey alarm.  2 liters O2 via NC  Slept well.

## 2023-04-11 NOTE — PLAN OF CARE
VS: Temp: (!) 96.7  F (35.9  C) Temp src: Oral BP: 130/75 Pulse: 82   Resp: 16 SpO2: 95 %    O2: Was using O2 2L at night. At 94% RA during the day   Output: Mixed continence   Last BM: 4/10   Activity: Assist of 1 w/ rahel wesdy   Skin: Intact, blanching redness to coccyx area. Redness in groin area, micatin cream applied.   Pain: Denies    CMS: Pt is alert to self. Disoriented to time, place and situation   Dressing: None    Diet: Pureed L4, Moderately thick L3, 1:1 feeder  Pt declined meals and liquids, wants thin water, provider talked to him and explained why he cannot have thin liquids  On cycled TF, started around 1430 running at 85ml/hr, tolerating it well.   On BG checks   LDA: L PIV  Received 1L of LR bolus   Equipment: Wheelchair, urinal, personal belongings, GB   Plan: TBD. Call light is in reach, family member at bedside   Additional Info: Pt triggered sepsis. Lactate level 2.5. Charge RN notified provider and called code sepsis. RRT came, provider ordered 1L of LR bolus. Recheck lactate level=3.5, Iv fluids were not done infusing when lactate level was drawn. Provider is aware. Reported she will talk to code sepsis provider and come up w/ a plan. Provider ordered another lactate recheck, discontinued omnicef and ordered ceftriaxone, see new orders.   Pt also had blood cultured done, results pending. UA was collected as well, see results.    Oncomign RN updated on pt status to continue to monitor.

## 2023-04-11 NOTE — PROGRESS NOTES
TCU referrals and updates below for today in RED. Team rounds tomorrow. Will f/u with the pend referrals and update pt, pt fiance, and IDT during rounds and determine next steps. SW will continue to follow and remain available as needs arise.     ADDENDUM: Before team huddle, met with pt finance at bedside and provided update. Fiance mentioned sending referrals to the facilities they were most interested in and if none can accept, might want to consider just going directly home. Will call all pending facilities in the morning, pending availability, and have more information to share at rounds tomorrow morning. Team rounds in the morning. Fiance mentioned hiring in help once at home. SWer provided Finance with some names of caregiver agencies. Fiance will look into and denied additional needs or concerns.     PENDING:   [facility]  04/07: Not sent at this time as  TCU will request community referrals first   04/11: Spoke with  TCU admissions regarding this case. Admissions will review and f/u.      [facility]   04/06: Referral sent   04/07: Called and left vm for [facility] admissions liaison and waiting for a return call  04/10: Spoke with shruti Gar. Will review further and f/u with determination. Updated clinicals faxed F: [phone].  04/11: Spoke with Rin. No beds at this time. Asked for the facility to review for clinical acceptance. Rin said she would f/u once she knows more and that the facility has a COVID outbreak at this time.      [facility]  04/06: Referral sent   04/07: Call and spoke with Kathryn in admissions. Referral not reviewed at this time, will call back with determination.   04/10: Left vm for admissions and awaiting a return call.   04/11: Left vm for admissions and awaiting a return call.      DECLINED:   --[facility]--due to no bed avail and acuity      --[facility] [place]--due to acuity   04/10: Asked admissions to  re-consider, will review tomorrow and f/u with determination.   04/11: Still declined due to acuity      --[facility] [place]--due to acuity (12 patients: 1 RN)     --[facility]--Only taking within [facility] system.    --[facility] (Fax: [phone])--Due to Acuity       --[facility]--Due to Acuity      CARIN Carpio   [facility]   Direct Phone: [phone]  I   Pager: [phone]  I  Fax: [phone]

## 2023-04-11 NOTE — PROGRESS NOTES
"Discharge Planner Post-Acute Rehab PT:     Discharge Plan: HCPT and 24/7 assist from fiance, checking for acceptance with TCU this week    Precautions: Tubefeed, L hemiparesis, alarms    Current Status:  Bed Mobility: CGA  Transfer: [facility] to [facility]  Gait: 150' FWW CGA  Stairs: 2 x 4 6\" steps bilat railings CGA to Linda  Balance: Able to sit unsupported, standing requires some UE support    PASS:  3/29: 13/36 4/4: 8/36    Assessment: Family training with pt's fiance today. Performed car transfer, stairs, STS transfers, ambulation, and furniture approach with patient and fiance. Pt performed well, fiance did a nice job guarding, reinforced how to properly hold gait belt and to be ready for assistance and steady pt if loss of balance begins to occur.    Other Barriers to Discharge (DME, Family Training, etc):  - Full flight of stairs to get to main level from basement entry in garage -  planning on staying on basement level with claus casiano  - Family training - performed 4/11  - w/c - ordered K3 4/10  "

## 2023-04-11 NOTE — PLAN OF CARE
FOCUS/GOAL  Mobility, Skin integrity, Psychosocial needs, Safety management, and Prevention of secondary complications    ASSESSMENT, INTERVENTIONS AND CONTINUING PLAN FOR GOAL:    Goal Outcome Evaluation:       Plan of Care Reviewed With: patient     Overall Patient Progress: no changeOverall Patient Progress: no change     Orientation fluctuates, garbled/slow speech. No changes during shift. Needs in reach and safety measures in place. Cycled TF @ 85ml/hr

## 2023-04-11 NOTE — PROGRESS NOTES
Discharge Planner Post-Acute Rehab OT:      Discharge Plan: Home with HCOT, assistance from SO and hired in help vs TCU     Precautions: Falls, left side hemiparesis, PEG tube,modifed textures and thickened liquids, slurred speech      Current Status:  ADLs:    Mobility: WC based. Ax1 Rahel Jarrell tsfs. Min-mod A with STS and stand pivot transfers with walker in therapy-fluctuates pending alertness and fatigue    Grooming: SBA seated in W/C. Mod A standing at sink      Dressing: UB- max A, LB-max A with rahel steady or total A with walker    Bathing: Liko lift to purple dependent shower chair, max A for bathing    Toileting: Ax1 rahel jarrell with toilet transfer and hygiene.   IADLs: Previously IND with all I/ADLs, lives separately from brynn swain and 2 kids live in [facility]   Vision/Cognition: cues to initiate and sustain action, does best w/ items presented 1 at a time or 1 step at a time and decreased stimulation in environment     Assessment:  Focused on morning ADL routine with use of walker. Pt continues to require mod A with STS and stand pivot transfers with walker. Completed FB dressing tasks at EOB to promote increased trunk control. Pt requires increased time to complete tasks d/t fatigue.    -5 d/t labs     Other Barriers to Discharge (DME, Family Training, etc):   Family training prior to discharge with S.O. and son (potential support from brynn, son, daughter)   Split-level home (full flight of stairs to get from main level from basement entry in garage)   Cognitive deficits   AE/DME: usaam pads, pull-ups, bed rail, rahel steady, grab bars, tub bench, bedside commode, and bidet attachment

## 2023-04-11 NOTE — PROGRESS NOTES
[facility]    RRT Note  4/11/2023   Time Called: 0943    RRT called for: Code Sepsis for elevated lactic acid level and hypothermia    Assessment & Plan     # Leukocytosis  # Urinary tract infection  Patient has had recent increase in WBCs starting 04/10 and recent diagnosis of E. Coli UTI. The patient had recently completed a course of Omnicef that ended 04/08.    # Query dehydration  # Diarrhea  The patient had a recent history of diarrhea and lack of PO intake on 04/10. Patient's family member at bedside stated that the patient was declining any PO intake because of the frequency of their diarrhea.    INTERVENTIONS:  - Blood cultures x2 sets  - PIV placed by ED nurse  - 1L LR bolus given  - Lactic acid recheck at 1200  - Omnicef reordered by ARU provider    At the end of the code sepsis, the patient remained in the ARU for continued monitoring.    Discussed with and defer further cares to ARU Hospitalist provider    Interval History     Stefan Diallo is a 77 year old male with past medical history of A-fib on warfarin therapy who presented to [facility] emergency room on 3/7/2023 for left-sided facial droop, arm and leg weakness. Patient's warfarin had been held since 2/25 for anticipated pain pump placement on 3/8/2023. Patient was at work when he went to sit in a chair and missed the chair falling onto his left side. His son, who is his boss, went to help and noted left sided facial droop, left sided weakness and activated EMS. CT showed increased attenuation within the R superior M2 branch of the MCA concerning for thromboembolus. His course was complicated by possible early sepsis due to aspiration PNA, afib, DMII management needs, and dysphagia requiring PEG tube placement.     Code Status: Full Code    Allergies   Allergies   Allergen Reactions     Bees Swelling     Reports using an epi pen if stung       Physical Exam   Vital Signs with Ranges:  Temp:  [96.7   F (35.9  C)-97  F (36.1  C)] 96.7  F (35.9  C)  Pulse:  [82-85] 82  Resp:  [16] 16  BP: (110-135)/(69-77) 130/75  SpO2:  [95 %-98 %] 95 %  I/O last 3 completed shifts:  In: 360 [NG/GT:360]  Out: -     Exam:  Constitutional: alert, no distress, cooperative and pale  Head: Normocephalic. No masses, lesions, tenderness or abnormalities  Neck: Neck supple. No adenopathy. Thyroid symmetric, normal size,, Carotids without bruits.  ENT: ENT exam normal, no neck nodes or sinus tenderness  Cardiovascular: negative findings: no murmurs, clicks, or gallops, positive findings: irregular rhythm  Respiratory: Percussion normal. Good diaphragmatic excursion. Lungs clear  Gastrointestinal: Abdomen soft, non-tender. BS normal. No masses, organomegaly  : Deferred  Musculoskeletal: extremities normal- no gross deformities noted, gait normal and normal muscle tone  Skin: no suspicious lesions or rashes  Neurologic: negative findings: mental status intact, positive findings: dysarthria present  Psychiatric: mentation appears normal and affect flat  Hematologic/Lymphatic/Immunologic: Normal cervical lymph nodes      Data       ABG:  -No lab results found in last 7 days.    Troponin:    No lab results found.    IMAGING: (X-ray/CT/MRI)   No results found for this or any previous visit (from the past 24 hour(s)).    CBC with Diff:  Recent Labs   Lab Test 04/11/23  0514 03/08/23  0455 03/07/23  1312   WBC 13.5*   < > 7.1   HGB 12.7*   < > 13.7   MCV 94   < > 94      < > 248   INR  --   --  1.12    < > = values in this interval not displayed.        Lactic Acid:    Lab Results   Component Value Date    LACT 2.5 04/11/2023           Comprehensive Metabolic Panel:  Recent Labs   Lab 04/11/23  0514 04/10/23  0724 04/10/23  0650     --  142   POTASSIUM 4.1  --  3.9   CHLORIDE 102  --  104   CO2 28  --  25   ANIONGAP 10  --  13   *   < > 189*   BUN 40.3*  --  36.2*   CR 0.85  --  0.70   GFRESTIMATED 88  --  >90   LUTHER 9.9  --   9.7   MAG  --   --  2.3   PHOS  --   --  2.8    < > = values in this interval not displayed.       INR:    Recent Labs   Lab Test 03/07/23  1312   INR 1.12       D-DIMER:  No results found for: DIMER    BNP:  Lab Results   Component Value Date     (H) 08/10/2022       UA:  No results for input(s): COLOR, APPEARANCE, URINEGLC, URINEBILI, URINEKETONE, SG, UBLD, URINEPH, PROTEIN, UROBILINOGEN, NITRITE, LEUKEST, RBCU, WBCU in the last 168 hours.    Time Spent on this Encounter   I spent 30 minutes on the unit/floor managing the care of Stefan Diallo. Over 50% of my time was spent counseling the patient and/or coordinating care regarding services listed in this note.    LEO Tucker CNP      This patient meets Sepsis Predictive Model Criteria (more info) and may be septic.    Predictive Model Details          8 (High)  Factor Value    Calculated 4/11/2023 09:49 21% SIRS temperature criterion met    Early Detection of Sepsis Model 18% Number of active cephalosporin orders 3     12% Age 77     12% SIRS WBC criterion met     10% Number of incisions 2     8% Diagnosis of hypertension present     5% Diagnosis of diabetes mellitus present     3% MCHC normal (32.7 g/dL)     2% RDW normal (14.0 %)     2% Hematocrit low (38.8 %)     2% Diagnosis of obesity present     2% Creatinine normal (0.85 mg/dL)     1% Clinically Relevant Sex not female     1% Number of feeding tubes 1     1% Number of active analgesic antipyretic orders 1     1% Number of pressure ulcers 1     0% Hemoglobin low (12.7 g/dL)     0% Number of active beta blocker orders 1     0% Number of active proton pump inhibitor orders 1        Most Recent Vitals:    Temp: 96.7  F (35.9  C) (04/11 0748)  Pulse: 82 (04/11 0748)  Resp: 16 (04/10 1500)  BP: 130/75 (04/11 0748)    Lab Results   Component Value Date    WBC 13.5 04/11/2023    WBC 10.9 10/29/2020       Click Accept to order Lactic Acid and repeat Vital signs OR   Click Open Order Set to access the  ED Sepsis Adult order set OR  Select appropriate Acknowledge Reason.

## 2023-04-11 NOTE — PLAN OF CARE
"Discharge Planner Post-Acute Rehab SLP:      Discharge Plan: home with S.O. 24/7 pt to reside in lower level of home, ongoing SLP     Precautions: PEG, aspiration risk     Current Status:  Hearing: WFL  Vision: WFL  Communication: Decreased vocal intensity  Cognition: Moderate cognitive linguistic impairment- reasoning, memory, attention  Swallow: Puree (4) texture, moderately thick (3) liquid BY TSP ONLY. 1:1 FEEDER AS PT WILL NOT IND INITIATE LIQUID BY TSP. Chair all meals if possible, fully upright, do NOT feed if not fully alert and agreeable. Recommend pt ok to resume ice chips for pleasure only at this time with the following precautions: thorough oral cares, limited # d/t aspiration risk, with SO only, must be fully alert and in chair (not in bed).      Assessment: Pt seen for meal session. Pt seated upright in bed and pleasant. He declined getting up into chair this AM. He is repeatedly asking for water. SLP facilitated oral care with return of mild secretions. Pt declined placing dentures. He consumed 2 oz mod thick liquid by spoon and 2 oz puree solids. He did have cough x1 on initial liquid sip, however improved timeliness with swallow initiation as he woke up more. Mod cues for slow pacing, alternating food/drink, and using spoon with liquid. Pt kept asking where the \"real water and straw is\". Reviewed VFSS results and aspiration risk. Discussed ice chip protocol after meals and oral cares - pt verbalized understanding. He had limited interest in meal today, do suspect dislike for texture was r/t this. Would be appropriate to trial minced solids with for meal.    Other Barriers to Discharge (Family Training, etc): diet texture training pending progress  "

## 2023-04-11 NOTE — LETTER
Recipient: Admissions at [facility]          Sender: STEVE Alcazar [phone]          Date: April 26, 2023  Patient Name:  Stefan Diallo  Patient YOB: 1945  Routing Message:  Please see attached Physician Orders.         The documents accompanying this notice contain confidential information belonging to the sender.  This information is intended only for the use of the individual or entity named above.  The authorized recipient of this information is prohibited from disclosing this information to any other party and is required to destroy the information after its stated need has been fulfilled, unless otherwise required by state law.    If you are not the intended recipient, you are hereby notified that any disclosure, copy, distribution or action taken in reliance on the contents of these documents is strictly prohibited.  If you have received this document in error, please return it by fax to [phone] with a note on the cover sheet explaining why you are returning it (e.g. not your patient, not your provider, etc.).  If you need further assistance, please call .  Documents may also be returned by mail to [employer], , [address], [place] [address].

## 2023-04-11 NOTE — PROGRESS NOTES
"  [facility]   Acute Rehabilitation Unit  Daily progress note    INTERVAL HISTORY  Stefan Diallo was seen and examined at bedside this morning with significant other present.  No acute events reported overnight.  Patient reports that he is feeling better overall, not \"drunk\" like he did before.  Thinks he slept well last night but still feeling tired this morning.  He notes \"very little\" ongoing cough and denies shortness of breath.  He denies any ongoing abdominal pain, nausea today.  Does complain of feeling thirsty.    Therapists and wife noting that patient did appear a little more confused yesterday afternoon and this morning as compared to days prior, and was needing more physical assist during PM therapy sessions than in AM.    Did trigger sepsis protocol, lactic acid was mildly elevated and code was called per protocol.    Functionally, he needs SBA seated in wheelchair, mod A standing at sink, max A for full body dressing.  Currently needing mod A for sit to stand and stand pivot transfers with walker.    MEDICATIONS    amantadine  100 mg Oral or Feeding Tube BID     apixaban ANTICOAGULANT  5 mg Per Feeding Tube BID     atenolol  12.5 mg Oral Daily     atorvastatin  20 mg Per Feeding Tube At Bedtime     diltiazem  60 mg Per Feeding Tube Q8H     fluticasone  1 spray Both Nostrils Daily     gabapentin  600 mg Per Feeding Tube At Bedtime     lactobacillus rhamnosus (GG)  1 capsule Per Feeding Tube BID     lidocaine  2 patch Transdermal Q24H     lidocaine   Transdermal Q8H LALO     metFORMIN  1,000 mg Oral or Feeding Tube BID w/meals     miconazole   Topical BID     mirtazapine  15 mg Oral At Bedtime     pantoprazole  40 mg Per Feeding Tube QAM AC     protein modular  1 packet Per Feeding Tube BID        acetaminophen, glucose **OR** dextrose **OR** glucagon, guaiFENesin, melatonin, - MEDICATION INSTRUCTIONS -, polyethylene glycol, senna-docusate     PHYSICAL EXAM  /77 " "  Pulse 85   Temp 97  F (36.1  C) (Oral)   Resp 16   Ht 1.854 m (6' 1\")   Wt 96.1 kg (211 lb 13.8 oz)   SpO2 98%   BMI 27.95 kg/m     Gen: NAD, lying in bed  HEENT: NC/AT, MMM  Cardio: RRR, no murmurs  Pulm: non-labored on room air, lungs CTA bilaterally  Abd: soft, non-tender, non-distended, bowel sounds present, +PEG  Ext: no edema in bilateral lower extremities  Neuro/MSK: awake, alert, left facial droop, +dysarthria and aphasia, left neglect, left hemiparesis, moving all extremities in bed    LABS  CBC RESULTS:   Recent Labs   Lab Test 04/11/23  0514 04/10/23  0650 04/06/23  0530   WBC 13.5* 11.9* 7.4   RBC 4.11* 4.17* 4.06*   HGB 12.7* 12.8* 12.4*   HCT 38.8* 38.8* 37.9*   MCV 94 93 93   MCH 30.9 30.7 30.5   MCHC 32.7 33.0 32.7   RDW 14.0 13.7 13.6    345 227     Last Basic Metabolic Panel:  Recent Labs   Lab Test 04/11/23  0514 04/10/23  2151 04/10/23  1922 04/10/23  0724 04/10/23  0650 04/06/23  0735 04/06/23  0530     --   --   --  142  --  142   POTASSIUM 4.1  --   --   --  3.9  --  3.9   CHLORIDE 102  --   --   --  104  --  103   CO2 28  --   --   --  25  --  24   ANIONGAP 10  --   --   --  13  --  15   * 126* 182*   < > 189*   < > 229*   BUN 40.3*  --   --   --  36.2*  --  21.5   CR 0.85  --   --   --  0.70  --  0.68   GFRESTIMATED 88  --   --   --  >90  --  >90   LUTHER 9.9  --   --   --  9.7  --  9.7    < > = values in this interval not displayed.     Recent Labs   Lab 04/11/23 0514 04/10/23  2151 04/10/23  1922 04/10/23  1205 04/10/23  0724 04/10/23  0650   * 126* 182* 143* 180* 189*       Rehabilitation - continue comprehensive acute inpatient rehabilitation program with multidisciplinary approach including therapies, rehab nursing, and physiatry following. See interval history for updates.      ASSESSMENT AND PLAN  Stefan Diallo is a 77 year old male with a past medical history of atrial fibrillation on coumadin though held PTA for planned procedure (pain pump " placement), chronic bilateral low back pain s/p lumbar fusion, type 2 diabetes mellitus c/b polyneuropathy, renal artery aneurysm, hypertension, hyperlipidemia, KISHA, GERD, BPH, and chronic bilateral shoulder pain who was admitted on 3/7/23 with acute right MCA stroke s/p tenecteplase with hospital course complicated by dysphagia s/p PEG placement on 3/9 complicated by PEG-site bleeding, suspected aspiration pneumonia, hyperglycemia, constipation, and hypokalemia.  He is now admitted to ARU on 3/16/23 for multidisciplinary rehabilitation and ongoing medical management.        Admission to acute inpatient rehab 03/16/23.    Impairment group code: Stroke Ischemic 01.1 (L) Body Involvement (R) Brain; s/p acute ischemic stroke of R MCA territory due to cardioembolism status post tenecteplase        1. PT, OT and SLP 60 minutes of each on a daily basis, in addition to rehab nursing and close management of physiatrist.       2. Impairment of ADL's: Noted to have impaired activity tolerance, impaired balance, impaired coordination, impaired judgement and safety awareness, impaired strength, impaired tone, impaired weight shifting, impulsiveness and pain, all affecting his ability to safely and independently perform basic ADLs.  Goal for assist of 1 with basic ADLs.     3. Impairment of mobility:  Noted to have impaired activity tolerance, impaired balance, impaired coordination, impaired judgement and safety awareness, impaired strength, impaired tone, impaired weight shifting, impulsiveness and pain, all affecting his ability to safely and independently perform basic mobility.  Goal for assist of 1 with basic mobility.     4. Impairment of cognition/language/swallow:  Noted to have dysarthria, aphasia, and impaired cognition with goals for improved cognitive-linguistic skills to meet basic needs.     5. Medical Conditions  New actions/orders/updates for today are in blue.     Acute R MCA stroke s/p tenecteplase 3/7, likely  "secondary to cardioembolism from atrial fibrillation off anticoagulation for an anticipated procedure  Initially on aspirin, later restarted on anticoagulation but with Eliquis in place of PTA warfarin.  - Continue apixaban 5 mg BID  - Continue PTA statin.  Goal LDL 40-70.  - Started on amantadine 100 mg daily 3/24, increased to BID (0700, 1300) on 3/24.  - Long term BP goal <135/80 to be achieved as outpatient within several weeks.  Management as below.  - Continue PT/OT/SLP  - Follow up with general neurology in 6-8 weeks    Vascular dementia  Neuropsych eval completed 3/30, see full report by Dr. Paulson.  In summary, impairments noted in most cognitive domains.  Does meet criteria for major neurocognitive disorder.   - Recommended that POA should be enacted and patient should receive assistance in complex decision-making.    Recurrent Leukocytosis  WBC mildly elevated on 4/10 at 11.9 after completion of antibiotic course for UTI as below.  Complained of upset stomach on 4/10, exam benign, had 1 soft BM today.  No other localizing signs/symptoms of infection.  - WBC up slightly again today at 13.5.  CRP significantly improved at 9.98 (240), procal in process  - Discussed with IM, recommended to repeat blood cultures and UA.  Resume empiric Omnicef with suspicion for inadequately treated UTI.    - Recently resumed on PO diet, high aspiration risk as above.  Lungs clear, patient reports cough is very minimal at this point.  Afebrile.  If no other clear source or develops respiratory symptoms, consider repeat CXR.  - Lactic acid mildly elevated 2.5.  Evaluated by rapid response team per protocol.  Felt likely component of dehydration given BUN elevated, patient complains of feeling \"thirsty\", has had intermittent loose stools.  Giving 1L IVF, repeat lactic after.  Consider broadening antibiotics if lactic remains elevated.    E. Coli UTI, treated  Sepsis, resolved  Increased lethargy, tone, need for assist noted on " 4/2 AM.  WBC elevated at 26.7.  Also with elevated inflammatory markers, elevated lactic acid, and mildly elevated high sensitive troponin (through improved from 3 wks ago).  Stat CT head showed evolution of known infarct but no acute intracranial pathology.  CXR shows possible left lower lobe airspace disease.  UA concerning for UTI.  Patient was started on empiric Zosyn and s/p 1L LR on 4/2 and repeated on 4/3.  MRSA nares, COVID PCR negative.  Per IM, suspect UTI as primary infection.  Procal only mildly elevated, suggesting against systemic infection.  Blood cultures negative.  UC with E coli.  WBC normalized on 4/4.  Completed 7-day course of antibiotics (Zosyn 4/2 -> ceftriaxone 4/4 -> omnicef 4/6-4/8).  - As above, repeat UA given recurrent leukocytosis and increasing confusion after completion of antibiotic course.  Resuming Omnicef empirically.    Atrial fibrillation  [PTA on warfarin, diltiazem]  On chronic anticoagulation with warfarin.  However, had been held since 2/25 for planned pain pump placement. PTA long-acting extended release diltiazem cannot be given through the PEG tube, substituted short-acting. Started on DOAC in place of PTA warfarin on 3/14.  - Continue Eliquis 5 mg BID  - Continue short acting diltiazem 60 mg TID while NPO  - Continue to monitor     Dyslipidemia  [PTA meds: atorvastatin 20 mg daily, gemfibrozil 600 mg BID]  - Continue PTA statin       Hypertension  [PTA meds: atenolol 25 mg daily, diltiazem  mg daily, lisinopril 5 mg daily]  Resuming PTA meds gradually. PTA diltiazem increased to 60 mg TID on 3/14/23.   - Long term BP <130/80, inpatient meds can be slowly titrated to this goal as otherwise appropriate  - Continue diltiazem 60 mg q8h  - Continue atenolol 12.5 mg daily (resumed on 4/7)  - BP overall has been more stable, continue to monitor  - Resume PTA lisinopril as indicated    Possible aspiration pneumonia  Met criteria for early sepsis.  Started on unasyn + vanco  "3/11.  MRSA nares negative.  BC's still negative at discharge.  Stopped vancomycin 3/13.  Changed to FT augmentin 3/15 with a plan for 7 total days abx, completed as of 3/18.  - Monitor respiratory status, SLP for dysphagia treatment as below    Cough  Patient has been complaining of persistent dry cough.  Afebrile, WBC WNL, satting well on room air.  Observed to be intermittent and mild.  Lungs clear on exam.  Patient complains this is disruptive for sleep.  - Robitussin PRN  - Added flonase at night for possible post-nasal drip on 3/29  - Noted to have LLL crackles per hospitalist on 4/2.  CXR with \"cardiomegaly with mild interstitial opacities, suggestive of early interstitial edema.  No consolidation to suggest aspiration pneumonia.\"     KISHA  - Has been refusing home CPAP, may be contributor to daytime somnolence. Discussed with patient who reports that he has not been using because he does not sleep as well with CPAP.  Also reports that he did not use just PTA for the same reason, unclear if/when used regularly.  Reviewed risks of untreated sleep apnea but may require ongoing discussion given impaired cognition.  - Preferring to use oxygen by NC with sleep over CPAP  - Significant other notes patient was consistently using CPAP PTA, but was not tolerating well earlier this admission due to inability to adjust the mask himself if displaced.  Now that he has improving function, she feels it would be good to re-attempt.  However, patient continued to decline due to mask not fitting well.  RT consulted.  Limited mask options available inpatient, recommended to contact home CPAP clinic for alternative mask.     Type II DM  Tube feeding induced hyperglycemia  Polyneuropathy  [PTA meds: metformin 500 mg qAM + 1000 mg qPM, gabapentin 600 mg at HS]  A1c 6.4% on 2/1/23.  PTA metformin held this admission.  Managed on small doses of Lantus and sliding scale insulin.  Stopped Lantus on 3/17, resumed home metformin " initially at 500 mg BID, increased to PTA dose as of 3/20, and to 1000 mg BID on 3/21.  Recent Labs   Lab 04/11/23  0514 04/10/23  2151 04/10/23  1922 04/10/23  1205 04/10/23  0724 04/10/23  0650   * 126* 182* 143* 180* 189*   - Continue home gabapentin  - Continue metformin 1000 mg BID (resumed on 4/4 after brief hold due to lactic acidosis, ultimately suspected 2/2 urosepsis).    - Monitor BG TID AC + HS.  Likely can decrease to BID in coming days given BG overall well-controlled, now on metformin only.  - Hypoglycemia protocol     Chronic back pain  Patient planned for pain pump placement 3/8/23 (Javi).  States he has chronic back pain from nerve impingement and uses tylenol PTA for pain.   No complaints of pain at ARU.  - Continue Tylenol prn for pain  - Continue PTA gabapentin 600 mg at HS  - Has not been complaining of any pain much of ARU stay.  However, therapies noting over beginning ~3/29, patient has been complaining of more right hip pain and is protecting weightbearing on that side.  Added lidocaine patch.  - Monitor     Moderate oral, severe oropharyngeal dysphagia  S/p PEG placement 3/9  Moderate malnutrition in the context of acute on chronic illness  - Cycled tube feeds per PEG (switched from continuous on 3/18).  Per discussion with team, adjusted timing to 2pm-6am (starting 3/29) in hopes to encourage better oral intake at breakfast.  Also started remeron 3/29 as below.  - Repeat VFSS on 3/22.  Per SLP, patient demonstrated significant improvement compared to prior VFSS on 3/9 though still aspiration with thin and mildly thick liquids.  As of 3/24, started on minced & moist solids (5) and moderately thick liquids (3) by TSP only; alternate food/drink and 1:1 assist with feeding.  - Given increased symptoms of dysphagia starting 4/2 in setting of urosepsis, made NPO.  Per SLP assessment, mentation and clinical status remain barrier to safe resumption of PO intake.  Was only eating minimal  amounts even prior to this so should not impact tube feed needs.  - Continue water flushes 180 mL q4h  - Per SLP, cautiously resumed on oral diet 4/7: puree (level 4) diet, moderately thick (level 3) liquids by tsp.  Up to chair for all meals, 1:1 assist.  - RD following, assistance appreciated  - Continue SLP  - Supplements added per RD  - Tube feeding PLC completed on 4/3     Loose stools, recurrent  Constipation, resolved  - Continue senokot-S 2 tabs BID PRN, Miralax PRN  - Recent loose stools in setting of antibiotics for UTI, possible additional contributors include resumption of metformin after holding a few days and increased reliance on tube feeds now NPO.  Added probiotic on 4/6.  Loose stools seem to be improving.  Continue to monitor.      GERD  - Continue pantoprazole as auto-sub for PTA on omeprazole 20 mg daily    Suspected HSV lesion, left upper lip, resolved  - Abreva completed x7 days    Anemia, mild, normocytic  Has been trending 12s-13s.  Dropped slightly on 3/27 at 11.9.  No evidence of active bleeding.  - Continue to trend CBC every M/Th.  4/11: Hgb stable at 12.7    Sacral pressure injury, stage 2  - WOCN consulted, appreciate assistance  - Wound care per their recs    Depressed mood  Despite amantadine, still noting fatigue, decreased motivation/engagement.  Also continues to complain of poor sleep.  Per Dr. Stinson, trial remeron 7.5 mg at bedtime started 3/29 for mood, sleep, appetite.  Tolerating well, still c/o poor sleep, increased to 15 mg on 3/31.   - Continue Remeron 15 mg at HS      6. Adjustment to disability:  Clinical psychology to eval and treat if indicated  7. FEN: pureed (level 4) diet, moderately thick (level 3) liquids by tsp, cycled nocturnal tube feeds via PEG  8. Bowel: incontinent, monitor, PRN bowel meds available  9. Bladder: continent/incontinent, using Primofit at night, will need to wean  10. DVT Prophylaxis: apixaban  11. GI Prophylaxis: PPI  12. Code:  full  13. Disposition: TCU vs home pending acceptance/authorization and needed level of assist  14. ELOS:  pending acceptance/discharge location  15. Follow up Appointments on Discharge: PCP in 1-2 weeks, general neurology in 6-8 weeks      Patient was discussed with Dr. Liu Stinson, PM&R staff physician and Dr. Culp, IM physician    Kenna Ruiz, PA-C  Physical Medicine & Rehabilitation

## 2023-04-11 NOTE — PROGRESS NOTES
Per ARU PAKenna, patient placement called and pt will most likely transfer to the hospital for additional medical w/u. Met with pt brynn Devries ([email or web address]) at bedside. Discussed hypothetical options from the hospital for discharge as it relates to insurance coverage, including home, TCU, vs return to ARU. If not returning to the hospital, this writer and IDT were working on TCU placement for a short rehab stay, while knowing that it was less likely that the insurance would approve (as they typically won't auth TCU from ARU). TCU updates in this writer last SW note from today. If insurance denied or if no facilities willing to accept, plan was for pt to go home. RN JAMAICA Dunbar was following from HC and HI standpoint. Caregiver training was initiated with Kayce and ongoing. Kayce planned to sell her home and move into pt's home. Pt has a walk-out basement with a bathroom where they could stay. Kayce works from home and was planning to look into hiring in help.     During pt stay, referral to the [facility] was completed on pt behalf. Neuropsychology testing completed and determined that pt could sign a HCD/POA. Pt is not legally , Kayce is his fiance. Pt has 3 adult children, one who is physically disabled per Kayce report and other child who does not have a relationship with pt. Pt and Kayce were given (03/29/23) a blank copy of HCD and POA documents. Kayce knows of a notary who could witness once those documents were completed. As of today, checked in with Kayce who confirmed that those documents were not completed yet. Pt and Kayce also given [employer] mobility application but not completed at this time either.     Team anticipated overnight O2 at discharge and anticipate may still need once discharge home.     This writer available if questions or concerns. No additional needs at this time.     Sharonda Ramires, [facility]hab   Direct Phone:  [phone]  I   Pager: [phone]  I  Fax: [phone]

## 2023-04-11 NOTE — PLAN OF CARE
Care Coordination:     Discharge back to hospital     Patient has open referral to [facility] for TF management.     Per therapies here- recommended ongoing HC for PT/OT/SLP/ RN and HHA. Writer sent initial referral to [facility]. They did not decline patient, but were asking for a clinical update closer to discharge to see if his progress improved. Writer had not requested another clinical review of chart yet, as discharge date was still TBD. [facility] was following chart as well.     Writer updated both [facility] liasion Lula, and [facility] Meme of discharge back to hospital.     Dora has completed the following education with [facility] on 4/3/23: Tube Feeding, Anticoagulation, and Stroke.

## 2023-04-11 NOTE — DISCHARGE SUMMARY
[facility]   Acute Rehabilitation Unit  Discharge summary     Date of Admission: 3/16/2023  Date of Discharge: 4/11/2023  Disposition: [facility]  Primary Care Physician: Collin Singh  Attending physician: Liu Stinson MD      DISCHARGE DIAGNOSIS      Possible early sepsis (elevated lactic acid, leukocytosis, encephalopathy)    Acute R MCA stroke     Left hemiparesis, impaired balance, impaired coordination, impaired proprioception, dysarthria, dysphagia, moderate cognitive linguistic impairment    Vascular dementia    Atrial fibrillation    Dyslipidemia    Hypertension    Type II diabetes mellitus c/b diabetic neuropathy    Hx KISHA    Chronic back pain    Moderate malnutrition in the context of acute on chronic illness s/p PEG placement 3/9/23    Recent loose stools    Hx GERD    Anemia    Depressed mood      BRIEF SUMMARY  Stefan Diallo is a 77 year old male with a past medical history of atrial fibrillation on coumadin though held PTA for planned procedure (pain pump placement), chronic bilateral low back pain s/p lumbar fusion, type 2 diabetes mellitus c/b polyneuropathy, renal artery aneurysm, hypertension, hyperlipidemia, KISHA, GERD, BPH, and chronic bilateral shoulder pain who was admitted on 3/7/23 with acute right MCA stroke s/p tenecteplase with hospital course complicated by dysphagia s/p PEG placement on 3/9 complicated by PEG-site bleeding, suspected aspiration pneumonia, hyperglycemia, constipation, and hypokalemia.  He was admitted to ARU on 3/16/23 for multidisciplinary rehabilitation and ongoing medical management.    Patient had a prolonged rehab course, complicated by extent of his deficits and fluctuating levels of alertness.  He had recently been treated for UTI, which was associated with significant functional decline and worsened encephalopathy after prior improvement.  After several days of treatment, he was noted to be improving.  "However, he then developed recurrent leukocytosis, worsening confusion, and elevated lactic acid.  He was evaluated by rapid response team and restarted on empiric antibiotics.  Given lactic acid continued to trend up despite IVF bolus, recommended to transfer to higher level of care for expedited infectious work-up and ongoing management.  Additional details regarding his course at ARU as below.    REHABILITATION COURSE  PT: Discharged back to acute hospital.    Current Status:  Bed Mobility: CGA  Transfer: Linda to [facility]  Gait: 150' FWW [facility]  Stairs: 2 x 4 6\" steps bilat railings CGA to Linda  Balance: Able to sit unsupported, standing requires some UE support    OT: Discharged back to acute hospital.    Current Status:  ADLs:    Mobility: WC based. Ax1 Rahel Jarrell tsfs. Min-mod A with STS and stand pivot transfers with walker in therapy-fluctuates pending alertness and fatigue    Grooming: SBA seated in W/C. Mod A standing at sink      Dressing: UB- max A, LB-max A with rahel steady or total A with walker    Bathing: Liko lift to purple dependent shower chair, max A for bathing    Toileting: Ax1 rahel wesabimbola with toilet transfer and hygiene.   IADLs: Previously IND with all I/ADLs, lives separately from brynn swain and 2 kids live in [facility]   Vision/Cognition: cues to initiate and sustain action, does best w/ items presented 1 at a time or 1 step at a time and decreased stimulation in environment    SLP: Discharged back to acute hospital.    Current Status:  Hearing: WFL  Vision: WFL  Communication: Decreased vocal intensity  Cognition: Moderate cognitive linguistic impairment- reasoning, memory, attention  Swallow: Puree (4) texture, moderately thick (3) liquid BY TSP ONLY. 1:1 FEEDER AS PT WILL NOT IND INITIATE LIQUID BY TSP. Chair all meals if possible, fully upright, do NOT feed if not fully alert and agreeable. Recommend pt ok to resume ice chips for pleasure only at this time with the following " precautions: thorough oral cares, limited # d/t aspiration risk, with SO only, must be fully alert and in chair (not in bed).     MEDICAL COURSE    Recurrent leukocytosis  Elevated lactic acid  E. Coli UTI, treated  Sepsis  Increased lethargy, tone, need for assist noted on 4/2 AM.  WBC elevated at 26.7. Also with elevated inflammatory markers, elevated lactic acid, and mildly elevated high sensitive troponin (through improved from 3 wks ago).  Stat CT head showed evolution of known infarct but no acute intracranial pathology.  CXR shows possible left lower lobe airspace disease.  UA concerning for UTI.  Patient was started on empiric Zosyn and s/p 1L LR on 4/2 and repeated on 4/3.  MRSA nares, COVID PCR negative.  Per IM, suspect UTI as primary infection.  Procal only mildly elevated, suggesting against systemic infection.  Blood cultures negative.  UC with E coli.  WBC normalized on 4/4.  Completed 7-day course of antibiotics (Zosyn 4/2 -> ceftriaxone 4/4 -> omnicef 4/6-4/8).  Patient demonstrated significant improvement in mental status and functional performance starting on about day 4 of treatment.  However, on 4/10, noted to have mild recurrent leukocytosis and later with some worsening confusion again.  On 4/11, WBC increased to 13.5.  Repeat blood cultures and UA ordered and restarted Omnicef.  However, then triggered sepsis protocol and lactic acid elevated at 2.5.  Evaluated by rapid response team per protocol.  Received 1L LR IV, repeat lactic worsened to 3.5.  Discussed with code team, recommended to start empiric Rocephin and initiate transfer to higher level of care for expedited infectious work-up and ongoing treatment.  ?incompletely treated UTI vs other source such as aspiration pneumonia (though no respiratory symptoms is at high risk) vs other.  - Received 1 dose of IV ceftriaxone 2g on 4/11  - Repeat blood cultures in progress  - Repeat lactic acid and CXR also in process    Acute R MCA  stroke s/p tenecteplase 3/7, likely secondary to cardioembolism from atrial fibrillation off anticoagulation for an anticipated procedure  Left hemiparesis, impaired balance, impaired coordination, impaired proprioception, dysarthria, dysphagia, moderate cognitive linguistic impairment  Initially on aspirin, later restarted on anticoagulation but with Eliquis in place of PTA warfarin.  - Continue apixaban 5 mg BID  - Continue PTA statin.  Goal LDL 40-70.  - Started on amantadine 100 mg daily 3/24, increased to BID (0700, 1300) on 3/24.  - Long term BP goal <135/80 to be achieved as outpatient within several weeks.  Management as below.  - Continue PT/OT/SLP  - Would likely benefit from TCU after optimized medically for ongoing rehab given slow rehab progress, extent of deficits, home set-up.  Was already initiated by ARU team prior to acute illness.  - Follow up with general neurology in 6-8 weeks     Vascular dementia  Neuropsych eval completed 3/30, see full report by Dr. Paulson.  In summary, impairments noted in most cognitive domains.  Does meet criteria for major neurocognitive disorder.   - Recommended that POA should be enacted and patient should receive assistance in complex decision-making.     Atrial fibrillation  [PTA on warfarin, diltiazem]  On chronic anticoagulation with warfarin.  However, had been held since 2/25 for planned pain pump placement. PTA long-acting extended release diltiazem cannot be given through the PEG tube, substituted short-acting. Started on DOAC in place of PTA warfarin on 3/14.  - Continue Eliquis 5 mg BID  - Continue short acting diltiazem 60 mg TID while NPO     Dyslipidemia  [PTA meds: atorvastatin 20 mg daily, gemfibrozil 600 mg BID]  - Continue PTA statin       Hypertension  [PTA meds: atenolol 25 mg daily, diltiazem  mg daily, lisinopril 5 mg daily]  Resuming PTA meds gradually. PTA diltiazem increased to 60 mg TID on 3/14/23. Resumed atenolol at lower dose on 4/7  "due to elevated BP.   - Long term BP <130/80, inpatient meds can be slowly titrated to this goal as otherwise appropriate  - Continue diltiazem 60 mg q8h  - Continue atenolol 12.5 mg daily  - Resume PTA lisinopril as indicated     Possible aspiration pneumonia 3/11/23  Met criteria for early sepsis ~3/11.  Started on unasyn + vanco 3/11.  MRSA nares negative.  BC's still negative at discharge.  Stopped vancomycin 3/13.  Changed to FT augmentin 3/15 with a plan for 7 total days abx, completed as of 3/18.  - Monitor respiratory status, SLP for dysphagia treatment as below     Cough, improving  Patient has been complaining of intermittent dry cough earlier during ARU stay, especially at night.  Afebrile, WBC WNL, satting well on room air.  Observed to be intermittent and mild.  Lungs clear on exam.  Added flonase at night for possible post-nasal drip on 3/29, which patient reported was very helpful.  CXR on 4/2 (as part of infectious work-up due to functional change and leukocytosis) with \"cardiomegaly with mild interstitial opacities, suggestive of early interstitial edema. No consolidation to suggest aspiration pneumonia.\" Patient reports \"very little\" ongoing cough at time of discharge from ARU.  - Continue to monitor     KISHA  Had been refusing home CPAP, may be contributor to daytime somnolence. Discussed with patient who reports that he has not been using because he does not sleep as well with CPAP.  Also reports that he did not use just PTA for the same reason, unclear if/when used regularly.  Mask not fitting well. RT consulted. Limited mask options available inpatient, recommended to contact home CPAP clinic for alternative mask.  Not yet received at time of discharge from ARU.  - Preferring to use oxygen by NC with sleep over CPAP  - Consider follow-up for CPAP re-fitting after discharge     Type II DM  Tube feeding induced hyperglycemia  Polyneuropathy  [PTA meds: metformin 500 mg qAM + 1000 mg qPM, " gabapentin 600 mg at HS]  A1c 6.4% on 2/1/23.  PTA metformin held this admission.  Managed on small doses of Lantus and sliding scale insulin.  Stopped Lantus on 3/17, resumed home metformin initially at 500 mg BID, increased to PTA dose as of 3/20, and to 1000 mg BID on 3/21.  - Continue home gabapentin  - Holding metformin starting 4/11 given lactic acidosis  - Monitor BG TID AC + HS  - Hypoglycemia protocol     Chronic back pain  Patient planned for pain pump placement 3/8/23 (Javi).  States he has chronic back pain from nerve impingement and uses tylenol PTA for pain.   No complaints of pain at ARU.  - Continue Tylenol prn for pain  - Continue PTA gabapentin 600 mg at HS  - Lidocaine patch added for some complaints of increased right hip pain starting ~3/29.  - Monitor     Moderate oral, severe oropharyngeal dysphagia  S/p PEG placement 3/9  Moderate malnutrition in the context of acute on chronic illness  Admitted to ARU NPO on continuous tube feeds.  Switched to cycled tube feeds on 3/18.  Repeat VFSS on 3/22.  Per SLP, patient demonstrated significant improvement compared to prior VFSS on 3/9 though still aspiration with thin and mildly thick liquids.  As of 3/24, started on minced & moist solids (5) and moderately thick liquids (3) by TSP only; alternate food/drink and 1:1 assist with feeding.  Was only eating small amounts.  Tube feeding PLC completed on 4/3 with significant other.  Given increased symptoms of dysphagia starting 4/2 in setting of urosepsis, made NPO. With improvement in mental status, SLP cautiously resumed on oral diet 4/7.  - Continue cycled tube feeds per PEG at 85 ml/hr from 2pm-6am   - Continue water flushes 180 mL q4h  - Continue puree (level 4) diet, moderately thick (level 3) liquids by tsp.  Up to chair for all meals, 1:1 assist.  Low threshold to hold if mental status worsening.  - Continue SLP  - RD consulted, appreciate ongoing assist     Loose stools, recurrent  Has fluctuated  between constipation and loose stools this admission.  Recently has tended towards loose stools in setting of increased reliance on tube feeds and antibiotics for UTI.  Had mild abdominal pain on 4/10 but not ongoing 4/11.  Abdomen soft, non-tender, no rebound of guarding.  Last BM on 4/10.  - Continue probiotic  - Continue senokot-S and Miralax PRN      GERD  - Continue pantoprazole as auto-sub for PTA on omeprazole 20 mg daily     Anemia, mild, normocytic  Has been trending 12s-13s.  Dropped slightly on 3/27 at 11.9.  No evidence of active bleeding.  Hgb stable at 12.7 on 4/11.  - Continue to trend CBC      Sacral pressure injury, stage 2, healed  - WOCN consulted, appreciate assistance     Depressed mood  Despite amantadine, still noting fatigue, decreased motivation/engagement.  Also continues to complain of poor sleep.  Per Dr. Stinson, trial remeron 7.5 mg at bedtime started 3/29 for mood, sleep, appetite.  Tolerating well, still c/o poor sleep, increased to 15 mg on 3/31.   - Continue Remeron 15 mg at HS      DISCHARGE MEDICATIONS  Current Discharge Medication List      START taking these medications    Details   amantadine (SYMMETREL) 100 MG capsule 1 capsule (100 mg) by Oral or Feeding Tube route 2 times daily    Associated Diagnoses: Cerebrovascular accident (CVA) due to occlusion of left middle cerebral artery (H)      atenolol (TENORMIN) 25 MG tablet Take 0.5 tablets (12.5 mg) by mouth daily    Associated Diagnoses: Chronic atrial fibrillation (H)      cefTRIAXone (ROCEPHIN) 2 GM vial Inject 2 g into the vein every 24 hours    Associated Diagnoses: Acute cystitis without hematuria      fluticasone (FLONASE) 50 MCG/ACT nasal spray Spray 1 spray into both nostrils daily    Associated Diagnoses: Acute cough      lactobacillus rhamnosus, GG, (CULTURELL) capsule 1 capsule by Per Feeding Tube route 2 times daily    Associated Diagnoses: Cerebrovascular accident (CVA) due to occlusion of left middle  cerebral artery (H)      Lidocaine (LIDOCARE) 4 % Patch Place 2 patches onto the skin every 24 hours To prevent lidocaine toxicity, patient should be patch free for 12 hrs daily.    Associated Diagnoses: Chronic bilateral low back pain without sciatica      melatonin 5 MG tablet Take 1 tablet (5 mg) by mouth nightly as needed for sleep    Associated Diagnoses: Cerebrovascular accident (CVA) due to occlusion of left middle cerebral artery (H)      metFORMIN (GLUCOPHAGE) 1000 MG tablet 1 tablet (1,000 mg) by Oral or Feeding Tube route 2 times daily (with meals)    Associated Diagnoses: Type II diabetes mellitus with peripheral circulatory disorder (H)      miconazole (MICATIN) 2 % external cream Apply topically 2 times daily    Associated Diagnoses: Cerebrovascular accident (CVA) due to occlusion of left middle cerebral artery (H)      mirtazapine (REMERON) 15 MG tablet Take 1 tablet (15 mg) by mouth At Bedtime    Associated Diagnoses: Cerebrovascular accident (CVA) due to occlusion of left middle cerebral artery (H)         CONTINUE these medications which have CHANGED    Details   senna-docusate (SENOKOT-S/PERICOLACE) 8.6-50 MG tablet 2 tablets by Per Feeding Tube route 2 times daily as needed for constipation    Associated Diagnoses: Cerebrovascular accident (CVA) due to occlusion of left middle cerebral artery (H)         CONTINUE these medications which have NOT CHANGED    Details   apixaban ANTICOAGULANT (ELIQUIS) 5 MG tablet 1 tablet (5 mg) by Per Feeding Tube route 2 times daily    Associated Diagnoses: Cerebrovascular accident (CVA) due to occlusion of left middle cerebral artery (H)      atorvastatin (LIPITOR) 20 MG tablet 1 tablet (20 mg) by Oral or Feeding Tube route At Bedtime    Associated Diagnoses: Cerebrovascular accident (CVA) due to occlusion of left middle cerebral artery (H)      CONTOUR NEXT TEST test strip USE 1 EACH AS DIRECTED DAILY.  Qty: 100 strip, Refills: 5    Associated Diagnoses: Type 2  diabetes mellitus with diabetic peripheral angiopathy without gangrene (H); Encounter for medication refill      diltiazem (CARDIZEM) 60 MG tablet 1 tablet (60 mg) by Per Feeding Tube route every 8 hours    Associated Diagnoses: Cerebrovascular accident (CVA) due to occlusion of left middle cerebral artery (H)      gabapentin (NEURONTIN) 250 MG/5ML solution 12 mLs (600 mg) by NG or Feeding tube route At Bedtime    Associated Diagnoses: Cerebrovascular accident (CVA) due to occlusion of left middle cerebral artery (H)      pantoprazole (PROTONIX) 2 mg/mL SUSP suspension 20 mLs (40 mg) by Per Feeding Tube route every morning (before breakfast)    Associated Diagnoses: Cerebrovascular accident (CVA) due to occlusion of left middle cerebral artery (H)      acetaminophen (TYLENOL) 325 MG/10.15ML solution 20.3 mLs (650 mg) by Per NG tube route every 4 hours as needed for mild pain or fever (for temperature greater than 100.4 F (38 C) - may also be used for moderate pain)    Associated Diagnoses: Cerebrovascular accident (CVA) due to occlusion of left middle cerebral artery (H)         STOP taking these medications       amoxicillin-clavulanate (AUGMENTIN) 400-57 MG/5ML suspension Comments:   Reason for Stopping:         insulin glargine (LANTUS PEN) 100 UNIT/ML pen Comments:   Reason for Stopping:         petrolatum-zinc oxide (SENSI-CARE) 49-15 % OINT ointment Comments:   Reason for Stopping:         polyethylene glycol (MIRALAX) 17 GM/Dose powder Comments:   Reason for Stopping:                 DISCHARGE INSTRUCTIONS AND FOLLOW UP  Discharge Procedure Orders   Follow Up and recommended labs and tests   Order Comments: Follow up with hospitalist for ongoing management.    When you are discharged from the hospital, we would recommend the following:     Follow up with PCP in 1-2 weeks.    Follow up with general neurology in 4-6 weeks.    Follow up with PM&R in 6-8 weeks.     Reason for your hospital stay   Order Comments:  You were admitted for rehabilitation following a stroke.  You are being re-admitted to the hospital due to elevated lactic acidosis, concern for possible sepsis.     Glucose monitor nursing POCT   Order Comments: Before meals and at bedtime     Intake and output   Order Comments: Every shift     Daily weights   Order Comments: Call Provider for weight gain of more than 2 pounds per day or 5 pounds per week.     IV access   Order Comments: Peripheral IV.     Activity - Up with nursing assistance     Order Specific Question Answer Comments   Is discharge order? Yes      Full Code     Order Specific Question Answer Comments   Code status determined by: Discussion with patient/ legal decision maker      Nutrition Services Adult IP Consult   Order Comments: Reason:  tube feeding     Physical Therapy Adult Consult   Order Comments: Evaluate and treat as clinically indicated.    Reason:  stroke     Occupational Therapy Adult Consult   Order Comments: Evaluate and treat as clinically indicated.    Reason:  stroke     Speech Language Path Adult Consult   Order Comments: Evaluate and treat as clinically indicated.    Reason:  stroke     Adult Formula Tube Feeding   Order Comments: Follow this regimen upon discharge:  Adult Formula Drip Feeding: Specified Time Jevity 1.5; Gastrostomy; Goal Rate: 85; mL/hr; From: 2:00 PM; To: 6:00 AM  Free water flushes 180 mL q4h.     Fall precautions     Diet   Order Comments: Follow this diet upon discharge: Orders Placed This Encounter      Adult Formula Drip Feeding: Specified Time Jevity 1.5; Gastrostomy; Goal Rate: 85; mL/hr; From: 2:00 PM; To: 6:00 AM      Room Service      Pureed Diet (level 4) Liquidized/Moderately Thick (level 3) (BY TSP ONLY)  Liquid BY TSP ONLY. 1:1 FEEDER AS PT WILL NOT IND INITIATE LIQUID BY TSP. Chair all meals if possible, fully upright, do NOT feed if not fully alert and agreeable.     Order Specific Question Answer Comments   Is discharge order? Yes            PHYSICAL EXAMINATION    Most recent Vital Signs:   Vitals:    04/10/23 1500 04/10/23 1924 04/11/23 0159 04/11/23 0748   BP: 110/69 118/70 135/77 130/75   BP Location: Left arm   Right arm   Patient Position:       Cuff Size:       Pulse: 85   82   Resp: 16      Temp: 97  F (36.1  C)   (!) 96.7  F (35.9  C)   TempSrc: Oral   Oral   SpO2: 98%   95%   Weight:       Height:           Please see today's progress note by this writer on this date for exam.    50 minutes spent in discharge, including >50% in counseling and coordination of care, medication review and plan of care recommended on follow up.     Patient was evaluated on day of discharge by attending physician, Liu Stinson MD, who agrees with plan of care.    Discharge summary was forwarded to Collin Singh (PCP) at the time of discharge, so as to bridge from hospital to outpatient care.     It was our pleasure to care for Stefan Diallo during this hospitalization. Please do not hesitate to contact me should there be questions regarding the hospital course or discharge plan.          Kenna Ruiz PA-C  Physical Medicine and Rehabilitation

## 2023-04-12 NOTE — PLAN OF CARE
Goal Outcome Evaluation:      Plan of Care Reviewed With: patient, spouse    Overall Patient Progress: no changeOverall Patient Progress: no change    Outcome Evaluation: Cotinue cycled TF/water flushes, po diet per SLP, see RD progress note for full assessment details

## 2023-04-12 NOTE — PROGRESS NOTES
6MS ADMISSION    D: Patient admitted/transferred from 5 rehab via wheelchair for CVA.     I: Upon arrival to the unit patient was oriented to room, unit, and call light. Patient s height, weight, and vital signs were obtained. Allergies reviewed and allergy band applied. Provider notified of patient s arrival on the unit. Adult AVS completed. Head to toe assessment completed. Education assessment completed. Care plan initiated.    A: Vital signs stable upon admission. Patient rates pain at 0. Two RN skin assessment completed Yes Second RN was Willie iLttle. Significant Skin Findings include none. [facility] Nurse Consult Ordered No. Bed Algorithm can be found in PCS flow sheets (Support Surface Algorithm) and on IP [facility] NURSE RESOURCE TAB, was this used during this assessment? No. Was a pulsate mattress ordered No.    P: Continue to monitor patient s weakness and intervene as needed. Continue with plan of care. Notify provider with any concerns or changes in patient status.

## 2023-04-12 NOTE — H&P
[facility]    History and Physical - Hospitalist Service       Date of Admission:  (Not on file)    Assessment & Plan      A: Patient is a 78 y/o man who has multiple medical problems including diabetes mellitus type II, atrial fibrillation, hypertension and hyperlipidemia. Patient had been hospitalized from 07-Mar-2023 to 16-Mar-2023 at [facility] with acute ischemic stroke of right MCA territory due to cardioembolism. Patient received tenecteplase on 07-Mar-2023. During this hospital stay, patient also had possible early sepsis secondary to aspiration pneumonia. Patient was transferred to acute rehabilitation unit on 16-Mar-2023.     Patient reportedly has had a prolonged rehabilitation course complicated by the extent of his deficits and fluctuating levels of alertness. Patient recently had been treated for urinary tract infection which was associated with significant functional decline and worsened encephalopathy after prior improvement. Patient reportedly improved after several days of treatment but would then develop recurrent leucocytosis, worsening confusion and lactic acidosis.     Earlier today, rapid response was called. Patient was started on empiric antibiotics. Patient was also given IV fluids for lactic acidosis. Lactic acid increased following IV fluid bolus and patient was transferred to the medical unit for further evaluation and treatment.     Current concern given recurrent leucocytosis and lactic acidosis is for sepsis though dehydration is another possibility. There is concern for recurrent urinary tract infection.    From chart review, patient completed a 7 day course of antibiotics for E. coli urinary tract infection on 08-Apr-2023 - patient was started on zosyn on 02-Apr, transitioned to ceftriaxone on 04-Apr and then transitioned to cefdinir on 06-Apr-2023.     P:  1.) Possible recurrent urinary tract infection: Patient has been  restarted empirically on ceftriaxone. Awaiting urine culture results.    2.) Leucocytosis, lactic acidosis; concern for early sepsis: Patient to remain on IV fluids. Labs to be rechecked.    3.) Atrial fibrillation: Patient to continue diltiazem. Patient had previously been on coumadin but this apparently had been on hold since 25-Feb for planned pain pump placement. Patient was started on eliquis for anticoagulation on 14-Mar.    4.) Hyperlipidemia: Patient on atorvastatin.    5.) Diabetes mellitus type II, patient usually on metformin: Will hold metformin for now. Patient to be on insulin sliding scale.    6.) Hypertension: Patient is on atenolol and diltiazem. Monitoring blood pressure.    7.) Acute right MCA stroke s/p tenecteplace on 07-Mar-2023, likely secondary cardioembolic from atrial fibrillation - patient had been off anticoagulation for anticipated procedure:  - Patient has residual left hemiparesis, impaired balance, impaired coordination, impaired proprioception, dysarthria, dysphagia and moderate cognitive linguistic impairment.  - Patient initially was on aspirin but is now on eliquis for anticoagulation.  - Patient also on amantadine.  - Patient to continue PT, OT and SLP.  - Patient to f/u with Neurology as outpatient.    8.) Vascular dementia:   - Patient had undergone neuropsychological evaluation on 29-Mar and meets criteria for major neurocognitive disorder. It was recommended that POA be eneacted and patient receive assistance in complex and reasoned decisions moving forward.  - Monitoring for safety.    9.) Cough: Monitoring for evidence of aspiration.    10.) Obstructive sleep apnea: Patient not using CPAP at present. Patient should f/u as outpatient.    11.) Dysphagia: Patient on puree (level 4) diet, moderately thick (level 3) liquids by tsp. Patient to be up to chair for all meals with 1:1 assist. Patient receiving SLP.     12.) Moderate malnutrition in the context of acute on chronic  "illness: Patient receiving tube feeds via PEG-tube. PEG-tube was placed 09-Mar.    13.) Loose stools: Monitoring for diarrhea.    14.) GERD: Patient on PPI.    15.) Mild normocytic anemia: No indication for transfusion at present.     16.) Sacral pressure injury, stage 2, healed: Monitoring for recurrence.    17.) Depressed mood: Patient on remeron 15 mg nightly.         Clinically Significant Risk Factors Present on Admission                       # Overweight: Estimated body mass index is 27.95 kg/m  as calculated from the following:    Height as of 3/16/23: 1.854 m (6' 1\").    Weight as of 4/7/23: 96.1 kg (211 lb 13.8 oz).             Rios Bynum MD  Hospitalist Service  [facility]  Securely message with [employer] (more info)  Text page via [facility] Paging/Directory     ______________________________________________________________________    Chief Complaint     Lactic acidosis    History of Present Illness     Patient is a 76 y/o man who has multiple medical problems including diabetes mellitus type II, atrial fibrillation, hypertension and hyperlipidemia. Patient had been hospitalized from 07-Mar-2023 to 16-Mar-2023 at [facility] with acute ischemic stroke of right MCA territory due to cardioembolism. Patient received tenecteplase on 07-Mar-2023. During this hospital stay, patient also had possible early sepsis secondary to aspiration pneumonia. Patient was transferred to acute rehabilitation unit on 16-Mar-2023.     Patient reportedly has had a prolonged rehabilitation course complicated by the extent of his deficits and fluctuating levels of alertness. Patient recently had been treated for urinary tract infection which was associated with significant functional decline and worsened encephalopathy after prior improvement. Patient reportedly improved after several days of treatment but would then develop recurrent leucocytosis, worsening confusion and lactic " acidosis.     Earlier today, rapid response was called. Patient was started on empiric antibiotics. Patient was also given IV fluids for lactic acidosis. Lactic acid increased following IV fluid bolus and patient was transferred to the medical unit for further evaluation and treatment.    Patient noted feeling thirsty but otherwise noted feeling well. Patient noted having some cough but no dyspnea. Patient noted no fever, no nausea and no vomiting. Patient noted no dysuria and no hematuria. Patient noted no pain. Patient noted no other problems.       Past Medical History    Past Medical History:   Diagnosis Date     Arthritis      Atrial fibrillation (H)      Bacteremia      Bell's palsy 2015     BPH (benign prostatic hyperplasia)      Diabetes (H)      Gastroesophageal reflux disease      GERD (gastroesophageal reflux disease)      GI hemorrhage      High cholesterol      Hyperlipemia      Hyperlipidemia      Hypertension      Hypertension      Idiopathic peripheral neuropathy      Irregular heart beat     chronic at fib     Renal artery aneurysm (H)      Renal artery aneurysm (H)      Sleep apnea     Bipap     Sleep apnea 10/31/2021     Sleep apnea, obstructive     USES BIPAP     Type 2 diabetes mellitus (H)      UTI (lower urinary tract infection)        Past Surgical History   Past Surgical History:   Procedure Laterality Date     AMPUTATE FOOT Right 5/6/2019    Procedure: AMPUTATION, 3rd TOE RIGHT FOOT;  Surgeon: Ravi Abbasi DPM;  [place] [facility] [place]  Service: [place]iatry     FUSION SPINE POSTERIOR MINIMALLY INVASIVE THREE + LEVELS N/A 10/29/2020    Procedure: L3/4/5S1 oblique lateral lumbar interbody fusion with discectomy L3/4/5S1 Posterior minimally invasive pedicle screw placement and posterolateral instrumentation and fusion  L3/4/5S1 Posterior minimally invasive pedicle screw placement and posterolateral instrumentation and fusion;  Surgeon: Vernon Bynum MD;  Location[facility]  REPAIR COMPL ROTATOR CUFF AVULSN,CHR      Description: Chronic Rotator Cuff Avulsion;  Recorded: 04/11/2011;     IR GASTROSTOMY TUBE PERCUTANEOUS PLCMNT  3/9/2023     ORTHOPEDIC SURGERY Right     knee surgery     ORTHOPEDIC SURGERY Right     amputation 3rd toe on right foot     TENOTOMY ACHILLES TENDON       TRANSRECTAL ULTRASONIC, TRANSURETHRAL RESECTION (TUR) OF PROSTATE CYST         Prior to Admission Medications   Cannot display prior to admission medications because the patient has not been admitted in this contact.        Allergies: Patient is allergic to bees.  Social History: Patient quit smoking in 1990.  Physical Exam   T 97.3 F, P 84, R 16; /76    General: Patient comfortable, NAD. Speech somewhat hard to understand.  Heart: RRR, S1 S2 w/o murmurs.  Lungs: Patient coughing occasionally. Fair inspiratory effort. Breath sounds present, no crackles/wheezes heard.  Abdomen: Soft, nontender.  Skin: Warm, dry.  Musculoskeletal: No visible joint swelling or erythema.  Neuro: Left-sided facial droop present.  Psych: Affect pleasant.     Labs noted.  Sodium 140; Potassium 4.1; BUN 40.3; Creatinine 0.85  WBC 13.5; Hb 12.7; Platelets 363  Procalcitonin 0.06      CXR:   Stable cardiomegaly with persistent mild interstitial prominence,  suggest mild edema and/or atelectasis/infection. No focal  consolidation.    Medical Decision Making

## 2023-04-12 NOTE — PROGRESS NOTES
[facility]    Medicine Progress Note - Hospitalist Service, GOLD TEAM 17    Date of Admission:  4/11/2023    Assessment & Plan      Patient is a 78 y/o man who has multiple medical problems including diabetes mellitus type II, atrial fibrillation, hypertension and hyperlipidemia. Patient had been hospitalized from 07-Mar-2023 to 16-Mar-2023 at [facility] with acute ischemic stroke of right MCA territory due to cardioembolism, did  receive tenecteplase on 07-Mar-2023. During this hospital stay also had possible early sepsis secondary to aspiration pneumonia. He was transferred to acute rehabilitation unit on 16-Mar-2023.      Had a prolonged rehabilitation course complicated by the extent of his deficits and fluctuating levels of alertness. Was treated for urinary tract infection which was associated with significant functional decline and worsened encephalopathy after prior improvement. Patient reportedly improved after several days of treatment but then develop recurrent leucocytosis, worsening confusion and lactic acidosis.      Earlier on day of admission rapid response was called. He  was started on empiric antibiotics, IV fluids  for lactic acidosis. Lactic acid increased following IV fluid bolus and patient was transferred to the medical unit for further evaluation and treatment.      Current concern given recurrent leucocytosis and lactic acidosis is for sepsis though dehydration is another possibility. There is concern for recurrent urinary tract infection.     From chart review, patient completed a 7 day course of antibiotics for E. coli urinary tract infection on 08-Apr-2023 - patient was started on zosyn on 02-Apr, transitioned to ceftriaxone on 04-Apr and then transitioned to cefdinir on 06-Apr-2023.         Possible recurrent urinary tract infection  - has been restarted empirically on ceftriaxone. Awaiting repeat urine culture results.  - UC form  4/2: E coli resistant to amp, unasyn and TMP/sulfa intermediate to cipro and levo, susceptible to others   -per significant other patient  Gets UTI ~ 2 x a year and has required admission with them,   - will need urology follow up       Leucocytosis, lactic acidosis  concern for early sepsis  - continue IV fluids  , antibiotics   - lactic acid 3.5 now back to normal      Acute right MCA   -stroke s/p tenecteplace on 07-Mar-2023, likely secondary cardioembolic from atrial fibrillation - patient had been off anticoagulation for anticipated procedure:  - residual left hemiparesis, impaired balance, impaired coordination, impaired proprioception, dysarthria, dysphagia and moderate cognitive linguistic impairment.  - initially was on aspirin but is now on eliquis for anticoagulation.  - continue amantadine.  - PT, OT and SLP.  -  f/u with Neurology as outpatient.      Vascular dementia   - neuropsychological evaluation on 29-Mar and meets criteria for major neurocognitive disorder. It was recommended that POA be eneacted and patient receive assistance in complex and reasoned decisions moving forward.      Atrial fibrillation  - continue diltiazem with holding parameters  - previously been on coumadin but this apparently had been on hold since 25-Feb for planned pain pump placement. Patient was started on eliquis for anticoagulation on 14-Mar.     Hyperlipidemia  - continue  atorvastatin.     Diabetes mellitus type II  -  patient usually on metformin, on hold for now , blood sugars 140s low 200s    -accu-checks insulin sliding scale      Hypertension  - on atenolol and diltiazem. Monitoring blood pressure.       Cough  -  Recently treated for aspirational pneumonia, Monitoring for evidence of aspiration  - on flonase for possible postnasal drip .  -negative  COVID  4/3     Obstructive sleep apnea  -not using CPAP at present. Patient should f/u as outpatient.     Dysphagia  - on puree (level 4) diet, moderately thick  "(level 3) liquids by tsp,  up to chair for all meals with 1:1 assist. Patient receiving SLP.       Moderate malnutrition in the context of acute on chronic illness  -  receiving tube feeds via PEG-tube. PEG-tube was placed 09-Mar.  - nutritionist to see      Loose stools  -  Monitoring for diarrhea  - if continues check  c diff as has had recent antibiotics exposure .     GERD  -  on PPI.     Mild normocytic anemia  - Hg stable, no signs of acute bleed       Sacral pressure injury, stage 2  - healed: Monitoring for recurrence.     Depressed mood  - continue PTA remeron 15 mg nightly.            Diet: Pureed Diet (level 4) Liquidized/Moderately Thick (level 3) (by tsp)    DVT Prophylaxis: DOAC  Scruggs Catheter: Not present  Lines: None     Cardiac Monitoring: None  Code Status: Full Code      Clinically Significant Risk Factors Present on Admission               # Drug Induced Coagulation Defect: home medication list includes an anticoagulant medication         # Overweight: Estimated body mass index is 26.5 kg/m  as calculated from the following:    Height as of this encounter: 1.854 m (6' 1\").    Weight as of this encounter: 91.1 kg (200 lb 13.4 oz).           Disposition Plan      Expected Discharge Date: 04/13/2023                  Clementine Poe MD  Hospitalist Service, GOLD TEAM 17  [facility]  Securely message with [employer] (more info)  Text page via [employer] Paging/Directory   See signed in provider for up to date coverage information  ______________________________________________________________________    Interval History   Patient  Sleepy but wakes up, does not have teeth  In, hard to understand but responds, significant oter here   No chest pain  Or shortness of breath      Physical Exam   Vital Signs: Temp: 98.1  F (36.7  C) Temp src: Axillary BP: 124/79 Pulse: 87   Resp: 12 SpO2: 95 % O2 Device: None (Room air)    Weight: 200 lbs 13.42 oz  General appearance:  " no apparent distress     HEENT: EOMI and PEARLA, sclera nonicteric,  Dry mucus membranes, head atraumatic  NECK: supple  RESPIRATORY: lungs are clear   CARDIOVASCULAR: normal S1S2  irreg irreg, soft systolic murmur   GASTROINTESTINAL: abdomen is  soft,  non-distended, non-tender with normal bowel sounds. PEG site looks ok     MUSCULOSKELETAL: normal muscle bulk and tone  SKIN: warm and dry, no rashes, no mottling noted   NEUROLOGIC: awake, speech slurred, was able to tell me his name, not age could tell me , knew ist  said Carine is the president , moves all extr, strenght 4/6 bilateral  And squeegees well with hands , able to lift legs off bed   EXTREMITIES: no clubbing cyanosis or edema noted  moves all extremities       Data   Imaging results reviewed over the past 24 hrs:   Recent Results (from the past 24 hour(s))   XR Chest 2 Views    Narrative    Exam: XR CHEST 2 VIEWS, 2023 5:18 PM    Comparison: Radiograph 2023, 3/12/2023    History: high aspiration risk, recurrent infectious work-up    Findings:  AP and lateral views of the chest. Stable enlargement of the  cardiomediastinal silhouette. Similar mild interstitial prominence. No  focal consolidation. No pleural effusion or pneumothorax. No acute  osseous abnormality.      Impression    Impression:   Stable cardiomegaly with persistent mild interstitial prominence,  suggest mild edema and/or atelectasis/infection. No focal  consolidation.    I have personally reviewed the examination and initial interpretation  and I agree with the findings.    CECILY DIXON MD         SYSTEM ID:  [id number]     Recent Labs   Lab 23  1139 23  0717 23  0649 23  1158 23  0514 04/10/23  0724 04/10/23  0650   WBC  --  14.1*  --   --  13.5*  --  11.9*   HGB  --  12.1*  --   --  12.7*  --  12.8*   MCV  --  95  --   --  94  --  93   PLT  --  357  --   --  363  --  345   NA  --  141  --   --  140  --  142   POTASSIUM  --  4.1  --   --   4.1  --  3.9   CHLORIDE  --  103  --   --  102  --  104   CO2  --  25  --   --  28  --  25   BUN  --  32.1*  --   --  40.3*  --  36.2*   CR  --  0.71  --   --  0.85  --  0.70   ANIONGAP  --  13  --   --  10  --  13   LUTHER  --  9.5  --   --  9.9  --  9.7   * 202* 211*   < > 189*   < > 189*    < > = values in this interval not displayed.

## 2023-04-12 NOTE — PLAN OF CARE
Goal Outcome Evaluation:    Patient is A&O x 2. RA. On Cycle PEG TF at 85ml/hr and water flushes of 180ml/hr from 2pm-6am. Pureed diet(L4) and liquidized/moderately thick liquids(L3). Up for chairs in all meals, needs 1:1 assistance during feeding. Incontinent in bladder and bowel. Wears brief. LBM: 04/10. Needs assistance x 1-2. Slurred and slow speech noted. Denies pain. PIV on L posterior forearm, SL, intact and patent.

## 2023-04-12 NOTE — PROGRESS NOTES
"/77 (BP Location: Right arm)   Pulse 83   Temp 98.6  F (37  C) (Oral)   Resp 18   Ht 1.854 m (6' 1\")   Wt 91.1 kg (200 lb 13.4 oz)   SpO2 92%   BMI 26.50 kg/m      VSS, calm and cooperative, A&Ox3 (disorientated to place). Denies chest pain, SOB, n/v, numbness/tingling, and dizziness.   LS diminished, on room air.   BS hypoactive, LBM 4/12. Incontinent of bowel and bladder  Weakness to LLE d/t stroke. Ax1 with walker and gait belt  Skin intact, redness to sacrum. Off loading offered to pt between cares  L PIV SL  Pt denies any pain  Pureed diet with thickened liquids. TF Jevity 1.5 running @ 85mL/hr with q4h 180mL flushes. Speech slow/slurred d/t stroke history  Call light in reach, continue POC.     "

## 2023-04-12 NOTE — PLAN OF CARE
Speech Language Therapy Discharge Summary    Reason for therapy discharge:    Discharged to acute care hospital    Progress towards therapy goal(s). See goals on Care Plan in [facility] electronic health record for goal details.  Goals not met.  Barriers to achieving goals:   limited tolerance for therapy and discharge from facility.    Therapy recommendation(s):    Continued therapy is recommended.  Rationale/Recommendations:  continue addressing cognitive and dysphagia related goals. Level of alertness has greatly varied. When fully alert and in good positioning, able to tolerate PO intake. Has required extra care for oral cares given degree of dried secretions that occurs overnight..

## 2023-04-12 NOTE — PHARMACY-ADMISSION MEDICATION HISTORY
Pharmacist Admission Medication History    Admission medication history is complete. The information provided in this note is only as accurate as the sources available at the time of the update.    Medication reconciliation/reorder completed by provider prior to medication history? Yes    Information Source(s): Facility (U/NH/) medication list/MAR via N/A    Pertinent Information: Pharmacy medication history completed 3/7/23 at [facility]. Patient was transferred to  ARU and has been admitted there ever since. See eMAR for current medications    Changes made to PTA medication list:    Added: None    Deleted: None    Changed: None    Medication Affordability:       Allergies reviewed with patient and updates made in EHR: no    Medication History Completed By: Ofe Willard [facility] 4/12/2023 1:19 PM    PTA Med List   Medication Sig Last Dose     acetaminophen (TYLENOL) 325 MG/10.15ML solution 20.3 mLs (650 mg) by Per NG tube route every 4 hours as needed for mild pain or fever (for temperature greater than 100.4 F (38 C) - may also be used for moderate pain)      amantadine (SYMMETREL) 100 MG capsule 1 capsule (100 mg) by Oral or Feeding Tube route 2 times daily      apixaban ANTICOAGULANT (ELIQUIS) 5 MG tablet 1 tablet (5 mg) by Per Feeding Tube route 2 times daily      atenolol (TENORMIN) 25 MG tablet Take 0.5 tablets (12.5 mg) by mouth daily      atorvastatin (LIPITOR) 20 MG tablet 1 tablet (20 mg) by Oral or Feeding Tube route At Bedtime      cefTRIAXone (ROCEPHIN) 2 GM vial Inject 2 g into the vein every 24 hours      diltiazem (CARDIZEM) 60 MG tablet 1 tablet (60 mg) by Per Feeding Tube route every 8 hours      fluticasone (FLONASE) 50 MCG/ACT nasal spray Spray 1 spray into both nostrils daily      gabapentin (NEURONTIN) 250 MG/5ML solution 12 mLs (600 mg) by NG or Feeding tube route At Bedtime      lactobacillus rhamnosus, GG, (CULTURELL) capsule 1 capsule by Per Feeding Tube route 2 times daily       Lidocaine (LIDOCARE) 4 % Patch Place 2 patches onto the skin every 24 hours To prevent lidocaine toxicity, patient should be patch free for 12 hrs daily.      melatonin 5 MG tablet Take 1 tablet (5 mg) by mouth nightly as needed for sleep      metFORMIN (GLUCOPHAGE) 1000 MG tablet 1 tablet (1,000 mg) by Oral or Feeding Tube route 2 times daily (with meals)      miconazole (MICATIN) 2 % external cream Apply topically 2 times daily      mirtazapine (REMERON) 15 MG tablet Take 1 tablet (15 mg) by mouth At Bedtime      pantoprazole (PROTONIX) 2 mg/mL SUSP suspension 20 mLs (40 mg) by Per Feeding Tube route every morning (before breakfast)      senna-docusate (SENOKOT-S/PERICOLACE) 8.6-50 MG tablet 2 tablets by Per Feeding Tube route 2 times daily as needed for constipation

## 2023-04-12 NOTE — PROGRESS NOTES
Occupational Therapy Discharge Summary    Reason for therapy discharge:    Discharged to acute care hospital.     Progress towards therapy goal(s). See goals on Care Plan in [facility] electronic health record for goal details.  Goals not met.  Barriers to achieving goals:   limited tolerance for therapy and discharge from facility.    Therapy recommendation(s):    Continued therapy is recommended.  Rationale/Recommendations:  Recommend continued OT services once medically stable. . Plan was for pt to discharge home with 24/7 care vs TCU for ongoing rehab. Pt had limited tolerance to intensity of therapy at ARU d/t lethargy, fluctuating level of assist d/t medical complexities, and impaired cognition. Pt would benefit from an extended rehab course at a less intense rehab setting to  maximize IND and safety with ADLs and transfers and decrease burden of care on S.O.

## 2023-04-12 NOTE — PROGRESS NOTES
04/12/23 1134   Appointment Info   Signing Clinician's Name / Credentials (PT) ROSAVLA Finley   Student Supervision Therapy services provided with the co-signing licensed therapist guiding and directing the services, and providing the skilled judgement and assessment throughout the session;Direct supervision provided   Living Environment   People in Home alone  (fiance planning to stay w/ him)   Current Living Arrangements house   Home Accessibility stairs within home   Number of Stairs, Main Entrance greater than 10 stairs   Number of Stairs, Within Home, Primary greater than 10 stairs  (fiance reports he will be able to live on one level initially)   Stair Railings, Within Home, Primary railing on right side (ascending);railings safe and in good condition;other (see comments)  (will be installing railings on the left side)   Transportation Anticipated family or friend will provide   Living Environment Comments tub to step into on upper level. Walk in shower on main level. Has a shower chair. Has an adjustable bed.   Self-Care   Usual Activity Tolerance moderate   Current Activity Tolerance fair   Equipment Currently Used at Home other (see comments)  (pt using walker prior to admission)   Fall history within last six months yes   Number of times patient has fallen within last six months 1   Activity/Exercise/Self-Care Comment pt has been doing inpatient rehab for the past month following R MCA stroke   Previous Level of Function/Home Environm   Bed Mobility, Premorbid Functional Level partial assistance   Transfers, Premorbid Functional Level partial assistance   Household Ambulation, Premorbid Functional Level not applicable (see comment)   Stairs, Premorbid Functional Level partial assistance   Community Ambulation, Premorbid Functional Level not applicable (see comment)   Previous Level of Function, Premorbid Pt IND w/ all functional mobility prior to stroke. At ARU pt demonstrated need for partial  assist w/ all functional mobility, tolerance for mobility was improving.   General Information   Onset of Illness/Injury or Date of Surgery 04/11/23   Referring Physician Collin Singh MD   Patient/Family Therapy Goals Statement (PT) not verbalized   Pertinent History of Current Problem (include personal factors and/or comorbidities that impact the POC) Per chart pt has multiple medical problems including diabetes mellitus type II, atrial fibrillation, hypertension and hyperlipidemia. Patient had been hospitalized from 3/7/23 to 3/16/23 at [facility] with acute ischemic stroke of right MCA territory due to cardioembolism. Patient received tenecteplase on 3/7/23. During this hospital stay, patient also had possible early sepsis secondary to aspiration pneumonia. Patient was transferred to acute rehabilitation unit on 3/16/23. Admitted 4/12/23 r/t high level of lactate and and other abnormal labs.   Existing Precautions/Restrictions fall   Weight-Bearing Status - LUE weight-bearing as tolerated   Weight-Bearing Status - RUE weight-bearing as tolerated   Weight-Bearing Status - LLE weight-bearing as tolerated   Weight-Bearing Status - RLE weight-bearing as tolerated   General Observations supine in bed upon entry, agreeable to minimal PT.  Pt fiance present for PT evaluation.   Cognition   Affect/Mental Status (Cognition) flat/blunted affect   Orientation Status (Cognition) unable/difficult to assess   Follows Commands (Cognition) follows one-step commands   Pain Assessment   Patient Currently in Pain No   Integumentary/Edema   Integumentary/Edema no deficits were identifed   Posture    Posture Protracted shoulders;Forward head position   Range of Motion (ROM)   Range of Motion ROM is WFL   Strength (Manual Muscle Testing)   Strength (Manual Muscle Testing) Deficits observed during functional mobility   Strength Comments weakness observed in difficulty scooting in bed and in STS transfer   Bed Mobility   Bed  Mobility scooting/bridging;supine-sit;sit-supine   Scooting/Bridging [place] (Bed Mobility) minimum assist (75% patient effort);contact guard   Supine-Sit [place] (Bed Mobility) moderate assist (50% patient effort)   Sit-Supine [place] (Bed Mobility) minimum assist (75% patient effort)   Impairments Contributing to Impaired Bed Mobility decreased strength   Transfers   Transfers sit-stand transfer   Transfer Safety Concerns Noted losing balance backward   Impairments Contributing to Impaired Transfers impaired balance;decreased strength   Sit-Stand Transfer   Sit-Stand [place] (Transfers) contact guard;minimum assist (75% patient effort)   Assistive Device (Sit-Stand Transfers) walker, front-wheeled   Gait/Stairs (Locomotion)   [place] Level (Gait) contact guard;minimum assist (75% patient effort)   Assistive Device (Gait) walker, front-wheeled   Distance in Feet 10'   Deviations/Abnormal Patterns (Gait) gait speed decreased;festinating/shuffling   Balance   Balance other (describe)   Balance Comments pt demonstrated difficulty maintaining upright in standing, often falling backwards   Clinical Impression   Criteria for Skilled Therapeutic Intervention Yes, treatment indicated   PT Diagnosis (PT) impaired functional mobility, decreased strength   Influenced by the following impairments hx of stroke, abnormal labs   Functional limitations due to impairments impaired bed mobility, transfers, and gait   Clinical Presentation (PT Evaluation Complexity) Stable/Uncomplicated   Clinical Presentation Rationale Pt demonstrates mobility deficits secondary to abnormal labs and recent stroke.   Clinical Decision Making (Complexity) moderate complexity   Planned Therapy Interventions (PT) bed mobility training;gait training;strengthening;transfer training   Risk & Benefits of therapy have been explained evaluation/treatment results reviewed;care plan/treatment goals reviewed;patient;spouse/significant  other   PT Total Evaluation Time   PT Eval, Low Complexity Minutes (79853) 12   Physical Therapy Goals   PT Frequency Daily   PT Predicted Duration/Target Date for Goal Attainment 04/19/23   PT: Bed Mobility Minimal assist;Supine to/from sit   PT: Transfers Supervision/stand-by assist;Assistive device;Sit to/from stand;Bed to/from chair   PT: Gait Supervision/stand-by assist;Standard walker;150 feet   PT: Stairs 10 stairs;Minimal assist;Rail on right  (will be having railing installed on left side)   Therapeutic Activity   Therapeutic Activities: dynamic activities to improve functional performance Minutes (96517) 25   Symptoms Noted During/After Treatment Fatigue   Treatment Detail/Skilled Intervention Pt performed STS transfer at EOB x3 additional times throughout session, requiring CGA and PT stabilizing the walker. Pt required increased time and rocked body back and forth to use momentum to stand. Pt required min Ax1 to maintain standing upright, several cues provided to lean forward and to put weight through hands on walker for support. Pt able to follow commands throughout. Pt tolerated standing at EOB for about 3 minutes while briefs were changed. Pt ambulated 10' back to bed w/ use of FWW and min Ax1. Pt returned to standing at EOB, cued to side step to R to sit higher up on bed. Able to perform sidestepping w/ increased time. Pt returned to sit at EOB w/ good control and use of FWW. Pt returned to supine w/ min Ax1 for BLE. Pt rolled IND to L and used bed rail to pull w/ BUE and pushing feet into bed to shift to center. Rolled IND to supine and remained supine at end of session w/ needs left in reach.   PT Discharge Planning   PT Plan Continue to work on bed mobilty and STS transfers. Progress gait as tolerated w/ wheelchair follow.   PT Discharge Recommendation (DC Rec) Transitional Care Facility   PT Rationale for DC Rec Pt demonstrates weakness and impairments w/ functional mobility. Would benefit from  additional PT to improve strength and overall functional mobility to promote greater independence to return home.   PT Brief overview of current status min Ax1 for bed mobility, CGA to min Ax1 for STS transfers, CGA for short gait in room w/ FWW   Total Session Time   Timed Code Treatment Minutes 25   Total Session Time (sum of timed and untimed services) 37

## 2023-04-12 NOTE — PLAN OF CARE
"Pt  A/O x3. Afebrile. VSS /79 (BP Location: Left arm)   Pulse 87   Temp 98.1  F (36.7  C) (Axillary)   Resp 12   Ht 1.854 m (6' 1\")   Wt 91.1 kg (200 lb 13.4 oz)   SpO2 95%   BMI 26.50 kg/m   Lungs- upper lobes, diminished lower Denies SOB, CP, nausea. Pt continued to have weakness secondary to stroke, more on LLE. Bowels- Hypoactive in all 4 quadrants. PP- + DP-+. CMS and Neuro's are intact. Pt is on a mechanically altered diet, but did not want to eat, insulin held for no oral intake, but given close to start of TF. TF started at 2pm at goal rate, with FWF per order. Oral mucosa dry, oral care done and lips moistened. Voiding. Denies pain. Pt is up with staff assist in chair and amb with therapy.  Pt is able to make needs known, speech is slurred, slow and difficult to understand. Pt's arslan is in room with pt, asked to provide SLICK/POA paper work, which she said she will. Pt give verbal consent for arslan to be present during updates/cares. Call light is within the pt's reach. Continue to monitor.     "

## 2023-04-12 NOTE — PROGRESS NOTES
CLINICAL NUTRITION SERVICES - ASSESSMENT NOTE     Nutrition Prescription    RECOMMENDATIONS FOR MDs/PROVIDERS TO ORDER:  None today     Malnutrition Status:    Moderate malnutrition in the context of acute on chronic illness or injury    Recommendations already ordered by Registered Dietitian (RD):  RD ordered cycled G-TF with Jevity 1.5 at 85 ml/hr x16 hrs from 2 pm to 6 am with 180 ml water flush q 4 hrs to provide 2040 kcals, ~87 gm protein, ~275 gm CHO, ~29 gm fiber, 2113 ml water/day     Future/Additional Recommendations:  Monitor tolerance to TF, wt/lab trends  Diet per SLP - consider calorie counts and weaning of TF when pt consistently taking >25% of meals and/or diet further advanced by SLP      REASON FOR ASSESSMENT  Stefan Diallo is a/an 77 year old male assessed by the dietitian for Provider Order - Registered Dietitian to Assess and Order TF per Medical Nutrition Therapy Protocol    NUTRITION HISTORY  Per chart review: Pt admits to hospital from ARU unit in the setting prolonged rehabilitation course for acute ischemic stroke of right MCA territory due to cardio embolism with deficits and fluctuating levels of alertness, recently  treated for urinary tract infection which was associated with significant functional decline and worsened encephalopathy, did improved after several days of treatment but pt then develop recurrent leucocytosis, worsening confusion and lactic acidosis. Other past medical history noted for diabetes mellitus type II, atrial fibrillation, hypertension and hyperlipidemia.    This RD received handoff from ARU RD regarding nutrition support plan of care, po intakes varying so cycled G-TF continues to provide majority of pt's nutritional needs, TF orders resumed as pt was on prior to hospital admission on ARU.     Per pt/family visit: RD visited pt and wife at bedside, reviewed TF plan of care as above, pt/wife agreeing.     CURRENT NUTRITION ORDERS  Diet: Puree (level 4), Moderately  "Thick Liquids (level 3) via teaspoon   Intake/Tolerance: Poor appetite documented thus far     Nutrition Support: Orders resumed as above as pt was on at ARU     LABS  Labs reviewed    MEDICATIONS  Remeron, Medium resistance correction scale insulin, Culturell, Lipitor, Amantadine, IV Rocephin, Protonix     ANTHROPOMETRICS  Height: 185.4 cm (6' 1\")  Most Recent Weight: 91.1 kg (200 lb 13.4 oz)    IBW: 83.6 kg  BMI: Overweight BMI 25-29.9  Weight History:   04/07/23 96.1 kg (211 lb 13.8 oz)   03/11/23 98 kg (216 lb 0.8 oz)   03/07/23 97.5 kg (215 lb)   02/01/23 98.9 kg (218 lb)   01/20/23 105.7 kg (233 lb)   08/19/22 95.9 kg (211 lb 8 oz)   08/10/22 93.9 kg (207 lb 1.6 oz)   05/21/22 100.7 kg (222 lb)   05/16/22 101 kg (222 lb 12 oz)     Weight assessment: Possible 11 lb wt loss in 4 days vs weighing accuracy - weight trends noted from ARU vs hospital.      Dosing Weight: 96.1 kg - weight from 4/7/23    ASSESSED NUTRITION NEEDS  Estimated Energy Needs: 6580-0949 kcals/day (20 - 25 kcals/kg)  Justification: Overweight  Estimated Protein Needs:  grams protein/day (1 - 1.2 grams of pro/kg)  Justification: Maintenance  Estimated Fluid Needs: 1 mL/kcal  Justification: Maintenance    PHYSICAL FINDINGS  WOC note from ARU reviewed - area to sacrum healed     MALNUTRITION  % Intake: Intake does not meet criteria as pt is supported by EN   % Weight Loss: Difficult assess as unsure of trends   Subcutaneous Fat Loss: Facial region: mild vs age related   Muscle Loss: Temporal: mild vs age related   Fluid Accumulation/Edema: None noted  Malnutrition Diagnosis: Moderate malnutrition in the context of acute on chronic illness or injury     NUTRITION DIAGNOSIS  Inadequate oral intake related to chewing/swallowing difficult/cognition as evidenced by continued need for modified diet and continue need for enteral nutrition support to meet estimated nutritional needs     INTERVENTIONS  Implementation  Nutrition Education: RD " reviewed nutrition plan of care as above with pt/wife at bedside    Enteral Nutrition/water flushes - ordered as above, continue as pt was on at ARU      Goals  Total avg nutritional intake to meet a minimum of 20 kcal/kg and 1.0 g PRO/kg daily (per dosing wt 96.1 kg).     Monitoring/Evaluation  Progress toward goals will be monitored and evaluated per protocol.    Alvina Contreras RD, CNSC, LD  [place] [address] Med/[address]urg RD pager: [phone]  Weekend/holiday pager: [phone]

## 2023-04-12 NOTE — PHARMACY-CONSULT NOTE
Pharmacy Tube Feeding Consult    Medication reviewed for administration by feeding tube and for potential food/drug interactions.    Recommendation: No changes are needed at this time.     Pharmacy will continue to follow as new medications are ordered.    Ofe Willard, RobeD, BCPS

## 2023-04-12 NOTE — PLAN OF CARE
Goal Outcome Evaluation:      Plan of Care Reviewed With: patient    Overall Patient Progress: no changeOverall Patient Progress: no change     Patient was transferred to [facility] this evening r/t high level of lactate and and other abnormal labs see charts. By the time of transfer the patient's x-Ray was not back yet. Provider had ordered Rocephin 2g IV and was given with no concern. Patient is on tube feeding and was also on Sodium chloride at 100ml/hr continues.

## 2023-04-12 NOTE — PROVIDER NOTIFICATION
"Paged on call provider:  \"[phone]  JS has arrived on the unit but doesn't have a provider assigned or orders in, do you have them?   MIRTHA LEVY *[id number]\"  "

## 2023-04-13 NOTE — PROGRESS NOTES
04/13/23 1050   Appointment Info   Signing Clinician's Name / Credentials (OT) Magaly Morton, OTR/L   Living Environment   People in Home alone   Current Living Arrangements house   Home Accessibility stairs within home   Number of Stairs, Main Entrance greater than 10 stairs   Stair Railings, Main Entrance railing on right side (ascending)   Number of Stairs, Within Home, Primary greater than 10 stairs   Stair Railings, Within Home, Primary railing on right side (ascending);railings safe and in good condition;other (see comments)   Transportation Anticipated family or friend will provide   Living Environment Comments pt will be living on lower level of the house which has a walk in shower. tub shower on upper level   Self-Care   Usual Activity Tolerance moderate   Current Activity Tolerance fair   Regular Exercise No   Equipment Currently Used at Home walker, rolling;shower chair;grab bar, toilet;raised toilet seat;grab bar, tub/shower;other (see comments)  (putting in gb in shower, long handle, reacher, sock aid, leg )   Fall history within last six months yes   Number of times patient has fallen within last six months 2   Activity/Exercise/Self-Care Comment prior to CVA pt was ind with all ADLs/IADLs; at ARU pt was receiving assistance with ADLs and was becoming more ind   General Information   Onset of Illness/Injury or Date of Surgery 04/11/23   Referring Physician Dr. Rios Bynum   Patient/Family Therapy Goal Statement (OT) to be ind   Additional Occupational Profile Info/Pertinent History of Current Problem per chart- Per chart pt has multiple medical problems including diabetes mellitus type II, atrial fibrillation, hypertension and hyperlipidemia. Patient had been hospitalized from 3/7/23 to 3/16/23 at [facility] with acute ischemic stroke of right MCA territory due to cardioembolism. Patient received tenecteplase on 3/7/23. During this hospital stay, patient also had possible early sepsis  secondary to aspiration pneumonia. Patient was transferred to acute rehabilitation unit on 3/16/23. Admitted 4/12/23 r/t high level of lactate and and other abnormal labs.   Existing Precautions/Restrictions fall   General Observations and Info Pt is agreeable to OT, but required encouragment to participate in activities   Cognitive Status Examination   Orientation Status person;time   Affect/Mental Status (Cognitive) WFL   Follows Commands WFL   Sensory   Sensory Quick Adds left LE   Sensory Comments pt reports neuropathy in LLE   Pain Assessment   Patient Currently in Pain Yes, see Vital Sign flowsheet  (pt reports pain in RLE)   Posture   Posture forward head position;protracted shoulders   Range of Motion Comprehensive   General Range of Motion no range of motion deficits identified   Strength Comprehensive (MMT)   General Manual Muscle Testing (MMT) Assessment other (see comments)   Comment, General Manual Muscle Testing (MMT) Assessment generalized weakness   Muscle Tone Assessment   Muscle Tone Quick Adds No deficits were identified   Coordination   Upper Extremity Coordination No deficits were identified   Transfers   Transfers sit-stand transfer;toilet transfer   Sit-Stand Transfer   Sit-Stand [place] (Transfers) minimum assist (75% patient effort)   Toilet Transfer   [place] Level (Toilet Transfer) minimum assist (75% patient effort)   Balance   Balance Assessment no deficits were identified   Activities of Daily Living   BADL Assessment/Intervention lower body dressing;toileting   Lower Body Dressing Assessment/Training   [place] Level (Lower Body Dressing) maximum assist (25% patient effort)   Toileting   [place] Level (Toileting) maximum assist (25% patient effort)   Clinical Impression   Criteria for Skilled Therapeutic Interventions Met (OT) Yes, treatment indicated   OT Diagnosis decreased ADL indepdence   Influenced by the following impairments recent CVA   OT Problem  List-Impairments impacting ADL problems related to;activity tolerance impaired;sensation;pain;balance;coordination   Assessment of Occupational Performance 3-5 Performance Deficits   Identified Performance Deficits dressing, toileting, showering   Planned Therapy Interventions (OT) ADL retraining   Clinical Decision Making Complexity (OT) moderate complexity   Risk & Benefits of therapy have been explained evaluation/treatment results reviewed;patient;spouse/significant other   OT Total Evaluation Time   OT Eval, Moderate Complexity Minutes (04722) 10   OT Goals   Therapy Frequency (OT) Daily   OT Predicted Duration/Target Date for Goal Attainment 04/20/23   OT: Lower Body Dressing Supervision/stand-by assist;using adaptive equipment   OT: Bed Mobility Modified independent   OT: Toilet Transfer/Toileting Modified independent   Self-Care/Home Management   Self-Care/Home Mgmt/ADL, Compensatory, Meal Prep Minutes (92864) 25   Symptoms Noted During/After Treatment (Meal Preparation/Planning Training) fatigue;increased pain   Treatment Detail/Skilled Intervention Pt was sitting in w/c at the beginning of the session and was agreeable to therapy although pt reported that he was tired after PT. Pt's significant other was present during the session. Pt Min A for 2x STS with FWW. Pt CGA for pivot transfer from w/c to toilet. Pt Max A for frederick care during toileting. Pt Max A for LB dressing while seated unsupported on the toilet to doff brief and don brief and shorts by threading feet through the leg holes. Pt was able to pull up brief and shorts while standing CGA with FWW. Pt CGA to wash hands while standing at sink with FWW. Pt CGA for ambulation from bathroom to recliner. PT CGA for chair transfer with FWW. Pt in recliner with all needs within reach at the end of the session. Increased time and effort d/t fatigue and pain.   OT Discharge Planning   OT Plan OT: standing activity tolerance, LB dressing with AE as needed,  transfers   OT Discharge Recommendation (DC Rec) Transitional Care Facility   OT Rationale for DC Rec Pt is not as ADL baseline and would benefit from continued skilled OT to increase activity tolerance and ADL independence. Per pt's significant other, pt was improving at ARU until this hospitalization.   OT Brief overview of current status Pt Max A for LB dressing, CGA for transfers, Min A for STS   Total Session Time   Timed Code Treatment Minutes 25   Total Session Time (sum of timed and untimed services) 35

## 2023-04-13 NOTE — CONSULTS
Care Management Initial Consult    General Information  Assessment completed with: Patient, VM-chart review,    Type of CM/SW Visit: Initial Assessment    Primary Care Provider verified and updated as needed: Yes   Readmission within the last 30 days: previous discharge plan unsuccessful         Advance Care Planning: Advance Care Planning Reviewed: no concerns identified          Communication Assessment  Patient's communication style: spoken language (English or Bilingual)    Hearing Difficulty or Deaf: no        Cognitive  Cognitive/Neuro/Behavioral: .WDL except, motor response  Level of Consciousness: alert  Arousal Level: arouses to voice  Orientation: disoriented to, place  Mood/Behavior: calm, cooperative  Best Language: 0 - No aphasia  Speech: garbled, slurred, slow    Living Environment:   People in home: alone     Current living Arrangements: house      Able to return to prior arrangements: no       Family/Social Support:  Care provided by: spouse/significant other  Provides care for: no one  Marital Status: Domestic Partnership  Significant Other, Children       Kayce  Description of Support System: Supportive, Involved    Support Assessment: Adequate family and caregiver support, Adequate social supports    Current Resources:   Patient receiving home care services: No     Community Resources: None  Equipment currently used at home: other (see comments) (pt using walker prior to admission)  Supplies currently used at home: None    Employment/Financial:  Employment Status: employed part-time        Financial Concerns: No concerns identified   Referral to Financial Worker: No       Lifestyle & Psychosocial Needs:  Social Determinants of Health     Tobacco Use: Medium Risk (3/10/2023)    Patient History      Smoking Tobacco Use: Former      Smokeless Tobacco Use: Never      Passive Exposure: Not on file   Alcohol Use: Not on file   Financial Resource Strain: Not on file   Food Insecurity: Not on file    Transportation Needs: Not on file   Physical Activity: Not on file   Stress: Not on file   Social Connections: Not on file   Intimate Partner Violence: Not on file   Depression: Not at risk (2/1/2023)    PHQ-2      PHQ-2 Score: 0   Housing Stability: Not on file       Functional Status:  Prior to admission patient needed assistance:   Dependent ADLs:: Ambulation-walker, Transfers, Eating          Mental Health Status:  Mental Health Status: No Current Concerns       Chemical Dependency Status:  Chemical Dependency Status: No Current Concerns             Values/Beliefs:  Spiritual, Cultural Beliefs, Zoroastrian Practices, Values that affect care:                 Additional Information:    SW met with patient at bedside and introduced self/role. SW completed Initial Assessment. Patient's speech was slow and quiet. Patient stated that he lives alone in a home and care is provided by his fianceKayce. 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 Patient noted his fiance as a support for him. Patient confirmed his PCP. He stated that he uses a wheelchair at home.  Part of CMA conducted via chart review. Patient has three children, two of whom are involved in patient's care. Patient's fianceKayce, noted that she is able to assist patient with cares once discharged home.  SW explained that at this time, TCU is being recommended. SW provided education on TCU and patient expressed interest in seeing a 'Care Compare' list of facilities.    Per IDT meeting, patient is not medically ready for discharge for a few days.       ZAK Camarena, LSW  6 Med Surg   [facility]  Phone: [phone]  Pager: [phone]

## 2023-04-13 NOTE — PROGRESS NOTES
[facility]    Medicine Progress Note - Hospitalist Service, GOLD TEAM 17    Date of Admission:  4/11/2023    Assessment & Plan      Patient is a 76 y/o man who has multiple medical problems including diabetes mellitus type II, atrial fibrillation, hypertension and hyperlipidemia. Patient had been hospitalized from 07-Mar-2023 to 16-Mar-2023 at [facility] with acute ischemic stroke of right MCA territory due to cardioembolism, did  receive tenecteplase on 07-Mar-2023. During this hospital stay also had possible early sepsis secondary to aspiration pneumonia. He was transferred to acute rehabilitation unit on 16-Mar-2023.      Had a prolonged rehabilitation course complicated by the extent of his deficits and fluctuating levels of alertness. Was treated for urinary tract infection which was associated with significant functional decline and worsened encephalopathy after prior improvement. Patient reportedly improved after several days of treatment but then develop recurrent leucocytosis, worsening confusion and lactic acidosis.      Earlier on day of admission rapid response was called. He  was started on empiric antibiotics, IV fluids  for lactic acidosis. Lactic acid increased following IV fluid bolus and patient was transferred to the medical unit for further evaluation and treatment.      Current concern given recurrent leucocytosis and lactic acidosis is for sepsis though dehydration is another possibility. There is concern for recurrent urinary tract infection.     From chart review, patient completed a 7 day course of antibiotics for E. coli urinary tract infection on 08-Apr-2023 - patient was started on zosyn on 02-Apr, transitioned to ceftriaxone on 04-Apr and then transitioned to cefdinir on 06-Apr-2023.      Encephalopathy, toxic metabolic   - much improved      Possible recurrent urinary tract infection  - has been restarted empirically on ceftriaxone.  Awaiting repeat urine culture results.  - UC form 4/2: E coli resistant to amp, unasyn and TMP/sulfa intermediate to cipro and levo, susceptible to others   -per significant other patient  Gets UTI ~ 2 x a year and has required admission with them,   - will need urology follow up       Leucocytosis, lactic acidosis  concern for early sepsis  - continue IV fluids  , antibiotics   - lactic acid 3.5 now back to normal      Acute right MCA   -stroke s/p tenecteplace on 07-Mar-2023, likely secondary cardioembolic from atrial fibrillation - patient had been off anticoagulation for anticipated procedure:  - residual left hemiparesis, impaired balance, impaired coordination, impaired proprioception, dysarthria, dysphagia and moderate cognitive linguistic impairment.  - initially was on aspirin but is now on eliquis for anticoagulation.  - continue amantadine.  - PT, OT and SLP.  -  f/u with Neurology as outpatient.      Vascular dementia   - neuropsychological evaluation on 29-Mar and meets criteria for major neurocognitive disorder. It was recommended that POA be eneacted and patient receive assistance in complex and reasoned decisions moving forward.      Atrial fibrillation  - continue diltiazem with holding parameters  - previously been on coumadin but this apparently had been on hold since 25-Feb for planned pain pump placement. Patient was started on eliquis for anticoagulation on 14-Mar.     Hyperlipidemia  - continue  atorvastatin.     Diabetes mellitus type II  -  patient usually on metformin, on hold for now , blood sugars 140s low 200s    -accu-checks insulin sliding scale      Hypertension  - on atenolol and diltiazem. Monitoring blood pressure.       Cough  -  Recently treated for aspirational pneumonia, Monitoring for evidence of aspiration  - on flonase for possible postnasal drip .  -negative  COVID  4/3     Obstructive sleep apnea  -not using CPAP at present. Patient should f/u as  "outpatient.     Dysphagia  - on puree (level 4) diet, moderately thick (level 3) liquids by tsp,  up to chair for all meals with 1:1 assist. Patient receiving SLP.       Moderate malnutrition in the context of acute on chronic illness  -  receiving tube feeds via PEG-tube. PEG-tube was placed 09-Mar.  - nutritionist to see      Loose stools  -  Monitoring for diarrhea  - if continues check  c diff as has had recent antibiotics exposure .     GERD  -  on PPI.     Mild normocytic anemia  - Hg stable, no signs of acute bleed       Sacral pressure injury, stage 2  - healed: Monitoring for recurrence.     Depressed mood  - continue PTA remeron 15 mg nightly.     4/12 discussed with  SO  4/13 discussed with  SO        Diet: Pureed Diet (level 4) Liquidized/Moderately Thick (level 3) (by tsp)  Adult Formula Drip Feeding: Continuous Jevity 1.5; Gastrostomy; Goal Rate: 85 ml/hr x16 hrs from 2 pm to 6 am; mL/hr; From: 2:00 PM; To: 6:00 AM    DVT Prophylaxis: DOAC  Scruggs Catheter: Not present  Lines: None     Cardiac Monitoring: None  Code Status: Full Code      Clinically Significant Risk Factors                         # Overweight: Estimated body mass index is 26.5 kg/m  as calculated from the following:    Height as of this encounter: 1.854 m (6' 1\").    Weight as of this encounter: 91.1 kg (200 lb 13.4 oz)., PRESENT ON ADMISSION  # Moderate Malnutrition: based on nutrition assessment, PRESENT ON ADMISSION       Disposition Plan             Clementine Poe MD  Hospitalist Service, GOLD TEAM 17  [facility]  Securely message with [employer] (more info)  Text page via [employer] Paging/Directory   See signed in provider for up to date coverage information  ______________________________________________________________________    Interval History   Doing much better today, up in chair, awake, still some slurrde speechm no CO no shortness of breath , SO here     Physical Exam   Vital Signs: " Temp: 98.1  F (36.7  C) Temp src: Oral BP: 130/77 Pulse: 80   Resp: 18 SpO2: 95 % O2 Device: None (Room air)    Weight: 200 lbs 13.42 oz  General appearance:  no apparent distress     HEENT: EOMI and PEARLA, sclera nonicteric,  Dry mucus membranes, head atraumatic  NECK: supple  RESPIRATORY: lungs are clear   CARDIOVASCULAR: normal S1S2  irreg irreg, soft systolic murmur   GASTROINTESTINAL: abdomen is  soft,  non-distended, non-tender with normal bowel sounds. PEG site looks ok     MUSCULOSKELETAL: normal muscle bulk and tone  SKIN: warm and dry, no rashes, no mottling noted   NEUROLOGIC: awake, speech slurred but much stronger, oriented  EXTREMITIES: no clubbing cyanosis or edema noted  moves all extremities       Data   Imaging results reviewed over the past 24 hrs:   No results found for this or any previous visit (from the past 24 hour(s)).  Recent Labs   Lab 04/13/23  0706 04/12/23  2141 04/12/23  1654 04/12/23  1139 04/12/23  0717 04/11/23  1158 04/11/23  0514 04/10/23  0724 04/10/23  0650   WBC 14.8*  --   --   --  14.1*  --  13.5*  --  11.9*   HGB 12.4*  --   --   --  12.1*  --  12.7*  --  12.8*   MCV 95  --   --   --  95  --  94  --  93     --   --   --  357  --  363  --  345   NA  --   --   --   --  141  --  140  --  142   POTASSIUM  --   --   --   --  4.1  --  4.1  --  3.9   CHLORIDE  --   --   --   --  103  --  102  --  104   CO2  --   --   --   --  25  --  28  --  25   BUN  --   --   --   --  32.1*  --  40.3*  --  36.2*   CR  --   --   --   --  0.71  --  0.85  --  0.70   ANIONGAP  --   --   --   --  13  --  10  --  13   LUTHER  --   --   --   --  9.5  --  9.9  --  9.7   GLC  --  170* 199* 148* 202*   < > 189*   < > 189*    < > = values in this interval not displayed.

## 2023-04-13 NOTE — PROGRESS NOTES
"Vital signs:  Temp: 98.1  F (36.7  C) Temp src: Oral BP: 130/77 Pulse: 80   Resp: 18 SpO2: 95 % O2 Device: None (Room air)   Height: 185.4 cm (6' 1\") Weight: 91.1 kg (200 lb 13.4 oz)   Patient is alert alert and oriented x3. Patient is incontinent of B and B. Patient has PEG tube. Tube feeding Jevity 1.5 running at 85 ml/hr from 2 pm to 6 am. With 180 ml flushes every 4 hours.   Patient has redness to sacrum, barrier cream applied with offloading between cares. Pt is on puree . L PIV SL with antibiotic  treatment.   "

## 2023-04-13 NOTE — PROGRESS NOTES
"Clinical Swallow Evaluation with Speech Pathology     04/13/23 7581   Appointment Info   Signing Clinician's Name / Credentials (SLP) Sheila Brooks MA, CCC-SLP   General Information   Onset of Illness/Injury or Date of Surgery 04/11/23   Referring Physician Rios Bynum MD   Pertinent History of Current Problem Per Hospitalist progress note this PM:     \"Patient is a 78 y/o man who has multiple medical problems including diabetes mellitus type II, atrial fibrillation, hypertension and hyperlipidemia. Patient had been hospitalized from 07-Mar-2023 to 16-Mar-2023 at [facility] with acute ischemic stroke of right MCA territory due to cardioembolism, did  receive tenecteplase on 07-Mar-2023. During this hospital stay also had possible early sepsis secondary to aspiration pneumonia. He was transferred to acute rehabilitation unit on 16-Mar-2023.          Had a prolonged rehabilitation course complicated by the extent of his deficits and fluctuating levels of alertness. Was treated for urinary tract infection which was associated with significant functional decline and worsened encephalopathy after prior improvement. Patient reportedly improved after several days of treatment but then develop recurrent leucocytosis, worsening confusion and lactic acidosis.          Earlier on day of admission rapid response was called. He  was started on empiric antibiotics, IV fluids  for lactic acidosis. Lactic acid increased following IV fluid bolus and patient was transferred to the medical unit for further evaluation and treatment.\"   General Observations Patient sitting up in chair upon arrival. His fiancee, Kayce, was present.   Type of Evaluation   Type of Evaluation Swallow Evaluation   Oral Motor   Oral Musculature anomalies present   Mucosal Quality dry;sticky;cracked  (Thorough oral cares were provided prior to PO trials)   Dentition (Oral Motor)   Dentition (Oral Motor) dental appliance/dentures   Dental " Appliance/Denture (Oral Motor) upper and lower;dentures, full   Comment, Dentition (Oral Motor) Patient has upper and lower dentures. The lower denture is very loose-fitting.   Facial Symmetry (Oral Motor)   Facial Symmetry (Oral Motor) left side impairment   Left Side Facial Asymmetry moderate impairment   Comment, Facial Symmetry (Oral Motor) Apparent left facial droop/weakness. A full oral motor evaluation was not completed as patient was confused and had difficulty consistently following directions.   Cough/Swallow/Gag Reflex (Oral Motor)   Volitional Throat Clear/Cough (Oral Motor) reduced strength   Volitional Swallow (Oral Motor) mildly delayed   Vocal Quality/Secretion Management (Oral Motor)   Vocal Quality (Oral Motor) hypophonic   Secretion Management (Oral Motor) wet/gurgly vocal quality  (Noted intermittently)   Comment, Vocal Quality/Secretion Management (Oral Motor) Patient's voice was very quiet at times. He required verbal cues to increase his volume. Mild wet/gurgly vocal quality was noted intermittently throughout session.   General Swallowing Observations   Current Diet/Method of Nutritional Intake (General Swallowing Observations, NIS) pureed (level 4);moderately thick liquids/liquidized (level 3);gastrostomy tube (PEG)  (This was patient's diet at [facility]. His meal trays have reportedly been on hold since transfer to acute care. Patient also receives tube feeding via PEG tube.)   Respiratory Support (General Swallowing Observations) none   Past History of Dysphagia Yes. Most recent video swallow study (3/22/23) showed aspiration of thin and mildly thick liquid with a delayed and ineffective cough response.   Swallowing Evaluation Clinical swallow evaluation   Clinical Swallow Evaluation   Feeding Assistance dependent   Clinical Swallow Evaluation Textures Trialed moderately thick liquids/liquidized;pureed   Clinical Swallow Eval: Moderately Thick Liquids   Mode of Presentation spoon;fed by  clinician   Volume Presented ~2 ounces   Oral Phase WFL   Pharyngeal Phase other (see comments)  (No overt s/sx of aspiration)   Diagnostic Statement Subjectively, oropharyngeal swallow function appeared WFL with trials of moderately thick liquid given by spoon. No overt clinical s/sx of aspiration observed.   Clinical Swallow Evaluation: Puree Solid Texture Trial   Mode of Presentation, Puree spoon;fed by clinician   Volume of Puree Presented ~2 ounces   Oral Phase, Puree WFL   Pharyngeal Phase, Puree coughing/choking  (Delayed cough noted x1)   Successful Strategies Trialed During Procedure, Puree other (see comments)  (Swallowing fast; dry swallow)   Diagnostic Statement Patient presented with delayed cough x1 with puree consistency. This appeared related to the fact that patient began talking immediately after swallowing. Patient was cued to swallow fast (to minimize premature pharyngeal entry), as well as to complete a dry swallow before talking. He was able to demonstrate these strategies. No further episodes of coughing noted.   Swallowing Recommendations   Diet Consistency Recommendations pureed (level 4);moderately thick liquids/liquidized (level 3)   Supervision Level for Intake 1:1 supervision needed   Mode of Delivery Recommendations bolus size, small;liquids via spoon only;slow rate of intake   Swallowing Maneuver Recommendations double dry swallow;other (see comments)  (Swallow fast)   Monitoring/Assistance Required (Eating/Swallowing) monitor for cough or change in vocal quality with intake;stop eating activities when fatigue is present   Recommended Feeding/Eating Techniques (Swallow Eval) maintain upright sitting position for eating;provide assist with feeding;minimize distractions during oral intake   Medication Administration Recommendations, Swallowing (SLP) Recommend that meds be administered through PEG tube.   Clinical Impression   Criteria for Skilled Therapeutic Interventions Met (SLP Eval)  Yes, treatment indicated   SLP Diagnosis Oropharyngeal dysphagia  (In the setting of recent CVA)   Risks & Benefits of therapy have been explained evaluation/treatment results reviewed;care plan/treatment goals reviewed;risks/benefits reviewed;participants voiced agreement with care plan;participants included;patient;spouse/significant other   Clinical Impression Comments   Clinical swallow evaluation completed per MD order. Patient presented with mild oral dysphagia with the textures trialed. Suspect at least moderate pharyngeal dysphagia given results of previous VFSS(s), as well as presence of mild clinical s/sx of aspiration x1 with puree consistency during this evaluation. At this time, patient appears appropriate to continue current diet of pureed textures (Level 4) and moderately thick liquids (Level 3) with use of the safe swallowing strategies listed above. Speech Therapy will follow for short course to monitor diet tolerance and reinforce strategies. A repeat VFSS is recommended prior to potential liquid advancement, however, do not anticipate that patient will be ready for this during acute admission.     SLP Total Evaluation Time   Eval: oral/pharyngeal swallow function, clinical swallow Minutes (05778) 19   SLP Goals   Therapy Frequency (SLP Eval) 3 times/wk   SLP Predicted Duration/Target Date for Goal Attainment 04/21/23   SLP Goals Swallow   SLP: Safely tolerate diet without signs/symptoms of aspiration Pureed diet;Moderately thick liquids;With use of swallow precautions;With use of compensatory swallow strategies;With assistance/supervision   Swallowing Dysfunction &/or Oral Function for Feeding   Treatment of Swallowing Dysfunction &/or Oral Function for Feeding Minutes (62770) 9   Symptoms Noted During/After Treatment None   SLP Discharge Planning   SLP Plan Follow 3x/week to monitor diet tolerance & reinforce safe swallow strategies. Anticipate short course of ST.   SLP Discharge Recommendation  Transitional Care Facility   SLP Rationale for DC Rec Swallow function is well below baseline. Patient is on a modified diet and requires cues to use safe swallowing strategies.   SLP Brief overview of current status  Resume diet of pureed textures and moderately thick liquids. Pt to be alert & sitting fully upright (preferably in chair) for all intake. 1:1 assist w/ feeding. Liquids by teaspoon only. OK for limited ice chips between meals for comfort. Complete thorough oral cares prior to offering ice chips.   Total Session Time   Total Session Time (sum of timed and untimed services) 28

## 2023-04-13 NOTE — PLAN OF CARE
"Goal Outcome Evaluation:      Plan of Care Reviewed With: patient, child, spouse    VS: /65 (BP Location: Left arm)   Pulse 77   Temp 97.9  F (36.6  C) (Axillary)   Resp 18   Ht 1.854 m (6' 1\")   Wt 91.1 kg (200 lb 13.4 oz)   SpO2 96%   BMI 26.50 kg/m     O2: Stable on room air, denies SOB and chest pain   Output: Incontinent at times of B&B   Last BM: 04/12   Activity: Assist of 1-2 with walker and GB   Up for meals? Yes   Skin: Redness to sacrum, barrier cream applied. No new deficits noted   Pain: Denies pain   CMS: Alert and oriented x 3, disoriented to place. Weakness to LLE d/t stroke. Speech slow and slurred   Dressing: n/a   Diet: Level 4 pureed diet with thikcned liquids. Speech consulted today and confirmed his diet. Also approved ice chips for patient between meals. Use spoon for liquids, no straws   LDA: L PIV saline locked. PEG tube, tube feeding Jevity running at 85mL/Hr from 8205-8982 with 180mL flushes q 4h   Plan: Continue with plan of care    Equipment IV pole, tube feeding pole, personal belongings in room. Call light within reach      * Patient has dentures in place, take them out at bedtime to clean and soak.       "

## 2023-04-14 NOTE — PROGRESS NOTES
[facility]    Medicine Progress Note - Hospitalist Service, GOLD TEAM 17    Date of Admission:  4/11/2023    Assessment & Plan      Patient is a 76 y/o man who has multiple medical problems including diabetes mellitus type II, atrial fibrillation, hypertension and hyperlipidemia. Patient had been hospitalized from 07-Mar-2023 to 16-Mar-2023 at [facility] with acute ischemic stroke of right MCA territory due to cardioembolism, did  receive tenecteplase on 07-Mar-2023. During this hospital stay also had possible early sepsis secondary to aspiration pneumonia. He was transferred to acute rehabilitation unit on 16-Mar-2023.      Had a prolonged rehabilitation course complicated by the extent of his deficits and fluctuating levels of alertness. Was treated for urinary tract infection which was associated with significant functional decline and worsened encephalopathy after prior improvement. Patient reportedly improved after several days of treatment but then develop recurrent leucocytosis, worsening confusion and lactic acidosis.      Earlier on day of admission rapid response was called. He  was started on empiric antibiotics, IV fluids  for lactic acidosis. Lactic acid increased following IV fluid bolus and patient was transferred to the medical unit for further evaluation and treatment.      Current concern given recurrent leucocytosis and lactic acidosis is for sepsis though dehydration is another possibility. There is concern for recurrent urinary tract infection.     From chart review, patient completed a 7 day course of antibiotics for E. coli urinary tract infection on 08-Apr-2023 - patient was started on zosyn on 02-Apr, transitioned to ceftriaxone on 04-Apr and then transitioned to cefdinir on 06-Apr-2023.      Encephalopathy, toxic metabolic   - much improved      Possible recurrent urinary tract infection  - has been restarted empirically on ceftriaxone.  Awaiting repeat urine culture results.  - received cefdinir x 3 days 4/6-4/8 and did get Rocephin  Prior , back on rocephine iv 4/11 on , complete 5 days   - UC form 4/2: E coli resistant to amp, unasyn and TMP/sulfa intermediate to cipro and levo, susceptible to others   -per significant other patient  Gets UTI ~ 2 x a year and has required admission with them,   - will need urology follow up       Leucocytosis, lactic acidosis  concern for early sepsis  - stopped IV fluids , complete antibiotics   - lactic acid 3.5 now back to normal      Acute right MCA   -stroke s/p tenecteplace on 07-Mar-2023, likely secondary cardioembolic from atrial fibrillation - patient had been off anticoagulation for anticipated procedure:  - residual left hemiparesis, impaired balance, impaired coordination, impaired proprioception, dysarthria, dysphagia and moderate cognitive linguistic impairment.  - initially was on aspirin but is now on eliquis for anticoagulation.  - continue amantadine.  - PT, OT and SLP.  - physical therapy  rec TCU   -  f/u with Neurology as outpatient.      Vascular dementia   - neuropsychological evaluation on 29-Mar and meets criteria for major neurocognitive disorder. It was recommended that POA be eneacted and patient receive assistance in complex and reasoned decisions moving forward.      Atrial fibrillation  - continue diltiazem with holding parameters  - previously been on coumadin but this apparently had been on hold since 25-Feb for planned pain pump placement. Patient was started on eliquis for anticoagulation on 14-Mar.     Hyperlipidemia  - continue  atorvastatin.     Diabetes mellitus type II  -  patient usually on metformin, restart as tolerating diet, lactate was up form sepsis and likely not due to metformin , monitor once restarted    -continue accu-checks insulin sliding scale      Hypertension  - on atenolol and diltiazem  - blood pressure  Ok .       Cough  -  Recently treated for  "aspirational pneumonia, Monitoring for evidence of aspiration  - on flonase for possible postnasal drip .  -negative  COVID  4/3     Obstructive sleep apnea  -not using CPAP at present. Patient should f/u as outpatient.     Dysphagia  - on puree (level 4) diet, moderately thick (level 3) liquids by tsp,  up to chair for all meals with 1:1 assist. Patient receiving SLP.       Moderate malnutrition in the context of acute on chronic illness  -  receiving tube feeds via PEG-tube. PEG-tube was placed 09-Mar.  - nutritionist to see      Loose stools  -  Monitoring for diarrhea  - if continues check  c diff as has had recent antibiotics exposure .     GERD  -  on PPI.     Mild normocytic anemia  - Hg stable, no signs of acute bleed       Sacral pressure injury, stage 2  - healed: Monitoring for recurrence.     Depressed mood  - continue PTA remeron 15 mg nightly.     4/12 discussed with  SO  4/13 discussed with  SO        Diet: Pureed Diet (level 4) Liquidized/Moderately Thick (level 3) (by tsp)  Adult Formula Drip Feeding: Continuous Jevity 1.5; Gastrostomy; Goal Rate: 85 ml/hr x16 hrs from 2 pm to 6 am; mL/hr; From: 2:00 PM; To: 6:00 AM    DVT Prophylaxis: DOAC  Scruggs Catheter: Not present  Lines: None     Cardiac Monitoring: None  Code Status: Full Code      Clinically Significant Risk Factors                         # Overweight: Estimated body mass index is 27.81 kg/m  as calculated from the following:    Height as of this encounter: 1.854 m (6' 1\").    Weight as of this encounter: 95.6 kg (210 lb 12.2 oz)., PRESENT ON ADMISSION  # Moderate Malnutrition: based on nutrition assessment, PRESENT ON ADMISSION       Disposition Plan    physical therapy  rec TCU         Clementine Poe MD  Hospitalist Service, GOLD TEAM 17  [facility]  Securely message with [employer] (more info)  Text page via [employer] Paging/Directory   See signed in provider for up to date coverage " information  ______________________________________________________________________    Interval History   Doing better, up in chair SO here, speech better  physical therapy  Just worked with him an [facility] TCU   Physical Exam   Vital Signs: Temp: 97.7  F (36.5  C) Temp src: Oral BP: 121/80 Pulse: 77   Resp: 18 SpO2: 96 % O2 Device: None (Room air)    Weight: 210 lbs 12.16 oz  General appearance:  no apparent distress     HEENT: EOMI and PEARLA, sclera nonicteric,  Moist mucus membranes, head atraumatic  NECK: supple  RESPIRATORY: lungs are clear   CARDIOVASCULAR: normal S1S2  irreg irreg, soft systolic murmur   GASTROINTESTINAL: abdomen is  soft,  non-distended, non-tender with normal bowel sounds. PEG site looks ok     MUSCULOSKELETAL: normal muscle bulk and tone  SKIN: warm and dry, no rashes, no mottling noted   NEUROLOGIC: awake, speech slurred but much stronger, oriented  EXTREMITIES: no clubbing cyanosis or edema noted  moves all extremities       Data   Imaging results reviewed over the past 24 hrs:   No results found for this or any previous visit (from the past 24 hour(s)).  Recent Labs   Lab 04/14/23  0803 04/14/23  0650 04/14/23  0200 04/13/23  0828 04/13/23  0706 04/12/23  1139 04/12/23  0717 04/11/23  1158 04/11/23  0514 04/10/23  0724 04/10/23  0650   WBC  --   --   --   --  14.8*  --  14.1*  --  13.5*  --  11.9*   HGB  --   --   --   --  12.4*  --  12.1*  --  12.7*  --  12.8*   MCV  --   --   --   --  95  --  95  --  94  --  93   PLT  --   --   --   --  352  --  357  --  363  --  345   NA  --   --   --   --   --   --  141  --  140  --  142   POTASSIUM  --   --   --   --   --   --  4.1  --  4.1  --  3.9   CHLORIDE  --   --   --   --   --   --  103  --  102  --  104   CO2  --   --   --   --   --   --  25  --  28  --  25   BUN  --   --   --   --   --   --  32.1*  --  40.3*  --  36.2*   CR  --   --   --   --   --   --  0.71  --  0.85  --  0.70   ANIONGAP  --   --   --   --   --   --  13  --  10  --  13   LUTHER   --   --   --   --   --   --  9.5  --  9.9  --  9.7   * 227* 150*   < >  --    < > 202*   < > 189*   < > 189*    < > = values in this interval not displayed.

## 2023-04-14 NOTE — PLAN OF CARE
[phone]    A&O to self and situation, unsure of place and date but knows he is in the hospital.  Denies pain, nausea, headache, dizziness, numbness, tingling, SOB, chest pain.  Garbled/slurred speech d/t previous stroke.  L-sided weakness at baseline.  Not OOB this shift.    Incont of bladder and bowel.  Leydi care completed, wound cleanser applied to erythemic area, barrier cream applied.  Turned q 4 hrs overnight.    Tube feeding in place, 85 ml/hr with 180 ml flushes q 4 hours.  Meds given via g-tube, flushed after administration.  Nothing by mouth this shift.  BG checks q 4 hours.    L PIV patent and saline locked between abx admin.    Diminished lung sounds, no increased work of breathing noted.    Pt slept well overnight.

## 2023-04-14 NOTE — UTILIZATION REVIEW
"  Admission Status; Secondary Review Determination     REASON FOR REVIEW: DENIAL OF INPATIENT LEVEL OF SERVICES    Under the authority of the Utilization Management Committee, the utilization review process indicated a secondary review on the above patient.  The review outcome is based on review of the medical records, discussions with staff, and applying clinical experience noted on the date of the review.        (xxx)      Inpatient Status Appropriate - This patient's medical care is consistent with medical management for inpatient care and reasonable inpatient medical practice.      () Observation Status Appropriate - This patient does not meet hospital inpatient criteria and is placed in observation status. If this patient's primary payer is Medicare and was admitted as an inpatient, Condition Code 44 should be used and patient status changed to \"observation\".   () Admission Status NOT Appropriate - This patient's medical care is not consistent with medical management for Inpatient or Observation Status.          RATIONALE FOR DETERMINATION     77 year old male with pmh of dm2, htn, afib, and recent sstroke of the right mca due to cardioembolism. He was treated with lytics and transferred to an acute rehab on 3/16. While at rehab, his course was complicated by severe deficits. His course also suffered from lactic acidosis, tachycardia, and worsening confusion from urinary tract infection that was treated initially with broad spectrum antibiotics with zosyn on 4/2 and then narrowed to cefdinir on 4/8, completing a 7 day course of antibiotics while still at the acute rehab facility.     However, on 4/11 he was increasingly confused, and a lactic acid was drawn that was notable to be elevated at 2.5, causing a rapid response team to be deployed. Workup was started, and the patient was started on ceftriaxone for possible incompletely treated UTI. He was given fluids, but then he had another lactic acid drawn that " was further increased to 3.5.    In this context of substantial intensive treatment at the acute rehab, the patient had a rising lactic acid to 3.5 despite reintroduction of IVF and IV antibiotics, and so he was transferred to the hospital for concerns for sepsis. His white count was elevated to 13.5 and he was continued on IV broad spectrum antibotics. Follow-up white cell counts were further increased on hospital days 4/12 and 4/13.    This patient presented with acute worsening of confusion, and worsening lactic acidosis, white cell count despite having completed a full course of 7 days of IV to oral antibiotics prior to admission at he acute rehab facility, AND failed to improve after restarting IV antibiotics and receiving 1 liter boluses of IVF at the acute rehab. As such, the patient presented with signs of sepsis with failing hospital level cares given in the acute rehab. With continued IV antibiotics he has improved. UA was notable for WBC and bacteria however no updated culture was available.    Given his presentation of failing significant treatments prior to hospitalization, continued sepsis as evidenced by rising lactic acid and wbc count, inpatient care is appropriate to ensure sepsis is adequately treated, lactic acid improves, and encephalopathy clears. Would agree that this meets inpatient criteria.    The severity of illness, intensity of service provided, expected LOS and risk for adverse outcome make the care complex, high risk and appropriate for hospital admission.        The information on this document is developed by the utilization review team in order for the business office to ensure compliance.  This only denotes the appropriateness of proper admission status and does not reflect the quality of care rendered.         The definitions of Inpatient Status and Observation Status used in making the determination above are those provided in the CMS Coverage Manual, Chapter 1 and Chapter 6,  section 70.4.      Sincerely,     Herb Bass DO  Physician Advisor  Utilization Review/ Case Management  [facility].

## 2023-04-14 NOTE — PROGRESS NOTES
Care Management Follow Up    Length of Stay (days): 3    Expected Discharge Date: 04/13/2023     Concerns to be Addressed: discharge planning     Patient plan of care discussed at interdisciplinary rounds: Yes    Anticipated Discharge Disposition:  TCU     Anticipated Discharge Services:  Post acute therapies  Anticipated Discharge DME:  None    Patient/family educated on Medicare website which has current facility and service quality ratings: yes  Education Provided on the Discharge Plan: Yes  Patient/Family in Agreement with the Plan: yes    Referrals Placed by CM/SW:  See below  Private pay costs discussed: Not applicable    Additional Information:    SW met with patient at bedside. Patient's fiance, Kayce, was also present. SW explained recommendation of TCU, which patient and Kayce were agreeable to. Kayce stated that patient has all of the equipment he needs once he gets home, but that patient would benefit from a TCU placement to get stronger. SW presented patient at [facility] with 'Care Compare' list; they gave preferences. SW explained that referrals will be sent today and that SW will keep them updated.    Referrals:    [facility]  [address] [place] [address]  P: [phone]  F: [phone]  4/14: Referral sent via Epic destination    [facility]  [address] [place] [address]  Adm:  [phone];  P:  [phone];  F:  [phone]  4/14: Referral sent via Epic destination    [facility]  [address] [place] [address]  [phone]  4/14: Referral sent via Epic destination    [facility]   [address] [place], [place] [address] [phone]  4/14: Referral sent via Epic destination    [facility]  [address] [place], [place] [id number] [phone]  Admissions:  [phone]  4/14: Referral sent via Epic destination    [facility]  [address] [place] [address]  [place] [address]  [phone]  4/14: Referral sent via Epic destination    [facility] ([facility])  [address] [place] [address]  [phone]  4/14: Referral sent via [facility] destination    [facility] 4th floor  [address] [place]  [address]  Admissions: [phone]  Fax: [phone]  RN to RN:  [phone]  4/14: Referral shared with  TCU liaison    Declined:    [facility]   [address] [facility] [address]  P:  [phone]  4/14: Referral sent via [facility] destination. Received call, unable to meet needs.        Inge Meyers, ANAW, LSW  6 Med Surg   [facility]  Phone: [phone]  Pager: [phone]

## 2023-04-14 NOTE — PLAN OF CARE
A & O to self and situation. RA. Up to chair for a couple hours this afternoon. HX of left sided weakness due to stroke. Some redness present on coccyx due to prior healed pressure injury. Mepilex applied. All medications crushed and given through feeding tube. Tube feeding started at 1400, will be complete at 0600 4/15 (per order). Also takes food orally, pureed and thickened liquids diet. Patient also enjoys ice chips to suck on. Carb counts and BG checks scheduled due to scheduled tube feedings. Incontinent of bowel and bladder.

## 2023-04-14 NOTE — PLAN OF CARE
A & O to self and situation. RA. Up to chair for a couple hours this afternoon. HX of left sided weakness due to stroke. Some redness present on coccyx due to prior healed pressure injury. Mepilex applied for prevention of worsening tissue injury. All medications crushed and given through feeding tube. Tube feeding started at 1400, will be complete at 0600 4/15 (per order). Also takes food orally, pureed and thickened liquids diet. Patient also enjoys ice chips to suck on. Carb counts and BG checks scheduled due to scheduled tube feedings. Incontinent of bowel and bladder.

## 2023-04-15 NOTE — PLAN OF CARE
A & O to self and situation. RA. Up to wheelchair for a couple hours this morning. HX of left sided weakness due to stroke. Some redness present on coccyx due to prior healed pressure injury. Mepilex removed from yesterday due to BM and urine getting on the dressing. Medications crushed and given through feeding tube. Patient tried one PO tablet with applesauce and tolerated well. Tube feeding started at 1400, will be complete at 0600 4/16 (per order). Also takes food orally, minced diet and thickened liquids. Carb counts and BG checks scheduled due to scheduled tube feedings. Incontinent of bowel and bladder.

## 2023-04-15 NOTE — PROGRESS NOTES
"Stefan Diallo is a 77 year old male patient.  No diagnosis found.  Past Medical History:   Diagnosis Date     Arthritis      Atrial fibrillation (H)      Bacteremia      Bell's palsy 2015     BPH (benign prostatic hyperplasia)      Diabetes (H)      Gastroesophageal reflux disease      GERD (gastroesophageal reflux disease)      GI hemorrhage      High cholesterol      Hyperlipemia      Hyperlipidemia      Hypertension      Hypertension      Idiopathic peripheral neuropathy      Irregular heart beat     chronic at fib     Renal artery aneurysm (H)      Renal artery aneurysm (H)      Sleep apnea     Bipap     Sleep apnea 10/31/2021     Sleep apnea, obstructive     USES BIPAP     Type 2 diabetes mellitus (H)      UTI (lower urinary tract infection)      No current outpatient medications on file.     Allergies   Allergen Reactions     Bees Swelling     Reports using an epi pen if stung     Principal Problem:    Lactic acidosis    Blood pressure 127/77, pulse 76, temperature 97.6  F (36.4  C), temperature source Oral, resp. rate 16, height 1.854 m (6' 1\"), weight 95.6 kg (210 lb 12.2 oz), SpO2 97 %.    Alert and oriented X2 ( year, month correct, day more than 3 days off; place correct; situation: was interested in treatment plan)  Reports numbness and tingling in feet  Visual hallucinations (asked if I could see the bugs crawling on his bed, I couldn't)   Intermittent cough   On continuous feeding (bed locked at 30 degrees or above)   Redness on coccyx       Dru Montana RN  4/15/2023    "

## 2023-04-15 NOTE — PROGRESS NOTES
[facility]    Medicine Progress Note - Hospitalist Service, GOLD TEAM 17    Date of Admission:  4/11/2023    Assessment & Plan      Patient is a 78 y/o man who has multiple medical problems including diabetes mellitus type II, atrial fibrillation, hypertension and hyperlipidemia. Patient had been hospitalized from 07-Mar-2023 to 16-Mar-2023 at [facility] with acute ischemic stroke of right MCA territory due to cardioembolism, did  receive tenecteplase on 07-Mar-2023. During this hospital stay also had possible early sepsis secondary to aspiration pneumonia. He was transferred to acute rehabilitation unit on 16-Mar-2023.      Had a prolonged rehabilitation course complicated by the extent of his deficits and fluctuating levels of alertness. Was treated for urinary tract infection which was associated with significant functional decline and worsened encephalopathy after prior improvement. Patient reportedly improved after several days of treatment but then develop recurrent leucocytosis, worsening confusion and lactic acidosis.      Earlier on day of admission rapid response was called. He  was started on empiric antibiotics, IV fluids  for lactic acidosis. Lactic acid increased following IV fluid bolus and patient was transferred to the medical unit for further evaluation and treatment.      Current concern given recurrent leucocytosis and lactic acidosis is for sepsis though dehydration is another possibility. There is concern for recurrent urinary tract infection.     From chart review, patient completed a 7 day course of antibiotics for E. coli urinary tract infection on 08-Apr-2023 - patient was started on zosyn on 02-Apr, transitioned to ceftriaxone on 04-Apr and then transitioned to cefdinir on 06-Apr-2023.      Encephalopathy, toxic metabolic   - much improved      Possible recurrent urinary tract infection  - has been restarted empirically on ceftriaxone.  Awaiting repeat urine culture results.  - received cefdinir x 3 days 4/6-4/8 and did get Rocephin  Prior , back on rocephine iv 4/11 on , complete 5 days   - UC form 4/2: E coli resistant to amp, unasyn and TMP/sulfa intermediate to cipro and levo, susceptible to others   -per significant other patient  Gets UTI ~ 2 x a year and has required admission with them,   - will need urology follow up       Leucocytosis, lactic acidosis  concern for early sepsis  - stopped IV fluids , complete antibiotics   - lactic acid 3.5 now back to normal      Acute right MCA   -stroke s/p tenecteplace on 07-Mar-2023, likely secondary cardioembolic from atrial fibrillation - patient had been off anticoagulation for anticipated procedure:  - residual left hemiparesis, impaired balance, impaired coordination, impaired proprioception, dysarthria, dysphagia and moderate cognitive linguistic impairment.  - initially was on aspirin but is now on eliquis for anticoagulation.  - continue amantadine.  - PT, OT and SLP.  - physical therapy  rec TCU   -  f/u with Neurology as outpatient.      Vascular dementia   - neuropsychological evaluation on 29-Mar and meets criteria for major neurocognitive disorder. It was recommended that POA be eneacted and patient receive assistance in complex and reasoned decisions moving forward.      Atrial fibrillation  - continue diltiazem with holding parameters  - previously been on coumadin but this apparently had been on hold since 25-Feb for planned pain pump placement. Patient was started on eliquis for anticoagulation on 14-Mar.     Hyperlipidemia  - continue  atorvastatin.     Diabetes mellitus type II  -  patient usually on metformin, restart as tolerating diet, lactate was up form sepsis and likely not due to metformin , monitor once restarted    -continue accu-checks insulin sliding scale      Hypertension  - on atenolol and diltiazem  - blood pressure  Ok .       Cough  -  Recently treated for  "aspirational pneumonia, Monitoring for evidence of aspiration  - on flonase for possible postnasal drip .  -negative  COVID  4/3     Obstructive sleep apnea  -not using CPAP at present. Patient should f/u as outpatient.     Dysphagia  - now on minced and moist texture , moderately thick (level 3) liquids by tsp,  up to chair for all meals with 1:1 assist. Patient receiving SLP.       Moderate malnutrition in the context of acute on chronic illness  -  receiving tube feeds via PEG-tube. PEG-tube was placed 09-Mar.  - nutritionist involved      Loose stools  -  Monitoring for diarrhea  - if continues check  c diff as has had recent antibiotics exposure .     GERD  -  on PPI.     Mild normocytic anemia  - Hg stable, no signs of acute bleed       Sacral pressure injury, stage 2  - healed: Monitoring for recurrence.     Depressed mood  - continue PTA remeron 15 mg nightly.     4/12 discussed with  SO  4/13 discussed with  SO        Diet: Pureed Diet (level 4) Liquidized/Moderately Thick (level 3) (by tsp)  Adult Formula Drip Feeding: Continuous Jevity 1.5; Gastrostomy; Goal Rate: 85 ml/hr x16 hrs from 2 pm to 6 am; mL/hr; From: 2:00 PM; To: 6:00 AM    DVT Prophylaxis: DOAC  Scruggs Catheter: Not present  Lines: None     Cardiac Monitoring: None  Code Status: Full Code      Clinically Significant Risk Factors                         # Overweight: Estimated body mass index is 27.81 kg/m  as calculated from the following:    Height as of this encounter: 1.854 m (6' 1\").    Weight as of this encounter: 95.6 kg (210 lb 12.2 oz)., PRESENT ON ADMISSION  # Moderate Malnutrition: based on nutrition assessment, PRESENT ON ADMISSION       Disposition Plan    physical therapy  rec TCU         Clementine Poe MD  Hospitalist Service, GOLD TEAM 17  [facility]  Securely message with [employer] (more info)  Text page via [employer] [facility] Paging/Directory   See signed in provider for up to date coverage " information  ______________________________________________________________________    Interval History      Feels ok, some cough but no sputums     Physical Exam   Vital Signs: Temp: 98.1  F (36.7  C) Temp src: Oral BP: 120/81 Pulse: 75   Resp: 16 SpO2: 94 % O2 Device: None (Room air)    Weight: 210 lbs 12.16 oz  General appearance:  no apparent distress     HEENT: EOMI and PEARLA, sclera nonicteric,  Moist mucus membranes, head atraumatic  NECK: supple  RESPIRATORY: lungs are clear   CARDIOVASCULAR: normal S1S2  irreg irreg, soft systolic murmur   GASTROINTESTINAL: abdomen is  soft,  non-distended, non-tender with normal bowel sounds. PEG site looks ok     MUSCULOSKELETAL: normal muscle bulk and tone  SKIN: warm and dry, no rashes, no mottling noted   NEUROLOGIC: awake, speech slurred but much stronger, oriented  EXTREMITIES: no clubbing cyanosis or edema noted  moves all extremities       Data   Imaging results reviewed over the past 24 hrs:   No results found for this or any previous visit (from the past 24 hour(s)).  Recent Labs   Lab 04/15/23  0731 04/14/23  2225 04/14/23  1648 04/14/23  1228 04/14/23  0819 04/13/23  0828 04/13/23  0706 04/12/23  1139 04/12/23  0717 04/11/23  1158 04/11/23  0514   WBC  --   --   --   --  12.1*  --  14.8*  --  14.1*  --  13.5*   HGB  --   --   --   --  12.5*  --  12.4*  --  12.1*  --  12.7*   MCV  --   --   --   --  95  --  95  --  95  --  94   PLT  --   --   --   --  351  --  352  --  357  --  363   NA  --   --   --   --  138  --   --   --  141  --  140   POTASSIUM  --   --   --   --  4.0  --   --   --  4.1  --  4.1   CHLORIDE  --   --   --   --  102  --   --   --  103  --  102   CO2  --   --   --   --  24  --   --   --  25  --  28   BUN  --   --   --   --  23.9*  --   --   --  32.1*  --  40.3*   CR  --   --   --   --  0.70  --   --   --  0.71  --  0.85   ANIONGAP  --   --   --   --  12  --   --   --  13  --  10   LUTHER  --   --   --   --  9.5  --   --   --  9.5  --  9.9   GLC  137* 135* 184*   < > 147*   < >  --    < > 202*   < > 189*    < > = values in this interval not displayed.

## 2023-04-16 NOTE — PROGRESS NOTES
Temp: 97.2  F (36.2  C) Temp src: Oral BP: (!) 141/86 Pulse: 76   Resp: 18 SpO2: 94 % O2 Device: None (Room air)       Patient is alert and oriented to self. Pt is on tube feeding from 2 pm to 0600. Pt is incontinent of both bladder and bowel. Patient slept most of the night. Pt has Mepilex on the coccyx for prevention purpose.

## 2023-04-16 NOTE — PROGRESS NOTES
[facility]    Medicine Progress Note - Hospitalist Service, GOLD TEAM 17    Date of Admission:  4/11/2023    Assessment & Plan      Patient is a 78 y/o man who has multiple medical problems including diabetes mellitus type II, atrial fibrillation, hypertension and hyperlipidemia. Patient had been hospitalized from 07-Mar-2023 to 16-Mar-2023 at [facility] with acute ischemic stroke of right MCA territory due to cardioembolism, did  receive tenecteplase on 07-Mar-2023. During this hospital stay also had possible early sepsis secondary to aspiration pneumonia. He was transferred to acute rehabilitation unit on 16-Mar-2023.      Had a prolonged rehabilitation course complicated by the extent of his deficits and fluctuating levels of alertness. Was treated for urinary tract infection which was associated with significant functional decline and worsened encephalopathy after prior improvement. Patient reportedly improved after several days of treatment but then develop recurrent leucocytosis, worsening confusion and lactic acidosis.      Earlier on day of admission rapid response was called. He  was started on empiric antibiotics, IV fluids  for lactic acidosis. Lactic acid increased following IV fluid bolus and patient was transferred to the medical unit for further evaluation and treatment.      Current concern given recurrent leucocytosis and lactic acidosis is for sepsis though dehydration is another possibility. There is concern for recurrent urinary tract infection.     From chart review, patient completed a 7 day course of antibiotics for E. coli urinary tract infection on 08-Apr-2023 - patient was started on zosyn on 02-Apr, transitioned to ceftriaxone on 04-Apr and then transitioned to cefdinir on 06-Apr-2023.      Encephalopathy, toxic metabolic   - much improved   - 4/16 more confused again repeat cbc bmp lactate ,c diff     Lactate returned normal, no  significant rise in WBC     Hallucination  - seeing bugs, also seeing people that are not here  , thinks hospital bed looks like dentist chair, But otherwise oriented , has been on antibiotics , now some loose stools , ru other infection , blood sugar ok ,  checking labs as above     loose stool  - check c diff in light of worsening confusion, some mottling ok knees and bellow (mottling  Resolved once lifted legs)        Possible recurrent urinary tract infection  - has been restarted empirically on ceftriaxone. Awaiting repeat urine culture results.  - received cefdinir x 3 days 4/6-4/8 and did get Rocephin  Prior , back on rocephine iv 4/11 on , completed 5 days  4/16   - UC form 4/2: E coli resistant to amp, unasyn and TMP/sulfa intermediate to cipro and levo, susceptible to others   -per significant other patient  Gets UTI ~ 2 x a year and has required admission with them,   - will need urology follow up       Leucocytosis, lactic acidosis  concern for early sepsis  - stopped IV fluids , complete antibiotics   - lactic acid 3.5 now back to normal      Acute right MCA   -stroke s/p tenecteplace on 07-Mar-2023, likely secondary cardioembolic from atrial fibrillation - patient had been off anticoagulation for anticipated procedure:  - residual left hemiparesis, impaired balance, impaired coordination, impaired proprioception, dysarthria, dysphagia and moderate cognitive linguistic impairment.  - initially was on aspirin but is now on eliquis for anticoagulation.  - continue amantadine.  - PT, OT and SLP.  - physical therapy  rec TCU   -  f/u with Neurology as outpatient.      Vascular dementia   - neuropsychological evaluation on 29-Mar and meets criteria for major neurocognitive disorder. It was recommended that POA be eneacted and patient receive assistance in complex and reasoned decisions moving forward.      Atrial fibrillation  - continue diltiazem with holding parameters  - previously been on coumadin but  this apparently had been on hold since 25-Feb for planned pain pump placement. Patient was started on eliquis for anticoagulation on 14-Mar.     Hyperlipidemia  - continue  atorvastatin.     Diabetes mellitus type II  -  patient usually on metformin, restart as tolerating diet, lactate was up form sepsis and likely not due to metformin , monitor once restarted    -continue accu-checks insulin sliding scale      Hypertension  - on atenolol and diltiazem  - blood pressure  Ok .       Cough  -  Recently treated for aspirational pneumonia, Monitoring for evidence of aspiration  - on flonase for possible postnasal drip .  -negative  COVID  4/3     Obstructive sleep apnea  -not using CPAP at present. Patient should f/u as outpatient.     Dysphagia  - now on minced and moist texture , moderately thick (level 3) liquids by tsp,  up to chair for all meals with 1:1 assist. Patient receiving SLP.       Moderate malnutrition in the context of acute on chronic illness  -  receiving tube feeds via PEG-tube. PEG-tube was placed 09-Mar.  - nutritionist involved      Loose stools  -  Monitoring for diarrhea  - if continues check  c diff as has had recent antibiotics exposure .     GERD  -  on PPI.     Mild normocytic anemia  - Hg stable, no signs of acute bleed       Sacral pressure injury, stage 2  - healed: Monitoring for recurrence.     Depressed mood  - continue PTA remeron 15 mg nightly.     4/12 discussed with  SO  4/13 discussed with  SO        Diet: Adult Formula Drip Feeding: Continuous Jevity 1.5; Gastrostomy; Goal Rate: 85 ml/hr x16 hrs from 2 pm to 6 am; mL/hr; From: 2:00 PM; To: 6:00 AM  Minced & Moist Diet (level 5) Liquidized/Moderately Thick (level 3)    DVT Prophylaxis: DOAC  Scruggs Catheter: Not present  Lines: None     Cardiac Monitoring: None  Code Status: Full Code      Clinically Significant Risk Factors                         # Overweight: Estimated body mass index is 27.81 kg/m  as calculated from the  "following:    Height as of this encounter: 1.854 m (6' 1\").    Weight as of this encounter: 95.6 kg (210 lb 12.2 oz).   # Moderate Malnutrition: based on nutrition assessment        Disposition Plan    physical therapy  rec TCU         Clementine Poe MD  Hospitalist Service, GOLD TEAM 17  [facility]  Securely message with [employer] (more info)  Text page via [employer] Paging/Directory   See signed in provider for up to date coverage information  ______________________________________________________________________    Interval History    Sitting up in chair, per SO patient  Has been seeing people who are not there seeing bugs, though she brought dog with her, patient  Otherwise is oriented  To self, place year month , recognizers SO but does seem confused     Physical Exam   Vital Signs: Temp: 97.2  F (36.2  C) Temp src: Oral BP: 131/81 Pulse: 81   Resp: 18 SpO2: 97 % O2 Device: None (Room air)    Weight: 210 lbs 12.16 oz  General appearance:  no apparent distress     HEENT: EOMI and PEARLA, sclera nonicteric,  Moist mucus membranes, head atraumatic  NECK: supple  RESPIRATORY: lungs are clear   CARDIOVASCULAR: normal S1S2  irreg irreg, soft systolic murmur   GASTROINTESTINAL: abdomen is  soft,  non-distended, non-tender with normal bowel sounds. PEG site looks ok     MUSCULOSKELETAL: normal muscle bulk and tone  SKIN: warm and dry, no rashes,  Had some mottling of both knees right > left and legs but improved once he elevated legs   NEUROLOGIC: awake, speech slurred patient  Otherwise is oriented  To self, place year month , recognizers SO but does seem confused as thinking hospital bed is dental chair   EXTREMITIES: no clubbing cyanosis or edema noted  moves all extremities       Data   Imaging results reviewed over the past 24 hrs:   No results found for this or any previous visit (from the past 24 hour(s)).  Recent Labs   Lab 04/16/23  0815 04/15/23  2156 04/15/23  1657 " 04/14/23  1228 04/14/23  0819 04/13/23  0828 04/13/23  0706 04/12/23  1139 04/12/23  0717 04/11/23  1158 04/11/23  0514   WBC  --   --   --   --  12.1*  --  14.8*  --  14.1*  --  13.5*   HGB  --   --   --   --  12.5*  --  12.4*  --  12.1*  --  12.7*   MCV  --   --   --   --  95  --  95  --  95  --  94   PLT  --   --   --   --  351  --  352  --  357  --  363   NA  --   --   --   --  138  --   --   --  141  --  140   POTASSIUM  --   --   --   --  4.0  --   --   --  4.1  --  4.1   CHLORIDE  --   --   --   --  102  --   --   --  103  --  102   CO2  --   --   --   --  24  --   --   --  25  --  28   BUN  --   --   --   --  23.9*  --   --   --  32.1*  --  40.3*   CR  --   --   --   --  0.70  --   --   --  0.71  --  0.85   ANIONGAP  --   --   --   --  12  --   --   --  13  --  10   LUTHER  --   --   --   --  9.5  --   --   --  9.5  --  9.9   * 131* 155*   < > 147*   < >  --    < > 202*   < > 189*    < > = values in this interval not displayed.

## 2023-04-16 NOTE — PLAN OF CARE
"VS: /81 (BP Location: Right arm)   Pulse 81   Temp 97.2  F (36.2  C) (Oral)   Resp 18   Ht 1.854 m (6' 1\")   Wt 95.6 kg (210 lb 12.2 oz)   SpO2 97%   BMI 27.81 kg/m     O2: SpO2 97% on RA. Denies chest pain. Denies sob. Reports no cough   Output: Incontinent of bowel and bladder.    Last BM: 4/15/2023   Activity: Ax1 with walker and gait belt. Bed alarm in place for safety    Up for meals? Yes   Skin: Slight redness on coccyx; hx healed pressure injury. Mepilex in place for protection    Pain: Denies pain    CMS: A&O to self and family at bedside. Intermittent confusion    Diet: Minced and moist diet (level 5); liquidized/moderately thick (level 3)   LDA: PIV left upper forearm; SL   Plan: Anticipated discharge to TCU; TBD.   Additional Info: TF started at 2:30pm; patient was working with PT, family wanted to wait after PT session to start TF.    Patient needs stool sample to rule out C-diff; not able to obtain sample during this shift.          "

## 2023-04-17 NOTE — PROGRESS NOTES
"Stefan Diallo is a 77 year old male patient.  No diagnosis found.  Past Medical History:   Diagnosis Date     Arthritis      Atrial fibrillation (H)      Bacteremia      Bell's palsy 2015     BPH (benign prostatic hyperplasia)      Diabetes (H)      Gastroesophageal reflux disease      GERD (gastroesophageal reflux disease)      GI hemorrhage      High cholesterol      Hyperlipemia      Hyperlipidemia      Hypertension      Hypertension      Idiopathic peripheral neuropathy      Irregular heart beat     chronic at fib     Renal artery aneurysm (H)      Renal artery aneurysm (H)      Sleep apnea     Bipap     Sleep apnea 10/31/2021     Sleep apnea, obstructive     USES BIPAP     Type 2 diabetes mellitus (H)      UTI (lower urinary tract infection)      No current outpatient medications on file.     Allergies   Allergen Reactions     Bees Swelling     Reports using an epi pen if stung     Principal Problem:    Lactic acidosis    Blood pressure 117/75, pulse 80, temperature 98.5  F (36.9  C), temperature source Oral, resp. rate 18, height 1.854 m (6' 1\"), weight 95.6 kg (210 lb 12.2 oz), SpO2 94 %.      Alert and oriented X2     Denies numbness, tingling, pain and SOB    Visual hallucinations (asked if I could see the bugs crawling on ceiling tiles , I couldn't)     On continuous feeding (bed locked at 30 degrees or above)     Redness on coccyx (Covered w/ Mepilex)      Feeding Stopped at 0623      Dru Montana RN  4/17/2023    "

## 2023-04-17 NOTE — PLAN OF CARE
Patient alert and oriented to self. A of 2 w/ gait belt and walker. Incont of b&b. Did not have stool during shift. No complaints of pain. Head CT ordered during shift per MD, results in chart. CYCLED TF started at 1400. Plan to discharge to TCU when bed available.

## 2023-04-17 NOTE — PROGRESS NOTES
[facility]    Medicine Progress Note - Hospitalist Service, GOLD TEAM 17    Date of Admission:  4/11/2023    Assessment & Plan      Patient is a 78 y/o man who has multiple medical problems including diabetes mellitus type II, atrial fibrillation, hypertension and hyperlipidemia. Patient had been hospitalized from 07-Mar-2023 to 16-Mar-2023 at [facility] with acute ischemic stroke of right MCA territory due to cardioembolism, did  receive tenecteplase on 07-Mar-2023. During this hospital stay also had possible early sepsis secondary to aspiration pneumonia. He was transferred to acute rehabilitation unit on 16-Mar-2023.      Had a prolonged rehabilitation course complicated by the extent of his deficits and fluctuating levels of alertness. Was treated for urinary tract infection which was associated with significant functional decline and worsened encephalopathy after prior improvement. Patient reportedly improved after several days of treatment but then develop recurrent leucocytosis, worsening confusion and lactic acidosis.      Earlier on day of admission rapid response was called. He  was started on empiric antibiotics, IV fluids  for lactic acidosis. Lactic acid increased following IV fluid bolus and patient was transferred to the medical unit for further evaluation and treatment.      Current concern given recurrent leucocytosis and lactic acidosis is for sepsis though dehydration is another possibility. There is concern for recurrent urinary tract infection.     From chart review, patient completed a 7 day course of antibiotics for E. coli urinary tract infection on 08-Apr-2023 - patient was started on zosyn on 02-Apr, transitioned to ceftriaxone on 04-Apr and then transitioned to cefdinir on 06-Apr-2023.      Encephalopathy, toxic metabolic   - much improved   - 4/16 more confused again repeat cbc bmp lactate ,c diff     Lactate returned normal and now 2  x , no significant rise in WBC     Hallucination  - new , no evidence of infection, no prior history  - check head CT   - asked psych to see , has history of vascular dementia so could be form that   - seeing bugs, also seeing people that are not here  , thinks hospital bed looks like dentist chair, But otherwise oriented , has been on antibiotics , now some loose stools , ru other infection , blood sugar ok ,  checking labs as above     loose stool  - check c diff in light of worsening confusion, some mottling ok knees and bellow (mottling  Resolved once lifted legs)   - stools more foprmed so doubt      Possible recurrent urinary tract infection  - has been restarted empirically on ceftriaxone. Awaiting repeat urine culture results.  - received cefdinir x 3 days 4/6-4/8 and did get Rocephin  Prior , back on rocephine iv 4/11 on , completed 5 days  4/16   - UC form 4/2: E coli resistant to amp, unasyn and TMP/sulfa intermediate to cipro and levo, susceptible to others   -per significant other patient  Gets UTI ~ 2 x a year and has required admission with them,   - will need urology follow up       Leucocytosis, lactic acidosis  concern for early sepsis  - stopped IV fluids , complete antibiotics   - lactic acid 3.5 now back to normal      Acute right MCA   -stroke s/p tenecteplace on 07-Mar-2023, likely secondary cardioembolic from atrial fibrillation - patient had been off anticoagulation for anticipated procedure:  - residual left hemiparesis, impaired balance, impaired coordination, impaired proprioception, dysarthria, dysphagia and moderate cognitive linguistic impairment.  - initially was on aspirin but is now on eliquis for anticoagulation.  - continue amantadine.  - PT, OT and SLP.  - physical therapy  rec TCU   -  f/u with Neurology as outpatient.      Vascular dementia   - neuropsychological evaluation on 29-Mar and meets criteria for major neurocognitive disorder. It was recommended that POA be eneacted  and patient receive assistance in complex and reasoned decisions moving forward.      Atrial fibrillation  - continue diltiazem with holding parameters  - previously been on coumadin but this apparently had been on hold since 25-Feb for planned pain pump placement. Patient was started on eliquis for anticoagulation on 14-Mar.     Hyperlipidemia  - continue  atorvastatin.     Diabetes mellitus type II  -  patient usually on metformin and now back on it , lactate was up form sepsis and likely not due to metformin , monitor once restarted    -continue accu-checks insulin sliding scale      Hypertension  - on atenolol and diltiazem  - blood pressure  Ok .       Cough  -  Recently treated for aspirational pneumonia, Monitoring for evidence of aspiration  - on flonase for possible postnasal drip .  -negative  COVID  4/3     Obstructive sleep apnea  -not using CPAP at present. Patient should f/u as outpatient.     Dysphagia  - now on minced and moist texture , moderately thick (level 3) liquids by tsp,  up to chair for all meals with 1:1 assist. Patient receiving SLP.       Moderate malnutrition in the context of acute on chronic illness  -  receiving tube feeds via PEG-tube. PEG-tube was placed 09-Mar.  - nutritionist involved      Loose stools  -  Monitoring for diarrhea  - if continues check  c diff as has had recent antibiotics exposure .     GERD  -  on PPI.     Mild normocytic anemia  - Hg stable, no signs of acute bleed       Sacral pressure injury, stage 2  - healed: Monitoring for recurrence.     Depressed mood  - continue PTA remeron 15 mg nightly.     4/12 discussed with  SO  4/13 4/17 discussed with  SO        Diet: Adult Formula Drip Feeding: Continuous Jevity 1.5; Gastrostomy; Goal Rate: 85 ml/hr x16 hrs from 2 pm to 6 am; mL/hr; From: 2:00 PM; To: 6:00 AM  Minced & Moist Diet (level 5) Liquidized/Moderately Thick (level 3)    DVT Prophylaxis: DOAC  Scruggs Catheter: Not present  Lines: None     Cardiac  "Monitoring: None  Code Status: Full Code      Clinically Significant Risk Factors                         # Overweight: Estimated body mass index is 27.81 kg/m  as calculated from the following:    Height as of this encounter: 1.854 m (6' 1\").    Weight as of this encounter: 95.6 kg (210 lb 12.2 oz).   # Moderate Malnutrition: based on nutrition assessment        Disposition Plan    physical therapy  rec TCU  Hold discharge  For 4/17         Clementine Poe MD  Hospitalist Service, GOLD TEAM [address] [place] [address]  Securely message with [employer] (more info)  Text page via [employer] Paging/Directory   See signed in provider for up to date coverage information  ______________________________________________________________________    Interval History     speech was much weaker and more difficult to understand when I first saw him in AM, came back later when SO was here, patient  Was now up in bed, speech much better and stronger however still was seeing bugs and did not seem to understand that they were not there  so not aware of hallucination     Physical Exam   Vital Signs: Temp: 98.5  F (36.9  C) Temp src: Oral BP: 131/85 Pulse: 79   Resp: 18 SpO2: 94 % O2 Device: None (Room air)    Weight: 210 lbs 12.16 oz  General appearance:  no apparent distress     HEENT: EOMI and PEARLA, sclera nonicteric,  Moist mucus membranes, head atraumatic  NECK: supple  RESPIRATORY: lungs are clear   CARDIOVASCULAR: normal S1S2  irreg irreg, soft systolic murmur   GASTROINTESTINAL: abdomen is  soft,  non-distended, non-tender with normal bowel sounds. PEG site looks ok     MUSCULOSKELETAL: normal muscle bulk and tone  SKIN: warm and dry, no rashes,  Had some mottling of both knees right > left and legs but improved once he elevated legs   NEUROLOGIC: awake, speech slurred patient  Weaker in AM  But stronger during the day , strength 5/5 an d= arms, oriented to self told me he is where he was yesterday, " able to tell me year    EXTREMITIES: no clubbing cyanosis or edema noted  moves all extremities       Data   Imaging results reviewed over the past 24 hrs:   No results found for this or any previous visit (from the past 24 hour(s)).  Recent Labs   Lab 04/17/23  0754 04/16/23  2251 04/16/23  1716 04/16/23  1237 04/16/23  1216 04/14/23  1228 04/14/23  0819 04/13/23  0828 04/13/23  0706 04/12/23  1139 04/12/23  0717   WBC  --   --   --   --  12.9*  --  12.1*  --  14.8*  --  14.1*   HGB  --   --   --   --  12.5*  --  12.5*  --  12.4*  --  12.1*   MCV  --   --   --   --  96  --  95  --  95  --  95   PLT  --   --   --   --  333  --  351  --  352  --  357   NA  --   --   --   --  139  --  138  --   --   --  141   POTASSIUM  --   --   --   --  4.3  --  4.0  --   --   --  4.1   CHLORIDE  --   --   --   --  103  --  102  --   --   --  103   CO2  --   --   --   --  25  --  24  --   --   --  25   BUN  --   --   --   --  20.1  --  23.9*  --   --   --  32.1*   CR  --   --   --   --  0.78  --  0.70  --   --   --  0.71   ANIONGAP  --   --   --   --  11  --  12  --   --   --  13   LUTHER  --   --   --   --  9.6  --  9.5  --   --   --  9.5   * 158* 170*   < > 140*   < > 147*   < >  --    < > 202*    < > = values in this interval not displayed.

## 2023-04-17 NOTE — PROGRESS NOTES
"Care Management Follow Up    Length of Stay (days): 6    Expected Discharge Date: 04/13/2023     Concerns to be Addressed: discharge planning     Patient plan of care discussed at interdisciplinary rounds: Yes    Anticipated Discharge Disposition:  TCU     Anticipated Discharge Services:  Post acute therapies  Anticipated Discharge DME:  None    Patient/family educated on Medicare website which has current facility and service quality ratings: yes  Education Provided on the Discharge Plan: Yes  Patient/Family in Agreement with the Plan: yes    Referrals Placed by CM/SW:  See below  Private pay costs discussed: Not applicable    Additional Information:    SW followed up on referrals.    Referrals:    [facility]  [address] [place] [address]  P: [phone]  F: [phone]  4/14: Referral sent via Epic destination  \"patient would have to be eating indep with no assist with feeding\"  4/17: Spoke with admissions, would like to know if he can eat better and will feeding tube get taken out? Would be nice to know if the goal is to get feeding tube out.     [facility]  [address] [place] [address]  Adm:  [phone];  P:  [phone];  F:  [phone]  4/14: Referral sent via Epic destination  4/17: Left VM for Ofe in admissions requesting call back regarding referral status.     [facility]  [address] [place], [place] [id number] [phone]  4/14: Referral sent via Epic destination  4/17: Left VM for admissions requesting call back regarding referral status.      [facility]   [address] [place], [place] [id number] [phone]  4/14: Referral sent via Epic destination  4/17: Left VM for admissions requesting call back regarding referral status.     [facility]  [address] [place], [place] [address] [phone]  Admissions: [phone]  4/14: Referral sent via Epic destination  4/17: Spoke " with admissions, they will review and get back to .         Declined:     [facility]   [address]   [facility] [address]  P:  [phone]  4/14: Referral sent via Epic destination. Received call, unable to meet needs.    [facility] 4th floor  [address] [place]  [address]  Admissions: [phone]  Fax: [phone]  RN to RN:  [phone]  4/14: Referral shared with  TCU liaison  4/15: Declined, no bed available, acuity too high    [facility]  [address] [place] [address]  [phone]  Admissions:  [phone]  4/14: Referral sent via [employer] destination  4/17: Reached  for [facility] in admissions. They are only taking short term admissions within those within [facility] system.    [facility])  [address] [place] [address]  [phone]  4/14: Referral sent via [employer] destination  4/17: Spoke with Skyla in admissions, will review. No beds today but will review anyway. Will let SW know. Declined, too high acuity          Inge Meyers, ANAW, LSW  6 Med Surg   [facility]  Phone: [phone]  Pager: [phone]

## 2023-04-18 NOTE — PLAN OF CARE
Goal Outcome Evaluation:    Plan of Care Reviewed With: patient  Overall Patient Progress: improvingOverall Patient Progress: improving    VS: Temp: 98.5  F (36.9  C) Temp src: Oral BP: 107/67 Pulse: 75   Resp: 18 SpO2: 96 % O2 Device: None (Room air)     O2: RA   Output: Incontinent both BB.   Last BM: 04/18/23, small formed BM. Stool sample sent to Lab for C.diff test.   Activity: Assist x2, repositioned Q2 hrs.   Skin: Blanchable redness on coccyx and scrotum, antifungal applied.   Pain: Denied pian.   CMS: A&O x4( place,person)   Dressing: Proteciive mapilex on coccyx.   Diet: On continues G-tube feeding. Ate dinner tonight with staff feeding assistance.   LDA: PIV in L.arm is patent.   Equipment: Walker.   Plan: Pt comfortable,watching TV show. Call light within reach. Continue to monitor.   Additional Info:

## 2023-04-18 NOTE — PROGRESS NOTES
[facility]    Medicine Progress Note - Hospitalist Service, GOLD TEAM 19    Date of Admission:  4/11/2023    Assessment & Plan      Patient is a 76 y/o man who has multiple medical problems including diabetes mellitus type II, atrial fibrillation, hypertension and hyperlipidemia. Patient had been hospitalized from 07-Mar-2023 to 16-Mar-2023 at [facility] with acute ischemic stroke of right MCA territory due to cardioembolism, did  receive tenecteplase on 07-Mar-2023. During this hospital stay also had possible early sepsis secondary to aspiration pneumonia. He was transferred to acute rehabilitation unit on 16-Mar-2023.      Had a prolonged rehabilitation course complicated by the extent of his deficits and fluctuating levels of alertness. Was treated for urinary tract infection which was associated with significant functional decline and worsened encephalopathy after prior improvement. Patient reportedly improved after several days of treatment but then develop recurrent leucocytosis, worsening confusion and lactic acidosis.      Earlier on day of admission rapid response was called. He  was started on empiric antibiotics, IV fluids  for lactic acidosis. Lactic acid increased following IV fluid bolus and patient was transferred to the medical unit for further evaluation and treatment.      Current concern given recurrent leucocytosis and lactic acidosis is for sepsis though dehydration is another possibility. There is concern for recurrent urinary tract infection.     From chart review, patient completed a 7 day course of antibiotics for E. coli urinary tract infection on 08-Apr-2023 - patient was started on zosyn on 02-Apr, transitioned to ceftriaxone on 04-Apr and then transitioned to cefdinir on 06-Apr-2023.      Encephalopathy, toxic metabolic   - much improved   - 4/16 more confused again repeat cbc bmp lactate ok      Lactate returned normal and now 2 x ,  no significant rise in WBC     Hallucination  - new , no evidence of infection, no prior history  - head CT 4/17 did not show any acute intracranial pathology , showed the chronic right MCA territory infarct    - asked psych to see , has history of vascular dementia so could be form that   - seeing bugs, also seeing people that are not here  , thinks hospital bed looks like dentist chair, But otherwise oriented , has been on antibiotics , now some loose stools , ru other infection , blood sugar ok ,  checking labs as above   - TSH B12 folate  Normal     loose stool  - now stools soft formed,so no c diff checked       Possible recurrent urinary tract infection  - has been restarted empirically on ceftriaxone. Awaiting repeat urine culture results.  - received cefdinir x 3 days 4/6-4/8 and did get Rocephin  Prior , back on rocephine iv 4/11 on , completed 5 days  4/16   - UC form 4/2: E coli resistant to amp, unasyn and TMP/sulfa intermediate to cipro and levo, susceptible to others   -per significant other patient  Gets UTI ~ 2 x a year and has required admission with them,   - will need urology follow up       Leucocytosis, lactic acidosis  concern for early sepsis  - stopped IV fluids , complete antibiotics   - lactic acid 3.5 now back to normal      Acute right MCA   -stroke s/p tenecteplace on 07-Mar-2023, likely secondary cardioembolic from atrial fibrillation - patient had been off anticoagulation for anticipated procedure:  - residual left hemiparesis, impaired balance, impaired coordination, impaired proprioception, dysarthria, dysphagia and moderate cognitive linguistic impairment.  - initially was on aspirin but is now on eliquis for anticoagulation.  - continue amantadine.  - PT, OT and SLP.  - physical therapy  rec TCU   -  f/u with Neurology as outpatient.      Vascular dementia   - neuropsychological evaluation on 29-Mar and meets criteria for major neurocognitive disorder. It was recommended that POA  be eneacted and patient receive assistance in complex and reasoned decisions moving forward.      Atrial fibrillation  - continue diltiazem with holding parameters  - previously been on coumadin but this apparently had been on hold since 25-Feb for planned pain pump placement. Patient was started on eliquis for anticoagulation on 14-Mar.     Hyperlipidemia  - continue  atorvastatin.     Diabetes mellitus type II  -  patient usually on metformin and now back on it , lactate was up form sepsis and likely not due to metformin , monitor once restarted    -continue accu-checks insulin sliding scale      Hypertension  - on atenolol and diltiazem  - blood pressure  Ok .       Cough  -  Recently treated for aspirational pneumonia, Monitoring for evidence of aspiration  - on flonase for possible postnasal drip .  -negative  COVID  4/3     Obstructive sleep apnea  -not using CPAP at present. Patient should f/u as outpatient.     Dysphagia  - now on minced and moist texture , moderately thick (level 3) liquids by tsp,  up to chair for all meals with 1:1 assist. Patient receiving SLP.       Moderate malnutrition in the context of acute on chronic illness  -  receiving tube feeds via PEG-tube. PEG-tube was placed 09-Mar.  - nutritionist involved   - PEG placed 3/9/23 ,  needs to stay in for 6 weeks prior to removal      Loose stools  -  Monitoring for diarrhea  - if continues check  c diff as has had recent antibiotics exposure .     GERD  -  on PPI.     Mild normocytic anemia  - Hg stable, no signs of acute bleed       Sacral pressure injury, stage 2  - healed: Monitoring for recurrence.     Depressed mood  - continue PTA remeron 15 mg nightly.     4/12 discussed with  SO  4/13 4/17, 4/18  discussed with  SO        Diet: Adult Formula Drip Feeding: Continuous Jevity 1.5; Gastrostomy; Goal Rate: 85 ml/hr x16 hrs from 2 pm to 6 am; mL/hr; From: 2:00 PM; To: 6:00 AM  Minced & Moist Diet (level 5) Liquidized/Moderately Thick (level  "3)    DVT Prophylaxis: DOAC  Scruggs Catheter: Not present  Lines: None     Cardiac Monitoring: None  Code Status: Full Code      Clinically Significant Risk Factors                         # Overweight: Estimated body mass index is 27.81 kg/m  as calculated from the following:    Height as of this encounter: 1.854 m (6' 1\").    Weight as of this encounter: 95.6 kg (210 lb 12.2 oz).   # Moderate Malnutrition: based on nutrition assessment        Disposition Plan    physical therapy  rec TCU         Clementine Poe MD  Hospitalist Service, GOLD TEAM [address] [place] [address]  Securely message with [employer] (more info)  Text page via [employer] Paging/Directory   See signed in provider for up to date coverage information  ______________________________________________________________________    Interval History    Still seeing bugs and states they are there even though we tell him no     Physical Exam   Vital Signs: Temp: 98.8  F (37.1  C) Temp src: Axillary BP: 120/79 Pulse: 70   Resp: 18 SpO2: 95 % O2 Device: None (Room air)    Weight: 210 lbs 12.16 oz  General appearance:  no apparent distress     HEENT: EOMI and PEARLA, sclera nonicteric,  Moist mucus membranes, head atraumatic  NECK: supple  RESPIRATORY: lungs are clear   CARDIOVASCULAR: normal S1S2  irreg irreg, soft systolic murmur   GASTROINTESTINAL: abdomen is  soft,  non-distended, non-tender with normal bowel sounds. PEG site looks ok     MUSCULOSKELETAL: normal muscle bulk and tone  SKIN: warm and dry, no rashes,  Had some mottling of both knees right > left and legs but improved once he elevated legs   NEUROLOGIC: awake, speech slurred , betty to recognizes SO, tell me his name age where he is , still seeing bugs though     EXTREMITIES: no clubbing cyanosis or edema noted  moves all extremities       Data   Imaging results reviewed over the past 24 hrs:   Recent Results (from the past 24 hour(s))   CT Head w/o Contrast    " Narrative    EXAM: CT HEAD W/O CONTRAST  4/17/2023 2:15 PM     HISTORY:  recent RCA stroke, sp tPA, now with hammucination, increased  slurred speech,       COMPARISON:  CT head 4/2/2023, 3/8/2023    TECHNIQUE: Using multidetector thin collimation helical acquisition  technique, axial, coronal and sagittal CT images from the skull base  to the vertex were obtained without intravenous contrast.   (topogram) image(s) also obtained and reviewed.    FINDINGS:  No acute intracranial hemorrhage, mass effect, or midline shift.  Gliosis/encephalomalacia at the site of the right MCA territory  infarct. Patchy and confluent areas of periventricular and subcortical  white matter hypoattenuation, nonspecific but likely represents  chronic small vessel ischemic disease. No definite new gray-white  matter differentiation. Ventricles are proportionate to the cerebral  sulci. Clear basal cisterns.    The bony calvaria and the bones of the skull base are normal. The  visualized portions of the paranasal sinuses and mastoid air cells are  clear. Bilateral pseudophakia. Scleral banding of the right orbit.      Impression    IMPRESSION:   1. No acute intracranial pathology.   2. Chronic appearing changes of the right MCA territory infarct.     I have personally reviewed the examination and initial interpretation  and I agree with the findings.    ABDELRAHMAN AGUDELO MD         SYSTEM ID:  [id number]     Recent Labs   Lab 04/18/23  0800 04/18/23  0204 04/17/23  2137 04/16/23  1237 04/16/23  1216 04/14/23  1228 04/14/23  0819 04/13/23  0828 04/13/23  0706 04/12/23  1139 04/12/23  0717   WBC  --   --   --   --  12.9*  --  12.1*  --  14.8*  --  14.1*   HGB  --   --   --   --  12.5*  --  12.5*  --  12.4*  --  12.1*   MCV  --   --   --   --  96  --  95  --  95  --  95   PLT  --   --   --   --  333  --  351  --  352  --  357   NA  --   --   --   --  139  --  138  --   --   --  141   POTASSIUM  --   --   --   --  4.3  --  4.0  --   --   --  4.1    CHLORIDE  --   --   --   --  103  --  102  --   --   --  103   CO2  --   --   --   --  25  --  24  --   --   --  25   BUN  --   --   --   --  20.1  --  23.9*  --   --   --  32.1*   CR  --   --   --   --  0.78  --  0.70  --   --   --  0.71   ANIONGAP  --   --   --   --  11  --  12  --   --   --  13   LUTHRE  --   --   --   --  9.6  --  9.5  --   --   --  9.5   * 128* 126*   < > 140*   < > 147*   < >  --    < > 202*    < > = values in this interval not displayed.

## 2023-04-18 NOTE — CONSULTS
"      Psychiatry Consultation; Follow up              Reason for Consult, requesting source:    Visual hallucinations   Requesting source: Deepika    Labs and imaging reviewed, seen by LEO Aj CNP    Total time spent in chart review, patient interview and coordination of care; 60 minutes               Interim history:    Patient is a 78 y/o man who has multiple medical problems including diabetes mellitus type II, atrial fibrillation, hypertension and hyperlipidemia. Patient had been hospitalized from 07-Mar-2023 to 16-Mar-2023 at [facility] with acute ischemic stroke of right MCA territory due to cardioembolism. He was subsequently transferred to rehab and stay was complicated by the extent of his deficits and fluctuating levels of alertness. Was treated for urinary tract infection which was associated with significant functional decline and worsened encephalopathy after prior improvement. Patient reportedly improved after several days of treatment but then develop recurrent leucocytosis, worsening confusion and lactic acidosis.     Neuropsychology was consulted 3/29 and diagnosed patient with vascular dementia. Patient has now developed hallucinations, head CT was negative, TSH and b12 normal. Patient reports seeing bugs all over the wall. He had a hard time talking with me and at one point asked family member in a whispered voice \"who is that man in the corner? Does he have cancer?\" I reintroduced myself but patient began struggling with his pudding again and did not participate.         Current Medications:       [START ON 4/19/2023] amantadine  100 mg Oral or Feeding Tube Daily     apixaban ANTICOAGULANT  5 mg Per Feeding Tube BID     atenolol  12.5 mg Oral or Feeding Tube Daily     atorvastatin  20 mg Oral or Feeding Tube At Bedtime     diltiazem  60 mg Per Feeding Tube Q8H     fluticasone  1 spray Both Nostrils Daily     gabapentin  600 mg NG or Feeding tube At Bedtime     insulin aspart  " "1-7 Units Subcutaneous TID AC     insulin aspart  1-5 Units Subcutaneous At Bedtime     lactobacillus rhamnosus (GG)  1 capsule Per Feeding Tube BID     lidocaine  2 patch Transdermal Q24H    And     lidocaine   Transdermal Q8H LALO     metFORMIN  500 mg Oral or Feeding Tube BID w/meals     miconazole   Topical BID     mirtazapine  15 mg Oral or Feeding Tube At Bedtime     pantoprazole  40 mg Per Feeding Tube QAM AC     sodium chloride (PF)  3 mL Intracatheter Q8H              Family and Social History:   No history of psychosis prior            MSE:   Appearance: slightly unkempt  Attitude:  cooperative and guarded  Eye Contact:  fair  Mood:  \"fair\"  Affect:  : mood congruent  Speech:  mumbling  Psychomotor Behavior:  no evidence of tardive dyskinesia, dystonia, or tics  Muscle strength and tone: decreased  Thought Process:  illogical  Associations:  no loose associations  Thought Content:  visual hallucinations present  Insight:  poor  Judgement:  limited  Oriented to:  person  Attention Span and Concentration:  poor  Recent and Remote Memory:  poor    Vital signs:  Temp: 98.8  F (37.1  C) Temp src: Axillary BP: 120/79 Pulse: 70   Resp: 18 SpO2: 95 % O2 Device: None (Room air)   Height: 185.4 cm (6' 1\") Weight: 95.6 kg (210 lb 12.2 oz)  Estimated body mass index is 27.81 kg/m  as calculated from the following:    Height as of this encounter: 1.854 m (6' 1\").    Weight as of this encounter: 95.6 kg (210 lb 12.2 oz).    Qtc: 512 on 4/2/23         DSM-5 Diagnosis:   Vascular Dementia           Assessment:   Stefan is a 77 year old male whose hospitalization has been complicated by delirium. He was seen by neuropsych while in the rehab unit and diagnosed with vascular dementia. He presents with confusion, visual hallucinations and some paranoia. He is on amantadine which can worsen psychosis/hallucinations/agitation. We could consider starting patient on Aricept, however with his other medication this could lead to " "bradycardia, hypotension and possibly falls. His last QTc was prolonged at 512, future considerations include starting abilify 2.5mg daily or PRN for hallucinations. Discussed recommendations with emergency contact and she elected to trial decreasing amantadine first before adding additional medication. Addendum: If medically appropriate and PT/OT believe he can participate appropriately I therapies, I think TCU is an appropriate placement for patient.           Summary of Recommendations:     1. Decreased Amantadine 100mg BID to 100mg daily for 5 doses then stop.     2. Delirium precautions:    Up during the day with lights on    Lights off at night, avoid interruptions during the night as much as possible    Family visits    Encourage wearing glasses    Reorientation    Avoid opioids, benzodiazepines, anticholinergics as much as possible.     Continue to ensure proper nutrition, fluid and electrolyte balance. Monitor for infections, hypoxia, metabolic derangements, or other causes of delirium.     3. Future considerations include Aricept 5mg daily, Abilify 2.5mg PRN hallucinations and/or Abilify 2.5mg daily        Amee Mascorro, MOLLY-BC  Consult/Liaison Psychiatry   [facility]       \"Much or all of the text in this note was generated through the use of Dragon Dictate voice to text software. Errors in spelling or words which appear to be out of contact are unintentional, may be present due having escaped editing\"           "

## 2023-04-18 NOTE — PROGRESS NOTES
"Care Management Follow Up    Length of Stay (days): 7    Expected Discharge Date: 4/19/2023     Concerns to be Addressed: discharge planning     Patient plan of care discussed at interdisciplinary rounds: Yes    Anticipated Discharge Disposition:  TCU     Anticipated Discharge Services:  Post acute therapies  Anticipated Discharge DME:  None    Patient/family educated on Medicare website which has current facility and service quality ratings: yes  Education Provided on the Discharge Plan: Yes  Patient/Family in Agreement with the Plan: yes    Referrals Placed by CM/SW:  See below  Private pay costs discussed: Not applicable    Additional Information:    SW followed up on referrals.     Referrals:     [address] [address]  P: [phone]  F: [phone]  4/14: Referral sent via Epic destination  \"patient would have to be eating indep with no assist with feeding\"  4/17: Spoke with admissions, would like to know if he can eat better and will feeding tube get taken out? Would be nice to know if the goal is to get feeding tube out.  4/18: Spoke with admissions, shared updates. They will review and get back to .      [facility] [place]  [address] [place], [place] [address] [phone]  ADMISSIONS: [phone]  Fax: [phone]  4/14: Referral sent via Epic destination  4/17: Left VM for admissions requesting call back regarding referral status.   4/18: Left VM requesting call back regarding referral status.     [facility]  [address] [place] [address]  [phone]  Admissions: [phone]  4/14: Referral sent via Epic destination  4/17: Spoke with admissions, they will review and get back to .  4/18: Spoke with admissions, they will review today and get back to .           Declined:     [facility]   [address] [place] [address] [phone]  4/14: Referral sent via Epic " destination  4/17: Left VM for admissions requesting call back regarding referral status.  4/18: Declined, cannot meet patient's needs    [facility]  [address] [place] [address]  Adm:  [phone];  P:  [phone];  F:  [phone]  4/14: Referral sent via Epic destination  4/17: Left VM for Ofe in admissions requesting call back regarding referral status.  4/18: Declined, memory care, bed not available, impaired cognition    [facility]   [address] [facility] [address]  P:  [phone]  4/14: Referral sent via Epic destination. Received call, unable to meet needs.     [facility] 4th floor  [address] [place]  [address]  Admissions: [phone]  Fax: [phone]  RN to RN:  [phone]  4/14: Referral shared with  TCU liaison  4/15: Declined, no bed available, acuity too high     [address] [address]  [phone]  Admissions:  [phone]  4/14: Referral sent via Epic destination  4/17: Reached VM for [facility] in admissions. They are only taking short term admissions within those within [facility] system.     [facility])  [address] [place] [address]  [phone]  4/14: Referral sent via Epic destination  4/17: Spoke with Skyla in admissions, will review. No beds today but will review anyway. Will let SW know. Declined, too high acuity        Inge Meyers, BSW, LSW  6 Med Surg   [facility]  Phone: [phone]  Pager: [phone]

## 2023-04-18 NOTE — PROGRESS NOTES
CLINICAL NUTRITION SERVICES - BRIEF NOTE     Nutrition Prescription      Recommendations already ordered by Registered Dietitian (RD):  RD will adjust cycled G-TF with Jevity 1.5 to 12 hrs from 6 pm to 6 am, will adjust water flushes to 300 ml QID to provide 1500 kcal, ~64 gm protein, ~216 gm CHO, 21 gm fiber, and 1960 ml/day (TF reduced by about 25%/meeting ~75% of lower end kcal/protein needs, fluids still meeting 100% of lower end needs)    Start calorie count x3 days    Added patient care order to remind nursing to appropriately document calorie count (nursing, please document strict calorie counts, please record % of meal items consumed on meal slips and save slips in folder on door to help dietitian assess ability to wean pt from feeding tube)    Alvares Magic Cup BID at lunch and dinner meals     Future/Additional Recommendations:  Will monitor intakes via calorie counts to assess ability to further wean pt from TF  Continue to monitor wt/lab trends  Diet per SLP      NEW FINDINGS   MAR reviewed. Labs reviewed. RD received additional Provider consult to consider adjusting TF (RD was going to be considering this by today or tomorrow as well), visited pt/wife at bedside, reviewed intakes, pt/wife agreeing with above plan of care, reviewed at present, likely more significant need for tube will be in the setting of continuing on thickened liquids and hydration vs nutrition, wife understanding. After visit, RD reviewed above plan of care with bedside RN.     INTERVENTIONS  Implementation  Collaboration with staff - as noted above   Enteral Nutrition - reduced run time from 16 hrs to 12 hrs (TF reduced by about 25%)  Feeding tube flush - adjusted as above   Calorie count - ordered as above   Medical food supplement therapy - ordered as above   Diet - per SLP     Monitoring/Evaluation  Will continue to monitor and evaluate per protocol.    Alvina Cotnreras RD, CNSC, LD  [facility] Med/Surg RD pager:  [phone]  Weekend/holiday pager: [phone]

## 2023-04-18 NOTE — PLAN OF CARE
"   VS:     Pt A/O X . Afebrile. Lungs- crackles in bases with frequent non productive cough overnight. Denies nausea, dizziness, fever,shortness of breath, chest pain, or newly occurring numbness/tingling. /76   Pulse 78   Temp 97.6  F (36.4  C) (Oral)   Resp 18   Ht 1.854 m (6' 1\")   Wt 95.6 kg (210 lb 12.2 oz)   SpO2 96%   BMI 27.81 kg/m        Output:     Bowels- active in all four quadrants. Last BM 4/17. Incontinent of both    Activity:     A of 2 ww per report but remained in bed overnight.      Skin: Scrotum is excoriated with Mepilex on coccyx.      Pain:     No pain reported or indicated.   CMS:     Base line weakness to left side sense stroke.       Dressing:     Mepilex on coccyx.      Diet:       Level 5 and mod thick. TF running overnight through peg without issue.    LDA:     PIV in LUE.      Equipment:     Walker, TF pump.      Plan:     Call light in reach, pt belongings in room, continue to monitor.       Additional Info:                "

## 2023-04-19 NOTE — PLAN OF CARE
Goal Outcome Evaluation:      Plan of Care Reviewed With: patient, spouse    Overall Patient Progress: improvingOverall Patient Progress: improving    Outcome Evaluation: Cycled TF reduced by 25% on 4/18, calorie counts to assess ability to further wean pt from TF - see RD progress note for details

## 2023-04-19 NOTE — PROGRESS NOTES
"Care Management Follow Up    Length of Stay (days): 8    Expected Discharge Date: 04/13/2023     Concerns to be Addressed: discharge planning     Patient plan of care discussed at interdisciplinary rounds: Yes    Anticipated Discharge Disposition:  TCU     Anticipated Discharge Services:  Post acute therapies  Anticipated Discharge DME:  None    Patient/family educated on Medicare website which has current facility and service quality ratings: yes  Education Provided on the Discharge Plan: Yes  Patient/Family in Agreement with the Plan: yes    Referrals Placed by CM/SW:  See below  Private pay costs discussed: Not applicable    Additional Information:    SW met with patient and partner at bedside. SW explained that placement has not been found but that SW will continue to work on placement. Partner noted that she believes patient may have had a seizure, SW stated that they will inform nurse. SW informed bedside nurse.     At end of day, patient was declined by last two remaining preferences. SW will speak with patient and partner tomorrow about additional TCU preferences. Will likely need Memory Care TCU.      Referrals:      Declined:     [address] [address]  P: [phone]  F: [phone]  4/14: Referral sent via Epic destination  \"patient would have to be eating indep with no assist with feeding\"  4/17: Spoke with admissions, would like to know if he can eat better and will feeding tube get taken out? Would be nice to know if the goal is to get feeding tube out.  4/18: Spoke with admissions, shared updates. They will review and get back to .  4/19: Declined, too high acuity      [facility]  [address] [place] [address] [phone]  Admissions: [phone]  4/14: Referral sent via [employer] destination  4/17: Spoke with admissions, they will review and get back to .  4/18: Spoke with admissions, they will review today and " get back to .  4/19: Declined, too high acuity    [facility]  [address] [place], [place] [address] [phone]  ADMISSIONS: [phone]  Fax: [phone]  4/14: Referral sent via Epic destination  4/17: Left VM for admissions requesting call back regarding referral status.   4/18: Left VM requesting call back regarding referral status.  4/19: Declined, bed not available until early to mid next week      [facility]   [address] [place], [place] [address] [phone]  4/14: Referral sent via Epic destination  4/17: Left VM for admissions requesting call back regarding referral status.  4/18: Declined, cannot meet patient's needs     [facility]  [address] [place] [address]  Adm:  [phone];  P:  [phone];  F:  [phone]  4/14: Referral sent via Epic destination  4/17: Left VM for Ofe in admissions requesting call back regarding referral status.  4/18: Declined, memory care, bed not available, impaired cognition     [facility]   [address] [facility] [address]  P:  [phone]  4/14: Referral sent via Epic destination. Received call, unable to meet needs.     [facility] 4th floor  [address] [place]  [address]  Admissions: [phone]  Fax: [phone]  RN to RN:  [phone]  4/14: Referral shared with  TCU liaison  4/15: Declined, no bed available, acuity too high     [facility]  [address] [place] [address]  [phone]  Admissions:  [phone]  4/14: Referral sent via Epic destination  4/17: Reached VM for [facility] in admissions. They are only taking short term admissions within those within [facility] system.     [facility]  [address] [place] [address]  [phone]  4/14: Referral sent via Epic destination  4/17: Spoke with Skyla in admissions, will review. No beds today but will review  anyway. Will let SW know. Declined, too high acuity           Inge Meyers, BSW, LSW  6 Med Surg   [facility]  Phone: [phone]  Pager: [phone]

## 2023-04-19 NOTE — PLAN OF CARE
Goal Outcome Evaluation:   Plan of Care Reviewed With: patient  Overall Patient Progress: improvingOverall Patient Progress: improving  VS: Temp: 97.5  F (36.4  C) Temp src: Oral BP: 111/70 Pulse: 82   Resp: 18 SpO2: 95 % O2 Device: None (Room air)     O2: RA   Output: Incontinent both BB.   Last BM: 04/18/23, small formed BM.   Activity: Assist x2, repositioned Q2 hrs.   Skin: Blanchable redness on coccyx and scrotum, antifungal applied.   Pain: Denied pian.   CMS: A&O x3( place,person, date)   Dressing: Proteciive mapilex on coccyx.   Diet: On continues G-tube feeding. Ate two cups of ice cream at dinner time.   LDA: PIV in L.arm is patent.   Equipment: Walker.   Plan: Pt comfortable,watching TV show. Call light within reach. Continue to monitor.   Additional Info:

## 2023-04-19 NOTE — PLAN OF CARE
"  VS: VSS- see flowsheets for further info   O2: Room air   Output: Incontinent of B&B   Last BM: 4/17/23   Activity: A1-2 w/ gait belt & walker    Skin: Blanchable erythema to coccyx/sacrum & bilateral buttock   Pain: Denies   CMS: Intact   Dressing: N/A   Diet: Regular diet; level 5 minced/moist; moderately thick liquids   LDA: PIV LUE- saline locked   Equipment: IV pump/pole. Gait belt. Walker.    Plan: Tenative discharge to TCU when medically appropriate   Additional Info: Pt's significant other & fiancee witnesses supposed seizure or seizure-like activity; stated patients body was convulsing and he was not conscious during episode which lasted approx. 30 seconds. Dr. Poe was informed immediately who ordered head CT; results came back normal.        Problem: Plan of Care - These are the overarching goals to be used throughout the patient stay.    Goal: Plan of Care Review  Description: The Plan of Care Review/Shift note should be completed every shift.  The Outcome Evaluation is a brief statement about your assessment that the patient is improving, declining, or no change.  This information will be displayed automatically on your shift note.  Outcome: Not Progressing  Flowsheets (Taken 4/19/2023 1601)  Plan of Care Reviewed With:   patient   significant other  Overall Patient Progress: no change  Goal: Patient-Specific Goal (Individualized)  Description: You can add care plan individualizations to a care plan. Examples of Individualization might be:  \"Parent requests to be called daily at 9am for status\", \"I have a hard time hearing out of my right ear\", or \"Do not touch me to wake me up as it startles me\".  Outcome: Not Progressing  Goal: Absence of Hospital-Acquired Illness or Injury  Outcome: Not Progressing  Intervention: Identify and Manage Fall Risk  Recent Flowsheet Documentation  Taken 4/19/2023 0900 by Mary Arriaga RN  Safety Promotion/Fall Prevention: activity supervised  Goal: Optimal Comfort " and Wellbeing  Outcome: Not Progressing  Goal: Readiness for Transition of Care  Outcome: Not Progressing     Problem: Fall Injury Risk  Goal: Absence of Fall and Fall-Related Injury  Outcome: Not Progressing  Intervention: Identify and Manage Contributors  Recent Flowsheet Documentation  Taken 4/19/2023 0900 by Mary Arriaga, RN  Medication Review/Management: medications reviewed  Intervention: Promote Injury-Free Environment  Recent Flowsheet Documentation  Taken 4/19/2023 0900 by Mary Arriaga, RN  Safety Promotion/Fall Prevention: activity supervised   Goal Outcome Evaluation:      Plan of Care Reviewed With: patient, significant other    Overall Patient Progress: no changeOverall Patient Progress: no change

## 2023-04-19 NOTE — PROGRESS NOTES
[facility]    Medicine Progress Note - Hospitalist Service, GOLD TEAM 19    Date of Admission:  4/11/2023    Assessment & Plan      Patient is a 76 y/o man who has multiple medical problems including diabetes mellitus type II, atrial fibrillation, hypertension and hyperlipidemia. Patient had been hospitalized from 07-Mar-2023 to 16-Mar-2023 at [facility] with acute ischemic stroke of right MCA territory due to cardioembolism, did  receive tenecteplase on 07-Mar-2023. During this hospital stay also had possible early sepsis secondary to aspiration pneumonia. He was transferred to acute rehabilitation unit on 16-Mar-2023.      Had a prolonged rehabilitation course complicated by the extent of his deficits and fluctuating levels of alertness. Was treated for urinary tract infection which was associated with significant functional decline and worsened encephalopathy after prior improvement. Patient reportedly improved after several days of treatment but then develop recurrent leucocytosis, worsening confusion and lactic acidosis.      Earlier on day of admission rapid response was called. He  was started on empiric antibiotics, IV fluids  for lactic acidosis. Lactic acid increased following IV fluid bolus and patient was transferred to the medical unit for further evaluation and treatment.      Current concern given recurrent leucocytosis and lactic acidosis is for sepsis though dehydration is another possibility. There is concern for recurrent urinary tract infection.     From chart review, patient completed a 7 day course of antibiotics for E. coli urinary tract infection on 08-Apr-2023 - patient was started on zosyn on 02-Apr, transitioned to ceftriaxone on 04-Apr and then transitioned to cefdinir on 06-Apr-2023.        Possible seizure  -4/19 ~ 11 AM patient  Was noted to have generalized shaking while sitting in chair witnessed by SO, states lasted about 30  seconds, he was not responsive during that time , he was more confused for short period after , not incontinent of bladder or bladder, apparently patient  Felt like  he was falling   - blood sugar in 150s ,    - came back to see  Patient , he is back to baseline , pupils = reactive  - checking STAT head CT as is on anticoagulation, asked neurology to see, ? Need for EEG  - place on telemetry, check labs   - seizure precautions , PRN ativan for prolonged seizure       addendum  - head CT came back negative for acute changes, shows the sequela of previous stroke      Encephalopathy, toxic metabolic   - much improved   - 4/16 more confused again repeat cbc bmp lactate ok        Hallucination  - new , no evidence of infection, no prior history  - head CT 4/17 did not show any acute intracranial pathology , showed the chronic right MCA territory infarct    - asked psych to see , has history of vascular dementia so could be form that   - seeing bugs, also seeing people that are not here  , thinks hospital bed looks like dentist chair, But otherwise oriented , has been on antibiotics , now some loose stools , ru other infection , blood sugar ok ,  checking labs as above   - TSH B12 folate  Normal   - seen by psychiatry, appreciate input , amantadine being tapered off 100 mg daily x 5 days then stop started 4/18   - consider Aricept 5 mg daily, abilift 2.5 mg PRN  Vs daily hallucination  But need to monitor qTC / heart rate if started as can prolong qTC     Prolonged qTC  Chronic RBBB  - last qtc was 512, repeat EKG  4/19 with qTC  418     loose stool  - now stools soft formed,so no c diff checked       Possible recurrent urinary tract infection  - has been restarted empirically on ceftriaxone. Awaiting repeat urine culture results.  - received cefdinir x 3 days 4/6-4/8 and did get Rocephin  Prior , back on rocephine iv 4/11 on , completed 5 days  4/16   - UC form 4/2: E coli resistant to amp, unasyn and TMP/sulfa  intermediate to cipro and levo, susceptible to others   -per significant other patient  Gets UTI ~ 2 x a year and has required admission with them,   - will need urology follow up       Leucocytosis, lactic acidosis  concern for early sepsis  - stopped IV fluids , complete antibiotics   - lactic acid 3.5 now back to normal      Acute right MCA   -stroke s/p tenecteplace on 07-Mar-2023, likely secondary cardioembolic from atrial fibrillation - patient had been off anticoagulation for anticipated procedure:  - residual left hemiparesis, impaired balance, impaired coordination, impaired proprioception, dysarthria, dysphagia and moderate cognitive linguistic impairment.  - initially was on aspirin but is now on eliquis for anticoagulation.  - continue amantadine taper as above .  - physical therapy  rec TCU   -  f/u with Neurology as outpatient.      Vascular dementia   - neuropsychological evaluation on 29-Mar and meets criteria for major neurocognitive disorder. It was recommended that POA be eneacted and patient receive assistance in complex and reasoned decisions moving forward.      Atrial fibrillation  - continue diltiazem with holding parameters  - previously been on coumadin but this apparently had been on hold since 25-Feb for planned pain pump placement. Patient was started on eliquis for anticoagulation on 14-Mar.     Hyperlipidemia  - continue  atorvastatin.     Diabetes mellitus type II  -  patient usually on metformin and now back on it , lactate was up form sepsis and likely not due to metformin , monitor once restarted    -continue accu-checks insulin sliding scale      Hypertension  - on atenolol and diltiazem  - blood pressure  Ok .       Cough  -  Recently treated for aspirational pneumonia, Monitoring for evidence of aspiration  - on flonase for possible postnasal drip .  -negative  COVID  4/3     Obstructive sleep apnea  -not using CPAP at present. Patient should f/u as outpatient.     Dysphagia  -  "now on minced and moist texture , moderately thick (level 3) liquids by tsp,  up to chair for all meals with 1:1 assist. Patient receiving SLP.       Moderate malnutrition in the context of acute on chronic illness  -  receiving tube feeds via PEG-tube. PEG-tube was placed 09-Mar.  - nutritionist involved   - PEG placed 3/9/23 ,  needs to stay in for 6 weeks prior to removal      Loose stools  -  Monitoring for diarrhea  - if continues check  c diff as has had recent antibiotics exposure .     GERD  -  on PPI.     Mild normocytic anemia  - Hg stable, no signs of acute bleed       Sacral pressure injury, stage 2  - healed: Monitoring for recurrence.     Depressed mood  - continue PTA remeron 15 mg nightly.     4/12 discussed with  SO  4/13 4/17, 4/18  discussed with  SO        Diet: Minced & Moist Diet (level 5) Liquidized/Moderately Thick (level 3)  Calorie Counts  Adult Formula Drip Feeding: Continuous Jevity 1.5; Gastrostomy; Goal Rate: 85 ml/hr x12 hrs from 6 pm to 6 am; mL/hr; From: 6:00 PM; To: 6:00 AM  Snacks/Supplements Adult: Magic Cup; With Meals    DVT Prophylaxis: DOAC  Scruggs Catheter: Not present  Lines: None     Cardiac Monitoring: None  Code Status: Full Code      Clinically Significant Risk Factors                         # Overweight: Estimated body mass index is 27.81 kg/m  as calculated from the following:    Height as of this encounter: 1.854 m (6' 1\").    Weight as of this encounter: 95.6 kg (210 lb 12.2 oz).   # Moderate Malnutrition: based on nutrition assessment        Disposition Plan    physical therapy  rec TCU         Clementine Poe MD  Hospitalist Service, GOLD TEAM 19  [facility]  Securely message with [employer] (more info)  Text page via [facility] Paging/Directory   See signed in provider for up to date coverage information  ______________________________________________________________________    Interval History    Was doing ok in AM  , still " seeing bugs . Later notified by RN that patient  Had possibel seizure activity, now back to baseline      Physical Exam   Vital Signs: Temp: 97.9  F (36.6  C) Temp src: Oral BP: 137/85 Pulse: 82   Resp: 18 SpO2: 97 % O2 Device: None (Room air)    Weight: 210 lbs 12.16 oz  General appearance:  no apparent distress     HEENT: EOMI and PEARLA, sclera nonicteric,  Moist mucus membranes, head atraumatic  NECK: supple  RESPIRATORY: lungs are clear   CARDIOVASCULAR: normal S1S2  irreg irreg, soft systolic murmur   GASTROINTESTINAL: abdomen is  soft,  non-distended, non-tender with normal bowel sounds. PEG site looks ok     MUSCULOSKELETAL: normal muscle bulk and tone  SKIN: warm and dry, no rashes,  Had some mottling of both knees right > left and legs but improved once he elevated legs   NEUROLOGIC: awake, speech slurred but better , oriented to self place SO  , still seeing bugs though     EXTREMITIES: no clubbing cyanosis or edema noted  moves all extremities       Data   Imaging results reviewed over the past 24 hrs:   No results found for this or any previous visit (from the past 24 hour(s)).  Recent Labs   Lab 04/18/23  2123 04/18/23  1722 04/18/23  1254 04/16/23  1237 04/16/23  1216 04/14/23  1228 04/14/23  0819 04/13/23  0828 04/13/23  0706   WBC  --   --   --   --  12.9*  --  12.1*  --  14.8*   HGB  --   --   --   --  12.5*  --  12.5*  --  12.4*   MCV  --   --   --   --  96  --  95  --  95   PLT  --   --   --   --  333  --  351  --  352   NA  --   --   --   --  139  --  138  --   --    POTASSIUM  --   --   --   --  4.3  --  4.0  --   --    CHLORIDE  --   --   --   --  103  --  102  --   --    CO2  --   --   --   --  25  --  24  --   --    BUN  --   --   --   --  20.1  --  23.9*  --   --    CR  --   --   --   --  0.78  --  0.70  --   --    ANIONGAP  --   --   --   --  11  --  12  --   --    LUTHER  --   --   --   --  9.6  --  9.5  --   --    * 100* 153*   < > 140*   < > 147*   < >  --     < > = values in this  interval not displayed.

## 2023-04-19 NOTE — PROGRESS NOTES
CLINICAL NUTRITION SERVICES - REASSESSMENT NOTE     Nutrition Prescription    RECOMMENDATIONS FOR MDs/PROVIDERS TO ORDER:  None today     Malnutrition Status:    Moderate malnutrition in the context of acute on chronic illness or injury     Recommendations already ordered by Registered Dietitian (RD):  None today - continue TF/water flushes, diet/supplement and calorie count as ordered     Future/Additional Recommendations:  Continue to monitor calorie count to assess ability to further wean pt from TF  Continue to monitor TF tolerance, wt/lab trends      EVALUATION OF THE PROGRESS TOWARD GOALS   Diet: Minced and Moist (level 5), Moderately Thick Liquids (level 3)    Po intakes: None up to 75% per flow sheets     Calorie Count: 4/18: 677 kcal and 29 g protein (1 meal - lunch, breakfast and dinner ordered, no documentation of these meals, 2 supplements at lunch)    Nutrition Support: RD adjusted/reduced TF on 4/19 to cycled G-TF with Jevity 1.5 to 12 hrs from 6 pm to 6 am, will adjust water flushes to 300 ml QID to provide 1500 kcal, ~64 gm protein, ~216 gm CHO, 21 gm fiber, and 1960 ml/day (TF reduced by about 25%/meeting ~75% of lower end kcal/protein needs, fluids still meeting 100% of lower end needs)    TF tolerance: Appears to be tolerating, some loose stool noted, but seems improved/form so no C-diff testing per Provider note.      NEW FINDINGS   MAR reviewed. Labs reviewed. RD visited with pt/wife at bedside on 4/18 regarding TF reduction and plan for calorie count as noted above, will continue with this plan and monitor calorie counts.     04/14/23 0805 95.6 kg (210 lb 12.2 oz) --   04/11/23 2313 91.1 kg (200 lb 13.4 oz) Bed scale     04/07/23 96.1 kg (211 lb 13.8 oz)   03/11/23 98 kg (216 lb 0.8 oz)   03/07/23 97.5 kg (215 lb)   02/01/23 98.9 kg (218 lb)   01/20/23 105.7 kg (233 lb) - question accuracy   08/19/22 95.9 kg (211 lb 8 oz)   08/10/22 93.9 kg (207 lb 1.6 oz)   05/21/22 100.7 kg (222 lb)    05/16/22 101 kg (222 lb 12 oz)      Weight assessment: Current weight appears more stable to recent trend, non-significant 2.7% weight loss in 1 month, non-significant 0.4% weight loss in >6 months, non-significant 5.4% weight loss in not quite 1 year.     MALNUTRITION  % Intake: Intake does not meet criteria as pt is supported by EN   % Weight Loss: Weight loss does not meet criteria  Muscle Loss: Temporal: mild vs age related   Fluid Accumulation/Edema: None noted  Malnutrition Diagnosis: Moderate malnutrition in the context of acute on chronic illness or injury     Previous Goals   Total avg nutritional intake to meet a minimum of 20 kcal/kg and 1.0 g PRO/kg daily (per dosing wt 96.1 kg)  Evaluation: Met    Previous Nutrition Diagnosis  Inadequate oral intake related to chewing/swallowing difficult/cognition as evidenced by continued need for modified diet and continue need for enteral nutrition support to meet estimated nutritional needs   Evaluation: No change/somewhat improving     CURRENT NUTRITION DIAGNOSIS  Inadequate oral intake related to chewing/swallowing difficult/cognition as evidenced by continued need for modified diet and continue need for enteral nutrition support to meet estimated nutritional needs     INTERVENTIONS  Implementation  Enteral Nutrition/water flushes - continue as ordered   Diet - per SL  Calorie count - continue as ordered   Medical food supplement therapy    Goals  Patient to consume % of nutritionally adequate meal trays TID, or the equivalent with supplements/snacks to be able to be weaned from TF.    Monitoring/Evaluation  Progress toward goals will be monitored and evaluated per protocol.    Alvina Contreras RD, CNSC, LD  [facility] 6 Med/Surg RD pager: [phone]  Weekend/holiday pager: [phone]

## 2023-04-19 NOTE — PLAN OF CARE
"Goal Outcome Evaluation:  VS: /73 (BP Location: Right arm)   Pulse 75   Temp 98.1  F (36.7  C) (Oral)   Resp 18   Ht 1.854 m (6' 1\")   Wt 95.6 kg (210 lb 12.2 oz)   SpO2 96%   BMI 27.81 kg/m     O2: Stable on room air, denies SOB. Has cough, diminished lung sounds   Output: Voids adequately, incontinent    Last BM: 04/17   Activity: Assist of 2, left sided weakness due to stroke   Up for meals? Yes   Skin: No new deficits noted, mepi on coccyx   Pain: Denies pain   CMS: Alert to self, confused about situation/place. Has garbled speech   Dressing: mepi on bottom   Diet: Level 5 mod think diet, TF run @85mL/hr from 8625-2703. Flush with 300mL via syringe q4h. Calorie count   LDA: L piv   Plan: Continue with plan of care, discharge to tcu    Equipment  walker, TF pump, gait belt, IV pole, personal belongings. Call light within reach           "

## 2023-04-19 NOTE — PROGRESS NOTES
SPIRITUAL HEALTH SERVICES Progress Note  [facility] ([facility]) 6B    Due to patient's length of stay I attempted to introduce myself as the unit  and offer a visit. Patient was asleep and did not rouse to the sound of my voice. I remain available by request.     Merlin Quinones MDiv  Chaplain Resident  Pager [phone]      * [facility] remains available 24/7 for emergent requests/referrals, either by having the switchboard page the on-call  or by entering an ASAP/STAT consult in Epic (this will also page the on-call ). Routine Epic consults receive an initial response within 24 hours.*

## 2023-04-19 NOTE — PLAN OF CARE
Patient is alert and oriented to self, disoriented to time, place and situation. Denies numbness/tingling, nausea/vomiting, SOB and chest pain. Pt has been coughing dry weak cough throughout the night. Pt is on tube feeding running 85 ml/hr. Flush every 4 hour. Pt is incontinent of both bladder and bowel. Patient slept most of the night. VSS on RA. Pt tried getting out of bed couple times, pt has some hallucination. Bed alarm on. Continue to monitor the POC and might get discharge to TCU on 4/19.

## 2023-04-20 NOTE — PLAN OF CARE
"  VS: /69 (BP Location: Right arm)   Pulse 80   Temp 97.2  F (36.2  C) (Oral)   Resp 18   Ht 1.854 m (6' 1\")   Wt 95.6 kg (210 lb 12.2 oz)   SpO2 100%   BMI 27.81 kg/m       O2: Room air   Output: Incontinent of B&B   Last BM: 4/17/23   Activity: A2   Skin: Blanchable erythema to coccyx    Pain: Denies   CMS: DANIEL   Dressing: Mepilex foam replaced to coccyx this shift   Diet: Moist & minced level 5; moderately thick liquids   LDA: PIV LUE- saline locked   Equipment: IV pump/pole   Plan: Tenative discharge to TCU when medically stable.    Additional Info: Patient has been extremely lethargic this shift; often difficult to arouse. Has not been out of bed and has been sleeping most of shift.        Problem: Plan of Care - These are the overarching goals to be used throughout the patient stay.    Goal: Plan of Care Review  Description: The Plan of Care Review/Shift note should be completed every shift.  The Outcome Evaluation is a brief statement about your assessment that the patient is improving, declining, or no change.  This information will be displayed automatically on your shift note.  Outcome: Progressing  Flowsheets (Taken 4/20/2023 1541)  Plan of Care Reviewed With:   patient   significant other  Overall Patient Progress: declining  Goal: Patient-Specific Goal (Individualized)  Description: You can add care plan individualizations to a care plan. Examples of Individualization might be:  \"Parent requests to be called daily at 9am for status\", \"I have a hard time hearing out of my right ear\", or \"Do not touch me to wake me up as it startles me\".  Outcome: Progressing  Goal: Absence of Hospital-Acquired Illness or Injury  Outcome: Progressing  Intervention: Identify and Manage Fall Risk  Recent Flowsheet Documentation  Taken 4/20/2023 1030 by Mary Arriaga, RN  Safety Promotion/Fall Prevention:   assistive device/personal items within reach   activity supervised  Goal: Optimal Comfort and " Pt ambulated to hallway bathroom - steady gait. Denies dizziness.    Wellbeing  Outcome: Progressing  Goal: Readiness for Transition of Care  Outcome: Progressing     Problem: Fall Injury Risk  Goal: Absence of Fall and Fall-Related Injury  Outcome: Progressing  Intervention: Promote Injury-Free Environment  Recent Flowsheet Documentation  Taken 4/20/2023 1030 by Mary Arriaga, RN  Safety Promotion/Fall Prevention:   assistive device/personal items within reach   activity supervised     Goal Outcome Evaluation:      Plan of Care Reviewed With: patient, significant other    Overall Patient Progress: decliningOverall Patient Progress: declining

## 2023-04-20 NOTE — PROGRESS NOTES
CLINICAL NUTRITION SERVICES - BRIEF NOTE     Nutrition Prescription    RECOMMENDATIONS FOR STAFF:  Please continue to save meal slips so we can accurately assess intake.      REASON FOR ASSESSMENT  Stefan Diallo is a/an 77 year old male assessed by the dietitian for Calorie Counts    Calorie Counts  No meal slips saved.     INTERVENTIONS  Implementation  None at this time. No meal slips saved.      Follow up/Monitoring  Will continue to follow up per protocol.     Stephanie Princej RD, LD   [address] pager: [phone]  Weekend/holiday pager: [phone]

## 2023-04-20 NOTE — PROGRESS NOTES
[facility]    Medicine Progress Note - Hospitalist Service, GOLD TEAM 19    Date of Admission:  4/11/2023    Assessment & Plan      Patient is a 78 y/o man who has multiple medical problems including diabetes mellitus type II, atrial fibrillation, hypertension and hyperlipidemia. Patient had been hospitalized from 07-Mar-2023 to 16-Mar-2023 at [facility] with acute ischemic stroke of right MCA territory due to cardioembolism, did  receive tenecteplase on 07-Mar-2023. During this hospital stay also had possible early sepsis secondary to aspiration pneumonia. He was transferred to acute rehabilitation unit on 16-Mar-2023.      Had a prolonged rehabilitation course complicated by the extent of his deficits and fluctuating levels of alertness. Was treated for urinary tract infection which was associated with significant functional decline and worsened encephalopathy after prior improvement. Patient reportedly improved after several days of treatment but then develop recurrent leucocytosis, worsening confusion and lactic acidosis.      Earlier on day of admission rapid response was called. He  was started on empiric antibiotics, IV fluids  for lactic acidosis. Lactic acid increased following IV fluid bolus and patient was transferred to the medical unit for further evaluation and treatment.      Current concern given recurrent leucocytosis and lactic acidosis is for sepsis though dehydration is another possibility. There is concern for recurrent urinary tract infection.     From chart review, patient completed a 7 day course of antibiotics for E. coli urinary tract infection on 08-Apr-2023 - patient was started on zosyn on 02-Apr, transitioned to ceftriaxone on 04-Apr and then transitioned to cefdinir on 06-Apr-2023.        Possible seizure  -4/19 ~ 11 AM patient  Was noted to have generalized shaking while sitting in chair witnessed by SO, states lasted about 30  seconds, he was not responsive during that time , he was more confused for short period after , not incontinent of bladder or bladder, apparently patient  Felt like  he was falling   - right after episode blood sugar in 150s ,    - came back to see  Patient , he is back to baseline , pupils = reactive  - seizure precautions , PRN ativan for prolonged seizure   - STAT head CT came back negative for acute changes, shows the sequela of previous stroke  , labs stable   - appreciate neurology's  input, was stared on Keppra 750 mg bid, no EEG recommended at this point inless has repeated event .   - needs neurology follow up    - 4/20 more sleepy, suspect multifactorial amantadine dose decreased also on Keppra, discussed with   Dr Judd and woidl not hold keppra at this point unless has significant lethargy, even then reduce dose to 500 mg bid, monitor for infection       Encephalopathy, toxic metabolic   - much improved   - 4/16 more confused again repeat cbc bmp lactate ok    - monitor, see above        Hallucination  - new , no evidence of infection, no prior history, 4/20 denied seeing bugs   - head CT 4/17 did not show any acute intracranial pathology , showed the chronic right MCA territory infarct    - asked psych to see , has history of vascular dementia so could be form that   - seeing bugs, also seeing people that are not here  , thinks hospital bed looks like dentist chair, But otherwise oriented , has been on antibiotics , now some loose stools , ru other infection , blood sugar ok ,  checking labs as above   - TSH B12 folate  Normal   - seen by psychiatry, appreciate input , amantadine being tapered off 100 mg daily x 5 days then stop started 4/18   - consider Aricept 5 mg daily, abilift 2.5 mg PRN  Vs daily hallucination  But need to monitor qTC / heart rate if started as can prolong qTC     Prolonged qTC  Chronic RBBB  - last qtc was 512, repeat EKG  4/19 with qTC  418     VT  - telemetry  reviewed , episode of VT in chart was motion however 4/20 AM he did have 5 beats    - repeat abs pending , continue telemetry in case contributing to above     loose stool  - now stools soft formed,so no c diff checked       Possible recurrent urinary tract infection  - has been restarted empirically on ceftriaxone. Awaiting repeat urine culture results.  - received cefdinir x 3 days 4/6-4/8 and did get Rocephin  Prior , back on rocephine iv 4/11 on , completed 5 days  4/16   - UC form 4/2: E coli resistant to amp, unasyn and TMP/sulfa intermediate to cipro and levo, susceptible to others   -per significant other patient  Gets UTI ~ 2 x a year and has required admission with them,   - will need urology follow up       Leucocytosis, lactic acidosis  concern for early sepsis  - stopped IV fluids , completed antibiotics   - lactic acid 3.5 now back to normal      Acute right MCA   -stroke s/p tenecteplace on 07-Mar-2023, likely secondary cardioembolic from atrial fibrillation - patient had been off anticoagulation for anticipated procedure:  - residual left hemiparesis, impaired balance, impaired coordination, impaired proprioception, dysarthria, dysphagia and moderate cognitive linguistic impairment.  - initially was on aspirin but is now on eliquis for anticoagulation.  - continue amantadine taper as above .  - physical therapy  rec TCU   -  f/u with Neurology as outpatient.      Vascular dementia   - neuropsychological evaluation on 29-Mar and meets criteria for major neurocognitive disorder. It was recommended that POA be eneacted and patient receive assistance in complex and reasoned decisions moving forward.      Atrial fibrillation  - continue diltiazem with holding parameters  - previously been on coumadin but this apparently had been on hold since 25-Feb for planned pain pump placement. Patient was started on eliquis for anticoagulation on 14-Mar.     Hyperlipidemia  - continue  atorvastatin.     Diabetes  mellitus type II  -  patient usually on metformin and now back on it , lactate was up form sepsis and likely not due to metformin , monitor once restarted    -continue accu-checks insulin sliding scale      Hypertension  - on atenolol and diltiazem  - blood pressure  Ok .       Cough  -  Recently treated for aspirational pneumonia, Monitoring for evidence of aspiration  - on flonase for possible postnasal drip .  -negative  COVID  4/3     Obstructive sleep apnea  -not using CPAP at present. Patient should f/u as outpatient.     Dysphagia  - now on minced and moist texture , moderately thick (level 3) liquids by tsp,  up to chair for all meals with 1:1 assist. Patient receiving SLP.       Moderate malnutrition in the context of acute on chronic illness  -  receiving tube feeds via PEG-tube. PEG-tube was placed 09-Mar.  - nutritionist involved   - PEG placed 3/9/23 ,  needs to stay in for 6 weeks prior to removal      Loose stools  -  Monitoring for diarrhea  - if continues check  c diff as has had recent antibiotics exposure .     GERD  -  on PPI.     Mild normocytic anemia  - Hg stable, no signs of acute bleed       Sacral pressure injury, stage 2  - healed: Monitoring for recurrence.     Depressed mood  - continue PTA remeron 15 mg nightly.     4/12 discussed with  SO  4/13 4/17, 4/18  discussed with  SO        Diet: Minced & Moist Diet (level 5) Liquidized/Moderately Thick (level 3)  Calorie Counts  Adult Formula Drip Feeding: Continuous Jevity 1.5; Gastrostomy; Goal Rate: 85 ml/hr x12 hrs from 6 pm to 6 am; mL/hr; From: 6:00 PM; To: 6:00 AM  Snacks/Supplements Adult: Magic Cup; With Meals    DVT Prophylaxis: DOAC  Scruggs Catheter: Not present  Lines: None     Cardiac Monitoring: ACTIVE order. Indication: QTc prolonging medication (48 hours)  Code Status: Full Code      Clinically Significant Risk Factors                         # Overweight: Estimated body mass index is 27.81 kg/m  as calculated from the  "following:    Height as of this encounter: 1.854 m (6' 1\").    Weight as of this encounter: 95.6 kg (210 lb 12.2 oz).   # Moderate Malnutrition: based on nutrition assessment        Disposition Plan    physical therapy  rec TCU , monitor mental status         Clementine Poe MD  Hospitalist Service, GOLD TEAM 19  [facility]  Securely message with [employer] (more info)  Text page via [employer] Paging/Directory   See signed in provider for up to date coverage information  ______________________________________________________________________    Interval History      More tired and sleepy today and confused, he did wake k up well when I asked him to lift arms and voice was strong, no further seizure like  activity , SO here     Physical Exam   Vital Signs: Temp: 97.4  F (36.3  C) Temp src: Oral BP: 105/65 Pulse: 66   Resp: 20 SpO2: 96 % O2 Device: Nasal cannula Oxygen Delivery: 1/32 LPM  Weight: 210 lbs 12.16 oz  General appearance:  no apparent distress     HEENT: EOMI and PEARLA, sclera nonicteric,  Moist mucus membranes, head atraumatic  NECK: supple  RESPIRATORY: lungs are clear   CARDIOVASCULAR: normal S1S2  irreg irreg, soft systolic murmur   GASTROINTESTINAL: abdomen is  soft,  non-distended, non-tender with normal bowel sounds. PEG site looks ok     MUSCULOSKELETAL: normal muscle bulk and tone  SKIN: warm and dry, no rashes,   NEUROLOGIC: tired, wakes up when asked him to move arms and voice became pretty strong then, wiggles toes and lifts legs up some form bed, squeezed with both hands but really not lifting up arms much    EXTREMITIES: no clubbing cyanosis or edema noted  moves all extremities       Data   Imaging results reviewed over the past 24 hrs:   Recent Results (from the past 24 hour(s))   CT Head w/o Contrast    Narrative    CT HEAD W/O CONTRAST 4/19/2023 12:11 PM    History: possible seizure, recent stroke on anticoagulation rule out  bleed     Comparison: " 4/17/2023 CT    Technique: Using multidetector thin collimation helical acquisition  technique, axial, coronal and sagittal CT images from the skull base  to the vertex were obtained without intravenous contrast.   (topogram) image(s) also obtained and reviewed.    Findings:     Ongoing sequela of right MCA territory infarct with encephalomalacia  and gliosis involving the right frontal lobe. Additional scattered  areas of hypodensity in the periventricular and subcortical white  matter which appears similar to prior and is most suggestive of  proximal vessel ischemic disease. No acute intracranial hemorrhage.  Ventricles are proportionate to the cerebral sulci and similar in size  to prior.    No acute calvarial fractures. Visualized paranasal sinuses are clear.  Small right-sided mastoid air cells. Debris in the external auditory  canals. Surgical changes related to right scleral banding and  bilateral pseudophakia.      Impression    Impression:    1. No acute intracranial hemorrhage.  2. Ongoing sequela of previous right MCA territory infarct.  3. Unchanged additional white matter changes most suggestive of  chronic small vessel ischemic disease.     I have personally reviewed the examination and initial interpretation  and I agree with the findings.    TRICIA CAI MD         SYSTEM ID:  [id number]     Recent Labs   Lab 04/19/23  2219 04/19/23  1744 04/19/23  1455 04/19/23  1142 04/16/23  1237 04/16/23  1216 04/14/23  1228 04/14/23  0819   WBC  --   --   --  13.0*  --  12.9*  --  12.1*   HGB  --   --   --  12.2*  --  12.5*  --  12.5*   MCV  --   --   --  98  --  96  --  95   PLT  --   --   --  316  --  333  --  351   NA  --   --   --  138  --  139  --  138   POTASSIUM  --   --   --  4.1  --  4.3  --  4.0   CHLORIDE  --   --   --  100  --  103  --  102   CO2  --   --   --  25  --  25  --  24   BUN  --   --   --  20.9  --  20.1  --  23.9*   CR  --   --   --  0.80  --  0.78  --  0.70   ANIONGAP  --   --    --  13  --  11  --  12   LUTHER  --   --   --  9.8  --  9.6  --  9.5   * 124* 124* 151*   < > 140*   < > 147*    < > = values in this interval not displayed.

## 2023-04-20 NOTE — PLAN OF CARE
"/62 (BP Location: Left arm)   Pulse 75   Temp 97.2  F (36.2  C) (Oral)   Resp 18   Ht 1.854 m (6' 1\")   Wt 95.6 kg (210 lb 12.2 oz)   SpO2 98%   BMI 27.81 kg/m       AOx self/situation with intermittent confusion; pleasant and cooperative.  Denies chest pain, exhibits dyspnea on exertion, denies nausea/vomitting, and denies numbness/tingling. Infrequent wet/weak cough; more effective cough with encouragement.  Assist of 1 or 2 with walker/belt, weak. Ambulated to toilet at request and urinated. Intermittent incontinence B&B.   Voice garbled.  Mepilex preventative to coccyx. Redness blanchable to scrotum and coccyx/buttock.  Level 5 diet. Moderately thick liquids.  PIV LUE SL.  G-tube feeding will run until 6am.    No acute events, changes in LOC, nor seizure like activity noted. Room door open and frequent rounding implemented. Bed alarm on.     Tylenol given with relief due to patient complaint of back pain.  "

## 2023-04-20 NOTE — CONSULTS
[facility]  Neurology Consultation    Patient Name:  Stefan Diallo  MRN:  [id number]    :  1945  Date of Service:  2023  Primary care provider:  Collin Singh      Neurology consultation service was asked to see Stefan Diallo by Dr. Poe to evaluate possible seizure.    Chief Complaint:  Seizure like activity    History of Present Illness:   Stefan Diallo is a 77 year old male with history of hypertension, hyperlipidemia, atrial fibrillation, recent stroke who presents with a possible seizure-like spell.  History obtained primarily through patient's fiancé who was witness to the event.  She was admitted originally in early March with left-sided weakness.  He ultimately did receive TNK.  He did not have thrombectomy.  He was ultimately discharged to rehab, where he had a prolonged rehabilitation course.  He was eventually transferred to medicine due to recurrent leukocytosis and lactic acidosis.  His stroke was ultimately felt to be due to atrial fibrillation, and patient was not anticoagulated at that time.  He is now on apixaban.    Per patient's fiancé, he was in his normal state of health poststroke earlier this morning.  She went out to talk to the nurse and noted that he was shaking.  He describes them as rigid, eyes closed, for limb shaking.  She states she has seen seizures before felt this was consistent.  She states there was no recent positional changes.  Reports he is not hypotensive at this time.  Did not receive recent medications.  States he was shaking for approximately 2 minutes.  She did feel he was quite rigid.  Afterwards he was confused but confusion only lasted a few minutes.  He is now back to baseline by the time I was with him.  Denies that he is ever had a seizure before.  No family history of seizure.     ROS  A comprehensive ROS was performed and pertinent findings were included in HPI.     PMH  Past Medical History:    Diagnosis Date     Arthritis      Atrial fibrillation (H)      Bacteremia      Bell's palsy 2015     BPH (benign prostatic hyperplasia)      Diabetes (H)      Gastroesophageal reflux disease      GERD (gastroesophageal reflux disease)      GI hemorrhage      High cholesterol      Hyperlipemia      Hyperlipidemia      Hypertension      Hypertension      Idiopathic peripheral neuropathy      Irregular heart beat     chronic at fib     Renal artery aneurysm (H)      Renal artery aneurysm (H)      Sleep apnea     Bipap     Sleep apnea 10/31/2021     Sleep apnea, obstructive     USES BIPAP     Type 2 diabetes mellitus (H)      UTI (lower urinary tract infection)      Past Surgical History:   Procedure Laterality Date     AMPUTATE FOOT Right 5/6/2019    Procedure: AMPUTATION, 3rd TOE RIGHT FOOT;  Surgeon: Ravi Abbasi DPM;  Location: [facility];  Service: Podiatry     FUSION SPINE POSTERIOR MINIMALLY INVASIVE THREE + LEVELS N/A 10/29/2020    Procedure: L3/4/5S1 oblique lateral lumbar interbody fusion with discectomy L3/4/5S1 Posterior minimally invasive pedicle screw placement and posterolateral instrumentation and fusion  L3/4/5S1 Posterior minimally invasive pedicle screw placement and posterolateral instrumentation and fusion;  Surgeon: Vernon Bynum MD;  Location:  OR      REPAIR COMPL ROTATOR CUFF AVULSN,CHR      Description: Chronic Rotator Cuff Avulsion;  Recorded: 04/11/2011;     IR GASTROSTOMY TUBE PERCUTANEOUS PLCMNT  3/9/2023     ORTHOPEDIC SURGERY Right     knee surgery     ORTHOPEDIC SURGERY Right     amputation 3rd toe on right foot     TENOTOMY ACHILLES TENDON       TRANSRECTAL ULTRASONIC, TRANSURETHRAL RESECTION (TUR) OF PROSTATE CYST         Medications   I have personally reviewed the patient's medication list.     Allergies  I have personally reviewed the patient's allergy list.     Social History  Denies smoking, alcohol, drug use    Family History    Denies family history of  "seizure      Physical Examination   Vitals: /62 (BP Location: Left arm)   Pulse 75   Temp 97.2  F (36.2  C) (Oral)   Resp 18   Ht 1.854 m (6' 1\")   Wt 95.6 kg (210 lb 12.2 oz)   SpO2 98%   BMI 27.81 kg/m    General: Lying in bed, NAD  Head: NC/AT  Neuro:  Mental status: Awake, alert, attentive, oriented to self, year but not month., place, .  Able to name simple objects.  Unable to name current president.  Fancy confirms this is near baseline poststroke  Cranial nerves: PERRL, conjugate gaze, EOMI, left facial droop, shoulder shrug strong, tongue midline, dysarthria motor: Normal bulk.  Right-sided strength appears full.  He does have a left hemiparesis with 4 out of 5 weakness in the left lower extremity, and 4- out of 5 weakness in the left upper extremity  Reflexes: Toes downgoing, no clonus, deep tendon reflexes 2+ on the right biceps, slightly more brisk on the left.  Brachioradialis approximately equal.  Patella equal, absent Achilles  Sensory: Reports light touch intact throughout  Coordination: Finger-nose-finger without dysmetria  Gait: Deferred due to chronic gait difficulty  Investigations   I have personally reviewed pertinent labs, tests, and radiological imaging. Discussion of notable findings is included under Impression.     Head CT personally reviewed, no blood seen.  Right MCA territory stroke chronic seen    CBC, BMP, lactic acid reviewed    Was patient transferred from outside hospital?   No    Impression  #Seizure like activity  #CVA  #Atrial fibrillation    Mr. Diallo is a 77-year-old male with past medical history outlined above who was admitted to medicine for lactic acidosis and leukocytosis and who neurology was consulted after seizure-like episode.  He does have some features that are suggestive of seizure, but certainly not all.  He particular is relative lack of postictal state and eyes closed at onset would be a bit unusual.  That being said, there is no positional changes " to suggest syncope/hypotension, vitals were normal, and shaking was longer than would be expected for convulsive syncope.  He also does have a structural lesion to explain this.  I discussed with patient and leslee that overall I am not entirely clear if this would represent seizure but do suspect it probably was based on the description of the event.  Therefore initiating antiepileptics is a risk-benefit discussion.  May reduce the risk of a second seizure, however if this were not to represent seizure would be exposed unnecessarily to antiepileptics.  After discussion of risk and benefit leslee who witnessed the event states she is quite convinced this represented a seizure.  They would like to start an antiepileptic.  I discussed side effect profile of Keppra including mood symptoms and they are agreeable.  At this time do not think EEG is needed unless event recurs    Recommendations  -Keppra 750mg BID ordered  - No need for EEG at this time unless event recurs  - Please place neurology referral on discharge  - Will sign off at this time please call if additional questions or concerns.      Thank you for involving Neurology in the care of Stefan Diallo.      Sharon Medina,     Medical Decision Making       NOTE(S)/MEDICAL RECORDS REVIEWED over the past 24 hours: H&P, medicnie recent note, and neurology [facility] notes  Tests personally interpreted in the past 24 hours:  - HEAD CT showing as above  Tests ORDERED & REVIEWED in the past 24 hours:  - BMP  - CBC  - Lactic Acid  - TSH,   - EKG  SUPPLEMENTAL HISTORY, in addition to the patient's history, over the past 24 hours obtained from:   Josemanuel Devries  Medical complexity over the past 24 hours:  - Prescription DRUG MANAGEMENT performed

## 2023-04-20 NOTE — PROGRESS NOTES
Care Management Follow Up    Length of Stay (days): 9    Expected Discharge Date: 04/13/2023     Concerns to be Addressed: discharge planning     Patient plan of care discussed at interdisciplinary rounds: Yes    Anticipated Discharge Disposition:  TCU     Anticipated Discharge Services:  Post acute therapies  Anticipated Discharge DME:  None    Patient/family educated on Medicare website which has current facility and service quality ratings: yes  Education Provided on the Discharge Plan: Yes  Patient/Family in Agreement with the Plan: yes    Referrals Placed by CM/SW:  See below  Private pay costs discussed: Not applicable    Additional Information:    SW met with patient and significant other at bedside. SW discussed [facility] TCU and significant other decided that that would be best for patient. Gave TCU preferences.    Patient has been accepted to [facility]. SW met with patient and significant other, Kayce, at bedside. They are in agreement with plan. CARLO per provider is Monday. SW also spoke with admissions at [facility] who are in agreement with tentative Monday discharge.      Accepted Referral:    [facility]  [address] [place] [address]  Phone: [phone]  Admissions: [phone]  4/20: Referral sent via destination. Can clinically accept. Will plan for Monday discharge.    Referrals:     [facility]  [address] [place] [address]  [phone]  Admissions: [phone]  4/14: Referral sent via Epic destination  4/17: Spoke with admissions, they will review and get back to .  4/18: Spoke with admissions, they will review today and get back to .    [facility]  [address] [place] [address] [phone]  4/20: Referral sent via destination    [facility]  [address] [place], [place] [address] [phone]  4/20:  "Referral sent via destination    [facility]  [address] [place] [address]  [phone]  4/20: Referral sent via destination    [facility]  [address] [facility] [place] [address]  [phone]  4/20: Referral sent via destination    [facility]  [address] [place] [address]  [phone]  4/20: Referral sent via destination    [facility]  [address] [place] [address]  Phone: [phone])  4/20: Referral sent via destination        Declined:      [facility]   [address] [place] [address]  [phone]  4/20: Referral sent via destination. Declined, acuity too high    [facility]  [address] [place] [address]  [phone]  4/20: Referral sent via destination. Declined due to PEG tube    [facility] [address] [place] [address]  P: [phone]  F: [phone]  4/14: Referral sent via Epic destination  \"patient would have to be eating indep with no assist with feeding\"  4/17: Spoke with admissions, would like to know if he can eat better and will feeding tube get taken out? Would be nice to know if the goal is to get feeding tube out.  4/18: Spoke with admissions, shared updates. They will review and get back to .  4/19: Declined, too high acuity       [facility]  [address] [place] [address]  [phone]  ADMISSIONS: [phone]  Fax: [phone]  4/14: Referral sent via Epic destination  4/17: Left VM for admissions requesting call back regarding referral status.   4/18: Left VM requesting call back regarding referral status.  4/19: Declined, bed not available until early to mid next week        [facility]   [address] [place] [address]  [phone]  4/14: Referral sent via Epic destination  4/17: Left VM for admissions requesting call back regarding " referral status.  4/18: Declined, cannot meet patient's needs     [facility]  [address] [place] [address]  Adm:  [phone];  P:  [phone];  F:  [phone]  4/14: Referral sent via Epic destination  4/17: Left VM for Ofe in admissions requesting call back regarding referral status.  4/18: Declined, memory care, bed not available, impaired cognition     [facility]   [address] [facility] [address]  P:  [phone]  4/14: Referral sent via Epic destination. Received call, unable to meet needs.     [facility] 4th floor  [address] [place]  [address]  Admissions: [phone]  Fax: [phone]  RN to RN:  [phone]  4/14: Referral shared with  TCU liaison  4/15: Declined, no bed available, acuity too high     [address] [id number] [phone]  Admissions:  [phone]  4/14: Referral sent via Epic destination  4/17: Reached VM for [facility] in admissions. They are only taking short term admissions within those within [facility] system.     [facility]  [address] [place] [address]  [phone]  4/14: Referral sent via Epic destination  4/17: Spoke with Skyla in admissions, will review. No beds today but will review anyway. Will let SW know. Declined, too high acuity          Inge Meyers, ANAW, LSW  6 Med Surg   [facility]  Phone: [phone]  Pager: [phone]

## 2023-04-21 NOTE — PROGRESS NOTES
CLINICAL NUTRITION SERVICES - BRIEF NOTE     Nutrition Prescription    Recommendations already ordered by Registered Dietitian (RD):  No further decrease in TF due to lack of calorie count information.   Will extend Calorie Count over weekend.      Calorie Counts  4/18: 677 kcal and 29 g protein (1 meal - lunch, breakfast and dinner ordered, no documentation of these meals, 2 supplements at lunch)  4/19: NO MEAL SLIPS SAVED  4/20: NO MEAL SLIPS SAVED    3 day average PO intake = Unable to assess     INTERVENTIONS  Implementation  No further decrease in TF due to lack of calorie count information.   Will extend Calorie Count over weekend.      Follow up/Monitoring  Will continue to follow up per protocol.     Stephanie Hammond RD, LD   10 A RD pager: [phone]  Weekend/holiday pager: [phone]

## 2023-04-21 NOTE — PROGRESS NOTES
[facility]    Medicine Progress Note - Hospitalist Service, GOLD TEAM 19    Date of Admission:  4/11/2023    Assessment & Plan      Patient is a 78 y/o man who has multiple medical problems including diabetes mellitus type II, atrial fibrillation, hypertension and hyperlipidemia. Patient had been hospitalized from 07-Mar-2023 to 16-Mar-2023 at [facility] with acute ischemic stroke of right MCA territory due to cardioembolism, did  receive tenecteplase on 07-Mar-2023. During this hospital stay also had possible early sepsis secondary to aspiration pneumonia. He was transferred to acute rehabilitation unit on 16-Mar-2023.      Had a prolonged rehabilitation course complicated by the extent of his deficits and fluctuating levels of alertness. Was treated for urinary tract infection which was associated with significant functional decline and worsened encephalopathy after prior improvement. Patient reportedly improved after several days of treatment but then develop recurrent leucocytosis, worsening confusion and lactic acidosis.      Earlier on day of admission rapid response was called. He  was started on empiric antibiotics, IV fluids  for lactic acidosis. Lactic acid increased following IV fluid bolus and patient was transferred to the medical unit for further evaluation and treatment.      Current concern given recurrent leucocytosis and lactic acidosis is for sepsis though dehydration is another possibility. There is concern for recurrent urinary tract infection.     From chart review, patient completed a 7 day course of antibiotics for E. coli urinary tract infection on 08-Apr-2023 - patient was started on zosyn on 02-Apr, transitioned to ceftriaxone on 04-Apr and then transitioned to cefdinir on 06-Apr-2023.        Possible seizure  -4/19 ~ 11 AM patient  Was noted to have generalized shaking while sitting in chair witnessed by SO, states lasted about 30  seconds, he was not responsive during that time , he was more confused for short period after , not incontinent of bladder or bladder, apparently patient  Felt like  he was falling   - right after episode blood sugar in 150s ,    - came back to see  Patient , he is back to baseline , pupils = reactive  - seizure precautions , PRN ativan for prolonged seizure   - STAT head CT came back negative for acute changes, shows the sequela of previous stroke  , labs stable   - appreciate neurology's  input, was stared on Keppra 750 mg bid, no EEG recommended at this point inless has repeated event .   - needs neurology follow up    - 4/20 more sleepy, suspect multifactorial amantadine dose decreased also on Keppra, discussed with   Dr Judd and would  not hold keppra at this point unless has significant lethargy, even then reduce dose to 500 mg bid. 4/21 even more sleepy so did reduce keppra to 500 mg bid, did not increase amantadine to bid at this point, also checking CXR and VBG     Addendum:   no CO2 retention on VBG         Encephalopathy, toxic metabolic   - much improved   - 4/16 more confused again repeat cbc bmp lactate ok    - monitor, see above        Hallucination  - new , no evidence of infection, no prior history, 4/20 denied seeing bugs   - head CT 4/17 did not show any acute intracranial pathology , showed the chronic right MCA territory infarct    -  has history of vascular dementia so could be form that   - seeing bugs, also seeing people that are not here  , thinks hospital bed looks like dentist chair, But otherwise oriented , has been on antibiotics , now some loose stools , ru other infection , blood sugar ok ,  checking labs as above   - TSH B12 folate  Normal   - seen by psychiatry, appreciate input , amantadine being tapered off 100 mg daily x 5 days then stop started 4/18   - consider Aricept 5 mg daily, abilift 2.5 mg PRN  Vs daily hallucination  But need to monitor qTC / heart rate if  started as can prolong qTC     Prolonged qTC  Chronic RBBB  - last qtc was 512, repeat EKG  4/19 with qTC  418     VT  - telemetry reviewed , episode of VT in chart was motion however 4/20 AM he did have 5 beats , electrolytes including calcium normal, nothing since     Cough  - has had some nonproductive cough, no fever no leukocytosis, checking CXR , if has infiltrate check procalcitonin to help with antibiotics , avoid flouroquinolones if needs antibiotics as can lower seizure threshold     loose stool  - now stools soft formed,so no c diff checked       Possible recurrent urinary tract infection  - has been restarted empirically on ceftriaxone. Awaiting repeat urine culture results.  - received cefdinir x 3 days 4/6-4/8 and did get Rocephin  Prior , back on rocephine iv 4/11 on , completed 5 days  4/16   - UC form 4/2: E coli resistant to amp, unasyn and TMP/sulfa intermediate to cipro and levo, susceptible to others   -per significant other patient  Gets UTI ~ 2 x a year and has required admission with them,   - will need urology follow up       Leucocytosis, lactic acidosis  concern for early sepsis  - stopped IV fluids , completed antibiotics   - lactic acid 3.5 now back to normal      Acute right MCA   -stroke s/p tenecteplace on 07-Mar-2023, likely secondary cardioembolic from atrial fibrillation - patient had been off anticoagulation for anticipated procedure:  - residual left hemiparesis, impaired balance, impaired coordination, impaired proprioception, dysarthria, dysphagia and moderate cognitive linguistic impairment.  - initially was on aspirin but is now on eliquis for anticoagulation.  - continue amantadine taper as above .  - physical therapy  rec TCU   -  f/u with Neurology as outpatient.      Vascular dementia   - neuropsychological evaluation on 29-Mar and meets criteria for major neurocognitive disorder. It was recommended that POA be eneacted and patient receive assistance in complex and  reasoned decisions moving forward.      Atrial fibrillation  - continue diltiazem with holding parameters  - previously been on coumadin but this apparently had been on hold since 25-Feb for planned pain pump placement. Patient was started on eliquis for anticoagulation on 14-Mar.     Hyperlipidemia  - continue  atorvastatin.     Diabetes mellitus type II  -  patient usually on metformin and now back on it , lactate was up form sepsis and likely not due to metformin , monitor once restarted    -continue accu-checks insulin sliding scale      Hypertension  - on atenolol and diltiazem  - blood pressure  Ok .       Cough  -  Recently treated for aspirational pneumonia, Monitoring for evidence of aspiration  - on flonase for possible postnasal drip .  -negative  COVID  4/3     Obstructive sleep apnea  -not using CPAP at present. Patient should f/u as outpatient.     Dysphagia  - now on minced and moist texture , moderately thick (level 3) liquids by tsp,  up to chair for all meals with 1:1 assist. Patient receiving SLP.       Moderate malnutrition in the context of acute on chronic illness  -  receiving tube feeds via PEG-tube. PEG-tube was placed 09-Mar.  - nutritionist involved   - PEG placed 3/9/23 ,  needs to stay in for 6 weeks prior to removal      Loose stools  -  Monitoring for diarrhea  - if continues check  c diff as has had recent antibiotics exposure .     GERD  -  on PPI.     Mild normocytic anemia  - Hg stable, no signs of acute bleed       Sacral pressure injury, stage 2  - healed: Monitoring for recurrence.     Depressed mood  - continue PTA remeron 15 mg nightly.     4/12 discussed with  SO  4/13 4/17, 4/18  discussed with  SO        Diet: Minced & Moist Diet (level 5) Liquidized/Moderately Thick (level 3)  Calorie Counts  Adult Formula Drip Feeding: Continuous Jevity 1.5; Gastrostomy; Goal Rate: 85 ml/hr x12 hrs from 6 pm to 6 am; mL/hr; From: 6:00 PM; To: 6:00 AM  Snacks/Supplements Adult: Magic Cup;  "With Meals    DVT Prophylaxis: DOAC  Scruggs Catheter: Not present  Lines: None     Cardiac Monitoring: ACTIVE order. Indication: QTc prolonging medication (48 hours)  Code Status: Full Code      Clinically Significant Risk Factors                         # Overweight: Estimated body mass index is 27.81 kg/m  as calculated from the following:    Height as of this encounter: 1.854 m (6' 1\").    Weight as of this encounter: 95.6 kg (210 lb 12.2 oz).   # Moderate Malnutrition: based on nutrition assessment        Disposition Plan    physical therapy  rec TCU , monitor mental status         Clementine Poe MD  Hospitalist Service, GOLD TEAM 19  [facility]  Securely message with "[employer] (more info)  Text page via [employer] Paging/Directory   See signed in provider for up to date coverage information  ______________________________________________________________________    Interval History      In bed, pretty sleepy but wakes up, mumbles, follows simple commands, son is here         Physical Exam   Vital Signs: Temp: 97.7  F (36.5  C) Temp src: Oral BP: 128/71 Pulse: 81   Resp: 18 SpO2: 97 % O2 Device: None (Room air)    Weight: 210 lbs 12.16 oz  General appearance:  no apparent distress     HEENT: EOMI and PEARLA, sclera nonicteric, dry  mucus membranes, head atraumatic  NECK: supple  RESPIRATORY: lungs are clear   CARDIOVASCULAR: normal S1S2  irreg irreg, soft systolic murmur   GASTROINTESTINAL: abdomen is  soft,  non-distended, non-tender with normal bowel sounds. PEG site looks ok     MUSCULOSKELETAL: normal muscle bulk and tone  SKIN: warm and dry, no rashes,   NEUROLOGIC:sleepy, wakes up, mumbles nam e, recognizes son, squeezes with both hands equally wiggles toes,  EXTREMITIES: no clubbing cyanosis or edema noted  moves all extremities       Data   Imaging results reviewed over the past 24 hrs:   No results found for this or any previous visit (from the past 24 " hour(s)).  Recent Labs   Lab 04/21/23  0651 04/20/23  2207 04/20/23  1709 04/20/23  1227 04/19/23  1455 04/19/23  1142 04/16/23  1237 04/16/23  1216 04/14/23  1228 04/14/23  0819   WBC  --   --   --   --   --  13.0*  --  12.9*  --  12.1*   HGB  --   --   --   --   --  12.2*  --  12.5*  --  12.5*   MCV  --   --   --   --   --  98  --  96  --  95   PLT  --   --   --   --   --  316  --  333  --  351   NA  --   --   --  137  --  138  --  139  --  138   POTASSIUM  --   --   --  4.2  --  4.1  --  4.3  --  4.0   CHLORIDE  --   --   --  102  --  100  --  103  --  102   CO2  --   --   --  25  --  25  --  25  --  24   BUN  --   --   --  22.8  --  20.9  --  20.1  --  23.9*   CR  --   --   --  0.76  --  0.80  --  0.78  --  0.70   ANIONGAP  --   --   --  10  --  13  --  11  --  12   LUTHER  --   --   --  9.4  9.4  --  9.8  --  9.6  --  9.5   * 85 110* 160*   < > 151*   < > 140*   < > 147*    < > = values in this interval not displayed.

## 2023-04-21 NOTE — PROGRESS NOTES
Care Management Follow Up    Length of Stay (days): 10    Expected Discharge Date: 04/21/2023     Concerns to be Addressed: discharge planning     Patient plan of care discussed at interdisciplinary rounds: Yes    Anticipated Discharge Disposition:  TCU     Anticipated Discharge Services:  Post acute therapies  Anticipated Discharge DME:  None    Patient/family educated on Medicare website which has current facility and service quality ratings: yes  Education Provided on the Discharge Plan: Yes  Patient/Family in Agreement with the Plan: yes    Referrals Placed by CM/SW:  See below  Private pay costs discussed: Not applicable    Additional Information:    Per IDT rounds, patient is expected to be medically ready for discharge on Monday. Facility is willing to accept patient on Monday.    SW spoke with patient's fiance at bedside. She noted some concerns and asked SW to reach out to other facilities, but is open to [facility] as well.       Accepted Referral:     [facility]  [address] [place] [address]  Phone: [phone]  Admissions: [phone]  4/20: Referral sent via destination. Can clinically accept. Will plan for Monday discharge.           Referrals:     [facility]  [address] [place] [address]  [phone]  Admissions: [phone]  4/14: Referral sent via [facility] destination  4/17: Spoke with admissions, they will review and get back to .  4/18: Spoke with admissions, they will review today and get back to .  4/21: Spoke with admissions, they will review. They were concerned about SNF days. SW provided explanation.     [facility]  [address] [place] [address] [phone]  4/20: Referral sent via destination  4/21: Left VM for admissions requesting call back regarding referral status.     [facility]  [address] [place] [address]  Phone:  "[phone]  4/20: Referral sent via destination  4/21: SW left VM requesting call back regarding referral status.           Declined:      [facility]  [address] [place] [address]  [phone]  4/20: Referral sent via destination  4/21: Spoke with admissions. Declined, no beds available. Can check back next week    [facility]  [address] [place] [address]  [phone]  4/20: Referral sent via destination  4/21: Declined, no reason given    [facility]  [address] [place] [address]  [phone]  4/20: Referral sent via destination. Declined, bed not available    [facility]  [address] [place] [address]  [phone]  4/20: Referral sent via destination  4/21: Declined, no reason given    [facility]   [address]  [place], [place] [address] [phone]  4/20: Referral sent via destination. Declined, acuity too high     [facility]  [address] [place] [address]  [phone]  4/20: Referral sent via destination. Declined due to PEG tube     [facility] [address] [place] [address]  P: [phone]  F: [phone]  4/14: Referral sent via Epic destination  \"patient would have to be eating indep with no assist with feeding\"  4/17: Spoke with admissions, would like to know if he can eat better and will feeding tube get taken out? Would be nice to know if the goal is to get feeding tube out.  4/18: Spoke with admissions, shared updates. They will review and get back to .  4/19: Declined, too high acuity     [facility]  [address] [place], [place] [address] [phone]  ADMISSIONS: [phone]  Fax: [phone]  4/14: Referral sent via Epic destination  4/17: Left VM for admissions requesting call back regarding referral status.   4/18: Left VM " requesting call back regarding referral status.  4/19: Declined, bed not available until early to mid next week     [facility]   [address] [place] [address]  [phone]  4/14: Referral sent via Epic destination  4/17: Left VM for admissions requesting call back regarding referral status.  4/18: Declined, cannot meet patient's needs     [facility]  [address] [place] [address]  Adm:  [phone];  P:  [phone];  F:  [phone]  4/14: Referral sent via Epic destination  4/17: Left VM for Ofe in admissions requesting call back regarding referral status.  4/18: Declined, memory care, bed not available, impaired cognition     [facility]   [address] [address]  P:  [phone]  4/14: Referral sent via Epic destination. Received call, unable to meet needs.     [facility] 4th floor  [address] [place]  [address]  Admissions: [phone]  Fax: [phone]  RN to RN:  [phone]  4/14: Referral shared with  TCU liaison  4/15: Declined, no bed available, acuity too high     [facility]  [address] [place] [address]  [phone]  Admissions:  [phone]  4/14: Referral sent via Epic destination  4/17: Reached VM for [facility] in admissions. They are only taking short term admissions within those within [facility] system.     [facility]  [address] [place] [address]  [phone]  4/14: Referral sent via Epic destination  4/17: Spoke with Skyla in admissions, will review. No beds today but will review anyway. Will let SW know. Declined, too high acuity          Inge Meyers, ANAW, LSW  6 Med Surg   [facility]  Phone: [phone]  Pager: [phone]

## 2023-04-21 NOTE — PLAN OF CARE
Goal Outcome Evaluation:    [id number]    Patient is alert to self. Lethargic. Call light within reach. Garbled and slow speech noted. Needs assistance x 2 during pericares and repositioning. Wears brief.     Maintained O2 at 1LPM via NC during hours of sleep.On tele. No complaints of pain, SOB and chest pain. PIV on L arm, SL, intact and patent. Redness on coccyx noted, applied mepilex.     On cycled TF via G-tube. Level 5 diet, moderately thick fluids.     Possible discharge to TCU once medically stable. Frequent rounds done, no events during the shift.

## 2023-04-21 NOTE — PLAN OF CARE
"/66 (BP Location: Left arm)   Pulse 73   Temp 97.5  F (36.4  C) (Oral)   Resp 18   Ht 1.854 m (6' 1\")   Wt 95.6 kg (210 lb 12.2 oz)   SpO2 95%   BMI 27.81 kg/m       Lethargic, arouses to light touch/voice. AOx self with intermittent confusion; pleasant and cooperative.  Denies chest pain, exhibits dyspnea on exertion, denies nausea/vomitting, and denies numbness/tingling. Infrequent wet/weak cough; more effective cough with encouragement.  Assist of 1 or 2 with walker/belt, weak. Will request to use restroom, encourage ambulation to toilet. Intermittent incontinence B&B.   Voice garbled/slow/quiet.  Mepilex preventative to coccyx. Redness blanchable to scrotum and coccyx/buttock.  Level 5 diet. Moderately thick liquids.  PIV LUE SL.  G-tube feeding will run until 10am.    No acute changes to LOC/alterness this shift; remains lethargic. Appetite poor, refusing oral intake even with encouragement and feeding assistance but continue encouraging. Room door open and frequent rounding implemented. Bed alarm on.     Tylenol given with relief due to patient complaint of back pain.  "

## 2023-04-21 NOTE — PLAN OF CARE
"Goal Outcome Evaluation:  VS: Blood pressure (!) 128/92, pulse 71, temperature 98.6  F (37  C), temperature source Axillary, resp. rate 18, height 1.854 m (6' 1\"), weight 95.6 kg (210 lb 12.2 oz), SpO2 96 %.     O2: R/A, denies SOB   Output/LBM: Incontinent of both B/B- LBM-4/18   Activity: AX2 SPT with GB and Walker   Skin: Blanchable redness on coccyx and scrotum    Pain: Denied   CMS: Intact, A&OX1(self)   Diet: G-tube -[id number], L5., Moderately thick liquids, Pills via G- tube.   LDA: MAEGANE PIV, G tube.               Plan of Care Reviewed With: patient    Overall Patient Progress: no changeOverall Patient Progress: no change           "

## 2023-04-22 NOTE — PLAN OF CARE
Patient is alert to self. Lethargic. Denies nausea/vomiting, SOB and chest pain. Call light within reach. Garbled and slow speech noted. Wet cough, suctioned and clear out some cough. Needs assistance x 2 during pericares and repositioning. Wears brief. Maintained O2 at 1LPM via NC during hours of sleep.On tele. PIV on L arm, SL, intact and patent. Redness on coccyx noted, applied mepilex. On cycled TF via G-tube. Level 5 diet, moderately thick fluids. Possible discharge to TCU once medically stable. Continue to monitor the patient and might get discharge on Monday.

## 2023-04-22 NOTE — PROGRESS NOTES
[facility]    Medicine Progress Note - Hospitalist Service, GOLD TEAM 19    Date of Admission:  4/11/2023    Assessment & Plan      Patient is a 78 y/o man who has multiple medical problems including diabetes mellitus type II, atrial fibrillation, hypertension and hyperlipidemia. Patient had been hospitalized from 07-Mar-2023 to 16-Mar-2023 at [facility] with acute ischemic stroke of right MCA territory due to cardioembolism, did  receive tenecteplase on 07-Mar-2023. During this hospital stay also had possible early sepsis secondary to aspiration pneumonia. He was transferred to acute rehabilitation unit on 16-Mar-2023.      Had a prolonged rehabilitation course complicated by the extent of his deficits and fluctuating levels of alertness. Was treated for urinary tract infection which was associated with significant functional decline and worsened encephalopathy after prior improvement. Patient reportedly improved after several days of treatment but then develop recurrent leucocytosis, worsening confusion and lactic acidosis.      Earlier on day of admission rapid response was called. He  was started on empiric antibiotics, IV fluids  for lactic acidosis. Lactic acid increased following IV fluid bolus and patient was transferred to the medical unit for further evaluation and treatment.      Current concern given recurrent leucocytosis and lactic acidosis is for sepsis though dehydration is another possibility. There is concern for recurrent urinary tract infection.     From chart review, patient completed a 7 day course of antibiotics for E. coli urinary tract infection on 08-Apr-2023 - patient was started on zosyn on 02-Apr, transitioned to ceftriaxone on 04-Apr and then transitioned to cefdinir on 06-Apr-2023.        Possible seizure  -4/19 ~ 11 AM patient  Was noted to have generalized shaking while sitting in chair witnessed by SO, states lasted about 30  seconds, he was not responsive during that time , he was more confused for short period after , not incontinent of bladder or bladder, apparently patient  Felt like  he was falling   - right after episode blood sugar in 150s ,    - came back to see  Patient , he is back to baseline , pupils = reactive  - seizure precautions , PRN ativan for prolonged seizure   - STAT head CT came back negative for acute changes, shows the sequela of previous stroke  , labs stable   - appreciate neurology's  input, was stared on Keppra 750 mg bid, no EEG recommended at this point inless has repeated event .   - needs neurology follow up    - 4/20 more sleepy, suspect multifactorial. Amantadine dose decreased, and  lso on Keppra  - Dr Poe  discussed with   Dr Judd and would  not hold keppra at this point unless has significant lethargy, even then reduce dose to 500 mg bid.   4/21 even more sleepy so did reduce keppra to 500 mg bid,  CXR with no acute findings   VBG with npo evidence of Co2 retention         Encephalopathy, toxic metabolic   - much improved   - 4/16 more confused again repeat cbc bmp lactate ok    - monitor, see above        Hallucination  - new , no evidence of infection, no prior history, 4/20 denied seeing bugs   - head CT 4/17 did not show any acute intracranial pathology , showed the chronic right MCA territory infarct    -  has history of vascular dementia so could be form that   - seeing bugs, also seeing people that are not here  , thinks hospital bed looks like dentist chair, But otherwise oriented , has been on antibiotics , now some loose stools , ru other infection , blood sugar ok ,  checking labs as above   - TSH B12 folate  Normal   - seen by psychiatry, appreciate input , amantadine being tapered off 100 mg daily x 5 days then stop started 4/18   - consider Aricept 5 mg daily, abilift 2.5 mg PRN  Vs daily hallucination  But need to monitor qTC / heart rate if started as can prolong qTC      Prolonged qTC  Chronic RBBB  - last qtc was 512, repeat EKG  4/19 with qTC  418     VT  - telemetry reviewed , episode of VT in chart was motion however 4/20 AM he did have 5 beats , electrolytes including calcium normal, nothing since     Cough  - has had some nonproductive cough, no fever no leukocytosis,   -CXR with no acute findings   - As previously attributed, this may be due to GERD/ Reflux          Possible recurrent urinary tract infection  - has been restarted empirically on ceftriaxone. Awaiting repeat urine culture results.  - received cefdinir x 3 days 4/6-4/8 and did get Rocephin  Prior , back on rocephine iv 4/11 on , completed 5 days  4/16   - UC form 4/2: E coli resistant to amp, unasyn and TMP/sulfa intermediate to cipro and levo, susceptible to others   -per significant other patient  Gets UTI ~ 2 x a year and has required admission with them,   - will need urology follow up       Leucocytosis, lactic acidosis  concern for early sepsis  - stopped IV fluids , completed antibiotics   - lactic acid 3.5 now back to normal      Acute right MCA   -stroke s/p tenecteplace on 07-Mar-2023, likely secondary cardioembolic from atrial fibrillation - patient had been off anticoagulation for anticipated procedure:  - residual left hemiparesis, impaired balance, impaired coordination, impaired proprioception, dysarthria, dysphagia and moderate cognitive linguistic impairment.  - initially was on aspirin but is now on eliquis for anticoagulation.  - continue amantadine taper as above .  - physical therapy  rec TCU   -  f/u with Neurology as outpatient.      Vascular dementia   - neuropsychological evaluation on 29-Mar and meets criteria for major neurocognitive disorder. It was recommended that POA be eneacted and patient receive assistance in complex and reasoned decisions moving forward.      Atrial fibrillation  - continue diltiazem with holding parameters  - previously been on coumadin but this apparently  had been on hold since 25-Feb for planned pain pump placement. Patient was started on eliquis for anticoagulation on 14-Mar.     Hyperlipidemia  - continue  atorvastatin.     Diabetes mellitus type II  -  patient usually on metformin and now back on it , lactate was up form sepsis and likely not due to metformin , monitor once restarted    -continue accu-checks insulin sliding scale      Hypertension  - on atenolol and diltiazem  - blood pressure  Ok .       Cough  -  Recently treated for aspirational pneumonia, Monitoring for evidence of aspiration  - on flonase for possible postnasal drip .  -negative  COVID  4/3     Obstructive sleep apnea  -not using CPAP at present. Patient should f/u as outpatient.     Dysphagia  - now on minced and moist texture , moderately thick (level 3) liquids by tsp,  up to chair for all meals with 1:1 assist. Patient receiving SLP.       Moderate malnutrition in the context of acute on chronic illness  -  receiving tube feeds via PEG-tube. PEG-tube was placed 09-Mar.  - nutritionist involved   - PEG placed 3/9/23 ,  needs to stay in for 6 weeks prior to removal      Loose stools  -  Monitoring for diarrhea       GERD  -  on PPI.     Mild normocytic anemia  - Hg stable, no signs of acute bleed    -Hgb at 11.3     Sacral pressure injury, stage 2  - healed: Monitoring for recurrence.     Depressed mood  - continue PTA remeron 15 mg nightly.     Discussed with faience at bedside        Diet: Minced & Moist Diet (level 5) Liquidized/Moderately Thick (level 3)  Adult Formula Drip Feeding: Continuous Jevity 1.5; Gastrostomy; Goal Rate: 85 ml/hr x12 hrs from 6 pm to 6 am; mL/hr; From: 6:00 PM; To: 6:00 AM  Snacks/Supplements Adult: Magic Cup; With Meals  Calorie Counts    DVT Prophylaxis: DOAC  Scruggs Catheter: Not present  Lines: None     Cardiac Monitoring: ACTIVE order. Indication: QTc prolonging medication (48 hours)  Code Status: Full Code      Clinically Significant Risk Factors            "              # Overweight: Estimated body mass index is 27.81 kg/m  as calculated from the following:    Height as of this encounter: 1.854 m (6' 1\").    Weight as of this encounter: 95.6 kg (210 lb 12.2 oz).   # Moderate Malnutrition: based on nutrition assessment        Disposition Plan    physical therapy  rec TCU , monitor mental status         Lencho Rodgers MD  Hospitalist Service, GOLD TEAM 19  [facility]  Securely message with [employer] (more info)  Text page via [employer] Paging/Directory   See signed in provider for up to date coverage information  ______________________________________________________________________    Interval History    Charts reviewed and patient was examined   sign out received from Dr. Poe  This am, patient was coughing when laying down and also very drowsy  Later in the afternoon, when he was sitting up, he appeared more awake, pleasant and comfortable, answering questions  There was no cough when he sat upright  Denies pain         Physical Exam   Vital Signs: Temp: 98.9  F (37.2  C) Temp src: Axillary BP: 112/72 Pulse: 71   Resp: 16 SpO2: 97 % O2 Device: Nasal cannula Oxygen Delivery: 1 LPM  Weight: 210 lbs 12.16 oz  General appearance:  no apparent distress     HEENT: EOMI and PEARLA, sclera nonicteric, dry  mucus membranes, head atraumatic  NECK: supple  RESPIRATORY: lungs are clear   CARDIOVASCULAR: normal S1S2  irreg irreg, soft systolic murmur   GASTROINTESTINAL: abdomen is  soft,  non-distended, non-tender with normal bowel sounds. PEG site looks ok     MUSCULOSKELETAL: normal muscle bulk and tone  SKIN: warm and dry, no rashes,   NEUROLOGIC:sleepy, wakes up, mumbles nam e, recognizes son, squeezes with both hands equally wiggles toes,  EXTREMITIES: no clubbing cyanosis or edema noted  moves all extremities       Data   Imaging results reviewed over the past 24 hrs:   Recent Results (from the past 24 hour(s))   XR " Chest 1 View    Narrative    Chest one view    INDICATION: Cough comment looking for infiltrates    COMPARISON: 4/11/2023    FINDINGS: Heart is mildly enlarged. There is atherosclerotic  calcification of the aortic knob. Lungs and pulmonary vasculature  appear normal on this single view. Degenerative changes are noted at  the right shoulder including high riding humeral head which may  indicate rotator cuff degeneration.      Impression    IMPRESSION: Aortic atherosclerosis with mild cardiomegaly. Right  shoulder degenerative change with possible rotator cuff degeneration.    ADAM GERMAIN MD         SYSTEM ID:  [id number]     Recent Labs   Lab 04/22/23  0957 04/22/23  0700 04/21/23  2157 04/21/23  0915 04/21/23  0834 04/20/23  1709 04/20/23  1227 04/19/23  1455 04/19/23  1142 04/16/23  1237 04/16/23  1216   WBC  --   --   --   --  8.3  --   --   --  13.0*  --  12.9*   HGB  --   --   --   --  11.3*  --   --   --  12.2*  --  12.5*   MCV  --   --   --   --  96  --   --   --  98  --  96   PLT  --   --   --   --  253  --   --   --  316  --  333   NA  --   --   --   --  137  --  137  --  138  --  139   POTASSIUM  --   --   --   --  4.1  --  4.2  --  4.1  --  4.3   CHLORIDE  --   --   --   --  102  --  102  --  100  --  103   CO2  --   --   --   --  24  --  25  --  25  --  25   BUN  --   --   --   --  22.7  --  22.8  --  20.9  --  20.1   CR  --   --   --   --  0.76  --  0.76  --  0.80  --  0.78   ANIONGAP  --   --   --   --  11  --  10  --  13  --  11   LUTHER  --   --   --   --  9.3  --  9.4  9.4  --  9.8  --  9.6   * 221* 169*   < > 226*   < > 160*   < > 151*   < > 140*    < > = values in this interval not displayed.

## 2023-04-22 NOTE — PLAN OF CARE
"Goal Outcome Evaluation:  VS: Blood pressure 112/72, pulse 71, temperature 98.9  F (37.2  C), temperature source Axillary, resp. rate 16, height 1.854 m (6' 1\"), weight 95.6 kg (210 lb 12.2 oz), SpO2 97 %.    O2: 1L via N/C, denies SOB, chest pain.   Output/LBM: Continent of bowel- LBM-4/22, incontinent of bladder.   Activity: AX2 SBA with w/c and GB   Skin: Blanchable redness on coccyx and scrotum area- antifungal cream applied.   Pain: Denied   CMS: Intact, A&OX3,    Diet: Minced & Moist Diet (level 5) Liquidized/Moderately Thick (level 3)G-tube scheduled [id number] with water flushes.   LDA: LUE PIV, G-tube   Additional Info/Plan Ate 20% @ lunch. 300cc H2O flushes QID          Plan of Care Reviewed With: patient    Overall Patient Progress: no changeOverall Patient Progress: no change           "

## 2023-04-22 NOTE — PROVIDER NOTIFICATION
Pt has sustained Afib throughout the night. HR: 74 and RR:12-2, Pt has been coughing throughout the night as well. Non productive dry cough, provider notified about it

## 2023-04-22 NOTE — PROGRESS NOTES
Received order for CPAP at night.  Pt did not have his home unit available.  Pt was s/u on hospital owned CPAP for overnight with a full face mask.    OLGA French RT

## 2023-04-23 NOTE — PROGRESS NOTES
[facility]    Medicine Progress Note - Hospitalist Service, GOLD TEAM 19    Date of Admission:  4/11/2023    Assessment & Plan      Patient is a 78 y/o man who has multiple medical problems including diabetes mellitus type II, atrial fibrillation, hypertension and hyperlipidemia. Patient had been hospitalized from 07-Mar-2023 to 16-Mar-2023 at [facility] with acute ischemic stroke of right MCA territory due to cardioembolism, did  receive tenecteplase on 07-Mar-2023. During this hospital stay also had possible early sepsis secondary to aspiration pneumonia. He was transferred to acute rehabilitation unit on 16-Mar-2023.      Had a prolonged rehabilitation course complicated by the extent of his deficits and fluctuating levels of alertness. Was treated for urinary tract infection which was associated with significant functional decline and worsened encephalopathy after prior improvement. Patient reportedly improved after several days of treatment but then develop recurrent leucocytosis, worsening confusion and lactic acidosis.      Earlier on day of admission rapid response was called. He  was started on empiric antibiotics, IV fluids  for lactic acidosis. Lactic acid increased following IV fluid bolus and patient was transferred to the medical unit for further evaluation and treatment.      Current concern given recurrent leucocytosis and lactic acidosis is for sepsis though dehydration is another possibility. There is concern for recurrent urinary tract infection.     From chart review, patient completed a 7 day course of antibiotics for E. coli urinary tract infection on 08-Apr-2023 - patient was started on zosyn on 02-Apr, transitioned to ceftriaxone on 04-Apr and then transitioned to cefdinir on 06-Apr-2023.        Possible seizure  -4/19 ~ 11 AM patient  Was noted to have generalized shaking while sitting in chair witnessed by SO, states lasted about 30  seconds, he was not responsive during that time , he was more confused for short period after , not incontinent of bladder or bladder, apparently patient  Felt like  he was falling   - right after episode blood sugar in 150s ,    - came back to see  Patient , he is back to baseline , pupils = reactive  - seizure precautions , PRN ativan for prolonged seizure   - STAT head CT came back negative for acute changes, shows the sequela of previous stroke  , labs stable   - appreciate neurology's  input, was stared on Keppra 750 mg bid, no EEG recommended at this point inless has repeated event .   - needs neurology follow up    - 4/20 more sleepy, suspect multifactorial. Amantadine dose decreased, but also initiated on Keppra which could cause drowsiness   - Dr Poe  discussed with  Dr Medina from neurology, who recommended that he wound not not hold keppra at this point unless has significant lethargy, even then reduce dose to 500 mg bid.   4/21 even more sleepy so did reduce keppra to 500 mg bid,  CXR with no acute findings   VBG with npo evidence of Co2 retention         Encephalopathy, toxic metabolic   - much improved   - 4/16 more confused again repeat cbc bmp lactate ok    - Almost at ARU baseline mentation on 4/22       Hallucination  - new , no evidence of infection, no prior history, 4/20 denied seeing bugs   - head CT 4/17 did not show any acute intracranial pathology , showed the chronic right MCA territory infarct    -  has history of vascular dementia so could be form that   - seeing bugs, also seeing people that are not here  , thinks hospital bed looks like dentist chair, But otherwise oriented , has been on antibiotics , now some loose stools , ru other infection , blood sugar ok ,  checking labs as above   - TSH B12 folate  Normal   - seen by psychiatry, appreciate input , amantadine being tapered off 100 mg daily x 5 days  ( now stopped)   - consider Aricept 5 mg daily, abilift 2.5 mg PRN   Vs daily hallucination  But need to monitor qTC / heart rate if started as can prolong qTC     Prolonged qTC  Chronic RBBB  - last qtc was 512, repeat EKG  4/19 with qTC  418     VT  - telemetry reviewed , episode of VT in chart was motion however 4/20 AM he did have 5 beats , electrolytes including calcium normal, nothing since     Cough  - has had some nonproductive cough, no fever no leukocytosis,   -CXR with no acute findings   - As previously attributed, this may be due to GERD/ Reflux          Possible recurrent urinary tract infection  - has been restarted empirically on ceftriaxone. Awaiting repeat urine culture results.  - received cefdinir x 3 days 4/6-4/8 and did get Rocephin  Prior , back on rocephine iv 4/11 on , completed 5 days  4/16   - UC form 4/2: E coli resistant to amp, unasyn and TMP/sulfa intermediate to cipro and levo, susceptible to others   -per significant other patient  Gets UTI ~ 2 x a year and has required admission with them,   - will need urology follow up       Leucocytosis, lactic acidosis  concern for early sepsis  - stopped IV fluids , completed antibiotics   - lactic acid 3.5 now back to normal      Acute right MCA   -stroke s/p tenecteplace on 07-Mar-2023, likely secondary cardioembolic from atrial fibrillation - patient had been off anticoagulation for anticipated procedure:  - residual left hemiparesis, impaired balance, impaired coordination, impaired proprioception, dysarthria, dysphagia and moderate cognitive linguistic impairment.  - initially was on aspirin but is now on eliquis for anticoagulation.  - continue amantadine taper as above .  - physical therapy  rec TCU   -  f/u with Neurology as outpatient.      Vascular dementia   - neuropsychological evaluation on 29-Mar and meets criteria for major neurocognitive disorder. It was recommended that POA be eneacted and patient receive assistance in complex and reasoned decisions moving forward.      Atrial fibrillation  -  continue diltiazem with holding parameters  - previously been on coumadin but this apparently had been on hold since 25-Feb for planned pain pump placement. Patient was started on eliquis for anticoagulation on 14-Mar.     Hyperlipidemia  - continue  atorvastatin.     Diabetes mellitus type II  -  patient usually on metformin and now back on it , lactate was up form sepsis and likely not due to metformin , monitor once restarted    -continue accu-checks insulin sliding scale      Hypertension  - on atenolol and diltiazem  - blood pressure  Ok .       Cough  -  Recently treated for aspirational pneumonia, Monitoring for evidence of aspiration  - on flonase for possible postnasal drip .  -negative  COVID  4/3     Obstructive sleep apnea  -not using CPAP at present. Patient should f/u as outpatient.     Dysphagia  - now on minced and moist texture , moderately thick (level 3) liquids by tsp,  up to chair for all meals with 1:1 assist. Patient receiving SLP.       Moderate malnutrition in the context of acute on chronic illness  -  receiving tube feeds via PEG-tube. PEG-tube was placed 09-Mar.  - nutritionist involved   - PEG placed 3/9/23 ,  needs to stay in for 6 weeks prior to removal      Loose stools  -  Monitoring for diarrhea       GERD  -  on PPI.     Mild normocytic anemia  - Hg stable, no signs of acute bleed    -Hgb at 11.3     Sacral pressure injury, stage 2  - healed: Monitoring for recurrence.     Depressed mood  - continue PTA remeron 15 mg nightly.     Discussed with faience at bedside        Diet: Minced & Moist Diet (level 5) Liquidized/Moderately Thick (level 3)  Adult Formula Drip Feeding: Continuous Jevity 1.5; Gastrostomy; Goal Rate: 85 ml/hr x12 hrs from 6 pm to 6 am; mL/hr; From: 6:00 PM; To: 6:00 AM  Snacks/Supplements Adult: Magic Cup; With Meals  Calorie Counts    DVT Prophylaxis: DOAC  Scruggs Catheter: Not present  Lines: None     Cardiac Monitoring: ACTIVE order. Indication: QTc prolonging  "medication (48 hours)  Code Status: Full Code      Clinically Significant Risk Factors                         # Overweight: Estimated body mass index is 27.81 kg/m  as calculated from the following:    Height as of this encounter: 1.854 m (6' 1\").    Weight as of this encounter: 95.6 kg (210 lb 12.2 oz).   # Moderate Malnutrition: based on nutrition assessment        Disposition Plan    physical therapy  rec TCU , monitor mental status         Lencho Rodgers MD  Hospitalist Service, GOLD TEAM 19  [facility]  Securely message with [email or web address] (more info)  Text page via [facility] Paging/Directory   See signed in provider for up to date coverage information  ______________________________________________________________________    Interval History    Charts reviewed and patient was examined  No acute complaints this morning  Woke up when I went onto his room   Denies any complaints       Physical Exam   Vital Signs: Temp: 98.2  F (36.8  C) Temp src: Oral BP: 130/79 Pulse: 77   Resp: 18 SpO2: 97 % O2 Device: None (Room air)    Weight: 210 lbs 12.16 oz  General appearance:  no apparent distress     HEENT: EOMI and PEARLA, sclera nonicteric, dry  mucus membranes, head atraumatic  NECK: supple  RESPIRATORY: lungs are clear   CARDIOVASCULAR: normal S1S2  irreg irreg, soft systolic murmur   GASTROINTESTINAL: abdomen is  soft,  non-distended, non-tender with normal bowel sounds. PEG site looks ok     MUSCULOSKELETAL: normal muscle bulk and tone  SKIN: warm and dry, no rashes,   NEUROLOGIC:sleepy, wakes up, mumbles nam e, recognizes son, squeezes with both hands equally wiggles toes,  EXTREMITIES: no clubbing cyanosis or edema noted  moves all extremities       Data   Imaging results reviewed over the past 24 hrs:   No results found for this or any previous visit (from the past 24 hour(s)).  Recent Labs   Lab 04/23/23  0847 04/22/23  2123 04/22/23  1718 04/21/23  0915 " 04/21/23  0834 04/20/23  1709 04/20/23  1227 04/19/23  1455 04/19/23  1142 04/16/23  1237 04/16/23  1216   WBC  --   --   --   --  8.3  --   --   --  13.0*  --  12.9*   HGB  --   --   --   --  11.3*  --   --   --  12.2*  --  12.5*   MCV  --   --   --   --  96  --   --   --  98  --  96   PLT  --   --   --   --  253  --   --   --  316  --  333   NA  --   --   --   --  137  --  137  --  138  --  139   POTASSIUM  --   --   --   --  4.1  --  4.2  --  4.1  --  4.3   CHLORIDE  --   --   --   --  102  --  102  --  100  --  103   CO2  --   --   --   --  24  --  25  --  25  --  25   BUN  --   --   --   --  22.7  --  22.8  --  20.9  --  20.1   CR  --   --   --   --  0.76  --  0.76  --  0.80  --  0.78   ANIONGAP  --   --   --   --  11  --  10  --  13  --  11   LUTHER  --   --   --   --  9.3  --  9.4  9.4  --  9.8  --  9.6   * 157* 130*   < > 226*   < > 160*   < > 151*   < > 140*    < > = values in this interval not displayed.

## 2023-04-23 NOTE — PLAN OF CARE
"Goal Outcome Evaluation:    VS: /79 (BP Location: Right arm)   Pulse 77   Temp 98.2  F (36.8  C) (Oral)   Resp 18   Ht 1.854 m (6' 1\")   Wt 95.6 kg (210 lb 12.2 oz)   SpO2 97%   BMI 27.81 kg/m     O2: Stable on room air, denies chest pain and shortness of breath   Output: Incontinent of bowel and bladder   Last BM: 4/22/2023   Activity: Assist x2 with walker and gait belt   Up for meals? Yes   Skin: Various bruising, G tube site.   Pain: Denies pain    CMS: Intact, denies numbness and tingling, A/Ox3   Dressing: N/a    Diet: Level 5 diet, moderately thick liquids.    LDA: L PIV- SL   Equipment: IV pump and pole, personal belongings, call light within reach    Plan: TBD          Plan of Care Reviewed With: patient    Overall Patient Progress: no changeOverall Patient Progress: no change           "

## 2023-04-23 NOTE — PROGRESS NOTES
Stefan Diallo is a 77 year old male patient.  1. Cerebrovascular accident (CVA) due to occlusion of left middle cerebral artery (H)    2. Other seizures (H)    3. Type II diabetes mellitus with peripheral circulatory disorder (H)    4. Insomnia, unspecified type      Past Medical History:   Diagnosis Date     Arthritis      Atrial fibrillation (H)      Bacteremia      Bell's palsy 2015     BPH (benign prostatic hyperplasia)      Diabetes (H)      Gastroesophageal reflux disease      GERD (gastroesophageal reflux disease)      GI hemorrhage      High cholesterol      Hyperlipemia      Hyperlipidemia      Hypertension      Hypertension      Idiopathic peripheral neuropathy      Irregular heart beat     chronic at fib     Renal artery aneurysm (H)      Renal artery aneurysm (H)      Sleep apnea     Bipap     Sleep apnea 10/31/2021     Sleep apnea, obstructive     USES BIPAP     Type 2 diabetes mellitus (H)      UTI (lower urinary tract infection)      Current Outpatient Medications   Medication Sig Dispense Refill     amantadine (SYMMETREL) 100 MG capsule 1 capsule (100 mg) by Oral or Feeding Tube route daily for 3 days 3 capsule 0     insulin aspart (NOVOLOG PEN) 100 UNIT/ML pen Inject 1-7 Units Subcutaneous 3 times daily (before meals) Correction Scale - MEDIUM INSULIN RESISTANCE DOSING     Do Not give Correction Insulin if Pre-Meal BG less than 140.   For Pre-Meal  - 189 give 1 unit.   For Pre-Meal  - 239 give 2 units.   For Pre-Meal  - 289 give 3 units.   For Pre-Meal  - 339 give 4 units.   For Pre-Meal - 399 give 5 units.   For Pre-Meal -449 give 6 units  For Pre-Meal BG greater than or equal to 450 give 7 units.   To be given with prandial insulin, and based on pre-meal blood glucose.    Notify provider if glucose greater than or equal to 350 mg/dL after administration of correction dose.  If given at mealtime, administer within 30 minutes of start of meal. 15 mL 0     insulin  "aspart (NOVOLOG PEN) 100 UNIT/ML pen Inject 1-5 Units Subcutaneous At Bedtime MEDIUM INSULIN RESISTANCE DOSING    Do Not give Bedtime Correction Insulin if BG less than  200.   For  - 249 give 1 units.   For  - 299 give 2 units.   For  - 349 give 3 units.   For  -399 give 4 units.   For BG greater than or equal to 400 give 5 units.  Notify provider if glucose greater than or equal to 350 mg/dL after administration of correction dose.  If given at mealtime, administer within 30 minutes of start of meal. 15 mL 0     levETIRAcetam (KEPPRA) 750 MG tablet 1 tablet (750 mg) by Oral or Feeding Tube route 2 times daily       Allergies   Allergen Reactions     Bees Swelling     Reports using an epi pen if stung     Principal Problem:    Lactic acidosis    Blood pressure 124/73, pulse 83, temperature 98.4  F (36.9  C), temperature source Oral, resp. rate 16, height 1.854 m (6' 1\"), weight 95.6 kg (210 lb 12.2 oz), SpO2 96 %.      Alert and oriented X3  Denies numbness, tingling, pain and SOB  Visual hallucinations (asked if I could see the bugs crawling on ceiling tiles , I couldn't)   On continuous feeding (bed locked at 30 degrees or above)   Redness on coccyx (Covered w/ Mepilex)    Afib    Refused CPAP (SATing 90's on room air)       Dru Montana RN  4/23/2023    "

## 2023-04-24 NOTE — PROGRESS NOTES
Care Management Follow Up    Length of Stay (days): 13    Expected Discharge Date: 04/21/2023     Concerns to be Addressed: discharge planning     Patient plan of care discussed at interdisciplinary rounds: Yes    Anticipated Discharge Disposition:  TCU    [facility]  [address] [place] [address]  Phone: [phone]  Admissions: [phone]     Anticipated Discharge Services:  Post acute therapies  Anticipated Discharge DME:  None    Patient/family educated on Medicare website which has current facility and service quality ratings: yes  Education Provided on the Discharge Plan: Yes  Patient/Family in Agreement with the Plan: yes    Referrals Placed by CM/SW:  See below  Private pay costs discussed: Not applicable    Additional Information:    Per provider, patient is not medically ready for discharge.    SW met with patient and fiance at bedside to inform them. SW will keep them up to date.    SW called [facility] to inform them of patient's CARLO. They stated that they may be able to take patient tomorrow but for sure Wednesday. SW to stay in contact with them.        ZAK Camarena, LSW  6 Med Surg   [facility]  Phone: [phone]  Pager: [phone]

## 2023-04-24 NOTE — PROGRESS NOTES
Cross Cover Note 1:51 AM    Paged regarding abn Tele read for VT but actually doesn't appear to be VT. Patient reported as asymp. Will get stat EKG. Will order AM chem w/ Mg/phos/K. Updated bedside RN.     Skyla Juárez MD  Internal Medicine-Pediatric Hospitalist  [facility]        Update 2:16 AM  EKG reviewed personally, appears similar to prior EKG findings on 4/2 and 4/19. No acute intervention needed at this time.     Skyla Juárez MD  Internal Medicine-Pediatric Hospitalist  [facility]

## 2023-04-24 NOTE — PLAN OF CARE
"Goal Outcome Evaluation:           VS: /74 (BP Location: Right arm)   Pulse 87   Temp 98.7  F (37.1  C) (Oral)   Resp 18   Ht 1.854 m (6' 1\")   Wt 95.6 kg (210 lb 12.2 oz)   SpO2 96%   BMI 27.81 kg/m     O2: Stable on room air, denies chest pain and shortness of breath   Output: Incontinent of bowel and bladder   Last BM: 4/23/2023   Activity: Assist x2 with walker and gait belt   Up for meals? Yes   Skin: Various bruising, G tube site.   Pain: Denies pain    CMS: Intact, denies numbness and tingling, A/Ox3   Dressing: N/a    Diet: Level 5 diet, moderately thick liquids. G tube for med and feeding 6pm-6am.   LDA: L PIV- SL   Equipment: IV pump and pole, personal belongings, call light within reach    Plan: TBD , TCU placement pending.     "

## 2023-04-24 NOTE — PROGRESS NOTES
CLINICAL NUTRITION SERVICES - BRIEF NOTE     Nutrition Prescription    Recommendations already ordered by Registered Dietitian (RD):  Calorie counts show possible minimal intake, will not decrease tube feedings any further due to intake not meeting needs.     Future/Additional Recommendations:  Monitor intake/weight trends and possibility for further TF decrease.      REASON FOR ASSESSMENT  Stefan Diallo is a/an 77 year old male assessed by the dietitian for completion of Calorie Counts    Findings  Calorie Counts - 4/19-4/24 4/18: 677 kcal and 29 g protein (1 meal - lunch, breakfast and dinner ordered, no documentation of these meals, 2 supplements at lunch)  4/19: NO MEAL SLIPS SAVED (3 meals and 2 supplements ordered)  4/20: NO MEAL SLIPS SAVED(2 meals and 2 supplements ordered)  4/21: 162 kcal and 9 g protein (1 meal and 2 supplements ordered, only meal slip saved)  4/22: NO MEAL SLIPS SAVED (1 meals and 2 supplements ordered)  4/23: NO MEAL SLIPS SAVED (2 meals and 2 supplements ordered, one meal slip saved but intake not documented)    Calorie Count was extended (total of 6 days) and is not yet complete, but minimal documentation available. Recent meals in flowsheets state intake of 0%/few bites.     INTERVENTIONS  Implementation  Calorie counts show possible minimal intake, will not decrease tube feedings any further due to intake not meeting needs.      Follow up/Monitoring  Will continue to follow up per protocol.     Stephanie Hammond RD, LD   [address] pager: [phone]  Weekend/holiday pager: [phone]

## 2023-04-24 NOTE — PROGRESS NOTES
[facility]    Medicine Progress Note - Hospitalist Service, GOLD TEAM 19    Date of Admission:  4/11/2023    Assessment & Plan   Patient is a 76 y/o man who has multiple medical problems including diabetes mellitus type II, atrial fibrillation, hypertension and hyperlipidemia. Patient had been hospitalized from 07-Mar-2023 to 16-Mar-2023 at [facility] with acute ischemic stroke of right MCA territory due to cardioembolism, did  receive tenecteplase on 07-Mar-2023. During this hospital stay also had possible early sepsis secondary to aspiration pneumonia. He was transferred to acute rehabilitation unit on 16-Mar-2023.      Had a prolonged rehabilitation course complicated by the extent of his deficits and fluctuating levels of alertness. Was treated for urinary tract infection which was associated with significant functional decline and worsened encephalopathy after prior improvement. Patient reportedly improved after several days of treatment but then develop recurrent leucocytosis, worsening confusion and lactic acidosis.      Earlier on day of admission rapid response was called. He  was started on empiric antibiotics, IV fluids  for lactic acidosis. Lactic acid increased following IV fluid bolus and patient was transferred to the medical unit for further evaluation and treatment.      Current concern given recurrent leucocytosis and lactic acidosis is for sepsis though dehydration is another possibility. There is concern for recurrent urinary tract infection.     From chart review, patient completed a 7 day course of antibiotics for E. coli urinary tract infection on 08-Apr-2023 - patient was started on zosyn on 02-Apr, transitioned to ceftriaxone on 04-Apr and then transitioned to cefdinir on 06-Apr-2023.      #Possible seizure  - 4/19 ~ 11 AM patient  Was noted to have generalized shaking while sitting in chair witnessed by SO, states lasted about 30  seconds, he was not responsive during that time , he was more confused for short period after , not incontinent of bladder or bladder, apparently patient  Felt like  he was falling   - Right after episode blood sugar in 150s ,    - Came back to see patient, he is back to baseline , pupils = reactive  - Seizure precautions , PRN ativan for prolonged seizure   - STAT head CT came back negative for acute changes, shows the sequela of previous stroke  , labs stable   - Appreciate neurology's  input, was stared on Keppra 750 mg bid, no EEG recommended at this point inless has repeated event .   - Needs neurology follow up    - 4/20 more sleepy, suspect multifactorial. Amantadine dose decreased, but also initiated on Keppra which could cause drowsiness   - Dr. Poe  discussed with Dr. Medina from neurology, who recommended that he wound not not hold keppra at this point unless has significant lethargy, even then reduce dose to 500 mg bid.   - 4/21 even more sleepy so did reduce keppra to 500 mg bid,  - CXR with no acute findings  - VBG with npo evidence of Co2 retention     #Encephalopathy, toxic metabolic   - Patient hypersomnolent on 4/24  - 4/16 more confused again repeat cbc bmp lactate ok    - At ARU baseline mentation on 4/22    #Hallucination  - New , no evidence of infection, no prior history, 4/20 denied seeing bugs   - Head CT 4/17 did not show any acute intracranial pathology , showed the chronic right MCA territory infarct    - Has history of vascular dementia so could be form that   - Seing bugs, also seeing people that are not here  , thinks hospital bed looks like dentist chair, But otherwise oriented , has been on antibiotics , now some loose stools , ru other infection , blood sugar ok ,  checking labs as above   - TSH B12 folate  Normal   - Seen by psychiatry, appreciate input , amantadine being tapered off 100 mg daily x 5 days  ( now stopped)   - Consider Aricept 5 mg daily, abilift 2.5 mg  PRN  Vs daily hallucination  But need to monitor qTC / heart rate if started as can prolong qTC     #Prolonged qTC  #Chronic RBBB  - Last QTc was 512, repeat EKG  4/19 with qTC  418     #VT  - Telemetry reviewed , episode of VT in chart was motion however 4/20 AM he did have 5 beats, electrolytes including calcium normal, nothing since     #Cough  - Has had some nonproductive cough, no fever no leukocytosis,   - CXR with no acute findings   - As previously attributed, this may be due to GERD/reflux     #Possible recurrent urinary tract infection  - Has been restarted empirically on ceftriaxone. Awaiting repeat urine culture results.  - Received cefdinir x 3 days 4/6-4/8 and did get Rocephin  Prior , back on rocephine iv 4/11 on , completed 5 days  4/16   - UC form 4/2: E coli resistant to amp, unasyn and TMP/sulfa intermediate to cipro and levo, susceptible to others   - Per significant other patient  Gets UTI ~ 2 x a year and has required admission with them,   - Will need urology follow up       #Leucocytosis, lactic acidosis  #Concern for early sepsis  - Stopped IV fluids , completed antibiotics   - Lactic acid 3.5 now back to normal     #Acute right MCA   - Stroke s/p tenecteplace on 07-Mar-2023, likely secondary cardioembolic from atrial fibrillation - patient had been off anticoagulation for anticipated procedure:  - Residual left hemiparesis, impaired balance, impaired coordination, impaired proprioception, dysarthria, dysphagia and moderate cognitive linguistic impairment.  - Initially was on aspirin but is now on eliquis for anticoagulation.  - Continue amantadine taper as above .  - Physical therapy recommending TCU   - Follow up with Neurology as outpatient.     #Vascular dementia   - Neuropsychological evaluation on 29-Mar and meets criteria for major neurocognitive disorder. It was recommended that POA be eneacted and patient receive assistance in complex and reasoned decisions moving  forward.    #Atrial fibrillation  - Continue diltiazem with holding parameters  - Previously been on coumadin but this apparently had been on hold since 25-Feb for planned pain pump placement. Patient was started on eliquis for anticoagulation on 14-Mar.     #Hyperlipidemia  - Fontinue atorvastatin.     #T2DM  - Patient usually on metformin and now back on it , lactate was up form sepsis and likely not due to metformin , monitor once restarted    - Continue accu-checks insulin sliding scale      #Hypertension  - On atenolol and diltiazem  - Blood pressure borderline  - Will hold p.m. atenolol dose     #Cough  - Recently treated for aspirational pneumonia, Monitoring for evidence of aspiration  - On flonase for possible postnasal drip  - Negative COVID-19 4/3     #Obstructive sleep apnea  - Not using CPAP at present. Patient should f/u as outpatient.     #Dysphagia  - Now on minced and moist texture , moderately thick (level 3) liquids by tsp,  up to chair for all meals with 1:1 assist. Patient receiving SLP.      #Moderate malnutrition in the context of acute on chronic illness  - Receiving tube feeds via PEG-tube. PEG-tube was placed 09-Mar.  - Nutritionist involved   - PEG placed 3/9/23,  needs to stay in for 6 weeks prior to removal      #Loose stools  - Monitoring for diarrhea    #GERD  - On PPI.     #Mild normocytic anemia  - Hg stable, no signs of acute bleed    - Hgb at 11.3     #Sacral pressure injury, stage 2  - Healed: Monitoring for recurrence.     #Depressed mood  - Continue PTA remeron 15 mg nightly.       Diet: Minced & Moist Diet (level 5) Liquidized/Moderately Thick (level 3)  Adult Formula Drip Feeding: Continuous Jevity 1.5; Gastrostomy; Goal Rate: 85 ml/hr x12 hrs from 6 pm to 6 am; mL/hr; From: 6:00 PM; To: 6:00 AM  Snacks/Supplements Adult: Magic Cup; With Meals  Calorie Counts    DVT Prophylaxis: DOAC  Scruggs Catheter: Not present  Lines: None     Cardiac Monitoring: ACTIVE order. Indication:  "QTc prolonging medication (48 hours)  Code Status: Full Code      Clinically Significant Risk Factors                        # Overweight: Estimated body mass index is 27.81 kg/m  as calculated from the following:    Height as of this encounter: 1.854 m (6' 1\").    Weight as of this encounter: 95.6 kg (210 lb 12.2 oz).   # Moderate Malnutrition: based on nutrition assessment        Disposition Plan        Patient was slated to discharge to TCU today, but patient's worsening mentation caused us to defer this discharge plan at present.    Suman Damon DO, S  Hospitalist Service, GOLD TEAM 19  [facility]  Securely message with [employer] (more info)  Text page via [facility] Paging/Directory   See signed in provider for up to date coverage information  ______________________________________________________________________    Interval History   Patient is hypersomnolent and difficult to arouse.  As such, unable to obtain comprehensive review of systems at present.  Patient's spouse is at bedside.  Discussed case with patient's bedside nurse.  Blood pressure borderline at 109/68 mmHg.  Will hold p.m. atenolol; reduce gabapentin dose to 300 mg.  Continue cardiac monitoring; monitor mentation.    Physical Exam   Vital Signs: Temp: 97.9  F (36.6  C) Temp src: Axillary BP: 109/68 Pulse: 81   Resp: 18 SpO2: 96 % O2 Device: Nasal cannula Oxygen Delivery: 2 LPM  Weight: 210 lbs 12.16 oz    GENERAL: Patient is hypersomnolent; difficult to arouse.  HEENT: Normocephalic; atraumatic; PERRLA; MMM.  CV: Irregularly irregular rhythm; normal S1, S2; no rubs, murmurs, or gallops.  RESP: Lung fields clear to aucultation B/L; no wheezing or crepitations.  GI: Abdomen is soft, nontender, nondistended; no organomegaly; normal bowel sounds.  : Deferred genital examination.   MSK: No clubbing, cyanosis, or edema.  DERM: Skin is intact; no rash, lesions, or skin breakdown.    Medical Decision Making "       55 MINUTES SPENT BY ME on the date of service doing chart review, history, exam, documentation & further activities per the note.      Data         Imaging results reviewed over the past 24 hrs:   No results found for this or any previous visit (from the past 24 hour(s)).

## 2023-04-24 NOTE — PROGRESS NOTES
CARE MANAGEMENT    4/24-CHW completed IMM with the patient. Patient refused to sign.      Kimmie MIRANDA-[facility] Worker

## 2023-04-24 NOTE — PROGRESS NOTES
Stefan Diallo is a 77 year old male patient.  1. Cerebrovascular accident (CVA) due to occlusion of left middle cerebral artery (H)    2. Other seizures (H)    3. Type II diabetes mellitus with peripheral circulatory disorder (H)    4. Insomnia, unspecified type      Past Medical History:   Diagnosis Date     Arthritis      Atrial fibrillation (H)      Bacteremia      Bell's palsy 2015     BPH (benign prostatic hyperplasia)      Diabetes (H)      Gastroesophageal reflux disease      GERD (gastroesophageal reflux disease)      GI hemorrhage      High cholesterol      Hyperlipemia      Hyperlipidemia      Hypertension      Hypertension      Idiopathic peripheral neuropathy      Irregular heart beat     chronic at fib     Renal artery aneurysm (H)      Renal artery aneurysm (H)      Sleep apnea     Bipap     Sleep apnea 10/31/2021     Sleep apnea, obstructive     USES BIPAP     Type 2 diabetes mellitus (H)      UTI (lower urinary tract infection)      Current Outpatient Medications   Medication Sig Dispense Refill     insulin aspart (NOVOLOG PEN) 100 UNIT/ML pen Inject 1-7 Units Subcutaneous 3 times daily (before meals) Correction Scale - MEDIUM INSULIN RESISTANCE DOSING     Do Not give Correction Insulin if Pre-Meal BG less than 140.   For Pre-Meal  - 189 give 1 unit.   For Pre-Meal  - 239 give 2 units.   For Pre-Meal  - 289 give 3 units.   For Pre-Meal  - 339 give 4 units.   For Pre-Meal - 399 give 5 units.   For Pre-Meal -449 give 6 units  For Pre-Meal BG greater than or equal to 450 give 7 units.   To be given with prandial insulin, and based on pre-meal blood glucose.    Notify provider if glucose greater than or equal to 350 mg/dL after administration of correction dose.  If given at mealtime, administer within 30 minutes of start of meal. 15 mL 0     insulin aspart (NOVOLOG PEN) 100 UNIT/ML pen Inject 1-5 Units Subcutaneous At Bedtime MEDIUM INSULIN RESISTANCE DOSING    Do  "Not give Bedtime Correction Insulin if BG less than  200.   For  - 249 give 1 units.   For  - 299 give 2 units.   For  - 349 give 3 units.   For  -399 give 4 units.   For BG greater than or equal to 400 give 5 units.  Notify provider if glucose greater than or equal to 350 mg/dL after administration of correction dose.  If given at mealtime, administer within 30 minutes of start of meal. 15 mL 0     levETIRAcetam (KEPPRA) 750 MG tablet 1 tablet (750 mg) by Oral or Feeding Tube route 2 times daily       Allergies   Allergen Reactions     Bees Swelling     Reports using an epi pen if stung     Principal Problem:    Lactic acidosis    Blood pressure 125/77, pulse 86, temperature 98.8  F (37.1  C), temperature source Oral, resp. rate 18, height 1.854 m (6' 1\"), weight 95.6 kg (210 lb 12.2 oz), SpO2 96 %.      Alert and oriented X3    Denies numbness, tingling, pain and SOB    Visual hallucinations (asked if I could see the bugs crawling on ceiling tiles , I couldn't)     On continuous feeding (bed locked at 30 degrees or above)     Redness on coccyx (Covered w/ Mepilex)      EKG: Was alarming \"V Tac\" provider notified ; 12 lead ordered (showed sings of possible R BBB) results consistent with results of previous EKGs; Alarms seemed to resolved shortly after a scheduled dose of diltiazem      Refused CPAP (SATing 90's on 2 liters nasal canula         Dru Montana RN  4/24/2023    "

## 2023-04-25 NOTE — PLAN OF CARE
Goal Outcome Evaluation:    Patient is A&O x3. Call light within reach. Garbled and slow speech noted. Needs assistance x 2 during pericares and repositioning. Wears brief.      Maintained O2 at 2LPM via NC during hours of sleep. Refused to apply BiPAP/CPAP. On tele. No complaints of pain, SOB and chest pain. PIV on L arm, SL, intact and patent. Redness on coccyx noted, applied mepilex.      On cycled TF via G-tube from 6pm-6am. Level 5 diet, moderately thick fluids.      Possible discharge to TCU once medically stable. Frequent rounds done, no events during the shift.

## 2023-04-25 NOTE — PLAN OF CARE
0700--1930:    Patient is alert, Ox3, garble speech,   VSS. On RA.     Lung sounds clear.     Bedrest, Ax2 with lift to transfer.   Denied pain, chest pain, SOB, nausea,   G tube feeding started at 1800, rate 85ml/hr, New tube and new bag.     Incontinent of bladder, brief in place, LBM 4/23.   Sacral Mepilex on  for the protection.   L pV, SL.     BG checked before meals, poor appetite.     Calorie and carb count envelope on the door.     Plan to discharge to TCU.

## 2023-04-25 NOTE — PROGRESS NOTES
Care Management Follow Up    Length of Stay (days): 14    Expected Discharge Date: 04/21/2023     Concerns to be Addressed: discharge planning     Patient plan of care discussed at interdisciplinary rounds: Yes    Anticipated Discharge Disposition:  TCU    [facility]  [address] [place] [address]  Phone: [phone]  Admissions: [phone]     Anticipated Discharge Services:  Post acute therapies  Anticipated Discharge DME:  None    Patient/family educated on Medicare website which has current facility and service quality ratings: yes  Education Provided on the Discharge Plan: Yes  Patient/Family in Agreement with the Plan: yes    Referrals Placed by CM/SW:  TCU  Private pay costs discussed: Not applicable    Additional Information:    SW met with patient's spouse at bedside. She stated that patient is doing better today and was engaging with her. She noted that she feels like patient will be motivated for transfer tomorrow.    Per IDT meeting, patient likely to be ready for discharge tomorrow.    FELISA called and completed [facility] Authorization. Approved: [id number] for dates 4/26-5/2/23.    SW called [facility] admissions and set up admission time for 3pm.    SW called [facility] transportation and set up stretcher ride. Will arrive between 1:42-2:27p tomorrow.        ZAK Camarena, LSW  6 Med Surg   [facility]  Phone: [phone]  Pager: [phone]

## 2023-04-25 NOTE — PROGRESS NOTES
[facility]    Medicine Progress Note - Hospitalist Service, GOLD TEAM 19    Date of Admission:  4/11/2023    Assessment & Plan   Patient is a 76 y/o man who has multiple medical problems including diabetes mellitus type II, atrial fibrillation, hypertension and hyperlipidemia. Patient had been hospitalized from 07-Mar-2023 to 16-Mar-2023 at [facility] with acute ischemic stroke of right MCA territory due to cardioembolism, did  receive tenecteplase on 07-Mar-2023. During this hospital stay also had possible early sepsis secondary to aspiration pneumonia. He was transferred to acute rehabilitation unit on 16-Mar-2023.      Had a prolonged rehabilitation course complicated by the extent of his deficits and fluctuating levels of alertness. Was treated for urinary tract infection which was associated with significant functional decline and worsened encephalopathy after prior improvement. Patient reportedly improved after several days of treatment but then develop recurrent leucocytosis, worsening confusion and lactic acidosis.      Earlier on day of admission rapid response was called. He  was started on empiric antibiotics, IV fluids  for lactic acidosis. Lactic acid increased following IV fluid bolus and patient was transferred to the medical unit for further evaluation and treatment.      Current concern given recurrent leucocytosis and lactic acidosis is for sepsis though dehydration is another possibility. There is concern for recurrent urinary tract infection.     From chart review, patient completed a 7 day course of antibiotics for E. coli urinary tract infection on 08-Apr-2023 - patient was started on zosyn on 02-Apr, transitioned to ceftriaxone on 04-Apr and then transitioned to cefdinir on 06-Apr-2023.       Today's changes: 4/25/2023  Patient remains encephalopathic however no acute event. Alert to self, knows fiance. Working with therapy. VSS.   unhold  atenolol.   Discontinue tele  Ct bed alarm, fall precautions.   1:1 prn. Not needing now.   Likely discharge tomorrow.          #Possible seizure  - 4/19 ~ 11 AM patient  Was noted to have generalized shaking while sitting in chair witnessed by SO, states lasted about 30 seconds, he was not responsive during that time , he was more confused for short period after , not incontinent of bladder or bladder, apparently patient  Felt like  he was falling   - Right after episode blood sugar in 150s ,    - Came back to see patient, he is back to baseline , pupils = reactive  - Seizure precautions , PRN ativan for prolonged seizure   - STAT head CT came back negative for acute changes, shows the sequela of previous stroke  , labs stable   - Appreciate neurology's  input, was stared on Keppra 750 mg bid, no EEG recommended at this point inless has repeated event .   - Needs neurology follow up    - 4/20 more sleepy, suspect multifactorial. Amantadine dose decreased, but also initiated on Keppra which could cause drowsiness   - Dr. Poe  discussed with Dr. Medina from neurology, who recommended that he wound not not hold keppra at this point unless has significant lethargy, even then reduce dose to 500 mg bid.   - 4/21 even more sleepy so did reduce keppra to 500 mg bid,  - CXR with no acute findings  - VBG with npo evidence of Co2 retention     #Encephalopathy, toxic metabolic   - Patient hypersomnolent on 4/24  - 4/16 more confused again repeat cbc bmp lactate ok    - At ARU baseline mentation on 4/22    #Hallucinations  - New , no evidence of infection, no prior history, 4/20 denied seeing bugs   - Head CT 4/17 did not show any acute intracranial pathology , showed the chronic right MCA territory infarct    - Has history of vascular dementia so could be form that   - Seing bugs, also seeing people that are not here  , thinks hospital bed looks like dentist chair, But otherwise oriented , has been on antibiotics  , now some loose stools , ru other infection , blood sugar ok ,  checking labs as above   - TSH B12 folate  Normal   - Seen by psychiatry, appreciate input , amantadine being tapered off 100 mg daily x 5 days  ( now stopped)   - Consider Aricept 5 mg daily, abilift 2.5 mg PRN  Vs daily hallucination  But need to monitor qTC / heart rate if started as can prolong qTC     #Prolonged qTC  #Chronic RBBB  - Last QTc was 512, repeat EKG  4/19 with qTC  418     #VT  - Telemetry reviewed , episode of VT in chart was motion however 4/20 AM he did have 5 beats, electrolytes including calcium normal, nothing since     #Cough  - Has had some nonproductive cough, no fever no leukocytosis,   - CXR with no acute findings   - As previously attributed, this may be due to GERD/reflux     #Possible recurrent urinary tract infection  - Has been restarted empirically on ceftriaxone. Awaiting repeat urine culture results.  - Received cefdinir x 3 days 4/6-4/8 and did get Rocephin  Prior , back on rocephine iv 4/11 on , completed 5 days  4/16   - UC form 4/2: E coli resistant to amp, unasyn and TMP/sulfa intermediate to cipro and levo, susceptible to others   - Per significant other patient  Gets UTI ~ 2 x a year and has required admission with them,   - Will need urology follow up       #Leucocytosis, lactic acidosis  #Concern for early sepsis  - Stopped IV fluids , completed antibiotics   - Lactic acid 3.5 now back to normal     #Acute right MCA   - Stroke s/p tenecteplace on 07-Mar-2023, likely secondary cardioembolic from atrial fibrillation - patient had been off anticoagulation for anticipated procedure:  - Residual left hemiparesis, impaired balance, impaired coordination, impaired proprioception, dysarthria, dysphagia and moderate cognitive linguistic impairment.  - Initially was on aspirin but is now on eliquis for anticoagulation.  - Continue amantadine taper as above .  - Physical therapy recommending TCU   - Follow up with  Neurology as outpatient.     #Vascular dementia   - Neuropsychological evaluation on 29-Mar and meets criteria for major neurocognitive disorder. It was recommended that POA be eneacted and patient receive assistance in complex and reasoned decisions moving forward.    #Atrial fibrillation  - Continue diltiazem with holding parameters  - Previously been on coumadin but this apparently had been on hold since 25-Feb for planned pain pump placement. Patient was started on eliquis for anticoagulation on 14-Mar.     #Hyperlipidemia  - Fontinue atorvastatin.     #T2DM  - Patient usually on metformin and now back on it , lactate was up form sepsis and likely not due to metformin , monitor once restarted    - Continue accu-checks insulin sliding scale      #Hypertension  - On atenolol and diltiazem  - Blood pressure borderline  - Will hold p.m. atenolol dose     #Cough  - Recently treated for aspirational pneumonia, Monitoring for evidence of aspiration  - On flonase for possible postnasal drip  - Negative COVID-19 4/3     #Obstructive sleep apnea  - Not using CPAP at present. Patient should f/u as outpatient.     #Dysphagia  - Now on minced and moist texture , moderately thick (level 3) liquids by tsp,  up to chair for all meals with 1:1 assist. Patient receiving SLP.      #Moderate malnutrition in the context of acute on chronic illness  - Receiving tube feeds via PEG-tube. PEG-tube was placed 09-Mar.  - Nutritionist involved   - PEG placed 3/9/23,  needs to stay in for 6 weeks prior to removal      #Loose stools  - Monitoring for diarrhea    #GERD  - On PPI.     #Mild normocytic anemia  - Hg stable, no signs of acute bleed    - Hgb at 11.3     #Sacral pressure injury, stage 2  - Healed: Monitoring for recurrence.     #Depressed mood  - Continue PTA remeron 15 mg nightly.       Diet: Minced & Moist Diet (level 5) Liquidized/Moderately Thick (level 3)  Adult Formula Drip Feeding: Continuous Jevity 1.5; Gastrostomy; Goal  "Rate: 85 ml/hr x12 hrs from 6 pm to 6 am; mL/hr; From: 6:00 PM; To: 6:00 AM  Snacks/Supplements Adult: Magic Cup; With Meals    DVT Prophylaxis: DOAC  Scruggs Catheter: Not present  Lines: None     Cardiac Monitoring: ACTIVE order. Indication: Tachyarrhythmias, acute (48 hours)  Code Status: Full Code      Clinically Significant Risk Factors           # Hypercalcemia: corrected calcium is >10.1, will monitor as appropriate    # Hypoalbuminemia: Lowest albumin = 3.3 g/dL at 4/24/2023  9:58 AM, will monitor as appropriate           # Overweight: Estimated body mass index is 26.56 kg/m  as calculated from the following:    Height as of this encounter: 1.854 m (6' 1\").    Weight as of this encounter: 91.3 kg (201 lb 4.5 oz).   # Moderate Malnutrition: based on nutrition assessment        Disposition Plan      Likely memory care SNF tomorrow.     Garrett Alfred MD, MHS  Hospitalist Service, GOLD TEAM 19  [facility]  Securely message with [employer] (more info)  Text page via [facility] Paging/Directory   See signed in provider for up to date coverage information  ______________________________________________________________________    Interval History   Interval events reviewed.   Overall doing better. VSS. Alert interactive. Working with therapy. No acute concern  Fiance at bedside.   Denies fever or chills.   No cough or cp or sob.   Gen weakness.   No NV or pain abdomen.   No new sensory or motor complaint.   No new rash.    No other new or acute medical concern      Physical Exam   Vital Signs: Temp: 98.5  F (36.9  C) Temp src: Axillary BP: 123/75 Pulse: 78   Resp: 16 SpO2: 96 % O2 Device: None (Room air) Oxygen Delivery: 2 LPM  Weight: 201 lbs 4.48 oz    General Appearance: Awake, interactive, NAD  HEENT: AT/NC, Anicteric, Moist MM  Respiratory: Normal work of breathing. CTA BL.   Cardiovascular: S1 S2 Regular.  GI/Abd: Soft. NT. ND.   Extremities: Distally wwp.  Neuro: alert. " Moving all extremities.   Skin: No acute rash on exposed areas.   Psychiatry: Stable mood.    Medical Decision Making       45 MINUTES SPENT BY ME on the date of service doing chart review, history, exam, documentation & further activities per the note.      Data     I have personally reviewed the following data over the past 24 hrs:    Procal: N/A CRP: N/A Lactic Acid: 0.9         Imaging results reviewed over the past 24 hrs:   No results found for this or any previous visit (from the past 24 hour(s)).

## 2023-04-26 PROBLEM — K94.20 COMPLICATION OF FEEDING TUBE (H): Status: ACTIVE | Noted: 2023-01-01

## 2023-04-26 NOTE — PLAN OF CARE
Goal Outcome Evaluation:    Patient is A&O x3. Call light within reach. Garbled and slow speech noted. Needs assistance x 2 during pericares and repositioning. LBM: 04/25. Wears brief.      RA. Sating >92%. Refused to apply BiPAP/CPAP. No complaints of pain, SOB and chest pain. PIV on L arm, SL, intact and patent. Redness on coccyx noted, applied mepilex.      On cycled TF via G-tube from 6pm-6am. Level 5 diet, moderately thick fluids.      Possible discharge to TCU once medically stable. Frequent rounds done, no events during the shift.

## 2023-04-26 NOTE — PLAN OF CARE
Physical Therapy Discharge Summary    Reason for therapy discharge:    Discharged to transitional care facility.    Progress towards therapy goal(s). See goals on Care Plan in [facility] electronic health record for goal details.  Goals not met.  Barriers to achieving goals:   discharge from facility.    Therapy recommendation(s):    Continued therapy is recommended.  Rationale/Recommendations:  Pt would benefit from further PT for LE strengthening, balance, and increase independence with all functional mobility.

## 2023-04-26 NOTE — PLAN OF CARE
[id number]  Disoriented to time, place, and situation. VSS.  and 141 this shift. Denied chest pain. Denied SOB. No c/o pain. Pt up to bathroom - incontinent at times. LBM 4/26. Ax2 via walker and gait belt. Level 5 minced and moist diet - moderately thick liquids. PEG tube in place - patent and draining. 300mL flush QID. L PIV removed.   Pt discharge to TCU via EMS around 1450. Paperwork and belongings secured and sent with pt. No further questions for pt or his spouse.

## 2023-04-26 NOTE — PROGRESS NOTES
CARE MANAGEMENT    4/26-CHW completed IMM with the patient. Patient took a copy of the form.      Kimmie MIRANDA-[facility] Worker

## 2023-04-26 NOTE — DISCHARGE INSTRUCTIONS
WOCN recommendations:     Treatment Plan:      Sacrum wound(s): Every 3 days and as needed  Cleanse the area with NS and pat dry.  Apply No sting film barrier to periwound skin.  Cover wound with Sacral Mepilex (#[id number])  Change dressing Q 3 days.  Turn and reposition Q 2hrs side to side only.  Ensure pt has Titi-cushion while sitting up in the chair.  FYI- If pt has constant incontinent loose stools needing dressing changes Q shift please discontinue the Mepilex dressing and apply criticaid barrier paste BID and PRN.  Air pump for bed, only ONE blue covidian pad under pt .       # Delirium precautions:  Up during the day with lights on  Lights off at night, avoid interruptions during the night as much as possible  Family visits  Encourage wearing glasses  Reorientation  Avoid opioids, benzodiazepines, anticholinergics as much as possible.   Continue to ensure proper nutrition, fluid and electrolyte balance. Monitor for infections, hypoxia, metabolic derangements, or other causes of delirium.

## 2023-04-26 NOTE — PLAN OF CARE
Occupational Therapy Discharge Summary    Reason for therapy discharge:    Discharged to transitional care facility.    Progress towards therapy goal(s). See goals on Care Plan in [facility] electronic health record for goal details.  Goals partially met.  Barriers to achieving goals:   discharge from facility.    Therapy recommendation(s):    Continued therapy is recommended.  Rationale/Recommendations:  to maximize safety and IND with functional mobility and ADLs.

## 2023-04-26 NOTE — PROGRESS NOTES
Care Management Discharge Note    Discharge Date: 04/26/2023       Discharge Disposition:  TCU    [facility]  [address] [place] [address]  Phone: [phone]  Admissions: [phone]    Discharge Services:  Post acute therapies    Discharge DME:  None    Discharge Transportation: [facility] stretcher ride    Private pay costs discussed: Not applicable    PAS Confirmation Code:  [id number]  Patient/family educated on Medicare website which has current facility and service quality ratings: yes    Education Provided on the Discharge Plan: Yes  Persons Notified of Discharge Plans: patient, spouse, provider, bedside nurse, charge nurse, facility  Patient/Family in Agreement with the Plan: yes    Handoff Referral Completed: Yes    Additional Information:    SW met with patient and spouse at bedside. Patient stated that he feels better today. Patient and spouse are ready for patient to discharge to TCU facility.    SW completed PCS and placed in hard chart.     [facility] Author[facility] [id number]. Through May 2nd, 2023.          ZAK Camarena, LSW  6 Med Surg   [facility]  Phone: [phone]  Pager: [phone]

## 2023-04-26 NOTE — DISCHARGE SUMMARY
[facility]  Hospitalist Discharge Summary      Date of Admission:  4/11/2023  Date of Discharge:  4/26/2023  Discharging Provider: Garrett Alfred MD  Discharge Service: Hospitalist Service, GOLD TEAM 19    Discharge Diagnoses      Acute encephalopathy, suspect toxic metabolic  Seizure like activity  Physical deconditioning  Possible Sepsis (elevated lactic acid, leukocytosis, encephalopathy).   UTI, recurrent.   Recent R MCA stroke   Left hemiparesis, impaired balance, impaired coordination, impaired proprioception, dysarthria, dysphagia, moderate cognitive linguistic impairment  Vascular dementia  Atrial fibrillation  Moderate malnutrition in the context of acute on chronic illness s/p PEG placement 3/9/23    Dyslipidemia    Hypertension    Type II diabetes mellitus c/b diabetic neuropathy    Hx KISHA    Chronic back pain        Recent loose stools    Hx GERD    Anemia    Depressed mood    Prolonged QTc         Follow-ups Needed After Discharge   Follow-up Appointments     Follow Up and recommended labs and tests      Follow up with retirement physician.  The following labs/tests are   recommended: weekly cbc, bmp.    Follow up with primary care after discharge within a week.    Follow up with Neurology and psychiatry in 2-4 weeks.     Keep other prior appointments as it is.               Discharge Disposition   Discharged to rehabilitation facility  Condition at discharge: Stable      Hospital Course     Patient is a 78 y/o man who has multiple medical problems including diabetes mellitus type II, atrial fibrillation, hypertension and hyperlipidemia. Patient had been hospitalized from 07-Mar-2023 to 16-Mar-2023 at [facility] with acute ischemic stroke of right MCA territory due to cardioembolism, did  receive tenecteplase on 07-Mar-2023. During this hospital stay also had possible early sepsis secondary to aspiration pneumonia. He was transferred to acute  rehabilitation unit on 16-Mar-2023.      Had a prolonged rehabilitation course complicated by the extent of his deficits and fluctuating levels of alertness. Was treated for urinary tract infection which was associated with significant functional decline and worsened encephalopathy after prior improvement. Patient reportedly improved after several days of treatment but then develop recurrent leucocytosis, worsening confusion and lactic acidosis.      Earlier on day of admission rapid response was called. He  was started on empiric antibiotics, IV fluids  for lactic acidosis. Lactic acid increased following IV fluid bolus and patient was transferred to the medical unit for further evaluation and treatment.      Current concern given recurrent leucocytosis and lactic acidosis is for sepsis though dehydration is another possibility. There is also concern for recurrent urinary tract infection.     From chart review, patient completed a 7 day course of antibiotics for E. coli urinary tract infection on 08-Apr-2023 - patient was started on iv zosyn on 02-Apr, transitioned to ceftriaxone on 04-Apr and then transitioned to cefdinir on 06-Apr-2023.         Today's changes: 4/26/2023    Patient alert. Interactive. No acute concern. Doing well. VSS. Fiance at bedside. agrees with the plan to discharge to rehab. Slowly progressive with therapy, tolerating tube feeding.  Rest as below.         #Acute Encephalopathy, toxic metabolic  # Hallucinations:      New , no evidence of infection, no prior history, 4/20 denied seeing bugs   - Head CT 4/17 did not show any acute intracranial pathology , showed the chronic right MCA territory infarct    - Has history of vascular dementia so could be form that   - Seing bugs, also seeing people that are not here  , thinks hospital bed looks like dentist chair, But otherwise oriented , has been on antibiotics , now some loose stools , ru other infection , blood sugar ok ,labs; unremarkable.  Now resolved.   - TSH B12 folate  Normal   - Seen by psychiatry, appreciate input , amantadine being tapered off 100 mg daily x 5 days  ( now stopped)   -  Future considerations include Aricept 5mg daily, Abilify 2.5mg PRN hallucinations and/or Abilify 2.5mg daily  -- Outpatient follow-up with psychiatry.       #Seizure like activity:   - 4/19 ~ 11 AM patient  Was noted to have generalized shaking while sitting in chair witnessed by SO, states lasted about 30 seconds, he was not responsive during that time , he was more confused for short period after , not incontinent of bladder or bladder, apparently patient  Felt like  he was falling. Right after episode blood sugar in 150s , . Not post ictal.   - STAT head CT came back negative for acute changes, shows the sequela of previous stroke  , follow-up labs stable   - Appreciate neurology's  input, was stared on Keppra 750 mg bid-> decreased later to 500 mg bid for somnolence. Currently doing well. No EEG recommended at this point unless has repeat event . Seizure precautions followed. No further episode noted.   - Outpatient Neurology follow up    - 4/20 more sleepy, suspect multifactorial. Amantadine dose decreased, Keppra and gabapentin dose reduced. Dr. Poe  discussed with Dr. Medina from neurology, who recommended that he wound not not hold keppra at this point unless has significant lethargy. 421: patient even more sleepy, so did reduce keppra to 500 mg bid.  - CXR with no acute findings  - VBG with no evidence of Co2 retention  -- Currently remains alert interactive. Oriented x self. People. Place. No new focal deficit.    He does have a left hemiparesis with 4 out of 5 weakness in the left lower extremity, and 4- out of 5 weakness in the left upper extremity, suspect from recent stroke.     #Prolonged qTC  #Chronic RBBB  - Last QTc was 512, repeat EKG  4/19 with qTC  418      #VT  - Telemetry reviewed , episode of VT in chart was motion however  4/20 AM he did have 5 beats, electrolytes including calcium normal, nothing since   --- VSS. B/P: 128/77, T: 98.8, P: 78, R: 16     #Cough  - Has had some nonproductive cough, no fever no leukocytosis,   - CXR with no acute findings   - As previously attributed, this may be due to GERD/reflux   - On flonase for possible postnasal drip  - Negative COVID-19 4/3    -- Symptomatic Mx.      #Possible recurrent urinary tract infection  - Has been restarted empirically on ceftriaxone. Awaiting repeat urine culture results.  - Received cefdinir x 3 days 4/6-4/8 and did get Rocephin  Prior , back on rocephine iv 4/11 on , completed 5 days  4/16   - UC form 4/2: E coli resistant to amp, unasyn and TMP/sulfa intermediate to cipro and levo, susceptible to others   - Per significant other patient  Gets UTI ~ 2 x a year and has required admission with them,   - Outpatient Urology follow up.     #Leucocytosis, lactic acidosis  #Concern for early sepsis  - Stopped IV fluids , completed antibiotics   - Lactic acid 3.5 now back to normal  -- VSS     #Acute right MCA   - Stroke s/p tenecteplace on 07-Mar-2023, likely secondary cardioembolic from atrial fibrillation - patient had been off anticoagulation for anticipated procedure:  - Residual left hemiparesis, impaired balance, impaired coordination, impaired proprioception, dysarthria, dysphagia and moderate cognitive linguistic impairment.  - Initially was on aspirin but is now on eliquis for anticoagulation.  - Amantadine tapered off.  - Physical therapy recommending TCU   - Follow up with Neurology as outpatient.     #Vascular dementia   - Neuropsychological evaluation on 29-Mar and meets criteria for major neurocognitive disorder. It was recommended that POA be eneacted and patient receive assistance in complex and reasoned decisions moving forward.     #Atrial fibrillation  - Continue diltiazem with holding parameters  - Previously been on coumadin but this apparently had been on  hold since 25-Feb for planned pain pump placement. Patient was started on eliquis for anticoagulation on 14-Mar.     #Hyperlipidemia  - Fontinue atorvastatin.     #T2DM  - Patient usually on metformin and now back on it , lactate was up form sepsis and likely not due to metformin , monitor once restarted    - Continue accu-checks insulin sliding scale   Recent Labs   Lab 04/26/23  0757 04/26/23  0642 04/25/23  2153 04/25/23  1719 04/25/23  1330 04/25/23  0641   * 178* 130* 127* 140* 196*        #Hypertension  - On atenolol and diltiazem  - Blood pressure stable.     #Obstructive sleep apnea  - Not using CPAP at present. Patient should f/u as outpatient.     #Dysphagia  Healthy diet and exercise reviewed.  Limit pop and juice intake.  Risks of obesity discussed.  Encourage exercise.  Limit TV/Computer/game time to one hour a day.  - SLP follow-up as needed.      #Moderate malnutrition in the context of acute on chronic illness  - Receiving tube feeds via PEG-tube. PEG-tube was placed 09-Mar.  - Nutritionist involved   - PEG placed 3/9/23,  needs to stay in for 6 weeks prior to removal   -- Consider Nutrition consultation, follow-up.      #Loose stools  - Monitoring for diarrhea  Better.      #GERD  - On PPI.     #Mild normocytic anemia  - Hg stable, no signs of acute bleed    - Hgb at 11.0     #Sacral pressure injury, stage 2  - Healed: Monitoring for recurrence.  -- WOCN consulted.   -- Wound care per WOCN     #Depressed mood  - Continue PTA remeron 15 mg nightly.           Diet: Minced & Moist Diet (level 5) Liquidized/Moderately Thick (level 3)  Adult Formula Drip Feeding: Continuous Jevity 1.5; Gastrostomy; Goal Rate: 85 ml/hr x12 hrs from 6 pm to 6 am; mL/hr; From: 6:00 PM; To: 6:00 AM  Snacks/Supplements Adult: Magic Cup; With Meals    DVT Prophylaxis: DOAC  Scruggs Catheter: Not present  Lines: None     Cardiac Monitoring: ACTIVE order. Indication: Tachyarrhythmias, acute (48 hours)  Code Status: Full  Code           Consultations This Hospital Stay   NUTRITION SERVICES ADULT IP CONSULT  PHYSICAL THERAPY ADULT IP CONSULT  OCCUPATIONAL THERAPY ADULT IP CONSULT  SPEECH LANGUAGE PATH ADULT IP CONSULT  PHARMACY IP CONSULT  CARE MANAGEMENT / SOCIAL WORK IP CONSULT  SPEECH LANGUAGE PATH ADULT IP CONSULT  PSYCHIATRY IP CONSULT  NUTRITION SERVICES ADULT IP CONSULT  NEUROLOGY GENERAL ADULT IP CONSULT  NUTRITION SERVICES ADULT IP CONSULT  PHYSICAL THERAPY ADULT IP CONSULT  OCCUPATIONAL THERAPY ADULT IP CONSULT  SPEECH LANGUAGE PATH ADULT IP CONSULT    Code Status   Full Code    Time Spent on this Encounter   I, Garrett Alfred MD, personally saw the patient today and spent greater than 30 minutes discharging this patient.       Garrett Alfred MD  [facility] MED SURG  [address] [place] [id number]  Phone: [phone]  Fax: [phone]  ______________________________________________________________________    Physical Exam   Vital Signs: Temp: 98.8  F (37.1  C) Temp src: Oral BP: 128/77 Pulse: 78   Resp: 16 SpO2: 96 % O2 Device: None (Room air)    Weight: 201 lbs 4.48 oz    General Appearance: Awake, interactive, NAD  HEENT: AT/NC, Anicteric, Moist MM  Neck: Supple.   Respiratory: Normal work of breathing. CTA BL.   Cardiovascular: S1 S2 irregular.   GI/Abd: Soft. NT. ND. No g/r.   Extremities: Distally wwp. No pedal edema.  Neuro: AO x 1, moving all extremities. Residual L sided weakness.   Skin: No acute rash on exposed areas. Dry skin.   Psychiatry: Stable mood.       Primary Care Physician   Collin Singh MD    Discharge Orders      General info for SNF    Length of Stay Estimate: Short Term Care: Estimated # of Days 31-90  Condition at Discharge: Stable  Level of care:skilled   Rehabilitation Potential: Good  Admission H&P remains valid and up-to-date: Yes  Recent Chemotherapy: N/A  Use Nursing Home Standing Orders: Yes     Mantoux instructions    Give two-step Mantoux (PPD) Per  Facility Policy Yes     Follow Up and recommended labs and tests    Follow up with [facility] physician.  The following labs/tests are recommended: weekly cbc, bmp.    Follow up with primary care after discharge within a week.    Follow up with Neurology and psychiatry in 2-4 weeks.     Keep other prior appointments as it is.     Reason for your hospital stay    Patient is a 78 y/o man who has multiple medical problems including diabetes mellitus type II, atrial fibrillation, hypertension and hyperlipidemia. Patient had been hospitalized from 07-Mar-2023 to 16-Mar-2023 at [facility] with acute ischemic stroke of right MCA territory due to cardioembolism. Patient received tenecteplase on 07-Mar-2023. During this hospital stay, patient also had possible early sepsis secondary to aspiration pneumonia. Patient was transferred to acute rehabilitation unit on 16-Mar-2023.      Patient reportedly has had a prolonged rehabilitation course complicated by the extent of his deficits and fluctuating levels of alertness. Patient recently had been treated for urinary tract infection which was associated with significant functional decline and worsened encephalopathy after prior improvement. Patient reportedly improved after several days of treatment but would then develop recurrent leucocytosis, worsening confusion and lactic acidosis.     Glucose monitor nursing POCT    Before meals and at bedtime     Intake and output    Every shift     Daily weights    Call Provider for weight gain of more than 2 pounds per day or 5 pounds per week.     Bladder scan    X 2 for post void residual     Wound care (specify)    See under discharge instructions.     Activity - Up with nursing assistance     Nutrition Services Adult IP Consult    Reason:  Malnutrition, on Tube feeding.     Physical Therapy Adult Consult    Evaluate and treat as clinically indicated.    Reason:  physical deconditioning.     Occupational Therapy Adult Consult     Evaluate and treat as clinically indicated.    Reason:  Physical deconditioning.     Speech Language Path Adult Consult    Evaluate and treat as clinically indicated.    Reason:  Cognitive impairment.     Fall precautions     Seizure precautions     Diet    Follow this diet upon discharge: Orders Placed This Encounter      Calorie Counts      Adult Formula Drip Feeding: Continuous Jevity 1.5; Gastrostomy; Goal Rate: 85 ml/hr x12 hrs from 6 pm to 6 am; mL/hr; From: 6:00 PM; To: 6:00 AM      Snacks/Supplements Adult: Magic Cup; With Meals      Calorie Counts      Minced & Moist Diet (level 5) Liquidized/Moderately Thick (level 3)       Significant Results and Procedures   Most Recent 3 CBC's:Recent Labs   Lab Test 04/24/23  0958 04/21/23  0834 04/19/23  1142   WBC 8.2 8.3 13.0*   HGB 11.0* 11.3* 12.2*   MCV 95 96 98    253 316     Most Recent 3 BMP's:Recent Labs   Lab Test 04/26/23  0757 04/26/23  0642 04/25/23  2153 04/24/23  1021 04/24/23  0958 04/24/23  0908 04/21/23  0915 04/21/23  0834   NA  --   --   --   --  142 141  --  137   POTASSIUM  --   --   --   --  4.3 4.1  --  4.1   CHLORIDE  --   --   --   --  105 105  --  102   CO2  --   --   --   --  26 25  --  24   BUN  --   --   --   --  24.4* 24.3*  --  22.7   CR  --   --   --   --  0.82 0.82  --  0.76   ANIONGAP  --   --   --   --  11 11  --  11   LUTHER  --   --   --   --  9.7 9.8  --  9.3   * 178* 130*   < > 140* 137*   < > 226*    < > = values in this interval not displayed.     Most Recent 2 LFT's:Recent Labs   Lab Test 04/24/23  0958 04/02/23  1113   AST 18 20   ALT 16 16   ALKPHOS 128 129   BILITOTAL 0.5 0.7     Most Recent 3 INR's:Recent Labs   Lab Test 03/07/23  1312 03/07/23  1047 02/24/23  1411   INR 1.12 1.21* 2.7*     Most Recent 3 Troponin's:No lab results found.  Most Recent 3 BNP's:No lab results found.  7-Day Micro Results     No results found for the last 168 hours.        Most Recent TSH and T4:Recent Labs   Lab Test  04/16/23  1216   TSH 1.26     Most Recent 6 glucoses:Recent Labs   Lab Test 04/26/23  0757 04/26/23  0642 04/25/23  2153 04/25/23  1719 04/25/23  1330 04/25/23  0641   * 178* 130* 127* 140* 196*     Most Recent Urinalysis:Recent Labs   Lab Test 04/11/23  1303 10/30/21  1233 08/16/21  1550   COLOR Yellow   < > Yellow   APPEARANCE Slightly Cloudy*   < > Clear   URINEGLC Negative   < > Negative   URINEBILI Negative   < > Negative   URINEKETONE Negative   < > Negative   SG 1.021   < > 1.020   UBLD Negative   < > Negative   URINEPH 7.5*   < > 5.5   PROTEIN Negative   < > Negative   UROBILINOGEN  --   --  0.2   NITRITE Negative   < > Negative   LEUKEST Trace*   < > Negative   RBCU 5*   < > 0-2   WBCU 9*   < > 0-5    < > = values in this interval not displayed.     Most Recent ESR & CRP:Recent Labs   Lab Test 04/11/23  0514 03/11/23  0714 01/20/23  1730 10/31/21  1926   SED  --   --  16*  --    CRP  --   --   --  25.1*   CRPI 9.98*   < > 3.20  --     < > = values in this interval not displayed.     Most Recent Anemia Panel:Recent Labs   Lab Test 04/24/23  0958 04/19/23  1142 04/17/23  1351 04/17/23  1350 12/18/20  1640 11/06/20  1113   WBC 8.2   < >  --   --    < > 11.6*   HGB 11.0*   < >  --   --    < > 12.9*   HCT 33.8*   < >  --   --    < > 37.8*   MCV 95   < >  --   --    < > 88      < >  --   --    < > 460*   IRON  --   --   --   --   --  56   DESI  --   --   --   --   --  1,201*   B12  --   --  879  --   --  352   FOLIC  --   --   --  6.3  --   --     < > = values in this interval not displayed.   ,   Results for orders placed or performed during the hospital encounter of 04/11/23   CT Head w/o Contrast    Narrative    EXAM: CT HEAD W/O CONTRAST  4/17/2023 2:15 PM     HISTORY:  recent RCA stroke, sp tPA, now with hammucination, increased  slurred speech,       COMPARISON:  CT head 4/2/2023, 3/8/2023    TECHNIQUE: Using multidetector thin collimation helical acquisition  technique, axial, coronal and  sagittal CT images from the skull base  to the vertex were obtained without intravenous contrast.   (topogram) image(s) also obtained and reviewed.    FINDINGS:  No acute intracranial hemorrhage, mass effect, or midline shift.  Gliosis/encephalomalacia at the site of the right MCA territory  infarct. Patchy and confluent areas of periventricular and subcortical  white matter hypoattenuation, nonspecific but likely represents  chronic small vessel ischemic disease. No definite new gray-white  matter differentiation. Ventricles are proportionate to the cerebral  sulci. Clear basal cisterns.    The bony calvaria and the bones of the skull base are normal. The  visualized portions of the paranasal sinuses and mastoid air cells are  clear. Bilateral pseudophakia. Scleral banding of the right orbit.      Impression    IMPRESSION:   1. No acute intracranial pathology.   2. Chronic appearing changes of the right MCA territory infarct.     I have personally reviewed the examination and initial interpretation  and I agree with the findings.    ABDELRAHMAN AGUDELO MD         SYSTEM ID:  [id number]   CT Head w/o Contrast    Narrative    CT HEAD W/O CONTRAST 4/19/2023 12:11 PM    History: possible seizure, recent stroke on anticoagulation rule out  bleed     Comparison: 4/17/2023 CT    Technique: Using multidetector thin collimation helical acquisition  technique, axial, coronal and sagittal CT images from the skull base  to the vertex were obtained without intravenous contrast.   (topogram) image(s) also obtained and reviewed.    Findings:     Ongoing sequela of right MCA territory infarct with encephalomalacia  and gliosis involving the right frontal lobe. Additional scattered  areas of hypodensity in the periventricular and subcortical white  matter which appears similar to prior and is most suggestive of  proximal vessel ischemic disease. No acute intracranial hemorrhage.  Ventricles are proportionate to the cerebral  sulci and similar in size  to prior.    No acute calvarial fractures. Visualized paranasal sinuses are clear.  Small right-sided mastoid air cells. Debris in the external auditory  canals. Surgical changes related to right scleral banding and  bilateral pseudophakia.      Impression    Impression:    1. No acute intracranial hemorrhage.  2. Ongoing sequela of previous right MCA territory infarct.  3. Unchanged additional white matter changes most suggestive of  chronic small vessel ischemic disease.     I have personally reviewed the examination and initial interpretation  and I agree with the findings.    TRICIA CAI MD         SYSTEM ID:  [id number]   XR Chest 1 View    Narrative    Chest one view    INDICATION: Cough comment looking for infiltrates    COMPARISON: 4/11/2023    FINDINGS: Heart is mildly enlarged. There is atherosclerotic  calcification of the aortic knob. Lungs and pulmonary vasculature  appear normal on this single view. Degenerative changes are noted at  the right shoulder including high riding humeral head which may  indicate rotator cuff degeneration.      Impression    IMPRESSION: Aortic atherosclerosis with mild cardiomegaly. Right  shoulder degenerative change with possible rotator cuff degeneration.    ADAM GERMAIN MD         SYSTEM ID:  [id number]       Discharge Medications   Current Discharge Medication List      START taking these medications    Details   !! insulin aspart (NOVOLOG PEN) 100 UNIT/ML pen Inject 1-7 Units Subcutaneous 3 times daily (before meals) Correction Scale - MEDIUM INSULIN RESISTANCE DOSING     Do Not give Correction Insulin if Pre-Meal BG less than 140.   For Pre-Meal  - 189 give 1 unit.   For Pre-Meal  - 239 give 2 units.   For Pre-Meal  - 289 give 3 units.   For Pre-Meal  - 339 give 4 units.   For Pre-Meal - 399 give 5 units.   For Pre-Meal -449 give 6 units  For Pre-Meal BG greater than or equal to 450 give 7 units.    To be given with prandial insulin, and based on pre-meal blood glucose.    Notify provider if glucose greater than or equal to 350 mg/dL after administration of correction dose.  If given at mealtime, administer within 30 minutes of start of meal.  Qty: 15 mL, Refills: 0    Associated Diagnoses: Type II diabetes mellitus with peripheral circulatory disorder (H)      !! insulin aspart (NOVOLOG PEN) 100 UNIT/ML pen Inject 1-5 Units Subcutaneous At Bedtime MEDIUM INSULIN RESISTANCE DOSING    Do Not give Bedtime Correction Insulin if BG less than  200.   For  - 249 give 1 units.   For  - 299 give 2 units.   For  - 349 give 3 units.   For  -399 give 4 units.   For BG greater than or equal to 400 give 5 units.  Notify provider if glucose greater than or equal to 350 mg/dL after administration of correction dose.  If given at mealtime, administer within 30 minutes of start of meal.  Qty: 15 mL, Refills: 0    Associated Diagnoses: Type II diabetes mellitus with peripheral circulatory disorder (H)      levETIRAcetam (KEPPRA) 750 MG tablet 1 tablet (750 mg) by Oral or Feeding Tube route 2 times daily    Associated Diagnoses: Other seizures (H)       !! - Potential duplicate medications found. Please discuss with provider.      CONTINUE these medications which have CHANGED    Details   amantadine (SYMMETREL) 100 MG capsule 1 capsule (100 mg) by Oral or Feeding Tube route daily for 3 days  Qty: 3 capsule, Refills: 0    Associated Diagnoses: Cerebrovascular accident (CVA) due to occlusion of left middle cerebral artery (H)      gabapentin (NEURONTIN) 250 MG/5ML solution 6 mLs (300 mg) by NG or Feeding tube route At Bedtime    Associated Diagnoses: Cerebrovascular accident (CVA) due to occlusion of left middle cerebral artery (H)         CONTINUE these medications which have NOT CHANGED    Details   acetaminophen (TYLENOL) 325 MG/10.15ML solution 20.3 mLs (650 mg) by Per NG tube route every 4 hours as  needed for mild pain or fever (for temperature greater than 100.4 F (38 C) - may also be used for moderate pain)    Associated Diagnoses: Cerebrovascular accident (CVA) due to occlusion of left middle cerebral artery (H)      apixaban ANTICOAGULANT (ELIQUIS) 5 MG tablet 1 tablet (5 mg) by Per Feeding Tube route 2 times daily    Associated Diagnoses: Cerebrovascular accident (CVA) due to occlusion of left middle cerebral artery (H)      atenolol (TENORMIN) 25 MG tablet Take 0.5 tablets (12.5 mg) by mouth daily    Associated Diagnoses: Chronic atrial fibrillation (H)      atorvastatin (LIPITOR) 20 MG tablet 1 tablet (20 mg) by Oral or Feeding Tube route At Bedtime    Associated Diagnoses: Cerebrovascular accident (CVA) due to occlusion of left middle cerebral artery (H)      diltiazem (CARDIZEM) 60 MG tablet 1 tablet (60 mg) by Per Feeding Tube route every 8 hours    Associated Diagnoses: Cerebrovascular accident (CVA) due to occlusion of left middle cerebral artery (H)      fluticasone (FLONASE) 50 MCG/ACT nasal spray Spray 1 spray into both nostrils daily    Associated Diagnoses: Acute cough      lactobacillus rhamnosus, GG, (CULTURELL) capsule 1 capsule by Per Feeding Tube route 2 times daily    Associated Diagnoses: Cerebrovascular accident (CVA) due to occlusion of left middle cerebral artery (H)      Lidocaine (LIDOCARE) 4 % Patch Place 2 patches onto the skin every 24 hours To prevent lidocaine toxicity, patient should be patch free for 12 hrs daily.    Associated Diagnoses: Chronic bilateral low back pain without sciatica      melatonin 5 MG tablet Take 1 tablet (5 mg) by mouth nightly as needed for sleep    Associated Diagnoses: Cerebrovascular accident (CVA) due to occlusion of left middle cerebral artery (H)      metFORMIN (GLUCOPHAGE) 1000 MG tablet 1 tablet (1,000 mg) by Oral or Feeding Tube route 2 times daily (with meals)    Associated Diagnoses: Type II diabetes mellitus with peripheral circulatory  disorder (H)      miconazole (MICATIN) 2 % external cream Apply topically 2 times daily    Associated Diagnoses: Cerebrovascular accident (CVA) due to occlusion of left middle cerebral artery (H)      mirtazapine (REMERON) 15 MG tablet Take 1 tablet (15 mg) by mouth At Bedtime    Associated Diagnoses: Cerebrovascular accident (CVA) due to occlusion of left middle cerebral artery (H)      pantoprazole (PROTONIX) 2 mg/mL SUSP suspension 20 mLs (40 mg) by Per Feeding Tube route every morning (before breakfast)    Associated Diagnoses: Cerebrovascular accident (CVA) due to occlusion of left middle cerebral artery (H)      senna-docusate (SENOKOT-S/PERICOLACE) 8.6-50 MG tablet 2 tablets by Per Feeding Tube route 2 times daily as needed for constipation    Associated Diagnoses: Cerebrovascular accident (CVA) due to occlusion of left middle cerebral artery (H)      CONTOUR NEXT TEST test strip USE 1 EACH AS DIRECTED DAILY.  Qty: 100 strip, Refills: 5    Associated Diagnoses: Type 2 diabetes mellitus with diabetic peripheral angiopathy without gangrene (H); Encounter for medication refill         STOP taking these medications       cefTRIAXone (ROCEPHIN) 2 GM vial Comments:   Reason for Stopping:             Allergies   Allergies   Allergen Reactions     Bees Swelling     Reports using an epi pen if stung

## 2023-04-27 NOTE — ED PROVIDER NOTES
History     Chief Complaint:  Gtube Problem     HPI   Stefan Diallo is a 77 year old male with a history of diabetes mellitus, on Eliquis and tube feeding who presents with G-tube problem. The patients care home reports having trouble establishing a connection with the patients g-tube, prompting his trip to the ED. Employees at the care home report an inability to care for feeding tube patients and were unaware this patient had one prior to accepting them.    Independent Historian:   Nursing home director - They report HPI    Review of External Notes: Discharge summary 4/26/23 discussing feeding tube and plane of care    ROS:  Review of Systems   See HPI    Allergies:  Bees     Medications:    Tylenol  Eliquis  Tenormin  Lipitor  Cardizem  Flonase  Neurontin  Novolog pen  Culturell  Keppra  Lidocaire  Melatonin  Glucophage  Remeron  Protonix  Senokot  Epipen  Lopid    Past Medical History:    Arthritis  Atrial fibrillation  Bacteremia  BPH  Diabetes mellitus, type 2  GERD  GI hemorrhage  High cholesterol  Hyperlipidemia  Hyperlipemia  Hypertension  Idiopathic peripheral neuropathy  Irregular heart beat  Renal artery aneurysm  Sleep apnea  UTI  Fusion of spine  Amputated toe of right foot  Reva palsy  Benign prostatic hyperplasia  Cellulitis  Myalgia and myositis  Obesity  Tinea corporis  Urticaria  Venous stasis ulcer of left calf    Past Surgical History:    Amputate third toe right foot  Fusion spine posterior three + levels  Repair compl rotator cuff avulsion  Gastrostomy tube percutaneous placement  Knee surgery, right  Tenotomy Achilles tendon  Transrectal ultrasonic resection of prostate cyst   Cataract removal    Family History:    Father: A-fib, CAD  Mother: CAD    Social History:  Patient came from [facility].  Patient is unaccompanied in the ED.  PCP: Collin Singh     Physical Exam     Patient Vitals for the past 24 hrs:   BP Temp Temp src Pulse Resp SpO2   04/26/23 2245 110/77 --  -- -- 20 96 %   04/26/23 2215 124/77 -- -- -- 20 95 %   04/26/23 2158 128/87 97.6  F (36.4  C) Oral 86 20 97 %        Physical Exam  Head: No signs of trauma.   CV: Normal rate and regular rhythm.    Resp: Effort normal and breath sounds normal. No respiratory distress.   GI: Soft. There is no tenderness.  No rebound or guarding.  Normal bowel sounds.  Feeding tube in place  MSK: Normal range of motion.   Skin: Skin is warm and dry. No rash noted.       Emergency Department Course     Emergency Department Course & Assessments:    Interventions:  Medications   levETIRAcetam (KEPPRA) tablet 500 mg (has no administration in time range)      Assessments:  2211 I obtained history and examined the patient as noted above.    Independent Interpretation (X-rays, CTs, rhythm strip):  None    Consultations/Discussion of Management or Tests:  2220 I spoke with Vic, the director of nursing care at [facility].  2230 I consulted with Dr. Jones of the hospitalist service and discussed patient admission. They accepted care of the patient.    Social Determinants of Health affecting care:   Vascular dementia    Disposition:  The patient was admitted to the hospital under the care of Dr. Jones.     Impression & Plan      Medical Decision Making:  Stefan Diallo presents as the nursing home that he was sent to is unable to provide his feeding tube nutrition.  Patient was just discharged from the hospital today to a nursing home facility.  Apparently there was some miscommunication and they were unaware that the patient required nutrition through feeding tube.  I did speak with the director of the nursing home and he reports that they do not have the equipment or training to care for such a patient and he was ultimately sent back to the emergency department.  Patient was admitted to the hospitalist service for continued medical management while further long-term care arrangements were arranged    Diagnosis:    ICD-10-CM     1. Complication of feeding tube (H)  K94.20          Scribe Disclosure:  I, NBA SALAZAR, am serving as a scribe at 10:23 PM on 4/26/2023 to document services personally performed by Stefan Hammond MD based on my observations and the provider's statements to me.        Stefan Hammond MD  04/27/23 0539

## 2023-04-27 NOTE — PHARMACY-ADMISSION MEDICATION HISTORY
Pharmacist Admission Medication History    Admission medication history is complete. The information provided in this note is only as accurate as the sources available at the time of the update.    Medication reconciliation/reorder completed by provider prior to medication history? No    Information Source(s): Facility (U/NH/) medication list/MAR via in-person and N/A    Pertinent Information: Patient discharged from [facility] today at 1200.  - Keppra: Was increased to 750mg BID during hospital stay and subsequently decreased to 500mg due to somnolence (Discharge summer medication list states 750mg BID however discharge summary notes state 500mg, pt most recently received 500mg BID).    Changes made to PTA medication list:    Added: Amantadine    Deleted: None    Changed: None    Medication Affordability: None       Allergies reviewed with patient and updates made in EHR: yes (per NH MAR)    Medication History Completed By: Anum Olivo [facility] 4/26/2023 10:33 PM    Prior to Admission medications    Medication Sig Last Dose Taking? Auth Provider Long Term End Date   acetaminophen (TYLENOL) 325 MG/10.15ML solution 20.3 mLs (650 mg) by Per NG tube route every 4 hours as needed for mild pain or fever (for temperature greater than 100.4 F (38 C) - may also be used for moderate pain)  Yes Ezio Donald MD No    amantadine (SYMMETREL) 100 MG capsule Take 100 mg by mouth daily . For 3 days.  Yes Unknown, Entered By History No 4/29/23   apixaban ANTICOAGULANT (ELIQUIS) 5 MG tablet 1 tablet (5 mg) by Per Feeding Tube route 2 times daily  Yes Ezio Donadl MD     atenolol (TENORMIN) 25 MG tablet Take 0.5 tablets (12.5 mg) by mouth daily  Patient taking differently: 12.5 mg by Per Feeding Tube route daily  Yes Kenna Ruiz PA-C Yes    atorvastatin (LIPITOR) 20 MG tablet 1 tablet (20 mg) by Oral or Feeding Tube route At Bedtime  Yes Ezio Donald MD Yes    diltiazem (CARDIZEM) 60 MG tablet 1  tablet (60 mg) by Per Feeding Tube route every 8 hours  Yes Ezio Donald MD Yes    fluticasone (FLONASE) 50 MCG/ACT nasal spray Spray 1 spray into both nostrils daily  Yes Kenna Ruiz PA-C No    gabapentin (NEURONTIN) 250 MG/5ML solution 6 mLs (300 mg) by NG or Feeding tube route At Bedtime  Yes Garrett Alfred MD Yes    insulin aspart (NOVOLOG PEN) 100 UNIT/ML pen Inject 1-7 Units Subcutaneous 3 times daily (before meals) Correction Scale - MEDIUM INSULIN RESISTANCE DOSING     Do Not give Correction Insulin if Pre-Meal BG less than 140.   For Pre-Meal  - 189 give 1 unit.   For Pre-Meal  - 239 give 2 units.   For Pre-Meal  - 289 give 3 units.   For Pre-Meal  - 339 give 4 units.   For Pre-Meal - 399 give 5 units.   For Pre-Meal -449 give 6 units  For Pre-Meal BG greater than or equal to 450 give 7 units.   To be given with prandial insulin, and based on pre-meal blood glucose.    Notify provider if glucose greater than or equal to 350 mg/dL after administration of correction dose.  If given at mealtime, administer within 30 minutes of start of meal.  Yes Clementine Poe MD Yes    insulin aspart (NOVOLOG PEN) 100 UNIT/ML pen Inject 1-5 Units Subcutaneous At Bedtime MEDIUM INSULIN RESISTANCE DOSING    Do Not give Bedtime Correction Insulin if BG less than  200.   For  - 249 give 1 units.   For  - 299 give 2 units.   For  - 349 give 3 units.   For  -399 give 4 units.   For BG greater than or equal to 400 give 5 units.  Notify provider if glucose greater than or equal to 350 mg/dL after administration of correction dose.  If given at mealtime, administer within 30 minutes of start of meal.  Yes Clementine Poe MD Yes    levETIRAcetam (KEPPRA) 500 MG tablet 1 tablet (500 mg) by Oral or Feeding Tube route 2 times daily  Yes Garrett Alfred MD Yes    Lidocaine (LIDOCARE) 4 % Patch Place 2 patches onto the skin every 24 hours To prevent  lidocaine toxicity, patient should be patch free for 12 hrs daily.  Yes Kenna Ruiz PA-C     melatonin 5 MG tablet Take 1 tablet (5 mg) by mouth nightly as needed for sleep  Yes Kenna Ruiz PA-C     metFORMIN (GLUCOPHAGE) 1000 MG tablet 1 tablet (1,000 mg) by Oral or Feeding Tube route 2 times daily (with meals)  Yes Kenna Ruiz PA-C Yes    miconazole (MICATIN) 2 % external cream Apply topically 2 times daily  Yes Kenna Ruiz PA-C     mirtazapine (REMERON) 15 MG tablet Take 1 tablet (15 mg) by mouth At Bedtime  Yes Kenna Ruiz PA-C Yes    pantoprazole (PROTONIX) 2 mg/mL SUSP suspension 20 mLs (40 mg) by Per Feeding Tube route every morning (before breakfast)  Yes Ezio Donald MD     senna-docusate (SENOKOT-S/PERICOLACE) 8.6-50 MG tablet 2 tablets by Per Feeding Tube route 2 times daily as needed for constipation  Yes Kenna Ruiz PA-C No    CONTOUR NEXT TEST test strip USE 1 EACH AS DIRECTED DAILY.   Collin Singh MD

## 2023-04-27 NOTE — PROGRESS NOTES
RECEIVING UNIT ED HANDOFF REVIEW    ED Nurse Handoff Report was reviewed by: Orion Li RN on April 26, 2023 at 11:26 PM

## 2023-04-27 NOTE — H&P
[facility]    History and Physical - Hospitalist Service       Date of Admission:  4/26/2023    Assessment & Plan      Stefan Diallo is a 77 year old male admitted on 4/26/2023. He presents for placement    Placement  Pt just discharged today from [facility]. CVA 3/2023 and was discharged to ARU 3/16. rehospitalized 2/2 concern for sepsis, lactic acidosis. However facility he was discharged to cannot do tube feeds so he was sent to [facility] ED. In ED AFVSS. Labs 4/24 reviewed.   - SW for placement    CVA  Seizures, possible   [apixaban, amantadine 100 mg daily (3 more days), levetiracetam 500 mg BID]  *Hospitalized 3/7-3/16/2023 at [facility] after an ischemic CVA of R MCA territory from cardioembolic from afib (warfarin had been on hold for procedure). Received TNK. Residual deficits L hemiparesis, dysphagia and cognitive issues  *4/19 possible seizure while hospitalized. Was started on keppra.  - continue apixaban  - continue keppra  - continue amantadine taper  - PT  - outpatient neurology follow up    Recent encephalopathy, toxic meatbolic  Reportedly was having hallucinations during recent admission. Psychiatry had seen, tapering off amantadine.   - outpatient f/u with psychiatry  - monitor    Dysphagia  Moderate malnutrition  On tube feeds and level 5 diet with mod thick liquids (level 3).   - nutrition for TFs  - level 5 diet with level 3 liquids    Atrial fibrillation  [apixaban 5 mg BID, atenolol 12.5 mg daily, diltiazem 60 mg PO q8 hours]  - resume meds    HTN  HLD  Chronic RBB  Prolonged QT  [atenolol 12.5 mg daily, atorvastatin 20 mg at HS, diltiazem 60mg q8]  QTc during recent hospital 472 4/24 (had been 418 4/19)  - resume meds    DM II  Most recent A1C 6.4% 2/2023  - continue metformin 1000 mg BID  - q4 hour BS  - med sliding scale insulin    Vascular dementia  Was seen by neuropsych 3/29, meets criteria for major neurocognitive disorder (dementia) related to CVA hx.     Chronic back  "pain  Was to have pain pump placement 3/8 at [facility], warfarin had been on hold for this.   - prn acetaminophen  - gabapentin 300 mg at HS  - lidoderm patches    Anemia- repeat hgb in the am  KISHA- not currently on CPAP, hadn't tolerated in past  GERD- protonix 40 mg qam  MDD- continue remeron 15 mg at HS  Hx recurrent UTIs- treated for UTI recent admission     Diet:   as above  DVT Prophylaxis: DOAC  Scruggs Catheter: Not present  Lines: None     Cardiac Monitoring: None  Code Status: Full Code      Clinically Significant Risk Factors Present on Admission               # Drug Induced Coagulation Defect: home medication list includes an anticoagulant medication         # Overweight: Estimated body mass index is 26.56 kg/m  as calculated from the following:    Height as of 4/11/23: 1.854 m (6' 1\").    Weight as of 4/25/23: 91.3 kg (201 lb 4.5 oz).           Disposition Plan      Expected Discharge Date: 04/27/2023                  Herb Jones MD  Hospitalist Service  [facility]  Securely message with [employer] (more info)  Text page via [employer] P[employer]/Directory     ______________________________________________________________________    Chief Complaint   placement    History is obtained from the electronic health record and emergency department physician    History of Present Illness   Stefan Diallo is a 77 year old male who presents for placement.  Patient has a complex recent medical history.  Been holding warfarin for procedure in late February early March when he had an embolic stroke to his right MCA territory.  He has residual left hemiparesis, dysphagia as well as cognition issues.  He was in an acute rehab when he developed lactic acidosis as well as concerns for sepsis.  He was treated for a UTI although was not clear whether or not he was infected.  He also developed encephalopathy.  He was ultimately discharged on 4/26 to TCU.  However when he arrived the TCU stated they could not " handle his tube feeds.  They therefore sent him to [facility] for transfer to a facility where they could do tube feeds.  Here he is comfortable.  His vitals are stable.  He is very difficult to understand from his speech perspective but he did not appear to be in pain.      Past Medical History    Past Medical History:   Diagnosis Date     Arthritis      Atrial fibrillation (H)      Bacteremia      Bell's palsy 2015     BPH (benign prostatic hyperplasia)      Diabetes (H)      Gastroesophageal reflux disease      GERD (gastroesophageal reflux disease)      GI hemorrhage      High cholesterol      Hyperlipemia      Hyperlipidemia      Hypertension      Hypertension      Idiopathic peripheral neuropathy      Irregular heart beat     chronic at fib     Renal artery aneurysm (H)      Renal artery aneurysm (H)      Sleep apnea     Bipap     Sleep apnea 10/31/2021     Sleep apnea, obstructive     USES BIPAP     Type 2 diabetes mellitus (H)      UTI (lower urinary tract infection)        Past Surgical History   Past Surgical History:   Procedure Laterality Date     AMPUTATE FOOT Right 5/6/2019    Procedure: AMPUTATION, 3rd TOE RIGHT FOOT;  Surgeon: Ravi Abbasi DPM;  Location: [facility];  Service: Podiatry     FUSION SPINE POSTERIOR MINIMALLY INVASIVE THREE + LEVELS N/A 10/29/2020    Procedure: L3/4/5S1 oblique lateral lumbar interbody fusion with discectomy L3/4/5S1 Posterior minimally invasive pedicle screw placement and posterolateral instrumentation and fusion  L3/4/5S1 Posterior minimally invasive pedicle screw placement and posterolateral instrumentation and fusion;  Surgeon: Vernon Bynum MD;  Location: [facility] OR      REPAIR COMPL ROTATOR CUFF AVULSN,CHR      Description: Chronic Rotator Cuff Avulsion;  Recorded: 04/11/2011;     IR GASTROSTOMY TUBE PERCUTANEOUS PLCMNT  3/9/2023     ORTHOPEDIC SURGERY Right     knee surgery     ORTHOPEDIC SURGERY Right     amputation 3rd toe on right foot      TENOTOMY ACHILLES TENDON       TRANSRECTAL ULTRASONIC, TRANSURETHRAL RESECTION (TUR) OF PROSTATE CYST         Prior to Admission Medications   Prior to Admission Medications   Prescriptions Last Dose Informant Patient Reported? Taking?   CONTOUR NEXT TEST test strip   No No   Sig: USE 1 EACH AS DIRECTED DAILY.   LACTOBACILLUS PO 2023  Yes Yes   Sig: Take 1 capsule by mouth daily   Lidocaine (LIDOCARE) 4 % Patch 2023  No Yes   Sig: Place 2 patches onto the skin every 24 hours To prevent lidocaine toxicity, patient should be patch free for 12 hrs daily.   acetaminophen (TYLENOL) 325 MG/10.15ML solution prn at prn  No Yes   Si.3 mLs (650 mg) by Per NG tube route every 4 hours as needed for mild pain or fever (for temperature greater than 100.4 F (38 C) - may also be used for moderate pain)   amantadine (SYMMETREL) 100 MG capsule starts tomorrow at starts tomorrow  Yes Yes   Sig: Take 100 mg by mouth daily . For 3 days.   apixaban ANTICOAGULANT (ELIQUIS) 5 MG tablet 2023  No Yes   Si tablet (5 mg) by Per Feeding Tube route 2 times daily   atenolol (TENORMIN) 25 MG tablet 2023  No Yes   Sig: Take 0.5 tablets (12.5 mg) by mouth daily   Patient taking differently: 12.5 mg by Per Feeding Tube route daily   atorvastatin (LIPITOR) 20 MG tablet 2023 at pm  No Yes   Si tablet (20 mg) by Oral or Feeding Tube route At Bedtime   diltiazem (CARDIZEM) 60 MG tablet 2023  No Yes   Si tablet (60 mg) by Per Feeding Tube route every 8 hours   fluticasone (FLONASE) 50 MCG/ACT nasal spray 2023 at am  No Yes   Sig: Spray 1 spray into both nostrils daily   gabapentin (NEURONTIN) 250 MG/5ML solution 2023  No Yes   Si mLs (300 mg) by NG or Feeding tube route At Bedtime   insulin aspart (NOVOLOG PEN) 100 UNIT/ML pen 2023 at x1  No Yes   Sig: Inject 1-7 Units Subcutaneous 3 times daily (before meals) Correction Scale - MEDIUM INSULIN RESISTANCE DOSING     Do Not give Correction  Insulin if Pre-Meal BG less than 140.   For Pre-Meal  - 189 give 1 unit.   For Pre-Meal  - 239 give 2 units.   For Pre-Meal  - 289 give 3 units.   For Pre-Meal  - 339 give 4 units.   For Pre-Meal - 399 give 5 units.   For Pre-Meal -449 give 6 units  For Pre-Meal BG greater than or equal to 450 give 7 units.   To be given with prandial insulin, and based on pre-meal blood glucose.    Notify provider if glucose greater than or equal to 350 mg/dL after administration of correction dose.  If given at mealtime, administer within 30 minutes of start of meal.   insulin aspart (NOVOLOG PEN) 100 UNIT/ML pen Past Week  No Yes   Sig: Inject 1-5 Units Subcutaneous At Bedtime MEDIUM INSULIN RESISTANCE DOSING    Do Not give Bedtime Correction Insulin if BG less than  200.   For  - 249 give 1 units.   For  - 299 give 2 units.   For  - 349 give 3 units.   For  -399 give 4 units.   For BG greater than or equal to 400 give 5 units.  Notify provider if glucose greater than or equal to 350 mg/dL after administration of correction dose.  If given at mealtime, administer within 30 minutes of start of meal.   levETIRAcetam (KEPPRA) 500 MG tablet 2023 at x1  Yes Yes   Si mg by Oral or Feeding Tube route 2 times daily   melatonin 5 MG tablet prn at prn  No Yes   Sig: Take 1 tablet (5 mg) by mouth nightly as needed for sleep   metFORMIN (GLUCOPHAGE) 1000 MG tablet 2023  No Yes   Si tablet (1,000 mg) by Oral or Feeding Tube route 2 times daily (with meals)   miconazole (MICATIN) 2 % external cream 2023 at x1  No Yes   Sig: Apply topically 2 times daily   mirtazapine (REMERON) 15 MG tablet 2023 at pm  No Yes   Sig: Take 1 tablet (15 mg) by mouth At Bedtime   pantoprazole (PROTONIX) 2 mg/mL SUSP suspension 2023 at am  No Yes   Si mLs (40 mg) by Per Feeding Tube route every morning (before breakfast)   senna-docusate (SENOKOT-S/PERICOLACE) 8.6-50  MG tablet prn at prn  No Yes   Si tablets by Per Feeding Tube route 2 times daily as needed for constipation      Facility-Administered Medications: None        Review of Systems    Review of systems not obtained due to patient factors - confusion and mental status     Physical Exam   Vital Signs: Temp: 98.3  F (36.8  C) Temp src: Oral BP: 104/67 Pulse: 84   Resp: 20 SpO2: 96 % O2 Device: None (Room air)    Weight: 0 lbs 0 oz    General Appearance: Alert, no distress  Respiratory: clear bilaterally  Cardiovascular: RRR, no murmur. No edema  GI: soft, nt/nd  Skin: no lesions grossly. G-tube site c/d/i  Other: L paresis, very difficult to understand     Medical Decision Making       50 MINUTES SPENT BY ME on the date of service doing chart review, history, exam, documentation & further activities per the note.      Data         Imaging results reviewed over the past 24 hrs:   No results found for this or any previous visit (from the past 24 hour(s)).

## 2023-04-27 NOTE — PROGRESS NOTES
Non-Billing note: Patient admitted the last night by Dr. Herb Jones; he was admitted to [facility] from 3/7 to 3/16 for acute ischemic stroke of right MCA, s/p tenecteplase; he had PEG placed at that time; he was discharged to ARU where he stayed from 03/16 to 04/11; he was transferred to [facility] on 04/11 because of concern for sepsis; he was discharged to TCU yesterday 4/26; it seems that when she arrived to TCU the staff stated that they could not handle his tube feedings and she was sent to [facility] ER  -Plan as per H&P by Dr. Jones  -Resumed tube feedings as per nutritionist; he is also allowed to have minced and moist diet with moderately thick liquid; calorie count.  -Resume his prior to admission medications, including Keppra, Eliquis  -PT evaluation  - consult  -Discussed with his fiancée and the bedside RN.    Karli Conrad, Hospitalist

## 2023-04-27 NOTE — UTILIZATION REVIEW
Concurrent stay review; Secondary Review Determination - [facility]        Under the authority of the Utilization Management Committee, the utilization review process indicated a secondary review on the above patient.  The review outcome is based on review of the medical records, discussions with staff, and applying clinical experience noted on the date of the review.        (x) Observation/outpatient Status Appropriate - Concurrent stay review       RATIONALE FOR DETERMINATION:   77-year-old male who was admitted on 4/26/2023 for placement. He had a history of a CVA in 3/2023 and was discharged to an ARU on 3/16. He was rehospitalized on 2/2 due to concern for sepsis and lactic acidosis. He was discharged to a facility that could not do tube feeds, so he was sent to [facility] ED. In the ED, he had AFVSS. Labs from 4/24 were reviewed. He is in need of placement.   He was ultimately discharged on 4/26 to TCU.  However when he arrived the TCU stated they could not handle his tube feeds.  They therefore sent him to [facility] for transfer to a facility where they could do tube feeds.  Here he is comfortable.  His vitals are stable.  He is very difficult to understand from his speech perspective but he did not appear to be in pain.      Patient delayed discharge is related to disposition, there is no medical necessity for inpatient admission at the time of this review. If there is a change in patient status, please resend for review.    The information on this document is developed by the utilization review team in order for the business office to ensure compliance.  This only denotes the appropriateness of proper admission status and does not reflect the quality of care rendered.       The definitions of Inpatient Status and Observation Status used in making the determination above are those provided in the CMS Coverage Manual, Chapter 1 and Chapter 6, section 70.4.        Sincerely,    Alfonso Fisher MD

## 2023-04-27 NOTE — ED TRIAGE NOTES
Patient BIBA c/o of Tube feeding problem, pt came from a facility per EMS.      Triage Assessment     Row Name 04/26/23 2204       Triage Assessment (Adult)    Airway WDL WDL       Skin Circulation/Temperature WDL    Skin Circulation/Temperature WDL X       Cardiac WDL    Cardiac WDL WDL       Cognitive/Neuro/Behavioral WDL    Cognitive/Neuro/Behavioral WDL X;orientation;speech    Level of Consciousness confused;lethargic    Arousal Level opens eyes spontaneously    Speech slurred;garbled    Mood/Behavior calm;cooperative    Row Name 04/26/23 2203       Triage Assessment (Adult)    Airway WDL WDL

## 2023-04-27 NOTE — CONSULTS
Care Management Initial Consult    General Information  Assessment completed with: Patient, Spouse or significant other, Kayce, significant other  Type of CM/SW Visit: Initial Assessment    Primary Care Provider verified and updated as needed: Yes   Readmission within the last 30 days:        Reason for Consult: discharge planning  Advance Care Planning:            Communication Assessment  Patient's communication style: spoken language (English or Bilingual)    Hearing Difficulty or Deaf: no   Wear Glasses or Blind: no    Cognitive  Cognitive/Neuro/Behavioral: .WDL except  Level of Consciousness: confused  Arousal Level: opens eyes spontaneously  Orientation: disoriented to, situation, time  Mood/Behavior: calm, cooperative     Speech: slurred, garbled    Living Environment:   People in home: significant other  Kayce  Current living Arrangements: house      Able to return to prior arrangements: yes       Family/Social Support:  Care provided by: self, spouse/significant other  Provides care for: no one  Marital Status: Lives with Significant Other             Description of Support System: Supportive, Involved         Current Resources:   Patient receiving home care services:       Community Resources:    Equipment currently used at home: walker, rolling, shower chair, grab bar, toilet, raised toilet seat, grab bar, tub/shower, other (see comments)  Supplies currently used at home:      Employment/Financial:  Employment Status: retired        Financial Concerns:               Lifestyle & Psychosocial Needs:  Social Determinants of Health     Tobacco Use: Medium Risk (3/10/2023)    Patient History      Smoking Tobacco Use: Former      Smokeless Tobacco Use: Never      Passive Exposure: Not on file   Alcohol Use: Not on file   Financial Resource Strain: Not on file   Food Insecurity: Not on file   Transportation Needs: Not on file   Physical Activity: Not on file   Stress: Not on file   Social Connections: Not on  file   Intimate Partner Violence: Not on file   Depression: Not at risk (2/1/2023)    PHQ-2      PHQ-2 Score: 0   Housing Stability: Not on file       Functional Status:  Prior to admission patient needed assistance:              Mental Health Status:          Chemical Dependency Status:                Values/Beliefs:  Spiritual, Cultural Beliefs, Synagogue Practices, Values that affect care:                 Additional Information:  Consult for discharge planning. Patient admitted on 4/26/23 for placement to a TCU that can accommodate patient's Gtube and a tentative discharge date of 4/28/23.  Writer reviewed chart and notes that patient was discharged from a TCU that did not know that patient had a feeding tube prior to admission. Writer printed list and provided to patient and family indicating facilities that do accept GTubes. Significant other would like to review this list but confirmed that referrals can be sent to [facility] which she had previously reviewed. She will contact  in the morning to provide additional choices. Referrals sent via [facility].    Patient has been vaccinated for COVID and has had the booster.      Writer discussed transportation options and possible out of pocket costs of transport with patient. Patient will be transported by Kayce at discharge of patient is able to get into vehicle.       STEVE Reece

## 2023-04-27 NOTE — PROGRESS NOTES
04/27/23 1516   Appointment Info   Signing Clinician's Name / Credentials (PT) Drew Campuzano, PT, DPT   Rehab Comments (PT) L sided hemiparesis and neglect   Quick Adds   Quick Adds Certification   Living Environment   People in Home alone   Current Living Arrangements house   Home Accessibility stairs within home   Number of Stairs, Within Home, Primary greater than 10 stairs  (14)   Stair Railings, Within Home, Primary railings on both sides of stairs  (will be having bilateral handrails installed)   Transportation Anticipated family or friend will provide   Living Environment Comments Patient lives alone in house at baseline, 10 steps to main level with bilateral handrails being installed. Significant other stating that she will be staying with patient and he will be staying on main bottom level.   Self-Care   Usual Activity Tolerance good   Current Activity Tolerance fair   Equipment Currently Used at Home walker, rolling;shower chair;grab bar, toilet;raised toilet seat;grab bar, tub/shower;other (see comments)   Fall history within last six months yes   Number of times patient has fallen within last six months 2   Activity/Exercise/Self-Care Comment Patient's significant other answering questions during session. Patient had stroke and was hospitalized 7 weeks ago. Discharged from hospital to ARU for 5 weeks, discharged back to hospital for UTI. Spouse reported patient was IND at  baseline with mobility and cares prior to initial stroke 7 weeks ago, was still working as well. Since initial hospitalization has been assist x1-2 and using FWW for mobility.   General Information   Onset of Illness/Injury or Date of Surgery 04/26/23   Referring Physician Herb Jones MD   Patient/Family Therapy Goals Statement (PT) Patient's significant other would like more rehab at TCU prior to discharging home   Pertinent History of Current Problem (include personal factors and/or comorbidities that impact the POC)  77-year-old male who was admitted on 4/26/2023 for placement. He had a history of a CVA in 3/2023 and was discharged to an ARU on 3/16. He was rehospitalized on 2/2 due to concern for sepsis and lactic acidosis. He was discharged to a facility that could not do tube feeds, so he was sent to [facility] ED. In the ED, he had AFVSS. Labs from 4/24 were reviewed. He is in need of placement.   Existing Precautions/Restrictions fall   Cognition   Affect/Mental Status (Cognition) low arousal/lethargic   Orientation Status (Cognition) oriented to;person  (patient lethargic during session.)   Follows Commands (Cognition) follows one-step commands   Cognitive Status Comments difficulty understanding speech   Strength (Manual Muscle Testing)   Strength Comments L sided weakness compared to R. Unable to acheive full knee extension on L   Bed Mobility   Bed Mobility supine-sit   Scooting/Bridging [place] (Bed Mobility) maximum assist (25% patient effort)   Comment, (Bed Mobility) max assist x1 supine to sit transfer   Transfers   Transfers sit-stand transfer   Sit-Stand Transfer   Sit-Stand [place] (Transfers) minimum assist (75% patient effort);moderate assist (50% patient effort)   Assistive Device (Sit-Stand Transfers) walker, front-wheeled   Comment, (Sit-Stand Transfer) sit<>Stand transfer with min-mod assist x1 and FWW   Gait/Stairs (Locomotion)   [place] Level (Gait) minimum assist (75% patient effort)   Assistive Device (Gait) walker, front-wheeled   Distance in Feet 5' eval   Distance in Feet (Gait) 15', 75' x 2   Comment, (Gait/Stairs) Ambulating with min assist x1 and FWW, forward flexed trunk posture, downward gaze, decreased step length   Balance   Balance Comments impaired standing balance, requires assist and BUE on FWW   Sensory Examination   Sensory Perception patient reports no sensory changes   Clinical Impression   Criteria for Skilled Therapeutic Intervention Yes, treatment indicated   PT Diagnosis  (PT) impaired mobility   Functional limitations due to impairments impaired functional mobility, impaired gait, transfers, bed mobility, stair navigation   Clinical Presentation (PT Evaluation Complexity) Stable/Uncomplicated   Clinical Presentation Rationale clinical judgement   Clinical Decision Making (Complexity) low complexity   Planned Therapy Interventions (PT) bed mobility training;gait training;strengthening;transfer training;neuromuscular re-education;patient/family education;progressive activity/exercise;stair training   Risk & Benefits of therapy have been explained evaluation/treatment results reviewed;care plan/treatment goals reviewed;risks/benefits reviewed;current/potential barriers reviewed;participants voiced agreement with care plan;participants included;patient   PT Total Evaluation Time   PT Eval, Low Complexity Minutes (02635) 9   Therapy Certification   Start of care date 04/26/23   Certification date from 04/27/23   Certification date to 05/04/23   Medical Diagnosis Placement   Physical Therapy Goals   PT Frequency 4x/week   PT Predicted Duration/Target Date for Goal Attainment 05/04/23   PT Goals Bed Mobility;Transfers;Gait;Stairs;PT Goal 1;PT Goal 2   PT: Bed Mobility Minimal assist;Supine to/from sit   PT: Transfers Supervision/stand-by assist;Assistive device;Sit to/from stand;Bed to/from chair   PT: Gait Supervision/stand-by assist;Standard walker;150 feet   PT: Stairs 10 stairs;Minimal assist;Rail on right   Interventions   Interventions Quick Adds Gait Training;Therapeutic Activity   Therapeutic Activity   Therapeutic Activities: dynamic activities to improve functional performance Minutes (51285) 23   Symptoms Noted During/After Treatment Fatigue   Treatment Detail/Skilled Intervention Patient supine in bed at beginning of session, agreeable to participate in PT. Significant other present. Patient lethargic at beginning of session. Education on benefits of mobility and encouragement  to mobilize. Patient performing supine to sit transfer with max assist x1 for LE positioning at EOB and to position trunk upright, increased time to complete. Required max assist x1 and repo sheet to scoot hips to EOB. Cues throughout but patient unable to perform secondary to weakness. Patient seated at EOB for prolonged period with SBA-CGA, forward flexed trunk posture. Rehab aideEstela, arrived to session to assist in safe mobility. Patient performing sit<>stand transfers to rahel wes with min assist x1, needing facilitation to grasp rahel lawrence crossbar. LUE weakness and neglect noted, required holding hand on crossbar during transfer. Transferred from bed to recliner with rahel lawrence. Seated in recliner performing sit<>stand tranfser with min-mod assist x1, cues and facilitation for hand placement on armrests. Patient performing sit<>stand from EOB with unilateral bedrail, FWW, and mod assist x1. Cues and facilitation for anterior weight shift, patient with heavy posterior lean upon standing. Increased time to transfer hands to walker. Completed bouts of ambulation in hallway, seated rest break in between. Seated in recliner at end of session with call light next to him, chair alarm on, and significant other present.   Gait Training   Gait Training Minutes (05289) 11   Symptoms Noted During/After Treatment (Gait Training) fatigue   Treatment Detail/Skilled Intervention Patient completing several bouts of ambulation during session for 15', and two bouts of 75' with FWW and min assist x1 with wheelchair follow. L foot placement near midline intermittently resulting in instability and need for assist to maintain balance/stability. Cues for upright posture, forward gaze, and increased step length while ambulating. Needing assist for walker management intermittently. Small shuffling steps noted with turns, changes in floor color, and near doors. Continuous cues for increased step length during session with transient  improvement. seated rest break in between bouts. No overt LOB throughout.   PT Discharge Planning   PT Plan progress gait distance, progress independence with sit<>stands and bed mobility   PT Discharge Recommendation (DC Rec) Transitional Care Facility   PT Rationale for DC Rec Patient below baseline functional status. Presenting with impaired strength, activity tolerance, and balance resulting in the need for assist x1-2 with mobility. Recommend discharge to TCU for improvement in strength, activity tolerance, balance, and independence with functional mobility prior to discharge home   PT Brief overview of current status assist x2 for transfers and gait, w/c follow for ambulation   Total Session Time   Timed Code Treatment Minutes 34   Total Session Time (sum of timed and untimed services) 43     [facility]  OUTPATIENT PHYSICAL THERAPY EVALUATION  PLAN OF TREATMENT FOR OUTPATIENT REHABILITATION  (COMPLETE FOR INITIAL CLAIMS ONLY)  Patient's Last Name, First Name, M.I.  YOB: 1945  YoelStefan MIRANDA                        Provider's Name  [facility] Medical Record No.  [id number]                             Onset Date:  04/26/23   Start of Care Date:  04/26/23   Type:     _X_PT   ___OT   ___SLP Medical Diagnosis:  Placement              PT Diagnosis:  impaired mobility Visits from SOC:  1     See note for plan of treatment, functional goals and certification details    I CERTIFY THE NEED FOR THESE SERVICES FURNISHED UNDER        THIS PLAN OF TREATMENT AND WHILE UNDER MY CARE     (Physician co-signature of this document indicates review and certification of the therapy plan).

## 2023-04-27 NOTE — PLAN OF CARE
Pt A/Ox2 VSS on RA, denies pain, CMS intact, assist of 2, incontinent of bowl and bladder, feeding tube, fluids running, mumbles at baseline

## 2023-04-27 NOTE — PLAN OF CARE
VSS, CMS intact. Up with 2 and rahel lawrence. Left sided weakness. On minced and moist diet with moderate thick liquid, poor-fair appetite, ate 25% of his breakfast. Tube feeding from 6pm-6am, orders in. On calorie count. Garble speech. Incontinent of bladder. A&OX2-3. Back pain managed with Lidocaine patch. Discharge pending placement.

## 2023-04-27 NOTE — CONSULTS
CLINICAL NUTRITION SERVICES  -  ASSESSMENT NOTE    Recommendations Ordered by Registered Dietitian (RD):   Restart TF as follows -     Jevity 1.5 @ 85 ml/hr x 12 hours (6 pm to 6 am)   Provides 1020 mL, 1530 kcal (85% est energy needs), 65 g protein (72% est protein needs), 220 g CHO, 21 g fiber, 775 mL free water.   Free water flushes 300 mL 4x daily to provide a total of 1975 mL free water.     Start Calorie Counts to run 4/27-4/29   Future/Additional Recommendations:   Oral diet per SLP.    Malnutrition: (4/27)   % Weight Loss:  > 7.5% in 3 months (severe malnutrition)  % Intake:  Decreased intake does not meet criteria for malnutrition - TF reliant   Subcutaneous Fat Loss:  Orbital region mild depletion, Upper arm region mild depletion and Thoracic region mild depletion  Muscle Loss:  Temporal region mild depletion, Acromion bone region mild depletion and Dorsal hand region mild depletion  Fluid Retention:  None noted    Malnutrition Diagnosis: Moderate malnutrition  In Context of:  Acute illness or injury     REASON FOR ASSESSMENT  Stefan Diallo is a 77 year old male seen by Registered Dietitian for Provider Order - Registered Dietitian to Assess and Order TF per Medical Nutrition Therapy Protocol    NUTRITION HISTORY  - Information obtained from chart review.   - Reviewed records. Pt was just discharged from [facility] yesterday. He was tolerating a cycled TF schedule as follows:     Jevity 1.5 @ 85 ml/hr x 12 hours (6 pm to 6 am)   Provides 1020 mL, 1530 kcal (85% est energy needs), 65 g protein (72% est protein needs), 220 g CHO, 21 g fiber, 775 mL free water.   Free water flushes 300 mL 4x daily to provide a total of 1975 mL free water.     He is also tolerating small amounts of IDDSI level 5 w/ level 3 liquids.     PMH: CVA 3/2023, went to ARU 3/16. He was re hospitalized due to sepsis, lactic acidosis.     CURRENT NUTRITION ORDERS  Diet Order:     Minced & Moist + Liquidized/Moderately thick     Current  "Intake/Tolerance:  None documented. No TF yet ordered. Suspect TF last ran the evening of 4/25-4/26.     NUTRITION FOCUSED PHYSICAL ASSESSMENT FOR DIAGNOSING MALNUTRITION)  No:  Deferred    Obtained from Chart/Interdisciplinary Team:  3/9 - PEG  3/22 - VFSS    ANTHROPOMETRICS  Height: 6' 1\"  Weight: 197 lbs 4.99 oz (89.5 kg)   Body mass index is 26.03 kg/m .  Weight Status:  Overweight BMI 25-29.9  IBW: 83.6 kg   % IBW: 107%  Weight History: 21# down since 2/1/23. (9.6% in 3 months)  Wt Readings from Last 10 Encounters:   04/27/23 89.5 kg (197 lb 5 oz)   04/25/23 91.3 kg (201 lb 4.5 oz)   04/07/23 96.1 kg (211 lb 13.8 oz)   03/11/23 98 kg (216 lb 0.8 oz)   03/07/23 97.5 kg (215 lb)   02/01/23 98.9 kg (218 lb)   01/20/23 105.7 kg (233 lb)   08/19/22 95.9 kg (211 lb 8 oz)   08/10/22 93.9 kg (207 lb 1.6 oz)   05/21/22 100.7 kg (222 lb)       LABS  Labs reviewed    MEDICATIONS  Medications reviewed  High sliding scale insulin   Culturelle   Glucophage BID   Remeron   NaCl IVF @ 100 mL/hr     ASSESSED NUTRITION NEEDS PER APPROVED PRACTICE GUIDELINES:  Dosing Weight 90 kg   Estimated Energy Needs: 8688-0191 kcals (20-25 Kcal/Kg)  Justification: maintenance  Estimated Protein Needs:  grams protein (1-1.2 g pro/Kg)  Justification: maintenance and preservation of lean body mass  Estimated Fluid Needs: 1 mL/kcal   Justification: maintenance    MALNUTRITION:  % Weight Loss:  > 7.5% in 3 months (severe malnutrition)  % Intake:  Decreased intake does not meet criteria for malnutrition - TF reliant   Subcutaneous Fat Loss:  Orbital region mild depletion, Upper arm region mild depletion and Thoracic region mild depletion  Muscle Loss:  Temporal region mild depletion, Acromion bone region mild depletion and Dorsal hand region mild depletion  Fluid Retention:  None noted    Malnutrition Diagnosis: Moderate malnutrition  In Context of:  Acute illness or injury    NUTRITION DIAGNOSIS:  Inadequate protein-energy intake related to " TF on hold with transfer of care as evidenced by negligible nutrition received the past day, only anything oral.     NUTRITION INTERVENTIONS  Recommendations / Nutrition Prescription  Restart TF as follows -     Jevity 1.5 @ 85 ml/hr x 12 hours (6 pm to 6 am)   Provides 1020 mL, 1530 kcal (85% est energy needs), 65 g protein (72% est protein needs), 220 g CHO, 21 g fiber, 775 mL free water.   Free water flushes 300 mL 4x daily to provide a total of 1975 mL free water.     Calorie Counts 4/27-4/29    Oral diet per SLP.     Implementation  Nutrition education: Per Provider order if indicated   EN Composition, EN Schedule and Feeding Tube Flush: as aboe    Nutrition Goals  TF @ goal to provide at least 70% estimated needs.   PO to improve to at least 50% of adequate meals TID.     MONITORING AND EVALUATION:  Progress towards goals will be monitored and evaluated per protocol and Practice Guidelines    Faviola Mccallum RD, LD  [facility], [facility], [facility], [facility]  Pager: [phone]  Weekend Pager: [phone]

## 2023-04-27 NOTE — PHARMACY-CONSULT NOTE
Pharmacy Tube Feeding Consult    Medication reviewed for administration by feeding tube and for potential food/drug interactions.    Recommendation: No changes are needed at this time. Did change gabapentin capsule to liquid per RN request.     Pharmacy will continue to follow as new medications are ordered.

## 2023-04-28 NOTE — PROGRESS NOTES
VSS, CMS intact. Up with 1-2 and walker. Back pain managed with Lidocaine patch. On minced and moist diet, poor appetite. Incontinent of bowel and bladder. Maceration on coccyx/buttock, medication and mepelix applied per plan of care. Medications given per PEG. A&OX2-3. Discharge to TCU. Left floor via wheelchair.

## 2023-04-28 NOTE — PLAN OF CARE
Pt here for placement. D/O to sit and time. A2 rahel steady. Garbled speech. L side weaker than R. Minced and moist diet with thickened liquids but pt not taking anything PO. Tf infusing at goal rate. BG q4h. T/R. BM+.

## 2023-04-28 NOTE — DISCHARGE SUMMARY
[facility]  Hospitalist Discharge Summary      Date of Admission:  4/26/2023  Date of Discharge:  4/28/2023  4:33 PM  Discharging Provider: Linn Conrad MD  Discharge Service: Hospitalist Service    Discharge Diagnoses      Placement  Recent CVA  Left hemiparesis, impaired balance, impaired coordination, impaired proprioception, dysarthria, dysphagia, moderate cognitive linguistic impairment  Seizure, possible  Recent toxic-metabolic encephalopathy, resolved  Dysphagia, s/p PEG 3/9/2023  Moderate malnutrition  Afib- on Eliquis  HTN  HLP  Chronic RBBB  DM type 2  Vascular dementia  Chronic back pain  Anemia  KISHA  GERD  MDD      Follow-ups Needed After Discharge   Follow-up Appointments     Follow Up and recommended labs and tests      Follow up with Nursing home physician in 2-3 days  The following   labs/tests are recommended: CBC, BMP    Follow up with Neurology.             Unresulted Labs Ordered in the Past 30 Days of this Admission     No orders found from 3/27/2023 to 4/27/2023.      None    Discharge Disposition   Discharged to short-term care facility  Condition at discharge: Stable      Hospital Course   Stefan Diallo is a 77 year old male admitted on 4/26/2023. He presents for placement.   As per H&P done by Dr Jones on 04/27/2023:     Stefan Diallo is a 77 year old male who presents for placement.  Patient has a complex recent medical history.  Been holding warfarin for procedure in late February early March when he had an embolic stroke to his right MCA territory.  He has residual left hemiparesis, dysphagia as well as cognition issues.  He was in an acute rehab when he developed lactic acidosis as well as concerns for sepsis.  He was treated for a UTI although was not clear whether or not he was infected.  He also developed encephalopathy.  He was ultimately discharged on 4/26 to TCU.  However when he arrived the TCU stated they could not handle his tube feeds.   They therefore sent him to [facility] for transfer to a facility where they could do tube feeds.       Placement  - had a recent CVA 3/2023 and was discharged to ARU 3/16. Rehospitalized 4/11 concern for sepsis, lactic acidosis. Pt just discharged to TCU on 4/26/23 from [facility].Please refer to discharge summary from 4/26/23 for the recent hospitalization course.   - However facility he was discharged to cannot do tube feeds so he was sent to [facility] ED. In ED AFVSS. Labs 4/24 reviewed.   -  consult appreciated  - Patient accepted to [facility] in [place]; Kayce, his fiancee was informed and she was happy with this facility.      Recent CVA  Residual left hemiparesis   Seizures, possible   [apixaban, amantadine 100 mg daily (3 more days), levetiracetam 500 mg BID]  *Hospitalized 3/7-3/16/2023 at [facility] after an ischemic CVA of R MCA territory from cardioembolic from afib (warfarin had been on hold for procedure). Received TNK. Residual deficits L hemiparesis, dysarthria, dysphagia and cognitive issues  *4/19 possible seizure while hospitalized. Was started on keppra.  - continue apixaban  - continue keppra  - finished amantadine taper  - continue PT/OT  - outpatient neurology follow up     Recent encephalopathy, toxic metabolic  Reportedly was having hallucinations during recent admission. Psychiatry had seen, tapering off amantadine.   - outpatient f/u with psychiatry  - monitor     Dysphagia s/p PEG placement 3/9/23  Moderate malnutrition  On tube feeds and level 5 diet with mod thick liquids (level 3).   - nutrition for TFs  - level 5 diet with level 3 liquids  - continue SLP in TCU     Atrial fibrillation  [apixaban 5 mg BID, atenolol 12.5 mg daily, diltiazem 60 mg PO q8 hours]  - resume meds     HTN  HLD  Chronic RBB  Prolonged QT  [atenolol 12.5 mg daily, atorvastatin 20 mg at HS, diltiazem 60mg q8]  QTc during recent hospital 472 4/24 (had been 418 4/19)  - resume meds     DM II  Most recent A1C 6.4%  2/2023  - continue metformin 1000 mg BID  - q4 hour BS  - med sliding scale insulin     Vascular dementia  Was seen by neuropsych 3/29, meets criteria for major neurocognitive disorder (dementia) related to CVA hx.      Chronic back pain  Was to have pain pump placement 3/8 at [facility], warfarin had been on hold for this.   - prn acetaminophen  - gabapentin 300 mg at HS  - lidoderm patches     Anemia- repeat hgb in the am  KISHA- not currently on CPAP, hadn't tolerated in past  GERD- protonix 40 mg qam  MDD- continue remeron 15 mg at HS  Hx recurrent UTIs- treated for UTI recent admission    Consultations This Hospital Stay   CARE MANAGEMENT / SOCIAL WORK IP CONSULT  PHYSICAL THERAPY ADULT IP CONSULT  NUTRITION SERVICES ADULT IP CONSULT  PHARMACY IP CONSULT  PHYSICAL THERAPY ADULT IP CONSULT  OCCUPATIONAL THERAPY ADULT IP CONSULT  SPEECH LANGUAGE PATH ADULT IP CONSULT    Code Status   Full Code    Time Spent on this Encounter   I, Linn Conrad MD, personally saw the patient today and spent less than or equal to 30 minutes discharging this patient.       Linn Conrad MD  [facility]  [address] [place] [id number]  Phone: [phone]  Fax: [phone]  ______________________________________________________________________    Physical Exam   Vital Signs: Temp: 97.5  F (36.4  C) Temp src: Oral BP: 121/65 Pulse: 65   Resp: 16 SpO2: 95 % O2 Device: None (Room air)    Weight: 203 lbs 11.28 oz     General Appearance:  Alert, no distress  Respiratory: bilateral air entry, no wheezing, no rales, no crackles  Cardiovascular: RRR, no murmur. No edema  GI: soft, nt/nd  Skin: no lesions grossly. G-tube site c/d/i  Other:  L paresis, very difficult to understand        Primary Care Physician   Collin Singh MD    Discharge Orders      Primary Care - Care Coordination Referral      General info for SNF    Length of Stay Estimate: Short Term Care: Estimated # of Days  <30  Condition at Discharge: Stable  Level of care:skilled   Rehabilitation Potential: Fair  Admission H&P remains valid and up-to-date: Yes  Recent Chemotherapy: N/A  Use Nursing Home Standing Orders: Yes     Mantoux instructions    Give two-step Mantoux (PPD) Per Facility Policy Yes     Follow Up and recommended labs and tests    Follow up with Nursing home physician in 2-3 days  The following labs/tests are recommended: CBC, BMP    Follow up with Neurology.     Reason for your hospital stay    TCU placement     Glucose monitor nursing POCT    Before meals and at bedtime     Activity - Up with nursing assistance     Full Code     Physical Therapy Adult Consult    Evaluate and treat as clinically indicated.    Reason:  CVA     Occupational Therapy Adult Consult    Evaluate and treat as clinically indicated.    Reason:  CVA     Speech Language Path Adult Consult    Evaluate and treat as clinically indicated.    Reason: CVA     Fall precautions     Diet    Follow this diet upon discharge: Orders Placed This Encounter      Adult Formula Drip Feeding: Continuous Jevity 1.5; Gastrostomy; Goal Rate: 85; mL/hr; From: 6:00 PM; To: 6:00 AM      Calorie Counts      Snacks/Supplements Adult: Other; B: beck Ensure Enlive, D: beck Magic Cup; With Meals      Combination Diet Regular Diet Adult; Minced and Moist Diet (level 5); Liquidized/Moderately Thick (level 3)       Significant Results and Procedures   Most Recent 3 CBC's:Recent Labs   Lab Test 04/27/23  0807 04/24/23  0958 04/21/23  0834   WBC 7.2 8.2 8.3   HGB 11.5* 11.0* 11.3*   MCV 93 95 96    243 253     Most Recent 3 BMP's:Recent Labs   Lab Test 04/28/23  0822 04/28/23  0540 04/28/23  0207 04/27/23  0828 04/27/23  0807 04/24/23  1021 04/24/23  0958 04/24/23  0908   NA  --   --   --   --  139  --  142 141   POTASSIUM  --   --   --   --  3.8  --  4.3 4.1   CHLORIDE  --   --   --   --  104  --  105 105   CO2  --   --   --   --  24  --  26 25   BUN  --   --   --    --  15.6  --  24.4* 24.3*   CR  --   --   --   --  0.75  --  0.82 0.82   ANIONGAP  --   --   --   --  11  --  11 11   LUTHER  --   --   --   --  9.2  --  9.7 9.8   * 174* 141*   < > 107*   < > 140* 137*    < > = values in this interval not displayed.     Most Recent 2 LFT's:Recent Labs   Lab Test 04/24/23  0958 04/02/23  1113   AST 18 20   ALT 16 16   ALKPHOS 128 129   BILITOTAL 0.5 0.7     Most Recent 3 INR's:Recent Labs   Lab Test 03/07/23  1312 03/07/23  1047 02/24/23  1411   INR 1.12 1.21* 2.7*     Most Recent 3 Creatinines:Recent Labs   Lab Test 04/27/23  0807 04/24/23  0958 04/24/23  0908   CR 0.75 0.82 0.82     Most Recent 3 Hemoglobins:Recent Labs   Lab Test 04/27/23  0807 04/24/23  0958 04/21/23  0834   HGB 11.5* 11.0* 11.3*     7-Day Micro Results     No results found for the last 168 hours.        Most Recent TSH and T4:Recent Labs   Lab Test 04/16/23  1216   TSH 1.26     Most Recent Hemoglobin A1c:Recent Labs   Lab Test 02/01/23  1339   A1C 6.4*     Most Recent 6 glucoses:Recent Labs   Lab Test 04/28/23  0822 04/28/23  0540 04/28/23  0207 04/27/23  2237 04/27/23  1731 04/27/23  1219   * 174* 141* 113* 108* 144*     Most Recent Urinalysis:Recent Labs   Lab Test 04/11/23  1303 10/30/21  1233 08/16/21  1550   COLOR Yellow   < > Yellow   APPEARANCE Slightly Cloudy*   < > Clear   URINEGLC Negative   < > Negative   URINEBILI Negative   < > Negative   URINEKETONE Negative   < > Negative   SG 1.021   < > 1.020   UBLD Negative   < > Negative   URINEPH 7.5*   < > 5.5   PROTEIN Negative   < > Negative   UROBILINOGEN  --   --  0.2   NITRITE Negative   < > Negative   LEUKEST Trace*   < > Negative   RBCU 5*   < > 0-2   WBCU 9*   < > 0-5    < > = values in this interval not displayed.   ,   Results for orders placed or performed during the hospital encounter of 04/11/23   CT Head w/o Contrast    Narrative    EXAM: CT HEAD W/O CONTRAST  4/17/2023 2:15 PM     HISTORY:  recent RCA stroke, sp tPA, now with  hammucination, increased  slurred speech,       COMPARISON:  CT head 4/2/2023, 3/8/2023    TECHNIQUE: Using multidetector thin collimation helical acquisition  technique, axial, coronal and sagittal CT images from the skull base  to the vertex were obtained without intravenous contrast.   (topogram) image(s) also obtained and reviewed.    FINDINGS:  No acute intracranial hemorrhage, mass effect, or midline shift.  Gliosis/encephalomalacia at the site of the right MCA territory  infarct. Patchy and confluent areas of periventricular and subcortical  white matter hypoattenuation, nonspecific but likely represents  chronic small vessel ischemic disease. No definite new gray-white  matter differentiation. Ventricles are proportionate to the cerebral  sulci. Clear basal cisterns.    The bony calvaria and the bones of the skull base are normal. The  visualized portions of the paranasal sinuses and mastoid air cells are  clear. Bilateral pseudophakia. Scleral banding of the right orbit.      Impression    IMPRESSION:   1. No acute intracranial pathology.   2. Chronic appearing changes of the right MCA territory infarct.     I have personally reviewed the examination and initial interpretation  and I agree with the findings.    ABDELRAHMAN AGUDELO MD         SYSTEM ID:  [id number]   CT Head w/o Contrast    Narrative    CT HEAD W/O CONTRAST 4/19/2023 12:11 PM    History: possible seizure, recent stroke on anticoagulation rule out  bleed     Comparison: 4/17/2023 CT    Technique: Using multidetector thin collimation helical acquisition  technique, axial, coronal and sagittal CT images from the skull base  to the vertex were obtained without intravenous contrast.   (topogram) image(s) also obtained and reviewed.    Findings:     Ongoing sequela of right MCA territory infarct with encephalomalacia  and gliosis involving the right frontal lobe. Additional scattered  areas of hypodensity in the periventricular and subcortical  white  matter which appears similar to prior and is most suggestive of  proximal vessel ischemic disease. No acute intracranial hemorrhage.  Ventricles are proportionate to the cerebral sulci and similar in size  to prior.    No acute calvarial fractures. Visualized paranasal sinuses are clear.  Small right-sided mastoid air cells. Debris in the external auditory  canals. Surgical changes related to right scleral banding and  bilateral pseudophakia.      Impression    Impression:    1. No acute intracranial hemorrhage.  2. Ongoing sequela of previous right MCA territory infarct.  3. Unchanged additional white matter changes most suggestive of  chronic small vessel ischemic disease.     I have personally reviewed the examination and initial interpretation  and I agree with the findings.    TRICIA CAI MD         SYSTEM ID:  [id number]   XR Chest 1 View    Narrative    Chest one view    INDICATION: Cough comment looking for infiltrates    COMPARISON: 4/11/2023    FINDINGS: Heart is mildly enlarged. There is atherosclerotic  calcification of the aortic knob. Lungs and pulmonary vasculature  appear normal on this single view. Degenerative changes are noted at  the right shoulder including high riding humeral head which may  indicate rotator cuff degeneration.      Impression    IMPRESSION: Aortic atherosclerosis with mild cardiomegaly. Right  shoulder degenerative change with possible rotator cuff degeneration.    ADAM GERMAIN MD         SYSTEM ID:  [id number]       Discharge Medications   Current Discharge Medication List      CONTINUE these medications which have CHANGED    Details   atenolol (TENORMIN) 25 MG tablet 0.5 tablets (12.5 mg) by Oral or Feeding Tube route daily    Associated Diagnoses: Chronic atrial fibrillation (H)         CONTINUE these medications which have NOT CHANGED    Details   acetaminophen (TYLENOL) 325 MG/10.15ML solution 20.3 mLs (650 mg) by Per NG tube route every 4 hours as needed  for mild pain or fever (for temperature greater than 100.4 F (38 C) - may also be used for moderate pain)    Associated Diagnoses: Cerebrovascular accident (CVA) due to occlusion of left middle cerebral artery (H)      apixaban ANTICOAGULANT (ELIQUIS) 5 MG tablet 1 tablet (5 mg) by Per Feeding Tube route 2 times daily    Associated Diagnoses: Cerebrovascular accident (CVA) due to occlusion of left middle cerebral artery (H)      atorvastatin (LIPITOR) 20 MG tablet 1 tablet (20 mg) by Oral or Feeding Tube route At Bedtime    Associated Diagnoses: Cerebrovascular accident (CVA) due to occlusion of left middle cerebral artery (H)      diltiazem (CARDIZEM) 60 MG tablet 1 tablet (60 mg) by Per Feeding Tube route every 8 hours    Associated Diagnoses: Cerebrovascular accident (CVA) due to occlusion of left middle cerebral artery (H)      fluticasone (FLONASE) 50 MCG/ACT nasal spray Spray 1 spray into both nostrils daily    Associated Diagnoses: Acute cough      gabapentin (NEURONTIN) 250 MG/5ML solution 6 mLs (300 mg) by NG or Feeding tube route At Bedtime    Associated Diagnoses: Cerebrovascular accident (CVA) due to occlusion of left middle cerebral artery (H)      !! insulin aspart (NOVOLOG PEN) 100 UNIT/ML pen Inject 1-7 Units Subcutaneous 3 times daily (before meals) Correction Scale - MEDIUM INSULIN RESISTANCE DOSING     Do Not give Correction Insulin if Pre-Meal BG less than 140.   For Pre-Meal  - 189 give 1 unit.   For Pre-Meal  - 239 give 2 units.   For Pre-Meal  - 289 give 3 units.   For Pre-Meal  - 339 give 4 units.   For Pre-Meal - 399 give 5 units.   For Pre-Meal -449 give 6 units  For Pre-Meal BG greater than or equal to 450 give 7 units.   To be given with prandial insulin, and based on pre-meal blood glucose.    Notify provider if glucose greater than or equal to 350 mg/dL after administration of correction dose.  If given at mealtime, administer within 30 minutes of  start of meal.  Qty: 15 mL, Refills: 0    Associated Diagnoses: Type II diabetes mellitus with peripheral circulatory disorder (H)      !! insulin aspart (NOVOLOG PEN) 100 UNIT/ML pen Inject 1-5 Units Subcutaneous At Bedtime MEDIUM INSULIN RESISTANCE DOSING    Do Not give Bedtime Correction Insulin if BG less than  200.   For  - 249 give 1 units.   For  - 299 give 2 units.   For  - 349 give 3 units.   For  -399 give 4 units.   For BG greater than or equal to 400 give 5 units.  Notify provider if glucose greater than or equal to 350 mg/dL after administration of correction dose.  If given at mealtime, administer within 30 minutes of start of meal.  Qty: 15 mL, Refills: 0    Associated Diagnoses: Type II diabetes mellitus with peripheral circulatory disorder (H)      LACTOBACILLUS PO Take 1 capsule by mouth daily      levETIRAcetam (KEPPRA) 500 MG tablet 500 mg by Oral or Feeding Tube route 2 times daily      Lidocaine (LIDOCARE) 4 % Patch Place 2 patches onto the skin every 24 hours To prevent lidocaine toxicity, patient should be patch free for 12 hrs daily.    Associated Diagnoses: Chronic bilateral low back pain without sciatica      melatonin 5 MG tablet Take 1 tablet (5 mg) by mouth nightly as needed for sleep    Associated Diagnoses: Cerebrovascular accident (CVA) due to occlusion of left middle cerebral artery (H)      metFORMIN (GLUCOPHAGE) 1000 MG tablet 1 tablet (1,000 mg) by Oral or Feeding Tube route 2 times daily (with meals)    Associated Diagnoses: Type II diabetes mellitus with peripheral circulatory disorder (H)      miconazole (MICATIN) 2 % external cream Apply topically 2 times daily    Associated Diagnoses: Cerebrovascular accident (CVA) due to occlusion of left middle cerebral artery (H)      mirtazapine (REMERON) 15 MG tablet Take 1 tablet (15 mg) by mouth At Bedtime    Associated Diagnoses: Cerebrovascular accident (CVA) due to occlusion of left middle cerebral artery  (H)      pantoprazole (PROTONIX) 2 mg/mL SUSP suspension 20 mLs (40 mg) by Per Feeding Tube route every morning (before breakfast)    Associated Diagnoses: Cerebrovascular accident (CVA) due to occlusion of left middle cerebral artery (H)      senna-docusate (SENOKOT-S/PERICOLACE) 8.6-50 MG tablet 2 tablets by Per Feeding Tube route 2 times daily as needed for constipation    Associated Diagnoses: Cerebrovascular accident (CVA) due to occlusion of left middle cerebral artery (H)      CONTOUR NEXT TEST test strip USE 1 EACH AS DIRECTED DAILY.  Qty: 100 strip, Refills: 5    Associated Diagnoses: Type 2 diabetes mellitus with diabetic peripheral angiopathy without gangrene (H); Encounter for medication refill       !! - Potential duplicate medications found. Please discuss with provider.      STOP taking these medications       amantadine (SYMMETREL) 100 MG capsule Comments:   Reason for Stopping:             Allergies   Allergies   Allergen Reactions     Bees Swelling     Reports using an epi pen if stung

## 2023-04-28 NOTE — PROGRESS NOTES
Care Management Discharge Note    Discharge Date: 05/01/2023       Discharge Disposition: Transitional Care    Discharge Services: None    Discharge DME: None    Discharge Transportation: family or friend will provide    Private pay costs discussed: transportation costs    PAS Confirmation Code: [id number]  Patient/family educated on Medicare website which has current facility and service quality ratings: yes    Education Provided on the Discharge Plan: Yes  Persons Notified of Discharge Plans: yes  Patient/Family in Agreement with the Plan: yes    Handoff Referral Completed:yes    Additional Information:  Patient accepted to [facility] in [place] today. Writer spoke to patient and brynn Devries. Kayce noted that this is her first choice and is very happy with placement at this facility. Facility would like to know if patient's tube feed can be changed to Iso Source 1.5 vs the Jevity 1.5. Call placed to nutrition to ask and writer told that orders will be placed for Jevity or equivalent so he can use what the facility has available.     Writer called [employer] to see if prior authorization can be used that went with patient to TCU and writer was told a new one is needed for [facility] Medicare Advantage plan. Auth received after writer provided clinicals over the phone and auth number is [id number]. This is authorized from 4/28/23-5/2/23.    Writer spoke to patient and Kayce to update that discharge can be today per facility. Writer discussed transport set up vs Kayce driving and she would like a ride to be set up. Writer confirmed that they are aware that the ride will be private pay and Kayce is in agreement. Ride scheduled via wheelchair today between [id number]. Patient, Kayce and bedside nurse updated. Discharge orders requested.   PAS completed.     PAS-RR    D: Per DHS regulation, SW completed and submitted PAS-RR to [place] [employer] Direct Connect via the T[employer] Line.  PAS-RR confirmation # is :  [id number]    I: SW spoke with Kayce and they are aware a PAS-RR has been submitted.  SW reviewed with Kayce that they may be contacted for a follow up appointment within 10 days of hospital discharge if their SNF stay is < 30 days.  Contact information for [employer] was also provided.    A: Kayce verbalized understanding.    P: Further questions may be directed to [employer] Line at #[phone], option #4 for PAS-RR staff.    1415: Orders received for discharge and faxed to facility.    STEVE Reece

## 2023-04-28 NOTE — DISCHARGE INSTRUCTIONS
TF via G-tube Jevity 1.5 (or equivalent, such as Isosource 1.5) at 85 mL/hr x 12 hrs (8 pm - 8 am).  Free H20 300 mL 4x/day.

## 2023-04-28 NOTE — PROGRESS NOTES
Alert, oriented to self. Appears comfortable, slept most of the shift. Garbled speech. L sided weakness. Follows instructions. VSS. Turned and repositioned with assist of 2. TF from 6PM-6AM. IVF infusing at 75 ml/hour. Incontinent of bladder, male external cath in place. Slept most of shift. discontinue pending placement.

## 2023-04-28 NOTE — PROGRESS NOTES
CALORIE COUNT      Approximate Oral Intake for: 4/27  Calories: 271  Protein: 13    TF + PO = 1801 kcal, 78 grams protein      Number of Meals Recorded: 3        Number of Snacks Recorded: 0      TF providing:     Jevity 1.5 @ 85 ml/hr x 12 hours (6 pm to 6 am)   Provides 1020 mL, 1530 kcal (85% est energy needs), 65 g protein (72% est protein needs), 220 g CHO, 21 g fiber, 775 mL free water.   Free water flushes 300 mL 4x daily to provide a total of 1975 mL free water.       Dosing wt: 90 kg    Estimated Needs:    Calories: 6641-6923 kcal  Protein:  grams pro      Summary:   Diet: Minced moist (L5), Moderately thick (L3)    Yesterday, pt met 15% of min energy needs and 14% of min protein needs via PO intake alone  Pt met 100% of min energy needs and 87% of min protein needs from TF + PO intake     Recommendations:   Continue with TF as ordered  Ordered chocolate Ensure Enlive with breakfast and chocolate Magic cup with dinner     Kcal count to continue    Fannie Estrada, RD, LD

## 2023-04-28 NOTE — PLAN OF CARE
Shift: 1500 - 1930  Vitals: VSS ex soft BP on RA  Pain: Lower back pain. PRNs offered, pt refused  IV Access: PIV infusing NS @ 75 mL/hr  Cognitive/Behavioral: A&Ox2, disoriented to time and situation. Confused/lethargic. Garbled, slurred speech. Calm/cooperative  Respiratory: WDL ex infrequent unproductive cough  Cardiac WDL  Neurovascular: WDL   GI/: Incontinent, external catheter, no BM this shift  Skin: Blanchable redness on sacrum/coccyx. PEG in LUQ  Mobility: Assist 2 with rahel steady. Left sided weakness  Diet: Minced and Moist Diet (level 5); Liquidized/Moderately Thick (level 3), poor appetite. Tube feeding started at 1800  D/C Plan: Pending placement - SW following

## 2023-05-01 NOTE — PLAN OF CARE
Physical Therapy Discharge Summary    Reason for therapy discharge:    Discharged to transitional care facility.    Progress towards therapy goal(s). See goals on Care Plan in [facility] electronic health record for goal details.  Goals partially met.  Barriers to achieving goals:   discharge from facility.    Therapy recommendation(s):    Continued therapy is recommended.  Rationale/Recommendations:  cont PT at TCU to further address deficits and optimize functional recovery.

## 2023-05-02 PROBLEM — M86.9 OSTEOMYELITIS OF TOE (H): Status: RESOLVED | Noted: 2021-10-31 | Resolved: 2023-01-01

## 2023-05-02 NOTE — PROGRESS NOTES
[facility] GERIATRIC SERVICES       Patient Stefan Diallo  MRN: [id number]        Reason for Visit     Chief Complaint   Patient presents with     Hospital F/U       Code Status     CPR/Full code     Assessment     Acute CVA in March 23  Recurrent hospitalizations related to sepsis as well as feeding tube malfunction  Seizure disorder  Dysphagia with malnutrition  Atrial fibrillation  Hypertension  Hyperlipidemia  Diabetes type 2 with last A1c of 6.4  Vascular dementia  History of chronic back pain  Prolonged QT  Anemia  GERD.    KISHA intolerant of CPAP  Obesity  Generalized weakness    Plan     Pt is admitted to TCU for strengthening and rehab.  Examined in the presence of his significant other  She has been his main caregiver  Currently discharged on tube feeding overnight which she is tolerating well  Diet upgraded to honey thickened and will be given feeding trials monitor  Work with SLP  Update diet and stop tube feeding as able  Discontinue POCT Glucose checks.  Continue twice daily checks only and discontinue them if blood sugars have been stable  He is diet controlled  AFIB controlled with atenolol and diltiazem  He is on apixaban  Rehab potential guarded;  Eli Devries at the bedside, endorses that he has had a significant decline from his baseline with this CVA.   Dave discussion about poor rehab potential but she is still hopeful for a return to home w/ her giving 24 hr cares.  Currently he is requiring a Kashif and full cares with all ADLs      History     Patient is a very pleasant 77 year old male who is admitted to TCU after 07-Mar-2023 to 16-Mar-2023 at [facility] with acute ischemic stroke of right MCA territory due to cardioembolism.     Subjectively feels fatigued and weak, has been able to participate with therapies but with heavy assist.  Has not been able to adjust positive in bed, has a call button clipped to shirt to call for help.  Endorses significant lower back pain that was  previously responsive to lidocaine patch. Lifting to get out of bed has aggravated this.  Is tube feeding, has had weight loss, decreased appetite and difficulties swallowing.        Past Medical & Surgical History     PAST MEDICAL HISTORY:   Past Medical History:   Diagnosis Date     Arthritis      Atrial fibrillation (H)      Bacteremia      Bell's palsy 2015     BPH (benign prostatic hyperplasia)      Diabetes (H)      Gastroesophageal reflux disease      GERD (gastroesophageal reflux disease)      GI hemorrhage      High cholesterol      Hyperlipemia      Hyperlipidemia      Hypertension      Hypertension      Idiopathic peripheral neuropathy      Irregular heart beat     chronic at fib     Renal artery aneurysm (H)      Renal artery aneurysm (H)      Sleep apnea     Bipap     Sleep apnea 10/31/2021     Sleep apnea, obstructive     USES BIPAP     Type 2 diabetes mellitus (H)      UTI (lower urinary tract infection)       PAST SURGICAL HISTORY:   has a past surgical history that includes orthopedic surgery (Right); orthopedic surgery (Right); Fusion spine posterior minimally invasive three + levels (N/A, 10/29/2020); REPAIR COMPL ROTATOR CUFF AVULSN,CHR; Transrectal Ultrasonic, Transurethral Resection (TUR) Of Prostate Cyst; Amputate foot (Right, 5/6/2019); Tenotomy Achilles Tendon; and IR Gastrostomy Tube Percutaneous Plcmnt (3/9/2023).      Past Social History     Reviewed,  reports that he quit smoking about 33 years ago. His smoking use included cigarettes and cigarettes. He has a 54.00 pack-year smoking history. He has never used smokeless tobacco. He reports that he does not drink alcohol and does not use drugs.    Family History     Reviewed, and family history includes Atrial fibrillation in his father; Coronary Artery Disease in his father and mother.    Medication List     Current Outpatient Medications   Medication     acetaminophen (TYLENOL) 325 MG/10.15ML solution     apixaban ANTICOAGULANT (ELIQUIS)  5 MG tablet     atenolol (TENORMIN) 25 MG tablet     atorvastatin (LIPITOR) 20 MG tablet     CONTOUR NEXT TEST test strip     diltiazem (CARDIZEM) 60 MG tablet     fluticasone (FLONASE) 50 MCG/ACT nasal spray     gabapentin (NEURONTIN) 250 MG/5ML solution     glucagon 1 MG SOLR injection     insulin aspart (NOVOLOG PEN) 100 UNIT/ML pen     insulin aspart (NOVOLOG PEN) 100 UNIT/ML pen     LACTOBACILLUS PO     levETIRAcetam (KEPPRA) 500 MG tablet     Lidocaine (LIDOCARE) 4 % Patch     melatonin 5 MG tablet     metFORMIN (GLUCOPHAGE) 1000 MG tablet     miconazole (MICATIN) 2 % external cream     mirtazapine (REMERON) 15 MG tablet     Nutritional Supplements (ISOSOURCE) LIQD     omeprazole (PRILOSEC) 20 MG DR capsule     senna-docusate (SENOKOT-S/PERICOLACE) 8.6-50 MG tablet     pantoprazole (PROTONIX) 2 mg/mL SUSP suspension     No current facility-administered medications for this visit.      MED REC REQUIRED  Post Medication Reconciliation Status: discharge medications reconciled, continue medications without change       Allergies     Allergies   Allergen Reactions     Bees Swelling     Reports using an epi pen if stung       Review of Systems   A comprehensive review of 14 systems was done. Pertinent findings noted here and in history of present illness. All the rest negative.  Constitutional: Negative for fever, chills. Decreased activity, decreased appetite and fatigue.   HENT: Negative for congestion and facial swelling.    Eyes: Negative for photophobia, redness and visual disturbance.   Respiratory: Positive for dry-nonproductive cough    Cardiovascular: Negative for chest pain, palpitations and leg swelling.   Gastrointestinal: Negative for nausea, diarrhea, constipation, blood in stool and abdominal distention.   Genitourinary: Negative.    Musculoskeletal: Lower back pain.  Weakness on the left side, difficulty swallowing.  Skin: Negative.    Neurological: Negative for dizziness, tremors, syncope,  "light-headedness and headaches. Weakness more pronounced on left upper/lower extremities.   Hematological: Does not bruise/bleed easily.   Psychiatric/Behavioral: Negative.        Physical Exam   /68   Pulse 94   Temp 97.5  F (36.4  C)   Resp 20   Ht 1.854 m (6' 1\")   Wt 89.4 kg (197 lb)   SpO2 94%   BMI 25.99 kg/m       Constitutional: Oriented to person, place, and time and appears well-developed.   HEENT:  Normocephalic and atraumatic.  Eyes: Conjunctivae and EOM are normal. Pupils are equal, round, and reactive to light. No discharge.  No scleral icterus. Nose normal. Mouth/Throat: Dentures in mouth. Oropharynx is clear and dry. No oropharyngeal exudate.    NECK: Normal range of motion. No JVD present. No tracheal deviation present. No thyromegaly present.   CARDIOVASCULAR: Normal rate, regular rhythm and intact distal pulses.  Exam reveals no gallop and no friction rub.  Systolic murmur present.  PULMONARY: Cough w/ scant sputum. Effort normal and breath sounds with scattered crackles. No respiratory distress.No Wheezing or rales.  ABDOMEN: G-tube in place. Soft. Bowel sounds are normal. No distension and no mass. There is no tenderness. There is no rebound and no guarding. No HSM.  MUSCULOSKELETAL: Significant limited range of motion in both shoulders, 3/5 strength in left arm extension/flexion/. Unable to test either leg 2/2 back pain.  LYMPH NODES: Has no cervical, supraclavicular, axillary adenopathy.   NEUROLOGICAL: Alert and oriented to person, place, and time. No cranial nerve deficit.  Normal muscle tone. Coordination impaired, dysdiadokokinesia with rapid movements and finger to nose.  GENITOURINARY: Deferred exam.  SKIN: Skin is warm and dry. No rash noted. No erythema. No pallor.   EXTREMITIES: No cyanosis, no clubbing, no edema. No Deformity.  PSYCHIATRIC: Normal mood, affect and behavior.    Lab Results     Recent Results (from the past 120 hour(s))   Glucose by meter    Collection " Time: 04/27/23  5:31 PM   Result Value Ref Range    GLUCOSE BY METER POCT 108 (H) 70 - 99 mg/dL   Glucose by meter    Collection Time: 04/27/23 10:37 PM   Result Value Ref Range    GLUCOSE BY METER POCT 113 (H) 70 - 99 mg/dL   Glucose by meter    Collection Time: 04/28/23  2:07 AM   Result Value Ref Range    GLUCOSE BY METER POCT 141 (H) 70 - 99 mg/dL   Glucose by meter    Collection Time: 04/28/23  5:40 AM   Result Value Ref Range    GLUCOSE BY METER POCT 174 (H) 70 - 99 mg/dL   Glucose by meter    Collection Time: 04/28/23  8:22 AM   Result Value Ref Range    GLUCOSE BY METER POCT 139 (H) 70 - 99 mg/dL       Dirk Jade PGY3  [facility]    And was seen independently and examined in the presence of the resident also.  Care concerns were reviewed.  Currently mobility is limited with significant left-sided hemiparesis.  He requires a Kashif and complete assistance with all ADLs.  Dysphagia concerns will be reviewed with the speech therapist and his diet is being upgraded.  Monitor blood pressures.  Continue with his rehab he may need placement however family still hopeful if they can discharge him home on a higher level of care if he gets to function better    Electronically signed by    Beti Perez MD

## 2023-05-02 NOTE — LETTER
5/2/2023        RE: Stefan Diallo  [address] [place] [address]        [facility] GERIATRIC SERVICES       Patient Stefan Diallo  MRN: [id number]        Reason for Visit     Chief Complaint   Patient presents with     Hospital F/U       Code Status     CPR/Full code     Assessment     Acute CVA in March 23  Recurrent hospitalizations related to sepsis as well as feeding tube malfunction  Seizure disorder  Dysphagia with malnutrition  Atrial fibrillation  Hypertension  Hyperlipidemia  Diabetes type 2 with last A1c of 6.4  Vascular dementia  History of chronic back pain  Prolonged QT  Anemia  GERD.    KISHA intolerant of CPAP  Obesity  Generalized weakness    Plan     Pt is admitted to TCU for strengthening and rehab.  Examined in the presence of his significant other  She has been his main caregiver  Currently discharged on tube feeding overnight which she is tolerating well  Diet upgraded to honey thickened and will be given feeding trials monitor  Work with SLP  Update diet and stop tube feeding as able  Discontinue POCT Glucose checks.  Continue twice daily checks only and discontinue them if blood sugars have been stable  He is diet controlled  AFIB controlled with atenolol and diltiazem  He is on apixaban  Rehab potential guarded;  Eli Devries at the bedside, endorses that he has had a significant decline from his baseline with this CVA.   Dave discussion about poor rehab potential but she is still hopeful for a return to home w/ her giving 24 hr cares.  Currently he is requiring a Kashif and full cares with all ADLs      History     Patient is a very pleasant 77 year old male who is admitted to TCU after 07-Mar-2023 to 16-Mar-2023 at [facility] with acute ischemic stroke of right MCA territory due to cardioembolism.     Subjectively feels fatigued and weak, has been able to participate with therapies but with heavy assist.  Has not been able to adjust positive in bed, has a call button  clipped to shirt to call for help.  Endorses significant lower back pain that was previously responsive to lidocaine patch. Lifting to get out of bed has aggravated this.  Is tube feeding, has had weight loss, decreased appetite and difficulties swallowing.        Past Medical & Surgical History     PAST MEDICAL HISTORY:   Past Medical History:   Diagnosis Date     Arthritis      Atrial fibrillation (H)      Bacteremia      Bell's palsy 2015     BPH (benign prostatic hyperplasia)      Diabetes (H)      Gastroesophageal reflux disease      GERD (gastroesophageal reflux disease)      GI hemorrhage      High cholesterol      Hyperlipemia      Hyperlipidemia      Hypertension      Hypertension      Idiopathic peripheral neuropathy      Irregular heart beat     chronic at fib     Renal artery aneurysm (H)      Renal artery aneurysm (H)      Sleep apnea     Bipap     Sleep apnea 10/31/2021     Sleep apnea, obstructive     USES BIPAP     Type 2 diabetes mellitus (H)      UTI (lower urinary tract infection)       PAST SURGICAL HISTORY:   has a past surgical history that includes orthopedic surgery (Right); orthopedic surgery (Right); Fusion spine posterior minimally invasive three + levels (N/A, 10/29/2020); REPAIR COMPL ROTATOR CUFF AVULSN,CHR; Transrectal Ultrasonic, Transurethral Resection (TUR) Of Prostate Cyst; Amputate foot (Right, 5/6/2019); Tenotomy Achilles Tendon; and IR Gastrostomy Tube Percutaneous Plcmnt (3/9/2023).      Past Social History     Reviewed,  reports that he quit smoking about 33 years ago. His smoking use included cigarettes and cigarettes. He has a 54.00 pack-year smoking history. He has never used smokeless tobacco. He reports that he does not drink alcohol and does not use drugs.    Family History     Reviewed, and family history includes Atrial fibrillation in his father; Coronary Artery Disease in his father and mother.    Medication List     Current Outpatient Medications   Medication      acetaminophen (TYLENOL) 325 MG/10.15ML solution     apixaban ANTICOAGULANT (ELIQUIS) 5 MG tablet     atenolol (TENORMIN) 25 MG tablet     atorvastatin (LIPITOR) 20 MG tablet     CONTOUR NEXT TEST test strip     diltiazem (CARDIZEM) 60 MG tablet     fluticasone (FLONASE) 50 MCG/ACT nasal spray     gabapentin (NEURONTIN) 250 MG/5ML solution     glucagon 1 MG SOLR injection     insulin aspart (NOVOLOG PEN) 100 UNIT/ML pen     insulin aspart (NOVOLOG PEN) 100 UNIT/ML pen     LACTOBACILLUS PO     levETIRAcetam (KEPPRA) 500 MG tablet     Lidocaine (LIDOCARE) 4 % Patch     melatonin 5 MG tablet     metFORMIN (GLUCOPHAGE) 1000 MG tablet     miconazole (MICATIN) 2 % external cream     mirtazapine (REMERON) 15 MG tablet     Nutritional Supplements (ISOSOURCE) LIQD     omeprazole (PRILOSEC) 20 MG DR capsule     senna-docusate (SENOKOT-S/PERICOLACE) 8.6-50 MG tablet     pantoprazole (PROTONIX) 2 mg/mL SUSP suspension     No current facility-administered medications for this visit.      MED REC REQUIRED  Post Medication Reconciliation Status: discharge medications reconciled, continue medications without change       Allergies     Allergies   Allergen Reactions     Bees Swelling     Reports using an epi pen if stung       Review of Systems   A comprehensive review of 14 systems was done. Pertinent findings noted here and in history of present illness. All the rest negative.  Constitutional: Negative for fever, chills. Decreased activity, decreased appetite and fatigue.   HENT: Negative for congestion and facial swelling.    Eyes: Negative for photophobia, redness and visual disturbance.   Respiratory: Positive for dry-nonproductive cough    Cardiovascular: Negative for chest pain, palpitations and leg swelling.   Gastrointestinal: Negative for nausea, diarrhea, constipation, blood in stool and abdominal distention.   Genitourinary: Negative.    Musculoskeletal: Lower back pain.  Weakness on the left side, difficulty  "swallowing.  Skin: Negative.    Neurological: Negative for dizziness, tremors, syncope, light-headedness and headaches. Weakness more pronounced on left upper/lower extremities.   Hematological: Does not bruise/bleed easily.   Psychiatric/Behavioral: Negative.        Physical Exam   /68   Pulse 94   Temp 97.5  F (36.4  C)   Resp 20   Ht 1.854 m (6' 1\")   Wt 89.4 kg (197 lb)   SpO2 94%   BMI 25.99 kg/m       Constitutional: Oriented to person, place, and time and appears well-developed.   HEENT:  Normocephalic and atraumatic.  Eyes: Conjunctivae and EOM are normal. Pupils are equal, round, and reactive to light. No discharge.  No scleral icterus. Nose normal. Mouth/Throat: Dentures in mouth. Oropharynx is clear and dry. No oropharyngeal exudate.    NECK: Normal range of motion. No JVD present. No tracheal deviation present. No thyromegaly present.   CARDIOVASCULAR: Normal rate, regular rhythm and intact distal pulses.  Exam reveals no gallop and no friction rub.  Systolic murmur present.  PULMONARY: Cough w/ scant sputum. Effort normal and breath sounds with scattered crackles. No respiratory distress.No Wheezing or rales.  ABDOMEN: G-tube in place. Soft. Bowel sounds are normal. No distension and no mass. There is no tenderness. There is no rebound and no guarding. No HSM.  MUSCULOSKELETAL: Significant limited range of motion in both shoulders, 3/5 strength in left arm extension/flexion/. Unable to test either leg 2/2 back pain.  LYMPH NODES: Has no cervical, supraclavicular, axillary adenopathy.   NEUROLOGICAL: Alert and oriented to person, place, and time. No cranial nerve deficit.  Normal muscle tone. Coordination impaired, dysdiadokokinesia with rapid movements and finger to nose.  GENITOURINARY: Deferred exam.  SKIN: Skin is warm and dry. No rash noted. No erythema. No pallor.   EXTREMITIES: No cyanosis, no clubbing, no edema. No Deformity.  PSYCHIATRIC: Normal mood, affect and behavior.    Lab " Results     Recent Results (from the past 120 hour(s))   Glucose by meter    Collection Time: 04/27/23  5:31 PM   Result Value Ref Range    GLUCOSE BY METER POCT 108 (H) 70 - 99 mg/dL   Glucose by meter    Collection Time: 04/27/23 10:37 PM   Result Value Ref Range    GLUCOSE BY METER POCT 113 (H) 70 - 99 mg/dL   Glucose by meter    Collection Time: 04/28/23  2:07 AM   Result Value Ref Range    GLUCOSE BY METER POCT 141 (H) 70 - 99 mg/dL   Glucose by meter    Collection Time: 04/28/23  5:40 AM   Result Value Ref Range    GLUCOSE BY METER POCT 174 (H) 70 - 99 mg/dL   Glucose by meter    Collection Time: 04/28/23  8:22 AM   Result Value Ref Range    GLUCOSE BY METER POCT 139 (H) 70 - 99 mg/dL       Dirk Jade PGY3  [facility]    And was seen independently and examined in the presence of the resident also.  Care concerns were reviewed.  Currently mobility is limited with significant left-sided hemiparesis.  He requires a Kashif and complete assistance with all ADLs.  Dysphagia concerns will be reviewed with the speech therapist and his diet is being upgraded.  Monitor blood pressures.  Continue with his rehab he may need placement however family still hopeful if they can discharge him home on a higher level of care if he gets to function better    Electronically signed by    Beti Perez MD                             Sincerely,        KEVIN Casey

## 2023-05-04 NOTE — PROGRESS NOTES
[facility] GERIATRIC SERVICES  Chief Complaint   Patient presents with     Hospital F/U     [facility] Record Number:  [id number]  Place of Service where encounter took place:  [facility] ([facility]) [[id number]]  Code Status:  Full Code     HISTORY:      HPI:  Stefan Diallo  is 77 year old (1945) undergoing physical and occupational therapy. Excerpted from records He has multiple medical problems including diabetes mellitus type II, atrial fibrillation, hypertension and hyperlipidemia. 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 During this hospital stay also had possible early sepsis secondary to aspiration pneumonia. He was transferred to acute rehabilitation unit on 16-Mar-2023.      Had a prolonged rehabilitation course complicated by the extent of his deficits and fluctuating levels of alertness. Was treated for urinary tract infection which was associated with significant functional decline and worsened encephalopathy after prior improvement. Patient reportedly improved after several days of treatment but then develop recurrent leucocytosis, worsening confusion and lactic acidosis.      Earlier on day of admission rapid response was called. He  was started on empiric antibiotics, IV fluids  for lactic acidosis. Lactic acid increased following IV fluid bolus and patient was transferred to the medical unit for further evaluation and treatment.      Current concern given recurrent leucocytosis and lactic acidosis is for sepsis though dehydration is another possibility. There is also concern for recurrent urinary tract infection.     From chart review, patient completed a 7 day course of antibiotics for E. coli urinary tract infection on 08-Apr-2023 - patient was started on iv zosyn on 02-Apr, transitioned to ceftriaxone on 04-Apr and then  transitioned to cefdinir on 06-Apr-2023.      Today he was seen at the bedside to review vital signs, labs, a routine visit and to establish care.  His fiancée was at the bedside.  His review of systems is limited today.  He is not very communicative and he does softly mumble words however incomprehensible although I am told when he is up he can say words and at times very clear.  He did shake his head no when asked if he was having any pain.  Currently on regular diet honey thickened liquids along with tube feedings 85 mils x12 hours.  I am told his p.o. appetite is poor.  He does have a swallow eval pending.  He will follow-up with neurology and psychiatry.  Labs reviewed last A1c 6.4, hemoglobin 11.5 and BMP within normal limits. 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      ALLERGIES:Bees    PAST MEDICAL HISTORY:   Past Medical History:   Diagnosis Date     Arthritis      Atrial fibrillation (H)      Bacteremia      Bell's palsy 2015     BPH (benign prostatic hyperplasia)      Diabetes (H)      Gastroesophageal reflux disease      GERD (gastroesophageal reflux disease)      GI hemorrhage      High cholesterol      Hyperlipemia      Hyperlipidemia      Hypertension      Hypertension      Idiopathic peripheral neuropathy      Irregular heart beat     chronic at fib     Renal artery aneurysm (H)      Renal artery aneurysm (H)      Sleep apnea     Bipap     Sleep apnea 10/31/2021     Sleep apnea, obstructive     USES BIPAP     Type 2 diabetes mellitus (H)      UTI (lower urinary tract infection)        PAST SURGICAL HISTORY:   has a past surgical history that includes orthopedic surgery (Right); orthopedic surgery (Right); Fusion spine posterior minimally invasive three + levels (N/A, 10/29/2020); REPAIR COMPL ROTATOR CUFF AVULSN,CHR; Transrectal Ultrasonic, Transurethral Resection (TUR) Of Prostate Cyst; Amputate foot (Right, 5/6/2019); Tenotomy Achilles Tendon; and IR Gastrostomy Tube Percutaneous Plcmnt  (3/9/2023).    FAMILY HISTORY: family history includes Atrial fibrillation in his father; Coronary Artery Disease in his father and mother.    SOCIAL HISTORY:  reports that he quit smoking about 33 years ago. His smoking use included cigarettes and cigarettes. He has a 54.00 pack-year smoking history. He has never used smokeless tobacco. He reports that he does not drink alcohol and does not use drugs.    ROS:Limited , Information from chart, nursing and fiance.   Constitutional: Negative for activity change, appetite change, fatigue and fever. On TF and diet however PO intake poor  HENT: Negative for congestion.    Respiratory: Negative for cough, shortness of breath and wheezing.    Cardiovascular: Negative for chest pain and leg swelling.   Gastrointestinal: Negative for abdominal distention, abdominal pain, constipation, diarrhea and nausea. No loose stools reported  Genitourinary: DANIEL  Musculoskeletal: DANIEL   Skin: Negative for color change and wound.   Neurological: Negative for dizziness.   Psychiatric/Behavioral: Negative for agitation, behavioral problems and confusion.     Physical Exam:  Constitutional:       Appearance: Patient is well-developed. Appetite poor   HENT:      Head: Normocephalic.   Eyes:      Conjunctiva/sclera: Conjunctivae normal.   Neck:      Musculoskeletal: Normal range of motion.   Cardiovascular:      Rate and Rhythm: Normal rate and regular rhythm.      Heart sounds: Normal heart sounds.   Pulmonary:      Effort: No respiratory distress.      Breath sounds: Normal breath sounds. No wheezing or rales.   Abdominal:      General: Bowel sounds are normal. There is no distension.      Palpations: Abdomen is soft.      Tenderness: There is no abdominal tenderness.   Musculoskeletal:       Normal range of motion.     Skin:General:        Skin is warm.   Neurological:         Mental Status: Patient is alert and oriented to person, place, and time.   Psychiatric:         Behavior: Behavior  "normal.     Vitals:/72   Pulse 91   Temp 97.2  F (36.2  C)   Resp 20   Ht 1.854 m (6' 1\")   Wt 89.4 kg (197 lb 3.2 oz)   SpO2 96%   BMI 26.02 kg/m   and Body mass index is 26.02 kg/m .    Lab/Diagnostic data:   No results found for this or any previous visit (from the past 240 hour(s)).    MEDICATIONS:     Review of your medicines          Accurate as of May 4, 2023 11:59 PM. If you have any questions, ask your nurse or doctor.            CONTINUE these medicines which may have CHANGED, or have new prescriptions. If we are uncertain of the size of tablets/capsules you have at home, strength may be listed as something that might have changed.      Dose / Directions   insulin aspart 100 UNIT/ML pen  Commonly known as: NovoLOG PEN  This may have changed: Another medication with the same name was removed. Continue taking this medication, and follow the directions you see here.  Used for: Type II diabetes mellitus with peripheral circulatory disorder (H)  Changed by: Azalea Yu CNP      Dose: 1-7 Units  Inject 1-7 Units Subcutaneous 3 times daily (before meals) Correction Scale - MEDIUM INSULIN RESISTANCE DOSING     Do Not give Correction Insulin if Pre-Meal BG less than 140.   For Pre-Meal  - 189 give 1 unit.   For Pre-Meal  - 239 give 2 units.   For Pre-Meal  - 289 give 3 units.   For Pre-Meal  - 339 give 4 units.   For Pre-Meal - 399 give 5 units.   For Pre-Meal -449 give 6 units  For Pre-Meal BG greater than or equal to 450 give 7 units.   To be given with prandial insulin, and based on pre-meal blood glucose.    Notify provider if glucose greater than or equal to 350 mg/dL after administration of correction dose.  If given at mealtime, administer within 30 minutes of start of meal.  Quantity: 15 mL  Refills: 0        CONTINUE these medicines which have NOT CHANGED      Dose / Directions   acetaminophen 325 MG/10.15ML solution  Commonly known as: TYLENOL  Used for: " Cerebrovascular accident (CVA) due to occlusion of left middle cerebral artery (H)      Dose: 650 mg  20.3 mLs (650 mg) by Per NG tube route every 4 hours as needed for mild pain or fever (for temperature greater than 100.4 F (38 C) - may also be used for moderate pain)  Refills: 0     apixaban ANTICOAGULANT 5 MG tablet  Commonly known as: ELIQUIS  Indication: Atrial Fibrillation Not Caused By A Heart Valve Problem  Used for: Cerebrovascular accident (CVA) due to occlusion of left middle cerebral artery (H)      Dose: 5 mg  1 tablet (5 mg) by Per Feeding Tube route 2 times daily  Refills: 0     atenolol 25 MG tablet  Commonly known as: TENORMIN  Used for: Chronic atrial fibrillation (H)      Dose: 12.5 mg  0.5 tablets (12.5 mg) by Oral or Feeding Tube route daily  Refills: 0     atorvastatin 20 MG tablet  Commonly known as: LIPITOR  Used for: Cerebrovascular accident (CVA) due to occlusion of left middle cerebral artery (H)      Dose: 20 mg  1 tablet (20 mg) by Oral or Feeding Tube route At Bedtime  Refills: 0     diltiazem 60 MG tablet  Commonly known as: CARDIZEM  Used for: Cerebrovascular accident (CVA) due to occlusion of left middle cerebral artery (H)      Dose: 60 mg  1 tablet (60 mg) by Per Feeding Tube route every 8 hours  Refills: 0     fluticasone 50 MCG/ACT nasal spray  Commonly known as: FLONASE  Used for: Acute cough      Dose: 1 spray  Spray 1 spray into both nostrils daily  Refills: 0     gabapentin 250 MG/5ML solution  Commonly known as: NEURONTIN  Used for: Cerebrovascular accident (CVA) due to occlusion of left middle cerebral artery (H)      Dose: 300 mg  6 mLs (300 mg) by NG or Feeding tube route At Bedtime  Refills: 0     glucagon 1 MG Solr injection      Dose: 1 mg  1 mg once  Refills: 0     isosource Liqd      Refills: 0     LACTOBACILLUS PO      Dose: 1 capsule  Take 1 capsule by mouth daily  Refills: 0     levETIRAcetam 500 MG tablet  Commonly known as: KEPPRA      Dose: 500 mg  500 mg by  Oral or Feeding Tube route 2 times daily  Refills: 0     Lidocaine 4 % Patch  Commonly known as: LIDOCARE  Used for: Chronic bilateral low back pain without sciatica      Dose: 2 patch  Place 2 patches onto the skin every 24 hours To prevent lidocaine toxicity, patient should be patch free for 12 hrs daily.  Refills: 0     melatonin 5 MG tablet  Used for: Cerebrovascular accident (CVA) due to occlusion of left middle cerebral artery (H)      Dose: 5 mg  Take 1 tablet (5 mg) by mouth nightly as needed for sleep  Refills: 0     menthol-zinc oxide 0.44-20.6 % Oint ointment  Commonly known as: CALMOSEPTINE      Apply topically 2 times daily And prn  Refills: 0     metFORMIN 1000 MG tablet  Commonly known as: GLUCOPHAGE  Used for: Type II diabetes mellitus with peripheral circulatory disorder (H)      Dose: 1,000 mg  1 tablet (1,000 mg) by Oral or Feeding Tube route 2 times daily (with meals)  Refills: 0     miconazole 2 % external cream  Commonly known as: MICATIN  Used for: Cerebrovascular accident (CVA) due to occlusion of left middle cerebral artery (H)      Apply topically 2 times daily  Refills: 0     mirtazapine 15 MG tablet  Commonly known as: REMERON  Used for: Cerebrovascular accident (CVA) due to occlusion of left middle cerebral artery (H)      Dose: 15 mg  Take 1 tablet (15 mg) by mouth At Bedtime  Refills: 0     omeprazole 20 MG DR capsule  Commonly known as: priLOSEC      Dose: 20 mg  Take 20 mg by mouth daily  Refills: 0     senna-docusate 8.6-50 MG tablet  Commonly known as: SENOKOT-S/PERICOLACE  Used for: Cerebrovascular accident (CVA) due to occlusion of left middle cerebral artery (H)      Dose: 2 tablet  2 tablets by Per Feeding Tube route 2 times daily as needed for constipation  Refills: 0        STOP taking    Contour Next Test test strip  Generic drug: blood glucose  Stopped by: Azalea Yu CNP               ASSESSMENT/PLAN  Encounter Diagnoses   Name Primary?     Complication of feeding tube  (H) Yes     Type II diabetes mellitus with peripheral circulatory disorder (H)      Physical deconditioning      Diabetes type 2 metformin 1000 mg twice daily, and aspart sliding scale, fingersticks as ordered A1c 2/1/2023 was 6.4    Physical deconditioning PT OT insomnia as needed melatonin    Pain management lidocaine patch, as needed Tylenol    Atrial fibrillation on apixaban twice daily    hypertension continue tenolol    HDL on atorvastatin    History CVA on diltiazem    Neuropathy on gabapentin    Seizures on Keppra      Electronically signed by: Azalea Yu CNP

## 2023-05-04 NOTE — LETTER
5/4/2023        RE: Stefan Diallo  [address] [facility] [address]         HEALTH GERIATRIC SERVICES  Chief Complaint   Patient presents with     Hospital F/U     Success Medical Record Number:  [id number]  Place of Service where encounter took place:  [facility] ([facility]) [[id number]]  Code Status:  Full Code     HISTORY:      HPI:  Stefan Diallo  is 77 year old (1945) undergoing physical and occupational therapy. Excerpted from records He has multiple medical problems including diabetes mellitus type II, atrial fibrillation, hypertension and hyperlipidemia. 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 During this hospital stay also had possible early sepsis secondary to aspiration pneumonia. He was transferred to acute rehabilitation unit on 16-Mar-2023.      Had a prolonged rehabilitation course complicated by the extent of his deficits and fluctuating levels of alertness. Was treated for urinary tract infection which was associated with significant functional decline and worsened encephalopathy after prior improvement. Patient reportedly improved after several days of treatment but then develop recurrent leucocytosis, worsening confusion and lactic acidosis.      Earlier on day of admission rapid response was called. He  was started on empiric antibiotics, IV fluids  for lactic acidosis. Lactic acid increased following IV fluid bolus and patient was transferred to the medical unit for further evaluation and treatment.      Current concern given recurrent leucocytosis and lactic acidosis is for sepsis though dehydration is another possibility. There is also concern for recurrent urinary tract infection.     From chart review, patient completed a 7 day course of antibiotics for E. coli urinary tract infection on 08-Apr-2023 - patient was  started on iv zosyn on 02-Apr, transitioned to ceftriaxone on 04-Apr and then transitioned to cefdinir on 06-Apr-2023.      Today he was seen at the bedside to review vital signs, labs, a routine visit and to establish care.  His fiancée was at the bedside.  His review of systems is limited today.  He is not very communicative and he does softly mumble words however incomprehensible although I am told when he is up he can say words and at times very clear.  He did shake his head no when asked if he was having any pain.  Currently on regular diet honey thickened liquids along with tube feedings 85 mils x12 hours.  I am told his p.o. appetite is poor.  He does have a swallow eval pending.  He will follow-up with neurology and psychiatry.  Labs reviewed last A1c 6.4, hemoglobin 11.5 and BMP within normal limits. 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      ALLERGIES:Bees    PAST MEDICAL HISTORY:   Past Medical History:   Diagnosis Date     Arthritis      Atrial fibrillation (H)      Bacteremia      Bell's palsy 2015     BPH (benign prostatic hyperplasia)      Diabetes (H)      Gastroesophageal reflux disease      GERD (gastroesophageal reflux disease)      GI hemorrhage      High cholesterol      Hyperlipemia      Hyperlipidemia      Hypertension      Hypertension      Idiopathic peripheral neuropathy      Irregular heart beat     chronic at fib     Renal artery aneurysm (H)      Renal artery aneurysm (H)      Sleep apnea     Bipap     Sleep apnea 10/31/2021     Sleep apnea, obstructive     USES BIPAP     Type 2 diabetes mellitus (H)      UTI (lower urinary tract infection)        PAST SURGICAL HISTORY:   has a past surgical history that includes orthopedic surgery (Right); orthopedic surgery (Right); Fusion spine posterior minimally invasive three + levels (N/A, 10/29/2020); REPAIR COMPL ROTATOR CUFF AVULSN,CHR; Transrectal Ultrasonic, Transurethral Resection (TUR) Of Prostate Cyst; Amputate foot (Right,  5/6/2019); Tenotomy Achilles Tendon; and IR Gastrostomy Tube Percutaneous Plcmnt (3/9/2023).    FAMILY HISTORY: family history includes Atrial fibrillation in his father; Coronary Artery Disease in his father and mother.    SOCIAL HISTORY:  reports that he quit smoking about 33 years ago. His smoking use included cigarettes and cigarettes. He has a 54.00 pack-year smoking history. He has never used smokeless tobacco. He reports that he does not drink alcohol and does not use drugs.    ROS:Limited , Information from chart, nursing and fiance.   Constitutional: Negative for activity change, appetite change, fatigue and fever. On TF and diet however PO intake poor  HENT: Negative for congestion.    Respiratory: Negative for cough, shortness of breath and wheezing.    Cardiovascular: Negative for chest pain and leg swelling.   Gastrointestinal: Negative for abdominal distention, abdominal pain, constipation, diarrhea and nausea. No loose stools reported  Genitourinary: DANIEL  Musculoskeletal: DANIEL   Skin: Negative for color change and wound.   Neurological: Negative for dizziness.   Psychiatric/Behavioral: Negative for agitation, behavioral problems and confusion.     Physical Exam:  Constitutional:       Appearance: Patient is well-developed. Appetite poor   HENT:      Head: Normocephalic.   Eyes:      Conjunctiva/sclera: Conjunctivae normal.   Neck:      Musculoskeletal: Normal range of motion.   Cardiovascular:      Rate and Rhythm: Normal rate and regular rhythm.      Heart sounds: Normal heart sounds.   Pulmonary:      Effort: No respiratory distress.      Breath sounds: Normal breath sounds. No wheezing or rales.   Abdominal:      General: Bowel sounds are normal. There is no distension.      Palpations: Abdomen is soft.      Tenderness: There is no abdominal tenderness.   Musculoskeletal:       Normal range of motion.     Skin:General:        Skin is warm.   Neurological:         Mental Status: Patient is alert and  "oriented to person, place, and time.   Psychiatric:         Behavior: Behavior normal.     Vitals:/72   Pulse 91   Temp 97.2  F (36.2  C)   Resp 20   Ht 1.854 m (6' 1\")   Wt 89.4 kg (197 lb 3.2 oz)   SpO2 96%   BMI 26.02 kg/m   and Body mass index is 26.02 kg/m .    Lab/Diagnostic data:   No results found for this or any previous visit (from the past 240 hour(s)).    MEDICATIONS:     Review of your medicines          Accurate as of May 4, 2023 11:59 PM. If you have any questions, ask your nurse or doctor.            CONTINUE these medicines which may have CHANGED, or have new prescriptions. If we are uncertain of the size of tablets/capsules you have at home, strength may be listed as something that might have changed.      Dose / Directions   insulin aspart 100 UNIT/ML pen  Commonly known as: NovoLOG PEN  This may have changed: Another medication with the same name was removed. Continue taking this medication, and follow the directions you see here.  Used for: Type II diabetes mellitus with peripheral circulatory disorder (H)  Changed by: Azalea Yu CNP      Dose: 1-7 Units  Inject 1-7 Units Subcutaneous 3 times daily (before meals) Correction Scale - MEDIUM INSULIN RESISTANCE DOSING     Do Not give Correction Insulin if Pre-Meal BG less than 140.   For Pre-Meal  - 189 give 1 unit.   For Pre-Meal  - 239 give 2 units.   For Pre-Meal  - 289 give 3 units.   For Pre-Meal  - 339 give 4 units.   For Pre-Meal - 399 give 5 units.   For Pre-Meal -449 give 6 units  For Pre-Meal BG greater than or equal to 450 give 7 units.   To be given with prandial insulin, and based on pre-meal blood glucose.    Notify provider if glucose greater than or equal to 350 mg/dL after administration of correction dose.  If given at mealtime, administer within 30 minutes of start of meal.  Quantity: 15 mL  Refills: 0        CONTINUE these medicines which have NOT CHANGED      Dose / Directions "   acetaminophen 325 MG/10.15ML solution  Commonly known as: TYLENOL  Used for: Cerebrovascular accident (CVA) due to occlusion of left middle cerebral artery (H)      Dose: 650 mg  20.3 mLs (650 mg) by Per NG tube route every 4 hours as needed for mild pain or fever (for temperature greater than 100.4 F (38 C) - may also be used for moderate pain)  Refills: 0     apixaban ANTICOAGULANT 5 MG tablet  Commonly known as: ELIQUIS  Indication: Atrial Fibrillation Not Caused By A Heart Valve Problem  Used for: Cerebrovascular accident (CVA) due to occlusion of left middle cerebral artery (H)      Dose: 5 mg  1 tablet (5 mg) by Per Feeding Tube route 2 times daily  Refills: 0     atenolol 25 MG tablet  Commonly known as: TENORMIN  Used for: Chronic atrial fibrillation (H)      Dose: 12.5 mg  0.5 tablets (12.5 mg) by Oral or Feeding Tube route daily  Refills: 0     atorvastatin 20 MG tablet  Commonly known as: LIPITOR  Used for: Cerebrovascular accident (CVA) due to occlusion of left middle cerebral artery (H)      Dose: 20 mg  1 tablet (20 mg) by Oral or Feeding Tube route At Bedtime  Refills: 0     diltiazem 60 MG tablet  Commonly known as: CARDIZEM  Used for: Cerebrovascular accident (CVA) due to occlusion of left middle cerebral artery (H)      Dose: 60 mg  1 tablet (60 mg) by Per Feeding Tube route every 8 hours  Refills: 0     fluticasone 50 MCG/ACT nasal spray  Commonly known as: FLONASE  Used for: Acute cough      Dose: 1 spray  Spray 1 spray into both nostrils daily  Refills: 0     gabapentin 250 MG/5ML solution  Commonly known as: NEURONTIN  Used for: Cerebrovascular accident (CVA) due to occlusion of left middle cerebral artery (H)      Dose: 300 mg  6 mLs (300 mg) by NG or Feeding tube route At Bedtime  Refills: 0     glucagon 1 MG Solr injection      Dose: 1 mg  1 mg once  Refills: 0     isosource Liqd      Refills: 0     LACTOBACILLUS PO      Dose: 1 capsule  Take 1 capsule by mouth daily  Refills: 0      levETIRAcetam 500 MG tablet  Commonly known as: KEPPRA      Dose: 500 mg  500 mg by Oral or Feeding Tube route 2 times daily  Refills: 0     Lidocaine 4 % Patch  Commonly known as: LIDOCARE  Used for: Chronic bilateral low back pain without sciatica      Dose: 2 patch  Place 2 patches onto the skin every 24 hours To prevent lidocaine toxicity, patient should be patch free for 12 hrs daily.  Refills: 0     melatonin 5 MG tablet  Used for: Cerebrovascular accident (CVA) due to occlusion of left middle cerebral artery (H)      Dose: 5 mg  Take 1 tablet (5 mg) by mouth nightly as needed for sleep  Refills: 0     menthol-zinc oxide 0.44-20.6 % Oint ointment  Commonly known as: CALMOSEPTINE      Apply topically 2 times daily And prn  Refills: 0     metFORMIN 1000 MG tablet  Commonly known as: GLUCOPHAGE  Used for: Type II diabetes mellitus with peripheral circulatory disorder (H)      Dose: 1,000 mg  1 tablet (1,000 mg) by Oral or Feeding Tube route 2 times daily (with meals)  Refills: 0     miconazole 2 % external cream  Commonly known as: MICATIN  Used for: Cerebrovascular accident (CVA) due to occlusion of left middle cerebral artery (H)      Apply topically 2 times daily  Refills: 0     mirtazapine 15 MG tablet  Commonly known as: REMERON  Used for: Cerebrovascular accident (CVA) due to occlusion of left middle cerebral artery (H)      Dose: 15 mg  Take 1 tablet (15 mg) by mouth At Bedtime  Refills: 0     omeprazole 20 MG DR capsule  Commonly known as: priLOSEC      Dose: 20 mg  Take 20 mg by mouth daily  Refills: 0     senna-docusate 8.6-50 MG tablet  Commonly known as: SENOKOT-S/PERICOLACE  Used for: Cerebrovascular accident (CVA) due to occlusion of left middle cerebral artery (H)      Dose: 2 tablet  2 tablets by Per Feeding Tube route 2 times daily as needed for constipation  Refills: 0        STOP taking    Contour Next Test test strip  Generic drug: blood glucose  Stopped by: Azalea Yu CNP                ASSESSMENT/PLAN  Encounter Diagnoses   Name Primary?     Complication of feeding tube (H) Yes     Type II diabetes mellitus with peripheral circulatory disorder (H)      Physical deconditioning      Diabetes type 2 metformin 1000 mg twice daily, and aspart sliding scale, fingersticks as ordered A1c 2/1/2023 was 6.4    Physical deconditioning PT OT insomnia as needed melatonin    Pain management lidocaine patch, as needed Tylenol    Atrial fibrillation on apixaban twice daily    hypertension continue tenolol    HDL on atorvastatin    History CVA on diltiazem    Neuropathy on gabapentin    Seizures on Keppra      Electronically signed by: Azalea Yu CNP      Sincerely,        Azalea Yu CNP

## 2023-05-10 NOTE — TELEPHONE ENCOUNTER
[facility] Triage Nurse Telephone Encounter    Provider: JAKOB Cosme  Facility: [facility]  Facility Type:  TCU    Caller: Cynthia  Call Back Number: [phone]    Allergies:    Allergies   Allergen Reactions     Bees Swelling     Reports using an epi pen if stung        Reason for call: Nurse is calling to clarify the Atropine order.      Verbal Order/Direction given by Provider: Discontinue Atropine solutab order.  Start Atropine 1% drops---give 2 drops sublingually BID and BID PRN.    Provider giving Order:  JAKOB Cosme    Verbal Order given to: Cynthia Costa RN

## 2023-05-15 NOTE — PROGRESS NOTES
[facility] GERIATRIC SERVICES  Chief Complaint   Patient presents with     RECHECK     [facility] Record Number:  [id number]  Place of Service where encounter took place:  [facility] (formerly [facility]) ([facility]) [[id number]]  Code Status:  Full Code     HISTORY:      HPI:  Stefan Diallo  is 77 year old (1945) undergoing physical and occupational therapy. Excerpted from records He has multiple medical problems including diabetes mellitus type II, atrial fibrillation, hypertension and hyperlipidemia. Patient had been hospitalized from 07-Mar-2023 to 16-Mar-2023 at [facility] with acute ischemic stroke of right MCA territory due to cardioembolism, did  receive tenecteplase on 07-Mar-2023. During this hospital stay also had possible early sepsis secondary to aspiration pneumonia. He was transferred to acute rehabilitation unit on 16-Mar-2023.      Had a prolonged rehabilitation course complicated by the extent of his deficits and fluctuating levels of alertness. Was treated for urinary tract infection which was associated with significant functional decline and worsened encephalopathy after prior improvement. Patient reportedly improved after several days of treatment but then develop recurrent leucocytosis, worsening confusion and lactic acidosis.      Earlier on day of admission rapid response was called. He  was started on empiric antibiotics, IV fluids  for lactic acidosis. Lactic acid increased following IV fluid bolus and patient was transferred to the medical unit for further evaluation and treatment.      Current concern given recurrent leucocytosis and lactic acidosis is for sepsis though dehydration is another possibility. There is also concern for recurrent urinary tract infection.     From chart review, patient completed a 7 day course of antibiotics for E. coli urinary tract infection on 08-Apr-2023 - patient was started on iv zosyn on 02-Apr, transitioned to ceftriaxone on 04-Apr and then transitioned to  cefdinir on 06-Apr-2023.      Today he was seen at the bedside to review vital signs, labs, a routine visit.  His review of systems is limited today.  He did have a recent F EES and noted to have thick weblike dry secretions.  He was switched to thin liquids.  He continues to cough.  His TF residuals  were 190 and 250 cc however they were checked during his feeding.  His nurse today reports when she checks prior to his feedings he is at 0 and at 0 approximately an hour after his feedings.  He does follow commands however he was not able to lift his left leg off the bed.  When asked if he was having any pain he did shake his head no.  Hospice consult pending.    Labs reviewed last A1c 6.4, hemoglobin 11.5 and BMP within normal limits. 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     ALLERGIES:Bees    PAST MEDICAL HISTORY:   Past Medical History:   Diagnosis Date     Arthritis      Atrial fibrillation (H)      Bacteremia      Bell's palsy 2015     BPH (benign prostatic hyperplasia)      Diabetes (H)      Gastroesophageal reflux disease      GERD (gastroesophageal reflux disease)      GI hemorrhage      High cholesterol      Hyperlipemia      Hyperlipidemia      Hypertension      Hypertension      Idiopathic peripheral neuropathy      Irregular heart beat     chronic at fib     Renal artery aneurysm (H)      Renal artery aneurysm (H)      Sleep apnea     Bipap     Sleep apnea 10/31/2021     Sleep apnea, obstructive     USES BIPAP     Type 2 diabetes mellitus (H)      UTI (lower urinary tract infection)        PAST SURGICAL HISTORY:   has a past surgical history that includes orthopedic surgery (Right); orthopedic surgery (Right); Fusion spine posterior minimally invasive three + levels (N/A, 10/29/2020); REPAIR COMPL ROTATOR CUFF AVULSN,CHR; Transrectal Ultrasonic, Transurethral Resection (TUR) Of Prostate Cyst; Amputate foot (Right, 5/6/2019); Tenotomy Achilles Tendon; and IR Gastrostomy Tube Percutaneous Plcmnt (3/9/2023).    FAMILY  HISTORY: family history includes Atrial fibrillation in his father; Coronary Artery Disease in his father and mother.    SOCIAL HISTORY:  reports that he quit smoking about 33 years ago. His smoking use included cigarettes and cigarettes. He has a 54.00 pack-year smoking history. He has never used smokeless tobacco. He reports that he does not drink alcohol and does not use drugs.    ROS:Limited , Information from chart, nursing    Constitutional: Negative for activity change, appetite change, fatigue and fever. On TF and diet however PO intake poor  HENT: Negative for congestion.    Respiratory: positive  for cough, shortness of breath and wheezing.    Cardiovascular: Negative for chest pain and leg swelling.   Gastrointestinal: Negative for abdominal distention, abdominal pain, constipation, diarrhea and nausea. No loose stools reported  Genitourinary: DANIEL  Musculoskeletal: DANIEL   Skin: Negative for color change and wound.   Neurological: Negative for dizziness.   Psychiatric/Behavioral: Negative for agitation, behavioral problems and confusion.     Physical Exam:  Constitutional:       Appearance: Patient is well-developed. Appetite poor   HENT:      Head: Normocephalic.   Eyes:      Conjunctiva/sclera: Conjunctivae normal.   Neck:      Musculoskeletal: Normal range of motion.   Cardiovascular:      Rate and Rhythm: Normal rate and regular rhythm.      Heart sounds: Normal heart sounds.   Pulmonary:      Effort: No respiratory distress.      Breath sounds: Normal breath sounds. No wheezing or rales.   Abdominal:      General: Bowel sounds are normal. There is no distension.      Palpations: Abdomen is soft.      Tenderness: There is no abdominal tenderness.   Musculoskeletal:       Normal range of motion.     Unable to lift left leg off the bed.  Moves all other 3 extremities  Skin:General:        Skin is warm.   Neurological:         Mental Status: Patient is alert and oriented to person, place, and time.  "  Psychiatric:         Behavior: Behavior normal.     Vitals:/81   Pulse 89   Temp 97.5  F (36.4  C)   Resp 18   Ht 1.854 m (6' 1\")   Wt 89.4 kg (197 lb 3.2 oz)   SpO2 95%   BMI 26.02 kg/m   and Body mass index is 26.02 kg/m .    Lab/Diagnostic data:   No results found for this or any previous visit (from the past 240 hour(s)).    MEDICATIONS:     Review of your medicines          Accurate as of May 16, 2023 11:59 PM. If you have any questions, ask your nurse or doctor.            CONTINUE these medicines which have NOT CHANGED      Dose / Directions   acetaminophen 325 MG/10.15ML solution  Commonly known as: TYLENOL  Used for: Cerebrovascular accident (CVA) due to occlusion of left middle cerebral artery (H)      Dose: 650 mg  20.3 mLs (650 mg) by Per NG tube route every 4 hours as needed for mild pain or fever (for temperature greater than 100.4 F (38 C) - may also be used for moderate pain)  Refills: 0     apixaban ANTICOAGULANT 5 MG tablet  Commonly known as: ELIQUIS  Indication: Atrial Fibrillation Not Caused By A Heart Valve Problem  Used for: Cerebrovascular accident (CVA) due to occlusion of left middle cerebral artery (H)      Dose: 5 mg  1 tablet (5 mg) by Per Feeding Tube route 2 times daily  Refills: 0     atenolol 25 MG tablet  Commonly known as: TENORMIN  Used for: Chronic atrial fibrillation (H)      Dose: 12.5 mg  0.5 tablets (12.5 mg) by Oral or Feeding Tube route daily  Refills: 0     atorvastatin 20 MG tablet  Commonly known as: LIPITOR  Used for: Cerebrovascular accident (CVA) due to occlusion of left middle cerebral artery (H)      Dose: 20 mg  1 tablet (20 mg) by Oral or Feeding Tube route At Bedtime  Refills: 0     atropine 1 % ophthalmic solution      Take 2 drops BID and BID PRN  Refills: 0     diltiazem 60 MG tablet  Commonly known as: CARDIZEM  Used for: Cerebrovascular accident (CVA) due to occlusion of left middle cerebral artery (H)      Dose: 60 mg  1 tablet (60 mg) by Per " Feeding Tube route every 8 hours  Refills: 0     fluticasone 50 MCG/ACT nasal spray  Commonly known as: FLONASE  Used for: Acute cough      Dose: 1 spray  Spray 1 spray into both nostrils daily  Refills: 0     gabapentin 250 MG/5ML solution  Commonly known as: NEURONTIN  Used for: Cerebrovascular accident (CVA) due to occlusion of left middle cerebral artery (H)      Dose: 300 mg  6 mLs (300 mg) by NG or Feeding tube route At Bedtime  Refills: 0     glucagon 1 MG Solr injection      Dose: 1 mg  1 mg once  Refills: 0     insulin aspart 100 UNIT/ML pen  Commonly known as: NovoLOG PEN  Used for: Type II diabetes mellitus with peripheral circulatory disorder (H)      Dose: 1-7 Units  Inject 1-7 Units Subcutaneous 3 times daily (before meals) Correction Scale - MEDIUM INSULIN RESISTANCE DOSING     Do Not give Correction Insulin if Pre-Meal BG less than 140.   For Pre-Meal  - 189 give 1 unit.   For Pre-Meal  - 239 give 2 units.   For Pre-Meal  - 289 give 3 units.   For Pre-Meal  - 339 give 4 units.   For Pre-Meal - 399 give 5 units.   For Pre-Meal -449 give 6 units  For Pre-Meal BG greater than or equal to 450 give 7 units.   To be given with prandial insulin, and based on pre-meal blood glucose.    Notify provider if glucose greater than or equal to 350 mg/dL after administration of correction dose.  If given at mealtime, administer within 30 minutes of start of meal.  Quantity: 15 mL  Refills: 0     isosource Liqd      Refills: 0     LACTOBACILLUS PO      Dose: 1 capsule  Take 1 capsule by mouth daily  Refills: 0     levETIRAcetam 500 MG tablet  Commonly known as: KEPPRA      Dose: 500 mg  500 mg by Oral or Feeding Tube route 2 times daily  Refills: 0     Lidocaine 4 % Patch  Commonly known as: LIDOCARE  Used for: Chronic bilateral low back pain without sciatica      Dose: 2 patch  Place 2 patches onto the skin every 24 hours To prevent lidocaine toxicity, patient should be patch free  for 12 hrs daily.  Refills: 0     melatonin 5 MG tablet  Used for: Cerebrovascular accident (CVA) due to occlusion of left middle cerebral artery (H)      Dose: 5 mg  Take 1 tablet (5 mg) by mouth nightly as needed for sleep  Refills: 0     menthol-zinc oxide 0.44-20.6 % Oint ointment  Commonly known as: CALMOSEPTINE      Apply topically 2 times daily And prn  Refills: 0     metFORMIN 1000 MG tablet  Commonly known as: GLUCOPHAGE  Used for: Type II diabetes mellitus with peripheral circulatory disorder (H)      Dose: 1,000 mg  1 tablet (1,000 mg) by Oral or Feeding Tube route 2 times daily (with meals)  Refills: 0     miconazole 2 % external cream  Commonly known as: MICATIN  Used for: Cerebrovascular accident (CVA) due to occlusion of left middle cerebral artery (H)      Apply topically 2 times daily  Refills: 0     mirtazapine 15 MG tablet  Commonly known as: REMERON  Used for: Cerebrovascular accident (CVA) due to occlusion of left middle cerebral artery (H)      Dose: 15 mg  Take 1 tablet (15 mg) by mouth At Bedtime  Refills: 0     omeprazole 20 MG DR capsule  Commonly known as: priLOSEC      Dose: 20 mg  Take 20 mg by mouth daily  Refills: 0     senna-docusate 8.6-50 MG tablet  Commonly known as: SENOKOT-S/PERICOLACE  Used for: Cerebrovascular accident (CVA) due to occlusion of left middle cerebral artery (H)      Dose: 2 tablet  2 tablets by Per Feeding Tube route 2 times daily as needed for constipation  Refills: 0            ASSESSMENT/PLAN  Encounter Diagnoses   Name Primary?     Type II diabetes mellitus with peripheral circulatory disorder (H) Yes     Physical deconditioning      Cerebrovascular accident (CVA) due to occlusion of left middle cerebral artery (H)      Diabetes type 2 metformin 1000 mg twice daily, and aspart sliding scale, fingersticks as ordered A1c 2/1/2023 was 6.4    Physical deconditioning PT OT    Insomnia as needed melatonin    Pain management lidocaine patch, as needed  Tylenol    Atrial fibrillation on apixaban twice daily    hypertension continue tenolol    HDL on atorvastatin    History CVA on diltiazem    Neuropathy on gabapentin    Seizures on Keppra      The patient is in need of enteral feeding for 90 days or more.  PT has DX of Oropharyngeal dysphagia, Gastrostomy Complication and CVA.  Patient will need this feeding 7 days per week.    Height:  70 inches  Weight:  197.2# on 5/1/23  Required length of need:  more than 90 days.  Possible lifetime.   Swallow study on 5/8/23 shows advancement to NDD2/IDDSI 5  Formula:  Isosource 1.5.  85cc for 12 hours overnight for seven days per week into   G-tube.   Water flushes:  G-tube:  300cc four times/day.  30cc before and after meds.    Has a G-tube   Method:  Bolus Feedings   Provides:  85cc (1530 kcal, 69.4 grams protein, 1979 cc free water). 779cc formula + 1200cc from flushes. Additional 30cc from medication flushes.      Electronically signed by: Azalea Yu CNP

## 2023-05-16 NOTE — LETTER
5/16/2023        RE: Stefan Diallo  [address] [id number]         HEALTH GERIATRIC SERVICES  Chief Complaint   Patient presents with     RECHECK     [place] Medical Record Number:  [id number]  Place of Service where encounter took place:  [facility] ([facility]) [20202]  Code Status:  Full Code     HISTORY:      HPI:  Stefan Diallo  is 77 year old (1945) undergoing physical and occupational therapy. Excerpted from records He has multiple medical problems including diabetes mellitus type II, atrial fibrillation, hypertension and hyperlipidemia. Patient had been hospitalized from 07-Mar-2023 to 16-Mar-2023 at [facility] with acute ischemic stroke of right MCA territory due to cardioembolism, did  receive tenecteplase on 07-Mar-2023. During this hospital stay also had possible early sepsis secondary to aspiration pneumonia. He was transferred to acute rehabilitation unit on 16-Mar-2023.      Had a prolonged rehabilitation course complicated by the extent of his deficits and fluctuating levels of alertness. Was treated for urinary tract infection which was associated with significant functional decline and worsened encephalopathy after prior improvement. Patient reportedly improved after several days of treatment but then develop recurrent leucocytosis, worsening confusion and lactic acidosis.      Earlier on day of admission rapid response was called. He  was started on empiric antibiotics, IV fluids  for lactic acidosis. Lactic acid increased following IV fluid bolus and patient was transferred to the medical unit for further evaluation and treatment.      Current concern given recurrent leucocytosis and lactic acidosis is for sepsis though dehydration is another possibility. There is also concern for recurrent urinary tract infection.     From chart review, patient completed a 7 day course of antibiotics for E. coli urinary tract infection on 08-Apr-2023 - patient was started  on iv zosyn on 02-Apr, transitioned to ceftriaxone on 04-Apr and then transitioned to cefdinir on 06-Apr-2023.      Today he was seen at the bedside to review vital signs, labs, a routine visit.  His review of systems is limited today.  He did have a recent F EES and noted to have thick weblike dry secretions.  He was switched to thin liquids.  He continues to cough.  His TF residuals  were 190 and 250 cc however they were checked during his feeding.  His nurse today reports when she checks prior to his feedings he is at 0 and at 0 approximately an hour after his feedings.  He does follow commands however he was not able to lift his left leg off the bed.  When asked if he was having any pain he did shake his head no.  Hospice consult pending.    Labs reviewed last A1c 6.4, hemoglobin 11.5 and BMP within normal limits. 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     ALLERGIES:Bees    PAST MEDICAL HISTORY:   Past Medical History:   Diagnosis Date     Arthritis      Atrial fibrillation (H)      Bacteremia      Bell's palsy 2015     BPH (benign prostatic hyperplasia)      Diabetes (H)      Gastroesophageal reflux disease      GERD (gastroesophageal reflux disease)      GI hemorrhage      High cholesterol      Hyperlipemia      Hyperlipidemia      Hypertension      Hypertension      Idiopathic peripheral neuropathy      Irregular heart beat     chronic at fib     Renal artery aneurysm (H)      Renal artery aneurysm (H)      Sleep apnea     Bipap     Sleep apnea 10/31/2021     Sleep apnea, obstructive     USES BIPAP     Type 2 diabetes mellitus (H)      UTI (lower urinary tract infection)        PAST SURGICAL HISTORY:   has a past surgical history that includes orthopedic surgery (Right); orthopedic surgery (Right); Fusion spine posterior minimally invasive three + levels (N/A, 10/29/2020); REPAIR COMPL ROTATOR CUFF AVULSN,CHR; Transrectal Ultrasonic, Transurethral Resection (TUR) Of Prostate Cyst; Amputate foot (Right, 5/6/2019);  Tenotomy Achilles Tendon; and IR Gastrostomy Tube Percutaneous Plcmnt (3/9/2023).    FAMILY HISTORY: family history includes Atrial fibrillation in his father; Coronary Artery Disease in his father and mother.    SOCIAL HISTORY:  reports that he quit smoking about 33 years ago. His smoking use included cigarettes and cigarettes. He has a 54.00 pack-year smoking history. He has never used smokeless tobacco. He reports that he does not drink alcohol and does not use drugs.    ROS:Limited , Information from chart, nursing    Constitutional: Negative for activity change, appetite change, fatigue and fever. On TF and diet however PO intake poor  HENT: Negative for congestion.    Respiratory: positive  for cough, shortness of breath and wheezing.    Cardiovascular: Negative for chest pain and leg swelling.   Gastrointestinal: Negative for abdominal distention, abdominal pain, constipation, diarrhea and nausea. No loose stools reported  Genitourinary: DANIEL  Musculoskeletal: DANIEL   Skin: Negative for color change and wound.   Neurological: Negative for dizziness.   Psychiatric/Behavioral: Negative for agitation, behavioral problems and confusion.     Physical Exam:  Constitutional:       Appearance: Patient is well-developed. Appetite poor   HENT:      Head: Normocephalic.   Eyes:      Conjunctiva/sclera: Conjunctivae normal.   Neck:      Musculoskeletal: Normal range of motion.   Cardiovascular:      Rate and Rhythm: Normal rate and regular rhythm.      Heart sounds: Normal heart sounds.   Pulmonary:      Effort: No respiratory distress.      Breath sounds: Normal breath sounds. No wheezing or rales.   Abdominal:      General: Bowel sounds are normal. There is no distension.      Palpations: Abdomen is soft.      Tenderness: There is no abdominal tenderness.   Musculoskeletal:       Normal range of motion.     Unable to lift left leg off the bed.  Moves all other 3 extremities  Skin:General:        Skin is warm.  "  Neurological:         Mental Status: Patient is alert and oriented to person, place, and time.   Psychiatric:         Behavior: Behavior normal.     Vitals:/81   Pulse 89   Temp 97.5  F (36.4  C)   Resp 18   Ht 1.854 m (6' 1\")   Wt 89.4 kg (197 lb 3.2 oz)   SpO2 95%   BMI 26.02 kg/m   and Body mass index is 26.02 kg/m .    Lab/Diagnostic data:   No results found for this or any previous visit (from the past 240 hour(s)).    MEDICATIONS:     Review of your medicines          Accurate as of May 16, 2023 11:59 PM. If you have any questions, ask your nurse or doctor.            CONTINUE these medicines which have NOT CHANGED      Dose / Directions   acetaminophen 325 MG/10.15ML solution  Commonly known as: TYLENOL  Used for: Cerebrovascular accident (CVA) due to occlusion of left middle cerebral artery (H)      Dose: 650 mg  20.3 mLs (650 mg) by Per NG tube route every 4 hours as needed for mild pain or fever (for temperature greater than 100.4 F (38 C) - may also be used for moderate pain)  Refills: 0     apixaban ANTICOAGULANT 5 MG tablet  Commonly known as: ELIQUIS  Indication: Atrial Fibrillation Not Caused By A Heart Valve Problem  Used for: Cerebrovascular accident (CVA) due to occlusion of left middle cerebral artery (H)      Dose: 5 mg  1 tablet (5 mg) by Per Feeding Tube route 2 times daily  Refills: 0     atenolol 25 MG tablet  Commonly known as: TENORMIN  Used for: Chronic atrial fibrillation (H)      Dose: 12.5 mg  0.5 tablets (12.5 mg) by Oral or Feeding Tube route daily  Refills: 0     atorvastatin 20 MG tablet  Commonly known as: LIPITOR  Used for: Cerebrovascular accident (CVA) due to occlusion of left middle cerebral artery (H)      Dose: 20 mg  1 tablet (20 mg) by Oral or Feeding Tube route At Bedtime  Refills: 0     atropine 1 % ophthalmic solution      Take 2 drops BID and BID PRN  Refills: 0     diltiazem 60 MG tablet  Commonly known as: CARDIZEM  Used for: Cerebrovascular accident " (CVA) due to occlusion of left middle cerebral artery (H)      Dose: 60 mg  1 tablet (60 mg) by Per Feeding Tube route every 8 hours  Refills: 0     fluticasone 50 MCG/ACT nasal spray  Commonly known as: FLONASE  Used for: Acute cough      Dose: 1 spray  Spray 1 spray into both nostrils daily  Refills: 0     gabapentin 250 MG/5ML solution  Commonly known as: NEURONTIN  Used for: Cerebrovascular accident (CVA) due to occlusion of left middle cerebral artery (H)      Dose: 300 mg  6 mLs (300 mg) by NG or Feeding tube route At Bedtime  Refills: 0     glucagon 1 MG Solr injection      Dose: 1 mg  1 mg once  Refills: 0     insulin aspart 100 UNIT/ML pen  Commonly known as: NovoLOG PEN  Used for: Type II diabetes mellitus with peripheral circulatory disorder (H)      Dose: 1-7 Units  Inject 1-7 Units Subcutaneous 3 times daily (before meals) Correction Scale - MEDIUM INSULIN RESISTANCE DOSING     Do Not give Correction Insulin if Pre-Meal BG less than 140.   For Pre-Meal  - 189 give 1 unit.   For Pre-Meal  - 239 give 2 units.   For Pre-Meal  - 289 give 3 units.   For Pre-Meal  - 339 give 4 units.   For Pre-Meal - 399 give 5 units.   For Pre-Meal -449 give 6 units  For Pre-Meal BG greater than or equal to 450 give 7 units.   To be given with prandial insulin, and based on pre-meal blood glucose.    Notify provider if glucose greater than or equal to 350 mg/dL after administration of correction dose.  If given at mealtime, administer within 30 minutes of start of meal.  Quantity: 15 mL  Refills: 0     isosource Liqd      Refills: 0     LACTOBACILLUS PO      Dose: 1 capsule  Take 1 capsule by mouth daily  Refills: 0     levETIRAcetam 500 MG tablet  Commonly known as: KEPPRA      Dose: 500 mg  500 mg by Oral or Feeding Tube route 2 times daily  Refills: 0     Lidocaine 4 % Patch  Commonly known as: LIDOCARE  Used for: Chronic bilateral low back pain without sciatica      Dose: 2  patch  Place 2 patches onto the skin every 24 hours To prevent lidocaine toxicity, patient should be patch free for 12 hrs daily.  Refills: 0     melatonin 5 MG tablet  Used for: Cerebrovascular accident (CVA) due to occlusion of left middle cerebral artery (H)      Dose: 5 mg  Take 1 tablet (5 mg) by mouth nightly as needed for sleep  Refills: 0     menthol-zinc oxide 0.44-20.6 % Oint ointment  Commonly known as: CALMOSEPTINE      Apply topically 2 times daily And prn  Refills: 0     metFORMIN 1000 MG tablet  Commonly known as: GLUCOPHAGE  Used for: Type II diabetes mellitus with peripheral circulatory disorder (H)      Dose: 1,000 mg  1 tablet (1,000 mg) by Oral or Feeding Tube route 2 times daily (with meals)  Refills: 0     miconazole 2 % external cream  Commonly known as: MICATIN  Used for: Cerebrovascular accident (CVA) due to occlusion of left middle cerebral artery (H)      Apply topically 2 times daily  Refills: 0     mirtazapine 15 MG tablet  Commonly known as: REMERON  Used for: Cerebrovascular accident (CVA) due to occlusion of left middle cerebral artery (H)      Dose: 15 mg  Take 1 tablet (15 mg) by mouth At Bedtime  Refills: 0     omeprazole 20 MG DR capsule  Commonly known as: priLOSEC      Dose: 20 mg  Take 20 mg by mouth daily  Refills: 0     senna-docusate 8.6-50 MG tablet  Commonly known as: SENOKOT-S/PERICOLACE  Used for: Cerebrovascular accident (CVA) due to occlusion of left middle cerebral artery (H)      Dose: 2 tablet  2 tablets by Per Feeding Tube route 2 times daily as needed for constipation  Refills: 0            ASSESSMENT/PLAN  Encounter Diagnoses   Name Primary?     Type II diabetes mellitus with peripheral circulatory disorder (H) Yes     Physical deconditioning      Cerebrovascular accident (CVA) due to occlusion of left middle cerebral artery (H)      Diabetes type 2 metformin 1000 mg twice daily, and aspart sliding scale, fingersticks as ordered A1c 2/1/2023 was 6.4    Physical  deconditioning PT OT    Insomnia as needed melatonin    Pain management lidocaine patch, as needed Tylenol    Atrial fibrillation on apixaban twice daily    hypertension continue tenolol    HDL on atorvastatin    History CVA on diltiazem    Neuropathy on gabapentin    Seizures on Keppra      The patient is in need of enteral feeding for 90 days or more.  PT has DX of Oropharyngeal dysphagia, Gastrostomy Complication and CVA.  Patient will need this feeding 7 days per week.    Height:  70 inches  Weight:  197.2# on 5/1/23  Required length of need:  more than 90 days.  Possible lifetime.   Swallow study on 5/8/23 shows advancement to NDD2/IDDSI 5  Formula:  Isosource 1.5.  85cc for 12 hours overnight for seven days per week into   G-tube.   Water flushes:  G-tube:  300cc four times/day.  30cc before and after meds.    Has a G-tube   Method:  Bolus Feedings   Provides:  85cc (1530 kcal, 69.4 grams protein, 1979 cc free water). 779cc formula + 1200cc from flushes. Additional 30cc from medication flushes.      Electronically signed by: Azalea Yu CNP      Sincerely,        Azalea Yu CNP

## 2023-06-06 NOTE — TELEPHONE ENCOUNTER
[facility]     Provider: Beti Perez MD  Facility: [facility]  Facility Type:  TCU    Allergies   Allergen Reactions     Bees Swelling     Reports using an epi pen if stung       Labs Reviewed by provider: TALIA     Verbal Order/Direction given by Provider: No new orders.      Provider giving Order:  Beti Perez MD    Verbal Order given to: Tess([phone])    Stefan Costa RN

## 2023-09-06 NOTE — PROGRESS NOTES
"  [facility]   Acute Rehabilitation Unit  Daily progress note    INTERVAL HISTORY  Stefan Diallo was seen and examined at bedside this morning with significant other present.  No acute events reported overnight.  PT notes still quite somnolent during first session this morning which was observed at the time of my visit as well, but seemed improved later in the morning.  States he slept ok overnight but still feeling tired.  Notes some diffuse abdominal discomfort, significant other notes this is often when he needs to have a BM.  He notes some ongoing shoulder pain as well.  Denies chest pain, palpitations, shortness of breath, dizziness, nausea.    Functionally, he needs min A for bed mobility, mod A for sit to stand with therapy and rahel stedy with nursing.  PT noted increased difficulty with transfers today, mod A to sit up edge of bed and max A for pivot transfer, seemed related to lethargy.    MEDICATIONS    amantadine  100 mg Per Feeding Tube Daily     apixaban ANTICOAGULANT  5 mg Per Feeding Tube BID     atorvastatin  20 mg Per Feeding Tube At Bedtime     diltiazem  60 mg Per Feeding Tube Q8H     docosanol   Topical 5x Daily     gabapentin  600 mg Per Feeding Tube At Bedtime     hydrocortisone   Topical TID     insulin aspart  1-7 Units Subcutaneous TID AC     insulin aspart  1-5 Units Subcutaneous At Bedtime     metFORMIN  1,000 mg Oral or Feeding Tube BID w/meals     pantoprazole  40 mg Per Feeding Tube QAM AC        acetaminophen, calamine, dextrose, glucose **OR** dextrose **OR** glucagon, melatonin, - MEDICATION INSTRUCTIONS -, polyethylene glycol, senna-docusate     PHYSICAL EXAM  /70 (BP Location: Right arm, Patient Position: Semi-Romano's, Cuff Size: Adult Large)   Pulse 98   Temp 97.9  F (36.6  C) (Oral)   Resp 16   Ht 1.854 m (6' 1\")   Wt 89.3 kg (196 lb 13.9 oz)   SpO2 96%   BMI 25.97 kg/m     Gen: NAD, sitting up in chair  HEENT: NC/AT  Cardio: " irregularly irregular, no murmurs  Pulm: non-labored on room air, lungs CTA bilaterally  Abd: soft, non-tender, non-distended, bowel sounds present, +PEG  Ext: no edema in bilateral lower extremities  Neuro/MSK: awake, lethargic, left facial droop, +dysarthria and aphasia, left neglect, left hemiparesis    LABS  CBC RESULTS:   Recent Labs   Lab Test 03/23/23  0557 03/22/23  0541 03/20/23  0655   WBC 11.7* 12.6* 11.7*   RBC 4.26* 4.34* 4.38*   HGB 13.1* 13.2* 13.6   HCT 39.7* 40.4 41.5   MCV 93 93 95   MCH 30.8 30.4 31.1   MCHC 33.0 32.7 32.8   RDW 13.2 13.1 13.2    464* 445     Last Basic Metabolic Panel:  Recent Labs   Lab Test 03/24/23 0206 03/23/23 2100 03/23/23 1738 03/23/23  0620 03/23/23  0557 03/22/23  0650 03/22/23  0541 03/20/23  0728 03/20/23  0655   NA  --   --   --   --  138  --  140  --  140   POTASSIUM  --   --   --   --  3.9  --  4.2  --  4.0   CHLORIDE  --   --   --   --  101  --  103  --  102   CO2  --   --   --   --  24  --  23  --  25   ANIONGAP  --   --   --   --  13  --  14  --  13   * 151* 179*   < > 217*   < > 210*   < > 239*   BUN  --   --   --   --  31.6*  --  32.1*  --  33.2*   CR  --   --   --   --  0.75  --  0.83  --  0.78   GFRESTIMATED  --   --   --   --  >90  --  89  --  >90   LUTHER  --   --   --   --  9.7  --  9.9  --  9.9    < > = values in this interval not displayed.     Recent Labs   Lab 03/24/23 0206 03/23/23 2100 03/23/23 1738 03/23/23  1214 03/23/23  0910 03/23/23  0620   * 151* 179* 154* 180* 207*       Rehabilitation - continue comprehensive acute inpatient rehabilitation program with multidisciplinary approach including therapies, rehab nursing, and physiatry following. See interval history for updates.      ASSESSMENT AND PLAN  Stefan Diallo is a 77 year old male with a past medical history of atrial fibrillation on coumadin though held PTA for planned procedure (pain pump placement), chronic bilateral low back pain s/p lumbar fusion, type 2  diabetes mellitus c/b polyneuropathy, renal artery aneurysm, hypertension, hyperlipidemia, KISHA, GERD, BPH, and chronic bilateral shoulder pain who was admitted on 3/7/23 with acute right MCA stroke s/p tenecteplase with hospital course complicated by dysphagia s/p PEG placement on 3/9 complicated by PEG-site bleeding, suspected aspiration pneumonia, hyperglycemia, constipation, and hypokalemia.  He is now admitted to ARU on 3/16/23 for multidisciplinary rehabilitation and ongoing medical management.        Admission to acute inpatient rehab 03/16/23.    Impairment group code: Stroke Ischemic 01.1 (L) Body Involvement (R) Brain; s/p acute ischemic stroke of R MCA territory due to cardioembolism status post tenecteplase        1. PT, OT and SLP 60 minutes of each on a daily basis, in addition to rehab nursing and close management of physiatrist.       2. Impairment of ADL's: Noted to have impaired activity tolerance, impaired balance, impaired coordination, impaired judgement and safety awareness, impaired strength, impaired tone, impaired weight shifting, impulsiveness and pain, all affecting his ability to safely and independently perform basic ADLs.  Goal for assist of 1 with basic ADLs.     3. Impairment of mobility:  Noted to have impaired activity tolerance, impaired balance, impaired coordination, impaired judgement and safety awareness, impaired strength, impaired tone, impaired weight shifting, impulsiveness and pain, all affecting his ability to safely and independently perform basic mobility.  Goal for assist of 1 with basic mobility.     4. Impairment of cognition/language/swallow:  Noted to have dysarthria, aphasia, and impaired cognition with goals for improved cognitive-linguistic skills to meet basic needs.     5. Medical Conditions  New actions/orders/updates for today are in blue.     Acute R MCA stroke s/p tenecteplase 3/7, likely secondary to cardioembolism from atrial fibrillation off  anticoagulation for an anticipated procedure  Initially on aspirin, later restarted on anticoagulation but with Eliquis in place of PTA warfarin.  - Continue apixaban 5 mg BID  - Continue PTA statin.  Goal LDL 40-70.  - Started on amantadine 100 mg daily 3/24.  Seems to be tolerating, increase to BID (0700, 1300).  - Consult for neuropsych eval  - Long term BP goal <135/80 to be achieved as outpatient within several weeks.  Management as below.  - Continue PT/OT/SLP  - Follow up with general neurology in 6-8 weeks     Possible early sepsis due to aspiration pneumonia  Started on unasyn + vanco 3/11.  MRSA nares negative.  BC's still negative at discharge.  Stopped vancomycin 3/13.  Changed to FT augmentin 3/15 with a plan for 7 total days abx, completed as of 3/18.  - Monitor respiratory status, SLP for dysphagia treatment as below     Atrial fibrillation  [PTA on warfarin, diltiazem]  On chronic anticoagulation with warfarin.  However, had been held since 2/25 for planned pain pump placement. PTA long-acting extended release diltiazem cannot be given through the PEG tube, substituted short-acting. Started on DOAC in place of PTA warfarin on 3/14.  - Continue Eliquis 5 mg BID  - Continue short acting diltiazem 60 mg TID while NPO  - Continue to monitor     Dyslipidemia  [PTA meds: atorvastatin 20 mg daily, gemfibrozil 600 mg BID]  - Continue PTA statin       Hypertension  [PTA meds: atenolol 25 mg daily, diltiazem  mg daily, lisinopril 5 mg daily]  Resuming PTA meds gradually. PTA diltiazem increased to 60 mg TID on 3/14/23.   - Long term BP <130/80, inpatient meds can be slowly titrated to this goal as otherwise appropriate  - Continue diltiazem 60 mg q8h  - BP generally at goal  - Resume PTA lisinopril, atenolol as indicated     Type II DM  Tube feeding induced hyperglycemia  Polyneuropathy  [PTA meds: metformin 500 mg qAM + 1000 mg qPM, gabapentin 600 mg at HS]  A1c 6.4% on 2/1/23.  PTA metformin held this  admission.  Managed on small doses of Lantus and sliding scale insulin.  Stopped Lantus on 3/17, resumed home metformin initially at 500 mg BID, increased to PTA dose as of 3/20.  Recent Labs   Lab 03/24/23  0206 03/23/23  2100 03/23/23  1738 03/23/23  1214 03/23/23  0910 03/23/23  0620   * 151* 179* 154* 180* 207*   - Continue home gabapentin  - Continue metformin 1000 mg BID (increased on 3/21)  - -207, given results of swallow study and may be able to decrease reliance on tube feeds in near future, will hold off on additional changes and monitor  - Continue Novolog medium intensity sliding scale insulin  - Monitor BG TID AC + HS + 0200  - Hypoglycemia protocol     KISHA  - Has been refusing home CPAP, may be contributor to daytime somnolence. Discussed with patient who reports that he has not been using because he does not sleep as well with CPAP.  Also reports that he did not use just PTA for the same reason, unclear if/when used regularly.  Reviewed risks of untreated sleep apnea but may require ongoing discussion given impaired cognition.     Chronic back pain  Patient planned for pain pump placement 3/8/23 (Javi).  States he has chronic back pain from nerve impingement and uses tylenol PTA for pain.   - Continue Tylenol prn for pain  - Monitor     Moderate oral, severe oropharyngeal dysphagia  S/p PEG placement 3/9  - Cycled tube feeds per PEG (switched from continuous on 3/18)  - Continue water flushes 180 mL QID  - RD following, assistance appreciated  - NPO with ice chips per SLP recs with supervision  - Continue SLP  - T tacs removed 3/20   - Repeat VFSS on 3/22.  Per SLP, patient demonstrated significant improvement compared to prior VFSS on 3/9 though still aspiration with thin and mildly thick liquids.  Pending success at bedside observation, anticipate starting PO diet such as minced and moist and moderately thick liquids.  Would repeat VFSS before advancing liquids further.     Loose  stools  Constipation, resolved  - Continue senokot-S 2 tabs BID PRN, Miralax PRN  - Still with loose stools.  Hopeful for improvement if able to start some oral intake.  However if ongoing, may need to consider holding metformin.     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  CRP elevated at 44 (though prevoiusly 312 on 3/11).  Previously had mild leukocytosis (WBC 12.7 on 3/11) in setting of suspected aspiration pneumonia, but normalized the following day.  Afebrile, no localizing signs/symptoms of infection.  Procal very mildly elevated at 0.07.  - 3/23: WBC improved though still mildly elevated at 11.7. Still no localizing signs/symptoms of infection, afebrile.  Will monitor and complete further work-up if clinical changes.  Repeat CBC on Monday or sooner if changes.      6. Adjustment to disability:  Clinical psychology to eval and treat if indicated  7. FEN: NPO on cycled tube feeds via PEG  8. Bowel: incontinent, monitor, PRN bowel meds available  9. Bladder: incontinent, using Primofit at night, will need to wean  10. DVT Prophylaxis: apixaban  11. GI Prophylaxis: PPI  12. Code: full  13. Disposition: goal for home  14. ELOS:  target 4/7/23  15. Follow up Appointments on Discharge: PCP in 1-2 weeks, general neurology in 6-8 weeks      Patient was discussed with Dr. Liu Stinson, PM&R staff physician     Kenna Ruiz PA-C  Physical Medicine & Rehabilitation     No

## 2024-07-09 NOTE — PROGRESS NOTES
Physical Therapy Discharge Summary    Reason for therapy discharge:    Discharged to hospital acutely    Progress towards therapy goal(s). See goals on Care Plan in [facility] electronic health record for goal details.  Goals partially met.  Barriers to achieving goals:   discharge from facility and flucuting therapy tolerance due to on/off infections while on unit.    Therapy recommendation(s):    Continued therapy is recommended.  Rationale/Recommendations:  PT eval/tx once medically appropriate. Following hospital stay, recommend TCU if rehab required as pt will benefit from less intense rehab setting to improve his tolerance and prior to returning home with s/o support. Overall, when not acutely ill, pt was making steady physical gains and was on track for assist gait in the home. W/c order was placed to [facility] for discharge, order put on hold..       n/a

## (undated) DEVICE — Device

## (undated) DEVICE — DRAPE IOBAN ISOLATION VERTICAL 6619

## (undated) DEVICE — LINEN DRAPE 54X72" 5467

## (undated) DEVICE — DRSG ABDOMINAL 07 1/2X8" 7197D

## (undated) DEVICE — 10G BONE ACCESS TOOL KIT

## (undated) DEVICE — SYR 30ML LL W/O NDL 302832

## (undated) DEVICE — DRSG GAUZE 4X4" TRAY

## (undated) DEVICE — IOM 15 MINS UP TO 7 HOURS

## (undated) DEVICE — DRSG STERI STRIP 1/2X4" R1547

## (undated) DEVICE — DECANTER VIAL 2006S

## (undated) DEVICE — DRAPE MAYO STAND 23X54 8337

## (undated) DEVICE — K-WIRES, AMW SPINE

## (undated) DEVICE — SU VICRYL 2-0 CT-2 CR 8X18" J726D

## (undated) DEVICE — PACK SMALL SPINE RIDGES

## (undated) DEVICE — BAG CLEAR TRASH 1.3M 39X33" P4040C

## (undated) DEVICE — CATH TRAY FOLEY COUDE SURESTEP 16FR W/DRN BAG LATEX A304416A

## (undated) DEVICE — SYR 03ML LL W/O NDL 309657

## (undated) DEVICE — DRAPE C-ARM 60X42" 1013

## (undated) DEVICE — PAD FOAM WILSON FRAME/JACKSON TABLE PT KIT 5878

## (undated) DEVICE — POSITIONER PT PRONESAFE HEAD REST W/DERMAPROX INSERT 40599

## (undated) DEVICE — GLOVE PROTEXIS POWDER FREE SMT 8.0  2D72PT80X

## (undated) DEVICE — GLOVE PROTEXIS BLUE W/NEU-THERA 6.5  2D73EB65

## (undated) DEVICE — GLOVE PROTEXIS W/NEU-THERA 6.5  2D73TE65

## (undated) DEVICE — SYR 10ML LL W/O NDL 302995

## (undated) DEVICE — LINEN ORTHO ACL PACK 5447

## (undated) DEVICE — NDL SPINAL 18GA 3.5" 405184

## (undated) DEVICE — PREP DURAPREP 26ML APL 8630

## (undated) DEVICE — GLOVE PROTEXIS POWDER FREE 7.5 ORTHOPEDIC 2D73ET75

## (undated) RX ORDER — FENTANYL CITRATE 50 UG/ML
INJECTION, SOLUTION INTRAMUSCULAR; INTRAVENOUS
Status: DISPENSED
Start: 2023-01-01

## (undated) RX ORDER — ONDANSETRON 2 MG/ML
INJECTION INTRAMUSCULAR; INTRAVENOUS
Status: DISPENSED
Start: 2020-10-29

## (undated) RX ORDER — FENTANYL CITRATE 50 UG/ML
INJECTION, SOLUTION INTRAMUSCULAR; INTRAVENOUS
Status: DISPENSED
Start: 2020-10-29

## (undated) RX ORDER — LIDOCAINE HYDROCHLORIDE 10 MG/ML
INJECTION, SOLUTION INFILTRATION; PERINEURAL
Status: DISPENSED
Start: 2023-01-01

## (undated) RX ORDER — TRIAMCINOLONE ACETONIDE 40 MG/ML
INJECTION, SUSPENSION INTRA-ARTICULAR; INTRAMUSCULAR
Status: DISPENSED
Start: 2020-10-29

## (undated) RX ORDER — KETOROLAC TROMETHAMINE 15 MG/ML
INJECTION, SOLUTION INTRAMUSCULAR; INTRAVENOUS
Status: DISPENSED
Start: 2020-10-29

## (undated) RX ORDER — HYDROMORPHONE HYDROCHLORIDE 1 MG/ML
INJECTION, SOLUTION INTRAMUSCULAR; INTRAVENOUS; SUBCUTANEOUS
Status: DISPENSED
Start: 2020-10-29

## (undated) RX ORDER — CEFAZOLIN SODIUM 2 G/100ML
INJECTION, SOLUTION INTRAVENOUS
Status: DISPENSED
Start: 2020-10-29

## (undated) RX ORDER — BUPIVACAINE HYDROCHLORIDE 2.5 MG/ML
INJECTION, SOLUTION EPIDURAL; INFILTRATION; INTRACAUDAL
Status: DISPENSED
Start: 2020-10-29

## (undated) RX ORDER — CEFAZOLIN SODIUM 2 G/100ML
INJECTION, SOLUTION INTRAVENOUS
Status: DISPENSED
Start: 2023-01-01

## (undated) RX ORDER — BUPIVACAINE HYDROCHLORIDE 7.5 MG/ML
INJECTION, SOLUTION EPIDURAL; RETROBULBAR
Status: DISPENSED
Start: 2020-10-29